# Patient Record
Sex: FEMALE | Race: BLACK OR AFRICAN AMERICAN | Employment: FULL TIME | URBAN - METROPOLITAN AREA
[De-identification: names, ages, dates, MRNs, and addresses within clinical notes are randomized per-mention and may not be internally consistent; named-entity substitution may affect disease eponyms.]

---

## 2017-05-18 PROBLEM — T82.868A THROMBOSIS COMPLICATING VENOUS ACCESS DEVICE (HCC): Chronic | Status: ACTIVE | Noted: 2017-05-18

## 2017-10-12 PROBLEM — D57.1 HB-SS DISEASE WITHOUT CRISIS (HCC): Status: ACTIVE | Noted: 2017-10-12

## 2017-10-12 PROBLEM — M54.40 ACUTE BILATERAL LOW BACK PAIN WITH SCIATICA: Status: ACTIVE | Noted: 2017-10-12

## 2017-12-13 PROBLEM — D57.00 SICKLE CELL CRISIS (HCC): Status: ACTIVE | Noted: 2017-12-13

## 2018-01-19 PROBLEM — Z3A.21 21 WEEKS GESTATION OF PREGNANCY: Status: ACTIVE | Noted: 2018-01-19

## 2018-03-20 ENCOUNTER — OFFICE VISIT (OUTPATIENT)
Dept: VASCULAR SURGERY | Age: 23
End: 2018-03-20

## 2018-03-20 DIAGNOSIS — D57.1 HB-SS DISEASE WITHOUT CRISIS (HCC): Primary | ICD-10-CM

## 2018-03-20 PROCEDURE — 99024 POSTOP FOLLOW-UP VISIT: CPT | Performed by: SURGERY

## 2018-05-28 ENCOUNTER — APPOINTMENT (OUTPATIENT)
Dept: CT IMAGING | Age: 23
DRG: 662 | End: 2018-05-28
Payer: MEDICAID

## 2018-05-28 ENCOUNTER — HOSPITAL ENCOUNTER (INPATIENT)
Age: 23
LOS: 3 days | Discharge: HOME | DRG: 662 | End: 2018-05-31
Attending: EMERGENCY MEDICINE | Admitting: INTERNAL MEDICINE
Payer: MEDICAID

## 2018-05-28 DIAGNOSIS — D57.01 ACUTE CHEST SYNDROME (HCC): Primary | ICD-10-CM

## 2018-05-28 DIAGNOSIS — D57.00 SICKLE CELL PAIN CRISIS (HCC): ICD-10-CM

## 2018-05-28 PROBLEM — J18.9 COMMUNITY ACQUIRED PNEUMONIA OF RIGHT LUNG: Status: ACTIVE | Noted: 2018-05-28

## 2018-05-28 LAB
ABO/RH: NORMAL
ANISOCYTOSIS: ABNORMAL
ANTIBODY SCREEN: NORMAL
APTT: 23.9 SEC (ref 24.5–35.1)
BACTERIA: ABNORMAL /HPF
BASOPHILS ABSOLUTE: 0 E9/L (ref 0–0.2)
BASOPHILS RELATIVE PERCENT: 0.2 % (ref 0–2)
BILIRUBIN URINE: NEGATIVE
BLOOD, URINE: NEGATIVE
CHP ED QC CHECK: YES
CHP ED QC CHECK: YES
CLARITY: CLEAR
COLOR: YELLOW
EKG ATRIAL RATE: 113 BPM
EKG P AXIS: 72 DEGREES
EKG P-R INTERVAL: 148 MS
EKG Q-T INTERVAL: 336 MS
EKG QRS DURATION: 80 MS
EKG QTC CALCULATION (BAZETT): 460 MS
EKG R AXIS: 88 DEGREES
EKG T AXIS: 5 DEGREES
EKG VENTRICULAR RATE: 113 BPM
EOSINOPHILS ABSOLUTE: 0.22 E9/L (ref 0.05–0.5)
EOSINOPHILS RELATIVE PERCENT: 1.5 % (ref 0–6)
EPITHELIAL CELLS, UA: ABNORMAL /HPF
GLUCOSE BLD-MCNC: 105 MG/DL
GLUCOSE URINE: NEGATIVE MG/DL
HCT VFR BLD CALC: 25.2 % (ref 34–48)
HEMOGLOBIN: 8.3 G/DL (ref 11.5–15.5)
HYPOCHROMIA: ABNORMAL
IMMATURE RETIC FRACT: 24.7 % (ref 3–15.9)
INR BLD: 1.2
KETONES, URINE: NEGATIVE MG/DL
LACTIC ACID, SEPSIS: 0.6 MMOL/L (ref 0.5–1.9)
LEUKOCYTE ESTERASE, URINE: ABNORMAL
LYMPHOCYTES ABSOLUTE: 3.17 E9/L (ref 1.5–4)
LYMPHOCYTES RELATIVE PERCENT: 22 % (ref 20–42)
MCH RBC QN AUTO: 18.9 PG (ref 26–35)
MCHC RBC AUTO-ENTMCNC: 32.9 % (ref 32–34.5)
MCV RBC AUTO: 57.3 FL (ref 80–99.9)
MONOCYTES ABSOLUTE: 0.86 E9/L (ref 0.1–0.95)
MONOCYTES RELATIVE PERCENT: 5.5 % (ref 2–12)
NEUTROPHILS ABSOLUTE: 10.22 E9/L (ref 1.8–7.3)
NEUTROPHILS RELATIVE PERCENT: 71 % (ref 43–80)
NITRITE, URINE: NEGATIVE
OVALOCYTES: ABNORMAL
PDW BLD-RTO: 22.5 FL (ref 11.5–15)
PH UA: 6.5 (ref 5–9)
PLATELET # BLD: 275 E9/L (ref 130–450)
PMV BLD AUTO: ABNORMAL FL (ref 7–12)
POC ANION GAP: 17
POC BUN: 7
POC CHLORIDE: 103
POC CO2: 24
POC CREATININE: NORMAL
POC POTASSIUM: NORMAL
POC SODIUM: 140
POIKILOCYTES: ABNORMAL
POLYCHROMASIA: ABNORMAL
PREGNANCY TEST URINE, POC: NEGATIVE
PROCALCITONIN: 0.05 NG/ML (ref 0–0.08)
PROTEIN UA: NEGATIVE MG/DL
PROTHROMBIN TIME: 13.9 SEC (ref 9.3–12.4)
RBC # BLD: 4.4 E12/L (ref 3.5–5.5)
RBC UA: ABNORMAL /HPF (ref 0–2)
RETIC HGB EQUIVALENT: 22.4 PG (ref 28.2–36.6)
RETICULOCYTE ABSOLUTE COUNT: 0.07 E12/L
RETICULOCYTE COUNT PCT: 1.5 % (ref 0.4–1.9)
SPECIFIC GRAVITY UA: 1.01 (ref 1–1.03)
TARGET CELLS: ABNORMAL
TROPONIN: <0.01 NG/ML (ref 0–0.03)
UROBILINOGEN, URINE: 0.2 E.U./DL
WBC # BLD: 14.4 E9/L (ref 4.5–11.5)
WBC UA: ABNORMAL /HPF (ref 0–5)

## 2018-05-28 PROCEDURE — 36415 COLL VENOUS BLD VENIPUNCTURE: CPT

## 2018-05-28 PROCEDURE — 2580000003 HC RX 258: Performed by: NURSE PRACTITIONER

## 2018-05-28 PROCEDURE — 86850 RBC ANTIBODY SCREEN: CPT

## 2018-05-28 PROCEDURE — 2580000003 HC RX 258: Performed by: EMERGENCY MEDICINE

## 2018-05-28 PROCEDURE — 6370000000 HC RX 637 (ALT 250 FOR IP): Performed by: NURSE PRACTITIONER

## 2018-05-28 PROCEDURE — 86900 BLOOD TYPING SEROLOGIC ABO: CPT

## 2018-05-28 PROCEDURE — 85730 THROMBOPLASTIN TIME PARTIAL: CPT

## 2018-05-28 PROCEDURE — 2580000003 HC RX 258: Performed by: INTERNAL MEDICINE

## 2018-05-28 PROCEDURE — 84484 ASSAY OF TROPONIN QUANT: CPT

## 2018-05-28 PROCEDURE — 71275 CT ANGIOGRAPHY CHEST: CPT

## 2018-05-28 PROCEDURE — 85610 PROTHROMBIN TIME: CPT

## 2018-05-28 PROCEDURE — 96374 THER/PROPH/DIAG INJ IV PUSH: CPT

## 2018-05-28 PROCEDURE — 99285 EMERGENCY DEPT VISIT HI MDM: CPT

## 2018-05-28 PROCEDURE — 1200000000 HC SEMI PRIVATE

## 2018-05-28 PROCEDURE — 93005 ELECTROCARDIOGRAM TRACING: CPT | Performed by: EMERGENCY MEDICINE

## 2018-05-28 PROCEDURE — 83605 ASSAY OF LACTIC ACID: CPT

## 2018-05-28 PROCEDURE — 85025 COMPLETE CBC W/AUTO DIFF WBC: CPT

## 2018-05-28 PROCEDURE — 87040 BLOOD CULTURE FOR BACTERIA: CPT

## 2018-05-28 PROCEDURE — 87149 DNA/RNA DIRECT PROBE: CPT

## 2018-05-28 PROCEDURE — 81001 URINALYSIS AUTO W/SCOPE: CPT

## 2018-05-28 PROCEDURE — 6360000002 HC RX W HCPCS: Performed by: NURSE PRACTITIONER

## 2018-05-28 PROCEDURE — 6360000002 HC RX W HCPCS: Performed by: INTERNAL MEDICINE

## 2018-05-28 PROCEDURE — 96375 TX/PRO/DX INJ NEW DRUG ADDON: CPT

## 2018-05-28 PROCEDURE — 86901 BLOOD TYPING SEROLOGIC RH(D): CPT

## 2018-05-28 PROCEDURE — 85045 AUTOMATED RETICULOCYTE COUNT: CPT

## 2018-05-28 PROCEDURE — 6360000002 HC RX W HCPCS: Performed by: EMERGENCY MEDICINE

## 2018-05-28 PROCEDURE — 6370000000 HC RX 637 (ALT 250 FOR IP): Performed by: INTERNAL MEDICINE

## 2018-05-28 PROCEDURE — 6360000004 HC RX CONTRAST MEDICATION: Performed by: RADIOLOGY

## 2018-05-28 PROCEDURE — 84145 PROCALCITONIN (PCT): CPT

## 2018-05-28 RX ORDER — HYDROMORPHONE HCL 110MG/55ML
1 PATIENT CONTROLLED ANALGESIA SYRINGE INTRAVENOUS ONCE
Status: COMPLETED | OUTPATIENT
Start: 2018-05-28 | End: 2018-05-28

## 2018-05-28 RX ORDER — SODIUM CHLORIDE 0.9 % (FLUSH) 0.9 %
10 SYRINGE (ML) INJECTION EVERY 12 HOURS SCHEDULED
Status: DISCONTINUED | OUTPATIENT
Start: 2018-05-28 | End: 2018-05-31 | Stop reason: HOSPADM

## 2018-05-28 RX ORDER — HYDROMORPHONE HCL 110MG/55ML
0.5 PATIENT CONTROLLED ANALGESIA SYRINGE INTRAVENOUS
Status: DISCONTINUED | OUTPATIENT
Start: 2018-05-28 | End: 2018-05-28

## 2018-05-28 RX ORDER — METHYLPREDNISOLONE SODIUM SUCCINATE 125 MG/2ML
60 INJECTION, POWDER, LYOPHILIZED, FOR SOLUTION INTRAMUSCULAR; INTRAVENOUS EVERY 12 HOURS
Status: DISCONTINUED | OUTPATIENT
Start: 2018-05-28 | End: 2018-05-30

## 2018-05-28 RX ORDER — HYDROXYUREA 500 MG/1
500 CAPSULE ORAL 2 TIMES DAILY
Status: DISCONTINUED | OUTPATIENT
Start: 2018-05-28 | End: 2018-05-31 | Stop reason: HOSPADM

## 2018-05-28 RX ORDER — FERROUS SULFATE 325(65) MG
325 TABLET ORAL 2 TIMES DAILY WITH MEALS
Status: DISCONTINUED | OUTPATIENT
Start: 2018-05-28 | End: 2018-05-31 | Stop reason: HOSPADM

## 2018-05-28 RX ORDER — SODIUM CHLORIDE 9 MG/ML
1000 INJECTION, SOLUTION INTRAVENOUS ONCE
Status: COMPLETED | OUTPATIENT
Start: 2018-05-28 | End: 2018-05-28

## 2018-05-28 RX ORDER — OXYCODONE HYDROCHLORIDE AND ACETAMINOPHEN 5; 325 MG/1; MG/1
2 TABLET ORAL EVERY 4 HOURS PRN
Status: DISCONTINUED | OUTPATIENT
Start: 2018-05-28 | End: 2018-05-28

## 2018-05-28 RX ORDER — SODIUM CHLORIDE 9 MG/ML
INJECTION, SOLUTION INTRAVENOUS CONTINUOUS
Status: DISCONTINUED | OUTPATIENT
Start: 2018-05-28 | End: 2018-05-30

## 2018-05-28 RX ORDER — ONDANSETRON 2 MG/ML
4 INJECTION INTRAMUSCULAR; INTRAVENOUS ONCE
Status: COMPLETED | OUTPATIENT
Start: 2018-05-28 | End: 2018-05-28

## 2018-05-28 RX ORDER — MORPHINE SULFATE 10 MG/ML
5 INJECTION, SOLUTION INTRAMUSCULAR; INTRAVENOUS ONCE
Status: COMPLETED | OUTPATIENT
Start: 2018-05-28 | End: 2018-05-28

## 2018-05-28 RX ORDER — IPRATROPIUM BROMIDE AND ALBUTEROL SULFATE 2.5; .5 MG/3ML; MG/3ML
1 SOLUTION RESPIRATORY (INHALATION)
Status: DISCONTINUED | OUTPATIENT
Start: 2018-05-28 | End: 2018-05-31 | Stop reason: HOSPADM

## 2018-05-28 RX ORDER — HYDROMORPHONE HCL 110MG/55ML
1 PATIENT CONTROLLED ANALGESIA SYRINGE INTRAVENOUS
Status: DISCONTINUED | OUTPATIENT
Start: 2018-05-28 | End: 2018-05-31

## 2018-05-28 RX ORDER — FOLIC ACID 1 MG/1
2 TABLET ORAL 3 TIMES DAILY
Status: DISCONTINUED | OUTPATIENT
Start: 2018-05-28 | End: 2018-05-28

## 2018-05-28 RX ORDER — ALBUTEROL SULFATE 2.5 MG/3ML
2.5 SOLUTION RESPIRATORY (INHALATION)
Status: DISCONTINUED | OUTPATIENT
Start: 2018-05-28 | End: 2018-05-31 | Stop reason: HOSPADM

## 2018-05-28 RX ORDER — OXYCODONE AND ACETAMINOPHEN 10; 325 MG/1; MG/1
1 TABLET ORAL EVERY 6 HOURS
Status: DISCONTINUED | OUTPATIENT
Start: 2018-05-28 | End: 2018-05-31 | Stop reason: HOSPADM

## 2018-05-28 RX ORDER — OXYCODONE HYDROCHLORIDE AND ACETAMINOPHEN 5; 325 MG/1; MG/1
1 TABLET ORAL EVERY 4 HOURS PRN
Status: DISCONTINUED | OUTPATIENT
Start: 2018-05-28 | End: 2018-05-28

## 2018-05-28 RX ORDER — SODIUM CHLORIDE 0.9 % (FLUSH) 0.9 %
10 SYRINGE (ML) INJECTION PRN
Status: DISCONTINUED | OUTPATIENT
Start: 2018-05-28 | End: 2018-05-31 | Stop reason: HOSPADM

## 2018-05-28 RX ORDER — FOLIC ACID 1 MG/1
1 TABLET ORAL 3 TIMES DAILY
Status: DISCONTINUED | OUTPATIENT
Start: 2018-05-28 | End: 2018-05-31 | Stop reason: HOSPADM

## 2018-05-28 RX ADMIN — IOPAMIDOL 80 ML: 755 INJECTION, SOLUTION INTRAVENOUS at 02:52

## 2018-05-28 RX ADMIN — OXYCODONE HYDROCHLORIDE AND ACETAMINOPHEN 2 TABLET: 5; 325 TABLET ORAL at 08:33

## 2018-05-28 RX ADMIN — Medication 10 ML: at 19:57

## 2018-05-28 RX ADMIN — Medication 10 ML: at 09:35

## 2018-05-28 RX ADMIN — HYDROMORPHONE HYDROCHLORIDE 1 MG: 2 INJECTION, SOLUTION INTRAMUSCULAR; INTRAVENOUS; SUBCUTANEOUS at 19:57

## 2018-05-28 RX ADMIN — ENOXAPARIN SODIUM 40 MG: 100 INJECTION SUBCUTANEOUS at 10:35

## 2018-05-28 RX ADMIN — SODIUM CHLORIDE 1000 ML: 9 INJECTION, SOLUTION INTRAVENOUS at 03:16

## 2018-05-28 RX ADMIN — METHYLPREDNISOLONE SODIUM SUCCINATE 60 MG: 125 INJECTION, POWDER, FOR SOLUTION INTRAMUSCULAR; INTRAVENOUS at 10:36

## 2018-05-28 RX ADMIN — HYDROXYUREA 500 MG: 500 CAPSULE ORAL at 11:21

## 2018-05-28 RX ADMIN — OXYCODONE HYDROCHLORIDE AND ACETAMINOPHEN 1 TABLET: 10; 325 TABLET ORAL at 17:18

## 2018-05-28 RX ADMIN — SODIUM CHLORIDE 1000 ML: 9 INJECTION, SOLUTION INTRAVENOUS at 06:50

## 2018-05-28 RX ADMIN — ONDANSETRON 4 MG: 2 INJECTION INTRAMUSCULAR; INTRAVENOUS at 03:42

## 2018-05-28 RX ADMIN — HYDROXYUREA 500 MG: 500 CAPSULE ORAL at 19:57

## 2018-05-28 RX ADMIN — MORPHINE SULFATE 5 MG: 10 INJECTION INTRAVENOUS at 03:19

## 2018-05-28 RX ADMIN — HYDROMORPHONE HYDROCHLORIDE 1 MG: 1 INJECTION, SOLUTION INTRAMUSCULAR; INTRAVENOUS; SUBCUTANEOUS at 05:21

## 2018-05-28 RX ADMIN — FOLIC ACID 1 MG: 1 TABLET ORAL at 19:57

## 2018-05-28 RX ADMIN — FERROUS SULFATE TAB 325 MG (65 MG ELEMENTAL FE) 325 MG: 325 (65 FE) TAB at 09:34

## 2018-05-28 RX ADMIN — OXYCODONE HYDROCHLORIDE AND ACETAMINOPHEN 1 TABLET: 10; 325 TABLET ORAL at 22:00

## 2018-05-28 RX ADMIN — SODIUM CHLORIDE: 9 INJECTION, SOLUTION INTRAVENOUS at 23:28

## 2018-05-28 RX ADMIN — METHYLPREDNISOLONE SODIUM SUCCINATE 60 MG: 125 INJECTION, POWDER, FOR SOLUTION INTRAMUSCULAR; INTRAVENOUS at 22:00

## 2018-05-28 RX ADMIN — SODIUM CHLORIDE: 9 INJECTION, SOLUTION INTRAVENOUS at 09:27

## 2018-05-28 RX ADMIN — HYDROMORPHONE HYDROCHLORIDE 0.5 MG: 2 INJECTION INTRAMUSCULAR; INTRAVENOUS; SUBCUTANEOUS at 10:36

## 2018-05-28 RX ADMIN — FERROUS SULFATE TAB 325 MG (65 MG ELEMENTAL FE) 325 MG: 325 (65 FE) TAB at 16:29

## 2018-05-28 RX ADMIN — HYDROMORPHONE HYDROCHLORIDE 1 MG: 2 INJECTION INTRAMUSCULAR; INTRAVENOUS; SUBCUTANEOUS at 13:33

## 2018-05-28 RX ADMIN — HYDROMORPHONE HYDROCHLORIDE 1 MG: 2 INJECTION, SOLUTION INTRAMUSCULAR; INTRAVENOUS; SUBCUTANEOUS at 16:25

## 2018-05-28 RX ADMIN — HYDROMORPHONE HYDROCHLORIDE 1 MG: 2 INJECTION, SOLUTION INTRAMUSCULAR; INTRAVENOUS; SUBCUTANEOUS at 23:28

## 2018-05-28 RX ADMIN — AZITHROMYCIN MONOHYDRATE 500 MG: 500 INJECTION, POWDER, LYOPHILIZED, FOR SOLUTION INTRAVENOUS at 06:51

## 2018-05-28 RX ADMIN — WATER 2 G: 1 INJECTION INTRAMUSCULAR; INTRAVENOUS; SUBCUTANEOUS at 06:47

## 2018-05-28 RX ADMIN — SODIUM CHLORIDE: 9 INJECTION, SOLUTION INTRAVENOUS at 16:29

## 2018-05-28 RX ADMIN — FOLIC ACID 1 MG: 1 TABLET ORAL at 13:33

## 2018-05-28 ASSESSMENT — PAIN DESCRIPTION - PROGRESSION
CLINICAL_PROGRESSION: NOT CHANGED

## 2018-05-28 ASSESSMENT — PAIN SCALES - GENERAL
PAINLEVEL_OUTOF10: 10
PAINLEVEL_OUTOF10: 10
PAINLEVEL_OUTOF10: 8
PAINLEVEL_OUTOF10: 6
PAINLEVEL_OUTOF10: 7
PAINLEVEL_OUTOF10: 9
PAINLEVEL_OUTOF10: 10
PAINLEVEL_OUTOF10: 10
PAINLEVEL_OUTOF10: 9
PAINLEVEL_OUTOF10: 4
PAINLEVEL_OUTOF10: 9
PAINLEVEL_OUTOF10: 10
PAINLEVEL_OUTOF10: 8
PAINLEVEL_OUTOF10: 8
PAINLEVEL_OUTOF10: 9
PAINLEVEL_OUTOF10: 5
PAINLEVEL_OUTOF10: 9

## 2018-05-28 ASSESSMENT — PAIN DESCRIPTION - DESCRIPTORS
DESCRIPTORS: ACHING;CONSTANT;DISCOMFORT
DESCRIPTORS: ACHING;DISCOMFORT
DESCRIPTORS: ACHING;DISCOMFORT
DESCRIPTORS: ACHING;CONSTANT;DISCOMFORT

## 2018-05-28 ASSESSMENT — PAIN DESCRIPTION - PAIN TYPE
TYPE: ACUTE PAIN

## 2018-05-28 ASSESSMENT — ENCOUNTER SYMPTOMS
COUGH: 0
EYE DISCHARGE: 0
WHEEZING: 0
VOMITING: 0
EYE REDNESS: 0
EYE PAIN: 0
BACK PAIN: 1
ABDOMINAL DISTENTION: 0
DIARRHEA: 0
SINUS PRESSURE: 0
SHORTNESS OF BREATH: 0
NAUSEA: 0
SORE THROAT: 0

## 2018-05-28 ASSESSMENT — PAIN DESCRIPTION - ONSET
ONSET: ON-GOING

## 2018-05-28 ASSESSMENT — PAIN DESCRIPTION - FREQUENCY
FREQUENCY: CONTINUOUS

## 2018-05-28 ASSESSMENT — PAIN DESCRIPTION - ORIENTATION
ORIENTATION: RIGHT;LEFT

## 2018-05-28 ASSESSMENT — PAIN DESCRIPTION - LOCATION
LOCATION: GENERALIZED
LOCATION: CHEST
LOCATION: BACK;CHEST;LEG
LOCATION: BACK;CHEST;LEG
LOCATION: GENERALIZED
LOCATION: BACK;CHEST;LEG
LOCATION: BACK;CHEST;LEG

## 2018-05-29 LAB
ALBUMIN SERPL-MCNC: 3.5 G/DL (ref 3.5–5.2)
ALP BLD-CCNC: 94 U/L (ref 35–104)
ALT SERPL-CCNC: 8 U/L (ref 0–32)
ANION GAP SERPL CALCULATED.3IONS-SCNC: 11 MMOL/L (ref 7–16)
ANISOCYTOSIS: ABNORMAL
AST SERPL-CCNC: 12 U/L (ref 0–31)
BASOPHILS ABSOLUTE: 0 E9/L (ref 0–0.2)
BASOPHILS RELATIVE PERCENT: 0.1 % (ref 0–2)
BILIRUB SERPL-MCNC: 0.8 MG/DL (ref 0–1.2)
BUN BLDV-MCNC: 7 MG/DL (ref 6–20)
CALCIUM SERPL-MCNC: 8.7 MG/DL (ref 8.6–10.2)
CHLORIDE BLD-SCNC: 103 MMOL/L (ref 98–107)
CO2: 24 MMOL/L (ref 22–29)
CREAT SERPL-MCNC: 0.7 MG/DL (ref 0.5–1)
EOSINOPHILS ABSOLUTE: 0 E9/L (ref 0.05–0.5)
EOSINOPHILS RELATIVE PERCENT: 0 % (ref 0–6)
GFR AFRICAN AMERICAN: >60
GFR NON-AFRICAN AMERICAN: >60 ML/MIN/1.73
GLUCOSE BLD-MCNC: 238 MG/DL (ref 74–109)
HCT VFR BLD CALC: 25 % (ref 34–48)
HEMOGLOBIN: 8.1 G/DL (ref 11.5–15.5)
HYPOCHROMIA: ABNORMAL
LIPASE: 22 U/L (ref 13–60)
LYMPHOCYTES ABSOLUTE: 0.91 E9/L (ref 1.5–4)
LYMPHOCYTES RELATIVE PERCENT: 5.5 % (ref 20–42)
MCH RBC QN AUTO: 18.8 PG (ref 26–35)
MCHC RBC AUTO-ENTMCNC: 32.4 % (ref 32–34.5)
MCV RBC AUTO: 58 FL (ref 80–99.9)
MONOCYTES ABSOLUTE: 0.15 E9/L (ref 0.1–0.95)
MONOCYTES RELATIVE PERCENT: 1 % (ref 2–12)
NEUTROPHILS ABSOLUTE: 14.19 E9/L (ref 1.8–7.3)
NEUTROPHILS RELATIVE PERCENT: 93.5 % (ref 43–80)
OVALOCYTES: ABNORMAL
PDW BLD-RTO: 22.3 FL (ref 11.5–15)
PLATELET # BLD: 289 E9/L (ref 130–450)
PMV BLD AUTO: ABNORMAL FL (ref 7–12)
POIKILOCYTES: ABNORMAL
POLYCHROMASIA: ABNORMAL
POTASSIUM REFLEX MAGNESIUM: 4.1 MMOL/L (ref 3.5–5)
RBC # BLD: 4.31 E12/L (ref 3.5–5.5)
SODIUM BLD-SCNC: 138 MMOL/L (ref 132–146)
TARGET CELLS: ABNORMAL
TOTAL PROTEIN: 7.3 G/DL (ref 6.4–8.3)
WBC # BLD: 15.1 E9/L (ref 4.5–11.5)

## 2018-05-29 PROCEDURE — 36415 COLL VENOUS BLD VENIPUNCTURE: CPT

## 2018-05-29 PROCEDURE — 6360000002 HC RX W HCPCS: Performed by: NURSE PRACTITIONER

## 2018-05-29 PROCEDURE — 83690 ASSAY OF LIPASE: CPT

## 2018-05-29 PROCEDURE — 2580000003 HC RX 258: Performed by: HOSPITALIST

## 2018-05-29 PROCEDURE — 80053 COMPREHEN METABOLIC PANEL: CPT

## 2018-05-29 PROCEDURE — 85025 COMPLETE CBC W/AUTO DIFF WBC: CPT

## 2018-05-29 PROCEDURE — 6370000000 HC RX 637 (ALT 250 FOR IP): Performed by: NURSE PRACTITIONER

## 2018-05-29 PROCEDURE — 36591 DRAW BLOOD OFF VENOUS DEVICE: CPT

## 2018-05-29 PROCEDURE — 2580000003 HC RX 258: Performed by: INTERNAL MEDICINE

## 2018-05-29 PROCEDURE — 1200000000 HC SEMI PRIVATE

## 2018-05-29 PROCEDURE — 6360000002 HC RX W HCPCS: Performed by: INTERNAL MEDICINE

## 2018-05-29 PROCEDURE — 6360000002 HC RX W HCPCS: Performed by: HOSPITALIST

## 2018-05-29 PROCEDURE — 2580000003 HC RX 258: Performed by: NURSE PRACTITIONER

## 2018-05-29 PROCEDURE — 6370000000 HC RX 637 (ALT 250 FOR IP): Performed by: INTERNAL MEDICINE

## 2018-05-29 RX ORDER — DIPHENHYDRAMINE HCL 25 MG
25 TABLET ORAL EVERY 6 HOURS PRN
Status: DISCONTINUED | OUTPATIENT
Start: 2018-05-29 | End: 2018-05-31 | Stop reason: HOSPADM

## 2018-05-29 RX ORDER — ONDANSETRON 2 MG/ML
4 INJECTION INTRAMUSCULAR; INTRAVENOUS EVERY 6 HOURS PRN
Status: DISCONTINUED | OUTPATIENT
Start: 2018-05-29 | End: 2018-05-31 | Stop reason: HOSPADM

## 2018-05-29 RX ORDER — DIPHENHYDRAMINE HYDROCHLORIDE 50 MG/ML
25 INJECTION INTRAMUSCULAR; INTRAVENOUS ONCE
Status: COMPLETED | OUTPATIENT
Start: 2018-05-29 | End: 2018-05-29

## 2018-05-29 RX ADMIN — FOLIC ACID 1 MG: 1 TABLET ORAL at 21:23

## 2018-05-29 RX ADMIN — OXYCODONE HYDROCHLORIDE AND ACETAMINOPHEN 1 TABLET: 10; 325 TABLET ORAL at 23:54

## 2018-05-29 RX ADMIN — SODIUM CHLORIDE: 9 INJECTION, SOLUTION INTRAVENOUS at 05:54

## 2018-05-29 RX ADMIN — ENOXAPARIN SODIUM 40 MG: 100 INJECTION SUBCUTANEOUS at 10:03

## 2018-05-29 RX ADMIN — FERROUS SULFATE TAB 325 MG (65 MG ELEMENTAL FE) 325 MG: 325 (65 FE) TAB at 10:02

## 2018-05-29 RX ADMIN — HYDROMORPHONE HYDROCHLORIDE 1 MG: 2 INJECTION, SOLUTION INTRAMUSCULAR; INTRAVENOUS; SUBCUTANEOUS at 08:23

## 2018-05-29 RX ADMIN — HYDROXYUREA 500 MG: 500 CAPSULE ORAL at 21:23

## 2018-05-29 RX ADMIN — OXYCODONE HYDROCHLORIDE AND ACETAMINOPHEN 1 TABLET: 10; 325 TABLET ORAL at 11:52

## 2018-05-29 RX ADMIN — FERROUS SULFATE TAB 325 MG (65 MG ELEMENTAL FE) 325 MG: 325 (65 FE) TAB at 17:01

## 2018-05-29 RX ADMIN — OXYCODONE HYDROCHLORIDE AND ACETAMINOPHEN 1 TABLET: 10; 325 TABLET ORAL at 18:33

## 2018-05-29 RX ADMIN — HYDROMORPHONE HYDROCHLORIDE 1 MG: 2 INJECTION, SOLUTION INTRAMUSCULAR; INTRAVENOUS; SUBCUTANEOUS at 21:23

## 2018-05-29 RX ADMIN — DEXTROSE MONOHYDRATE 1 G: 5 INJECTION INTRAVENOUS at 10:02

## 2018-05-29 RX ADMIN — HYDROMORPHONE HYDROCHLORIDE 1 MG: 2 INJECTION, SOLUTION INTRAMUSCULAR; INTRAVENOUS; SUBCUTANEOUS at 13:40

## 2018-05-29 RX ADMIN — AZITHROMYCIN MONOHYDRATE 500 MG: 500 INJECTION, POWDER, LYOPHILIZED, FOR SOLUTION INTRAVENOUS at 07:08

## 2018-05-29 RX ADMIN — DIPHENHYDRAMINE HCL 25 MG: 25 TABLET ORAL at 18:36

## 2018-05-29 RX ADMIN — Medication 10 ML: at 21:30

## 2018-05-29 RX ADMIN — HYDROMORPHONE HYDROCHLORIDE 1 MG: 2 INJECTION, SOLUTION INTRAMUSCULAR; INTRAVENOUS; SUBCUTANEOUS at 04:22

## 2018-05-29 RX ADMIN — DIPHENHYDRAMINE HYDROCHLORIDE 25 MG: 50 INJECTION, SOLUTION INTRAMUSCULAR; INTRAVENOUS at 01:04

## 2018-05-29 RX ADMIN — SODIUM CHLORIDE: 9 INJECTION, SOLUTION INTRAVENOUS at 17:34

## 2018-05-29 RX ADMIN — HYDROMORPHONE HYDROCHLORIDE 1 MG: 2 INJECTION, SOLUTION INTRAMUSCULAR; INTRAVENOUS; SUBCUTANEOUS at 17:01

## 2018-05-29 RX ADMIN — ONDANSETRON 4 MG: 2 INJECTION INTRAMUSCULAR; INTRAVENOUS at 10:11

## 2018-05-29 RX ADMIN — METHYLPREDNISOLONE SODIUM SUCCINATE 60 MG: 125 INJECTION, POWDER, FOR SOLUTION INTRAMUSCULAR; INTRAVENOUS at 21:23

## 2018-05-29 RX ADMIN — OXYCODONE HYDROCHLORIDE AND ACETAMINOPHEN 1 TABLET: 10; 325 TABLET ORAL at 05:53

## 2018-05-29 RX ADMIN — FOLIC ACID 1 MG: 1 TABLET ORAL at 10:02

## 2018-05-29 RX ADMIN — HYDROXYUREA 500 MG: 500 CAPSULE ORAL at 10:03

## 2018-05-29 RX ADMIN — FOLIC ACID 1 MG: 1 TABLET ORAL at 13:28

## 2018-05-29 RX ADMIN — METHYLPREDNISOLONE SODIUM SUCCINATE 60 MG: 125 INJECTION, POWDER, FOR SOLUTION INTRAMUSCULAR; INTRAVENOUS at 10:02

## 2018-05-29 ASSESSMENT — PAIN SCALES - GENERAL
PAINLEVEL_OUTOF10: 9
PAINLEVEL_OUTOF10: 9
PAINLEVEL_OUTOF10: 0
PAINLEVEL_OUTOF10: 9
PAINLEVEL_OUTOF10: 8
PAINLEVEL_OUTOF10: 0
PAINLEVEL_OUTOF10: 8
PAINLEVEL_OUTOF10: 6
PAINLEVEL_OUTOF10: 8
PAINLEVEL_OUTOF10: 7
PAINLEVEL_OUTOF10: 7
PAINLEVEL_OUTOF10: 9

## 2018-05-29 ASSESSMENT — PAIN DESCRIPTION - PAIN TYPE
TYPE: ACUTE PAIN

## 2018-05-29 ASSESSMENT — PAIN DESCRIPTION - DESCRIPTORS
DESCRIPTORS: ACHING;CRAMPING;SORE
DESCRIPTORS: ACHING;CRAMPING;SORE
DESCRIPTORS: ACHING;CRAMPING;DISCOMFORT;SORE
DESCRIPTORS: ACHING;DISCOMFORT;CRAMPING;SORE

## 2018-05-29 ASSESSMENT — PAIN DESCRIPTION - ORIENTATION
ORIENTATION: RIGHT;LEFT

## 2018-05-29 ASSESSMENT — PAIN DESCRIPTION - ONSET
ONSET: ON-GOING

## 2018-05-29 ASSESSMENT — PAIN DESCRIPTION - PROGRESSION
CLINICAL_PROGRESSION: NOT CHANGED

## 2018-05-29 ASSESSMENT — PAIN DESCRIPTION - FREQUENCY
FREQUENCY: CONTINUOUS

## 2018-05-29 ASSESSMENT — PAIN DESCRIPTION - LOCATION
LOCATION: GENERALIZED
LOCATION: RIB CAGE;BACK;LEG

## 2018-05-30 LAB
ALBUMIN SERPL-MCNC: 3.4 G/DL (ref 3.5–5.2)
ALP BLD-CCNC: 98 U/L (ref 35–104)
ALT SERPL-CCNC: 7 U/L (ref 0–32)
ANION GAP SERPL CALCULATED.3IONS-SCNC: 9 MMOL/L (ref 7–16)
ANISOCYTOSIS: ABNORMAL
AST SERPL-CCNC: 13 U/L (ref 0–31)
BASOPHILS ABSOLUTE: 0 E9/L (ref 0–0.2)
BASOPHILS RELATIVE PERCENT: 0.1 % (ref 0–2)
BILIRUB SERPL-MCNC: 0.5 MG/DL (ref 0–1.2)
BUN BLDV-MCNC: 6 MG/DL (ref 6–20)
CALCIUM SERPL-MCNC: 8.4 MG/DL (ref 8.6–10.2)
CHLORIDE BLD-SCNC: 108 MMOL/L (ref 98–107)
CO2: 25 MMOL/L (ref 22–29)
CREAT SERPL-MCNC: 0.7 MG/DL (ref 0.5–1)
EOSINOPHILS ABSOLUTE: 0 E9/L (ref 0.05–0.5)
EOSINOPHILS RELATIVE PERCENT: 0 % (ref 0–6)
GFR AFRICAN AMERICAN: >60
GFR NON-AFRICAN AMERICAN: >60 ML/MIN/1.73
GLUCOSE BLD-MCNC: 227 MG/DL (ref 74–109)
HCT VFR BLD CALC: 25.6 % (ref 34–48)
HEMOGLOBIN: 8.3 G/DL (ref 11.5–15.5)
HYPOCHROMIA: ABNORMAL
LIPASE: 32 U/L (ref 13–60)
LYMPHOCYTES ABSOLUTE: 0.85 E9/L (ref 1.5–4)
LYMPHOCYTES RELATIVE PERCENT: 3 % (ref 20–42)
MCH RBC QN AUTO: 18.9 PG (ref 26–35)
MCHC RBC AUTO-ENTMCNC: 32.4 % (ref 32–34.5)
MCV RBC AUTO: 58.4 FL (ref 80–99.9)
MONOCYTES ABSOLUTE: 0.57 E9/L (ref 0.1–0.95)
MONOCYTES RELATIVE PERCENT: 1.5 % (ref 2–12)
NEUTROPHILS ABSOLUTE: 27.17 E9/L (ref 1.8–7.3)
NEUTROPHILS RELATIVE PERCENT: 95.5 % (ref 43–80)
NUCLEATED RED BLOOD CELLS: 0.5 /100 WBC
PDW BLD-RTO: 22.5 FL (ref 11.5–15)
PLATELET # BLD: 316 E9/L (ref 130–450)
PMV BLD AUTO: ABNORMAL FL (ref 7–12)
POIKILOCYTES: ABNORMAL
POLYCHROMASIA: ABNORMAL
POTASSIUM REFLEX MAGNESIUM: 3.8 MMOL/L (ref 3.5–5)
PROCALCITONIN: 0.03 NG/ML (ref 0–0.08)
RBC # BLD: 4.38 E12/L (ref 3.5–5.5)
SODIUM BLD-SCNC: 142 MMOL/L (ref 132–146)
TARGET CELLS: ABNORMAL
TOTAL PROTEIN: 7 G/DL (ref 6.4–8.3)
WBC # BLD: 28.3 E9/L (ref 4.5–11.5)

## 2018-05-30 PROCEDURE — 87040 BLOOD CULTURE FOR BACTERIA: CPT

## 2018-05-30 PROCEDURE — 85025 COMPLETE CBC W/AUTO DIFF WBC: CPT

## 2018-05-30 PROCEDURE — 84145 PROCALCITONIN (PCT): CPT

## 2018-05-30 PROCEDURE — 2580000003 HC RX 258: Performed by: NURSE PRACTITIONER

## 2018-05-30 PROCEDURE — 6370000000 HC RX 637 (ALT 250 FOR IP): Performed by: NURSE PRACTITIONER

## 2018-05-30 PROCEDURE — 36591 DRAW BLOOD OFF VENOUS DEVICE: CPT

## 2018-05-30 PROCEDURE — 1200000000 HC SEMI PRIVATE

## 2018-05-30 PROCEDURE — 36415 COLL VENOUS BLD VENIPUNCTURE: CPT

## 2018-05-30 PROCEDURE — 6360000002 HC RX W HCPCS: Performed by: HOSPITALIST

## 2018-05-30 PROCEDURE — 6360000002 HC RX W HCPCS: Performed by: INTERNAL MEDICINE

## 2018-05-30 PROCEDURE — 80053 COMPREHEN METABOLIC PANEL: CPT

## 2018-05-30 PROCEDURE — 6360000002 HC RX W HCPCS: Performed by: NURSE PRACTITIONER

## 2018-05-30 PROCEDURE — 6370000000 HC RX 637 (ALT 250 FOR IP): Performed by: INTERNAL MEDICINE

## 2018-05-30 PROCEDURE — 86902 BLOOD TYPE ANTIGEN DONOR EA: CPT

## 2018-05-30 PROCEDURE — 2580000003 HC RX 258: Performed by: HOSPITALIST

## 2018-05-30 PROCEDURE — 83690 ASSAY OF LIPASE: CPT

## 2018-05-30 RX ORDER — METHYLPREDNISOLONE SODIUM SUCCINATE 40 MG/ML
20 INJECTION, POWDER, LYOPHILIZED, FOR SOLUTION INTRAMUSCULAR; INTRAVENOUS EVERY 12 HOURS
Status: DISCONTINUED | OUTPATIENT
Start: 2018-05-30 | End: 2018-05-31

## 2018-05-30 RX ADMIN — DEXTROSE MONOHYDRATE 1 G: 5 INJECTION INTRAVENOUS at 09:29

## 2018-05-30 RX ADMIN — DIPHENHYDRAMINE HCL 25 MG: 25 TABLET ORAL at 06:27

## 2018-05-30 RX ADMIN — HYDROMORPHONE HYDROCHLORIDE 1 MG: 2 INJECTION, SOLUTION INTRAMUSCULAR; INTRAVENOUS; SUBCUTANEOUS at 15:54

## 2018-05-30 RX ADMIN — OXYCODONE HYDROCHLORIDE AND ACETAMINOPHEN 1 TABLET: 10; 325 TABLET ORAL at 23:50

## 2018-05-30 RX ADMIN — Medication 10 ML: at 20:31

## 2018-05-30 RX ADMIN — METHYLPREDNISOLONE SODIUM SUCCINATE 60 MG: 125 INJECTION, POWDER, FOR SOLUTION INTRAMUSCULAR; INTRAVENOUS at 10:42

## 2018-05-30 RX ADMIN — FERROUS SULFATE TAB 325 MG (65 MG ELEMENTAL FE) 325 MG: 325 (65 FE) TAB at 09:29

## 2018-05-30 RX ADMIN — FOLIC ACID 1 MG: 1 TABLET ORAL at 13:37

## 2018-05-30 RX ADMIN — HYDROMORPHONE HYDROCHLORIDE 1 MG: 2 INJECTION, SOLUTION INTRAMUSCULAR; INTRAVENOUS; SUBCUTANEOUS at 01:21

## 2018-05-30 RX ADMIN — Medication 10 ML: at 22:29

## 2018-05-30 RX ADMIN — AZITHROMYCIN MONOHYDRATE 500 MG: 500 INJECTION, POWDER, LYOPHILIZED, FOR SOLUTION INTRAVENOUS at 07:29

## 2018-05-30 RX ADMIN — FOLIC ACID 1 MG: 1 TABLET ORAL at 21:50

## 2018-05-30 RX ADMIN — METHYLPREDNISOLONE SODIUM SUCCINATE 20 MG: 40 INJECTION, POWDER, FOR SOLUTION INTRAMUSCULAR; INTRAVENOUS at 22:29

## 2018-05-30 RX ADMIN — HYDROXYUREA 500 MG: 500 CAPSULE ORAL at 09:29

## 2018-05-30 RX ADMIN — HYDROMORPHONE HYDROCHLORIDE 1 MG: 2 INJECTION, SOLUTION INTRAMUSCULAR; INTRAVENOUS; SUBCUTANEOUS at 08:21

## 2018-05-30 RX ADMIN — SODIUM CHLORIDE: 9 INJECTION, SOLUTION INTRAVENOUS at 03:33

## 2018-05-30 RX ADMIN — OXYCODONE HYDROCHLORIDE AND ACETAMINOPHEN 1 TABLET: 10; 325 TABLET ORAL at 18:42

## 2018-05-30 RX ADMIN — HYDROMORPHONE HYDROCHLORIDE 1 MG: 2 INJECTION, SOLUTION INTRAMUSCULAR; INTRAVENOUS; SUBCUTANEOUS at 20:31

## 2018-05-30 RX ADMIN — DIPHENHYDRAMINE HCL 25 MG: 25 TABLET ORAL at 15:54

## 2018-05-30 RX ADMIN — FERROUS SULFATE TAB 325 MG (65 MG ELEMENTAL FE) 325 MG: 325 (65 FE) TAB at 17:07

## 2018-05-30 RX ADMIN — ENOXAPARIN SODIUM 40 MG: 100 INJECTION SUBCUTANEOUS at 09:29

## 2018-05-30 RX ADMIN — OXYCODONE HYDROCHLORIDE AND ACETAMINOPHEN 1 TABLET: 10; 325 TABLET ORAL at 13:37

## 2018-05-30 RX ADMIN — OXYCODONE HYDROCHLORIDE AND ACETAMINOPHEN 1 TABLET: 10; 325 TABLET ORAL at 06:17

## 2018-05-30 RX ADMIN — ONDANSETRON 4 MG: 2 INJECTION INTRAMUSCULAR; INTRAVENOUS at 08:21

## 2018-05-30 RX ADMIN — HYDROXYUREA 500 MG: 500 CAPSULE ORAL at 21:50

## 2018-05-30 RX ADMIN — HYDROMORPHONE HYDROCHLORIDE 1 MG: 2 INJECTION, SOLUTION INTRAMUSCULAR; INTRAVENOUS; SUBCUTANEOUS at 04:45

## 2018-05-30 RX ADMIN — FOLIC ACID 1 MG: 1 TABLET ORAL at 09:29

## 2018-05-30 RX ADMIN — HYDROMORPHONE HYDROCHLORIDE 1 MG: 2 INJECTION, SOLUTION INTRAMUSCULAR; INTRAVENOUS; SUBCUTANEOUS at 11:27

## 2018-05-30 ASSESSMENT — PAIN SCALES - GENERAL
PAINLEVEL_OUTOF10: 0
PAINLEVEL_OUTOF10: 8
PAINLEVEL_OUTOF10: 4
PAINLEVEL_OUTOF10: 8
PAINLEVEL_OUTOF10: 6
PAINLEVEL_OUTOF10: 8
PAINLEVEL_OUTOF10: 5
PAINLEVEL_OUTOF10: 9
PAINLEVEL_OUTOF10: 8
PAINLEVEL_OUTOF10: 7
PAINLEVEL_OUTOF10: 8
PAINLEVEL_OUTOF10: 0
PAINLEVEL_OUTOF10: 8
PAINLEVEL_OUTOF10: 10

## 2018-05-30 ASSESSMENT — PAIN DESCRIPTION - PAIN TYPE
TYPE: ACUTE PAIN

## 2018-05-30 ASSESSMENT — PAIN DESCRIPTION - ORIENTATION
ORIENTATION: RIGHT;LEFT
ORIENTATION: RIGHT;LEFT
ORIENTATION: RIGHT

## 2018-05-30 ASSESSMENT — PAIN DESCRIPTION - LOCATION
LOCATION: GENERALIZED
LOCATION: GENERALIZED
LOCATION: BACK;GENERALIZED
LOCATION: GENERALIZED

## 2018-05-30 ASSESSMENT — PAIN DESCRIPTION - PROGRESSION
CLINICAL_PROGRESSION: NOT CHANGED

## 2018-05-30 ASSESSMENT — PAIN DESCRIPTION - ONSET
ONSET: ON-GOING

## 2018-05-30 ASSESSMENT — PAIN DESCRIPTION - DESCRIPTORS
DESCRIPTORS: SHARP
DESCRIPTORS: ACHING;CONSTANT;DISCOMFORT
DESCRIPTORS: CONSTANT;DISCOMFORT;DULL
DESCRIPTORS: ACHING;DISCOMFORT;CONSTANT
DESCRIPTORS: ACHING;CONSTANT;CRAMPING
DESCRIPTORS: ACHING;CONSTANT;CRAMPING;DISCOMFORT
DESCRIPTORS: ACHING;CONSTANT;CRAMPING

## 2018-05-30 ASSESSMENT — PAIN DESCRIPTION - FREQUENCY
FREQUENCY: CONTINUOUS

## 2018-05-31 VITALS
BODY MASS INDEX: 50.02 KG/M2 | OXYGEN SATURATION: 99 % | DIASTOLIC BLOOD PRESSURE: 64 MMHG | WEIGHT: 293 LBS | SYSTOLIC BLOOD PRESSURE: 129 MMHG | TEMPERATURE: 97.8 F | HEART RATE: 69 BPM | RESPIRATION RATE: 18 BRPM | HEIGHT: 64 IN

## 2018-05-31 LAB
ANISOCYTOSIS: ABNORMAL
BASOPHILS ABSOLUTE: 0 E9/L (ref 0–0.2)
BASOPHILS RELATIVE PERCENT: 0.2 % (ref 0–2)
EOSINOPHILS ABSOLUTE: 0 E9/L (ref 0.05–0.5)
EOSINOPHILS RELATIVE PERCENT: 0 % (ref 0–6)
HCT VFR BLD CALC: 24.8 % (ref 34–48)
HEMOGLOBIN: 8.1 G/DL (ref 11.5–15.5)
HYPOCHROMIA: ABNORMAL
LIPASE: 33 U/L (ref 13–60)
LYMPHOCYTES ABSOLUTE: 3.53 E9/L (ref 1.5–4)
LYMPHOCYTES RELATIVE PERCENT: 9.5 % (ref 20–42)
MCH RBC QN AUTO: 19.1 PG (ref 26–35)
MCHC RBC AUTO-ENTMCNC: 32.7 % (ref 32–34.5)
MCV RBC AUTO: 58.4 FL (ref 80–99.9)
MONOCYTES ABSOLUTE: 0.71 E9/L (ref 0.1–0.95)
MONOCYTES RELATIVE PERCENT: 1.5 % (ref 2–12)
NEUTROPHILS ABSOLUTE: 31.42 E9/L (ref 1.8–7.3)
NEUTROPHILS RELATIVE PERCENT: 88.9 % (ref 43–80)
OVALOCYTES: ABNORMAL
PDW BLD-RTO: 22.5 FL (ref 11.5–15)
PLATELET # BLD: 308 E9/L (ref 130–450)
PMV BLD AUTO: ABNORMAL FL (ref 7–12)
POIKILOCYTES: ABNORMAL
POLYCHROMASIA: ABNORMAL
RBC # BLD: 4.25 E12/L (ref 3.5–5.5)
TARGET CELLS: ABNORMAL
TEAR DROP CELLS: ABNORMAL
WBC # BLD: 35.3 E9/L (ref 4.5–11.5)

## 2018-05-31 PROCEDURE — 6360000002 HC RX W HCPCS: Performed by: HOSPITALIST

## 2018-05-31 PROCEDURE — 6360000002 HC RX W HCPCS: Performed by: INTERNAL MEDICINE

## 2018-05-31 PROCEDURE — 6370000000 HC RX 637 (ALT 250 FOR IP): Performed by: INTERNAL MEDICINE

## 2018-05-31 PROCEDURE — 6370000000 HC RX 637 (ALT 250 FOR IP): Performed by: NURSE PRACTITIONER

## 2018-05-31 PROCEDURE — 6360000002 HC RX W HCPCS: Performed by: NURSE PRACTITIONER

## 2018-05-31 PROCEDURE — 36591 DRAW BLOOD OFF VENOUS DEVICE: CPT

## 2018-05-31 PROCEDURE — 85025 COMPLETE CBC W/AUTO DIFF WBC: CPT

## 2018-05-31 PROCEDURE — 6360000002 HC RX W HCPCS

## 2018-05-31 PROCEDURE — 83690 ASSAY OF LIPASE: CPT

## 2018-05-31 PROCEDURE — 36415 COLL VENOUS BLD VENIPUNCTURE: CPT

## 2018-05-31 PROCEDURE — 2580000003 HC RX 258: Performed by: NURSE PRACTITIONER

## 2018-05-31 RX ORDER — HEPARIN SODIUM (PORCINE) LOCK FLUSH IV SOLN 100 UNIT/ML 100 UNIT/ML
SOLUTION INTRAVENOUS
Status: COMPLETED
Start: 2018-05-31 | End: 2018-05-31

## 2018-05-31 RX ORDER — OXYCODONE HYDROCHLORIDE AND ACETAMINOPHEN 5; 325 MG/1; MG/1
1 TABLET ORAL EVERY 6 HOURS PRN
Qty: 20 TABLET | Refills: 0 | Status: SHIPPED | OUTPATIENT
Start: 2018-05-31 | End: 2018-06-07

## 2018-05-31 RX ORDER — LEVOFLOXACIN 500 MG/1
500 TABLET, FILM COATED ORAL DAILY
Qty: 7 TABLET | Refills: 0 | Status: SHIPPED | OUTPATIENT
Start: 2018-05-31 | End: 2018-06-07

## 2018-05-31 RX ORDER — HYDROMORPHONE HCL 110MG/55ML
1 PATIENT CONTROLLED ANALGESIA SYRINGE INTRAVENOUS 4 TIMES DAILY PRN
Status: DISCONTINUED | OUTPATIENT
Start: 2018-05-31 | End: 2018-05-31 | Stop reason: HOSPADM

## 2018-05-31 RX ADMIN — AZITHROMYCIN MONOHYDRATE 500 MG: 500 INJECTION, POWDER, LYOPHILIZED, FOR SOLUTION INTRAVENOUS at 06:29

## 2018-05-31 RX ADMIN — FOLIC ACID 1 MG: 1 TABLET ORAL at 13:30

## 2018-05-31 RX ADMIN — HYDROMORPHONE HYDROCHLORIDE 1 MG: 2 INJECTION, SOLUTION INTRAMUSCULAR; INTRAVENOUS; SUBCUTANEOUS at 08:07

## 2018-05-31 RX ADMIN — HYDROMORPHONE HYDROCHLORIDE 1 MG: 2 INJECTION, SOLUTION INTRAMUSCULAR; INTRAVENOUS; SUBCUTANEOUS at 01:27

## 2018-05-31 RX ADMIN — HYDROMORPHONE HYDROCHLORIDE 1 MG: 2 INJECTION, SOLUTION INTRAMUSCULAR; INTRAVENOUS; SUBCUTANEOUS at 04:41

## 2018-05-31 RX ADMIN — Medication 10 ML: at 04:41

## 2018-05-31 RX ADMIN — FOLIC ACID 1 MG: 1 TABLET ORAL at 08:06

## 2018-05-31 RX ADMIN — DIPHENHYDRAMINE HCL 25 MG: 25 TABLET ORAL at 01:27

## 2018-05-31 RX ADMIN — HYDROMORPHONE HYDROCHLORIDE 1 MG: 2 INJECTION INTRAMUSCULAR; INTRAVENOUS; SUBCUTANEOUS at 13:30

## 2018-05-31 RX ADMIN — HEPARIN 100 UNITS: 100 SYRINGE at 16:35

## 2018-05-31 RX ADMIN — ENOXAPARIN SODIUM 40 MG: 100 INJECTION SUBCUTANEOUS at 08:06

## 2018-05-31 RX ADMIN — OXYCODONE HYDROCHLORIDE AND ACETAMINOPHEN 1 TABLET: 10; 325 TABLET ORAL at 06:28

## 2018-05-31 RX ADMIN — Medication 10 ML: at 08:07

## 2018-05-31 RX ADMIN — OXYCODONE HYDROCHLORIDE AND ACETAMINOPHEN 1 TABLET: 10; 325 TABLET ORAL at 12:08

## 2018-05-31 RX ADMIN — DEXTROSE MONOHYDRATE 1 G: 5 INJECTION INTRAVENOUS at 08:06

## 2018-05-31 RX ADMIN — FERROUS SULFATE TAB 325 MG (65 MG ELEMENTAL FE) 325 MG: 325 (65 FE) TAB at 08:06

## 2018-05-31 RX ADMIN — HYDROXYUREA 500 MG: 500 CAPSULE ORAL at 08:05

## 2018-05-31 ASSESSMENT — PAIN DESCRIPTION - PAIN TYPE
TYPE: ACUTE PAIN

## 2018-05-31 ASSESSMENT — PAIN SCALES - GENERAL
PAINLEVEL_OUTOF10: 8
PAINLEVEL_OUTOF10: 8
PAINLEVEL_OUTOF10: 7
PAINLEVEL_OUTOF10: 7
PAINLEVEL_OUTOF10: 6
PAINLEVEL_OUTOF10: 7
PAINLEVEL_OUTOF10: 8
PAINLEVEL_OUTOF10: 7
PAINLEVEL_OUTOF10: 6
PAINLEVEL_OUTOF10: 8
PAINLEVEL_OUTOF10: 8

## 2018-05-31 ASSESSMENT — PAIN DESCRIPTION - LOCATION
LOCATION: GENERALIZED
LOCATION: BACK;CHEST;GENERALIZED
LOCATION: GENERALIZED
LOCATION: BACK;CHEST;GENERALIZED
LOCATION: BACK;CHEST;GENERALIZED

## 2018-05-31 ASSESSMENT — PAIN DESCRIPTION - DESCRIPTORS
DESCRIPTORS: ACHING;CONSTANT;DISCOMFORT
DESCRIPTORS: SHARP
DESCRIPTORS: ACHING;CONSTANT;DISCOMFORT

## 2018-05-31 ASSESSMENT — PAIN DESCRIPTION - PROGRESSION: CLINICAL_PROGRESSION: NOT CHANGED

## 2018-05-31 ASSESSMENT — PAIN DESCRIPTION - FREQUENCY: FREQUENCY: CONTINUOUS

## 2018-06-01 LAB
CULTURE, BLOOD 2: ABNORMAL
CULTURE, BLOOD 2: ABNORMAL
ORGANISM: ABNORMAL

## 2018-06-02 LAB — BLOOD CULTURE, ROUTINE: NORMAL

## 2018-06-04 LAB — BLOOD CULTURE, ROUTINE: NORMAL

## 2018-08-19 ENCOUNTER — APPOINTMENT (OUTPATIENT)
Dept: GENERAL RADIOLOGY | Age: 23
End: 2018-08-19
Payer: MEDICAID

## 2018-08-19 ENCOUNTER — HOSPITAL ENCOUNTER (EMERGENCY)
Age: 23
Discharge: HOME | End: 2018-08-19
Attending: EMERGENCY MEDICINE
Payer: MEDICAID

## 2018-08-19 VITALS
DIASTOLIC BLOOD PRESSURE: 77 MMHG | TEMPERATURE: 98 F | HEIGHT: 64 IN | WEIGHT: 283 LBS | BODY MASS INDEX: 48.32 KG/M2 | SYSTOLIC BLOOD PRESSURE: 109 MMHG | OXYGEN SATURATION: 100 % | RESPIRATION RATE: 16 BRPM | HEART RATE: 81 BPM

## 2018-08-19 DIAGNOSIS — D57.00 SICKLE CELL ANEMIA WITH PAIN (HCC): Primary | ICD-10-CM

## 2018-08-19 LAB
ALBUMIN SERPL-MCNC: 3.9 G/DL (ref 3.5–5.2)
ALP BLD-CCNC: 110 U/L (ref 35–104)
ALT SERPL-CCNC: <5 U/L (ref 0–32)
ANION GAP SERPL CALCULATED.3IONS-SCNC: 12 MMOL/L (ref 7–16)
ANISOCYTOSIS: ABNORMAL
AST SERPL-CCNC: 10 U/L (ref 0–31)
BACTERIA: ABNORMAL /HPF
BASOPHILS ABSOLUTE: 0.03 E9/L (ref 0–0.2)
BASOPHILS RELATIVE PERCENT: 0.3 % (ref 0–2)
BILIRUB SERPL-MCNC: 1 MG/DL (ref 0–1.2)
BILIRUBIN URINE: NEGATIVE
BLOOD, URINE: ABNORMAL
BUN BLDV-MCNC: 11 MG/DL (ref 6–20)
CALCIUM SERPL-MCNC: 9 MG/DL (ref 8.6–10.2)
CHLORIDE BLD-SCNC: 103 MMOL/L (ref 98–107)
CHP ED QC CHECK: YES
CLARITY: ABNORMAL
CO2: 24 MMOL/L (ref 22–29)
COLOR: YELLOW
CREAT SERPL-MCNC: 0.9 MG/DL (ref 0.5–1)
EKG ATRIAL RATE: 73 BPM
EKG P AXIS: 51 DEGREES
EKG P-R INTERVAL: 144 MS
EKG Q-T INTERVAL: 398 MS
EKG QRS DURATION: 84 MS
EKG QTC CALCULATION (BAZETT): 438 MS
EKG R AXIS: 56 DEGREES
EKG T AXIS: 24 DEGREES
EKG VENTRICULAR RATE: 73 BPM
EOSINOPHILS ABSOLUTE: 0.14 E9/L (ref 0.05–0.5)
EOSINOPHILS RELATIVE PERCENT: 1.2 % (ref 0–6)
EPITHELIAL CELLS, UA: ABNORMAL /HPF
GFR AFRICAN AMERICAN: >60
GFR NON-AFRICAN AMERICAN: >60 ML/MIN/1.73
GLUCOSE BLD-MCNC: 97 MG/DL (ref 74–109)
GLUCOSE URINE: NEGATIVE MG/DL
HCT VFR BLD CALC: 28.1 % (ref 34–48)
HEMOGLOBIN: 9.3 G/DL (ref 11.5–15.5)
HYPOCHROMIA: ABNORMAL
IMMATURE GRANULOCYTES #: 0.05 E9/L
IMMATURE GRANULOCYTES %: 0.4 % (ref 0–5)
IMMATURE RETIC FRACT: 22.6 % (ref 3–15.9)
KETONES, URINE: NEGATIVE MG/DL
LACTIC ACID: 0.7 MMOL/L (ref 0.5–2.2)
LEUKOCYTE ESTERASE, URINE: ABNORMAL
LIPASE: 28 U/L (ref 13–60)
LYMPHOCYTES ABSOLUTE: 3.91 E9/L (ref 1.5–4)
LYMPHOCYTES RELATIVE PERCENT: 33.1 % (ref 20–42)
MCH RBC QN AUTO: 19.3 PG (ref 26–35)
MCHC RBC AUTO-ENTMCNC: 33.1 % (ref 32–34.5)
MCV RBC AUTO: 58.3 FL (ref 80–99.9)
MONOCYTES ABSOLUTE: 0.72 E9/L (ref 0.1–0.95)
MONOCYTES RELATIVE PERCENT: 6.1 % (ref 2–12)
NEUTROPHILS ABSOLUTE: 6.97 E9/L (ref 1.8–7.3)
NEUTROPHILS RELATIVE PERCENT: 58.9 % (ref 43–80)
NITRITE, URINE: NEGATIVE
PDW BLD-RTO: 20.3 FL (ref 11.5–15)
PH UA: 6.5 (ref 5–9)
PLATELET # BLD: 300 E9/L (ref 130–450)
PMV BLD AUTO: ABNORMAL FL (ref 7–12)
POIKILOCYTES: ABNORMAL
POLYCHROMASIA: ABNORMAL
POTASSIUM SERPL-SCNC: 4.1 MMOL/L (ref 3.5–5)
PREGNANCY TEST URINE, POC: NEGATIVE
PROTEIN UA: NEGATIVE MG/DL
RBC # BLD: 4.82 E12/L (ref 3.5–5.5)
RBC UA: ABNORMAL /HPF (ref 0–2)
RETIC HGB EQUIVALENT: 21.3 PG (ref 28.2–36.6)
RETICULOCYTE ABSOLUTE COUNT: 0.08 E12/L
RETICULOCYTE COUNT PCT: 1.6 % (ref 0.4–1.9)
SODIUM BLD-SCNC: 139 MMOL/L (ref 132–146)
SPECIFIC GRAVITY UA: 1.01 (ref 1–1.03)
SPHEROCYTES: ABNORMAL
TARGET CELLS: ABNORMAL
TEAR DROP CELLS: ABNORMAL
TOTAL PROTEIN: 7.4 G/DL (ref 6.4–8.3)
TROPONIN: <0.01 NG/ML (ref 0–0.03)
UROBILINOGEN, URINE: 0.2 E.U./DL
WBC # BLD: 11.8 E9/L (ref 4.5–11.5)
WBC UA: ABNORMAL /HPF (ref 0–5)

## 2018-08-19 PROCEDURE — 6360000002 HC RX W HCPCS: Performed by: PHYSICIAN ASSISTANT

## 2018-08-19 PROCEDURE — 85025 COMPLETE CBC W/AUTO DIFF WBC: CPT

## 2018-08-19 PROCEDURE — 71046 X-RAY EXAM CHEST 2 VIEWS: CPT

## 2018-08-19 PROCEDURE — 96372 THER/PROPH/DIAG INJ SC/IM: CPT

## 2018-08-19 PROCEDURE — 81001 URINALYSIS AUTO W/SCOPE: CPT

## 2018-08-19 PROCEDURE — S0028 INJECTION, FAMOTIDINE, 20 MG: HCPCS | Performed by: PHYSICIAN ASSISTANT

## 2018-08-19 PROCEDURE — 96375 TX/PRO/DX INJ NEW DRUG ADDON: CPT

## 2018-08-19 PROCEDURE — 83605 ASSAY OF LACTIC ACID: CPT

## 2018-08-19 PROCEDURE — 87088 URINE BACTERIA CULTURE: CPT

## 2018-08-19 PROCEDURE — 83690 ASSAY OF LIPASE: CPT

## 2018-08-19 PROCEDURE — 93005 ELECTROCARDIOGRAM TRACING: CPT | Performed by: PHYSICIAN ASSISTANT

## 2018-08-19 PROCEDURE — 36415 COLL VENOUS BLD VENIPUNCTURE: CPT

## 2018-08-19 PROCEDURE — 80053 COMPREHEN METABOLIC PANEL: CPT

## 2018-08-19 PROCEDURE — 96376 TX/PRO/DX INJ SAME DRUG ADON: CPT

## 2018-08-19 PROCEDURE — 99284 EMERGENCY DEPT VISIT MOD MDM: CPT

## 2018-08-19 PROCEDURE — 2580000003 HC RX 258: Performed by: PHYSICIAN ASSISTANT

## 2018-08-19 PROCEDURE — 96374 THER/PROPH/DIAG INJ IV PUSH: CPT

## 2018-08-19 PROCEDURE — 84484 ASSAY OF TROPONIN QUANT: CPT

## 2018-08-19 PROCEDURE — 85045 AUTOMATED RETICULOCYTE COUNT: CPT

## 2018-08-19 PROCEDURE — 2500000003 HC RX 250 WO HCPCS: Performed by: PHYSICIAN ASSISTANT

## 2018-08-19 RX ORDER — DIPHENHYDRAMINE HYDROCHLORIDE 50 MG/ML
25 INJECTION INTRAMUSCULAR; INTRAVENOUS ONCE
Status: COMPLETED | OUTPATIENT
Start: 2018-08-19 | End: 2018-08-19

## 2018-08-19 RX ORDER — HEPARIN SODIUM (PORCINE) LOCK FLUSH IV SOLN 100 UNIT/ML 100 UNIT/ML
100 SOLUTION INTRAVENOUS ONCE
Status: COMPLETED | OUTPATIENT
Start: 2018-08-19 | End: 2018-08-19

## 2018-08-19 RX ORDER — ALBUTEROL SULFATE 0.63 MG/3ML
1 SOLUTION RESPIRATORY (INHALATION) EVERY 4 HOURS PRN
COMMUNITY
End: 2019-05-01

## 2018-08-19 RX ORDER — 0.9 % SODIUM CHLORIDE 0.9 %
1000 INTRAVENOUS SOLUTION INTRAVENOUS ONCE
Status: COMPLETED | OUTPATIENT
Start: 2018-08-19 | End: 2018-08-19

## 2018-08-19 RX ADMIN — FAMOTIDINE 20 MG: 10 INJECTION, SOLUTION INTRAVENOUS at 18:29

## 2018-08-19 RX ADMIN — SODIUM CHLORIDE 1000 ML: 9 INJECTION, SOLUTION INTRAVENOUS at 18:28

## 2018-08-19 RX ADMIN — HEPARIN 100 UNITS: 100 SYRINGE at 20:25

## 2018-08-19 RX ADMIN — HYDROMORPHONE HYDROCHLORIDE 1 MG: 1 INJECTION, SOLUTION INTRAMUSCULAR; INTRAVENOUS; SUBCUTANEOUS at 19:37

## 2018-08-19 RX ADMIN — HYDROMORPHONE HYDROCHLORIDE 1 MG: 1 INJECTION, SOLUTION INTRAMUSCULAR; INTRAVENOUS; SUBCUTANEOUS at 18:28

## 2018-08-19 RX ADMIN — ENOXAPARIN SODIUM 100 MG: 100 INJECTION SUBCUTANEOUS at 19:37

## 2018-08-19 RX ADMIN — DIPHENHYDRAMINE HYDROCHLORIDE 25 MG: 50 INJECTION, SOLUTION INTRAMUSCULAR; INTRAVENOUS at 18:29

## 2018-08-19 ASSESSMENT — PAIN DESCRIPTION - DESCRIPTORS: DESCRIPTORS: CRAMPING

## 2018-08-19 ASSESSMENT — PAIN DESCRIPTION - ORIENTATION: ORIENTATION: UPPER

## 2018-08-19 ASSESSMENT — PAIN DESCRIPTION - LOCATION: LOCATION: ABDOMEN

## 2018-08-19 ASSESSMENT — PAIN DESCRIPTION - PAIN TYPE: TYPE: ACUTE PAIN

## 2018-08-19 ASSESSMENT — PAIN DESCRIPTION - FREQUENCY: FREQUENCY: CONTINUOUS

## 2018-08-19 ASSESSMENT — PAIN SCALES - GENERAL: PAINLEVEL_OUTOF10: 9

## 2018-08-19 NOTE — ED PROVIDER NOTES
ED Attending  CC: Yes      HPI:  8/19/18, Time: 5:55PM.       Tabatha Sosa is a 21 y.o. female presenting to the ED for sickle cell pain to her lower back and hips as well as anterior chest soreness and upper abdominal discomfort. Her symptoms began worse since yesterday. She lives out of the area and is at the hospital today because her best friend is in labor. She has had no known fever or chills. She has had some nausea, but no vomiting. The complaint has been persistent, moderate in severity, and worsened by nothing. She has an extensive history of sickle cell, pulmonary embolism as well as pneumonia a few months ago. ROS:   Pertinent positives and negatives are stated within the HPI, all other systems reviewed and are negative.    --------------------------------------------- PAST HISTORY ---------------------------------------------  Past Medical History:  has a past medical history of CLABSI (central line-associated bloodstream infection); Deep vein thrombosis (DVT) of left upper extremity ([facility] [facility] Disease of blood and blood forming organ; Heart murmur; 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[address] Miscarriage within last 12 months; Obesity, Class III, BMI 40-49.9 (morbid obesity) ([address] PE (pulmonary embolism); PE (pulmonary embolism); Pneumonia; Psychiatric problem; Sickle cell anemia ([facility] [address] and Thrombosis complicating venous access device ([facility] [address] Past Surgical History:  has a past surgical history that includes knee surgery; Tonsillectomy; Tunneled venous port placement (2014); Tunneled venous port placement (Right, 01/23/2015); other surgical history (08/04/2016); Tunneled venous port placement (Right, 09/15/2016); Tunneled venous port placement (Right, 05/18/2017); and other surgical history (Right, 03/05/2018). Social History:  reports that she has never smoked. She has never used smokeless tobacco. She reports that she does not drink alcohol or use drugs.     Family History: family history includes Cancer in her maternal grandmother; Diabetes in her mother; High Blood Pressure in her mother; Kidney Disease in her paternal grandmother. The patients home medications have been reviewed. Allergies: Demerol hcl [meperidine]; Adhesive tape; and Bactrim [sulfamethoxazole-trimethoprim]      ---------------------------------------------------PHYSICAL EXAM--------------------------------------    Constitutional/General: Alert and oriented x3, well appearing, non toxic in NAD  Head: NC/AT  Eyes: PERRL, EOMI  Ears and Nose: No discharge or bleeding  Mouth: Oropharynx clear, no erythema, mucosa moist,  handling secretions, no trismus  Neck: Supple, full ROM, non tender to palpation in the midline, no stridor, no crepitus, no meningeal signs  Pulmonary: Lungs clear to auscultation bilaterally, no current active wheezes, rales, or rhonchi. Not in respiratory distress  Cardiovascular:  Regular rate. Regular rhythm. . 2+ distal pulses  Chest: Some mild anterior chest wall tenderness  Abdomen: Soft. Some mid epigastric tenderness, Non distended. +BS. No rebound, guarding, or rigidity. No pulsatile masses appreciated. No CVA tenderness posteriorly bilaterally. Musculoskeletal: Moves all extremities x 4. Warm and well perfused, no clubbing, cyanosis, or edema. Capillary refill <3 seconds  Skin: warm and dry. No rashes. Neurologic: GCS 15, CN 2-12 grossly intact, no focal deficits, symmetric strength 5/5 in the upper and lower extremities bilaterally  Psych: Normal Affect    -------------------------------------------------- RESULTS -------------------------------------------------  I have personally reviewed all laboratory and imaging results for this patient. Results are listed below.      LABS:  Results for orders placed or performed during the hospital encounter of 08/19/18   CBC Auto Differential   Result Value Ref Range    WBC 11.8 (H) 4.5 - 11.5 E9/L    RBC 4.82 3.50 - 5.50 E12/L    Hemoglobin 9.3 (L) 11.5 - 15.5 g/dL Intravenous Given 8/19/18 1937)   heparin flush 100 UNIT/ML injection 100 Units (100 Units Intercatheter Given 8/19/18 2025)       Medical Decision Making:    Patient to ER with complaints of upper abdominal soreness, chest soreness and sickle cell pain. Re-Evaluations:             Re-evaluation. Patients symptoms stable  Will check EKG, labs, CXR, UA. Patient given IVF, Benadryl, Dilaudid and Pepcid. 7:25PM Patient resting, somewhat improved with Dilaudid given. Patient advised of stable labs so far. Patient has missed 2 Lovenox doses as she forgot her medication at home, history of PE. Additional dose of IV Dilaudid ordered for pain. Will give dose of Lovenox now as well.    8:13PM Patient eating cheese puffs and pop in room, using her cell phone, feeling improved after second dose of Dilaudid given. Patient advised to make sure that she uses her meds as prescribed and to use her Lovenox twice daily as prescribed. Recommend to see her PCP upon arriving home for follow up. This patient's ED course included: a personal history and physicial examination, re-evaluation prior to disposition, multiple bedside re-evaluations, IV medications, cardiac monitoring, continuous pulse oximetry and complex medical decision making and emergency management    This patient has remained hemodynamically stable during their ED course. Counseling: The emergency provider has spoken with the patient and discussed todays results, in addition to providing specific details for the plan of care and counseling regarding the diagnosis and prognosis. Questions are answered at this time and they are agreeable with the plan.     --------------------------------- IMPRESSION AND DISPOSITION ---------------------------------    IMPRESSION  1.  Sickle cell anemia with pain (HCC)        DISPOSITION  Disposition: Discharge to home  Patient condition is stable    Patient seen and discussed with Dr. Susi Choudhary

## 2018-08-20 NOTE — ED NOTES
Discharge instructions given, medications and follow up instructions reviewed. Patient verbalized understanding, no other noted or stated problems at this time. Patient will follow up with physicians as directed.       Vinnie Baer RN  08/19/18 [id number]

## 2018-08-21 LAB — URINE CULTURE, ROUTINE: NORMAL

## 2018-11-16 ENCOUNTER — APPOINTMENT (OUTPATIENT)
Dept: GENERAL RADIOLOGY | Age: 23
DRG: 662 | End: 2018-11-16
Payer: MEDICAID

## 2018-11-16 ENCOUNTER — HOSPITAL ENCOUNTER (EMERGENCY)
Age: 23
Discharge: HOME | DRG: 662 | End: 2018-11-16
Attending: EMERGENCY MEDICINE
Payer: MEDICAID

## 2018-11-16 VITALS
OXYGEN SATURATION: 100 % | RESPIRATION RATE: 16 BRPM | SYSTOLIC BLOOD PRESSURE: 150 MMHG | HEIGHT: 63 IN | WEIGHT: 290 LBS | TEMPERATURE: 98.2 F | HEART RATE: 81 BPM | BODY MASS INDEX: 51.38 KG/M2 | DIASTOLIC BLOOD PRESSURE: 81 MMHG

## 2018-11-16 DIAGNOSIS — D57.00 SICKLE CELL PAIN CRISIS (HCC): Primary | ICD-10-CM

## 2018-11-16 LAB
ALBUMIN SERPL-MCNC: 3.8 G/DL (ref 3.5–5.2)
ALP BLD-CCNC: 99 U/L (ref 35–104)
ALT SERPL-CCNC: 6 U/L (ref 0–32)
ANION GAP SERPL CALCULATED.3IONS-SCNC: 11 MMOL/L (ref 7–16)
ANISOCYTOSIS: ABNORMAL
AST SERPL-CCNC: 11 U/L (ref 0–31)
BASOPHILS ABSOLUTE: 0.02 E9/L (ref 0–0.2)
BASOPHILS RELATIVE PERCENT: 0.2 % (ref 0–2)
BILIRUB SERPL-MCNC: 0.8 MG/DL (ref 0–1.2)
BUN BLDV-MCNC: 7 MG/DL (ref 6–20)
CALCIUM SERPL-MCNC: 8.5 MG/DL (ref 8.6–10.2)
CHLORIDE BLD-SCNC: 103 MMOL/L (ref 98–107)
CO2: 24 MMOL/L (ref 22–29)
CREAT SERPL-MCNC: 0.8 MG/DL (ref 0.5–1)
EKG ATRIAL RATE: 59 BPM
EKG P AXIS: 20 DEGREES
EKG P-R INTERVAL: 138 MS
EKG Q-T INTERVAL: 418 MS
EKG QRS DURATION: 88 MS
EKG QTC CALCULATION (BAZETT): 413 MS
EKG R AXIS: 43 DEGREES
EKG T AXIS: 24 DEGREES
EKG VENTRICULAR RATE: 59 BPM
EOSINOPHILS ABSOLUTE: 0.22 E9/L (ref 0.05–0.5)
EOSINOPHILS RELATIVE PERCENT: 2.2 % (ref 0–6)
GFR AFRICAN AMERICAN: >60
GFR NON-AFRICAN AMERICAN: >60 ML/MIN/1.73
GLUCOSE BLD-MCNC: 123 MG/DL (ref 74–99)
HCT VFR BLD CALC: 27.3 % (ref 34–48)
HEMOGLOBIN: 9.2 G/DL (ref 11.5–15.5)
IMMATURE GRANULOCYTES #: 0.06 E9/L
IMMATURE GRANULOCYTES %: 0.6 % (ref 0–5)
IMMATURE RETIC FRACT: 20.7 % (ref 3–15.9)
LYMPHOCYTES ABSOLUTE: 3.98 E9/L (ref 1.5–4)
LYMPHOCYTES RELATIVE PERCENT: 38.9 % (ref 20–42)
MCH RBC QN AUTO: 19.5 PG (ref 26–35)
MCHC RBC AUTO-ENTMCNC: 33.7 % (ref 32–34.5)
MCV RBC AUTO: 57.7 FL (ref 80–99.9)
MONOCYTES ABSOLUTE: 0.55 E9/L (ref 0.1–0.95)
MONOCYTES RELATIVE PERCENT: 5.4 % (ref 2–12)
NEUTROPHILS ABSOLUTE: 5.39 E9/L (ref 1.8–7.3)
NEUTROPHILS RELATIVE PERCENT: 52.7 % (ref 43–80)
PDW BLD-RTO: 20.5 FL (ref 11.5–15)
PLATELET # BLD: 306 E9/L (ref 130–450)
PMV BLD AUTO: ABNORMAL FL (ref 7–12)
POIKILOCYTES: ABNORMAL
POTASSIUM SERPL-SCNC: 3.8 MMOL/L (ref 3.5–5)
RBC # BLD: 4.73 E12/L (ref 3.5–5.5)
RETIC HGB EQUIVALENT: 21.9 PG (ref 28.2–36.6)
RETICULOCYTE ABSOLUTE COUNT: 0.06 E12/L
RETICULOCYTE COUNT PCT: 1.3 % (ref 0.4–1.9)
SODIUM BLD-SCNC: 138 MMOL/L (ref 132–146)
TARGET CELLS: ABNORMAL
TOTAL PROTEIN: 7 G/DL (ref 6.4–8.3)
WBC # BLD: 10.2 E9/L (ref 4.5–11.5)

## 2018-11-16 PROCEDURE — 93005 ELECTROCARDIOGRAM TRACING: CPT

## 2018-11-16 PROCEDURE — 6360000002 HC RX W HCPCS: Performed by: EMERGENCY MEDICINE

## 2018-11-16 PROCEDURE — 96375 TX/PRO/DX INJ NEW DRUG ADDON: CPT

## 2018-11-16 PROCEDURE — 80053 COMPREHEN METABOLIC PANEL: CPT

## 2018-11-16 PROCEDURE — 96376 TX/PRO/DX INJ SAME DRUG ADON: CPT

## 2018-11-16 PROCEDURE — 85045 AUTOMATED RETICULOCYTE COUNT: CPT

## 2018-11-16 PROCEDURE — 6360000002 HC RX W HCPCS

## 2018-11-16 PROCEDURE — 85025 COMPLETE CBC W/AUTO DIFF WBC: CPT

## 2018-11-16 PROCEDURE — 36415 COLL VENOUS BLD VENIPUNCTURE: CPT

## 2018-11-16 PROCEDURE — 71045 X-RAY EXAM CHEST 1 VIEW: CPT

## 2018-11-16 PROCEDURE — 99284 EMERGENCY DEPT VISIT MOD MDM: CPT

## 2018-11-16 PROCEDURE — 96374 THER/PROPH/DIAG INJ IV PUSH: CPT

## 2018-11-16 RX ORDER — HEPARIN SODIUM (PORCINE) LOCK FLUSH IV SOLN 100 UNIT/ML 100 UNIT/ML
SOLUTION INTRAVENOUS
Status: COMPLETED
Start: 2018-11-16 | End: 2018-11-16

## 2018-11-16 RX ORDER — KETOROLAC TROMETHAMINE 30 MG/ML
15 INJECTION, SOLUTION INTRAMUSCULAR; INTRAVENOUS ONCE
Status: COMPLETED | OUTPATIENT
Start: 2018-11-16 | End: 2018-11-16

## 2018-11-16 RX ORDER — OXYCODONE HYDROCHLORIDE 5 MG/1
5 TABLET ORAL EVERY 6 HOURS PRN
Qty: 12 TABLET | Refills: 0 | Status: ON HOLD | OUTPATIENT
Start: 2018-11-16 | End: 2018-11-22 | Stop reason: HOSPADM

## 2018-11-16 RX ADMIN — HYDROMORPHONE HYDROCHLORIDE 1 MG: 1 INJECTION, SOLUTION INTRAMUSCULAR; INTRAVENOUS; SUBCUTANEOUS at 16:22

## 2018-11-16 RX ADMIN — HYDROMORPHONE HYDROCHLORIDE 1 MG: 1 INJECTION, SOLUTION INTRAMUSCULAR; INTRAVENOUS; SUBCUTANEOUS at 15:20

## 2018-11-16 RX ADMIN — Medication 300 UNITS: at 18:28

## 2018-11-16 RX ADMIN — KETOROLAC TROMETHAMINE 15 MG: 30 INJECTION, SOLUTION INTRAMUSCULAR at 15:20

## 2018-11-16 ASSESSMENT — PAIN DESCRIPTION - PROGRESSION: CLINICAL_PROGRESSION: NOT CHANGED

## 2018-11-16 ASSESSMENT — ENCOUNTER SYMPTOMS
DIARRHEA: 0
SHORTNESS OF BREATH: 0
ABDOMINAL DISTENTION: 0
COUGH: 0
EYE REDNESS: 0
SORE THROAT: 0
WHEEZING: 0
BACK PAIN: 0
EYE DISCHARGE: 0
NAUSEA: 0
VOMITING: 0
EYE PAIN: 0
SINUS PRESSURE: 0

## 2018-11-16 ASSESSMENT — PAIN DESCRIPTION - PAIN TYPE: TYPE: ACUTE PAIN

## 2018-11-16 ASSESSMENT — PAIN DESCRIPTION - ONSET: ONSET: ON-GOING

## 2018-11-16 ASSESSMENT — PAIN DESCRIPTION - FREQUENCY: FREQUENCY: CONTINUOUS

## 2018-11-16 ASSESSMENT — PAIN SCALES - GENERAL
PAINLEVEL_OUTOF10: 9
PAINLEVEL_OUTOF10: 9
PAINLEVEL_OUTOF10: 7

## 2018-11-16 ASSESSMENT — PAIN DESCRIPTION - LOCATION: LOCATION: GENERALIZED

## 2018-11-16 NOTE — ED NOTES
Bed: 01  Expected date:   Expected time:   Means of arrival:   Comments:     Isaías Burch RN  11/16/18 [id number]

## 2018-11-18 ENCOUNTER — APPOINTMENT (OUTPATIENT)
Dept: CT IMAGING | Age: 23
DRG: 662 | End: 2018-11-18
Payer: MEDICAID

## 2018-11-18 ENCOUNTER — HOSPITAL ENCOUNTER (INPATIENT)
Age: 23
LOS: 3 days | Discharge: HOME | DRG: 662 | End: 2018-11-22
Attending: EMERGENCY MEDICINE | Admitting: INTERNAL MEDICINE
Payer: MEDICAID

## 2018-11-18 DIAGNOSIS — R11.0 NAUSEA: ICD-10-CM

## 2018-11-18 DIAGNOSIS — D57.00 SICKLE CELL ANEMIA WITH PAIN (HCC): Primary | ICD-10-CM

## 2018-11-18 LAB
ANION GAP SERPL CALCULATED.3IONS-SCNC: 10 MMOL/L (ref 7–16)
ANISOCYTOSIS: ABNORMAL
BASOPHILS ABSOLUTE: 0.02 E9/L (ref 0–0.2)
BASOPHILS RELATIVE PERCENT: 0.2 % (ref 0–2)
BUN BLDV-MCNC: 10 MG/DL (ref 6–20)
CALCIUM SERPL-MCNC: 8.8 MG/DL (ref 8.6–10.2)
CHLORIDE BLD-SCNC: 101 MMOL/L (ref 98–107)
CO2: 26 MMOL/L (ref 22–29)
CREAT SERPL-MCNC: 0.9 MG/DL (ref 0.5–1)
EKG ATRIAL RATE: 76 BPM
EKG P AXIS: 47 DEGREES
EKG P-R INTERVAL: 148 MS
EKG Q-T INTERVAL: 392 MS
EKG QRS DURATION: 82 MS
EKG QTC CALCULATION (BAZETT): 441 MS
EKG R AXIS: 48 DEGREES
EKG T AXIS: 28 DEGREES
EKG VENTRICULAR RATE: 76 BPM
EOSINOPHILS ABSOLUTE: 0.14 E9/L (ref 0.05–0.5)
EOSINOPHILS RELATIVE PERCENT: 1.6 % (ref 0–6)
GFR AFRICAN AMERICAN: >60
GFR NON-AFRICAN AMERICAN: >60 ML/MIN/1.73
GLUCOSE BLD-MCNC: 95 MG/DL (ref 74–99)
HCG(URINE) PREGNANCY TEST: NEGATIVE
HCT VFR BLD CALC: 28.8 % (ref 34–48)
HEMOGLOBIN: 9.6 G/DL (ref 11.5–15.5)
IMMATURE GRANULOCYTES #: 0.03 E9/L
IMMATURE GRANULOCYTES %: 0.3 % (ref 0–5)
IMMATURE RETIC FRACT: 25.9 % (ref 3–15.9)
LYMPHOCYTES ABSOLUTE: 3.27 E9/L (ref 1.5–4)
LYMPHOCYTES RELATIVE PERCENT: 36.7 % (ref 20–42)
MCH RBC QN AUTO: 19.5 PG (ref 26–35)
MCHC RBC AUTO-ENTMCNC: 33.3 % (ref 32–34.5)
MCV RBC AUTO: 58.4 FL (ref 80–99.9)
MONOCYTES ABSOLUTE: 0.6 E9/L (ref 0.1–0.95)
MONOCYTES RELATIVE PERCENT: 6.7 % (ref 2–12)
NEUTROPHILS ABSOLUTE: 4.84 E9/L (ref 1.8–7.3)
NEUTROPHILS RELATIVE PERCENT: 54.5 % (ref 43–80)
PDW BLD-RTO: 21 FL (ref 11.5–15)
PLATELET # BLD: 292 E9/L (ref 130–450)
PMV BLD AUTO: ABNORMAL FL (ref 7–12)
POIKILOCYTES: ABNORMAL
POLYCHROMASIA: ABNORMAL
POTASSIUM SERPL-SCNC: 3.9 MMOL/L (ref 3.5–5)
RBC # BLD: 4.93 E12/L (ref 3.5–5.5)
RETIC HGB EQUIVALENT: 22.2 PG (ref 28.2–36.6)
RETICULOCYTE ABSOLUTE COUNT: 0.07 E12/L
RETICULOCYTE COUNT PCT: 1.4 % (ref 0.4–1.9)
SODIUM BLD-SCNC: 137 MMOL/L (ref 132–146)
TARGET CELLS: ABNORMAL
TEAR DROP CELLS: ABNORMAL
TROPONIN: <0.01 NG/ML (ref 0–0.03)
WBC # BLD: 8.9 E9/L (ref 4.5–11.5)

## 2018-11-18 PROCEDURE — 6360000002 HC RX W HCPCS: Performed by: INTERNAL MEDICINE

## 2018-11-18 PROCEDURE — 6370000000 HC RX 637 (ALT 250 FOR IP): Performed by: INTERNAL MEDICINE

## 2018-11-18 PROCEDURE — 99219 PR INITIAL OBSERVATION CARE/DAY 50 MINUTES: CPT | Performed by: INTERNAL MEDICINE

## 2018-11-18 PROCEDURE — 71275 CT ANGIOGRAPHY CHEST: CPT

## 2018-11-18 PROCEDURE — 85045 AUTOMATED RETICULOCYTE COUNT: CPT

## 2018-11-18 PROCEDURE — 2580000003 HC RX 258: Performed by: STUDENT IN AN ORGANIZED HEALTH CARE EDUCATION/TRAINING PROGRAM

## 2018-11-18 PROCEDURE — 85025 COMPLETE CBC W/AUTO DIFF WBC: CPT

## 2018-11-18 PROCEDURE — 6360000004 HC RX CONTRAST MEDICATION: Performed by: RADIOLOGY

## 2018-11-18 PROCEDURE — 81025 URINE PREGNANCY TEST: CPT

## 2018-11-18 PROCEDURE — 6370000000 HC RX 637 (ALT 250 FOR IP): Performed by: STUDENT IN AN ORGANIZED HEALTH CARE EDUCATION/TRAINING PROGRAM

## 2018-11-18 PROCEDURE — 84484 ASSAY OF TROPONIN QUANT: CPT

## 2018-11-18 PROCEDURE — G0378 HOSPITAL OBSERVATION PER HR: HCPCS

## 2018-11-18 PROCEDURE — 80048 BASIC METABOLIC PNL TOTAL CA: CPT

## 2018-11-18 PROCEDURE — 96374 THER/PROPH/DIAG INJ IV PUSH: CPT

## 2018-11-18 PROCEDURE — 6360000002 HC RX W HCPCS: Performed by: STUDENT IN AN ORGANIZED HEALTH CARE EDUCATION/TRAINING PROGRAM

## 2018-11-18 PROCEDURE — 93005 ELECTROCARDIOGRAM TRACING: CPT | Performed by: STUDENT IN AN ORGANIZED HEALTH CARE EDUCATION/TRAINING PROGRAM

## 2018-11-18 PROCEDURE — 2580000003 HC RX 258: Performed by: INTERNAL MEDICINE

## 2018-11-18 PROCEDURE — 94761 N-INVAS EAR/PLS OXIMETRY MLT: CPT

## 2018-11-18 PROCEDURE — 96375 TX/PRO/DX INJ NEW DRUG ADDON: CPT

## 2018-11-18 PROCEDURE — 96376 TX/PRO/DX INJ SAME DRUG ADON: CPT

## 2018-11-18 PROCEDURE — 99285 EMERGENCY DEPT VISIT HI MDM: CPT

## 2018-11-18 RX ORDER — SODIUM CHLORIDE 0.9 % (FLUSH) 0.9 %
10 SYRINGE (ML) INJECTION PRN
Status: DISCONTINUED | OUTPATIENT
Start: 2018-11-18 | End: 2018-11-22 | Stop reason: HOSPADM

## 2018-11-18 RX ORDER — 0.9 % SODIUM CHLORIDE 0.9 %
1000 INTRAVENOUS SOLUTION INTRAVENOUS ONCE
Status: COMPLETED | OUTPATIENT
Start: 2018-11-18 | End: 2018-11-18

## 2018-11-18 RX ORDER — OXYCODONE HYDROCHLORIDE AND ACETAMINOPHEN 5; 325 MG/1; MG/1
2 TABLET ORAL EVERY 4 HOURS PRN
Status: DISCONTINUED | OUTPATIENT
Start: 2018-11-18 | End: 2018-11-22 | Stop reason: HOSPADM

## 2018-11-18 RX ORDER — KETOROLAC TROMETHAMINE 30 MG/ML
15 INJECTION, SOLUTION INTRAMUSCULAR; INTRAVENOUS ONCE
Status: COMPLETED | OUTPATIENT
Start: 2018-11-18 | End: 2018-11-18

## 2018-11-18 RX ORDER — ONDANSETRON 2 MG/ML
4 INJECTION INTRAMUSCULAR; INTRAVENOUS EVERY 8 HOURS PRN
Status: DISCONTINUED | OUTPATIENT
Start: 2018-11-18 | End: 2018-11-22 | Stop reason: HOSPADM

## 2018-11-18 RX ORDER — ONDANSETRON 2 MG/ML
4 INJECTION INTRAMUSCULAR; INTRAVENOUS ONCE
Status: COMPLETED | OUTPATIENT
Start: 2018-11-18 | End: 2018-11-18

## 2018-11-18 RX ORDER — OXYCODONE HYDROCHLORIDE AND ACETAMINOPHEN 5; 325 MG/1; MG/1
1 TABLET ORAL ONCE
Status: COMPLETED | OUTPATIENT
Start: 2018-11-18 | End: 2018-11-18

## 2018-11-18 RX ORDER — OXYCODONE HYDROCHLORIDE AND ACETAMINOPHEN 5; 325 MG/1; MG/1
1 TABLET ORAL EVERY 4 HOURS PRN
Status: DISCONTINUED | OUTPATIENT
Start: 2018-11-18 | End: 2018-11-22 | Stop reason: HOSPADM

## 2018-11-18 RX ORDER — FOLIC ACID 1 MG/1
2 TABLET ORAL 3 TIMES DAILY
Status: DISCONTINUED | OUTPATIENT
Start: 2018-11-18 | End: 2018-11-22 | Stop reason: HOSPADM

## 2018-11-18 RX ORDER — HYDROXYUREA 500 MG/1
500 CAPSULE ORAL 2 TIMES DAILY
Status: DISCONTINUED | OUTPATIENT
Start: 2018-11-18 | End: 2018-11-22 | Stop reason: HOSPADM

## 2018-11-18 RX ORDER — ALBUTEROL SULFATE 0.63 MG/3ML
1 SOLUTION RESPIRATORY (INHALATION) EVERY 4 HOURS PRN
Status: DISCONTINUED | OUTPATIENT
Start: 2018-11-18 | End: 2018-11-22 | Stop reason: HOSPADM

## 2018-11-18 RX ORDER — 0.9 % SODIUM CHLORIDE 0.9 %
1000 INTRAVENOUS SOLUTION INTRAVENOUS ONCE
Status: DISCONTINUED | OUTPATIENT
Start: 2018-11-18 | End: 2018-11-22 | Stop reason: HOSPADM

## 2018-11-18 RX ORDER — FERROUS SULFATE 325(65) MG
325 TABLET ORAL 2 TIMES DAILY WITH MEALS
Status: DISCONTINUED | OUTPATIENT
Start: 2018-11-18 | End: 2018-11-22 | Stop reason: HOSPADM

## 2018-11-18 RX ORDER — SODIUM CHLORIDE 9 MG/ML
INJECTION, SOLUTION INTRAVENOUS CONTINUOUS
Status: DISCONTINUED | OUTPATIENT
Start: 2018-11-18 | End: 2018-11-22 | Stop reason: HOSPADM

## 2018-11-18 RX ORDER — OXYCODONE HYDROCHLORIDE 5 MG/1
5 TABLET ORAL EVERY 6 HOURS PRN
Status: DISCONTINUED | OUTPATIENT
Start: 2018-11-18 | End: 2018-11-22 | Stop reason: HOSPADM

## 2018-11-18 RX ORDER — SODIUM CHLORIDE 0.9 % (FLUSH) 0.9 %
10 SYRINGE (ML) INJECTION EVERY 12 HOURS SCHEDULED
Status: DISCONTINUED | OUTPATIENT
Start: 2018-11-18 | End: 2018-11-22 | Stop reason: HOSPADM

## 2018-11-18 RX ORDER — ACETAMINOPHEN 325 MG/1
650 TABLET ORAL EVERY 4 HOURS PRN
Status: DISCONTINUED | OUTPATIENT
Start: 2018-11-18 | End: 2018-11-22 | Stop reason: HOSPADM

## 2018-11-18 RX ADMIN — ONDANSETRON 4 MG: 2 INJECTION INTRAMUSCULAR; INTRAVENOUS at 17:01

## 2018-11-18 RX ADMIN — SODIUM CHLORIDE 1000 ML: 9 INJECTION, SOLUTION INTRAVENOUS at 13:30

## 2018-11-18 RX ADMIN — OXYCODONE AND ACETAMINOPHEN 2 TABLET: 5; 325 TABLET ORAL at 21:00

## 2018-11-18 RX ADMIN — SODIUM CHLORIDE 1000 ML: 9 INJECTION, SOLUTION INTRAVENOUS at 17:18

## 2018-11-18 RX ADMIN — HYDROMORPHONE HYDROCHLORIDE 0.5 MG: 1 INJECTION, SOLUTION INTRAMUSCULAR; INTRAVENOUS; SUBCUTANEOUS at 13:30

## 2018-11-18 RX ADMIN — OXYCODONE AND ACETAMINOPHEN 1 TABLET: 5; 325 TABLET ORAL at 17:01

## 2018-11-18 RX ADMIN — SODIUM CHLORIDE: 9 INJECTION, SOLUTION INTRAVENOUS at 18:35

## 2018-11-18 RX ADMIN — ENOXAPARIN SODIUM 100 MG: 100 INJECTION SUBCUTANEOUS at 21:00

## 2018-11-18 RX ADMIN — KETOROLAC TROMETHAMINE 15 MG: 30 INJECTION, SOLUTION INTRAMUSCULAR at 13:34

## 2018-11-18 RX ADMIN — FOLIC ACID 2 MG: 1 TABLET ORAL at 21:00

## 2018-11-18 RX ADMIN — IOPAMIDOL 90 ML: 755 INJECTION, SOLUTION INTRAVENOUS at 15:55

## 2018-11-18 RX ADMIN — HYDROXYUREA 500 MG: 500 CAPSULE ORAL at 21:01

## 2018-11-18 RX ADMIN — HYDROMORPHONE HYDROCHLORIDE 0.5 MG: 1 INJECTION, SOLUTION INTRAMUSCULAR; INTRAVENOUS; SUBCUTANEOUS at 15:04

## 2018-11-18 ASSESSMENT — ENCOUNTER SYMPTOMS
ABDOMINAL PAIN: 0
STRIDOR: 0
WHEEZING: 0
SHORTNESS OF BREATH: 1
DIARRHEA: 0
VOMITING: 0
COUGH: 0
NAUSEA: 0
BACK PAIN: 0
COLOR CHANGE: 0
SORE THROAT: 0

## 2018-11-18 ASSESSMENT — PAIN DESCRIPTION - PROGRESSION
CLINICAL_PROGRESSION: NOT CHANGED
CLINICAL_PROGRESSION: NOT CHANGED

## 2018-11-18 ASSESSMENT — PAIN DESCRIPTION - LOCATION
LOCATION: GENERALIZED
LOCATION: BACK;CHEST;LEG
LOCATION: GENERALIZED

## 2018-11-18 ASSESSMENT — PAIN SCALES - GENERAL
PAINLEVEL_OUTOF10: 9
PAINLEVEL_OUTOF10: 8
PAINLEVEL_OUTOF10: 8
PAINLEVEL_OUTOF10: 9
PAINLEVEL_OUTOF10: 8
PAINLEVEL_OUTOF10: 7
PAINLEVEL_OUTOF10: 8
PAINLEVEL_OUTOF10: 8
PAINLEVEL_OUTOF10: 7
PAINLEVEL_OUTOF10: 9
PAINLEVEL_OUTOF10: 8

## 2018-11-18 ASSESSMENT — PAIN DESCRIPTION - DESCRIPTORS
DESCRIPTORS: PATIENT UNABLE TO DESCRIBE
DESCRIPTORS: ACHING;CONSTANT;DISCOMFORT
DESCRIPTORS: SHARP
DESCRIPTORS: ACHING;CONSTANT
DESCRIPTORS: ACHING
DESCRIPTORS: ACHING;SHARP

## 2018-11-18 ASSESSMENT — PAIN DESCRIPTION - PAIN TYPE
TYPE: CHRONIC PAIN
TYPE: ACUTE PAIN
TYPE: CHRONIC PAIN

## 2018-11-18 ASSESSMENT — PAIN DESCRIPTION - ONSET: ONSET: ON-GOING

## 2018-11-18 ASSESSMENT — PAIN DESCRIPTION - FREQUENCY
FREQUENCY: CONTINUOUS

## 2018-11-18 NOTE — ED PROVIDER NOTES
The patient is a 24-year-old female with a history of sickle cell disease who presents to the emergency department with complaint of chest pain and hip pain bilaterally that has been worsening over the past 2 days. The patient had been seen 2 days ago in the emergency department and was treated for a sickle cell pain crisis. Her pain was adequately managed at that time although after discharge the oxycodone that she was using as needed for pain has not been adequate. She also reports left sided chest pain and some shortness of breath associated with her symptoms. Her pain is aching and nonradiating, constant. The patient is on prophylactic Lovenox due to a history of pulmonary embolism in the past and acute chest syndrome. Symptoms are worse with deep respirations. Patient is nontoxic in appearance but does appear to be somewhat uncomfortable. The history is provided by the patient. Sickle Cell Pain Crisis   Location:  Hip and chest  Severity:  Severe  Onset quality:  Gradual  Similar to previous crisis episodes: yes    Timing:  Constant  Progression:  Worsening  Chronicity:  Recurrent  History of pulmonary emboli: yes    Context: not infection and not pregnancy    Relieved by:  Nothing  Worsened by:  Nothing  Ineffective treatments:  Prescription drugs  Associated symptoms: chest pain and shortness of breath    Associated symptoms: no congestion, no cough, no fatigue, no fever, no headaches, no nausea, no sore throat, no vomiting and no wheezing    Risk factors: hx of pneumonia and prior acute chest    Risk factors: no smoking        Review of Systems   Constitutional: Negative for activity change, appetite change, chills, diaphoresis, fatigue and fever. HENT: Negative for congestion and sore throat. Eyes: Negative for visual disturbance. Respiratory: Positive for shortness of breath. Negative for cough, wheezing and stridor. Cardiovascular: Positive for chest pain.  Negative for palpitations and with contrast.  [LS]   5319 Case discussed with Dr. Jayda Adam. He agrees to accept the patient for observation.  [LS]      ED Course User Index  [LS] Hardeep Capellan DO     EKG: This EKG is signed and interpreted by me. Rate: 76  Rhythm: Sinus  Interpretation: no acute changes  Comparison: changes compared to previous EKG on 11/16/2018 (was previously sinus bradycardia)    ED Course as of Nov 18 1735   Sun Nov 18, 2018   1356 Patient sitting comfortably in bed. [LS]   1440 Patient reassessed. She reports minimal improvement in pain. Patient is lying in bed Face-timing on her phone. We'll re-dose dilaudid. She has not gone to CTA yet. [LS]   [phone] with radiology tech. I discussed with them that myself and the attending don't believe the patient currently is in acute sickle cell pain crisis of the laboratory findings. We are comfortable with evaluate the patient with CTA chest with contrast.  [LS]   2722 Case discussed with Dr. Jayda Adam. He agrees to accept the patient for observation.  [LS]      ED Course User Index  [LS] Hardeep Capellan, DO       --------------------------------------------- PAST HISTORY ---------------------------------------------  Past Medical History:  has a past medical history of CLABSI (central line-associated bloodstream infection); Deep vein thrombosis (DVT) of left upper extremity ([address] Disease of blood and blood forming organ; Heart murmur; 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[address] Miscarriage within last 12 months; Obesity, Class III, BMI 40-49.9 (morbid obesity) ([address] PE (pulmonary embolism); PE (pulmonary embolism); Pneumonia; Psychiatric problem; Sickle cell anemia ([address] and Thrombosis complicating venous access device ([address] Past Surgical History:  has a past surgical history that includes knee surgery; Tonsillectomy; Tunneled venous port placement (2014); Tunneled venous port placement (Right, 01/23/2015); other surgical history (08/04/2016);  Tunneled venous port placement (Right, 09/15/2016); Tunneled venous port placement (Right, 05/18/2017); and other surgical history (Right, 03/05/2018). Social History:  reports that she has never smoked. She has never used smokeless tobacco. She reports that she does not drink alcohol or use drugs. Family History: family history includes Cancer in her maternal grandmother; Diabetes in her mother; High Blood Pressure in her mother; Kidney Disease in her paternal grandmother. The patients home medications have been reviewed. Allergies: Demerol hcl [meperidine];  Adhesive tape; and Bactrim [sulfamethoxazole-trimethoprim]    -------------------------------------------------- RESULTS -------------------------------------------------    LABS:  Results for orders placed or performed during the hospital encounter of 11/18/18   CBC Auto Differential   Result Value Ref Range    WBC 8.9 4.5 - 11.5 E9/L    RBC 4.93 3.50 - 5.50 E12/L    Hemoglobin 9.6 (L) 11.5 - 15.5 g/dL    Hematocrit 28.8 (L) 34.0 - 48.0 %    MCV 58.4 (L) 80.0 - 99.9 fL    MCH 19.5 (L) 26.0 - 35.0 pg    MCHC 33.3 32.0 - 34.5 %    RDW 21.0 (H) 11.5 - 15.0 fL    Platelets 851 337 - 676 E9/L    MPV NOT CALC 7.0 - 12.0 fL    Neutrophils % 54.5 43.0 - 80.0 %    Immature Granulocytes % 0.3 0.0 - 5.0 %    Lymphocytes % 36.7 20.0 - 42.0 %    Monocytes % 6.7 2.0 - 12.0 %    Eosinophils % 1.6 0.0 - 6.0 %    Basophils % 0.2 0.0 - 2.0 %    Neutrophils # 4.84 1.80 - 7.30 E9/L    Immature Granulocytes # 0.03 E9/L    Lymphocytes # 3.27 1.50 - 4.00 E9/L    Monocytes # 0.60 0.10 - 0.95 E9/L    Eosinophils # 0.14 0.05 - 0.50 E9/L    Basophils # 0.02 0.00 - 0.20 E9/L    Anisocytosis 1+     Polychromasia 1+     Poikilocytes 1+     Target Cells 1+     Tear Drop Cells 1+    Basic Metabolic Panel   Result Value Ref Range    Sodium 137 132 - 146 mmol/L    Potassium 3.9 3.5 - 5.0 mmol/L    Chloride 101 98 - 107 mmol/L    CO2 26 22 - 29 mmol/L    Anion Gap 10 7 - 16 mmol/L    Glucose 95 74 - 99 mg/dL    BUN 10

## 2018-11-18 NOTE — H&P
[facility]   History and Physical      CHIEF COMPLAINT:  Pain in chest as well as back    History of Present Illness:  21 y.o. female with a history of sickle cell, who also had Bilateral PE in past and who is on anticoagulation with Lovenox on advise from her Hematologist, presents with pains more due to her sickle cell crises. Patient who presented to ED twice in 3 days and parents who were reluctant in going back, patient was referred for in patient management mostly hydration. No recent fever, chills, nausea or vomitting. Pain more generalized but involving chest wall and back. Informant(s) for H&P: Patient as well as her parents    REVIEW OF SYSTEMS:  no fevers, chills, cp, sob, n/v, ha, vision/hearing changes, wt changes, hot/cold flashes, other open skin lesions, diarrhea, constipation, dysuria/hematuria unless noted in HPI. Complete ROS performed with the patient and is otherwise negative.       PMH:  Past Medical History:   Diagnosis Date    CLABSI (central line-associated bloodstream infection) 8/3/2016    Deep vein thrombosis (DVT) of left upper extremity (HCC) 8/12/2016    Disease of blood and blood forming organ     Heart murmur     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[address]     Miscarriage within last 12 months 04/2017    Obesity, Class III, BMI 40-49.9 (morbid obesity) ([address] 5/22/2014    PE (pulmonary embolism)     PE (pulmonary embolism)     Pneumonia     Psychiatric problem     Sickle cell anemia (HCC)     Thrombosis complicating venous access device ([address] 5/18/2017       Surgical History:  Past Surgical History:   Procedure Laterality Date    KNEE SURGERY      right    OTHER SURGICAL HISTORY  08/04/2016    removal of mediport    OTHER SURGICAL HISTORY Right 03/05/2018    TUNNELED VENOUS PORT REMOVAL & REINSERTION;DILATION OF VENOUS TRACT    TONSILLECTOMY      TUNNELED VENOUS PORT PLACEMENT  2014    and removed    TUNNELED VENOUS PORT PLACEMENT Right 01/23/2015

## 2018-11-19 PROCEDURE — 6360000002 HC RX W HCPCS: Performed by: INTERNAL MEDICINE

## 2018-11-19 PROCEDURE — 6370000000 HC RX 637 (ALT 250 FOR IP): Performed by: INTERNAL MEDICINE

## 2018-11-19 PROCEDURE — 2580000003 HC RX 258: Performed by: INTERNAL MEDICINE

## 2018-11-19 PROCEDURE — 1200000000 HC SEMI PRIVATE

## 2018-11-19 PROCEDURE — 99232 SBSQ HOSP IP/OBS MODERATE 35: CPT | Performed by: INTERNAL MEDICINE

## 2018-11-19 PROCEDURE — 36591 DRAW BLOOD OFF VENOUS DEVICE: CPT

## 2018-11-19 RX ORDER — KETOROLAC TROMETHAMINE 30 MG/ML
15 INJECTION, SOLUTION INTRAMUSCULAR; INTRAVENOUS EVERY 8 HOURS PRN
Status: DISCONTINUED | OUTPATIENT
Start: 2018-11-19 | End: 2018-11-22 | Stop reason: HOSPADM

## 2018-11-19 RX ADMIN — OXYCODONE AND ACETAMINOPHEN 2 TABLET: 5; 325 TABLET ORAL at 01:25

## 2018-11-19 RX ADMIN — OXYCODONE AND ACETAMINOPHEN 2 TABLET: 5; 325 TABLET ORAL at 18:30

## 2018-11-19 RX ADMIN — FOLIC ACID 2 MG: 1 TABLET ORAL at 20:55

## 2018-11-19 RX ADMIN — KETOROLAC TROMETHAMINE 15 MG: 30 INJECTION, SOLUTION INTRAMUSCULAR at 16:59

## 2018-11-19 RX ADMIN — ENOXAPARIN SODIUM 100 MG: 100 INJECTION SUBCUTANEOUS at 08:51

## 2018-11-19 RX ADMIN — OXYCODONE AND ACETAMINOPHEN 2 TABLET: 5; 325 TABLET ORAL at 10:29

## 2018-11-19 RX ADMIN — HYDROXYUREA 500 MG: 500 CAPSULE ORAL at 20:55

## 2018-11-19 RX ADMIN — FERROUS SULFATE TAB 325 MG (65 MG ELEMENTAL FE) 325 MG: 325 (65 FE) TAB at 16:56

## 2018-11-19 RX ADMIN — SODIUM CHLORIDE: 9 INJECTION, SOLUTION INTRAVENOUS at 22:36

## 2018-11-19 RX ADMIN — SODIUM CHLORIDE: 9 INJECTION, SOLUTION INTRAVENOUS at 00:31

## 2018-11-19 RX ADMIN — OXYCODONE AND ACETAMINOPHEN 2 TABLET: 5; 325 TABLET ORAL at 22:34

## 2018-11-19 RX ADMIN — FERROUS SULFATE TAB 325 MG (65 MG ELEMENTAL FE) 325 MG: 325 (65 FE) TAB at 08:51

## 2018-11-19 RX ADMIN — FOLIC ACID 2 MG: 1 TABLET ORAL at 08:51

## 2018-11-19 RX ADMIN — OXYCODONE AND ACETAMINOPHEN 2 TABLET: 5; 325 TABLET ORAL at 05:39

## 2018-11-19 RX ADMIN — ONDANSETRON 4 MG: 2 INJECTION INTRAMUSCULAR; INTRAVENOUS at 18:22

## 2018-11-19 RX ADMIN — ENOXAPARIN SODIUM 100 MG: 100 INJECTION SUBCUTANEOUS at 20:55

## 2018-11-19 RX ADMIN — HYDROXYUREA 500 MG: 500 CAPSULE ORAL at 08:51

## 2018-11-19 RX ADMIN — OXYCODONE AND ACETAMINOPHEN 2 TABLET: 5; 325 TABLET ORAL at 14:30

## 2018-11-19 ASSESSMENT — PAIN DESCRIPTION - FREQUENCY
FREQUENCY: CONTINUOUS
FREQUENCY: INTERMITTENT
FREQUENCY: INTERMITTENT
FREQUENCY: CONTINUOUS

## 2018-11-19 ASSESSMENT — PAIN DESCRIPTION - LOCATION
LOCATION: GENERALIZED

## 2018-11-19 ASSESSMENT — PAIN DESCRIPTION - DESCRIPTORS
DESCRIPTORS: ACHING;DISCOMFORT;SORE
DESCRIPTORS: ACHING;DISCOMFORT;SORE
DESCRIPTORS: ACHING;CONSTANT;DISCOMFORT
DESCRIPTORS: ACHING
DESCRIPTORS: ACHING
DESCRIPTORS: ACHING;DISCOMFORT
DESCRIPTORS: ACHING;DISCOMFORT
DESCRIPTORS: ACHING

## 2018-11-19 ASSESSMENT — PAIN DESCRIPTION - PROGRESSION
CLINICAL_PROGRESSION: GRADUALLY WORSENING
CLINICAL_PROGRESSION: GRADUALLY WORSENING
CLINICAL_PROGRESSION: NOT CHANGED
CLINICAL_PROGRESSION: GRADUALLY WORSENING
CLINICAL_PROGRESSION: GRADUALLY WORSENING

## 2018-11-19 ASSESSMENT — PAIN DESCRIPTION - ONSET
ONSET: GRADUAL
ONSET: GRADUAL
ONSET: ON-GOING

## 2018-11-19 ASSESSMENT — PAIN DESCRIPTION - PAIN TYPE
TYPE: ACUTE PAIN
TYPE: CHRONIC PAIN

## 2018-11-19 ASSESSMENT — PAIN SCALES - GENERAL
PAINLEVEL_OUTOF10: 0
PAINLEVEL_OUTOF10: 0
PAINLEVEL_OUTOF10: 8
PAINLEVEL_OUTOF10: 9
PAINLEVEL_OUTOF10: 7
PAINLEVEL_OUTOF10: 5
PAINLEVEL_OUTOF10: 7
PAINLEVEL_OUTOF10: 8
PAINLEVEL_OUTOF10: 0
PAINLEVEL_OUTOF10: 9
PAINLEVEL_OUTOF10: 7
PAINLEVEL_OUTOF10: 8
PAINLEVEL_OUTOF10: 0

## 2018-11-19 NOTE — PROGRESS NOTES
P Quality Flow/Interdisciplinary Rounds Progress Note        Quality Flow Rounds held on November 19, 2018    Disciplines Attending:  Bedside Nurse, ,  and Nursing Unit Leadership    Priya Carias was admitted on 11/18/2018 12:20 PM    Anticipated Discharge Date:  Expected Discharge Date: 11/19/18    Disposition:    Tristen Score:  Tristen Scale Score: 23    Readmission Risk              Risk of Unplanned Readmission:        15           Discussed patient goal for the day, patient clinical progression, and barriers to discharge.   The following Goal(s) of the Day/Commitment(s) have been identified:  check ho Fajardo  November 19, 2018

## 2018-11-20 ENCOUNTER — APPOINTMENT (OUTPATIENT)
Dept: CT IMAGING | Age: 23
DRG: 662 | End: 2018-11-20
Payer: MEDICAID

## 2018-11-20 LAB
ANION GAP SERPL CALCULATED.3IONS-SCNC: 9 MMOL/L (ref 7–16)
APTT: 43.4 SEC (ref 24.5–35.1)
BUN BLDV-MCNC: 9 MG/DL (ref 6–20)
CALCIUM SERPL-MCNC: 8.5 MG/DL (ref 8.6–10.2)
CHLORIDE BLD-SCNC: 106 MMOL/L (ref 98–107)
CO2: 25 MMOL/L (ref 22–29)
CREAT SERPL-MCNC: 0.9 MG/DL (ref 0.5–1)
GFR AFRICAN AMERICAN: >60
GFR NON-AFRICAN AMERICAN: >60 ML/MIN/1.73
GLUCOSE BLD-MCNC: 118 MG/DL (ref 74–99)
HCT VFR BLD CALC: 27.8 % (ref 34–48)
HCT VFR BLD CALC: 28.4 % (ref 34–48)
HEMOGLOBIN: 9.2 G/DL (ref 11.5–15.5)
HEMOGLOBIN: 9.4 G/DL (ref 11.5–15.5)
IMMATURE RETIC FRACT: 26.8 % (ref 3–15.9)
INR BLD: 1.1
MCH RBC QN AUTO: 19.7 PG (ref 26–35)
MCHC RBC AUTO-ENTMCNC: 33.8 % (ref 32–34.5)
MCV RBC AUTO: 58.4 FL (ref 80–99.9)
PDW BLD-RTO: 21.1 FL (ref 11.5–15)
PLATELET # BLD: 277 E9/L (ref 130–450)
PMV BLD AUTO: ABNORMAL FL (ref 7–12)
POTASSIUM SERPL-SCNC: 4.1 MMOL/L (ref 3.5–5)
PROTHROMBIN TIME: 13.2 SEC (ref 9.3–12.4)
RBC # BLD: 4.76 E12/L (ref 3.5–5.5)
RETIC HGB EQUIVALENT: 22.4 PG (ref 28.2–36.6)
RETICULOCYTE ABSOLUTE COUNT: 0.08 E12/L
RETICULOCYTE COUNT PCT: 1.7 % (ref 0.4–1.9)
SODIUM BLD-SCNC: 140 MMOL/L (ref 132–146)
WBC # BLD: 10.1 E9/L (ref 4.5–11.5)

## 2018-11-20 PROCEDURE — 85014 HEMATOCRIT: CPT

## 2018-11-20 PROCEDURE — 6360000002 HC RX W HCPCS: Performed by: INTERNAL MEDICINE

## 2018-11-20 PROCEDURE — 36415 COLL VENOUS BLD VENIPUNCTURE: CPT

## 2018-11-20 PROCEDURE — 6370000000 HC RX 637 (ALT 250 FOR IP): Performed by: INTERNAL MEDICINE

## 2018-11-20 PROCEDURE — 99232 SBSQ HOSP IP/OBS MODERATE 35: CPT | Performed by: INTERNAL MEDICINE

## 2018-11-20 PROCEDURE — 85018 HEMOGLOBIN: CPT

## 2018-11-20 PROCEDURE — 74178 CT ABD&PLV WO CNTR FLWD CNTR: CPT

## 2018-11-20 PROCEDURE — 85610 PROTHROMBIN TIME: CPT

## 2018-11-20 PROCEDURE — 36591 DRAW BLOOD OFF VENOUS DEVICE: CPT

## 2018-11-20 PROCEDURE — 1200000000 HC SEMI PRIVATE

## 2018-11-20 PROCEDURE — 6370000000 HC RX 637 (ALT 250 FOR IP): Performed by: CLINICAL NURSE SPECIALIST

## 2018-11-20 PROCEDURE — 6360000004 HC RX CONTRAST MEDICATION: Performed by: RADIOLOGY

## 2018-11-20 PROCEDURE — 2580000003 HC RX 258: Performed by: INTERNAL MEDICINE

## 2018-11-20 PROCEDURE — 85027 COMPLETE CBC AUTOMATED: CPT

## 2018-11-20 PROCEDURE — 85045 AUTOMATED RETICULOCYTE COUNT: CPT

## 2018-11-20 PROCEDURE — 85730 THROMBOPLASTIN TIME PARTIAL: CPT

## 2018-11-20 PROCEDURE — 80048 BASIC METABOLIC PNL TOTAL CA: CPT

## 2018-11-20 RX ORDER — PANTOPRAZOLE SODIUM 40 MG/1
40 TABLET, DELAYED RELEASE ORAL
Status: DISCONTINUED | OUTPATIENT
Start: 2018-11-20 | End: 2018-11-22 | Stop reason: HOSPADM

## 2018-11-20 RX ORDER — DIPHENHYDRAMINE HCL 25 MG
25 TABLET ORAL EVERY 8 HOURS PRN
Status: DISCONTINUED | OUTPATIENT
Start: 2018-11-20 | End: 2018-11-22 | Stop reason: HOSPADM

## 2018-11-20 RX ADMIN — FERROUS SULFATE TAB 325 MG (65 MG ELEMENTAL FE) 325 MG: 325 (65 FE) TAB at 08:31

## 2018-11-20 RX ADMIN — FOLIC ACID 2 MG: 1 TABLET ORAL at 21:00

## 2018-11-20 RX ADMIN — ENOXAPARIN SODIUM 100 MG: 100 INJECTION SUBCUTANEOUS at 21:00

## 2018-11-20 RX ADMIN — HYDROXYUREA 500 MG: 500 CAPSULE ORAL at 21:01

## 2018-11-20 RX ADMIN — ONDANSETRON 4 MG: 2 INJECTION INTRAMUSCULAR; INTRAVENOUS at 00:44

## 2018-11-20 RX ADMIN — ENOXAPARIN SODIUM 100 MG: 100 INJECTION SUBCUTANEOUS at 08:30

## 2018-11-20 RX ADMIN — SODIUM CHLORIDE: 9 INJECTION, SOLUTION INTRAVENOUS at 22:18

## 2018-11-20 RX ADMIN — KETOROLAC TROMETHAMINE 15 MG: 30 INJECTION, SOLUTION INTRAMUSCULAR at 00:43

## 2018-11-20 RX ADMIN — FOLIC ACID 2 MG: 1 TABLET ORAL at 13:26

## 2018-11-20 RX ADMIN — SODIUM CHLORIDE: 9 INJECTION, SOLUTION INTRAVENOUS at 02:40

## 2018-11-20 RX ADMIN — KETOROLAC TROMETHAMINE 15 MG: 30 INJECTION, SOLUTION INTRAMUSCULAR at 21:38

## 2018-11-20 RX ADMIN — OXYCODONE HYDROCHLORIDE 5 MG: 5 TABLET ORAL at 16:09

## 2018-11-20 RX ADMIN — OXYCODONE HYDROCHLORIDE 5 MG: 5 TABLET ORAL at 00:44

## 2018-11-20 RX ADMIN — OXYCODONE AND ACETAMINOPHEN 2 TABLET: 5; 325 TABLET ORAL at 06:41

## 2018-11-20 RX ADMIN — FERROUS SULFATE TAB 325 MG (65 MG ELEMENTAL FE) 325 MG: 325 (65 FE) TAB at 16:20

## 2018-11-20 RX ADMIN — SODIUM CHLORIDE: 9 INJECTION, SOLUTION INTRAVENOUS at 14:37

## 2018-11-20 RX ADMIN — FOLIC ACID 2 MG: 1 TABLET ORAL at 08:31

## 2018-11-20 RX ADMIN — OXYCODONE AND ACETAMINOPHEN 2 TABLET: 5; 325 TABLET ORAL at 02:40

## 2018-11-20 RX ADMIN — IOHEXOL 50 ML: 240 INJECTION, SOLUTION INTRATHECAL; INTRAVASCULAR; INTRAVENOUS; ORAL at 17:18

## 2018-11-20 RX ADMIN — OXYCODONE AND ACETAMINOPHEN 2 TABLET: 5; 325 TABLET ORAL at 23:32

## 2018-11-20 RX ADMIN — IOPAMIDOL 110 ML: 755 INJECTION, SOLUTION INTRAVENOUS at 17:18

## 2018-11-20 RX ADMIN — PANTOPRAZOLE SODIUM 40 MG: 40 TABLET, DELAYED RELEASE ORAL at 17:52

## 2018-11-20 RX ADMIN — OXYCODONE AND ACETAMINOPHEN 2 TABLET: 5; 325 TABLET ORAL at 12:29

## 2018-11-20 RX ADMIN — KETOROLAC TROMETHAMINE 15 MG: 30 INJECTION, SOLUTION INTRAMUSCULAR at 08:30

## 2018-11-20 RX ADMIN — OXYCODONE AND ACETAMINOPHEN 2 TABLET: 5; 325 TABLET ORAL at 17:52

## 2018-11-20 RX ADMIN — OXYCODONE HYDROCHLORIDE 5 MG: 5 TABLET ORAL at 09:50

## 2018-11-20 RX ADMIN — DIPHENHYDRAMINE HCL 25 MG: 25 TABLET ORAL at 10:36

## 2018-11-20 RX ADMIN — HYDROXYUREA 500 MG: 500 CAPSULE ORAL at 08:30

## 2018-11-20 ASSESSMENT — PAIN DESCRIPTION - ORIENTATION
ORIENTATION: RIGHT

## 2018-11-20 ASSESSMENT — PAIN DESCRIPTION - DESCRIPTORS
DESCRIPTORS: ACHING;DISCOMFORT;CONSTANT
DESCRIPTORS: ACHING;CONSTANT;DISCOMFORT
DESCRIPTORS: ACHING;DISCOMFORT;CONSTANT
DESCRIPTORS: ACHING;CONSTANT;DISCOMFORT
DESCRIPTORS: STABBING
DESCRIPTORS: ACHING;CONSTANT;DISCOMFORT
DESCRIPTORS: ACHING;DISCOMFORT;SPASM
DESCRIPTORS: ACHING;DISCOMFORT;CONSTANT

## 2018-11-20 ASSESSMENT — PAIN DESCRIPTION - PAIN TYPE
TYPE: ACUTE PAIN

## 2018-11-20 ASSESSMENT — PAIN DESCRIPTION - PROGRESSION
CLINICAL_PROGRESSION: NOT CHANGED

## 2018-11-20 ASSESSMENT — PAIN DESCRIPTION - LOCATION
LOCATION: ABDOMEN;GENERALIZED
LOCATION: ABDOMEN
LOCATION: ABDOMEN;GENERALIZED
LOCATION: ABDOMEN
LOCATION: ABDOMEN
LOCATION: ABDOMEN;GENERALIZED
LOCATION: ABDOMEN

## 2018-11-20 ASSESSMENT — PAIN SCALES - GENERAL
PAINLEVEL_OUTOF10: 5
PAINLEVEL_OUTOF10: 8
PAINLEVEL_OUTOF10: 8
PAINLEVEL_OUTOF10: 5
PAINLEVEL_OUTOF10: 8
PAINLEVEL_OUTOF10: 8
PAINLEVEL_OUTOF10: 6
PAINLEVEL_OUTOF10: 5
PAINLEVEL_OUTOF10: 4
PAINLEVEL_OUTOF10: 8
PAINLEVEL_OUTOF10: 9
PAINLEVEL_OUTOF10: 3
PAINLEVEL_OUTOF10: 8
PAINLEVEL_OUTOF10: 5
PAINLEVEL_OUTOF10: 8
PAINLEVEL_OUTOF10: 7
PAINLEVEL_OUTOF10: 4
PAINLEVEL_OUTOF10: 4
PAINLEVEL_OUTOF10: 7

## 2018-11-20 ASSESSMENT — PAIN DESCRIPTION - ONSET
ONSET: ON-GOING

## 2018-11-20 ASSESSMENT — PAIN DESCRIPTION - FREQUENCY
FREQUENCY: CONTINUOUS
FREQUENCY: INTERMITTENT
FREQUENCY: CONTINUOUS
FREQUENCY: INTERMITTENT
FREQUENCY: CONTINUOUS

## 2018-11-20 NOTE — CONSULTS
EMR please  · Medicate for pain per PCP  · Medicate with Zofran for nausea as ordered  · Protonix as ordered  · Continue to monitor    Thank you very much for your consultation. We will follow closely with you.     Discussed with Dr. Gerardo Jack developed by Dr. Shu Garcia [id number], FNP-BC 11/20/2018 3:06 PM for Dr. Elvis Girard

## 2018-11-20 NOTE — PLAN OF CARE
Problem: Pain:  Goal: Control of acute pain  Control of acute pain   Outcome: Met This Shift      Problem: Falls - Risk of:  Goal: Will remain free from falls  Will remain free from falls   Outcome: Met This Shift      Problem:  Activity:  Goal: Fatigue will decrease  Fatigue will decrease  Outcome: Met This Shift      Problem: Physical Regulation:  Goal: Signs and symptoms of infection will decrease  Signs and symptoms of infection will decrease  Outcome: Met This Shift

## 2018-11-21 LAB
ALBUMIN SERPL-MCNC: 3.6 G/DL (ref 3.5–5.2)
ALP BLD-CCNC: 98 U/L (ref 35–104)
ALT SERPL-CCNC: 6 U/L (ref 0–32)
ANION GAP SERPL CALCULATED.3IONS-SCNC: 9 MMOL/L (ref 7–16)
AST SERPL-CCNC: 12 U/L (ref 0–31)
BILIRUB SERPL-MCNC: 0.7 MG/DL (ref 0–1.2)
BUN BLDV-MCNC: 7 MG/DL (ref 6–20)
CALCIUM SERPL-MCNC: 8.3 MG/DL (ref 8.6–10.2)
CHLORIDE BLD-SCNC: 105 MMOL/L (ref 98–107)
CO2: 25 MMOL/L (ref 22–29)
CREAT SERPL-MCNC: 0.9 MG/DL (ref 0.5–1)
GFR AFRICAN AMERICAN: >60
GFR NON-AFRICAN AMERICAN: >60 ML/MIN/1.73
GLUCOSE BLD-MCNC: 111 MG/DL (ref 74–99)
HCT VFR BLD CALC: 26.8 % (ref 34–48)
HCT VFR BLD CALC: 28 % (ref 34–48)
HCT VFR BLD CALC: 29 % (ref 34–48)
HEMOGLOBIN: 8.9 G/DL (ref 11.5–15.5)
HEMOGLOBIN: 9.5 G/DL (ref 11.5–15.5)
HEMOGLOBIN: 9.8 G/DL (ref 11.5–15.5)
MCH RBC QN AUTO: 19.5 PG (ref 26–35)
MCHC RBC AUTO-ENTMCNC: 33.2 % (ref 32–34.5)
MCV RBC AUTO: 58.6 FL (ref 80–99.9)
PDW BLD-RTO: 20.7 FL (ref 11.5–15)
PLATELET # BLD: 268 E9/L (ref 130–450)
PMV BLD AUTO: ABNORMAL FL (ref 7–12)
POTASSIUM REFLEX MAGNESIUM: 4 MMOL/L (ref 3.5–5)
RBC # BLD: 4.57 E12/L (ref 3.5–5.5)
SODIUM BLD-SCNC: 139 MMOL/L (ref 132–146)
TOTAL PROTEIN: 6.3 G/DL (ref 6.4–8.3)
WBC # BLD: 8.9 E9/L (ref 4.5–11.5)

## 2018-11-21 PROCEDURE — 2580000003 HC RX 258: Performed by: INTERNAL MEDICINE

## 2018-11-21 PROCEDURE — 80053 COMPREHEN METABOLIC PANEL: CPT

## 2018-11-21 PROCEDURE — 6370000000 HC RX 637 (ALT 250 FOR IP): Performed by: INTERNAL MEDICINE

## 2018-11-21 PROCEDURE — 6370000000 HC RX 637 (ALT 250 FOR IP): Performed by: CLINICAL NURSE SPECIALIST

## 2018-11-21 PROCEDURE — APPSS30 APP SPLIT SHARED TIME 16-30 MINUTES: Performed by: NURSE PRACTITIONER

## 2018-11-21 PROCEDURE — 85018 HEMOGLOBIN: CPT

## 2018-11-21 PROCEDURE — 36591 DRAW BLOOD OFF VENOUS DEVICE: CPT

## 2018-11-21 PROCEDURE — 6360000002 HC RX W HCPCS: Performed by: INTERNAL MEDICINE

## 2018-11-21 PROCEDURE — 85014 HEMATOCRIT: CPT

## 2018-11-21 PROCEDURE — 87425 ROTAVIRUS AG IA: CPT

## 2018-11-21 PROCEDURE — 87045 FECES CULTURE AEROBIC BACT: CPT

## 2018-11-21 PROCEDURE — 85027 COMPLETE CBC AUTOMATED: CPT

## 2018-11-21 PROCEDURE — 2580000003 HC RX 258: Performed by: NURSE PRACTITIONER

## 2018-11-21 PROCEDURE — 1200000000 HC SEMI PRIVATE

## 2018-11-21 PROCEDURE — 90686 IIV4 VACC NO PRSV 0.5 ML IM: CPT | Performed by: INTERNAL MEDICINE

## 2018-11-21 PROCEDURE — G0008 ADMIN INFLUENZA VIRUS VAC: HCPCS | Performed by: INTERNAL MEDICINE

## 2018-11-21 PROCEDURE — 99233 SBSQ HOSP IP/OBS HIGH 50: CPT | Performed by: INTERNAL MEDICINE

## 2018-11-21 PROCEDURE — 6360000002 HC RX W HCPCS: Performed by: NURSE PRACTITIONER

## 2018-11-21 PROCEDURE — 36415 COLL VENOUS BLD VENIPUNCTURE: CPT

## 2018-11-21 PROCEDURE — 6370000000 HC RX 637 (ALT 250 FOR IP): Performed by: NURSE PRACTITIONER

## 2018-11-21 RX ORDER — METHYLPREDNISOLONE SODIUM SUCCINATE 40 MG/ML
30 INJECTION, POWDER, LYOPHILIZED, FOR SOLUTION INTRAMUSCULAR; INTRAVENOUS EVERY 8 HOURS
Status: COMPLETED | OUTPATIENT
Start: 2018-11-21 | End: 2018-11-22

## 2018-11-21 RX ORDER — DOCUSATE SODIUM 100 MG/1
100 CAPSULE, LIQUID FILLED ORAL DAILY
Status: DISCONTINUED | OUTPATIENT
Start: 2018-11-21 | End: 2018-11-22

## 2018-11-21 RX ADMIN — PIPERACILLIN SODIUM AND TAZOBACTAM SODIUM 3.38 G: 3; .375 INJECTION, POWDER, LYOPHILIZED, FOR SOLUTION INTRAVENOUS at 12:24

## 2018-11-21 RX ADMIN — OXYCODONE HYDROCHLORIDE 5 MG: 5 TABLET ORAL at 06:23

## 2018-11-21 RX ADMIN — OXYCODONE HYDROCHLORIDE 5 MG: 5 TABLET ORAL at 21:12

## 2018-11-21 RX ADMIN — FERROUS SULFATE TAB 325 MG (65 MG ELEMENTAL FE) 325 MG: 325 (65 FE) TAB at 16:25

## 2018-11-21 RX ADMIN — KETOROLAC TROMETHAMINE 15 MG: 30 INJECTION, SOLUTION INTRAMUSCULAR at 04:22

## 2018-11-21 RX ADMIN — DOCUSATE SODIUM 100 MG: 100 CAPSULE, LIQUID FILLED ORAL at 14:07

## 2018-11-21 RX ADMIN — PANTOPRAZOLE SODIUM 40 MG: 40 TABLET, DELAYED RELEASE ORAL at 16:25

## 2018-11-21 RX ADMIN — PIPERACILLIN SODIUM AND TAZOBACTAM SODIUM 3.38 G: 3; .375 INJECTION, POWDER, LYOPHILIZED, FOR SOLUTION INTRAVENOUS at 21:08

## 2018-11-21 RX ADMIN — SODIUM CHLORIDE: 9 INJECTION, SOLUTION INTRAVENOUS at 08:24

## 2018-11-21 RX ADMIN — OXYCODONE HYDROCHLORIDE 5 MG: 5 TABLET ORAL at 14:05

## 2018-11-21 RX ADMIN — DIPHENHYDRAMINE HCL 25 MG: 25 TABLET ORAL at 21:12

## 2018-11-21 RX ADMIN — OXYCODONE AND ACETAMINOPHEN 2 TABLET: 5; 325 TABLET ORAL at 08:23

## 2018-11-21 RX ADMIN — INFLUENZA A VIRUS A/MICHIGAN/45/2015 X-275 (H1N1) ANTIGEN (FORMALDEHYDE INACTIVATED), INFLUENZA A VIRUS A/SINGAPORE/INFIMH-16-0019/2016 IVR-186 (H3N2) ANTIGEN (FORMALDEHYDE INACTIVATED), INFLUENZA B VIRUS B/PHUKET/3073/2013 ANTIGEN (FORMALDEHYDE INACTIVATED), AND INFLUENZA B VIRUS B/MARYLAND/15/2016 BX-69A ANTIGEN (FORMALDEHYDE INACTIVATED) 0.5 ML: 15; 15; 15; 15 INJECTION, SUSPENSION INTRAMUSCULAR at 08:35

## 2018-11-21 RX ADMIN — ENOXAPARIN SODIUM 100 MG: 100 INJECTION SUBCUTANEOUS at 21:08

## 2018-11-21 RX ADMIN — FOLIC ACID 2 MG: 1 TABLET ORAL at 14:05

## 2018-11-21 RX ADMIN — HYDROXYUREA 500 MG: 500 CAPSULE ORAL at 21:08

## 2018-11-21 RX ADMIN — FOLIC ACID 2 MG: 1 TABLET ORAL at 08:23

## 2018-11-21 RX ADMIN — HYDROXYUREA 500 MG: 500 CAPSULE ORAL at 08:22

## 2018-11-21 RX ADMIN — FOLIC ACID 2 MG: 1 TABLET ORAL at 21:08

## 2018-11-21 RX ADMIN — OXYCODONE AND ACETAMINOPHEN 2 TABLET: 5; 325 TABLET ORAL at 19:02

## 2018-11-21 RX ADMIN — FERROUS SULFATE TAB 325 MG (65 MG ELEMENTAL FE) 325 MG: 325 (65 FE) TAB at 08:23

## 2018-11-21 RX ADMIN — DIPHENHYDRAMINE HCL 25 MG: 25 TABLET ORAL at 04:24

## 2018-11-21 RX ADMIN — Medication 10 ML: at 16:26

## 2018-11-21 RX ADMIN — ENOXAPARIN SODIUM 100 MG: 100 INJECTION SUBCUTANEOUS at 08:23

## 2018-11-21 RX ADMIN — METHYLPREDNISOLONE SODIUM SUCCINATE 30 MG: 40 INJECTION, POWDER, FOR SOLUTION INTRAMUSCULAR; INTRAVENOUS at 12:25

## 2018-11-21 RX ADMIN — SODIUM CHLORIDE: 9 INJECTION, SOLUTION INTRAVENOUS at 16:25

## 2018-11-21 RX ADMIN — METHYLPREDNISOLONE SODIUM SUCCINATE 30 MG: 40 INJECTION, POWDER, FOR SOLUTION INTRAMUSCULAR; INTRAVENOUS at 21:08

## 2018-11-21 RX ADMIN — PANTOPRAZOLE SODIUM 40 MG: 40 TABLET, DELAYED RELEASE ORAL at 06:23

## 2018-11-21 RX ADMIN — KETOROLAC TROMETHAMINE 15 MG: 30 INJECTION, SOLUTION INTRAMUSCULAR at 16:25

## 2018-11-21 ASSESSMENT — PAIN DESCRIPTION - DESCRIPTORS
DESCRIPTORS: ACHING;DISCOMFORT
DESCRIPTORS: ACHING;DISCOMFORT;SORE
DESCRIPTORS: ACHING;DISCOMFORT;SORE

## 2018-11-21 ASSESSMENT — PAIN SCALES - GENERAL
PAINLEVEL_OUTOF10: 7
PAINLEVEL_OUTOF10: 7
PAINLEVEL_OUTOF10: 6
PAINLEVEL_OUTOF10: 8
PAINLEVEL_OUTOF10: 10
PAINLEVEL_OUTOF10: 8
PAINLEVEL_OUTOF10: 7
PAINLEVEL_OUTOF10: 0
PAINLEVEL_OUTOF10: 7
PAINLEVEL_OUTOF10: 0
PAINLEVEL_OUTOF10: 5

## 2018-11-21 ASSESSMENT — PAIN DESCRIPTION - LOCATION
LOCATION: ABDOMEN;HIP
LOCATION: ABDOMEN;BACK
LOCATION: ABDOMEN
LOCATION: ABDOMEN;BACK;CHEST

## 2018-11-21 ASSESSMENT — PAIN DESCRIPTION - ORIENTATION: ORIENTATION: RIGHT

## 2018-11-21 ASSESSMENT — PAIN DESCRIPTION - PROGRESSION
CLINICAL_PROGRESSION: NOT CHANGED
CLINICAL_PROGRESSION: GRADUALLY WORSENING
CLINICAL_PROGRESSION: GRADUALLY WORSENING

## 2018-11-21 ASSESSMENT — PAIN DESCRIPTION - FREQUENCY
FREQUENCY: INTERMITTENT

## 2018-11-21 ASSESSMENT — PAIN DESCRIPTION - PAIN TYPE
TYPE: ACUTE PAIN

## 2018-11-21 ASSESSMENT — PAIN DESCRIPTION - ONSET
ONSET: ON-GOING
ONSET: ON-GOING
ONSET: GRADUAL

## 2018-11-21 NOTE — PATIENT CARE CONFERENCE
[facility] Quality Flow/Interdisciplinary Rounds Progress Note        Quality Flow Rounds held on November 21, 2018    Disciplines Attending:  Bedside Nurse, ,  and Nursing Unit Leadership    Stas Paulino was admitted on 11/18/2018 12:20 PM    Anticipated Discharge Date:  Expected Discharge Date: 11/19/18    Disposition:    Tristen Score:  Tristen Scale Score: 22    Readmission Risk              Risk of Unplanned Readmission:        13           Discussed patient goal for the day, patient clinical progression, and barriers to discharge.   The following Goal(s) of the Day/Commitment(s) have been identified:  Labs - Report Results      Litzy Adilia  November 21, 2018

## 2018-11-21 NOTE — PROGRESS NOTES
Paged Dr Martins Lipoma re CT scan result. Waiting for call back. Notified Dr Faheem Guo re Ct scan results.

## 2018-11-21 NOTE — CONSULTS
complicating venous access device [facility]) 5/18/2017 August 2016. Admitted to [facility] with a sickle cell crisis characterized by abdominal and foot pain. She had a low-grade fever. Patient was seen in consultation by ID because of blood cultures with coagulase-negative Staphylococcus. She was thought to have a CLABSI with an infected Mediport. The Mediport was removed and the patient was discharged on IV Vancomycin for 2 weeks. Before she finished her antibiotic she was readmitted to [facility] with pain and swelling in the left upper extremity where she had the PICC. She seen in consultation by Dr. Arlander Goodpasture and was observed off antibiotics. She was anticoagulated and discharged home. September 2014. Admitted to the hospital with a sickle cell crisis. Seen in consultation by Dr. Derick Martin for gram-negative coco bacteremia. She was thought to have a possible infected Mediport which was nonfunctioning. Blood cultures grew Klebsiella pneumonia. The Mediport was left in place and she was treated with Zosyn. She was discharged on Ertapenem and oral Ciprofloxacin.     Past Surgical History:        Procedure Laterality Date    KNEE SURGERY      right    OTHER SURGICAL HISTORY  08/04/2016    removal of mediport    OTHER SURGICAL HISTORY Right 03/05/2018    TUNNELED VENOUS PORT REMOVAL & REINSERTION;DILATION OF VENOUS TRACT    TONSILLECTOMY      TUNNELED VENOUS PORT PLACEMENT  2014    and removed    TUNNELED VENOUS PORT PLACEMENT Right 01/23/2015    [place], removed 08/04/16 CLABSI    TUNNELED VENOUS PORT PLACEMENT Right 09/15/2016    CHEST    TUNNELED VENOUS PORT PLACEMENT Right 05/18/2017    SUBCLAVIAN     Current Medications:   Scheduled Meds:   methylPREDNISolone  30 mg Intravenous Q8H    piperacillin-tazobactam  3.375 g Intravenous Q8H    docusate sodium  100 mg Oral Daily    pantoprazole  40 mg Oral BID AC    sodium chloride  1,000 mL Intravenous Once    sodium chloride flush Lab Results   Component Value Date    COLORU Yellow 08/19/2018    PHUR 6.5 08/19/2018    LABCAST RARE 12/28/2016    WBCUA 5-10 08/19/2018    RBCUA NONE 08/19/2018    MUCUS Present 10/29/2017    BACTERIA MODERATE 08/19/2018    CLARITYU SLCLOUDY 08/19/2018    SPECGRAV 1.010 08/19/2018    LEUKOCYTESUR LARGE 08/19/2018    UROBILINOGEN 0.2 08/19/2018    BILIRUBINUR Negative 08/19/2018    BLOODU TRACE 08/19/2018    GLUCOSEU Negative 08/19/2018    AMORPHOUS FEW 10/29/2017     Radiology:  CT of the abdomen and pelvis reviewed    Microbiology:  Pending  No results for input(s): BC in the last 72 hours. No results for input(s): ORG in the last 72 hours. No results for input(s): Og Holes in the last 72 hours. No results for input(s): STREPNEUMAGU in the last 72 hours. No results for input(s): LP1UAG in the last 72 hours. No results for input(s): ASO in the last 72 hours. No results for input(s): CULTRESP in the last 72 hours. Assessment:  · Sickle cell crisis  · Possible foodborne illness  · Abdominal pain associated to GI bleed  · Typhlitis with lymphadenitis on CT. I did not think CT was impressive    Plan:    · Antibiotics per gastrology  · Patient can be discharged from ID standpoint    Thank you for having us see this patient in consultation. I will be discussing this case with the treating physicians.     Samy Presser  2:01 PM  11/21/2018

## 2018-11-22 ENCOUNTER — APPOINTMENT (OUTPATIENT)
Dept: GENERAL RADIOLOGY | Age: 23
DRG: 662 | End: 2018-11-22
Payer: MEDICAID

## 2018-11-22 VITALS
HEIGHT: 63 IN | RESPIRATION RATE: 16 BRPM | OXYGEN SATURATION: 98 % | BODY MASS INDEX: 51.91 KG/M2 | TEMPERATURE: 98.3 F | WEIGHT: 293 LBS | HEART RATE: 72 BPM | DIASTOLIC BLOOD PRESSURE: 67 MMHG | SYSTOLIC BLOOD PRESSURE: 126 MMHG

## 2018-11-22 LAB
ALBUMIN SERPL-MCNC: 3.8 G/DL (ref 3.5–5.2)
ALP BLD-CCNC: 93 U/L (ref 35–104)
ALT SERPL-CCNC: 8 U/L (ref 0–32)
ANION GAP SERPL CALCULATED.3IONS-SCNC: 11 MMOL/L (ref 7–16)
AST SERPL-CCNC: 14 U/L (ref 0–31)
BILIRUB SERPL-MCNC: 0.6 MG/DL (ref 0–1.2)
BUN BLDV-MCNC: 6 MG/DL (ref 6–20)
CALCIUM SERPL-MCNC: 8.7 MG/DL (ref 8.6–10.2)
CHLORIDE BLD-SCNC: 103 MMOL/L (ref 98–107)
CO2: 23 MMOL/L (ref 22–29)
CREAT SERPL-MCNC: 0.7 MG/DL (ref 0.5–1)
GFR AFRICAN AMERICAN: >60
GFR NON-AFRICAN AMERICAN: >60 ML/MIN/1.73
GLUCOSE BLD-MCNC: 185 MG/DL (ref 74–99)
POTASSIUM REFLEX MAGNESIUM: 4.2 MMOL/L (ref 3.5–5)
ROTAVIRUS ANTIGEN: NORMAL
SODIUM BLD-SCNC: 137 MMOL/L (ref 132–146)
TOTAL PROTEIN: 7 G/DL (ref 6.4–8.3)

## 2018-11-22 PROCEDURE — 6370000000 HC RX 637 (ALT 250 FOR IP): Performed by: INTERNAL MEDICINE

## 2018-11-22 PROCEDURE — 6370000000 HC RX 637 (ALT 250 FOR IP): Performed by: NURSE PRACTITIONER

## 2018-11-22 PROCEDURE — 6370000000 HC RX 637 (ALT 250 FOR IP): Performed by: CLINICAL NURSE SPECIALIST

## 2018-11-22 PROCEDURE — 36415 COLL VENOUS BLD VENIPUNCTURE: CPT

## 2018-11-22 PROCEDURE — 6360000002 HC RX W HCPCS: Performed by: INTERNAL MEDICINE

## 2018-11-22 PROCEDURE — 2580000003 HC RX 258: Performed by: NURSE PRACTITIONER

## 2018-11-22 PROCEDURE — 6360000002 HC RX W HCPCS: Performed by: NURSE PRACTITIONER

## 2018-11-22 PROCEDURE — 99239 HOSP IP/OBS DSCHRG MGMT >30: CPT | Performed by: INTERNAL MEDICINE

## 2018-11-22 PROCEDURE — 80053 COMPREHEN METABOLIC PANEL: CPT

## 2018-11-22 PROCEDURE — 74018 RADEX ABDOMEN 1 VIEW: CPT

## 2018-11-22 PROCEDURE — 2580000003 HC RX 258: Performed by: INTERNAL MEDICINE

## 2018-11-22 PROCEDURE — APPSS30 APP SPLIT SHARED TIME 16-30 MINUTES: Performed by: NURSE PRACTITIONER

## 2018-11-22 RX ORDER — PANTOPRAZOLE SODIUM 40 MG/1
40 TABLET, DELAYED RELEASE ORAL DAILY
Qty: 30 TABLET | Refills: 0 | Status: SHIPPED | OUTPATIENT
Start: 2018-11-22 | End: 2019-04-20

## 2018-11-22 RX ORDER — DOCUSATE SODIUM 100 MG/1
200 CAPSULE, LIQUID FILLED ORAL NIGHTLY
Status: DISCONTINUED | OUTPATIENT
Start: 2018-11-22 | End: 2018-11-22 | Stop reason: HOSPADM

## 2018-11-22 RX ORDER — HEPARIN SODIUM (PORCINE) LOCK FLUSH IV SOLN 100 UNIT/ML 100 UNIT/ML
100 SOLUTION INTRAVENOUS PRN
Status: DISCONTINUED | OUTPATIENT
Start: 2018-11-22 | End: 2018-11-22 | Stop reason: HOSPADM

## 2018-11-22 RX ADMIN — SODIUM CHLORIDE: 9 INJECTION, SOLUTION INTRAVENOUS at 10:12

## 2018-11-22 RX ADMIN — PANTOPRAZOLE SODIUM 40 MG: 40 TABLET, DELAYED RELEASE ORAL at 17:17

## 2018-11-22 RX ADMIN — HYDROXYUREA 500 MG: 500 CAPSULE ORAL at 08:16

## 2018-11-22 RX ADMIN — OXYCODONE HYDROCHLORIDE 5 MG: 5 TABLET ORAL at 15:10

## 2018-11-22 RX ADMIN — OXYCODONE AND ACETAMINOPHEN 2 TABLET: 5; 325 TABLET ORAL at 00:27

## 2018-11-22 RX ADMIN — FOLIC ACID 2 MG: 1 TABLET ORAL at 08:15

## 2018-11-22 RX ADMIN — FERROUS SULFATE TAB 325 MG (65 MG ELEMENTAL FE) 325 MG: 325 (65 FE) TAB at 08:15

## 2018-11-22 RX ADMIN — PANTOPRAZOLE SODIUM 40 MG: 40 TABLET, DELAYED RELEASE ORAL at 06:09

## 2018-11-22 RX ADMIN — OXYCODONE HYDROCHLORIDE 5 MG: 5 TABLET ORAL at 04:52

## 2018-11-22 RX ADMIN — DIPHENHYDRAMINE HCL 25 MG: 25 TABLET ORAL at 04:52

## 2018-11-22 RX ADMIN — PIPERACILLIN SODIUM AND TAZOBACTAM SODIUM 3.38 G: 3; .375 INJECTION, POWDER, LYOPHILIZED, FOR SOLUTION INTRAVENOUS at 04:15

## 2018-11-22 RX ADMIN — FERROUS SULFATE TAB 325 MG (65 MG ELEMENTAL FE) 325 MG: 325 (65 FE) TAB at 17:17

## 2018-11-22 RX ADMIN — FOLIC ACID 2 MG: 1 TABLET ORAL at 15:07

## 2018-11-22 RX ADMIN — OXYCODONE AND ACETAMINOPHEN 2 TABLET: 5; 325 TABLET ORAL at 12:22

## 2018-11-22 RX ADMIN — DIPHENHYDRAMINE HCL 25 MG: 25 TABLET ORAL at 15:10

## 2018-11-22 RX ADMIN — ENOXAPARIN SODIUM 100 MG: 100 INJECTION SUBCUTANEOUS at 08:14

## 2018-11-22 RX ADMIN — METHYLPREDNISOLONE SODIUM SUCCINATE 30 MG: 40 INJECTION, POWDER, FOR SOLUTION INTRAMUSCULAR; INTRAVENOUS at 04:15

## 2018-11-22 RX ADMIN — DOCUSATE SODIUM 100 MG: 100 CAPSULE, LIQUID FILLED ORAL at 08:18

## 2018-11-22 RX ADMIN — OXYCODONE AND ACETAMINOPHEN 2 TABLET: 5; 325 TABLET ORAL at 08:15

## 2018-11-22 RX ADMIN — Medication 100 UNITS: at 17:32

## 2018-11-22 ASSESSMENT — PAIN DESCRIPTION - ORIENTATION
ORIENTATION: RIGHT

## 2018-11-22 ASSESSMENT — PAIN SCALES - GENERAL
PAINLEVEL_OUTOF10: 7
PAINLEVEL_OUTOF10: 7
PAINLEVEL_OUTOF10: 0
PAINLEVEL_OUTOF10: 6
PAINLEVEL_OUTOF10: 7
PAINLEVEL_OUTOF10: 7
PAINLEVEL_OUTOF10: 4
PAINLEVEL_OUTOF10: 0
PAINLEVEL_OUTOF10: 7

## 2018-11-22 ASSESSMENT — PAIN DESCRIPTION - LOCATION
LOCATION: ABDOMEN

## 2018-11-22 ASSESSMENT — PAIN DESCRIPTION - DESCRIPTORS
DESCRIPTORS: ACHING;DISCOMFORT;SORE
DESCRIPTORS: ACHING
DESCRIPTORS: ACHING;DISCOMFORT;SORE

## 2018-11-22 ASSESSMENT — PAIN DESCRIPTION - PROGRESSION
CLINICAL_PROGRESSION: NOT CHANGED

## 2018-11-22 ASSESSMENT — PAIN DESCRIPTION - FREQUENCY
FREQUENCY: INTERMITTENT

## 2018-11-22 ASSESSMENT — PAIN DESCRIPTION - ONSET
ONSET: ON-GOING

## 2018-11-22 ASSESSMENT — PAIN DESCRIPTION - PAIN TYPE
TYPE: ACUTE PAIN

## 2018-11-22 NOTE — PROGRESS NOTES
Low-dose CT  acquisition technique included one of following options; 1 . Automated exposure control, 2. Adjustment of MA and or KV according to patient's size or 3. Use of iterative reconstruction. Scarring/linear atelectasis in the right lower lung. Pulmonary nodule left lower lung ( series 5 image 11), measuring approximately 0.93 x 0.9 cm. Second pulmonary nodule left lower lung more posteriorly measuring approximately 0.77 x by 0.7 cm (series 5 image 11). Area of airspace disease in the right lower lung base measuring 2.4 x 2.1 cm on series 5 image 15. These findings are new when compared with the study from 5/7/2017. Another area of indeterminate airspace disease noted in the left lung base measuring approximately 2.2 x 1.7 cm also new compared with the previous exam, on series 5 image 16. Noncontrasted imaging demonstrates no evidence of gallstones. No renal, ureteral or bladder calculi. Postcontrast imaging demonstrates that the visualized portions of the heart, esophagus, stomach, duodenum, pancreas, pancreatic duct,, extrahepatic common bile duct, adrenals, right kidney, left kidney, are unremarkable. Abnormally enlarged lymph nodes are noted in the right lower abdominal quadrant which are also more numerous than expected. Fat-containing umbilical hernia. Anasarca noted within the subcutaneous fatty tissues involving both the right lateral abdominal regions. Spleen is enlarged measuring 14.7 cm measuring craniocaudal dimension. Liver measures approximately 19.2 cm craniocaudal dimension. The appendix is within normal limits. Mild to moderate diffuse fatty infiltration of liver. There is no evidence of portal vein, splenic vein, venous confluence or superior mesenteric vein thrombus or occlusion. Visualized uterus is grossly unremarkable. Visualized bladder grossly unremarkable. Vertebral body heights and intervertebral disc spaces are well-preserved.  There is normal sagittal alignment of the visualized spine. No lytic or blastic bony lesions. .     ALERT:  THIS IS AN ABNORMAL REPORT 1. There is no evidence of bowel obstruction on the exam. 2. Abnormal pulmonary nodules in the left lung, the largest which measures 0.93 x 0.9 cm findings could reflect benign pulmonary nodules, metastatic disease is not excluded. Other potential etiologies including, not limited to, septic pulmonary emboli or Wegener's granulomatosis are also not excluded. . Clinical correlation recommended. 3. Amorphous appearing airspace disease in the bilateral lower lobes, largest area is on the right measuring approximately 2.4 x 2.1 cm, findings could be reflective focal areas of developing infiltrate/pneumonia. Other etiologies not excluded. Clinical correlation recommended. 4. Anasarca in the bilateral subcutaneous fatty tissues of the abdomen without drainable fluid collection. 5. More numerous than expected and abnormally enlarged lymph nodes in the right lower abdominal quadrant could be suggestive of lymphadenitis or typhlitis. Clinical correlation recommended. Other infectious or inflammatory etiologies are not excluded. 6. Mild to moderate diffuse fatty infiltration of liver. 7. Splenomegaly. Hepatomegaly. Assessment:     Active Problems:  ? Hematochezia/rectal bleeding with Heme + stool  ? Sickle Cell Anemia  ? Abdominal pain, low abdomen  ? Diarrhea  ? Nausea  ? Shortness of breath - defer to PCP  ? Hx PE, DVT  ? Pulmonary nodules- per CT  ? Anasarca- per CT  ? Lymphadenitis vs typhlitis- per CT  ? Fatty liver- per CT  ? Splenomegaly  ? Hepatomegaly       Plan:     ? Stool studies pending  ? DC antibiotics. D/W ID  ? Solumedrol 30 MG IV Q 8 x 3 doses per Dr. Summer Basurto  ? Continue Protonix   ? Discharge per PCP, ok from gi pov with outpatient work up to be scheduled.      Discussed with Dr. Sana Borges per Dr. Vandana Holland [id number], FNP-BC 11/22/2018 12:56 PM For Dr. Summer Basurto

## 2018-11-24 LAB — CULTURE, STOOL: NORMAL

## 2019-01-11 ENCOUNTER — APPOINTMENT (OUTPATIENT)
Dept: GENERAL RADIOLOGY | Age: 24
End: 2019-01-11
Payer: MEDICAID

## 2019-01-11 ENCOUNTER — HOSPITAL ENCOUNTER (EMERGENCY)
Age: 24
Discharge: HOME | End: 2019-01-12
Attending: EMERGENCY MEDICINE
Payer: MEDICAID

## 2019-01-11 DIAGNOSIS — Z86.2 HISTORY OF SICKLE CELL ANEMIA: ICD-10-CM

## 2019-01-11 DIAGNOSIS — G89.29 OTHER CHRONIC PAIN: Primary | ICD-10-CM

## 2019-01-11 LAB
ALBUMIN SERPL-MCNC: 4 G/DL (ref 3.5–5.2)
ALP BLD-CCNC: 97 U/L (ref 35–104)
ALT SERPL-CCNC: 6 U/L (ref 0–32)
ANION GAP SERPL CALCULATED.3IONS-SCNC: 10 MMOL/L (ref 7–16)
ANISOCYTOSIS: ABNORMAL
AST SERPL-CCNC: 10 U/L (ref 0–31)
BASOPHILS ABSOLUTE: 0.03 E9/L (ref 0–0.2)
BASOPHILS RELATIVE PERCENT: 0.2 % (ref 0–2)
BILIRUB SERPL-MCNC: 1.1 MG/DL (ref 0–1.2)
BILIRUBIN URINE: NEGATIVE
BLOOD, URINE: NEGATIVE
BUN BLDV-MCNC: 9 MG/DL (ref 6–20)
CALCIUM SERPL-MCNC: 8.7 MG/DL (ref 8.6–10.2)
CHLORIDE BLD-SCNC: 105 MMOL/L (ref 98–107)
CLARITY: CLEAR
CO2: 24 MMOL/L (ref 22–29)
COLOR: YELLOW
CREAT SERPL-MCNC: 0.8 MG/DL (ref 0.5–1)
EOSINOPHILS ABSOLUTE: 0.24 E9/L (ref 0.05–0.5)
EOSINOPHILS RELATIVE PERCENT: 1.9 % (ref 0–6)
GFR AFRICAN AMERICAN: >60
GFR NON-AFRICAN AMERICAN: >60 ML/MIN/1.73
GLUCOSE BLD-MCNC: 97 MG/DL (ref 74–99)
GLUCOSE URINE: NEGATIVE MG/DL
HCT VFR BLD CALC: 27 % (ref 34–48)
HEMOGLOBIN: 9.2 G/DL (ref 11.5–15.5)
HYPOCHROMIA: ABNORMAL
IMMATURE GRANULOCYTES #: 0.06 E9/L
IMMATURE GRANULOCYTES %: 0.5 % (ref 0–5)
IMMATURE RETIC FRACT: 27.3 % (ref 3–15.9)
KETONES, URINE: NEGATIVE MG/DL
LACTIC ACID: 0.8 MMOL/L (ref 0.5–2.2)
LEUKOCYTE ESTERASE, URINE: ABNORMAL
LIPASE: 29 U/L (ref 13–60)
LYMPHOCYTES ABSOLUTE: 4.96 E9/L (ref 1.5–4)
LYMPHOCYTES RELATIVE PERCENT: 39.7 % (ref 20–42)
MCH RBC QN AUTO: 20.7 PG (ref 26–35)
MCHC RBC AUTO-ENTMCNC: 34.1 % (ref 32–34.5)
MCV RBC AUTO: 60.7 FL (ref 80–99.9)
MONOCYTES ABSOLUTE: 0.78 E9/L (ref 0.1–0.95)
MONOCYTES RELATIVE PERCENT: 6.2 % (ref 2–12)
NEUTROPHILS ABSOLUTE: 6.43 E9/L (ref 1.8–7.3)
NEUTROPHILS RELATIVE PERCENT: 51.5 % (ref 43–80)
NITRITE, URINE: NEGATIVE
OVALOCYTES: ABNORMAL
PDW BLD-RTO: 21.5 FL (ref 11.5–15)
PH UA: 6.5 (ref 5–9)
PLATELET # BLD: 270 E9/L (ref 130–450)
PMV BLD AUTO: ABNORMAL FL (ref 7–12)
POIKILOCYTES: ABNORMAL
POLYCHROMASIA: ABNORMAL
POTASSIUM REFLEX MAGNESIUM: 3.8 MMOL/L (ref 3.5–5)
PROTEIN UA: NEGATIVE MG/DL
RBC # BLD: 4.45 E12/L (ref 3.5–5.5)
RETIC HGB EQUIVALENT: 24.3 PG (ref 28.2–36.6)
RETICULOCYTE ABSOLUTE COUNT: 0.09 E12/L
RETICULOCYTE COUNT PCT: 2.1 % (ref 0.4–1.9)
SODIUM BLD-SCNC: 139 MMOL/L (ref 132–146)
SPECIFIC GRAVITY UA: 1.01 (ref 1–1.03)
TARGET CELLS: ABNORMAL
TEAR DROP CELLS: ABNORMAL
TOTAL PROTEIN: 7.1 G/DL (ref 6.4–8.3)
UROBILINOGEN, URINE: 0.2 E.U./DL
WBC # BLD: 12.5 E9/L (ref 4.5–11.5)

## 2019-01-11 PROCEDURE — 81001 URINALYSIS AUTO W/SCOPE: CPT

## 2019-01-11 PROCEDURE — 36415 COLL VENOUS BLD VENIPUNCTURE: CPT

## 2019-01-11 PROCEDURE — 99284 EMERGENCY DEPT VISIT MOD MDM: CPT

## 2019-01-11 PROCEDURE — G0480 DRUG TEST DEF 1-7 CLASSES: HCPCS

## 2019-01-11 PROCEDURE — 83690 ASSAY OF LIPASE: CPT

## 2019-01-11 PROCEDURE — 80053 COMPREHEN METABOLIC PANEL: CPT

## 2019-01-11 PROCEDURE — 83605 ASSAY OF LACTIC ACID: CPT

## 2019-01-11 PROCEDURE — 6360000002 HC RX W HCPCS: Performed by: EMERGENCY MEDICINE

## 2019-01-11 PROCEDURE — 85025 COMPLETE CBC W/AUTO DIFF WBC: CPT

## 2019-01-11 PROCEDURE — 96374 THER/PROPH/DIAG INJ IV PUSH: CPT

## 2019-01-11 PROCEDURE — 80307 DRUG TEST PRSMV CHEM ANLYZR: CPT

## 2019-01-11 PROCEDURE — 2580000003 HC RX 258: Performed by: EMERGENCY MEDICINE

## 2019-01-11 PROCEDURE — 71045 X-RAY EXAM CHEST 1 VIEW: CPT

## 2019-01-11 PROCEDURE — 87088 URINE BACTERIA CULTURE: CPT

## 2019-01-11 PROCEDURE — 85045 AUTOMATED RETICULOCYTE COUNT: CPT

## 2019-01-11 RX ORDER — 0.9 % SODIUM CHLORIDE 0.9 %
2000 INTRAVENOUS SOLUTION INTRAVENOUS ONCE
Status: COMPLETED | OUTPATIENT
Start: 2019-01-11 | End: 2019-01-12

## 2019-01-11 RX ORDER — KETOROLAC TROMETHAMINE 30 MG/ML
15 INJECTION, SOLUTION INTRAMUSCULAR; INTRAVENOUS ONCE
Status: COMPLETED | OUTPATIENT
Start: 2019-01-11 | End: 2019-01-11

## 2019-01-11 RX ADMIN — KETOROLAC TROMETHAMINE 15 MG: 30 INJECTION INTRAMUSCULAR; INTRAVENOUS at 22:53

## 2019-01-11 RX ADMIN — SODIUM CHLORIDE 2000 ML: 9 INJECTION, SOLUTION INTRAVENOUS at 22:46

## 2019-01-11 ASSESSMENT — ENCOUNTER SYMPTOMS
CONSTIPATION: 0
DIARRHEA: 0
BLOOD IN STOOL: 0
COUGH: 0
SHORTNESS OF BREATH: 0
BACK PAIN: 1
VOMITING: 0
NAUSEA: 0
ABDOMINAL PAIN: 0

## 2019-01-11 ASSESSMENT — PAIN SCALES - GENERAL
PAINLEVEL_OUTOF10: 9
PAINLEVEL_OUTOF10: 9
PAINLEVEL_OUTOF10: 5

## 2019-01-12 VITALS
WEIGHT: 282 LBS | OXYGEN SATURATION: 98 % | DIASTOLIC BLOOD PRESSURE: 69 MMHG | RESPIRATION RATE: 18 BRPM | TEMPERATURE: 98.6 F | HEIGHT: 63 IN | HEART RATE: 87 BPM | BODY MASS INDEX: 49.96 KG/M2 | SYSTOLIC BLOOD PRESSURE: 105 MMHG

## 2019-01-12 LAB
ACETAMINOPHEN LEVEL: <5 MCG/ML (ref 10–30)
AMPHETAMINE SCREEN, URINE: NOT DETECTED
BACTERIA: NORMAL /HPF
BARBITURATE SCREEN URINE: NOT DETECTED
BENZODIAZEPINE SCREEN, URINE: NOT DETECTED
CANNABINOID SCREEN URINE: NOT DETECTED
COCAINE METABOLITE SCREEN URINE: NOT DETECTED
EPITHELIAL CELLS, UA: NORMAL /HPF
ETHANOL: <10 MG/DL (ref 0–0.08)
METHADONE SCREEN, URINE: NOT DETECTED
OPIATE SCREEN URINE: NOT DETECTED
PHENCYCLIDINE SCREEN URINE: NOT DETECTED
PROPOXYPHENE SCREEN: NOT DETECTED
RBC UA: NORMAL /HPF (ref 0–2)
SALICYLATE, SERUM: <0.3 MG/DL (ref 0–30)
TRICYCLIC ANTIDEPRESSANTS SCREEN SERUM: NEGATIVE NG/ML
WBC UA: NORMAL /HPF (ref 0–5)

## 2019-01-12 PROCEDURE — 96375 TX/PRO/DX INJ NEW DRUG ADDON: CPT

## 2019-01-12 PROCEDURE — 6360000002 HC RX W HCPCS: Performed by: EMERGENCY MEDICINE

## 2019-01-12 PROCEDURE — 6360000002 HC RX W HCPCS

## 2019-01-12 RX ORDER — HEPARIN SODIUM (PORCINE) LOCK FLUSH IV SOLN 100 UNIT/ML 100 UNIT/ML
SOLUTION INTRAVENOUS
Status: COMPLETED
Start: 2019-01-12 | End: 2019-01-12

## 2019-01-12 RX ORDER — OXYCODONE HYDROCHLORIDE AND ACETAMINOPHEN 5; 325 MG/1; MG/1
1 TABLET ORAL EVERY 6 HOURS PRN
Qty: 10 TABLET | Refills: 0 | Status: SHIPPED | OUTPATIENT
Start: 2019-01-12 | End: 2019-01-15

## 2019-01-12 RX ORDER — HEPARIN SODIUM (PORCINE) LOCK FLUSH IV SOLN 100 UNIT/ML 100 UNIT/ML
500 SOLUTION INTRAVENOUS PRN
Status: DISCONTINUED | OUTPATIENT
Start: 2019-01-12 | End: 2019-01-12 | Stop reason: HOSPADM

## 2019-01-12 RX ADMIN — HEPARIN SODIUM (PORCINE) LOCK FLUSH IV SOLN 100 UNIT/ML 500 UNITS: 100 SOLUTION at 01:10

## 2019-01-12 RX ADMIN — Medication 500 UNITS: at 01:10

## 2019-01-12 RX ADMIN — HYDROMORPHONE HYDROCHLORIDE 1 MG: 1 INJECTION, SOLUTION INTRAMUSCULAR; INTRAVENOUS; SUBCUTANEOUS at 00:14

## 2019-01-12 ASSESSMENT — PAIN SCALES - GENERAL: PAINLEVEL_OUTOF10: 5

## 2019-01-13 LAB — URINE CULTURE, ROUTINE: NORMAL

## 2019-01-17 ENCOUNTER — HOSPITAL ENCOUNTER (EMERGENCY)
Age: 24
Discharge: HOME | End: 2019-01-17
Attending: EMERGENCY MEDICINE
Payer: MEDICAID

## 2019-01-17 VITALS
DIASTOLIC BLOOD PRESSURE: 65 MMHG | WEIGHT: 282 LBS | HEIGHT: 63 IN | RESPIRATION RATE: 16 BRPM | SYSTOLIC BLOOD PRESSURE: 114 MMHG | OXYGEN SATURATION: 95 % | TEMPERATURE: 98.1 F | BODY MASS INDEX: 49.96 KG/M2 | HEART RATE: 99 BPM

## 2019-01-17 DIAGNOSIS — D57.00 SICKLE CELL PAIN CRISIS (HCC): Primary | ICD-10-CM

## 2019-01-17 LAB
ALBUMIN SERPL-MCNC: 3.7 G/DL (ref 3.5–5.2)
ALP BLD-CCNC: 99 U/L (ref 35–104)
ALT SERPL-CCNC: 9 U/L (ref 0–32)
ANION GAP SERPL CALCULATED.3IONS-SCNC: 7 MMOL/L (ref 7–16)
ANISOCYTOSIS: ABNORMAL
AST SERPL-CCNC: 17 U/L (ref 0–31)
BASOPHILS ABSOLUTE: 0 E9/L (ref 0–0.2)
BASOPHILS RELATIVE PERCENT: 0 % (ref 0–2)
BILIRUB SERPL-MCNC: 1.3 MG/DL (ref 0–1.2)
BUN BLDV-MCNC: 8 MG/DL (ref 6–20)
CALCIUM SERPL-MCNC: 8.7 MG/DL (ref 8.6–10.2)
CHLORIDE BLD-SCNC: 102 MMOL/L (ref 98–107)
CO2: 27 MMOL/L (ref 22–29)
CREAT SERPL-MCNC: 0.9 MG/DL (ref 0.5–1)
EKG ATRIAL RATE: 88 BPM
EKG P AXIS: 59 DEGREES
EKG P-R INTERVAL: 140 MS
EKG Q-T INTERVAL: 378 MS
EKG QRS DURATION: 82 MS
EKG QTC CALCULATION (BAZETT): 457 MS
EKG R AXIS: 77 DEGREES
EKG T AXIS: 20 DEGREES
EKG VENTRICULAR RATE: 88 BPM
EOSINOPHILS ABSOLUTE: 0.28 E9/L (ref 0.05–0.5)
EOSINOPHILS RELATIVE PERCENT: 3 % (ref 0–6)
GFR AFRICAN AMERICAN: >60
GFR NON-AFRICAN AMERICAN: >60 ML/MIN/1.73
GLUCOSE BLD-MCNC: 97 MG/DL (ref 74–99)
HCT VFR BLD CALC: 28.8 % (ref 34–48)
HEMOGLOBIN: 9.6 G/DL (ref 11.5–15.5)
HYPOCHROMIA: ABNORMAL
IMMATURE RETIC FRACT: 30 % (ref 3–15.9)
LYMPHOCYTES ABSOLUTE: 3.5 E9/L (ref 1.5–4)
LYMPHOCYTES RELATIVE PERCENT: 38 % (ref 20–42)
MCH RBC QN AUTO: 20.3 PG (ref 26–35)
MCHC RBC AUTO-ENTMCNC: 33.3 % (ref 32–34.5)
MCV RBC AUTO: 60.9 FL (ref 80–99.9)
MONOCYTES ABSOLUTE: 0.46 E9/L (ref 0.1–0.95)
MONOCYTES RELATIVE PERCENT: 5 % (ref 2–12)
NEUTROPHILS ABSOLUTE: 4.97 E9/L (ref 1.8–7.3)
NEUTROPHILS RELATIVE PERCENT: 54 % (ref 43–80)
PDW BLD-RTO: 21.2 FL (ref 11.5–15)
PLATELET # BLD: 278 E9/L (ref 130–450)
PMV BLD AUTO: ABNORMAL FL (ref 7–12)
POIKILOCYTES: ABNORMAL
POLYCHROMASIA: ABNORMAL
POTASSIUM REFLEX MAGNESIUM: 4.1 MMOL/L (ref 3.5–5)
RBC # BLD: 4.73 E12/L (ref 3.5–5.5)
RETIC HGB EQUIVALENT: 24.7 PG (ref 28.2–36.6)
RETICULOCYTE ABSOLUTE COUNT: 0.12 E12/L
RETICULOCYTE COUNT PCT: 2.4 % (ref 0.4–1.9)
SODIUM BLD-SCNC: 136 MMOL/L (ref 132–146)
TARGET CELLS: ABNORMAL
TOTAL PROTEIN: 6.7 G/DL (ref 6.4–8.3)
TROPONIN: <0.01 NG/ML (ref 0–0.03)
WBC # BLD: 9.2 E9/L (ref 4.5–11.5)

## 2019-01-17 PROCEDURE — 6360000002 HC RX W HCPCS: Performed by: FAMILY MEDICINE

## 2019-01-17 PROCEDURE — 96374 THER/PROPH/DIAG INJ IV PUSH: CPT

## 2019-01-17 PROCEDURE — 85025 COMPLETE CBC W/AUTO DIFF WBC: CPT

## 2019-01-17 PROCEDURE — 96375 TX/PRO/DX INJ NEW DRUG ADDON: CPT

## 2019-01-17 PROCEDURE — 93005 ELECTROCARDIOGRAM TRACING: CPT

## 2019-01-17 PROCEDURE — 80053 COMPREHEN METABOLIC PANEL: CPT

## 2019-01-17 PROCEDURE — 99284 EMERGENCY DEPT VISIT MOD MDM: CPT

## 2019-01-17 PROCEDURE — 2580000003 HC RX 258: Performed by: FAMILY MEDICINE

## 2019-01-17 PROCEDURE — 96376 TX/PRO/DX INJ SAME DRUG ADON: CPT

## 2019-01-17 PROCEDURE — 85045 AUTOMATED RETICULOCYTE COUNT: CPT

## 2019-01-17 PROCEDURE — 84484 ASSAY OF TROPONIN QUANT: CPT

## 2019-01-17 RX ORDER — 0.9 % SODIUM CHLORIDE 0.9 %
1000 INTRAVENOUS SOLUTION INTRAVENOUS ONCE
Status: COMPLETED | OUTPATIENT
Start: 2019-01-17 | End: 2019-01-17

## 2019-01-17 RX ORDER — HEPARIN SODIUM (PORCINE) LOCK FLUSH IV SOLN 100 UNIT/ML 100 UNIT/ML
SOLUTION INTRAVENOUS
Status: DISCONTINUED
Start: 2019-01-17 | End: 2019-01-17 | Stop reason: HOSPADM

## 2019-01-17 RX ORDER — DIPHENHYDRAMINE HYDROCHLORIDE 50 MG/ML
50 INJECTION INTRAMUSCULAR; INTRAVENOUS ONCE
Status: COMPLETED | OUTPATIENT
Start: 2019-01-17 | End: 2019-01-17

## 2019-01-17 RX ADMIN — SODIUM CHLORIDE 1000 ML: 9 INJECTION, SOLUTION INTRAVENOUS at 14:23

## 2019-01-17 RX ADMIN — HYDROMORPHONE HYDROCHLORIDE 1 MG: 1 INJECTION, SOLUTION INTRAMUSCULAR; INTRAVENOUS; SUBCUTANEOUS at 15:42

## 2019-01-17 RX ADMIN — HYDROMORPHONE HYDROCHLORIDE 0.5 MG: 1 INJECTION, SOLUTION INTRAMUSCULAR; INTRAVENOUS; SUBCUTANEOUS at 14:23

## 2019-01-17 RX ADMIN — DIPHENHYDRAMINE HYDROCHLORIDE 50 MG: 50 INJECTION INTRAMUSCULAR; INTRAVENOUS at 14:24

## 2019-01-17 ASSESSMENT — ENCOUNTER SYMPTOMS
SORE THROAT: 0
SINUS PRESSURE: 0
CONSTIPATION: 0
VOMITING: 0
ABDOMINAL PAIN: 0
WHEEZING: 0
RHINORRHEA: 0
SHORTNESS OF BREATH: 0
NAUSEA: 0
COUGH: 0
BACK PAIN: 1
DIARRHEA: 0
SINUS PAIN: 0

## 2019-01-17 ASSESSMENT — PAIN SCALES - GENERAL
PAINLEVEL_OUTOF10: 7
PAINLEVEL_OUTOF10: 9

## 2019-01-17 ASSESSMENT — PAIN DESCRIPTION - PAIN TYPE: TYPE: CHRONIC PAIN

## 2019-01-17 ASSESSMENT — PAIN SCALES - WONG BAKER: WONGBAKER_NUMERICALRESPONSE: 2

## 2019-02-15 ENCOUNTER — TELEPHONE (OUTPATIENT)
Dept: VASCULAR SURGERY | Age: 24
End: 2019-02-15

## 2019-03-17 ENCOUNTER — HOSPITAL ENCOUNTER (EMERGENCY)
Age: 24
Discharge: HOME | End: 2019-03-17
Attending: EMERGENCY MEDICINE
Payer: MEDICAID

## 2019-03-17 ENCOUNTER — APPOINTMENT (OUTPATIENT)
Dept: GENERAL RADIOLOGY | Age: 24
End: 2019-03-17
Payer: MEDICAID

## 2019-03-17 VITALS
DIASTOLIC BLOOD PRESSURE: 59 MMHG | RESPIRATION RATE: 14 BRPM | HEART RATE: 83 BPM | TEMPERATURE: 98.6 F | HEIGHT: 63 IN | OXYGEN SATURATION: 98 % | SYSTOLIC BLOOD PRESSURE: 120 MMHG | WEIGHT: 293 LBS | BODY MASS INDEX: 51.91 KG/M2

## 2019-03-17 DIAGNOSIS — D57.00 HB-SS DISEASE WITH CRISIS (HCC): Primary | ICD-10-CM

## 2019-03-17 LAB
ANION GAP SERPL CALCULATED.3IONS-SCNC: 10 MMOL/L (ref 7–16)
ANISOCYTOSIS: ABNORMAL
BASOPHILS ABSOLUTE: 0.03 E9/L (ref 0–0.2)
BASOPHILS RELATIVE PERCENT: 0.3 % (ref 0–2)
BUN BLDV-MCNC: 9 MG/DL (ref 6–20)
CALCIUM SERPL-MCNC: 9 MG/DL (ref 8.6–10.2)
CHLORIDE BLD-SCNC: 101 MMOL/L (ref 98–107)
CO2: 27 MMOL/L (ref 22–29)
CREAT SERPL-MCNC: 0.8 MG/DL (ref 0.5–1)
EOSINOPHILS ABSOLUTE: 0.19 E9/L (ref 0.05–0.5)
EOSINOPHILS RELATIVE PERCENT: 2 % (ref 0–6)
GFR AFRICAN AMERICAN: >60
GFR NON-AFRICAN AMERICAN: >60 ML/MIN/1.73
GLUCOSE BLD-MCNC: 97 MG/DL (ref 74–99)
HCT VFR BLD CALC: 31.6 % (ref 34–48)
HEMOGLOBIN: 10.6 G/DL (ref 11.5–15.5)
IMMATURE GRANULOCYTES #: 0.03 E9/L
IMMATURE GRANULOCYTES %: 0.3 % (ref 0–5)
IMMATURE RETIC FRACT: 14 % (ref 3–15.9)
LYMPHOCYTES ABSOLUTE: 4.12 E9/L (ref 1.5–4)
LYMPHOCYTES RELATIVE PERCENT: 44.3 % (ref 20–42)
MCH RBC QN AUTO: 21.2 PG (ref 26–35)
MCHC RBC AUTO-ENTMCNC: 33.5 % (ref 32–34.5)
MCV RBC AUTO: 63.2 FL (ref 80–99.9)
MONOCYTES ABSOLUTE: 0.65 E9/L (ref 0.1–0.95)
MONOCYTES RELATIVE PERCENT: 7 % (ref 2–12)
NEUTROPHILS ABSOLUTE: 4.29 E9/L (ref 1.8–7.3)
NEUTROPHILS RELATIVE PERCENT: 46.1 % (ref 43–80)
PDW BLD-RTO: 21.3 FL (ref 11.5–15)
PLATELET # BLD: 302 E9/L (ref 130–450)
PMV BLD AUTO: ABNORMAL FL (ref 7–12)
POIKILOCYTES: ABNORMAL
POLYCHROMASIA: ABNORMAL
POTASSIUM SERPL-SCNC: 5.2 MMOL/L (ref 3.5–5)
RBC # BLD: 5 E12/L (ref 3.5–5.5)
RETIC HGB EQUIVALENT: 22 PG (ref 28.2–36.6)
RETICULOCYTE ABSOLUTE COUNT: 0.08 E12/L
RETICULOCYTE COUNT PCT: 1.6 % (ref 0.4–1.9)
SCHISTOCYTES: ABNORMAL
SODIUM BLD-SCNC: 138 MMOL/L (ref 132–146)
TARGET CELLS: ABNORMAL
WBC # BLD: 9.3 E9/L (ref 4.5–11.5)

## 2019-03-17 PROCEDURE — 85045 AUTOMATED RETICULOCYTE COUNT: CPT

## 2019-03-17 PROCEDURE — 6360000002 HC RX W HCPCS: Performed by: PHYSICIAN ASSISTANT

## 2019-03-17 PROCEDURE — 6360000002 HC RX W HCPCS: Performed by: EMERGENCY MEDICINE

## 2019-03-17 PROCEDURE — 96374 THER/PROPH/DIAG INJ IV PUSH: CPT

## 2019-03-17 PROCEDURE — 71046 X-RAY EXAM CHEST 2 VIEWS: CPT

## 2019-03-17 PROCEDURE — 85025 COMPLETE CBC W/AUTO DIFF WBC: CPT

## 2019-03-17 PROCEDURE — 96375 TX/PRO/DX INJ NEW DRUG ADDON: CPT

## 2019-03-17 PROCEDURE — 99284 EMERGENCY DEPT VISIT MOD MDM: CPT

## 2019-03-17 PROCEDURE — 80048 BASIC METABOLIC PNL TOTAL CA: CPT

## 2019-03-17 RX ORDER — KETOROLAC TROMETHAMINE 30 MG/ML
15 INJECTION, SOLUTION INTRAMUSCULAR; INTRAVENOUS ONCE
Status: COMPLETED | OUTPATIENT
Start: 2019-03-17 | End: 2019-03-17

## 2019-03-17 RX ORDER — METOCLOPRAMIDE HYDROCHLORIDE 5 MG/ML
10 INJECTION INTRAMUSCULAR; INTRAVENOUS ONCE
Status: COMPLETED | OUTPATIENT
Start: 2019-03-17 | End: 2019-03-17

## 2019-03-17 RX ORDER — DIPHENHYDRAMINE HYDROCHLORIDE 50 MG/ML
12.5 INJECTION INTRAMUSCULAR; INTRAVENOUS ONCE
Status: COMPLETED | OUTPATIENT
Start: 2019-03-17 | End: 2019-03-17

## 2019-03-17 RX ORDER — NAPROXEN 500 MG/1
500 TABLET ORAL 2 TIMES DAILY
Qty: 14 TABLET | Refills: 0 | Status: SHIPPED | OUTPATIENT
Start: 2019-03-17 | End: 2019-04-20

## 2019-03-17 RX ADMIN — KETOROLAC TROMETHAMINE 15 MG: 30 INJECTION, SOLUTION INTRAMUSCULAR; INTRAVENOUS at 16:00

## 2019-03-17 RX ADMIN — DIPHENHYDRAMINE HYDROCHLORIDE 12.5 MG: 50 INJECTION, SOLUTION INTRAMUSCULAR; INTRAVENOUS at 15:19

## 2019-03-17 RX ADMIN — METOCLOPRAMIDE 10 MG: 5 INJECTION, SOLUTION INTRAMUSCULAR; INTRAVENOUS at 15:19

## 2019-03-17 RX ADMIN — HYDROMORPHONE HYDROCHLORIDE 0.5 MG: 1 INJECTION, SOLUTION INTRAMUSCULAR; INTRAVENOUS; SUBCUTANEOUS at 15:18

## 2019-03-17 ASSESSMENT — PAIN SCALES - GENERAL
PAINLEVEL_OUTOF10: 9
PAINLEVEL_OUTOF10: 7
PAINLEVEL_OUTOF10: 9

## 2019-03-23 ENCOUNTER — APPOINTMENT (OUTPATIENT)
Dept: GENERAL RADIOLOGY | Age: 24
End: 2019-03-23
Payer: MEDICAID

## 2019-03-23 ENCOUNTER — HOSPITAL ENCOUNTER (EMERGENCY)
Age: 24
Discharge: HOME | End: 2019-03-24
Attending: EMERGENCY MEDICINE
Payer: MEDICAID

## 2019-03-23 DIAGNOSIS — D57.00 SICKLE CELL PAIN CRISIS (HCC): Primary | ICD-10-CM

## 2019-03-23 LAB
ALBUMIN SERPL-MCNC: 3.6 G/DL (ref 3.5–5.2)
ALP BLD-CCNC: 102 U/L (ref 35–104)
ALT SERPL-CCNC: 10 U/L (ref 0–32)
ANION GAP SERPL CALCULATED.3IONS-SCNC: 11 MMOL/L (ref 7–16)
AST SERPL-CCNC: 14 U/L (ref 0–31)
BACTERIA: ABNORMAL /HPF
BASOPHILS ABSOLUTE: 0.03 E9/L (ref 0–0.2)
BASOPHILS RELATIVE PERCENT: 0.2 % (ref 0–2)
BILIRUB SERPL-MCNC: 0.9 MG/DL (ref 0–1.2)
BILIRUBIN URINE: NEGATIVE
BLOOD, URINE: NEGATIVE
BUN BLDV-MCNC: 10 MG/DL (ref 6–20)
CALCIUM SERPL-MCNC: 9 MG/DL (ref 8.6–10.2)
CHLORIDE BLD-SCNC: 102 MMOL/L (ref 98–107)
CLARITY: CLEAR
CO2: 25 MMOL/L (ref 22–29)
COLOR: YELLOW
CREAT SERPL-MCNC: 0.9 MG/DL (ref 0.5–1)
EOSINOPHILS ABSOLUTE: 0.18 E9/L (ref 0.05–0.5)
EOSINOPHILS RELATIVE PERCENT: 1.4 % (ref 0–6)
EPITHELIAL CELLS, UA: ABNORMAL /HPF
GFR AFRICAN AMERICAN: >60
GFR NON-AFRICAN AMERICAN: >60 ML/MIN/1.73
GLUCOSE BLD-MCNC: 104 MG/DL (ref 74–99)
GLUCOSE URINE: NEGATIVE MG/DL
HCG, URINE, POC: NEGATIVE
HCT VFR BLD CALC: 29.3 % (ref 34–48)
HEMOGLOBIN: 9.8 G/DL (ref 11.5–15.5)
IMMATURE GRANULOCYTES #: 0.05 E9/L
IMMATURE GRANULOCYTES %: 0.4 % (ref 0–5)
IMMATURE RETIC FRACT: 19.3 % (ref 3–15.9)
KETONES, URINE: NEGATIVE MG/DL
LEUKOCYTE ESTERASE, URINE: ABNORMAL
LYMPHOCYTES ABSOLUTE: 4.12 E9/L (ref 1.5–4)
LYMPHOCYTES RELATIVE PERCENT: 32.6 % (ref 20–42)
Lab: NORMAL
MCH RBC QN AUTO: 20.7 PG (ref 26–35)
MCHC RBC AUTO-ENTMCNC: 33.4 % (ref 32–34.5)
MCV RBC AUTO: 61.8 FL (ref 80–99.9)
MONOCYTES ABSOLUTE: 0.83 E9/L (ref 0.1–0.95)
MONOCYTES RELATIVE PERCENT: 6.6 % (ref 2–12)
NEGATIVE QC PASS/FAIL: NORMAL
NEUTROPHILS ABSOLUTE: 7.44 E9/L (ref 1.8–7.3)
NEUTROPHILS RELATIVE PERCENT: 58.8 % (ref 43–80)
NITRITE, URINE: NEGATIVE
PDW BLD-RTO: 20.2 FL (ref 11.5–15)
PH UA: 6.5 (ref 5–9)
PLATELET # BLD: 266 E9/L (ref 130–450)
PMV BLD AUTO: ABNORMAL FL (ref 7–12)
POSITIVE QC PASS/FAIL: NORMAL
POTASSIUM SERPL-SCNC: 4.2 MMOL/L (ref 3.5–5)
PROTEIN UA: NEGATIVE MG/DL
RBC # BLD: 4.74 E12/L (ref 3.5–5.5)
RBC UA: ABNORMAL /HPF (ref 0–2)
RETIC HGB EQUIVALENT: 22.9 PG (ref 28.2–36.6)
RETICULOCYTE ABSOLUTE COUNT: 0.07 E12/L
RETICULOCYTE COUNT PCT: 1.4 % (ref 0.4–1.9)
SODIUM BLD-SCNC: 138 MMOL/L (ref 132–146)
SPECIFIC GRAVITY UA: 1.01 (ref 1–1.03)
TOTAL PROTEIN: 7.4 G/DL (ref 6.4–8.3)
TROPONIN: <0.01 NG/ML (ref 0–0.03)
UROBILINOGEN, URINE: 0.2 E.U./DL
WBC # BLD: 12.7 E9/L (ref 4.5–11.5)
WBC UA: ABNORMAL /HPF (ref 0–5)

## 2019-03-23 PROCEDURE — 85045 AUTOMATED RETICULOCYTE COUNT: CPT

## 2019-03-23 PROCEDURE — 84484 ASSAY OF TROPONIN QUANT: CPT

## 2019-03-23 PROCEDURE — 99284 EMERGENCY DEPT VISIT MOD MDM: CPT

## 2019-03-23 PROCEDURE — 85025 COMPLETE CBC W/AUTO DIFF WBC: CPT

## 2019-03-23 PROCEDURE — 6370000000 HC RX 637 (ALT 250 FOR IP): Performed by: EMERGENCY MEDICINE

## 2019-03-23 PROCEDURE — 71045 X-RAY EXAM CHEST 1 VIEW: CPT

## 2019-03-23 PROCEDURE — 80053 COMPREHEN METABOLIC PANEL: CPT

## 2019-03-23 PROCEDURE — 36415 COLL VENOUS BLD VENIPUNCTURE: CPT

## 2019-03-23 PROCEDURE — 81001 URINALYSIS AUTO W/SCOPE: CPT

## 2019-03-23 RX ORDER — OXYCODONE HYDROCHLORIDE AND ACETAMINOPHEN 5; 325 MG/1; MG/1
2 TABLET ORAL ONCE
Status: COMPLETED | OUTPATIENT
Start: 2019-03-23 | End: 2019-03-23

## 2019-03-23 RX ADMIN — OXYCODONE HYDROCHLORIDE AND ACETAMINOPHEN 2 TABLET: 5; 325 TABLET ORAL at 23:28

## 2019-03-23 ASSESSMENT — PAIN DESCRIPTION - LOCATION: LOCATION: GENERALIZED

## 2019-03-23 ASSESSMENT — PAIN DESCRIPTION - DESCRIPTORS: DESCRIPTORS: ACHING

## 2019-03-23 ASSESSMENT — PAIN SCALES - GENERAL: PAINLEVEL_OUTOF10: 9

## 2019-03-24 VITALS
SYSTOLIC BLOOD PRESSURE: 111 MMHG | RESPIRATION RATE: 18 BRPM | TEMPERATURE: 98 F | OXYGEN SATURATION: 99 % | DIASTOLIC BLOOD PRESSURE: 89 MMHG | WEIGHT: 293 LBS | HEART RATE: 80 BPM | HEIGHT: 63 IN | BODY MASS INDEX: 51.91 KG/M2

## 2019-03-24 PROCEDURE — 6360000002 HC RX W HCPCS: Performed by: EMERGENCY MEDICINE

## 2019-03-24 PROCEDURE — 6370000000 HC RX 637 (ALT 250 FOR IP): Performed by: EMERGENCY MEDICINE

## 2019-03-24 PROCEDURE — 96372 THER/PROPH/DIAG INJ SC/IM: CPT

## 2019-03-24 RX ORDER — ONDANSETRON 4 MG/1
TABLET, ORALLY DISINTEGRATING ORAL
Status: DISCONTINUED
Start: 2019-03-24 | End: 2019-03-24 | Stop reason: HOSPADM

## 2019-03-24 RX ORDER — ONDANSETRON 4 MG/1
4 TABLET, ORALLY DISINTEGRATING ORAL ONCE
Status: COMPLETED | OUTPATIENT
Start: 2019-03-24 | End: 2019-03-24

## 2019-03-24 RX ADMIN — HYDROMORPHONE HYDROCHLORIDE 2 MG: 1 INJECTION, SOLUTION INTRAMUSCULAR; INTRAVENOUS; SUBCUTANEOUS at 00:35

## 2019-03-24 RX ADMIN — ONDANSETRON 4 MG: 4 TABLET, ORALLY DISINTEGRATING ORAL at 00:35

## 2019-03-24 ASSESSMENT — PAIN SCALES - GENERAL
PAINLEVEL_OUTOF10: 8
PAINLEVEL_OUTOF10: 8
PAINLEVEL_OUTOF10: 5

## 2019-03-24 ASSESSMENT — PAIN DESCRIPTION - LOCATION: LOCATION: GENERALIZED

## 2019-03-24 ASSESSMENT — PAIN DESCRIPTION - DESCRIPTORS: DESCRIPTORS: ACHING

## 2019-03-29 LAB
EKG ATRIAL RATE: 92 BPM
EKG P AXIS: 54 DEGREES
EKG P-R INTERVAL: 142 MS
EKG Q-T INTERVAL: 366 MS
EKG QRS DURATION: 84 MS
EKG QTC CALCULATION (BAZETT): 452 MS
EKG R AXIS: 61 DEGREES
EKG T AXIS: 37 DEGREES
EKG VENTRICULAR RATE: 92 BPM

## 2019-04-01 ENCOUNTER — HOSPITAL ENCOUNTER (EMERGENCY)
Age: 24
Discharge: HOME | End: 2019-04-01
Attending: EMERGENCY MEDICINE
Payer: MEDICAID

## 2019-04-01 VITALS
HEART RATE: 86 BPM | OXYGEN SATURATION: 100 % | BODY MASS INDEX: 51.91 KG/M2 | SYSTOLIC BLOOD PRESSURE: 98 MMHG | WEIGHT: 293 LBS | DIASTOLIC BLOOD PRESSURE: 60 MMHG | TEMPERATURE: 98.1 F | RESPIRATION RATE: 16 BRPM | HEIGHT: 63 IN

## 2019-04-01 DIAGNOSIS — O20.0 MISCARRIAGE, THREATENED, EARLY PREGNANCY: ICD-10-CM

## 2019-04-01 DIAGNOSIS — N30.00 ACUTE CYSTITIS WITHOUT HEMATURIA: ICD-10-CM

## 2019-04-01 DIAGNOSIS — D57.00 HB-SS DISEASE WITH CRISIS (HCC): Primary | ICD-10-CM

## 2019-04-01 LAB
ALBUMIN SERPL-MCNC: 4.1 G/DL (ref 3.5–5.2)
ALP BLD-CCNC: 92 U/L (ref 35–104)
ALT SERPL-CCNC: 8 U/L (ref 0–32)
ANION GAP SERPL CALCULATED.3IONS-SCNC: 10 MMOL/L (ref 7–16)
AST SERPL-CCNC: 13 U/L (ref 0–31)
BACTERIA: ABNORMAL /HPF
BILIRUB SERPL-MCNC: 0.9 MG/DL (ref 0–1.2)
BILIRUBIN DIRECT: 0.2 MG/DL (ref 0–0.3)
BILIRUBIN URINE: NEGATIVE
BILIRUBIN, INDIRECT: 0.7 MG/DL (ref 0–1)
BLOOD, URINE: ABNORMAL
BUN BLDV-MCNC: 8 MG/DL (ref 6–20)
CALCIUM SERPL-MCNC: 9 MG/DL (ref 8.6–10.2)
CHLORIDE BLD-SCNC: 104 MMOL/L (ref 98–107)
CLARITY: CLEAR
CO2: 25 MMOL/L (ref 22–29)
COLOR: YELLOW
CREAT SERPL-MCNC: 0.9 MG/DL (ref 0.5–1)
EPITHELIAL CELLS, UA: ABNORMAL /HPF
GFR AFRICAN AMERICAN: >60
GFR NON-AFRICAN AMERICAN: >60 ML/MIN/1.73
GLUCOSE BLD-MCNC: 87 MG/DL (ref 74–99)
GLUCOSE URINE: NEGATIVE MG/DL
GONADOTROPIN, CHORIONIC (HCG) QUANT: <0.1 MIU/ML
HCT VFR BLD CALC: 28.9 % (ref 34–48)
HEMOGLOBIN: 9.7 G/DL (ref 11.5–15.5)
IMMATURE RETIC FRACT: 22.1 % (ref 3–15.9)
KETONES, URINE: NEGATIVE MG/DL
LEUKOCYTE ESTERASE, URINE: ABNORMAL
MCH RBC QN AUTO: 21.2 PG (ref 26–35)
MCHC RBC AUTO-ENTMCNC: 33.6 % (ref 32–34.5)
MCV RBC AUTO: 63.2 FL (ref 80–99.9)
NITRITE, URINE: NEGATIVE
PDW BLD-RTO: 20.4 FL (ref 11.5–15)
PH UA: 6 (ref 5–9)
PLATELET # BLD: 315 E9/L (ref 130–450)
PMV BLD AUTO: ABNORMAL FL (ref 7–12)
POTASSIUM REFLEX MAGNESIUM: 4.2 MMOL/L (ref 3.5–5)
PROTEIN UA: NEGATIVE MG/DL
RBC # BLD: 4.57 E12/L (ref 3.5–5.5)
RBC UA: >20 /HPF (ref 0–2)
RETIC HGB EQUIVALENT: 25.7 PG (ref 28.2–36.6)
RETICULOCYTE ABSOLUTE COUNT: 0.09 E12/L
RETICULOCYTE COUNT PCT: 1.9 % (ref 0.4–1.9)
SODIUM BLD-SCNC: 139 MMOL/L (ref 132–146)
SPECIFIC GRAVITY UA: 1.01 (ref 1–1.03)
TOTAL PROTEIN: 7.3 G/DL (ref 6.4–8.3)
UROBILINOGEN, URINE: 0.2 E.U./DL
WBC # BLD: 11.2 E9/L (ref 4.5–11.5)
WBC UA: ABNORMAL /HPF (ref 0–5)

## 2019-04-01 PROCEDURE — 99284 EMERGENCY DEPT VISIT MOD MDM: CPT

## 2019-04-01 PROCEDURE — 84702 CHORIONIC GONADOTROPIN TEST: CPT

## 2019-04-01 PROCEDURE — 6360000002 HC RX W HCPCS: Performed by: EMERGENCY MEDICINE

## 2019-04-01 PROCEDURE — 80048 BASIC METABOLIC PNL TOTAL CA: CPT

## 2019-04-01 PROCEDURE — 96375 TX/PRO/DX INJ NEW DRUG ADDON: CPT

## 2019-04-01 PROCEDURE — 96365 THER/PROPH/DIAG IV INF INIT: CPT

## 2019-04-01 PROCEDURE — 85045 AUTOMATED RETICULOCYTE COUNT: CPT

## 2019-04-01 PROCEDURE — 81001 URINALYSIS AUTO W/SCOPE: CPT

## 2019-04-01 PROCEDURE — 96376 TX/PRO/DX INJ SAME DRUG ADON: CPT

## 2019-04-01 PROCEDURE — 80076 HEPATIC FUNCTION PANEL: CPT

## 2019-04-01 PROCEDURE — 85027 COMPLETE CBC AUTOMATED: CPT

## 2019-04-01 PROCEDURE — 6360000002 HC RX W HCPCS: Performed by: STUDENT IN AN ORGANIZED HEALTH CARE EDUCATION/TRAINING PROGRAM

## 2019-04-01 PROCEDURE — 2580000003 HC RX 258: Performed by: EMERGENCY MEDICINE

## 2019-04-01 RX ORDER — SODIUM CHLORIDE 9 MG/ML
INJECTION, SOLUTION INTRAVENOUS ONCE
Status: COMPLETED | OUTPATIENT
Start: 2019-04-01 | End: 2019-04-01

## 2019-04-01 RX ORDER — DIPHENHYDRAMINE HYDROCHLORIDE 50 MG/ML
50 INJECTION INTRAMUSCULAR; INTRAVENOUS ONCE
Status: COMPLETED | OUTPATIENT
Start: 2019-04-01 | End: 2019-04-01

## 2019-04-01 RX ORDER — CEFDINIR 300 MG/1
300 CAPSULE ORAL 2 TIMES DAILY
Qty: 14 CAPSULE | Refills: 0 | Status: SHIPPED | OUTPATIENT
Start: 2019-04-01 | End: 2019-04-08

## 2019-04-01 RX ADMIN — DIPHENHYDRAMINE HYDROCHLORIDE 50 MG: 50 INJECTION, SOLUTION INTRAMUSCULAR; INTRAVENOUS at 14:13

## 2019-04-01 RX ADMIN — CEFTRIAXONE 1 G: 1 INJECTION, POWDER, FOR SOLUTION INTRAMUSCULAR; INTRAVENOUS at 15:05

## 2019-04-01 RX ADMIN — SODIUM CHLORIDE: 9 INJECTION, SOLUTION INTRAVENOUS at 14:14

## 2019-04-01 RX ADMIN — HYDROMORPHONE HYDROCHLORIDE 0.5 MG: 1 INJECTION, SOLUTION INTRAMUSCULAR; INTRAVENOUS; SUBCUTANEOUS at 16:44

## 2019-04-01 RX ADMIN — HYDROMORPHONE HYDROCHLORIDE 1 MG: 1 INJECTION, SOLUTION INTRAMUSCULAR; INTRAVENOUS; SUBCUTANEOUS at 14:13

## 2019-04-01 ASSESSMENT — ENCOUNTER SYMPTOMS
EYE REDNESS: 0
SHORTNESS OF BREATH: 0
SINUS PRESSURE: 0
EYE PAIN: 0
DIARRHEA: 0
NAUSEA: 0
SORE THROAT: 0
COUGH: 0
EYE DISCHARGE: 0
VOMITING: 0
WHEEZING: 0
BACK PAIN: 1
ABDOMINAL DISTENTION: 0

## 2019-04-01 ASSESSMENT — PAIN SCALES - GENERAL
PAINLEVEL_OUTOF10: 8
PAINLEVEL_OUTOF10: 9
PAINLEVEL_OUTOF10: 6
PAINLEVEL_OUTOF10: 9

## 2019-04-01 ASSESSMENT — PAIN DESCRIPTION - INTENSITY: RATING_2: 5

## 2019-04-01 ASSESSMENT — PAIN DESCRIPTION - PAIN TYPE: TYPE: ACUTE PAIN

## 2019-04-01 ASSESSMENT — PAIN DESCRIPTION - LOCATION
LOCATION_2: PELVIS
LOCATION: CHEST;BACK;LEG

## 2019-04-01 NOTE — ED NOTES
Last menstrual period . Pt went to ER on Thursday and this is when she found out she was pregnant. Pt began having vaginal bleeding, spotting, got heavier this morning states when she went to the bathroom there was blood and so she came right up, pt did not place a sanitary pad but thinks she is still bleeding. Pt experiencing cramping. Pt has been pregnant 4 times, one live birth 25 weeks  at approx one hour old, the others were early pregnancy miscarriage.      Rhianna Brown, NOHEMY  [phone]

## 2019-04-01 NOTE — ED PROVIDER NOTES
reviewed and are negative. Physical Exam   Constitutional: She is oriented to person, place, and time. She appears well-developed and well-nourished. HENT:   Head: Normocephalic and atraumatic. Eyes: Pupils are equal, round, and reactive to light. Neck: Normal range of motion. Neck supple. Cardiovascular: Normal rate and regular rhythm. Pulmonary/Chest: Effort normal and breath sounds normal. No respiratory distress. She has no wheezes. She has no rales. Abdominal: Soft. Bowel sounds are normal. There is no tenderness. There is no rebound and no guarding. Musculoskeletal: She exhibits no edema. Neurological: She is alert and oriented to person, place, and time. No cranial nerve deficit. Coordination normal.   Skin: Skin is warm and dry. Nursing note and vitals reviewed. Procedures    LIMITED PELVIC BEDSIDE ULTRASOUND     Risks, benefits and alternatives (for applicable procedures below) described. Performed By: Fabio Ortega DO. Indication:  Vaginal Bleeding; Early Pregnancy  Endocavitary probe utilized: no  Informed consent: The patient provided verbal consent for this procedure. .  Procedural Quadrants:     Bladder Full: no  IUP Identified: yes  Ectopic Pregnancy Identified: no  Pelvic Free Fluid: no  Other findings: Gestational Sac dated at 5 weeks and 5 days    A female chaperone was present for all transvaginal portions of the exam, if applicable. MDM  Number of Diagnoses or Management Options  Diagnosis management comments: Patient is a 21 yr old female with a hx of Sickle Cell. She relates symptoms in line with her normal crises which includes chest pain, back pain and leg pain. She denies any fevers, abdominal pain or n/v. She is prescribed Norco at home which she states was not enough for her pain. She also relates mild vaginal bleeding which began yesterday. She recently found out that she was pregnant.  She has had 2 prior miscarriages and a premature birth that passed shortly thereafter. A Bedside US was performed which found the gestational sac to equal 5 weeks and 5 days. We will obtain a Transvag US and perform a pelvic exam to r/o miscarriage. She will also be given pain medication for her crisis.                Archana Agudelo DO  Resident  04/01/19 [phone]

## 2019-04-20 ENCOUNTER — APPOINTMENT (OUTPATIENT)
Dept: GENERAL RADIOLOGY | Age: 24
End: 2019-04-20
Payer: MEDICAID

## 2019-04-20 ENCOUNTER — HOSPITAL ENCOUNTER (EMERGENCY)
Age: 24
Discharge: HOME | End: 2019-04-20
Attending: EMERGENCY MEDICINE
Payer: MEDICAID

## 2019-04-20 VITALS
SYSTOLIC BLOOD PRESSURE: 114 MMHG | WEIGHT: 293 LBS | BODY MASS INDEX: 51.9 KG/M2 | RESPIRATION RATE: 17 BRPM | TEMPERATURE: 97.9 F | HEART RATE: 74 BPM | DIASTOLIC BLOOD PRESSURE: 66 MMHG | OXYGEN SATURATION: 99 %

## 2019-04-20 DIAGNOSIS — D57.00 SICKLE CELL ANEMIA WITH PAIN (HCC): Primary | ICD-10-CM

## 2019-04-20 LAB
ANION GAP SERPL CALCULATED.3IONS-SCNC: 9 MMOL/L (ref 7–16)
BUN BLDV-MCNC: 10 MG/DL (ref 6–20)
CALCIUM SERPL-MCNC: 9.2 MG/DL (ref 8.6–10.2)
CHLORIDE BLD-SCNC: 105 MMOL/L (ref 98–107)
CO2: 26 MMOL/L (ref 22–29)
CREAT SERPL-MCNC: 0.9 MG/DL (ref 0.5–1)
D DIMER: 262 NG/ML DDU
GFR AFRICAN AMERICAN: >60
GFR NON-AFRICAN AMERICAN: >60 ML/MIN/1.73
GLUCOSE BLD-MCNC: 81 MG/DL (ref 74–99)
HCG, URINE, POC: NEGATIVE
HCT VFR BLD CALC: 29.2 % (ref 34–48)
HEMOGLOBIN: 9.7 G/DL (ref 11.5–15.5)
Lab: NORMAL
MCH RBC QN AUTO: 20.3 PG (ref 26–35)
MCHC RBC AUTO-ENTMCNC: 33.2 % (ref 32–34.5)
MCV RBC AUTO: 61.2 FL (ref 80–99.9)
NEGATIVE QC PASS/FAIL: NORMAL
PDW BLD-RTO: 19.6 FL (ref 11.5–15)
PLATELET # BLD: 296 E9/L (ref 130–450)
PMV BLD AUTO: ABNORMAL FL (ref 7–12)
POSITIVE QC PASS/FAIL: NORMAL
POTASSIUM REFLEX MAGNESIUM: 4 MMOL/L (ref 3.5–5)
RBC # BLD: 4.77 E12/L (ref 3.5–5.5)
SODIUM BLD-SCNC: 140 MMOL/L (ref 132–146)
TROPONIN: <0.01 NG/ML (ref 0–0.03)
WBC # BLD: 10.3 E9/L (ref 4.5–11.5)

## 2019-04-20 PROCEDURE — 6360000002 HC RX W HCPCS: Performed by: EMERGENCY MEDICINE

## 2019-04-20 PROCEDURE — 85027 COMPLETE CBC AUTOMATED: CPT

## 2019-04-20 PROCEDURE — 99285 EMERGENCY DEPT VISIT HI MDM: CPT

## 2019-04-20 PROCEDURE — 36415 COLL VENOUS BLD VENIPUNCTURE: CPT

## 2019-04-20 PROCEDURE — 96376 TX/PRO/DX INJ SAME DRUG ADON: CPT

## 2019-04-20 PROCEDURE — 80048 BASIC METABOLIC PNL TOTAL CA: CPT

## 2019-04-20 PROCEDURE — 71045 X-RAY EXAM CHEST 1 VIEW: CPT

## 2019-04-20 PROCEDURE — 96374 THER/PROPH/DIAG INJ IV PUSH: CPT

## 2019-04-20 PROCEDURE — 84484 ASSAY OF TROPONIN QUANT: CPT

## 2019-04-20 PROCEDURE — 2580000003 HC RX 258: Performed by: EMERGENCY MEDICINE

## 2019-04-20 PROCEDURE — 96375 TX/PRO/DX INJ NEW DRUG ADDON: CPT

## 2019-04-20 PROCEDURE — 85378 FIBRIN DEGRADE SEMIQUANT: CPT

## 2019-04-20 RX ORDER — MORPHINE SULFATE 4 MG/ML
10 INJECTION, SOLUTION INTRAMUSCULAR; INTRAVENOUS ONCE
Status: COMPLETED | OUTPATIENT
Start: 2019-04-20 | End: 2019-04-20

## 2019-04-20 RX ORDER — KETOROLAC TROMETHAMINE 30 MG/ML
15 INJECTION, SOLUTION INTRAMUSCULAR; INTRAVENOUS ONCE
Status: COMPLETED | OUTPATIENT
Start: 2019-04-20 | End: 2019-04-20

## 2019-04-20 RX ORDER — DIPHENHYDRAMINE HYDROCHLORIDE 50 MG/ML
25 INJECTION INTRAMUSCULAR; INTRAVENOUS ONCE
Status: COMPLETED | OUTPATIENT
Start: 2019-04-20 | End: 2019-04-20

## 2019-04-20 RX ORDER — 0.9 % SODIUM CHLORIDE 0.9 %
1000 INTRAVENOUS SOLUTION INTRAVENOUS ONCE
Status: COMPLETED | OUTPATIENT
Start: 2019-04-20 | End: 2019-04-20

## 2019-04-20 RX ADMIN — SODIUM CHLORIDE 1000 ML: 9 INJECTION, SOLUTION INTRAVENOUS at 15:06

## 2019-04-20 RX ADMIN — MORPHINE SULFATE 10 MG: 4 INJECTION INTRAVENOUS at 14:58

## 2019-04-20 RX ADMIN — KETOROLAC TROMETHAMINE 15 MG: 30 INJECTION, SOLUTION INTRAMUSCULAR at 15:21

## 2019-04-20 RX ADMIN — HYDROMORPHONE HYDROCHLORIDE 1 MG: 1 INJECTION, SOLUTION INTRAMUSCULAR; INTRAVENOUS; SUBCUTANEOUS at 17:09

## 2019-04-20 RX ADMIN — DIPHENHYDRAMINE HYDROCHLORIDE 25 MG: 50 INJECTION, SOLUTION INTRAMUSCULAR; INTRAVENOUS at 14:58

## 2019-04-20 ASSESSMENT — ENCOUNTER SYMPTOMS
SORE THROAT: 0
BACK PAIN: 1
EYE REDNESS: 0
NAUSEA: 0
SHORTNESS OF BREATH: 0
COUGH: 0
VOMITING: 0
DIARRHEA: 0
EYE DISCHARGE: 0
WHEEZING: 0
SINUS PRESSURE: 0
EYE PAIN: 0
ABDOMINAL DISTENTION: 0

## 2019-04-20 ASSESSMENT — PAIN DESCRIPTION - LOCATION
LOCATION: GENERALIZED
LOCATION: GENERALIZED
LOCATION: BACK;CHEST;LEG

## 2019-04-20 ASSESSMENT — PAIN DESCRIPTION - PAIN TYPE
TYPE: ACUTE PAIN
TYPE: ACUTE PAIN

## 2019-04-20 ASSESSMENT — PAIN SCALES - GENERAL
PAINLEVEL_OUTOF10: 9
PAINLEVEL_OUTOF10: 7
PAINLEVEL_OUTOF10: 9

## 2019-04-20 NOTE — ED PROVIDER NOTES
29-year-old female history of sickle cell disease, morbid obesity, recent miscarriage, presenting to the emergency Department for primary complaint of chest pain, back pain, lower extremity pain. Patient states this pain is similar to previous sickle cell pain. She denies any fevers, chills, nausea, vomiting, shortness of breath. Denies any recent trauma. Denies any recent sick contacts. She has had multiple episodes of sickle cell ane and the last month and a half since her miscarriage. She states her symptoms usually improve with oxygen, Dilaudid, Toradol. She is attended to utilize Tylenol and agree Profen at home without any improvement in her pain. The history is provided by the patient. Review of Systems   Constitutional: Negative for chills and fever. HENT: Negative for ear pain, sinus pressure and sore throat. Eyes: Negative for pain, discharge and redness. Respiratory: Negative for cough, shortness of breath and wheezing. Cardiovascular: Positive for chest pain. Gastrointestinal: Negative for abdominal distention, diarrhea, nausea and vomiting. Genitourinary: Negative for dysuria and frequency. Musculoskeletal: Positive for back pain and myalgias. Negative for arthralgias. Skin: Negative for rash and wound. Neurological: Negative for weakness and headaches. Hematological: Negative for adenopathy. All other systems reviewed and are negative. Physical Exam   Constitutional: She is oriented to person, place, and time. She appears well-developed and well-nourished. Obese female, sitting upright, no acute distress. HENT:   Head: Normocephalic and atraumatic. Right Ear: External ear normal.   Left Ear: External ear normal.   Mouth/Throat: Oropharynx is clear and moist.   Eyes: Pupils are equal, round, and reactive to light. Neck: Normal range of motion. Neck supple. Cardiovascular: Normal rate, regular rhythm and normal heart sounds.    No murmur heard.  Pulmonary/Chest: Effort normal and breath sounds normal. No respiratory distress. She has no wheezes. She has no rales. Abdominal: Soft. Bowel sounds are normal. There is no tenderness. There is no rebound and no guarding. Musculoskeletal: She exhibits no edema. Reproducible pain with palpation to the anterior chest wall, as well as back and bilateral lower extremities. Neurological: She is alert and oriented to person, place, and time. No cranial nerve deficit. Coordination normal.   Skin: Skin is warm and dry. Nursing note and vitals reviewed. Procedures    MDM  Number of Diagnoses or Management Options  Sickle cell anemia with pain [facility]):   Diagnosis management comments: 30-year-old female history of sickle cell disease, presenting to the emergency Department with primary complaint of chest pain, back pain, bilateral lower extremities. Patient states pain is similar to previous sickle cell flares. Denies any fevers, chills, nausea, vomiting, service of breath. Pain patient's pain improved significantly with 2 rounds of opioid pain medication as well as Toradol and IV fluid. She was discharged home. ED Course as of Apr 20 1918   Sat Apr 20, 2019   1701 Reassessed the patient at bedside. She states she has received minimal improvement from morphine. Notes that dilaudid works much better. [KS]      ED Course User Index  [KS] Gaby Young DO       EKG: This EKG is signed and interpreted by me. Rate: 81  Rhythm: Sinus  Interpretation: Normal sinus rhythm, no acute ST elevations or depressions, adequate R wave progression, normal axis  Comparison: No acute changes.  Previous EKG captured on 3/23/2019       --------------------------------------------- PAST HISTORY ---------------------------------------------  Past Medical History:  has a past medical history of CLABSI (central line-associated bloodstream infection), Deep vein thrombosis (DVT) of left upper extremity ([address] Disease of blood and blood forming organ, Heart murmur, 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[facility] Miscarriage within last 12 months, Obesity, Class III, BMI 40-49.9 (morbid obesity) ([facility] PE (pulmonary embolism), PE (pulmonary embolism), Pneumonia, Psychiatric problem, Sickle cell anemia ([facility] and Thrombosis complicating venous access device ([facility] Past Surgical History:  has a past surgical history that includes knee surgery; Tonsillectomy; Tunneled venous port placement (2014); Tunneled venous port placement (Right, 01/23/2015); other surgical history (08/04/2016); Tunneled venous port placement (Right, 09/15/2016); Tunneled venous port placement (Right, 05/18/2017); and other surgical history (Right, 03/05/2018). Social History:  reports that she has never smoked. She has never used smokeless tobacco. She reports that she does not drink alcohol or use drugs. Family History: family history includes Cancer in her maternal grandmother; Diabetes in her mother; High Blood Pressure in her mother; Kidney Disease in her paternal grandmother. The patients home medications have been reviewed. Allergies: Demerol hcl [meperidine];  Adhesive tape; and Bactrim [sulfamethoxazole-trimethoprim]    -------------------------------------------------- RESULTS -------------------------------------------------  Labs:  Results for orders placed or performed during the hospital encounter of 04/20/19   D-Dimer, Quantitative   Result Value Ref Range    D-Dimer, Quant 262 ng/mL DDU   Troponin   Result Value Ref Range    Troponin <0.01 0.00 - 0.03 ng/mL   CBC   Result Value Ref Range    WBC 10.3 4.5 - 11.5 E9/L    RBC 4.77 3.50 - 5.50 E12/L    Hemoglobin 9.7 (L) 11.5 - 15.5 g/dL    Hematocrit 29.2 (L) 34.0 - 48.0 %    MCV 61.2 (L) 80.0 - 99.9 fL    MCH 20.3 (L) 26.0 - 35.0 pg    MCHC 33.2 32.0 - 34.5 %    RDW 19.6 (H) 11.5 - 15.0 fL    Platelets 547 920 - 511 E9/L    MPV NOT CALC 7.0 - 12.0 fL   Basic Metabolic Panel w/ Reflex to MG Result Value Ref Range    Sodium 140 132 - 146 mmol/L    Potassium reflex Magnesium 4.0 3.5 - 5.0 mmol/L    Chloride 105 98 - 107 mmol/L    CO2 26 22 - 29 mmol/L    Anion Gap 9 7 - 16 mmol/L    Glucose 81 74 - 99 mg/dL    BUN 10 6 - 20 mg/dL    CREATININE 0.9 0.5 - 1.0 mg/dL    GFR Non-African American >60 >=60 mL/min/1.73    GFR African American >60     Calcium 9.2 8.6 - 10.2 mg/dL   POC Pregnancy Urine Qual   Result Value Ref Range    HCG, Urine, POC Negative Negative    Lot Number [id number]     Positive QC Pass/Fail Pass     Negative QC Pass/Fail Pass    EKG 12 Lead   Result Value Ref Range    Ventricular Rate 81 BPM    Atrial Rate 81 BPM    P-R Interval 142 ms    QRS Duration 82 ms    Q-T Interval 386 ms    QTc Calculation (Bazett) 448 ms    P Axis 51 degrees    R Axis 49 degrees    T Axis 21 degrees       Radiology:  XR CHEST PORTABLE   Final Result      NO ACUTE CARDIOPULMONARY PROCESS             ------------------------- NURSING NOTES AND VITALS REVIEWED ---------------------------  Date / Time Roomed:  4/20/2019  1:51 PM  ED Bed Assignment:  17A/17A-17    The nursing notes within the ED encounter and vital signs as below have been reviewed. /66   Pulse 74   Temp 97.9 °F (36.6 °C) (Oral)   Resp 17   Wt 293 lb (132.9 kg)   LMP 02/17/2019 Comment: recent miscarriage  SpO2 99%   Breastfeeding? Unknown   BMI 51.90 kg/m²   Oxygen Saturation Interpretation: Normal      ------------------------------------------ PROGRESS NOTES ------------------------------------------  ED Course as of Apr 20 1919   Sat Apr 20, 2019   1701 Reassessed the patient at bedside. She states she has received minimal improvement from morphine. Notes that dilaudid works much better.      [KS]      ED Course User Index  [KS] Catarina Escobar,          7:19 PM  I have spoken with the patient and discussed todays results, in addition to providing specific details for the plan of care and counseling regarding the diagnosis and prognosis. Their questions are answered at this time and they are agreeable with the plan. I discussed at length with them reasons for immediate return here for re evaluation. They will followup with their primary care physician by calling their office tomorrow. --------------------------------- ADDITIONAL PROVIDER NOTES ---------------------------------  At this time the patient is without objective evidence of an acute process requiring hospitalization or inpatient management. They have remained hemodynamically stable throughout their entire ED visit and are stable for discharge with outpatient follow-up. The plan has been discussed in detail and they are aware of the specific conditions for emergent return, as well as the importance of follow-up. New Prescriptions    No medications on file       Diagnosis:  1. Sickle cell anemia with pain (HCC)        Disposition:  Patient's disposition: Discharge to home  Patient's condition is stable.        Christos Roberts DO  Resident  04/20/19 [id number]

## 2019-04-25 LAB
EKG ATRIAL RATE: 81 BPM
EKG P AXIS: 51 DEGREES
EKG P-R INTERVAL: 142 MS
EKG Q-T INTERVAL: 386 MS
EKG QRS DURATION: 82 MS
EKG QTC CALCULATION (BAZETT): 448 MS
EKG R AXIS: 49 DEGREES
EKG T AXIS: 21 DEGREES
EKG VENTRICULAR RATE: 81 BPM

## 2019-04-30 ENCOUNTER — OFFICE VISIT (OUTPATIENT)
Dept: VASCULAR SURGERY | Age: 24
End: 2019-04-30
Payer: MEDICAID

## 2019-04-30 DIAGNOSIS — I87.8 POOR VENOUS ACCESS: Primary | ICD-10-CM

## 2019-04-30 PROCEDURE — G8427 DOCREV CUR MEDS BY ELIG CLIN: HCPCS | Performed by: NURSE PRACTITIONER

## 2019-04-30 PROCEDURE — G8417 CALC BMI ABV UP PARAM F/U: HCPCS | Performed by: NURSE PRACTITIONER

## 2019-04-30 PROCEDURE — 99213 OFFICE O/P EST LOW 20 MIN: CPT | Performed by: NURSE PRACTITIONER

## 2019-04-30 PROCEDURE — 1036F TOBACCO NON-USER: CPT | Performed by: NURSE PRACTITIONER

## 2019-04-30 RX ORDER — ESCITALOPRAM OXALATE 5 MG/1
10 TABLET ORAL DAILY
Status: ON HOLD | COMMUNITY
Start: 2019-02-21 | End: 2020-10-08

## 2019-04-30 NOTE — PROGRESS NOTES
Vascular Surgery Outpatient Progress Note      Chief Complaint   Patient presents with    Circulatory Problem     Port was infected and removed and patient would like a new port inserted. HISTORY OF PRESENT ILLNESS:                The patient is a 21 y.o. female who returns for follow-up evaluation of poor venous access, sickle cell anemia. She reports that her port was removed approximately 3 months ago at [facility] due to infection. She completed a course of antibiotics. She was recently in the ED and was poked over 15 times unsuccessfully for peripheral IV. She has been cleared for a new port insertion and would like to proceed. Past Medical History:        Diagnosis Date    CLABSI (central line-associated bloodstream infection) 8/3/2016    Deep vein thrombosis (DVT) of left upper extremity (HCC) 8/12/2016    Disease of blood and blood forming organ     Heart murmur     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[facility]     Miscarriage within last 12 months 04/2017    Obesity, Class III, BMI 40-49.9 (morbid obesity) ([facility] 5/22/2014    PE (pulmonary embolism)     PE (pulmonary embolism)     Pneumonia     Psychiatric problem     Sickle cell anemia (HCC)     Thrombosis complicating venous access device ([facility] 5/18/2017     Past Surgical History:        Procedure Laterality Date    KNEE SURGERY      right    OTHER SURGICAL HISTORY  08/04/2016    removal of mediport    OTHER SURGICAL HISTORY Right 03/05/2018    TUNNELED VENOUS PORT REMOVAL & REINSERTION;DILATION OF VENOUS TRACT    TONSILLECTOMY      TUNNELED VENOUS PORT PLACEMENT  2014    and removed    TUNNELED VENOUS PORT PLACEMENT Right 01/23/2015    [place], removed 08/04/16 CLABSI    TUNNELED VENOUS PORT PLACEMENT Right 09/15/2016    CHEST    TUNNELED VENOUS PORT PLACEMENT Right 05/18/2017    SUBCLAVIAN     Current Medications:   Prior to Admission medications    Medication Sig Start Date End Date Taking?  Authorizing Provider   escitalopram (LEXAPRO) 5 MG tablet Take 5 mg by mouth daily 2/21/19  Yes Historical Provider, MD   enoxaparin (LOVENOX) 100 MG/ML injection Inject 40 mg into the skin 2 times daily    Yes Historical Provider, MD   ferrous sulfate 325 (65 FE) MG tablet Take 1 tablet by mouth 2 times daily (with meals) 2/27/17  Yes Cira Pineda DO   folic acid (FOLVITE) 1 MG tablet Take 2 tablets by mouth daily  Patient taking differently: Take 2 mg by mouth 3 times daily  9/20/16  Yes Cira Pineda DO   albuterol (ACCUNEB) 0.63 MG/3ML nebulizer solution Take 1 ampule by nebulization every 4 hours as needed for Wheezing    Historical Provider, MD     Allergies:  Demerol hcl [meperidine];  Adhesive tape; and Bactrim [sulfamethoxazole-trimethoprim]    Social History     Socioeconomic History    Marital status: Single     Spouse name: Not on file    Number of children: Not on file    Years of education: Not on file    Highest education level: Not on file   Occupational History    Not on file   Social Needs    Financial resource strain: Not on file    Food insecurity:     Worry: Not on file     Inability: Not on file    Transportation needs:     Medical: Not on file     Non-medical: Not on file   Tobacco Use    Smoking status: Never Smoker    Smokeless tobacco: Never Used   Substance and Sexual Activity    Alcohol use: Yes     Comment: socially    Drug use: No    Sexual activity: Yes     Partners: Male   Lifestyle    Physical activity:     Days per week: Not on file     Minutes per session: Not on file    Stress: Not on file   Relationships    Social connections:     Talks on phone: Not on file     Gets together: Not on file     Attends [employer] service: Not on file     Active member of club or organization: Not on file     Attends meetings of clubs or organizations: Not on file     Relationship status: Not on file    Intimate partner violence:     Fear of current or ex partner: Not on file     Emotionally abused: Not on file Physically abused: Not on file     Forced sexual activity: Not on file   Other Topics Concern    Not on file   Social History Narrative    Not on file        Family History   Problem Relation Age of Onset    High Blood Pressure Mother     Diabetes Mother     Cancer Maternal Grandmother         breast    Kidney Disease Paternal Grandmother        REVIEW OF SYSTEMS (New symptoms):    Eyes:      Blurred vision:  No [x]/Yes []               Diplopia:   No [x]/Yes []               Vision loss:       No [x]/Yes []   Ears, nose, throat:             Hearing loss:    No [x]/Yes []      Vertigo:   No [x]/Yes []                       Swallowing problem:  No [x]/Yes []               Nose bleeds:   No [x]/Yes []      Voice hoarseness:  No [x]/Yes []  Respiratory:             Cough:   No [x]/Yes []      Pleuritic chest pain:  No [x]/Yes []                        Dyspnea:   No [x]/Yes []      Wheezing:   No [x]/Yes []  Cardiovascular:             Angina:   No [x]/Yes []      Palpitations:   No [x]/Yes []          Claudication:    No [x]/Yes []      Leg swelling:   No [x]/Yes []  Gastrointestinal:             Nausea or vomiting:  No [x]/Yes []               Abdominal pain:  No [x]/Yes []                     Intestinal bleeding: No [x]/Yes []  Musculoskeletal:             Leg pain:   No [x]/Yes []      Back pain:   No [x]/Yes []                    Weakness:   No [x]/Yes []  Neurologic:             Numbness:   No [x]/Yes []      Paralysis:   No [x]/Yes []                       Headaches:   No [x]/Yes []  Hematologic, lymphatic:   Anemia:   No [x]/Yes []              Bleeding or bruising:  No [x]/Yes []              Fevers or chills: No [x]/Yes []  Endocrine:             Temp intolerance:   No [x]/Yes []                       Polydipsia, polyuria:  No [x]/Yes []  Skin:              Rash:    No [x]/Yes []      Ulcers:   No [x]/Yes []              Abnorm pigment: No [x]/Yes []  :              Frequency/urgency:  No [x]/Yes

## 2019-05-01 ENCOUNTER — HOSPITAL ENCOUNTER (INPATIENT)
Age: 24
LOS: 2 days | Discharge: HOME | DRG: 662 | End: 2019-05-05
Attending: EMERGENCY MEDICINE | Admitting: HOSPITALIST
Payer: MEDICAID

## 2019-05-01 ENCOUNTER — APPOINTMENT (OUTPATIENT)
Dept: GENERAL RADIOLOGY | Age: 24
DRG: 662 | End: 2019-05-01
Payer: MEDICAID

## 2019-05-01 DIAGNOSIS — D57.00 SICKLE CELL PAIN CRISIS (HCC): Primary | ICD-10-CM

## 2019-05-01 LAB
ALBUMIN SERPL-MCNC: 3.9 G/DL (ref 3.5–5.2)
ALP BLD-CCNC: 100 U/L (ref 35–104)
ALT SERPL-CCNC: 8 U/L (ref 0–32)
ANION GAP SERPL CALCULATED.3IONS-SCNC: 10 MMOL/L (ref 7–16)
APTT: 29.6 SEC (ref 24.5–35.1)
AST SERPL-CCNC: 14 U/L (ref 0–31)
BACTERIA: NORMAL /HPF
BASOPHILS ABSOLUTE: 0.03 E9/L (ref 0–0.2)
BASOPHILS RELATIVE PERCENT: 0.2 % (ref 0–2)
BILIRUB SERPL-MCNC: 0.8 MG/DL (ref 0–1.2)
BILIRUBIN URINE: NEGATIVE
BLOOD, URINE: NEGATIVE
BUN BLDV-MCNC: 11 MG/DL (ref 6–20)
CALCIUM SERPL-MCNC: 8.7 MG/DL (ref 8.6–10.2)
CHLORIDE BLD-SCNC: 107 MMOL/L (ref 98–107)
CLARITY: CLEAR
CO2: 26 MMOL/L (ref 22–29)
COLOR: YELLOW
CREAT SERPL-MCNC: 1 MG/DL (ref 0.5–1)
EOSINOPHILS ABSOLUTE: 0.28 E9/L (ref 0.05–0.5)
EOSINOPHILS RELATIVE PERCENT: 2.2 % (ref 0–6)
GFR AFRICAN AMERICAN: >60
GFR NON-AFRICAN AMERICAN: >60 ML/MIN/1.73
GLUCOSE BLD-MCNC: 91 MG/DL (ref 74–99)
GLUCOSE URINE: NEGATIVE MG/DL
HCG(URINE) PREGNANCY TEST: NEGATIVE
HCT VFR BLD CALC: 30.1 % (ref 34–48)
HEMOGLOBIN: 10.1 G/DL (ref 11.5–15.5)
IMMATURE GRANULOCYTES #: 0.1 E9/L
IMMATURE GRANULOCYTES %: 0.8 % (ref 0–5)
IMMATURE RETIC FRACT: 24.7 % (ref 3–15.9)
INR BLD: 1.2
KETONES, URINE: NEGATIVE MG/DL
LACTIC ACID: 1.1 MMOL/L (ref 0.5–2.2)
LEUKOCYTE ESTERASE, URINE: ABNORMAL
LYMPHOCYTES ABSOLUTE: 5.08 E9/L (ref 1.5–4)
LYMPHOCYTES RELATIVE PERCENT: 40.3 % (ref 20–42)
MCH RBC QN AUTO: 20.7 PG (ref 26–35)
MCHC RBC AUTO-ENTMCNC: 33.6 % (ref 32–34.5)
MCV RBC AUTO: 61.7 FL (ref 80–99.9)
MONOCYTES ABSOLUTE: 0.66 E9/L (ref 0.1–0.95)
MONOCYTES RELATIVE PERCENT: 5.2 % (ref 2–12)
NEUTROPHILS ABSOLUTE: 6.47 E9/L (ref 1.8–7.3)
NEUTROPHILS RELATIVE PERCENT: 51.3 % (ref 43–80)
NITRITE, URINE: NEGATIVE
PDW BLD-RTO: 19.7 FL (ref 11.5–15)
PH UA: 6 (ref 5–9)
PLATELET # BLD: 300 E9/L (ref 130–450)
PMV BLD AUTO: ABNORMAL FL (ref 7–12)
POTASSIUM REFLEX MAGNESIUM: 3.9 MMOL/L (ref 3.5–5)
PROTEIN UA: NEGATIVE MG/DL
PROTHROMBIN TIME: 13.6 SEC (ref 9.3–12.4)
RBC # BLD: 4.88 E12/L (ref 3.5–5.5)
RBC UA: NORMAL /HPF (ref 0–2)
RETIC HGB EQUIVALENT: 22.7 PG (ref 28.2–36.6)
RETICULOCYTE ABSOLUTE COUNT: 0.11 E12/L
RETICULOCYTE COUNT PCT: 2.2 % (ref 0.4–1.9)
SODIUM BLD-SCNC: 143 MMOL/L (ref 132–146)
SPECIFIC GRAVITY UA: 1.01 (ref 1–1.03)
TOTAL PROTEIN: 7.6 G/DL (ref 6.4–8.3)
TROPONIN: <0.01 NG/ML (ref 0–0.03)
UROBILINOGEN, URINE: 0.2 E.U./DL
WBC # BLD: 12.6 E9/L (ref 4.5–11.5)
WBC UA: NORMAL /HPF (ref 0–5)

## 2019-05-01 PROCEDURE — 81025 URINE PREGNANCY TEST: CPT

## 2019-05-01 PROCEDURE — 6360000002 HC RX W HCPCS: Performed by: EMERGENCY MEDICINE

## 2019-05-01 PROCEDURE — 85610 PROTHROMBIN TIME: CPT

## 2019-05-01 PROCEDURE — 96361 HYDRATE IV INFUSION ADD-ON: CPT

## 2019-05-01 PROCEDURE — 96374 THER/PROPH/DIAG INJ IV PUSH: CPT

## 2019-05-01 PROCEDURE — 99285 EMERGENCY DEPT VISIT HI MDM: CPT

## 2019-05-01 PROCEDURE — 36415 COLL VENOUS BLD VENIPUNCTURE: CPT

## 2019-05-01 PROCEDURE — 6360000002 HC RX W HCPCS: Performed by: HOSPITALIST

## 2019-05-01 PROCEDURE — 85045 AUTOMATED RETICULOCYTE COUNT: CPT

## 2019-05-01 PROCEDURE — 93005 ELECTROCARDIOGRAM TRACING: CPT | Performed by: NURSE PRACTITIONER

## 2019-05-01 PROCEDURE — 81001 URINALYSIS AUTO W/SCOPE: CPT

## 2019-05-01 PROCEDURE — 83605 ASSAY OF LACTIC ACID: CPT

## 2019-05-01 PROCEDURE — 96376 TX/PRO/DX INJ SAME DRUG ADON: CPT

## 2019-05-01 PROCEDURE — 84484 ASSAY OF TROPONIN QUANT: CPT

## 2019-05-01 PROCEDURE — 85730 THROMBOPLASTIN TIME PARTIAL: CPT

## 2019-05-01 PROCEDURE — G0378 HOSPITAL OBSERVATION PER HR: HCPCS

## 2019-05-01 PROCEDURE — 2580000003 HC RX 258: Performed by: HOSPITALIST

## 2019-05-01 PROCEDURE — 2580000003 HC RX 258: Performed by: PHYSICIAN ASSISTANT

## 2019-05-01 PROCEDURE — 71046 X-RAY EXAM CHEST 2 VIEWS: CPT

## 2019-05-01 PROCEDURE — 96375 TX/PRO/DX INJ NEW DRUG ADDON: CPT

## 2019-05-01 PROCEDURE — 94761 N-INVAS EAR/PLS OXIMETRY MLT: CPT

## 2019-05-01 PROCEDURE — 85025 COMPLETE CBC W/AUTO DIFF WBC: CPT

## 2019-05-01 PROCEDURE — 6360000002 HC RX W HCPCS: Performed by: PHYSICIAN ASSISTANT

## 2019-05-01 PROCEDURE — 96372 THER/PROPH/DIAG INJ SC/IM: CPT

## 2019-05-01 PROCEDURE — 80053 COMPREHEN METABOLIC PANEL: CPT

## 2019-05-01 RX ORDER — FOLIC ACID 1 MG/1
2 TABLET ORAL DAILY
Status: DISCONTINUED | OUTPATIENT
Start: 2019-05-02 | End: 2019-05-02

## 2019-05-01 RX ORDER — SODIUM CHLORIDE AND POTASSIUM CHLORIDE .9; .15 G/100ML; G/100ML
SOLUTION INTRAVENOUS CONTINUOUS
Status: DISCONTINUED | OUTPATIENT
Start: 2019-05-01 | End: 2019-05-05 | Stop reason: HOSPADM

## 2019-05-01 RX ORDER — SODIUM CHLORIDE 0.9 % (FLUSH) 0.9 %
10 SYRINGE (ML) INJECTION EVERY 12 HOURS SCHEDULED
Status: DISCONTINUED | OUTPATIENT
Start: 2019-05-01 | End: 2019-05-05 | Stop reason: HOSPADM

## 2019-05-01 RX ORDER — ONDANSETRON 2 MG/ML
4 INJECTION INTRAMUSCULAR; INTRAVENOUS ONCE
Status: COMPLETED | OUTPATIENT
Start: 2019-05-01 | End: 2019-05-01

## 2019-05-01 RX ORDER — SODIUM CHLORIDE 0.9 % (FLUSH) 0.9 %
10 SYRINGE (ML) INJECTION PRN
Status: DISCONTINUED | OUTPATIENT
Start: 2019-05-01 | End: 2019-05-05 | Stop reason: HOSPADM

## 2019-05-01 RX ORDER — ONDANSETRON 2 MG/ML
4 INJECTION INTRAMUSCULAR; INTRAVENOUS EVERY 6 HOURS PRN
Status: DISCONTINUED | OUTPATIENT
Start: 2019-05-01 | End: 2019-05-05 | Stop reason: HOSPADM

## 2019-05-01 RX ORDER — ESCITALOPRAM OXALATE 10 MG/1
5 TABLET ORAL DAILY
Status: DISCONTINUED | OUTPATIENT
Start: 2019-05-02 | End: 2019-05-05 | Stop reason: HOSPADM

## 2019-05-01 RX ORDER — FERROUS SULFATE 325(65) MG
325 TABLET ORAL 2 TIMES DAILY WITH MEALS
Status: DISCONTINUED | OUTPATIENT
Start: 2019-05-02 | End: 2019-05-05 | Stop reason: HOSPADM

## 2019-05-01 RX ORDER — 0.9 % SODIUM CHLORIDE 0.9 %
1000 INTRAVENOUS SOLUTION INTRAVENOUS ONCE
Status: COMPLETED | OUTPATIENT
Start: 2019-05-01 | End: 2019-05-01

## 2019-05-01 RX ORDER — SENNA PLUS 8.6 MG/1
2 TABLET ORAL 2 TIMES DAILY
Status: DISCONTINUED | OUTPATIENT
Start: 2019-05-01 | End: 2019-05-05 | Stop reason: HOSPADM

## 2019-05-01 RX ADMIN — HYDROMORPHONE HYDROCHLORIDE 0.5 MG: 1 INJECTION, SOLUTION INTRAMUSCULAR; INTRAVENOUS; SUBCUTANEOUS at 20:25

## 2019-05-01 RX ADMIN — HYDROMORPHONE HYDROCHLORIDE 1 MG: 1 INJECTION, SOLUTION INTRAMUSCULAR; INTRAVENOUS; SUBCUTANEOUS at 21:19

## 2019-05-01 RX ADMIN — ONDANSETRON 4 MG: 2 INJECTION INTRAMUSCULAR; INTRAVENOUS at 21:17

## 2019-05-01 RX ADMIN — HYDROMORPHONE HYDROCHLORIDE 1 MG: 1 INJECTION, SOLUTION INTRAMUSCULAR; INTRAVENOUS; SUBCUTANEOUS at 22:13

## 2019-05-01 RX ADMIN — HYDROMORPHONE HYDROCHLORIDE 1 MG: 1 INJECTION, SOLUTION INTRAMUSCULAR; INTRAVENOUS; SUBCUTANEOUS at 23:35

## 2019-05-01 RX ADMIN — ONDANSETRON 4 MG: 2 INJECTION INTRAMUSCULAR; INTRAVENOUS at 20:22

## 2019-05-01 RX ADMIN — SODIUM CHLORIDE AND POTASSIUM CHLORIDE: .9; .15 SOLUTION INTRAVENOUS at 23:38

## 2019-05-01 RX ADMIN — SODIUM CHLORIDE 1000 ML: 9 INJECTION, SOLUTION INTRAVENOUS at 19:48

## 2019-05-01 RX ADMIN — ENOXAPARIN SODIUM 120 MG: 150 INJECTION SUBCUTANEOUS at 23:38

## 2019-05-01 RX ADMIN — Medication 10 ML: at 23:39

## 2019-05-01 ASSESSMENT — PAIN DESCRIPTION - DESCRIPTORS
DESCRIPTORS: SHARP;PRESSURE
DESCRIPTORS: ACHING;SORE;SHARP

## 2019-05-01 ASSESSMENT — PAIN SCALES - GENERAL
PAINLEVEL_OUTOF10: 9
PAINLEVEL_OUTOF10: 9
PAINLEVEL_OUTOF10: 7
PAINLEVEL_OUTOF10: 8
PAINLEVEL_OUTOF10: 8

## 2019-05-01 ASSESSMENT — PAIN DESCRIPTION - LOCATION
LOCATION: BACK;CHEST;LEG
LOCATION: BACK;CHEST;LEG

## 2019-05-01 ASSESSMENT — PAIN DESCRIPTION - PAIN TYPE
TYPE: ACUTE PAIN
TYPE: ACUTE PAIN

## 2019-05-01 ASSESSMENT — PAIN DESCRIPTION - FREQUENCY: FREQUENCY: INTERMITTENT

## 2019-05-01 ASSESSMENT — PAIN DESCRIPTION - ONSET: ONSET: ON-GOING

## 2019-05-02 LAB
ANION GAP SERPL CALCULATED.3IONS-SCNC: 14 MMOL/L (ref 7–16)
BUN BLDV-MCNC: 11 MG/DL (ref 6–20)
CALCIUM SERPL-MCNC: 8.1 MG/DL (ref 8.6–10.2)
CHLORIDE BLD-SCNC: 108 MMOL/L (ref 98–107)
CO2: 21 MMOL/L (ref 22–29)
CREAT SERPL-MCNC: 1 MG/DL (ref 0.5–1)
EKG ATRIAL RATE: 79 BPM
EKG P AXIS: 49 DEGREES
EKG P-R INTERVAL: 148 MS
EKG Q-T INTERVAL: 388 MS
EKG QRS DURATION: 94 MS
EKG QTC CALCULATION (BAZETT): 444 MS
EKG R AXIS: 54 DEGREES
EKG T AXIS: 40 DEGREES
EKG VENTRICULAR RATE: 79 BPM
GFR AFRICAN AMERICAN: >60
GFR NON-AFRICAN AMERICAN: >60 ML/MIN/1.73
GLUCOSE BLD-MCNC: 81 MG/DL (ref 74–99)
HCT VFR BLD CALC: 26.1 % (ref 34–48)
HCT VFR BLD CALC: 27.4 % (ref 34–48)
HEMOGLOBIN: 8.7 G/DL (ref 11.5–15.5)
IMMATURE RETIC FRACT: 20 % (ref 3–15.9)
LACTATE DEHYDROGENASE: 175 U/L (ref 135–214)
MAGNESIUM: 1.8 MG/DL (ref 1.6–2.6)
MCH RBC QN AUTO: 20.7 PG (ref 26–35)
MCHC RBC AUTO-ENTMCNC: 33.3 % (ref 32–34.5)
MCV RBC AUTO: 62 FL (ref 80–99.9)
PDW BLD-RTO: 19.4 FL (ref 11.5–15)
PLATELET # BLD: 260 E9/L (ref 130–450)
PMV BLD AUTO: ABNORMAL FL (ref 7–12)
POTASSIUM REFLEX MAGNESIUM: 3.9 MMOL/L (ref 3.5–5)
RBC # BLD: 4.21 E12/L (ref 3.5–5.5)
RETIC HGB EQUIVALENT: 21.9 PG (ref 28.2–36.6)
RETICULOCYTE ABSOLUTE COUNT: 0.08 E12/L
RETICULOCYTE COUNT PCT: 1.9 % (ref 0.4–1.9)
SODIUM BLD-SCNC: 143 MMOL/L (ref 132–146)
WBC # BLD: 11.1 E9/L (ref 4.5–11.5)

## 2019-05-02 PROCEDURE — 93010 ELECTROCARDIOGRAM REPORT: CPT | Performed by: INTERNAL MEDICINE

## 2019-05-02 PROCEDURE — 96372 THER/PROPH/DIAG INJ SC/IM: CPT

## 2019-05-02 PROCEDURE — 2580000003 HC RX 258: Performed by: HOSPITALIST

## 2019-05-02 PROCEDURE — 83615 LACTATE (LD) (LDH) ENZYME: CPT

## 2019-05-02 PROCEDURE — 85027 COMPLETE CBC AUTOMATED: CPT

## 2019-05-02 PROCEDURE — 6370000000 HC RX 637 (ALT 250 FOR IP): Performed by: HOSPITALIST

## 2019-05-02 PROCEDURE — 85045 AUTOMATED RETICULOCYTE COUNT: CPT

## 2019-05-02 PROCEDURE — 80048 BASIC METABOLIC PNL TOTAL CA: CPT

## 2019-05-02 PROCEDURE — 6360000002 HC RX W HCPCS: Performed by: HOSPITALIST

## 2019-05-02 PROCEDURE — 96361 HYDRATE IV INFUSION ADD-ON: CPT

## 2019-05-02 PROCEDURE — 96376 TX/PRO/DX INJ SAME DRUG ADON: CPT

## 2019-05-02 PROCEDURE — 6370000000 HC RX 637 (ALT 250 FOR IP): Performed by: STUDENT IN AN ORGANIZED HEALTH CARE EDUCATION/TRAINING PROGRAM

## 2019-05-02 PROCEDURE — 83735 ASSAY OF MAGNESIUM: CPT

## 2019-05-02 PROCEDURE — G0378 HOSPITAL OBSERVATION PER HR: HCPCS

## 2019-05-02 PROCEDURE — 36415 COLL VENOUS BLD VENIPUNCTURE: CPT

## 2019-05-02 RX ORDER — OXYCODONE HYDROCHLORIDE AND ACETAMINOPHEN 5; 325 MG/1; MG/1
1 TABLET ORAL EVERY 6 HOURS PRN
Qty: 12 TABLET | Refills: 0 | Status: SHIPPED | OUTPATIENT
Start: 2019-05-02 | End: 2019-05-05

## 2019-05-02 RX ORDER — DIPHENHYDRAMINE HCL 25 MG
25 TABLET ORAL EVERY 6 HOURS PRN
Status: DISCONTINUED | OUTPATIENT
Start: 2019-05-02 | End: 2019-05-05 | Stop reason: HOSPADM

## 2019-05-02 RX ORDER — FOLIC ACID 1 MG/1
1 TABLET ORAL 2 TIMES DAILY
Status: DISCONTINUED | OUTPATIENT
Start: 2019-05-02 | End: 2019-05-05 | Stop reason: HOSPADM

## 2019-05-02 RX ADMIN — HYDROMORPHONE HYDROCHLORIDE 1 MG: 1 INJECTION, SOLUTION INTRAMUSCULAR; INTRAVENOUS; SUBCUTANEOUS at 12:22

## 2019-05-02 RX ADMIN — HYDROMORPHONE HYDROCHLORIDE 1 MG: 1 INJECTION, SOLUTION INTRAMUSCULAR; INTRAVENOUS; SUBCUTANEOUS at 02:51

## 2019-05-02 RX ADMIN — DIPHENHYDRAMINE HCL 25 MG: 25 TABLET ORAL at 08:35

## 2019-05-02 RX ADMIN — HYDROMORPHONE HYDROCHLORIDE 1 MG: 1 INJECTION, SOLUTION INTRAMUSCULAR; INTRAVENOUS; SUBCUTANEOUS at 06:09

## 2019-05-02 RX ADMIN — Medication 10 ML: at 12:22

## 2019-05-02 RX ADMIN — HYDROMORPHONE HYDROCHLORIDE 1 MG: 1 INJECTION, SOLUTION INTRAMUSCULAR; INTRAVENOUS; SUBCUTANEOUS at 15:21

## 2019-05-02 RX ADMIN — SODIUM CHLORIDE AND POTASSIUM CHLORIDE: .9; .15 SOLUTION INTRAVENOUS at 07:31

## 2019-05-02 RX ADMIN — SODIUM CHLORIDE AND POTASSIUM CHLORIDE: .9; .15 SOLUTION INTRAVENOUS at 22:55

## 2019-05-02 RX ADMIN — FOLIC ACID 1 MG: 1 TABLET ORAL at 21:28

## 2019-05-02 RX ADMIN — DIPHENHYDRAMINE HCL 25 MG: 25 TABLET ORAL at 01:17

## 2019-05-02 RX ADMIN — Medication 10 ML: at 18:27

## 2019-05-02 RX ADMIN — FERROUS SULFATE TAB 325 MG (65 MG ELEMENTAL FE) 325 MG: 325 (65 FE) TAB at 17:39

## 2019-05-02 RX ADMIN — HYDROMORPHONE HYDROCHLORIDE 1 MG: 1 INJECTION, SOLUTION INTRAMUSCULAR; INTRAVENOUS; SUBCUTANEOUS at 09:18

## 2019-05-02 RX ADMIN — Medication 10 ML: at 21:28

## 2019-05-02 RX ADMIN — FERROUS SULFATE TAB 325 MG (65 MG ELEMENTAL FE) 325 MG: 325 (65 FE) TAB at 08:35

## 2019-05-02 RX ADMIN — ENOXAPARIN SODIUM 120 MG: 150 INJECTION SUBCUTANEOUS at 21:27

## 2019-05-02 RX ADMIN — FOLIC ACID 1 MG: 1 TABLET ORAL at 08:37

## 2019-05-02 RX ADMIN — SENNOSIDES 17.2 MG: 8.6 TABLET, FILM COATED ORAL at 08:35

## 2019-05-02 RX ADMIN — SODIUM CHLORIDE AND POTASSIUM CHLORIDE: .9; .15 SOLUTION INTRAVENOUS at 15:20

## 2019-05-02 RX ADMIN — ESCITALOPRAM OXALATE 5 MG: 10 TABLET, FILM COATED ORAL at 11:01

## 2019-05-02 RX ADMIN — ENOXAPARIN SODIUM 120 MG: 150 INJECTION SUBCUTANEOUS at 08:35

## 2019-05-02 RX ADMIN — DIPHENHYDRAMINE HCL 25 MG: 25 TABLET ORAL at 21:34

## 2019-05-02 RX ADMIN — HYDROMORPHONE HYDROCHLORIDE 1 MG: 1 INJECTION, SOLUTION INTRAMUSCULAR; INTRAVENOUS; SUBCUTANEOUS at 18:27

## 2019-05-02 RX ADMIN — SENNOSIDES 17.2 MG: 8.6 TABLET, FILM COATED ORAL at 21:28

## 2019-05-02 RX ADMIN — Medication 10 ML: at 09:18

## 2019-05-02 RX ADMIN — DIPHENHYDRAMINE HCL 25 MG: 25 TABLET ORAL at 15:20

## 2019-05-02 RX ADMIN — HYDROMORPHONE HYDROCHLORIDE 1 MG: 1 INJECTION, SOLUTION INTRAMUSCULAR; INTRAVENOUS; SUBCUTANEOUS at 21:28

## 2019-05-02 ASSESSMENT — PAIN DESCRIPTION - PAIN TYPE
TYPE: ACUTE PAIN

## 2019-05-02 ASSESSMENT — PAIN DESCRIPTION - DESCRIPTORS
DESCRIPTORS: ACHING;SHOOTING;STABBING
DESCRIPTORS: STABBING;SHOOTING;ACHING
DESCRIPTORS: STABBING;SHOOTING;SHARP
DESCRIPTORS: STABBING;SHOOTING;SORE
DESCRIPTORS: STABBING;SHARP;JABBING
DESCRIPTORS: STABBING;SHOOTING;ACHING

## 2019-05-02 ASSESSMENT — PAIN SCALES - GENERAL
PAINLEVEL_OUTOF10: 2
PAINLEVEL_OUTOF10: 7
PAINLEVEL_OUTOF10: 8
PAINLEVEL_OUTOF10: 7
PAINLEVEL_OUTOF10: 3
PAINLEVEL_OUTOF10: 0
PAINLEVEL_OUTOF10: 8
PAINLEVEL_OUTOF10: 7
PAINLEVEL_OUTOF10: 8
PAINLEVEL_OUTOF10: 2
PAINLEVEL_OUTOF10: 0
PAINLEVEL_OUTOF10: 2
PAINLEVEL_OUTOF10: 8
PAINLEVEL_OUTOF10: 2
PAINLEVEL_OUTOF10: 0
PAINLEVEL_OUTOF10: 3

## 2019-05-02 ASSESSMENT — PAIN - FUNCTIONAL ASSESSMENT
PAIN_FUNCTIONAL_ASSESSMENT: ACTIVITIES ARE NOT PREVENTED

## 2019-05-02 ASSESSMENT — PAIN DESCRIPTION - FREQUENCY
FREQUENCY: CONTINUOUS

## 2019-05-02 ASSESSMENT — PAIN DESCRIPTION - ONSET
ONSET: ON-GOING
ONSET: ON-GOING

## 2019-05-02 ASSESSMENT — PAIN DESCRIPTION - LOCATION
LOCATION: GENERALIZED
LOCATION: GENERALIZED
LOCATION: BACK;CHEST;LEG
LOCATION: GENERALIZED

## 2019-05-02 ASSESSMENT — PAIN DESCRIPTION - PROGRESSION
CLINICAL_PROGRESSION: GRADUALLY WORSENING
CLINICAL_PROGRESSION: NOT CHANGED

## 2019-05-02 NOTE — DISCHARGE SUMMARY
Hospital Medicine Discharge Summary    Patient ID: Souleymane Carnes      Patient's PCP: Delvin Moritz    Admit Date: 5/1/2019     Discharge Date:   05/05/19    Admitting Physician: Anderson Prince MD     Discharge Physician: Philomena Henley MD     Discharge Diagnoses: Active Hospital Problems    Diagnosis Date Noted    Sickle cell crisis [facility]) [D57.00] 12/13/2017       The patient was seen and examined on day of discharge and this discharge summary is in conjunction with any daily progress note from day of discharge. Admission HPI: 22 yo female hx SCS PE on lovenox 120 bid,  came to ER with 4 days of typical of her SCC pain chest lower back legs. Denies any recent fever, chills, nausea, vomiting, URI symptoms or cough. In ER HD stable, no distress given iv pain meds ivf and being admitted, ua negative, trop negative ekg/cxr normal      Hospital Course:   Ms. Souleymane Carnes, a 21y.o. year old female  who  has a past medical history of CLABSI (central line-associated bloodstream infection), Deep vein thrombosis (DVT) of left upper extremity ([address] Disease of blood and blood forming organ, Heart murmur, 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[address] Miscarriage within last 12 months, Obesity, Class III, BMI 40-49.9 (morbid obesity) ([address] PE (pulmonary embolism), PE (pulmonary embolism), Pneumonia, Psychiatric problem, Sickle cell anemia ([address] and Thrombosis complicating venous access device ([address] During the course the patient's hospital stay admitted for sickle cell crisis. Heme/onc was consulted. She felt better clinically after receiving IV fluids and pain meds. Stable at time of discharge to home.      Consults:     IP CONSULT TO INTERNAL MEDICINE  IP CONSULT TO ONCOLOGY    Significant Diagnostic Studies:  As above      Discharge Instructions/Follow-up:  As above       Activity: activity as tolerated    Physical Exam:  Vitals:    05/02/19 0830   BP: 122/69   Pulse: 95   Resp: 20   Temp: 97.8 °F (36.6 °C)   SpO2:

## 2019-05-02 NOTE — PROGRESS NOTES
Subjective: The patient is awake and alert. She is complaining of pain involving the anterior chest lower back and lower extremities. Pain is rated as 10/10. It is responding to Dilaudid and it can drop to 6/10. She has no fevers or chills. No significant dyspnea. No nausea or vomiting. No abdominal pain. No constipation or diarrhea. No dizziness or lightheadedness. No changes in the color of the stools or urine. Objective:    /69   Pulse 95   Temp 97.8 °F (36.6 °C)   Resp 20   Ht 5' 3\" (1.6 m)   Wt 295 lb 9.6 oz (134.1 kg)   LMP 02/17/2019 Comment: recent miscarriage  SpO2 96%   BMI 52.36 kg/m²     General: NAD  HEENT: No thrush or mucositis, EOMI, PERRLA  Heart:  RRR, no murmurs, gallops, or rubs.   Lungs:  Decreased breath sounds at the bases, no wheeze, rales or rhonchi  Abd: obese,  nontender, nondistended, no masses  Extrem:  No clubbing, cyanosis, or edema  Lymphatics: No palpable adenopathy in cervical and supraclavicular regions        CBC with Differential:    Lab Results   Component Value Date    WBC 11.1 05/02/2019    RBC 4.21 05/02/2019    HGB 8.7 05/02/2019    HCT 26.1 05/02/2019     05/02/2019    MCV 62.0 05/02/2019    MCH 20.7 05/02/2019    MCHC 33.3 05/02/2019    RDW 19.4 05/02/2019    NRBC 0.5 05/30/2018    BANDSPCT 1 09/01/2016    METASPCT 1 09/16/2016    LYMPHOPCT 40.3 05/01/2019    PROMYELOPCT 3.5 06/26/2017    MONOPCT 5.2 05/01/2019    BASOPCT 0.2 05/01/2019    MONOSABS 0.66 05/01/2019    LYMPHSABS 5.08 05/01/2019    EOSABS 0.28 05/01/2019    BASOSABS 0.03 05/01/2019     CMP:    Lab Results   Component Value Date     05/02/2019    K 3.9 05/02/2019     05/02/2019    CO2 21 05/02/2019    BUN 11 05/02/2019    CREATININE 1.0 05/02/2019    GFRAA >60 05/02/2019    LABGLOM >60 05/02/2019    GLUCOSE 81 05/02/2019    GLUCOSE 89 05/03/2011    PROT 7.6 05/01/2019    LABALBU 3.9 05/01/2019    LABALBU 4.0 05/03/2011    CALCIUM 8.1 05/02/2019    BILITOT 0.8 05/01/2019    ALKPHOS 100 05/01/2019    AST 14 05/01/2019    ALT 8 05/01/2019            Current Facility-Administered Medications:     diphenhydrAMINE (BENADRYL) tablet 25 mg, 25 mg, Oral, Q6H PRN, Dmitry Hale MD, 25 mg at 05/02/19 0835    sodium chloride flush 0.9 % injection 10 mL, 10 mL, Intravenous, 2 times per day, Dmitry Hale MD, 10 mL at 05/01/19 2339    sodium chloride flush 0.9 % injection 10 mL, 10 mL, Intravenous, PRN, Dmitry Hale MD, 10 mL at 05/02/19 0918    magnesium hydroxide (MILK OF MAGNESIA) 400 MG/5ML suspension 30 mL, 30 mL, Oral, Daily PRN, Dmitry Hale MD    ondansetron (ZOFRAN) injection 4 mg, 4 mg, Intravenous, Q6H PRN, Dmitry Hale MD    enoxaparin (LOVENOX) injection 120 mg, 120 mg, Subcutaneous, BID, Dmitry Hale MD, 120 mg at 05/02/19 0835    escitalopram (LEXAPRO) tablet 5 mg, 5 mg, Oral, Daily, Dmitry Hale MD    ferrous sulfate tablet 325 mg, 325 mg, Oral, BID WC, Dmitry Hale MD, 325 mg at 57/76/30 7729    folic acid (FOLVITE) tablet 2 mg, 2 mg, Oral, Daily, Dmitry Hale MD, 1 mg at 05/02/19 0837    HYDROmorphone (DILAUDID) injection 1 mg, 1 mg, Intravenous, Q3H PRN, Dmitry Hale MD, 1 mg at 05/02/19 0918    senna (SENOKOT) tablet 17.2 mg, 2 tablet, Oral, BID, Dmitry Hale MD, 17.2 mg at 05/02/19 0835    0.9% NaCl with KCl 20 mEq infusion, , Intravenous, Continuous, Dmitry Hale MD, Last Rate: 125 mL/hr at 05/02/19 0731    Assessment:    Active Problems:    Sickle cell crisis ([address]  Resolved Problems:    * No resolved hospital problems. *  Patient is a 44-year-old woman who used to be followed at the [facility] by Dr. Josefina Simmons for history of hemoglobin C disease, currently she is being followed by a hematologist at [facility] on and off hydroxyurea admitted to the hospital for progressive pain over the last 4-5 days involving the chest lower back and lower extremities. On admission WBC 12.6 hemoglobin 10.1 and platelet count 175.  She has history of pulmonary embolism diagnosed in April 2015 treated with short term anticoagulation. She was placed on full dose Lovenox since her last pregnancy    Plan:  Continue supportive care with IV hydration and pain control. She is currently using Dilaudid 1 mg every 3 hours as needed. She thinks the current regimen is adequate. The hematologist that she is following at [facility] is not recommending to restart hydroxyurea according to the patient. Her baseline hemoglobin is between 9 to 10 g/dL. I recommend to transfuse her with 1 or 2 units of PRBC if tomorrow's hemoglobin is less than 8.5 g/dL. Continue anticoagulation with Lovenox.     Electronically signed by Cristiano Murray MD on 5/2/2019 at 9:44 AM

## 2019-05-02 NOTE — ED PROVIDER NOTES
ED Attending  CC: Caitlin       Department of Emergency Medicine   ED  Provider Note  Admit Date/RoomTime: 5/1/2019  5:54 PM  ED Room: 35[facility]  MRN: [id number]  Chief Complaint:   Sickle Cell Pain Crisis (for 4-5 days, pain chest, lower back and legs, some sob)       History of Present Illness   Source of history provided by:  patient. History/Exam Limitations: none. Souleymane Carnes is a 21 y.o. 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 The complaint has been persistent, moderate in severity. Patient states for the last 4-5 days she has had chest, neck, back and leg pain. States occasional shortness of breath. Has a history of sickle cell. States this feels like her typical crisis. Denies any recent fever, chills, nausea, vomiting, URI symptoms or cough. ROS   Pertinent positives and negatives are stated within HPI, all other systems reviewed and are negative.        Past Surgical History:   Procedure Laterality Date    KNEE SURGERY      right    OTHER SURGICAL HISTORY  08/04/2016    removal of mediport    OTHER SURGICAL HISTORY Right 03/05/2018    TUNNELED VENOUS PORT REMOVAL & REINSERTION;DILATION OF VENOUS TRACT    TONSILLECTOMY      TUNNELED VENOUS PORT PLACEMENT  2014    and removed    TUNNELED VENOUS PORT PLACEMENT Right 01/23/2015    [place], removed 08/04/16 CLABSI    TUNNELED VENOUS PORT PLACEMENT Right 09/15/2016    CHEST    TUNNELED VENOUS PORT PLACEMENT Right 05/18/2017    SUBCLAVIAN   Social History:  reports that she has never smoked. She has never used smokeless tobacco. She reports that she drinks alcohol. She reports that she does not use drugs. Family History: family history includes Cancer in her maternal grandmother; Diabetes in her mother; High Blood Pressure in her mother; Kidney Disease in her paternal grandmother. Allergies: Demerol hcl [meperidine]; Adhesive tape; and Bactrim [sulfamethoxazole-trimethoprim]    Physical Exam Section   Oxygen Saturation Interpretation: Normal.   ED Triage Vitals   BP Temp Temp Source Pulse Resp SpO2 Height Weight   05/01/19 1706 05/01/19 1706 05/01/19 1706 05/01/19 1649 05/01/19 1706 05/01/19 1649 05/01/19 1706 05/01/19 1706   131/87 98.3 °F (36.8 °C) Temporal 97 18 98 % 5' 3\" (1.6 m) 293 lb (132.9 kg)       Physical Exam  · Constitutional/General: Alert and oriented x3, well appearing, non toxic. · HEENT:  NC/NT. PERRLA,  Airway patent. · Neck: Supple, full ROM, non tender to palpation in the midline, no stridor, no crepitus, no meningeal signs  · Respiratory: Lungs clear to auscultation bilaterally, no wheezes, rales, or rhonchi. Not in respiratory distress  · CV:  Regular rate. Regular rhythm. No murmurs, gallops, or rubs. 2+ distal pulses  · Chest: No chest wall tenderness  · GI:  Abdomen Soft, Non tender, Non distended. +BS. No rebound, guarding, or rigidity. No pulsatile masses. · Musculoskeletal: Moves all extremities x 4. Warm and well perfused, no clubbing, cyanosis, or edema. Capillary refill <3 seconds  · Integument: skin warm and dry. No rashes.    · Lymphatic: no lymphadenopathy noted  · Neurologic: GCS 15, no focal deficits, symmetric strength 5/5 in the upper and lower extremities bilaterally  · Psychiatric: Normal Affect      Lab / Imaging Results   (All laboratory and radiology results have been personally reviewed by myself)  Labs:  Results for orders placed or performed during the hospital encounter of 05/01/19   CBC Auto Differential   Result Value Ref Range    WBC 12.6 (H) 4.5 - 11.5 E9/L    RBC 4.88 3.50 - 5.50 E12/L    Hemoglobin 10.1 (L) 11.5 - 15.5 g/dL    Hematocrit 30.1 (L) 34.0 - 48.0 %    MCV 61.7 (L) 80.0 - 99.9 fL    MCH 20.7 (L) 26.0 - 35.0 pg    MCHC 33.6 32.0 - 34.5 %    RDW 19.7 (H) 11.5 - 15.0 fL    Platelets 689 237 - 370 E9/L    MPV NOT CALC 7.0 - 12.0 fL    Neutrophils % 51.3 43.0 - 80.0 %    Immature Granulocytes % 0.8 0.0 - 5.0 %    Lymphocytes % 40.3 20.0 - 42.0 %    Monocytes % 5.2 2.0 - 12.0 %    Eosinophils % 2.2 0.0 - 6.0 %    Basophils % 0.2 0.0 - 2.0 %    Neutrophils # 6.47 1.80 - 7.30 E9/L    Immature Granulocytes # 0.10 E9/L    Lymphocytes # 5.08 (H) 1.50 - 4.00 E9/L    Monocytes # 0.66 0.10 - 0.95 E9/L    Eosinophils # 0.28 0.05 - 0.50 E9/L    Basophils # 0.03 0.00 - 0.20 E9/L   Comprehensive Metabolic Panel w/ Reflex to MG   Result Value Ref Range    Sodium 143 132 - 146 mmol/L    Potassium reflex Magnesium 3.9 3.5 - 5.0 mmol/L    Chloride 107 98 - 107 mmol/L    CO2 26 22 - 29 mmol/L    Anion Gap 10 7 - 16 mmol/L    Glucose 91 74 - 99 mg/dL    BUN 11 6 - 20 mg/dL    CREATININE 1.0 0.5 - 1.0 mg/dL    GFR Non-African American >60 >=60 mL/min/1.73    GFR African American >60     Calcium 8.7 8.6 - 10.2 mg/dL    Total Protein 7.6 6.4 - 8.3 g/dL    Alb 3.9 3.5 - 5.2 g/dL    Total Bilirubin 0.8 0.0 - 1.2 mg/dL    Alkaline Phosphatase 100 35 - 104 U/L    ALT 8 0 - 32 U/L    AST 14 0 - 31 U/L   Lactic Acid, Plasma   Result Value Ref Range    Lactic Acid 1.1 0.5 - 2.2 mmol/L   RETICULOCYTES   Result Value Ref Range    Retic Ct Pct 2.2 (H) 0.4 - 1.9 %    Retic Ct Abs 0.110 E12/L    Immature Retic Fract 24.7 (H) 3.0 - 15.9 %    Retic HGB Equivalent 22.7 (L) 28.2 - 36.6 pg   Urinalysis, reflex to microscopic   Result Value Ref Range    Color, UA Yellow Straw/Yellow    Clarity, UA Clear Clear    Glucose, Ur Negative Negative mg/dL    Bilirubin Urine Negative Negative    Ketones, Urine Negative Negative mg/dL    Specific Gravity, UA 1.010 1.005 - 1.030    Blood, Urine Negative Negative    pH, UA 6.0 5.0 - 9.0    Protein, UA Negative Negative mg/dL    Urobilinogen, Urine 0.2 <2.0 E.U./dL    Nitrite, Urine Negative Negative    Leukocyte Esterase, Urine TRACE (A) Negative   Troponin   Result Value Ref Range    Troponin <0.01 0.00 - 0.03 ng/mL   Microscopic Urinalysis   Result Value Ref Range    WBC, UA 0-1 0 - 5 /HPF    RBC, UA NONE 0 - 2 /HPF    Bacteria, UA NONE /HPF   Pregnancy, urine   Result Value Ref Range    HCG(Urine) Pregnancy Test NEGATIVE NEGATIVE   Protime-INR   Result Value Ref Range    Protime 13.6 (H) 9.3 - 12.4 sec    INR 1.2    APTT   Result Value Ref Range    aPTT 29.6 24.5 - 35.1 sec     Imaging: All Radiology results interpreted by Radiologist unless otherwise noted. XR CHEST STANDARD (2 VW)   Final Result   No acute cardiopulmonary process. EKG #1:  Interpreted by emergency department physician unless otherwise noted. Time:  1721    Rate: 79  Rhythm: Sinus. Interpretation: normal EKG, normal sinus rhythm. ED Course / Medical Decision Making     Medications   HYDROmorphone (DILAUDID) injection 1 mg (has no administration in time range)   ondansetron (ZOFRAN) injection 4 mg (has no administration in time range)   0.9 % sodium chloride bolus (1,000 mLs Intravenous New Bag 5/1/19 1948)   ondansetron (ZOFRAN) injection 4 mg (4 mg Intravenous Given 5/1/19 2022)   HYDROmorphone (DILAUDID) injection 0.5 mg (0.5 mg Intravenous Given 5/1/19 2025)     Consultations:             None    Procedures:   none    MDM:  Vital signs stable. Patient no acute distress. Lab work and EKG obtained. EKG shows normal sinus rhythm. Multiple attempts for IV access. Slight leukocytosis noted. Hemoglobin at baseline.  Reticulocyte count 2.2. Troponin negative. Chest x-ray shows no acute cardiopulmonary pathology. Dr. Nikolai Cook evaluated the patient. Disposition pending pain management. Counseling:   I have spoken with the patient and discussed todays results, in addition to providing specific details for the plan of care and counseling regarding the diagnosis and prognosis and are agreeable with the plan. Assessment      1. Sickle cell pain crisis [facility])      This patient's ED course included: a personal history and physicial examination  This patient has remained hemodynamically stable during their ED course. Plan   Other Disposition: pain control. Patient condition is stable. New Medications     New Prescriptions    No medications on file     Electronically signed by SHAHABZ Erickson   DD: 5/1/19  **This report was transcribed using voice recognition software. Every effort was made to ensure accuracy; however, inadvertent computerized transcription errors may be present.   END OF PROVIDER NOTE       Javier Erickson  05/01/19 3154

## 2019-05-02 NOTE — PLAN OF CARE
Problem: Pain:  Goal: Pain level will decrease  Description  Pain level will decrease  Outcome: Met This Shift  Goal: Control of acute pain  Description  Control of acute pain  Outcome: Met This Shift  Goal: Control of chronic pain  Description  Control of chronic pain  Outcome: Met This Shift     Problem: DAILY CARE  Goal: Daily care needs are met  Outcome: Met This Shift     Problem: PAIN  Goal: Patient's pain/discomfort is manageable  Outcome: Met This Shift

## 2019-05-02 NOTE — H&P
Hospital Medicine History & Physical      PCP: Pedro Reese    Date of Admission: 5/1/2019    Date of Service: 5-2-19    Chief Complaint:  scc      History Of Present Illness:      22 yo female hx SCS PE on lovenox 120 bid,  came to ER with 4 days of typical of her SCC pain chest lower back legs. Denies any recent fever, chills, nausea, vomiting, URI symptoms or cough. 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[facility] [address]     Miscarriage within last 12 months 04/2017    Obesity, Class III, BMI 40-49.9 (morbid obesity) ([facility] [address] 5/22/2014    PE (pulmonary embolism)     PE (pulmonary embolism)     Pneumonia     Psychiatric problem     Sickle cell anemia (HCC)     Thrombosis complicating venous access device ([facility] [address] 5/18/2017       Past Surgical History:          Procedure Laterality Date    KNEE SURGERY      right    OTHER SURGICAL HISTORY  08/04/2016    removal of mediport    OTHER SURGICAL HISTORY Right 03/05/2018    TUNNELED VENOUS PORT REMOVAL & REINSERTION;DILATION OF VENOUS TRACT    TONSILLECTOMY      TUNNELED VENOUS PORT PLACEMENT  2014    and removed    TUNNELED VENOUS PORT PLACEMENT Right 01/23/2015    [place], removed 08/04/16 CLABSI    TUNNELED VENOUS PORT PLACEMENT Right 09/15/2016    CHEST    TUNNELED VENOUS PORT PLACEMENT Right 05/18/2017    SUBCLAVIAN       Medications Prior to Admission:      Prior to Admission medications    Medication Sig Start Date End Date Taking?  Authorizing Provider   escitalopram (LEXAPRO) 5 MG tablet Take 5 mg by mouth daily 2/21/19  Yes Historical Provider, MD   enoxaparin (LOVENOX) 100 MG/ML injection Inject 40 mg into the skin 2 times daily    Yes Historical Provider, MD   ferrous sulfate 325 (65 FE) MG tablet Take 1 tablet by mouth 2 times daily (with meals) 2/27/17  Yes Harshad Weinberg, DO   folic acid (FOLVITE) 1 MG tablet Take 2 tablets by mouth daily  Patient taking differently: Take 2 mg by mouth 3 times daily  9/20/16  Yes Harshad Weinberg DO       Allergies:  Demerol hcl [meperidine]; Adhesive tape; and Bactrim [sulfamethoxazole-trimethoprim]    Social History:      The patient currently lives at home    TOBACCO:   reports that she has never smoked. She has never used smokeless tobacco.  ETOH:   reports that she drinks alcohol. Family History:       Reviewed in detail and negative for DM, CAD, Cancer, CVA. Positive as follows:        Problem Relation Age of Onset    High Blood Pressure Mother     Diabetes Mother     Cancer Maternal Grandmother         breast    Kidney Disease Paternal Grandmother        REVIEW OF SYSTEMS:   Pertinent positives as noted in the HPI. All other systems reviewed and negative. PHYSICAL EXAM PERFORMED:    BP (!) 141/64   Pulse 97   Temp 97.8 °F (36.6 °C) (Temporal)   Resp 18   Ht 5' 3\" (1.6 m)   Wt 293 lb (132.9 kg)   LMP 02/17/2019 Comment: recent miscarriage  SpO2 99%   BMI 51.90 kg/m²     General appearance:  No apparent distress, appears stated age and cooperative. HEENT:  Normal cephalic, atraumatic without obvious deformity. Pupils equal, round, and reactive to light. Extra ocular muscles intact. Conjunctivae/corneas clear. Neck: Supple, with full range of motion. No jugular venous distention. Trachea midline. Respiratory:  Normal respiratory effort. Clear to auscultation, bilaterally without Rales/Wheezes/Rhonchi. Cardiovascular:  Regular rate and rhythm with normal S1/S2 without murmurs, rubs or gallops. Abdomen: Soft, non-tender, non-distended with normal bowel sounds. Musculoskeletal:  No clubbing, cyanosis or edema bilaterally.   Full range of motion without deformity. Skin: Skin color, texture, turgor normal.  No rashes or lesions. Neurologic:  Neurovascularly intact without any focal sensory/motor deficits. Cranial nerves: II-XII intact, grossly non-focal.  Psychiatric:  Alert and oriented, thought content appropriate, normal insight  Capillary Refill: Brisk,< 3 seconds   Peripheral Pulses: +2 palpable, equal bilaterally       Labs:     Recent Labs     05/01/19  1730   WBC 12.6*   HGB 10.1*   HCT 30.1*        Recent Labs     05/01/19  1730      K 3.9      CO2 26   BUN 11   CREATININE 1.0   CALCIUM 8.7     Recent Labs     05/01/19  1730   AST 14   ALT 8   BILITOT 0.8   ALKPHOS 100     Recent Labs     05/01/19  1845   INR 1.2     Recent Labs     05/01/19  1730   TROPONINI <0.01       Urinalysis:      Lab Results   Component Value Date    NITRU Negative 05/01/2019    WBCUA 0-1 05/01/2019    BACTERIA NONE 05/01/2019    RBCUA NONE 05/01/2019    BLOODU Negative 05/01/2019    SPECGRAV 1.010 05/01/2019    GLUCOSEU Negative 05/01/2019       Radiology:         XR CHEST STANDARD (2 VW)   Final Result   No acute cardiopulmonary process. ASSESSMENT:    Active Hospital Problems    Diagnosis Date Noted    Sickle cell crisis [facility]) [D57.00] 12/13/2017     20 yo female hx SCS PE on lovenox 120 bid,  came to ER with 4 days of typical of her SCC pain chest lower back legs. Denies any recent fever, chills, nausea, vomiting, URI symptoms or cough. In ER HD stable, no distress given iv pain meds ivf and being admitted, ua negative, trop negative ekg/cxr normal      SCC   HX PE      obs  Pain control  ivf  Hem c/s  Home med/    DVT Prophylaxis: lovenox  Diet: DIET FULL LIQUID;  Code Status: Full Code    PT/OT Eval Status: ordered    Dispo - obs       Dmitry Hale MD    Thank you Libretad Fink for the opportunity to be involved in this patient's care. If you have any questions or concerns please feel free to contact me at [phone].

## 2019-05-02 NOTE — PLAN OF CARE
Problem: Pain:  Goal: Pain level will decrease  Description  Pain level will decrease  5/2/2019 1952 by Marcela Delgado RN  Outcome: Met This Shift  5/2/2019 1742 by Julio Gtz RN  Outcome: Met This Shift  5/2/2019 0859 by Julio Gtz RN  Outcome: Met This Shift

## 2019-05-03 LAB
ALBUMIN SERPL-MCNC: 3.5 G/DL (ref 3.5–5.2)
ALP BLD-CCNC: 99 U/L (ref 35–104)
ALT SERPL-CCNC: 12 U/L (ref 0–32)
ANION GAP SERPL CALCULATED.3IONS-SCNC: 11 MMOL/L (ref 7–16)
AST SERPL-CCNC: 19 U/L (ref 0–31)
BILIRUB SERPL-MCNC: 0.6 MG/DL (ref 0–1.2)
BUN BLDV-MCNC: 8 MG/DL (ref 6–20)
CALCIUM SERPL-MCNC: 8.3 MG/DL (ref 8.6–10.2)
CHLORIDE BLD-SCNC: 108 MMOL/L (ref 98–107)
CO2: 21 MMOL/L (ref 22–29)
CREAT SERPL-MCNC: 0.8 MG/DL (ref 0.5–1)
GFR AFRICAN AMERICAN: >60
GFR NON-AFRICAN AMERICAN: >60 ML/MIN/1.73
GLUCOSE BLD-MCNC: 105 MG/DL (ref 74–99)
HCT VFR BLD CALC: 27.3 % (ref 34–48)
HEMOGLOBIN: 9.2 G/DL (ref 11.5–15.5)
MCH RBC QN AUTO: 20.6 PG (ref 26–35)
MCHC RBC AUTO-ENTMCNC: 33.7 % (ref 32–34.5)
MCV RBC AUTO: 61.2 FL (ref 80–99.9)
PDW BLD-RTO: 19.5 FL (ref 11.5–15)
PLATELET # BLD: 268 E9/L (ref 130–450)
PMV BLD AUTO: ABNORMAL FL (ref 7–12)
POTASSIUM SERPL-SCNC: 4.4 MMOL/L (ref 3.5–5)
RBC # BLD: 4.46 E12/L (ref 3.5–5.5)
SODIUM BLD-SCNC: 140 MMOL/L (ref 132–146)
TOTAL PROTEIN: 6.4 G/DL (ref 6.4–8.3)
WBC # BLD: 10.2 E9/L (ref 4.5–11.5)

## 2019-05-03 PROCEDURE — 36415 COLL VENOUS BLD VENIPUNCTURE: CPT

## 2019-05-03 PROCEDURE — 80053 COMPREHEN METABOLIC PANEL: CPT

## 2019-05-03 PROCEDURE — 6360000002 HC RX W HCPCS: Performed by: HOSPITALIST

## 2019-05-03 PROCEDURE — 6370000000 HC RX 637 (ALT 250 FOR IP): Performed by: HOSPITALIST

## 2019-05-03 PROCEDURE — 6370000000 HC RX 637 (ALT 250 FOR IP): Performed by: STUDENT IN AN ORGANIZED HEALTH CARE EDUCATION/TRAINING PROGRAM

## 2019-05-03 PROCEDURE — 85027 COMPLETE CBC AUTOMATED: CPT

## 2019-05-03 PROCEDURE — 2140000000 HC CCU INTERMEDIATE R&B

## 2019-05-03 RX ADMIN — SODIUM CHLORIDE AND POTASSIUM CHLORIDE: .9; .15 SOLUTION INTRAVENOUS at 06:51

## 2019-05-03 RX ADMIN — HYDROMORPHONE HYDROCHLORIDE 1 MG: 1 INJECTION, SOLUTION INTRAMUSCULAR; INTRAVENOUS; SUBCUTANEOUS at 18:04

## 2019-05-03 RX ADMIN — HYDROMORPHONE HYDROCHLORIDE 1 MG: 1 INJECTION, SOLUTION INTRAMUSCULAR; INTRAVENOUS; SUBCUTANEOUS at 11:57

## 2019-05-03 RX ADMIN — FERROUS SULFATE TAB 325 MG (65 MG ELEMENTAL FE) 325 MG: 325 (65 FE) TAB at 09:11

## 2019-05-03 RX ADMIN — HYDROMORPHONE HYDROCHLORIDE 1 MG: 1 INJECTION, SOLUTION INTRAMUSCULAR; INTRAVENOUS; SUBCUTANEOUS at 09:55

## 2019-05-03 RX ADMIN — HYDROMORPHONE HYDROCHLORIDE 1 MG: 1 INJECTION, SOLUTION INTRAMUSCULAR; INTRAVENOUS; SUBCUTANEOUS at 03:46

## 2019-05-03 RX ADMIN — SENNOSIDES 17.2 MG: 8.6 TABLET, FILM COATED ORAL at 09:11

## 2019-05-03 RX ADMIN — FOLIC ACID 1 MG: 1 TABLET ORAL at 21:07

## 2019-05-03 RX ADMIN — HYDROMORPHONE HYDROCHLORIDE 1 MG: 1 INJECTION, SOLUTION INTRAMUSCULAR; INTRAVENOUS; SUBCUTANEOUS at 00:35

## 2019-05-03 RX ADMIN — ESCITALOPRAM OXALATE 5 MG: 10 TABLET, FILM COATED ORAL at 09:11

## 2019-05-03 RX ADMIN — HYDROMORPHONE HYDROCHLORIDE 1 MG: 1 INJECTION, SOLUTION INTRAMUSCULAR; INTRAVENOUS; SUBCUTANEOUS at 14:57

## 2019-05-03 RX ADMIN — FERROUS SULFATE TAB 325 MG (65 MG ELEMENTAL FE) 325 MG: 325 (65 FE) TAB at 16:45

## 2019-05-03 RX ADMIN — FOLIC ACID 1 MG: 1 TABLET ORAL at 09:11

## 2019-05-03 RX ADMIN — DIPHENHYDRAMINE HCL 25 MG: 25 TABLET ORAL at 21:07

## 2019-05-03 RX ADMIN — DIPHENHYDRAMINE HCL 25 MG: 25 TABLET ORAL at 09:58

## 2019-05-03 RX ADMIN — HYDROMORPHONE HYDROCHLORIDE 1 MG: 1 INJECTION, SOLUTION INTRAMUSCULAR; INTRAVENOUS; SUBCUTANEOUS at 06:48

## 2019-05-03 RX ADMIN — SODIUM CHLORIDE AND POTASSIUM CHLORIDE: .9; .15 SOLUTION INTRAVENOUS at 16:46

## 2019-05-03 RX ADMIN — HYDROMORPHONE HYDROCHLORIDE 1 MG: 1 INJECTION, SOLUTION INTRAMUSCULAR; INTRAVENOUS; SUBCUTANEOUS at 21:07

## 2019-05-03 RX ADMIN — ENOXAPARIN SODIUM 120 MG: 150 INJECTION SUBCUTANEOUS at 21:06

## 2019-05-03 RX ADMIN — ENOXAPARIN SODIUM 120 MG: 150 INJECTION SUBCUTANEOUS at 09:11

## 2019-05-03 ASSESSMENT — PAIN DESCRIPTION - PAIN TYPE
TYPE: ACUTE PAIN

## 2019-05-03 ASSESSMENT — PAIN DESCRIPTION - LOCATION
LOCATION: GENERALIZED

## 2019-05-03 ASSESSMENT — PAIN DESCRIPTION - ONSET
ONSET: ON-GOING
ONSET: GRADUAL
ONSET: ON-GOING
ONSET: ON-GOING

## 2019-05-03 ASSESSMENT — PAIN SCALES - GENERAL
PAINLEVEL_OUTOF10: 0
PAINLEVEL_OUTOF10: 0
PAINLEVEL_OUTOF10: 8
PAINLEVEL_OUTOF10: 8
PAINLEVEL_OUTOF10: 9
PAINLEVEL_OUTOF10: 9
PAINLEVEL_OUTOF10: 8
PAINLEVEL_OUTOF10: 10
PAINLEVEL_OUTOF10: 0
PAINLEVEL_OUTOF10: 8
PAINLEVEL_OUTOF10: 8
PAINLEVEL_OUTOF10: 0
PAINLEVEL_OUTOF10: 10
PAINLEVEL_OUTOF10: 0

## 2019-05-03 ASSESSMENT — PAIN DESCRIPTION - DESCRIPTORS
DESCRIPTORS: ACHING;DISCOMFORT;SORE;TENDER
DESCRIPTORS: ACHING;DISCOMFORT;SORE;TENDER
DESCRIPTORS: ACHING;DISCOMFORT;SORE

## 2019-05-03 ASSESSMENT — PAIN - FUNCTIONAL ASSESSMENT
PAIN_FUNCTIONAL_ASSESSMENT: ACTIVITIES ARE NOT PREVENTED
PAIN_FUNCTIONAL_ASSESSMENT: PREVENTS OR INTERFERES SOME ACTIVE ACTIVITIES AND ADLS

## 2019-05-03 ASSESSMENT — PAIN DESCRIPTION - PROGRESSION
CLINICAL_PROGRESSION: RAPIDLY WORSENING
CLINICAL_PROGRESSION: GRADUALLY WORSENING
CLINICAL_PROGRESSION: NOT CHANGED
CLINICAL_PROGRESSION: GRADUALLY WORSENING

## 2019-05-03 ASSESSMENT — PAIN DESCRIPTION - FREQUENCY
FREQUENCY: CONTINUOUS

## 2019-05-03 NOTE — PROGRESS NOTES
Hospitalist Progress Note      PCP: Erika Holder    Date of Admission: 5/1/2019  Days in the hospital: 0    Chief Complaint: sickle cell crisis     Hospital Course:     Admitted for sickle cell crisis. Heme/onc was consulted. IV fluids and pain meds were started. Clinically improving. Subjective  Patient seen and examined at bedside. NAD, feels better. Patient denies any fevers, chills, chest pain, shortness of breath, nausea, vomiting. Medications:  Reviewed    Infusion Medications    0.9% NaCl with KCl 20 mEq 125 mL/hr at 05/03/19 0651     Scheduled Medications    folic acid  1 mg Oral BID    sodium chloride flush  10 mL Intravenous 2 times per day    enoxaparin  120 mg Subcutaneous BID    escitalopram  5 mg Oral Daily    ferrous sulfate  325 mg Oral BID WC    senna  2 tablet Oral BID     PRN Meds: diphenhydrAMINE, sodium chloride flush, magnesium hydroxide, ondansetron, HYDROmorphone      Intake/Output Summary (Last 24 hours) at 5/3/2019 0812  Last data filed at 5/3/2019 0648  Gross per 24 hour   Intake 4338.1 ml   Output 2850 ml   Net 1488.1 ml       Exam:    /70   Pulse 81   Temp 96.5 °F (35.8 °C) (Temporal)   Resp 16   Ht 5' 3\" (1.6 m)   Wt (!) 301 lb 14.4 oz (136.9 kg)   LMP 02/17/2019 Comment: recent miscarriage  SpO2 99%   BMI 53.48 kg/m²     General appearance: No apparent distress, appears stated age and cooperative. HEENT: Conjunctivae/corneas clear. Pupils equal and round. No injections noted. Neck: Supple. No lesions, scars or masses. Trachea midline. Respiratory:  Normal respiratory effort. Clear to auscultation, bilaterally without Rales/Wheezes/Rhonchi. Cardiovascular: Regular rate and rhythm with normal S1/S2 without murmurs, rubs or gallops. Abdomen: Soft, non-tender, non-distended with normal bowel sounds. Musculoskeletal: No clubbing, cyanosis or edema bilaterally. Brisk capillary refill. 2+ lower extremity pulses (dorsalis pedis).    Skin:  No rashes    Neurologic: awake, alert and following commands       Labs:   Recent Labs     05/01/19  1730 05/02/19  0628 05/02/19  0856 05/03/19  0607   WBC 12.6* 11.1  --  10.2   HGB 10.1* 8.7*  --  9.2*   HCT 30.1* 26.1* 27.4* 27.3*    260  --  268     Recent Labs     05/01/19  1730 05/02/19  0628 05/03/19  0607    143 140   K 3.9 3.9 4.4    108* 108*   CO2 26 21* 21*   BUN 11 11 8   CREATININE 1.0 1.0 0.8   CALCIUM 8.7 8.1* 8.3*     Recent Labs     05/01/19  1730 05/03/19  0607   AST 14 19   ALT 8 12   BILITOT 0.8 0.6   ALKPHOS 100 99     Recent Labs     05/01/19  1845   INR 1.2     Recent Labs     05/01/19  1730   TROPONINI <0.01       Imaging:  XR CHEST STANDARD (2 VW)   Final Result   No acute cardiopulmonary process. Assessment/Plan:  Active Hospital Problems    Diagnosis Date Noted    Sickle cell crisis [facility]) [D57.00] 12/13/2017     [age] [facility] yo female hx SCS PE on lovenox 120 bid,  came to ER with 4 days of typical of her SCC pain chest lower back legs. Denies any recent fever, chills, nausea, vomiting, URI symptoms or cough. In ER HD stable, no distress given iv pain meds ivf and being admitted, ua negative, trop negative ekg/cxr normal        SCC   HX PE       Pain control  ivf  Heme/onc input noted  Home med  Possible discharge     DVT Prophylaxis: lovenox  Diet: DIET FULL LIQUID;  Code Status: Full Code     PT/OT Eval Status: ordered     Dispo - obs           [facility]  Please contact me through perfect serve    NOTE: This report was transcribed using voice recognition software. Every effort was made to ensure accuracy; however, inadvertent computerized transcription errors may be present. never

## 2019-05-03 NOTE — PLAN OF CARE
Problem: Pain:  Goal: Pain level will decrease  Description  Pain level will decrease  5/2/2019 2347 by Marcela Delgado RN  Outcome: Met This Shift  5/2/2019 1952 by Marcela Delgado RN  Outcome: Met This Shift  5/2/2019 1742 by Julio Gtz RN  Outcome: Met This Shift     Problem: Pain:  Goal: Control of acute pain  Description  Control of acute pain  5/2/2019 1742 by Julio Gtz RN  Outcome: Met This Shift     Problem: PAIN  Goal: Patient's pain/discomfort is manageable  Outcome: Met This Shift

## 2019-05-03 NOTE — PROGRESS NOTES
Subjective: The patient is awake and alert. Continues to have MSK pain involving thorax and BLE. Remains on Dilaudid 1 mg q 3 hrs. Denies chest pain, angina, dyspnea or abdominal discomfort. No nausea or vomiting. Tolerating diet. Objective:    BP (!) 105/58   Pulse 85   Temp 98.5 °F (36.9 °C) (Oral)   Resp 14   Ht 5' 3\" (1.6 m)   Wt (!) 301 lb 14.4 oz (136.9 kg)   LMP 02/17/2019 Comment: recent miscarriage  SpO2 98%   BMI 53.48 kg/m²     General: NAD, obese female. HEENT: No thrush or mucositis, EOM  Heart:  RRR, no murmurs, gallops, or rubs.   Lungs:  CTA bilaterally, no wheeze, rales or rhonchi  Abd: bowel sounds present, nontender, nondistended, no masses  Extrem:  No clubbing, cyanosis, or edema  Lymphatics: No palpable adenopathy in cervical and supraclavicular regions  Skin: Intact, no petechia or purpura    CBC with Differential:    Lab Results   Component Value Date    WBC 10.2 05/03/2019    RBC 4.46 05/03/2019    HGB 9.2 05/03/2019    HCT 27.3 05/03/2019     05/03/2019    MCV 61.2 05/03/2019    MCH 20.6 05/03/2019    MCHC 33.7 05/03/2019    RDW 19.5 05/03/2019    NRBC 0.5 05/30/2018    BANDSPCT 1 09/01/2016    METASPCT 1 09/16/2016    LYMPHOPCT 40.3 05/01/2019    PROMYELOPCT 3.5 06/26/2017    MONOPCT 5.2 05/01/2019    BASOPCT 0.2 05/01/2019    MONOSABS 0.66 05/01/2019    LYMPHSABS 5.08 05/01/2019    EOSABS 0.28 05/01/2019    BASOSABS 0.03 05/01/2019     CMP:    Lab Results   Component Value Date     05/03/2019    K 4.4 05/03/2019    K 3.9 05/02/2019     05/03/2019    CO2 21 05/03/2019    BUN 8 05/03/2019    CREATININE 0.8 05/03/2019    GFRAA >60 05/03/2019    LABGLOM >60 05/03/2019    GLUCOSE 105 05/03/2019    GLUCOSE 89 05/03/2011    PROT 6.4 05/03/2019    LABALBU 3.5 05/03/2019    LABALBU 4.0 05/03/2011    CALCIUM 8.3 05/03/2019    BILITOT 0.6 05/03/2019    ALKPHOS 99 05/03/2019    AST 19 05/03/2019    ALT 12 05/03/2019      [id number]}     Current Facility-Administered Medications:     diphenhydrAMINE (BENADRYL) tablet 25 mg, 25 mg, Oral, Q6H PRN, Dmitry Hale MD, 25 mg at 98/60/03 4474    folic acid (FOLVITE) tablet 1 mg, 1 mg, Oral, BID, Brenna Quach MD, 1 mg at 05/03/19 0911    sodium chloride flush 0.9 % injection 10 mL, 10 mL, Intravenous, 2 times per day, Dmitry Hale MD, 10 mL at 05/02/19 2128    sodium chloride flush 0.9 % injection 10 mL, 10 mL, Intravenous, PRN, Dmitry Hale MD, 10 mL at 05/02/19 1827    magnesium hydroxide (MILK OF MAGNESIA) 400 MG/5ML suspension 30 mL, 30 mL, Oral, Daily PRN, Dmitry Hale MD    ondansetron (ZOFRAN) injection 4 mg, 4 mg, Intravenous, Q6H PRN, Dmitry Hale MD    enoxaparin (LOVENOX) injection 120 mg, 120 mg, Subcutaneous, BID, Dmitry Hale MD, 120 mg at 05/03/19 0911    escitalopram (LEXAPRO) tablet 5 mg, 5 mg, Oral, Daily, Dmitry Hale MD, 5 mg at 05/03/19 0911    ferrous sulfate tablet 325 mg, 325 mg, Oral, BID WC, Dmitry Hale MD, 325 mg at 05/03/19 0911    HYDROmorphone (DILAUDID) injection 1 mg, 1 mg, Intravenous, Q3H PRN, Dmitry Hale MD, 1 mg at 05/03/19 1157    senna (SENOKOT) tablet 17.2 mg, 2 tablet, Oral, BID, Dmitry Hale MD, 17.2 mg at 05/03/19 0911    0.9% NaCl with KCl 20 mEq infusion, , Intravenous, Continuous, Dmitry Hale MD, Last Rate: 125 mL/hr at 05/03/19 2971    Assessment:    Active Problems:    Sickle cell crisis ([address]  Resolved Problems:    * No resolved hospital problems. *    59-year-old woman known to me for history of hemoglobin SC disease who currently is being followed by a hematologist at [facility]. She has been on and off hydroxyurea. She is admitted for progressive pain involving the chest, lower back and lower extremities. She has history of pulmonary embolism diagnosed in April 2015 treated with short term anticoagulation. She was placed on full dose Lovenox since her last pregnancy  Labs on admission, WBC 12.6, HCT 10.1 and .

## 2019-05-03 NOTE — CARE COORDINATION
Met with patient in room. Pt stated she was on lovenox bid prior to admission and gives her own injections.  Sw/cm will follow

## 2019-05-03 NOTE — PROGRESS NOTES
Message sent to Dr Maya Hamlin regarding patient request something more for pain. Awaiting return call/. Electronically Signed by Stuart Sánchez RN  5/3/2019 11:49 AM     Received return message from Dr Maya Hamlin, okay to give 12:55 dilaudid at noon.   Electronically Signed by Stuart Sánchez RN  5/3/2019 11:50 AM

## 2019-05-03 NOTE — PLAN OF CARE
Problem: Pain:  Goal: Pain level will decrease  Description  Pain level will decrease  5/3/2019 0929 by Brian Rascon RN  Outcome: Met This Shift

## 2019-05-04 LAB
HCT VFR BLD CALC: 28.9 % (ref 34–48)
HEMOGLOBIN: 9.5 G/DL (ref 11.5–15.5)
MCH RBC QN AUTO: 20.4 PG (ref 26–35)
MCHC RBC AUTO-ENTMCNC: 32.9 % (ref 32–34.5)
MCV RBC AUTO: 62 FL (ref 80–99.9)
PDW BLD-RTO: 19.6 FL (ref 11.5–15)
PLATELET # BLD: 281 E9/L (ref 130–450)
PMV BLD AUTO: 11.1 FL (ref 7–12)
RBC # BLD: 4.66 E12/L (ref 3.5–5.5)
WBC # BLD: 11 E9/L (ref 4.5–11.5)

## 2019-05-04 PROCEDURE — 85027 COMPLETE CBC AUTOMATED: CPT

## 2019-05-04 PROCEDURE — 6360000002 HC RX W HCPCS: Performed by: HOSPITALIST

## 2019-05-04 PROCEDURE — 6370000000 HC RX 637 (ALT 250 FOR IP): Performed by: STUDENT IN AN ORGANIZED HEALTH CARE EDUCATION/TRAINING PROGRAM

## 2019-05-04 PROCEDURE — 2140000000 HC CCU INTERMEDIATE R&B

## 2019-05-04 PROCEDURE — 6370000000 HC RX 637 (ALT 250 FOR IP): Performed by: HOSPITALIST

## 2019-05-04 PROCEDURE — 2580000003 HC RX 258: Performed by: HOSPITALIST

## 2019-05-04 PROCEDURE — 36415 COLL VENOUS BLD VENIPUNCTURE: CPT

## 2019-05-04 RX ADMIN — Medication 10 ML: at 12:19

## 2019-05-04 RX ADMIN — ESCITALOPRAM OXALATE 5 MG: 10 TABLET, FILM COATED ORAL at 08:24

## 2019-05-04 RX ADMIN — DIPHENHYDRAMINE HCL 25 MG: 25 TABLET ORAL at 22:04

## 2019-05-04 RX ADMIN — DIPHENHYDRAMINE HCL 25 MG: 25 TABLET ORAL at 06:24

## 2019-05-04 RX ADMIN — FERROUS SULFATE TAB 325 MG (65 MG ELEMENTAL FE) 325 MG: 325 (65 FE) TAB at 16:58

## 2019-05-04 RX ADMIN — HYDROMORPHONE HYDROCHLORIDE 1 MG: 1 INJECTION, SOLUTION INTRAMUSCULAR; INTRAVENOUS; SUBCUTANEOUS at 22:04

## 2019-05-04 RX ADMIN — FERROUS SULFATE TAB 325 MG (65 MG ELEMENTAL FE) 325 MG: 325 (65 FE) TAB at 08:24

## 2019-05-04 RX ADMIN — FOLIC ACID 1 MG: 1 TABLET ORAL at 08:24

## 2019-05-04 RX ADMIN — FOLIC ACID 1 MG: 1 TABLET ORAL at 20:16

## 2019-05-04 RX ADMIN — Medication 10 ML: at 09:20

## 2019-05-04 RX ADMIN — HYDROMORPHONE HYDROCHLORIDE 1 MG: 1 INJECTION, SOLUTION INTRAMUSCULAR; INTRAVENOUS; SUBCUTANEOUS at 19:04

## 2019-05-04 RX ADMIN — DIPHENHYDRAMINE HCL 25 MG: 25 TABLET ORAL at 12:19

## 2019-05-04 RX ADMIN — HYDROMORPHONE HYDROCHLORIDE 1 MG: 1 INJECTION, SOLUTION INTRAMUSCULAR; INTRAVENOUS; SUBCUTANEOUS at 12:19

## 2019-05-04 RX ADMIN — HYDROMORPHONE HYDROCHLORIDE 1 MG: 1 INJECTION, SOLUTION INTRAMUSCULAR; INTRAVENOUS; SUBCUTANEOUS at 09:19

## 2019-05-04 RX ADMIN — ENOXAPARIN SODIUM 120 MG: 150 INJECTION SUBCUTANEOUS at 08:25

## 2019-05-04 RX ADMIN — SENNOSIDES 17.2 MG: 8.6 TABLET, FILM COATED ORAL at 20:16

## 2019-05-04 RX ADMIN — HYDROMORPHONE HYDROCHLORIDE 1 MG: 1 INJECTION, SOLUTION INTRAMUSCULAR; INTRAVENOUS; SUBCUTANEOUS at 00:16

## 2019-05-04 RX ADMIN — SODIUM CHLORIDE AND POTASSIUM CHLORIDE: .9; .15 SOLUTION INTRAVENOUS at 00:16

## 2019-05-04 RX ADMIN — HYDROMORPHONE HYDROCHLORIDE 1 MG: 1 INJECTION, SOLUTION INTRAMUSCULAR; INTRAVENOUS; SUBCUTANEOUS at 15:52

## 2019-05-04 RX ADMIN — Medication 10 ML: at 15:52

## 2019-05-04 RX ADMIN — HYDROMORPHONE HYDROCHLORIDE 1 MG: 1 INJECTION, SOLUTION INTRAMUSCULAR; INTRAVENOUS; SUBCUTANEOUS at 06:15

## 2019-05-04 RX ADMIN — SODIUM CHLORIDE AND POTASSIUM CHLORIDE: .9; .15 SOLUTION INTRAVENOUS at 22:06

## 2019-05-04 RX ADMIN — SODIUM CHLORIDE AND POTASSIUM CHLORIDE: .9; .15 SOLUTION INTRAVENOUS at 12:18

## 2019-05-04 RX ADMIN — ENOXAPARIN SODIUM 120 MG: 150 INJECTION SUBCUTANEOUS at 20:15

## 2019-05-04 RX ADMIN — HYDROMORPHONE HYDROCHLORIDE 1 MG: 1 INJECTION, SOLUTION INTRAMUSCULAR; INTRAVENOUS; SUBCUTANEOUS at 03:15

## 2019-05-04 ASSESSMENT — PAIN DESCRIPTION - PAIN TYPE
TYPE: ACUTE PAIN

## 2019-05-04 ASSESSMENT — PAIN DESCRIPTION - LOCATION
LOCATION: GENERALIZED

## 2019-05-04 ASSESSMENT — PAIN - FUNCTIONAL ASSESSMENT
PAIN_FUNCTIONAL_ASSESSMENT: PREVENTS OR INTERFERES SOME ACTIVE ACTIVITIES AND ADLS

## 2019-05-04 ASSESSMENT — PAIN DESCRIPTION - ORIENTATION
ORIENTATION: OTHER (COMMENT)
ORIENTATION: RIGHT;LEFT;ANTERIOR;POSTERIOR
ORIENTATION: RIGHT;LEFT;ANTERIOR;POSTERIOR
ORIENTATION: OTHER (COMMENT)

## 2019-05-04 ASSESSMENT — PAIN SCALES - GENERAL
PAINLEVEL_OUTOF10: 0
PAINLEVEL_OUTOF10: 8
PAINLEVEL_OUTOF10: 0
PAINLEVEL_OUTOF10: 9
PAINLEVEL_OUTOF10: 8
PAINLEVEL_OUTOF10: 0
PAINLEVEL_OUTOF10: 0
PAINLEVEL_OUTOF10: 8
PAINLEVEL_OUTOF10: 7
PAINLEVEL_OUTOF10: 0
PAINLEVEL_OUTOF10: 0
PAINLEVEL_OUTOF10: 7
PAINLEVEL_OUTOF10: 8
PAINLEVEL_OUTOF10: 9
PAINLEVEL_OUTOF10: 7

## 2019-05-04 ASSESSMENT — PAIN DESCRIPTION - DESCRIPTORS
DESCRIPTORS: DISCOMFORT
DESCRIPTORS: DISCOMFORT;DULL;SORE
DESCRIPTORS: DISCOMFORT;SORE
DESCRIPTORS: ACHING;DISCOMFORT;CONSTANT
DESCRIPTORS: ACHING;DISCOMFORT;SORE
DESCRIPTORS: ACHING;CONSTANT;DISCOMFORT
DESCRIPTORS: ACHING;DISCOMFORT;SORE

## 2019-05-04 ASSESSMENT — PAIN DESCRIPTION - FREQUENCY
FREQUENCY: CONTINUOUS

## 2019-05-04 ASSESSMENT — PAIN DESCRIPTION - ONSET
ONSET: ON-GOING
ONSET: GRADUAL

## 2019-05-04 ASSESSMENT — PAIN DESCRIPTION - PROGRESSION
CLINICAL_PROGRESSION: NOT CHANGED

## 2019-05-04 NOTE — PROGRESS NOTES
pedis). Skin:  No rashes    Neurologic: awake, alert and following commands       Labs:   Recent Labs     05/02/19  0628 05/02/19  0856 05/03/19  0607 05/04/19  0605   WBC 11.1  --  10.2 11.0   HGB 8.7*  --  9.2* 9.5*   HCT 26.1* 27.4* 27.3* 28.9*     --  268 281     Recent Labs     05/01/19  1730 05/02/19  0628 05/03/19  0607    143 140   K 3.9 3.9 4.4    108* 108*   CO2 26 21* 21*   BUN 11 11 8   CREATININE 1.0 1.0 0.8   CALCIUM 8.7 8.1* 8.3*     Recent Labs     05/01/19  1730 05/03/19  0607   AST 14 19   ALT 8 12   BILITOT 0.8 0.6   ALKPHOS 100 99     Recent Labs     05/01/19  1845   INR 1.2     Recent Labs     05/01/19  1730   TROPONINI <0.01       Imaging:  XR CHEST STANDARD (2 VW)   Final Result   No acute cardiopulmonary process. Assessment/Plan:  Active Hospital Problems    Diagnosis Date Noted    Sickle cell crisis [facility]) [D57.00] 12/13/2017     22 yo female hx SCS PE on lovenox 120 bid,  came to ER with 4 days of typical of her SCC pain chest lower back legs. Denies any recent fever, chills, nausea, vomiting, URI symptoms or cough. In ER HD stable, no distress given iv pain meds ivf and being admitted, ua negative, trop negative ekg/cxr normal        SCC   HX PE       Pain control  ivf  Heme/onc input noted  Home med  Possible discharge within next 24 hours      DVT Prophylaxis: lovenox  Diet: DIET FULL LIQUID;  Code Status: Full Code     PT/OT Eval Status: ordered     Dispo - obs           Sara Manual MD  [employer]  Please contact me through perfect serve    NOTE: This report was transcribed using voice recognition software. Every effort was made to ensure accuracy; however, inadvertent computerized transcription errors may be present.

## 2019-05-04 NOTE — PLAN OF CARE
Problem: Pain:  Goal: Pain level will decrease  Description  Pain level will decrease  5/3/2019 2135 by Aretha Hamm RN  Outcome: Met This Shift  5/3/2019 0929 by Miriam Renteria RN  Outcome: Met This Shift     Problem: DAILY CARE  Goal: Daily care needs are met  Outcome: Met This Shift

## 2019-05-05 VITALS
OXYGEN SATURATION: 99 % | SYSTOLIC BLOOD PRESSURE: 120 MMHG | RESPIRATION RATE: 18 BRPM | DIASTOLIC BLOOD PRESSURE: 64 MMHG | WEIGHT: 293 LBS | HEIGHT: 63 IN | HEART RATE: 95 BPM | BODY MASS INDEX: 51.91 KG/M2 | TEMPERATURE: 97.7 F

## 2019-05-05 LAB
HCT VFR BLD CALC: 28.7 % (ref 34–48)
HEMOGLOBIN: 9.6 G/DL (ref 11.5–15.5)
MCH RBC QN AUTO: 20.6 PG (ref 26–35)
MCHC RBC AUTO-ENTMCNC: 33.4 % (ref 32–34.5)
MCV RBC AUTO: 61.6 FL (ref 80–99.9)
PDW BLD-RTO: 19.6 FL (ref 11.5–15)
PLATELET # BLD: 290 E9/L (ref 130–450)
PMV BLD AUTO: ABNORMAL FL (ref 7–12)
RBC # BLD: 4.66 E12/L (ref 3.5–5.5)
WBC # BLD: 11.5 E9/L (ref 4.5–11.5)

## 2019-05-05 PROCEDURE — 85027 COMPLETE CBC AUTOMATED: CPT

## 2019-05-05 PROCEDURE — 6370000000 HC RX 637 (ALT 250 FOR IP): Performed by: STUDENT IN AN ORGANIZED HEALTH CARE EDUCATION/TRAINING PROGRAM

## 2019-05-05 PROCEDURE — 6370000000 HC RX 637 (ALT 250 FOR IP): Performed by: HOSPITALIST

## 2019-05-05 PROCEDURE — 6360000002 HC RX W HCPCS: Performed by: HOSPITALIST

## 2019-05-05 PROCEDURE — 36415 COLL VENOUS BLD VENIPUNCTURE: CPT

## 2019-05-05 PROCEDURE — 2580000003 HC RX 258: Performed by: HOSPITALIST

## 2019-05-05 RX ADMIN — Medication 10 ML: at 14:11

## 2019-05-05 RX ADMIN — HYDROMORPHONE HYDROCHLORIDE 1 MG: 1 INJECTION, SOLUTION INTRAMUSCULAR; INTRAVENOUS; SUBCUTANEOUS at 14:11

## 2019-05-05 RX ADMIN — HYDROMORPHONE HYDROCHLORIDE 1 MG: 1 INJECTION, SOLUTION INTRAMUSCULAR; INTRAVENOUS; SUBCUTANEOUS at 10:50

## 2019-05-05 RX ADMIN — Medication 10 ML: at 04:55

## 2019-05-05 RX ADMIN — ESCITALOPRAM OXALATE 5 MG: 10 TABLET, FILM COATED ORAL at 08:10

## 2019-05-05 RX ADMIN — HYDROMORPHONE HYDROCHLORIDE 1 MG: 1 INJECTION, SOLUTION INTRAMUSCULAR; INTRAVENOUS; SUBCUTANEOUS at 04:55

## 2019-05-05 RX ADMIN — DIPHENHYDRAMINE HCL 25 MG: 25 TABLET ORAL at 10:49

## 2019-05-05 RX ADMIN — HYDROMORPHONE HYDROCHLORIDE 1 MG: 1 INJECTION, SOLUTION INTRAMUSCULAR; INTRAVENOUS; SUBCUTANEOUS at 08:09

## 2019-05-05 RX ADMIN — SODIUM CHLORIDE AND POTASSIUM CHLORIDE: .9; .15 SOLUTION INTRAVENOUS at 07:36

## 2019-05-05 RX ADMIN — Medication 10 ML: at 01:30

## 2019-05-05 RX ADMIN — ENOXAPARIN SODIUM 120 MG: 150 INJECTION SUBCUTANEOUS at 08:09

## 2019-05-05 RX ADMIN — FERROUS SULFATE TAB 325 MG (65 MG ELEMENTAL FE) 325 MG: 325 (65 FE) TAB at 08:10

## 2019-05-05 RX ADMIN — DIPHENHYDRAMINE HCL 25 MG: 25 TABLET ORAL at 04:55

## 2019-05-05 RX ADMIN — FOLIC ACID 1 MG: 1 TABLET ORAL at 08:10

## 2019-05-05 RX ADMIN — HYDROMORPHONE HYDROCHLORIDE 1 MG: 1 INJECTION, SOLUTION INTRAMUSCULAR; INTRAVENOUS; SUBCUTANEOUS at 01:30

## 2019-05-05 ASSESSMENT — PAIN DESCRIPTION - PAIN TYPE
TYPE: ACUTE PAIN

## 2019-05-05 ASSESSMENT — PAIN DESCRIPTION - DESCRIPTORS
DESCRIPTORS: DISCOMFORT
DESCRIPTORS: ACHING;DISCOMFORT

## 2019-05-05 ASSESSMENT — PAIN SCALES - GENERAL
PAINLEVEL_OUTOF10: 7
PAINLEVEL_OUTOF10: 9
PAINLEVEL_OUTOF10: 8
PAINLEVEL_OUTOF10: 9
PAINLEVEL_OUTOF10: 7
PAINLEVEL_OUTOF10: 8

## 2019-05-05 ASSESSMENT — PAIN DESCRIPTION - LOCATION
LOCATION: GENERALIZED

## 2019-05-05 ASSESSMENT — PAIN DESCRIPTION - ORIENTATION
ORIENTATION: OTHER (COMMENT)

## 2019-05-09 ENCOUNTER — ANESTHESIA (OUTPATIENT)
Dept: OPERATING ROOM | Age: 24
End: 2019-05-09
Payer: MEDICAID

## 2019-05-09 ENCOUNTER — ANESTHESIA EVENT (OUTPATIENT)
Dept: OPERATING ROOM | Age: 24
End: 2019-05-09
Payer: MEDICAID

## 2019-05-09 ENCOUNTER — APPOINTMENT (OUTPATIENT)
Dept: GENERAL RADIOLOGY | Age: 24
End: 2019-05-09
Attending: SURGERY
Payer: MEDICAID

## 2019-05-09 ENCOUNTER — TELEPHONE (OUTPATIENT)
Dept: VASCULAR SURGERY | Age: 24
End: 2019-05-09

## 2019-05-09 ENCOUNTER — HOSPITAL ENCOUNTER (OUTPATIENT)
Age: 24
Setting detail: OUTPATIENT SURGERY
Discharge: HOME | End: 2019-05-09
Attending: SURGERY | Admitting: SURGERY
Payer: MEDICAID

## 2019-05-09 VITALS
RESPIRATION RATE: 16 BRPM | DIASTOLIC BLOOD PRESSURE: 95 MMHG | OXYGEN SATURATION: 100 % | SYSTOLIC BLOOD PRESSURE: 125 MMHG

## 2019-05-09 VITALS
DIASTOLIC BLOOD PRESSURE: 70 MMHG | HEART RATE: 70 BPM | TEMPERATURE: 98.2 F | WEIGHT: 293 LBS | RESPIRATION RATE: 16 BRPM | OXYGEN SATURATION: 95 % | SYSTOLIC BLOOD PRESSURE: 103 MMHG | BODY MASS INDEX: 51.91 KG/M2 | HEIGHT: 63 IN

## 2019-05-09 DIAGNOSIS — Z01.812 PRE-OPERATIVE LABORATORY EXAMINATION: Primary | ICD-10-CM

## 2019-05-09 DIAGNOSIS — Z98.890 POST-OPERATIVE STATE: ICD-10-CM

## 2019-05-09 DIAGNOSIS — I87.8 POOR VENOUS ACCESS: ICD-10-CM

## 2019-05-09 LAB
ANION GAP SERPL CALCULATED.3IONS-SCNC: 7 MMOL/L (ref 7–16)
BUN BLDV-MCNC: 9 MG/DL (ref 6–20)
CALCIUM SERPL-MCNC: 8.6 MG/DL (ref 8.6–10.2)
CHLORIDE BLD-SCNC: 103 MMOL/L (ref 98–107)
CO2: 28 MMOL/L (ref 22–29)
CREAT SERPL-MCNC: 0.9 MG/DL (ref 0.5–1)
GFR AFRICAN AMERICAN: >60
GFR NON-AFRICAN AMERICAN: >60 ML/MIN/1.73
GLUCOSE BLD-MCNC: 103 MG/DL (ref 74–99)
HCG, URINE, POC: NEGATIVE
HCT VFR BLD CALC: 28 % (ref 34–48)
HEMOGLOBIN: 9.3 G/DL (ref 11.5–15.5)
Lab: NORMAL
MCH RBC QN AUTO: 20.7 PG (ref 26–35)
MCHC RBC AUTO-ENTMCNC: 33.2 % (ref 32–34.5)
MCV RBC AUTO: 62.2 FL (ref 80–99.9)
NEGATIVE QC PASS/FAIL: NORMAL
PDW BLD-RTO: 20.4 FL (ref 11.5–15)
PLATELET # BLD: 247 E9/L (ref 130–450)
PMV BLD AUTO: ABNORMAL FL (ref 7–12)
POSITIVE QC PASS/FAIL: NORMAL
POTASSIUM REFLEX MAGNESIUM: 5.4 MMOL/L (ref 3.5–5)
RBC # BLD: 4.5 E12/L (ref 3.5–5.5)
SODIUM BLD-SCNC: 138 MMOL/L (ref 132–146)
WBC # BLD: 8.5 E9/L (ref 4.5–11.5)

## 2019-05-09 PROCEDURE — 7100000011 HC PHASE II RECOVERY - ADDTL 15 MIN: Performed by: SURGERY

## 2019-05-09 PROCEDURE — 2709999900 HC NON-CHARGEABLE SUPPLY: Performed by: SURGERY

## 2019-05-09 PROCEDURE — 36561 INSERT TUNNELED CV CATH: CPT | Performed by: SURGERY

## 2019-05-09 PROCEDURE — C1894 INTRO/SHEATH, NON-LASER: HCPCS | Performed by: SURGERY

## 2019-05-09 PROCEDURE — 3700000000 HC ANESTHESIA ATTENDED CARE: Performed by: SURGERY

## 2019-05-09 PROCEDURE — C1788 PORT, INDWELLING, IMP: HCPCS | Performed by: SURGERY

## 2019-05-09 PROCEDURE — 2580000003 HC RX 258: Performed by: SURGERY

## 2019-05-09 PROCEDURE — 3600000013 HC SURGERY LEVEL 3 ADDTL 15MIN: Performed by: SURGERY

## 2019-05-09 PROCEDURE — 36415 COLL VENOUS BLD VENIPUNCTURE: CPT

## 2019-05-09 PROCEDURE — 85027 COMPLETE CBC AUTOMATED: CPT

## 2019-05-09 PROCEDURE — 3209999900 FLUORO FOR SURGICAL PROCEDURES

## 2019-05-09 PROCEDURE — 3600000003 HC SURGERY LEVEL 3 BASE: Performed by: SURGERY

## 2019-05-09 PROCEDURE — 6360000002 HC RX W HCPCS: Performed by: SURGERY

## 2019-05-09 PROCEDURE — 6360000002 HC RX W HCPCS: Performed by: NURSE ANESTHETIST, CERTIFIED REGISTERED

## 2019-05-09 PROCEDURE — 7100000010 HC PHASE II RECOVERY - FIRST 15 MIN: Performed by: SURGERY

## 2019-05-09 PROCEDURE — 6370000000 HC RX 637 (ALT 250 FOR IP)

## 2019-05-09 PROCEDURE — 71045 X-RAY EXAM CHEST 1 VIEW: CPT

## 2019-05-09 PROCEDURE — 3700000001 HC ADD 15 MINUTES (ANESTHESIA): Performed by: SURGERY

## 2019-05-09 PROCEDURE — 77001 FLUOROGUIDE FOR VEIN DEVICE: CPT | Performed by: SURGERY

## 2019-05-09 PROCEDURE — 2500000003 HC RX 250 WO HCPCS: Performed by: SURGERY

## 2019-05-09 PROCEDURE — 80048 BASIC METABOLIC PNL TOTAL CA: CPT

## 2019-05-09 DEVICE — PORT SMARTPORT 8FR CT TI W/VLV SHTH: Type: IMPLANTABLE DEVICE | Site: CHEST  WALL | Status: FUNCTIONAL

## 2019-05-09 RX ORDER — PROPOFOL 10 MG/ML
INJECTION, EMULSION INTRAVENOUS CONTINUOUS PRN
Status: DISCONTINUED | OUTPATIENT
Start: 2019-05-09 | End: 2019-05-09 | Stop reason: SDUPTHER

## 2019-05-09 RX ORDER — SODIUM CHLORIDE 9 MG/ML
INJECTION, SOLUTION INTRAVENOUS CONTINUOUS
Status: DISCONTINUED | OUTPATIENT
Start: 2019-05-09 | End: 2019-05-09 | Stop reason: HOSPADM

## 2019-05-09 RX ORDER — OXYCODONE HYDROCHLORIDE AND ACETAMINOPHEN 5; 325 MG/1; MG/1
TABLET ORAL
Status: COMPLETED
Start: 2019-05-09 | End: 2019-05-09

## 2019-05-09 RX ORDER — HEPARIN SODIUM (PORCINE) LOCK FLUSH IV SOLN 100 UNIT/ML 100 UNIT/ML
SOLUTION INTRAVENOUS PRN
Status: DISCONTINUED | OUTPATIENT
Start: 2019-05-09 | End: 2019-05-09 | Stop reason: ALTCHOICE

## 2019-05-09 RX ORDER — ONDANSETRON 2 MG/ML
4 INJECTION INTRAMUSCULAR; INTRAVENOUS EVERY 8 HOURS PRN
Status: DISCONTINUED | OUTPATIENT
Start: 2019-05-09 | End: 2019-05-09 | Stop reason: HOSPADM

## 2019-05-09 RX ORDER — SODIUM CHLORIDE 0.9 % (FLUSH) 0.9 %
10 SYRINGE (ML) INJECTION EVERY 12 HOURS SCHEDULED
Status: DISCONTINUED | OUTPATIENT
Start: 2019-05-09 | End: 2019-05-09 | Stop reason: HOSPADM

## 2019-05-09 RX ORDER — ACETAMINOPHEN 325 MG/1
650 TABLET ORAL EVERY 4 HOURS PRN
Status: DISCONTINUED | OUTPATIENT
Start: 2019-05-09 | End: 2019-05-09 | Stop reason: HOSPADM

## 2019-05-09 RX ORDER — FENTANYL CITRATE 50 UG/ML
INJECTION, SOLUTION INTRAMUSCULAR; INTRAVENOUS PRN
Status: DISCONTINUED | OUTPATIENT
Start: 2019-05-09 | End: 2019-05-09 | Stop reason: SDUPTHER

## 2019-05-09 RX ORDER — MIDAZOLAM HYDROCHLORIDE 1 MG/ML
INJECTION INTRAMUSCULAR; INTRAVENOUS PRN
Status: DISCONTINUED | OUTPATIENT
Start: 2019-05-09 | End: 2019-05-09 | Stop reason: SDUPTHER

## 2019-05-09 RX ORDER — OXYCODONE HYDROCHLORIDE AND ACETAMINOPHEN 5; 325 MG/1; MG/1
2 TABLET ORAL EVERY 4 HOURS PRN
Status: DISCONTINUED | OUTPATIENT
Start: 2019-05-09 | End: 2019-05-09 | Stop reason: HOSPADM

## 2019-05-09 RX ORDER — SODIUM CHLORIDE 0.9 % (FLUSH) 0.9 %
10 SYRINGE (ML) INJECTION PRN
Status: DISCONTINUED | OUTPATIENT
Start: 2019-05-09 | End: 2019-05-09 | Stop reason: HOSPADM

## 2019-05-09 RX ORDER — OXYCODONE HYDROCHLORIDE AND ACETAMINOPHEN 5; 325 MG/1; MG/1
1 TABLET ORAL EVERY 4 HOURS PRN
Status: DISCONTINUED | OUTPATIENT
Start: 2019-05-09 | End: 2019-05-09 | Stop reason: HOSPADM

## 2019-05-09 RX ADMIN — FENTANYL CITRATE 50 MCG: 50 INJECTION, SOLUTION INTRAMUSCULAR; INTRAVENOUS at 10:22

## 2019-05-09 RX ADMIN — OXYCODONE HYDROCHLORIDE AND ACETAMINOPHEN 2 TABLET: 5; 325 TABLET ORAL at 11:00

## 2019-05-09 RX ADMIN — SODIUM CHLORIDE: 9 INJECTION, SOLUTION INTRAVENOUS at 09:59

## 2019-05-09 RX ADMIN — CEFAZOLIN 3 G: 1 INJECTION, POWDER, FOR SOLUTION INTRAMUSCULAR; INTRAVENOUS; PARENTERAL at 10:01

## 2019-05-09 RX ADMIN — FENTANYL CITRATE 50 MCG: 50 INJECTION, SOLUTION INTRAMUSCULAR; INTRAVENOUS at 10:14

## 2019-05-09 RX ADMIN — FENTANYL CITRATE 50 MCG: 50 INJECTION, SOLUTION INTRAMUSCULAR; INTRAVENOUS at 10:00

## 2019-05-09 RX ADMIN — MIDAZOLAM HYDROCHLORIDE 2 MG: 1 INJECTION, SOLUTION INTRAMUSCULAR; INTRAVENOUS at 10:00

## 2019-05-09 RX ADMIN — PROPOFOL 75 MCG/KG/MIN: 10 INJECTION, EMULSION INTRAVENOUS at 10:02

## 2019-05-09 RX ADMIN — FENTANYL CITRATE 50 MCG: 50 INJECTION, SOLUTION INTRAMUSCULAR; INTRAVENOUS at 10:03

## 2019-05-09 ASSESSMENT — PAIN - FUNCTIONAL ASSESSMENT: PAIN_FUNCTIONAL_ASSESSMENT: 0-10

## 2019-05-09 ASSESSMENT — PULMONARY FUNCTION TESTS
PIF_VALUE: 0
PIF_VALUE: 1
PIF_VALUE: 0
PIF_VALUE: 1
PIF_VALUE: 0

## 2019-05-09 ASSESSMENT — PAIN DESCRIPTION - PAIN TYPE
TYPE: SURGICAL PAIN

## 2019-05-09 ASSESSMENT — PAIN DESCRIPTION - DESCRIPTORS
DESCRIPTORS: ACHING;DISCOMFORT
DESCRIPTORS: CONSTANT

## 2019-05-09 ASSESSMENT — PAIN SCALES - GENERAL
PAINLEVEL_OUTOF10: 10
PAINLEVEL_OUTOF10: 10
PAINLEVEL_OUTOF10: 8
PAINLEVEL_OUTOF10: 10

## 2019-05-09 ASSESSMENT — PAIN DESCRIPTION - LOCATION
LOCATION: GENERALIZED

## 2019-05-09 NOTE — TELEPHONE ENCOUNTER
----- Message from Rhonda Zavala MD sent at 5/9/2019 10:55 AM EDT -----  Please schedule patient for 2-week post-op office visit.

## 2019-05-09 NOTE — ANESTHESIA PRE PROCEDURE
Department of Anesthesiology  Preprocedure Note       Name:  Kristin Jackson   Age:  21 y.o.  :  1995                                          MRN:  [id number]         Date:  2019      Surgeon: Diamond Stacy):  Oswald Galvin MD    Procedure: PORT INSERTION (N/A )    Medications prior to admission:   Prior to Admission medications    Medication Sig Start Date End Date Taking? Authorizing Provider   escitalopram (LEXAPRO) 5 MG tablet Take 5 mg by mouth daily 19  Yes Historical Provider, MD   ferrous sulfate 325 (65 FE) MG tablet Take 1 tablet by mouth 2 times daily (with meals) 17  Yes Gregg Rendon DO   folic acid (FOLVITE) 1 MG tablet Take 2 tablets by mouth daily  Patient taking differently: Take 2 mg by mouth 3 times daily  16  Yes Gregg Rendon DO   enoxaparin (LOVENOX) 100 MG/ML injection Inject 40 mg into the skin 2 times daily     Historical Provider, MD       Current medications:    Current Facility-Administered Medications   Medication Dose Route Frequency Provider Last Rate Last Dose    0.9 % sodium chloride infusion   Intravenous Continuous Oswald Galvin MD        sodium chloride flush 0.9 % injection 10 mL  10 mL Intravenous 2 times per day Oswald Galvin MD        sodium chloride flush 0.9 % injection 10 mL  10 mL Intravenous PRN Oswald Galvin MD        ceFAZolin (ANCEF) 3 g in dextrose 5 % 100 mL IVPB  3 g Intravenous On Call to [address] [place] [address] [address] Sherlyn           Allergies:     Allergies   Allergen Reactions    Demerol Hcl [Meperidine] Hives    Adhesive Tape Dermatitis, Rash and Itching    Bactrim [Sulfamethoxazole-Trimethoprim] Rash     Rash, itchy       Problem List:    Patient Active Problem List   Diagnosis Code    Microcytic anemia D50.9    Obesity, Class III, BMI 40-49.9 (morbid obesity) ([facility]) E66.01    Poor venous access, s/p Rt ACW venous access port I87.8    Chronic pain syndrome G89.4    Chest pain R07.9    Nephrocalcinosis E83.59, N29  Psychogenic pain F45.41    Hb-SS disease with crisis ([address] D57.00    S/p Pulmonary embolism (HCC) I26.99    Numbness on right side R20.0    Gastroenteritis K52.9    Sickle-cell-hemoglobin C disease with crisis ([address] D57.219    Lower abdominal pain R10.30    C/f of JOSE ANTONIO (obstructive sleep apnea) G47.33    Hypomagnesemia E83.42    Acute deep vein thrombosis (DVT) of axillary vein of left upper extremity ([address] I82. A12    Acute cystitis with hematuria N30.01    Sickle cell disease, type SC (HCC) D57.20    Thrombosis complicating venous access device (HCC) P15.513G    Acute bilateral low back pain with sciatica M54.40    Hb-SS disease without crisis ([address] D57.1    Maternal sickle cell anemia complicating pregnancy in second trimester ([facility] O99.012, D57.1    Encounter for fetal anatomic survey Z36.89    Suspected shortening of cervix not found Z03.75    Long term current use of anticoagulant therapy Z79.01    Personal history of PE (pulmonary embolism) Z86.711    Oligohydramnios, second trimester, not applicable or unspecified O41. 46X0    Maternal morbid obesity in second trimester, antepartum ([address] O99.212, E66.01    16 weeks gestation of pregnancy Z3A.16    Sickle cell crisis ([address] D57.00    21 weeks gestation of pregnancy Z3A.21    Sickle cell crisis acute chest syndrome ([address] D57.01    Community acquired pneumonia of right upper lobe of lung ([address] J18.1    Sickle cell anemia with pain (HCC) D57.00    Lymphadenitis I88.9    Rectal bleeding K62.5    Anemia due to blood loss D50.0       Past Medical History:        Diagnosis Date    CLABSI (central line-associated bloodstream infection) 8/3/2016    Deep vein thrombosis (DVT) of left upper extremity ([address] 8/12/2016    Disease of blood and blood forming organ     Heart murmur     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[address]     Miscarriage within last 12 months 04/2017    Obesity, Class III, BMI 40-49.9 (morbid obesity) ([facility] 5/22/2014    PE (pulmonary embolism)     PE (pulmonary embolism)     Pneumonia     Psychiatric problem     Sickle cell anemia (HCC)     Thrombosis complicating venous access device ([facility] 5/18/2017       Past Surgical History:        Procedure Laterality Date    KNEE SURGERY      right    OTHER SURGICAL HISTORY  08/04/2016    removal of mediport    OTHER SURGICAL HISTORY Right 03/05/2018    TUNNELED VENOUS PORT REMOVAL & REINSERTION;DILATION OF VENOUS TRACT    TONSILLECTOMY      TUNNELED VENOUS PORT PLACEMENT  2014    and removed    TUNNELED VENOUS PORT PLACEMENT Right 01/23/2015    Delatore, removed 08/04/16 CLABSI    TUNNELED VENOUS PORT PLACEMENT Right 09/15/2016    CHEST    TUNNELED VENOUS PORT PLACEMENT Right 05/18/2017    SUBCLAVIAN       Social History:    Social History     Tobacco Use    Smoking status: Never Smoker    Smokeless tobacco: Never Used   Substance Use Topics    Alcohol use: Yes     Comment: socially                                Counseling given: Not Answered      Vital Signs (Current):   Vitals:    05/07/19 1550 05/09/19 0810   BP:  126/64   Pulse:  85   Resp:  18   Temp:  98.1 °F (36.7 °C)   TempSrc:  Temporal   SpO2:  99%   Weight: (!) 305 lb (138.3 kg) (!) 305 lb (138.3 kg)   Height: 5' 3\" (1.6 m) 5' 3\" (1.6 m)                                              BP Readings from Last 3 Encounters:   05/09/19 126/64   05/05/19 120/64   04/20/19 114/66       NPO Status: Time of last liquid consumption: 2330                        Time of last solid consumption: 2330                        Date of last liquid consumption: 05/08/19                        Date of last solid food consumption: 05/08/19    BMI:   Wt Readings from Last 3 Encounters:   05/09/19 (!) 305 lb (138.3 kg)   05/05/19 (!) 305 lb 4.8 oz (138.5 kg)   04/20/19 293 lb (132.9 kg)     Body mass index is 54.03 kg/m².     CBC:   Lab Results   Component Value Date    WBC 11.5 05/05/2019    RBC 4.66 05/05/2019    HGB 9.6 05/05/2019    HCT

## 2019-05-09 NOTE — PROGRESS NOTES
Going over patients discharge instructions with patient and patients friends , patient friend wants to speak with Doctor about having patient admitted over night for observations in inforemed him that patients had out patient procedure and is able to go home and if she had any post op issues patient can come to ER,  but I could message doctor in perfect serve to see if he is still here and able to speak to patient family . Dr Tristen Gore .

## 2019-06-11 ENCOUNTER — OFFICE VISIT (OUTPATIENT)
Dept: VASCULAR SURGERY | Age: 24
End: 2019-06-11

## 2019-06-11 DIAGNOSIS — I87.8 POOR VENOUS ACCESS: Primary | ICD-10-CM

## 2019-06-11 PROCEDURE — 99024 POSTOP FOLLOW-UP VISIT: CPT | Performed by: NURSE PRACTITIONER

## 2019-06-11 NOTE — PROGRESS NOTES
Vascular Surgery Progress Note    Chief Complaint   Patient presents with    Post-Op Check     s/p insertion catheter. Patient returns for post operative evaluation status post venous port insertion. The patient denies any unexpected problems since hospital discharge. She reports that her port is functioning well for medication and fluid administration, but does not draw back blood for blood draws. Procedure Laterality Date    INSERTION / REMOVAL / REPLACEMENT VENOUS ACCESS CATHETER Left 5/9/2019    PORT INSERTION performed by Kassy Charles MD at [place] [address]      right    OTHER SURGICAL HISTORY  08/04/2016    removal of mediport    OTHER SURGICAL HISTORY Right 03/05/2018    TUNNELED VENOUS PORT REMOVAL & REINSERTION;DILATION OF VENOUS TRACT    TONSILLECTOMY      TUNNELED VENOUS PORT PLACEMENT  2014    and removed    TUNNELED VENOUS PORT PLACEMENT Right 01/23/2015    Delatore, removed 08/04/16 CLABSI    TUNNELED VENOUS PORT PLACEMENT Right 09/15/2016    CHEST    TUNNELED VENOUS PORT PLACEMENT Right 05/18/2017    SUBCLAVIAN       Physical Exam:  The incision(s) are healing without evidence of infection. Heart rhythm is regular. Right      Left   Brachial     Radial 2 2   Femoral     Popliteal     Dorsalis Pedis     Posterior Tibial     (3=normal, 2=diminished, 1=barely palpable, 4=widened)    Problem List Items Addressed This Visit     Poor venous access - Primary (Chronic)        I reviewed with the patient and her mother that I recommend using the port for IV medication and fluid administration. If the port ever stops functioning completely, we can replace it at that time. I reviewed with the patient that normal activities can be resumed as tolerated. Pt seen and plan reviewed with Dr. Leatha Brown.      Alea Rosas CNP     Plan: Return PRN

## 2019-06-26 ENCOUNTER — HOSPITAL ENCOUNTER (EMERGENCY)
Age: 24
Discharge: HOME | End: 2019-06-26
Attending: EMERGENCY MEDICINE
Payer: MEDICAID

## 2019-06-26 ENCOUNTER — APPOINTMENT (OUTPATIENT)
Dept: GENERAL RADIOLOGY | Age: 24
End: 2019-06-26
Payer: MEDICAID

## 2019-06-26 VITALS
HEIGHT: 64 IN | WEIGHT: 290 LBS | RESPIRATION RATE: 18 BRPM | BODY MASS INDEX: 49.51 KG/M2 | TEMPERATURE: 96.7 F | HEART RATE: 92 BPM | SYSTOLIC BLOOD PRESSURE: 117 MMHG | OXYGEN SATURATION: 97 % | DIASTOLIC BLOOD PRESSURE: 69 MMHG

## 2019-06-26 DIAGNOSIS — D57.00 SICKLE CELL ANEMIA WITH PAIN (HCC): Primary | ICD-10-CM

## 2019-06-26 LAB
ALBUMIN SERPL-MCNC: 3.8 G/DL (ref 3.5–5.2)
ALP BLD-CCNC: 90 U/L (ref 35–104)
ALT SERPL-CCNC: 5 U/L (ref 0–32)
ANION GAP SERPL CALCULATED.3IONS-SCNC: 10 MMOL/L (ref 7–16)
ANISOCYTOSIS: ABNORMAL
AST SERPL-CCNC: 9 U/L (ref 0–31)
BASOPHILS ABSOLUTE: 0 E9/L (ref 0–0.2)
BASOPHILS RELATIVE PERCENT: 0.2 % (ref 0–2)
BILIRUB SERPL-MCNC: 0.9 MG/DL (ref 0–1.2)
BUN BLDV-MCNC: 10 MG/DL (ref 6–20)
CALCIUM SERPL-MCNC: 9 MG/DL (ref 8.6–10.2)
CHLORIDE BLD-SCNC: 102 MMOL/L (ref 98–107)
CO2: 26 MMOL/L (ref 22–29)
CREAT SERPL-MCNC: 0.9 MG/DL (ref 0.5–1)
EKG ATRIAL RATE: 86 BPM
EKG P AXIS: 57 DEGREES
EKG P-R INTERVAL: 148 MS
EKG Q-T INTERVAL: 374 MS
EKG QRS DURATION: 86 MS
EKG QTC CALCULATION (BAZETT): 447 MS
EKG R AXIS: 40 DEGREES
EKG T AXIS: 34 DEGREES
EKG VENTRICULAR RATE: 86 BPM
EOSINOPHILS ABSOLUTE: 0.35 E9/L (ref 0.05–0.5)
EOSINOPHILS RELATIVE PERCENT: 2.6 % (ref 0–6)
GFR AFRICAN AMERICAN: >60
GFR NON-AFRICAN AMERICAN: >60 ML/MIN/1.73
GLUCOSE BLD-MCNC: 114 MG/DL (ref 74–99)
HCG QUALITATIVE: NEGATIVE
HCT VFR BLD CALC: 27.9 % (ref 34–48)
HEMOGLOBIN: 9.4 G/DL (ref 11.5–15.5)
HYPOCHROMIA: ABNORMAL
IMMATURE RETIC FRACT: 17 % (ref 3–15.9)
LACTIC ACID: 0.7 MMOL/L (ref 0.5–2.2)
LYMPHOCYTES ABSOLUTE: 4.19 E9/L (ref 1.5–4)
LYMPHOCYTES RELATIVE PERCENT: 30.7 % (ref 20–42)
MCH RBC QN AUTO: 20.3 PG (ref 26–35)
MCHC RBC AUTO-ENTMCNC: 33.7 % (ref 32–34.5)
MCV RBC AUTO: 60.1 FL (ref 80–99.9)
MONOCYTES ABSOLUTE: 0.54 E9/L (ref 0.1–0.95)
MONOCYTES RELATIVE PERCENT: 3.5 % (ref 2–12)
NEUTROPHILS ABSOLUTE: 8.51 E9/L (ref 1.8–7.3)
NEUTROPHILS RELATIVE PERCENT: 63.2 % (ref 43–80)
PDW BLD-RTO: 19.6 FL (ref 11.5–15)
PLATELET # BLD: 294 E9/L (ref 130–450)
PMV BLD AUTO: ABNORMAL FL (ref 7–12)
POIKILOCYTES: ABNORMAL
POLYCHROMASIA: ABNORMAL
POTASSIUM SERPL-SCNC: 3.8 MMOL/L (ref 3.5–5)
RBC # BLD: 4.64 E12/L (ref 3.5–5.5)
RETIC HGB EQUIVALENT: 23 PG (ref 28.2–36.6)
RETICULOCYTE ABSOLUTE COUNT: 0.08 E12/L
RETICULOCYTE COUNT PCT: 1.7 % (ref 0.4–1.9)
SODIUM BLD-SCNC: 138 MMOL/L (ref 132–146)
TARGET CELLS: ABNORMAL
TOTAL PROTEIN: 7.3 G/DL (ref 6.4–8.3)
TROPONIN: <0.01 NG/ML (ref 0–0.03)
WBC # BLD: 13.5 E9/L (ref 4.5–11.5)

## 2019-06-26 PROCEDURE — 2580000003 HC RX 258: Performed by: EMERGENCY MEDICINE

## 2019-06-26 PROCEDURE — 85025 COMPLETE CBC W/AUTO DIFF WBC: CPT

## 2019-06-26 PROCEDURE — 6360000002 HC RX W HCPCS: Performed by: EMERGENCY MEDICINE

## 2019-06-26 PROCEDURE — 83605 ASSAY OF LACTIC ACID: CPT

## 2019-06-26 PROCEDURE — 71045 X-RAY EXAM CHEST 1 VIEW: CPT

## 2019-06-26 PROCEDURE — 80053 COMPREHEN METABOLIC PANEL: CPT

## 2019-06-26 PROCEDURE — 96376 TX/PRO/DX INJ SAME DRUG ADON: CPT

## 2019-06-26 PROCEDURE — 36415 COLL VENOUS BLD VENIPUNCTURE: CPT

## 2019-06-26 PROCEDURE — 99285 EMERGENCY DEPT VISIT HI MDM: CPT

## 2019-06-26 PROCEDURE — 96374 THER/PROPH/DIAG INJ IV PUSH: CPT

## 2019-06-26 PROCEDURE — 93005 ELECTROCARDIOGRAM TRACING: CPT | Performed by: EMERGENCY MEDICINE

## 2019-06-26 PROCEDURE — 84703 CHORIONIC GONADOTROPIN ASSAY: CPT

## 2019-06-26 PROCEDURE — 96375 TX/PRO/DX INJ NEW DRUG ADDON: CPT

## 2019-06-26 PROCEDURE — 85045 AUTOMATED RETICULOCYTE COUNT: CPT

## 2019-06-26 PROCEDURE — 93010 ELECTROCARDIOGRAM REPORT: CPT | Performed by: INTERNAL MEDICINE

## 2019-06-26 PROCEDURE — 84484 ASSAY OF TROPONIN QUANT: CPT

## 2019-06-26 RX ORDER — HEPARIN SODIUM (PORCINE) LOCK FLUSH IV SOLN 100 UNIT/ML 100 UNIT/ML
SOLUTION INTRAVENOUS
Status: DISCONTINUED
Start: 2019-06-26 | End: 2019-06-26 | Stop reason: HOSPADM

## 2019-06-26 RX ORDER — ONDANSETRON 2 MG/ML
4 INJECTION INTRAMUSCULAR; INTRAVENOUS ONCE
Status: COMPLETED | OUTPATIENT
Start: 2019-06-26 | End: 2019-06-26

## 2019-06-26 RX ORDER — 0.9 % SODIUM CHLORIDE 0.9 %
500 INTRAVENOUS SOLUTION INTRAVENOUS ONCE
Status: COMPLETED | OUTPATIENT
Start: 2019-06-26 | End: 2019-06-26

## 2019-06-26 RX ORDER — 0.9 % SODIUM CHLORIDE 0.9 %
1000 INTRAVENOUS SOLUTION INTRAVENOUS ONCE
Status: COMPLETED | OUTPATIENT
Start: 2019-06-26 | End: 2019-06-26

## 2019-06-26 RX ADMIN — HYDROMORPHONE HYDROCHLORIDE 1 MG: 1 INJECTION, SOLUTION INTRAMUSCULAR; INTRAVENOUS; SUBCUTANEOUS at 07:06

## 2019-06-26 RX ADMIN — ONDANSETRON HYDROCHLORIDE 4 MG: 2 SOLUTION INTRAMUSCULAR; INTRAVENOUS at 07:00

## 2019-06-26 RX ADMIN — SODIUM CHLORIDE 1000 ML: 9 INJECTION, SOLUTION INTRAVENOUS at 05:17

## 2019-06-26 RX ADMIN — SODIUM CHLORIDE 500 ML: 9 INJECTION, SOLUTION INTRAVENOUS at 07:05

## 2019-06-26 RX ADMIN — HYDROMORPHONE HYDROCHLORIDE 1 MG: 1 INJECTION, SOLUTION INTRAMUSCULAR; INTRAVENOUS; SUBCUTANEOUS at 05:16

## 2019-06-26 ASSESSMENT — PAIN SCALES - GENERAL
PAINLEVEL_OUTOF10: 9
PAINLEVEL_OUTOF10: 7
PAINLEVEL_OUTOF10: 9

## 2019-06-26 ASSESSMENT — PAIN DESCRIPTION - DESCRIPTORS: DESCRIPTORS: SHARP

## 2019-06-26 ASSESSMENT — PAIN DESCRIPTION - LOCATION: LOCATION: BACK;CHEST;LEG

## 2019-06-26 NOTE — ED PROVIDER NOTES
Department of Emergency Medicine   ED  Provider Note  Admit Date/RoomTime: 2019  4:17 AM  ED Room:   MRN: [id number]  Chief Complaint: Sickle Cell Pain Crisis (started couple of days ago geting worse)       History of Present Illness   Source of history provided by:  patient. History/Exam Limitations: none. Tobi Spence is a 21 y.o. female who has a past medical history of:   Past Medical History:   Diagnosis Date    CLABSI (central line-associated bloodstream infection) 8/3/2016    Deep vein thrombosis (DVT) of left upper extremity ([facility] 2016    Disease of blood and blood forming organ     Heart murmur     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[facility]     Miscarriage within last 12 months 2017    Obesity, Class III, BMI 40-49.9 (morbid obesity) ([facility] 2014    PE (pulmonary embolism)     PE (pulmonary embolism)     Pneumonia     Psychiatric problem     Sickle cell anemia (HCC)     Thrombosis complicating venous access device ([facility] 2017     Patient with history of sickle cell disease and a left chest wall Mediport presents with a pain crisis. She states that she has pain in her chest, her back and her extremities. This is her typical sickle cell flareup. She is taking no narcotic pain medications at home and states that she is actually visiting here from [place]. Her grandmother  and she is attending the .  She follows with a hematologist in [place] although she does have history of extensive medical visits here to our facility as well. No fever or chills. She denies any productive cough or leg swelling. ROS    Pertinent positives and negatives are stated within HPI, all other systems reviewed and are negative.     Past Surgical History:   Procedure Laterality Date    INSERTION / REMOVAL / REPLACEMENT VENOUS ACCESS CATHETER Left 2019    PORT INSERTION performed by Tony Barton MD at [address]      right    OTHER SURGICAL HISTORY 08/04/2016    removal of mediport    OTHER SURGICAL HISTORY Right 03/05/2018    TUNNELED VENOUS PORT REMOVAL & REINSERTION;DILATION OF VENOUS TRACT    TONSILLECTOMY      TUNNELED VENOUS PORT PLACEMENT  2014    and removed    TUNNELED VENOUS PORT PLACEMENT Right 01/23/2015    Reginoatordeidre, removed 08/04/16 CLABSI    TUNNELED VENOUS PORT PLACEMENT Right 09/15/2016    CHEST    TUNNELED VENOUS PORT PLACEMENT Right 05/18/2017    SUBCLAVIAN   Social History:  reports that she has never smoked. She has never used smokeless tobacco. She reports that she drinks alcohol. She reports that she does not use drugs. Family History: family history includes Cancer in her maternal grandmother; Diabetes in her mother; High Blood Pressure in her mother; Kidney Disease in her paternal grandmother. Allergies: Demerol hcl [meperidine]; Adhesive tape; and Bactrim [sulfamethoxazole-trimethoprim]    Physical Exam   Oxygen Saturation Interpretation: Normal.   ED Triage Vitals   BP Temp Temp src Pulse Resp SpO2 Height Weight   06/26/19 0407 06/26/19 0407 -- 06/26/19 0405 06/26/19 0407 06/26/19 0405 06/26/19 0407 06/26/19 0407   (!) 146/98 96.7 °F (35.9 °C)  92 18 98 % 5' 3.5\" (1.613 m) 290 lb (131.5 kg)       Physical Exam  · Constitutional/General: Alert and oriented x3, well appearing, non toxic  · HEENT:  NC/NT. PERRLA,  Airway patent. · Neck: Supple, full ROM, non tender to palpation in the midline, no stridor, no crepitus, no meningeal signs  · Respiratory: Lungs clear to auscultation bilaterally, no wheezes, rales, or rhonchi. Not in respiratory distress  · CV:  Regular rate. Regular rhythm. No murmurs, gallops, or rubs. 2+ distal pulses  · Chest: No chest wall tenderness  · GI:  Abdomen Soft, Non tender, Non distended. +BS. No rebound, guarding, or rigidity. No pulsatile masses. · Musculoskeletal: Moves all extremities x 4. Warm and well perfused, no clubbing, cyanosis, or edema.  Capillary refill <3 seconds  · Integument: skin Ct Pct 1.7 0.4 - 1.9 %    Retic Ct Abs 0.080 E12/L    Immature Retic Fract 17.0 (H) 3.0 - 15.9 %    Retic HGB Equivalent 23.0 (L) 28.2 - 36.6 pg   Lactic Acid, Plasma   Result Value Ref Range    Lactic Acid 0.7 0.5 - 2.2 mmol/L   HCG, SERUM, QUALITATIVE   Result Value Ref Range    hCG Qual NEGATIVE NEGATIVE   EKG 12 Lead   Result Value Ref Range    Ventricular Rate 86 BPM    Atrial Rate 86 BPM    P-R Interval 148 ms    QRS Duration 86 ms    Q-T Interval 374 ms    QTc Calculation (Bazett) 447 ms    P Axis 57 degrees    R Axis 40 degrees    T Axis 34 degrees     Imaging: All Radiology results interpreted by Radiologist unless otherwise noted. XR CHEST PORTABLE   Final Result   No interval change                   ED Course / Medical Decision Making     Medications   heparin flush 100 UNIT/ML injection (has no administration in time range)   0.9 % sodium chloride bolus (0 mLs Intravenous Stopped 6/26/19 0702)   HYDROmorphone (DILAUDID) injection 1 mg (1 mg Intravenous Given 6/26/19 0516)   HYDROmorphone (DILAUDID) injection 1 mg (1 mg Intravenous Given 6/26/19 0706)   ondansetron (ZOFRAN) injection 4 mg (4 mg Intravenous Given 6/26/19 0700)   0.9 % sodium chloride bolus (500 mLs Intravenous New Bag 6/26/19 0705)       Consult(s):   None    Procedure(s):   none    MDM:   Patient is very comfortable appearing on physical exam.  Her hemoglobin is 9.4 which appears to be at her baseline. No evidence of acute chest syndrome. She was medicated for pain and given IV fluids. She will be discharged once her symptoms improve. Counseling: The emergency provider has spoken with the patient and discussed todays results, in addition to providing specific details for the plan of care and counseling regarding the diagnosis and prognosis. Questions are answered at this time and they are agreeable with the plan. Assessment      1.  Sickle cell anemia with pain [facility])      Plan   Discharge to home  Patient condition is stable    New Medications     New Prescriptions    No medications on file     Electronically signed by Yamini Casanova DO   DD: 6/26/19  **This report was transcribed using voice recognition software. Every effort was made to ensure accuracy; however, inadvertent computerized transcription errors may be present.   END OF ED PROVIDER NOTE        Yamini Casanova DO  06/26/19 4671

## 2019-06-30 ENCOUNTER — HOSPITAL ENCOUNTER (EMERGENCY)
Age: 24
Discharge: HOME | End: 2019-06-30
Attending: EMERGENCY MEDICINE
Payer: MEDICAID

## 2019-06-30 ENCOUNTER — APPOINTMENT (OUTPATIENT)
Dept: GENERAL RADIOLOGY | Age: 24
End: 2019-06-30
Payer: MEDICAID

## 2019-06-30 VITALS
TEMPERATURE: 98.8 F | HEART RATE: 64 BPM | DIASTOLIC BLOOD PRESSURE: 68 MMHG | SYSTOLIC BLOOD PRESSURE: 118 MMHG | RESPIRATION RATE: 16 BRPM | BODY MASS INDEX: 51.38 KG/M2 | WEIGHT: 290 LBS | HEIGHT: 63 IN | OXYGEN SATURATION: 95 %

## 2019-06-30 DIAGNOSIS — R07.9 CHEST PAIN, UNSPECIFIED TYPE: ICD-10-CM

## 2019-06-30 DIAGNOSIS — D57.00 SICKLE CELL PAIN CRISIS (HCC): Primary | ICD-10-CM

## 2019-06-30 DIAGNOSIS — M54.50 LOW BACK PAIN WITHOUT SCIATICA, UNSPECIFIED BACK PAIN LATERALITY, UNSPECIFIED CHRONICITY: ICD-10-CM

## 2019-06-30 LAB
ANION GAP SERPL CALCULATED.3IONS-SCNC: 10 MMOL/L (ref 7–16)
ANISOCYTOSIS: ABNORMAL
BACTERIA: ABNORMAL /HPF
BASOPHILS ABSOLUTE: 0 E9/L (ref 0–0.2)
BASOPHILS RELATIVE PERCENT: 0 % (ref 0–2)
BILIRUBIN URINE: NEGATIVE
BLOOD, URINE: NEGATIVE
BUN BLDV-MCNC: 7 MG/DL (ref 6–20)
CALCIUM SERPL-MCNC: 8.8 MG/DL (ref 8.6–10.2)
CHLORIDE BLD-SCNC: 105 MMOL/L (ref 98–107)
CLARITY: CLEAR
CO2: 24 MMOL/L (ref 22–29)
COLOR: YELLOW
CREAT SERPL-MCNC: 0.9 MG/DL (ref 0.5–1)
EOSINOPHILS ABSOLUTE: 0.19 E9/L (ref 0.05–0.5)
EOSINOPHILS RELATIVE PERCENT: 1.7 % (ref 0–6)
EPITHELIAL CELLS, UA: ABNORMAL /HPF
GFR AFRICAN AMERICAN: >60
GFR NON-AFRICAN AMERICAN: >60 ML/MIN/1.73
GLUCOSE BLD-MCNC: 126 MG/DL (ref 74–99)
GLUCOSE URINE: NEGATIVE MG/DL
HCG, URINE, POC: NEGATIVE
HCT VFR BLD CALC: 28 % (ref 34–48)
HEMOGLOBIN: 9.5 G/DL (ref 11.5–15.5)
HYPOCHROMIA: ABNORMAL
IMMATURE RETIC FRACT: 16.7 % (ref 3–15.9)
KETONES, URINE: NEGATIVE MG/DL
LEUKOCYTE ESTERASE, URINE: ABNORMAL
LYMPHOCYTES ABSOLUTE: 5 E9/L (ref 1.5–4)
LYMPHOCYTES RELATIVE PERCENT: 45.2 % (ref 20–42)
Lab: NORMAL
MCH RBC QN AUTO: 20.5 PG (ref 26–35)
MCHC RBC AUTO-ENTMCNC: 33.9 % (ref 32–34.5)
MCV RBC AUTO: 60.3 FL (ref 80–99.9)
MONOCYTES ABSOLUTE: 0.44 E9/L (ref 0.1–0.95)
MONOCYTES RELATIVE PERCENT: 3.5 % (ref 2–12)
NEGATIVE QC PASS/FAIL: NORMAL
NEUTROPHILS ABSOLUTE: 5.55 E9/L (ref 1.8–7.3)
NEUTROPHILS RELATIVE PERCENT: 49.6 % (ref 43–80)
NITRITE, URINE: NEGATIVE
NUCLEATED RED BLOOD CELLS: 0 /100 WBC
PDW BLD-RTO: 19.5 FL (ref 11.5–15)
PH UA: 7.5 (ref 5–9)
PLATELET # BLD: 276 E9/L (ref 130–450)
PMV BLD AUTO: ABNORMAL FL (ref 7–12)
POIKILOCYTES: ABNORMAL
POLYCHROMASIA: ABNORMAL
POSITIVE QC PASS/FAIL: NORMAL
POTASSIUM SERPL-SCNC: 3.6 MMOL/L (ref 3.5–5)
PROTEIN UA: NEGATIVE MG/DL
RBC # BLD: 4.64 E12/L (ref 3.5–5.5)
RBC UA: ABNORMAL /HPF (ref 0–2)
RETIC HGB EQUIVALENT: 22 PG (ref 28.2–36.6)
RETICULOCYTE ABSOLUTE COUNT: 0.08 E12/L
RETICULOCYTE COUNT PCT: 1.7 % (ref 0.4–1.9)
SODIUM BLD-SCNC: 139 MMOL/L (ref 132–146)
SPECIFIC GRAVITY UA: 1.01 (ref 1–1.03)
TARGET CELLS: ABNORMAL
TEAR DROP CELLS: ABNORMAL
TROPONIN: <0.01 NG/ML (ref 0–0.03)
UROBILINOGEN, URINE: 0.2 E.U./DL
WBC # BLD: 11.1 E9/L (ref 4.5–11.5)
WBC UA: ABNORMAL /HPF (ref 0–5)

## 2019-06-30 PROCEDURE — 85045 AUTOMATED RETICULOCYTE COUNT: CPT

## 2019-06-30 PROCEDURE — 80048 BASIC METABOLIC PNL TOTAL CA: CPT

## 2019-06-30 PROCEDURE — 6360000002 HC RX W HCPCS: Performed by: EMERGENCY MEDICINE

## 2019-06-30 PROCEDURE — 93005 ELECTROCARDIOGRAM TRACING: CPT | Performed by: EMERGENCY MEDICINE

## 2019-06-30 PROCEDURE — 71046 X-RAY EXAM CHEST 2 VIEWS: CPT

## 2019-06-30 PROCEDURE — 81001 URINALYSIS AUTO W/SCOPE: CPT

## 2019-06-30 PROCEDURE — 99285 EMERGENCY DEPT VISIT HI MDM: CPT

## 2019-06-30 PROCEDURE — 72110 X-RAY EXAM L-2 SPINE 4/>VWS: CPT

## 2019-06-30 PROCEDURE — 96374 THER/PROPH/DIAG INJ IV PUSH: CPT

## 2019-06-30 PROCEDURE — 96375 TX/PRO/DX INJ NEW DRUG ADDON: CPT

## 2019-06-30 PROCEDURE — 84484 ASSAY OF TROPONIN QUANT: CPT

## 2019-06-30 PROCEDURE — 85025 COMPLETE CBC W/AUTO DIFF WBC: CPT

## 2019-06-30 RX ORDER — HEPARIN SODIUM (PORCINE) LOCK FLUSH IV SOLN 100 UNIT/ML 100 UNIT/ML
SOLUTION INTRAVENOUS
Status: DISCONTINUED
Start: 2019-06-30 | End: 2019-06-30 | Stop reason: HOSPADM

## 2019-06-30 RX ORDER — DIPHENHYDRAMINE HYDROCHLORIDE 50 MG/ML
50 INJECTION INTRAMUSCULAR; INTRAVENOUS ONCE
Status: COMPLETED | OUTPATIENT
Start: 2019-06-30 | End: 2019-06-30

## 2019-06-30 RX ORDER — ONDANSETRON 2 MG/ML
6 INJECTION INTRAMUSCULAR; INTRAVENOUS ONCE
Status: COMPLETED | OUTPATIENT
Start: 2019-06-30 | End: 2019-06-30

## 2019-06-30 RX ORDER — MORPHINE SULFATE 4 MG/ML
8 INJECTION, SOLUTION INTRAMUSCULAR; INTRAVENOUS ONCE
Status: COMPLETED | OUTPATIENT
Start: 2019-06-30 | End: 2019-06-30

## 2019-06-30 RX ORDER — OXYCODONE HYDROCHLORIDE 15 MG/1
15 TABLET ORAL EVERY 8 HOURS PRN
Qty: 10 TABLET | Refills: 0 | Status: SHIPPED | OUTPATIENT
Start: 2019-06-30 | End: 2019-07-03

## 2019-06-30 RX ORDER — ONDANSETRON 2 MG/ML
6 INJECTION INTRAMUSCULAR; INTRAVENOUS
Status: DISCONTINUED | OUTPATIENT
Start: 2019-06-30 | End: 2019-06-30

## 2019-06-30 RX ADMIN — ONDANSETRON 6 MG: 2 INJECTION INTRAMUSCULAR; INTRAVENOUS at 13:19

## 2019-06-30 RX ADMIN — MORPHINE SULFATE 8 MG: 4 INJECTION, SOLUTION INTRAMUSCULAR; INTRAVENOUS at 13:19

## 2019-06-30 RX ADMIN — DIPHENHYDRAMINE HYDROCHLORIDE 50 MG: 50 INJECTION, SOLUTION INTRAMUSCULAR; INTRAVENOUS at 13:20

## 2019-06-30 ASSESSMENT — ENCOUNTER SYMPTOMS
SORE THROAT: 0
COUGH: 0
ABDOMINAL DISTENTION: 0
VOMITING: 0
NAUSEA: 1
HEARTBURN: 0
BACK PAIN: 1
EYE REDNESS: 0
BLOOD IN STOOL: 0
ABDOMINAL PAIN: 0
EYE DISCHARGE: 0
RHINORRHEA: 0
WHEEZING: 0
CONSTIPATION: 0
EYE PAIN: 0
SHORTNESS OF BREATH: 0
DIARRHEA: 0
SINUS PRESSURE: 0

## 2019-06-30 ASSESSMENT — PAIN SCALES - GENERAL
PAINLEVEL_OUTOF10: 6
PAINLEVEL_OUTOF10: 9
PAINLEVEL_OUTOF10: 9

## 2019-06-30 ASSESSMENT — PAIN DESCRIPTION - DESCRIPTORS: DESCRIPTORS: ACHING

## 2019-06-30 ASSESSMENT — PAIN DESCRIPTION - PAIN TYPE: TYPE: ACUTE PAIN

## 2019-06-30 ASSESSMENT — PAIN DESCRIPTION - LOCATION: LOCATION: GENERALIZED

## 2019-07-01 LAB
EKG ATRIAL RATE: 79 BPM
EKG P AXIS: 46 DEGREES
EKG P-R INTERVAL: 148 MS
EKG Q-T INTERVAL: 396 MS
EKG QRS DURATION: 96 MS
EKG QTC CALCULATION (BAZETT): 454 MS
EKG R AXIS: 50 DEGREES
EKG T AXIS: 39 DEGREES
EKG VENTRICULAR RATE: 79 BPM

## 2019-07-01 PROCEDURE — 93010 ELECTROCARDIOGRAM REPORT: CPT | Performed by: INTERNAL MEDICINE

## 2019-07-18 ENCOUNTER — APPOINTMENT (OUTPATIENT)
Dept: GENERAL RADIOLOGY | Age: 24
End: 2019-07-18
Payer: MEDICAID

## 2019-07-18 ENCOUNTER — HOSPITAL ENCOUNTER (EMERGENCY)
Age: 24
Discharge: HOME | End: 2019-07-18
Attending: EMERGENCY MEDICINE
Payer: MEDICAID

## 2019-07-18 VITALS
WEIGHT: 290 LBS | DIASTOLIC BLOOD PRESSURE: 71 MMHG | RESPIRATION RATE: 16 BRPM | TEMPERATURE: 97.4 F | BODY MASS INDEX: 49.51 KG/M2 | SYSTOLIC BLOOD PRESSURE: 121 MMHG | HEIGHT: 64 IN | HEART RATE: 61 BPM | OXYGEN SATURATION: 97 %

## 2019-07-18 DIAGNOSIS — D57.00 SICKLE CELL PAIN CRISIS (HCC): Primary | ICD-10-CM

## 2019-07-18 LAB
ALBUMIN SERPL-MCNC: 4.1 G/DL (ref 3.5–5.2)
ALP BLD-CCNC: 94 U/L (ref 35–104)
ALT SERPL-CCNC: 6 U/L (ref 0–32)
ANION GAP SERPL CALCULATED.3IONS-SCNC: 13 MMOL/L (ref 7–16)
AST SERPL-CCNC: 10 U/L (ref 0–31)
BILIRUB SERPL-MCNC: 1.1 MG/DL (ref 0–1.2)
BILIRUBIN URINE: NEGATIVE
BLOOD, URINE: NEGATIVE
BUN BLDV-MCNC: 11 MG/DL (ref 6–20)
CALCIUM SERPL-MCNC: 9.1 MG/DL (ref 8.6–10.2)
CHLORIDE BLD-SCNC: 103 MMOL/L (ref 98–107)
CLARITY: CLEAR
CO2: 22 MMOL/L (ref 22–29)
COLOR: YELLOW
CREAT SERPL-MCNC: 0.9 MG/DL (ref 0.5–1)
EKG ATRIAL RATE: 60 BPM
EKG P AXIS: 10 DEGREES
EKG P-R INTERVAL: 138 MS
EKG Q-T INTERVAL: 436 MS
EKG QRS DURATION: 82 MS
EKG QTC CALCULATION (BAZETT): 436 MS
EKG R AXIS: 31 DEGREES
EKG T AXIS: 27 DEGREES
EKG VENTRICULAR RATE: 60 BPM
GFR AFRICAN AMERICAN: >60
GFR NON-AFRICAN AMERICAN: >60 ML/MIN/1.73
GLUCOSE BLD-MCNC: 111 MG/DL (ref 74–99)
GLUCOSE URINE: NEGATIVE MG/DL
HCG, URINE, POC: NEGATIVE
HCT VFR BLD CALC: 30 % (ref 34–48)
HEMOGLOBIN: 10.1 G/DL (ref 11.5–15.5)
KETONES, URINE: NEGATIVE MG/DL
LEUKOCYTE ESTERASE, URINE: NEGATIVE
Lab: NORMAL
MCH RBC QN AUTO: 20.1 PG (ref 26–35)
MCHC RBC AUTO-ENTMCNC: 33.7 % (ref 32–34.5)
MCV RBC AUTO: 59.8 FL (ref 80–99.9)
NEGATIVE QC PASS/FAIL: NORMAL
NITRITE, URINE: NEGATIVE
PDW BLD-RTO: 20.2 FL (ref 11.5–15)
PH UA: 6.5 (ref 5–9)
PLATELET # BLD: 276 E9/L (ref 130–450)
PMV BLD AUTO: ABNORMAL FL (ref 7–12)
POSITIVE QC PASS/FAIL: NORMAL
POTASSIUM SERPL-SCNC: 3.7 MMOL/L (ref 3.5–5)
PROTEIN UA: NEGATIVE MG/DL
RBC # BLD: 5.02 E12/L (ref 3.5–5.5)
SODIUM BLD-SCNC: 138 MMOL/L (ref 132–146)
SPECIFIC GRAVITY UA: 1.01 (ref 1–1.03)
TOTAL PROTEIN: 7.6 G/DL (ref 6.4–8.3)
TROPONIN: <0.01 NG/ML (ref 0–0.03)
UROBILINOGEN, URINE: 0.2 E.U./DL
WBC # BLD: 12 E9/L (ref 4.5–11.5)

## 2019-07-18 PROCEDURE — 84484 ASSAY OF TROPONIN QUANT: CPT

## 2019-07-18 PROCEDURE — 96372 THER/PROPH/DIAG INJ SC/IM: CPT

## 2019-07-18 PROCEDURE — 80053 COMPREHEN METABOLIC PANEL: CPT

## 2019-07-18 PROCEDURE — 2580000003 HC RX 258: Performed by: EMERGENCY MEDICINE

## 2019-07-18 PROCEDURE — 93010 ELECTROCARDIOGRAM REPORT: CPT | Performed by: INTERNAL MEDICINE

## 2019-07-18 PROCEDURE — 71045 X-RAY EXAM CHEST 1 VIEW: CPT

## 2019-07-18 PROCEDURE — 99284 EMERGENCY DEPT VISIT MOD MDM: CPT

## 2019-07-18 PROCEDURE — 6360000002 HC RX W HCPCS: Performed by: EMERGENCY MEDICINE

## 2019-07-18 PROCEDURE — 81003 URINALYSIS AUTO W/O SCOPE: CPT

## 2019-07-18 PROCEDURE — 85027 COMPLETE CBC AUTOMATED: CPT

## 2019-07-18 PROCEDURE — 96375 TX/PRO/DX INJ NEW DRUG ADDON: CPT

## 2019-07-18 PROCEDURE — 36415 COLL VENOUS BLD VENIPUNCTURE: CPT

## 2019-07-18 PROCEDURE — 96374 THER/PROPH/DIAG INJ IV PUSH: CPT

## 2019-07-18 PROCEDURE — 93005 ELECTROCARDIOGRAM TRACING: CPT | Performed by: EMERGENCY MEDICINE

## 2019-07-18 RX ORDER — MORPHINE SULFATE 4 MG/ML
4 INJECTION, SOLUTION INTRAMUSCULAR; INTRAVENOUS ONCE
Status: COMPLETED | OUTPATIENT
Start: 2019-07-18 | End: 2019-07-18

## 2019-07-18 RX ORDER — PROMETHAZINE HYDROCHLORIDE 25 MG/ML
12.5 INJECTION, SOLUTION INTRAMUSCULAR; INTRAVENOUS ONCE
Status: COMPLETED | OUTPATIENT
Start: 2019-07-18 | End: 2019-07-18

## 2019-07-18 RX ORDER — DIPHENHYDRAMINE HYDROCHLORIDE 50 MG/ML
25 INJECTION INTRAMUSCULAR; INTRAVENOUS ONCE
Status: COMPLETED | OUTPATIENT
Start: 2019-07-18 | End: 2019-07-18

## 2019-07-18 RX ORDER — 0.9 % SODIUM CHLORIDE 0.9 %
1000 INTRAVENOUS SOLUTION INTRAVENOUS ONCE
Status: COMPLETED | OUTPATIENT
Start: 2019-07-18 | End: 2019-07-18

## 2019-07-18 RX ADMIN — SODIUM CHLORIDE 1000 ML: 9 INJECTION, SOLUTION INTRAVENOUS at 02:33

## 2019-07-18 RX ADMIN — PROMETHAZINE HYDROCHLORIDE 12.5 MG: 25 INJECTION INTRAMUSCULAR; INTRAVENOUS at 03:02

## 2019-07-18 RX ADMIN — MORPHINE SULFATE 4 MG: 4 INJECTION, SOLUTION INTRAMUSCULAR; INTRAVENOUS at 03:03

## 2019-07-18 RX ADMIN — DIPHENHYDRAMINE HYDROCHLORIDE 25 MG: 50 INJECTION, SOLUTION INTRAMUSCULAR; INTRAVENOUS at 03:03

## 2019-07-18 ASSESSMENT — PAIN DESCRIPTION - DESCRIPTORS: DESCRIPTORS: SHARP

## 2019-07-18 ASSESSMENT — PAIN SCALES - GENERAL: PAINLEVEL_OUTOF10: 10

## 2019-07-18 ASSESSMENT — PAIN DESCRIPTION - LOCATION: LOCATION: GENERALIZED

## 2019-07-18 ASSESSMENT — PAIN DESCRIPTION - FREQUENCY: FREQUENCY: CONTINUOUS

## 2019-07-18 ASSESSMENT — PAIN DESCRIPTION - PAIN TYPE: TYPE: ACUTE PAIN

## 2019-07-18 NOTE — ED PROVIDER NOTES
Department of Emergency Medicine   ED  Provider Note  Admit Date/RoomTime: 7/18/2019  2:12 AM  ED Room: 05/05     HPI:   Miguelito Santana is a 25 y.o. female presenting to the ED for sickle cell crisis, beginning 2 days ago. The complaint has been persistent, moderate in severity, and worsened by nothing. Pt with Hx of DVT, sickle cell, PE, PNA, presents to the ED due to a sickle cell crisis that began 2 days ago. Pt states her crisis is typical and consistent with her usual crisis, no new sx. She complains of chest pain, lower back pain, and hip pain with radiation down the lower extremities. Pt also notes mild SOB, worse when coughing, denies any production. Pt took ibuprofen 6 hours PTA. She reports not having her prescribed pain meds at home, but confirms she has been taking her Lovenox consistently. Patient denies fever/chills, edema, headache, abdominal pain, nausea, vomiting, diarrhea, constipation, hematochezia, melena, incontinence, dysuria, hematuria, trauma, neck pain or other complaints. ROS:   Pertinent positives and negatives are stated within HPI, all other systems reviewed and are negative.    ---------------------------------------- PAST HISTORY -------------------------------------------  Past Medical History:  has a past medical history of CLABSI (central line-associated bloodstream infection), Deep vein thrombosis (DVT) of left upper extremity ([address] Disease of blood and blood forming organ, Heart murmur, 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[address] Miscarriage within last 12 months, Obesity, Class III, BMI 40-49.9 (morbid obesity) ([address] PE (pulmonary embolism), PE (pulmonary embolism), Pneumonia, Psychiatric problem, Sickle cell anemia ([address] and Thrombosis complicating venous access device ([address] Past Surgical History:  has a past surgical history that includes knee surgery; Tonsillectomy; Tunneled venous port placement (2014);  Tunneled venous port placement (Right, 01/23/2015); other surgical Interpretation: Normal    --------------------------------------------PHYSICAL EXAM--------------------------------------  Constitutional/General: Alert and oriented x3, well appearing, mild distress  Head: Normocephalic and atraumatic  Eyes: PERRL, EOMI, conjunctiva normal, sclera non icteric  Mouth: Oropharynx clear and handling secretions  Neck: Supple, full ROM, No tenderness  Respiratory: Lungs clear to auscultation bilaterally, no wheezes, rales, or rhonchi. Not in respiratory distress  Cardiovascular:  Regular rate. Regular rhythm. No murmurs, gallops, or rubs. 2+ distal pulses  Chest: No chest wall tenderness, power port noted in the left upper chest  GI:  Abdomen Soft, Non tender, Non distended. +BS. No rebound, guarding, or rigidity. No pulsatile masses. Musculoskeletal: Moves all extremities x 4. Warm and well perfused, no clubbing, cyanosis, or edema. Integument: Skin warm and dry. No rashes. Neurologic: GCS 15, no focal deficits, symmetric strength 5/5 in the upper and lower extremities bilaterally x4. Cranial nerves II-XII grossly intact. Psych: Normal Affect    ------------------------- ED COURSE/MEDICAL DECISION MAKING----------------------  Medications   0.9 % sodium chloride bolus (1,000 mLs Intravenous New Bag 7/18/19 0233)   morphine sulfate (PF) injection 4 mg (4 mg Intravenous Given 7/18/19 0303)   promethazine (PHENERGAN) injection 12.5 mg (12.5 mg Intramuscular Given 7/18/19 0302)   diphenhydrAMINE (BENADRYL) injection 25 mg (25 mg Intravenous Given 7/18/19 0303)       Medical Decision Making:          Re-Evaluation:  Re-evaluation. Medicated and made aware of findings. Patient to follow-up with her family doctor. Vital signs are stable. Plan will be to discharge home    Consultation:    Counseling:    The emergency provider has spoken with the patient and discussed todays results, in addition to providing specific details for the plan of care and counseling regarding the diagnosis

## 2019-08-12 ENCOUNTER — HOSPITAL ENCOUNTER (EMERGENCY)
Age: 24
Discharge: HOME | End: 2019-08-12
Attending: EMERGENCY MEDICINE
Payer: MEDICAID

## 2019-08-12 ENCOUNTER — APPOINTMENT (OUTPATIENT)
Dept: GENERAL RADIOLOGY | Age: 24
End: 2019-08-12
Payer: MEDICAID

## 2019-08-12 VITALS
SYSTOLIC BLOOD PRESSURE: 98 MMHG | WEIGHT: 290 LBS | BODY MASS INDEX: 51.38 KG/M2 | RESPIRATION RATE: 14 BRPM | HEIGHT: 63 IN | DIASTOLIC BLOOD PRESSURE: 58 MMHG | HEART RATE: 89 BPM | TEMPERATURE: 97.8 F | OXYGEN SATURATION: 97 %

## 2019-08-12 DIAGNOSIS — R07.9 CHEST PAIN, UNSPECIFIED TYPE: ICD-10-CM

## 2019-08-12 DIAGNOSIS — D57.00 SICKLE CELL PAIN CRISIS (HCC): Primary | ICD-10-CM

## 2019-08-12 DIAGNOSIS — M79.604 LEG PAIN, BILATERAL: ICD-10-CM

## 2019-08-12 DIAGNOSIS — M79.605 LEG PAIN, BILATERAL: ICD-10-CM

## 2019-08-12 LAB
ALBUMIN SERPL-MCNC: 4.2 G/DL (ref 3.5–5.2)
ALP BLD-CCNC: 101 U/L (ref 35–104)
ALT SERPL-CCNC: 6 U/L (ref 0–32)
ANION GAP SERPL CALCULATED.3IONS-SCNC: 10 MMOL/L (ref 7–16)
ANISOCYTOSIS: ABNORMAL
AST SERPL-CCNC: 15 U/L (ref 0–31)
ATYPICAL LYMPHOCYTE RELATIVE PERCENT: 0.9 % (ref 0–4)
BACTERIA: ABNORMAL /HPF
BASOPHILS ABSOLUTE: 0 E9/L (ref 0–0.2)
BASOPHILS RELATIVE PERCENT: 0 % (ref 0–2)
BILIRUB SERPL-MCNC: 1.4 MG/DL (ref 0–1.2)
BILIRUBIN URINE: NEGATIVE
BLOOD, URINE: ABNORMAL
BUN BLDV-MCNC: 8 MG/DL (ref 6–20)
CALCIUM SERPL-MCNC: 9.3 MG/DL (ref 8.6–10.2)
CHLORIDE BLD-SCNC: 103 MMOL/L (ref 98–107)
CLARITY: CLEAR
CO2: 23 MMOL/L (ref 22–29)
COLOR: YELLOW
CREAT SERPL-MCNC: 0.9 MG/DL (ref 0.5–1)
EOSINOPHILS ABSOLUTE: 0.09 E9/L (ref 0.05–0.5)
EOSINOPHILS RELATIVE PERCENT: 0.8 % (ref 0–6)
EPITHELIAL CELLS, UA: ABNORMAL /HPF
GFR AFRICAN AMERICAN: >60
GFR NON-AFRICAN AMERICAN: >60 ML/MIN/1.73
GLUCOSE BLD-MCNC: 101 MG/DL (ref 74–99)
GLUCOSE URINE: NEGATIVE MG/DL
HCG, URINE, POC: NEGATIVE
HCT VFR BLD CALC: 30.4 % (ref 34–48)
HEMOGLOBIN: 10.2 G/DL (ref 11.5–15.5)
HYPOCHROMIA: ABNORMAL
IMMATURE RETIC FRACT: 24.8 % (ref 3–15.9)
KETONES, URINE: NEGATIVE MG/DL
LEUKOCYTE ESTERASE, URINE: ABNORMAL
LYMPHOCYTES ABSOLUTE: 3.7 E9/L (ref 1.5–4)
LYMPHOCYTES RELATIVE PERCENT: 31.9 % (ref 20–42)
Lab: NORMAL
MCH RBC QN AUTO: 20.5 PG (ref 26–35)
MCHC RBC AUTO-ENTMCNC: 33.6 % (ref 32–34.5)
MCV RBC AUTO: 61 FL (ref 80–99.9)
METAMYELOCYTES RELATIVE PERCENT: 0.9 % (ref 0–1)
MONOCYTES ABSOLUTE: 0.11 E9/L (ref 0.1–0.95)
MONOCYTES RELATIVE PERCENT: 0.8 % (ref 2–12)
MYELOCYTE PERCENT: 0.9 % (ref 0–0)
NEGATIVE QC PASS/FAIL: NORMAL
NEUTROPHILS ABSOLUTE: 7.39 E9/L (ref 1.8–7.3)
NEUTROPHILS RELATIVE PERCENT: 63.8 % (ref 43–80)
NITRITE, URINE: NEGATIVE
NUCLEATED RED BLOOD CELLS: 0 /100 WBC
PDW BLD-RTO: 21 FL (ref 11.5–15)
PH UA: 5 (ref 5–9)
PLATELET # BLD: 311 E9/L (ref 130–450)
PMV BLD AUTO: ABNORMAL FL (ref 7–12)
POIKILOCYTES: ABNORMAL
POSITIVE QC PASS/FAIL: NORMAL
POTASSIUM SERPL-SCNC: 4.1 MMOL/L (ref 3.5–5)
PROTEIN UA: ABNORMAL MG/DL
RBC # BLD: 4.98 E12/L (ref 3.5–5.5)
RBC UA: >20 /HPF (ref 0–2)
RETIC HGB EQUIVALENT: 26.1 PG (ref 28.2–36.6)
RETICULOCYTE ABSOLUTE COUNT: 0.1 E12/L
RETICULOCYTE COUNT PCT: 2.1 % (ref 0.4–1.9)
ROULEAUX: ABNORMAL
SODIUM BLD-SCNC: 136 MMOL/L (ref 132–146)
SPECIFIC GRAVITY UA: 1.02 (ref 1–1.03)
TARGET CELLS: ABNORMAL
TOTAL PROTEIN: 8.2 G/DL (ref 6.4–8.3)
TROPONIN: <0.01 NG/ML (ref 0–0.03)
UROBILINOGEN, URINE: 0.2 E.U./DL
WBC # BLD: 11.2 E9/L (ref 4.5–11.5)
WBC UA: >20 /HPF (ref 0–5)

## 2019-08-12 PROCEDURE — 96375 TX/PRO/DX INJ NEW DRUG ADDON: CPT

## 2019-08-12 PROCEDURE — 87088 URINE BACTERIA CULTURE: CPT

## 2019-08-12 PROCEDURE — 71045 X-RAY EXAM CHEST 1 VIEW: CPT

## 2019-08-12 PROCEDURE — 6360000002 HC RX W HCPCS: Performed by: EMERGENCY MEDICINE

## 2019-08-12 PROCEDURE — 81001 URINALYSIS AUTO W/SCOPE: CPT

## 2019-08-12 PROCEDURE — 99284 EMERGENCY DEPT VISIT MOD MDM: CPT

## 2019-08-12 PROCEDURE — 2580000003 HC RX 258: Performed by: NURSE PRACTITIONER

## 2019-08-12 PROCEDURE — 85025 COMPLETE CBC W/AUTO DIFF WBC: CPT

## 2019-08-12 PROCEDURE — 93005 ELECTROCARDIOGRAM TRACING: CPT | Performed by: NURSE PRACTITIONER

## 2019-08-12 PROCEDURE — 84484 ASSAY OF TROPONIN QUANT: CPT

## 2019-08-12 PROCEDURE — 93005 ELECTROCARDIOGRAM TRACING: CPT | Performed by: EMERGENCY MEDICINE

## 2019-08-12 PROCEDURE — 6360000002 HC RX W HCPCS: Performed by: STUDENT IN AN ORGANIZED HEALTH CARE EDUCATION/TRAINING PROGRAM

## 2019-08-12 PROCEDURE — 85045 AUTOMATED RETICULOCYTE COUNT: CPT

## 2019-08-12 PROCEDURE — 80053 COMPREHEN METABOLIC PANEL: CPT

## 2019-08-12 PROCEDURE — 96374 THER/PROPH/DIAG INJ IV PUSH: CPT

## 2019-08-12 RX ORDER — MORPHINE SULFATE 4 MG/ML
4 INJECTION, SOLUTION INTRAMUSCULAR; INTRAVENOUS ONCE
Status: COMPLETED | OUTPATIENT
Start: 2019-08-12 | End: 2019-08-12

## 2019-08-12 RX ORDER — 0.9 % SODIUM CHLORIDE 0.9 %
1000 INTRAVENOUS SOLUTION INTRAVENOUS ONCE
Status: COMPLETED | OUTPATIENT
Start: 2019-08-12 | End: 2019-08-12

## 2019-08-12 RX ORDER — ONDANSETRON 2 MG/ML
4 INJECTION INTRAMUSCULAR; INTRAVENOUS ONCE
Status: COMPLETED | OUTPATIENT
Start: 2019-08-12 | End: 2019-08-12

## 2019-08-12 RX ORDER — MORPHINE SULFATE 4 MG/ML
8 INJECTION, SOLUTION INTRAMUSCULAR; INTRAVENOUS ONCE
Status: DISCONTINUED | OUTPATIENT
Start: 2019-08-12 | End: 2019-08-12

## 2019-08-12 RX ORDER — HEPARIN SODIUM (PORCINE) LOCK FLUSH IV SOLN 100 UNIT/ML 100 UNIT/ML
100 SOLUTION INTRAVENOUS PRN
Status: DISCONTINUED | OUTPATIENT
Start: 2019-08-12 | End: 2019-08-13 | Stop reason: HOSPADM

## 2019-08-12 RX ADMIN — SODIUM CHLORIDE 1000 ML: 9 INJECTION, SOLUTION INTRAVENOUS at 19:22

## 2019-08-12 RX ADMIN — Medication 100 UNITS: at 22:08

## 2019-08-12 RX ADMIN — HYDROMORPHONE HYDROCHLORIDE 1 MG: 1 INJECTION, SOLUTION INTRAMUSCULAR; INTRAVENOUS; SUBCUTANEOUS at 20:55

## 2019-08-12 RX ADMIN — MORPHINE SULFATE 4 MG: 4 INJECTION, SOLUTION INTRAMUSCULAR; INTRAVENOUS at 19:36

## 2019-08-12 RX ADMIN — ONDANSETRON 4 MG: 2 INJECTION INTRAMUSCULAR; INTRAVENOUS at 19:34

## 2019-08-12 ASSESSMENT — ENCOUNTER SYMPTOMS
SINUS PAIN: 0
DIARRHEA: 1
EYE PAIN: 0
VOMITING: 0
SHORTNESS OF BREATH: 0
NAUSEA: 1
COUGH: 0
CONSTIPATION: 0
ABDOMINAL PAIN: 1
EYE REDNESS: 0
SORE THROAT: 0

## 2019-08-12 ASSESSMENT — PAIN SCALES - GENERAL
PAINLEVEL_OUTOF10: 6
PAINLEVEL_OUTOF10: 7
PAINLEVEL_OUTOF10: 8
PAINLEVEL_OUTOF10: 9

## 2019-08-12 ASSESSMENT — PAIN DESCRIPTION - DESCRIPTORS: DESCRIPTORS: ACHING

## 2019-08-12 ASSESSMENT — PAIN DESCRIPTION - LOCATION: LOCATION: GENERALIZED

## 2019-08-12 NOTE — ED PROVIDER NOTES
sounds and intact distal pulses. Pulmonary/Chest: Effort normal and breath sounds normal. No respiratory distress. She has no wheezes. Abdominal: Soft. Bowel sounds are normal. She exhibits no distension and no mass. There is no tenderness. There is no guarding. Musculoskeletal: She exhibits no edema. Neurological: She is oriented to person, place, and time. Skin: Skin is warm and dry. Psychiatric: She has a normal mood and affect. Nursing note and vitals reviewed. Procedures    MDM        Patient presents to the ED for   Chief Complaint   Patient presents with    Sickle Cell Pain Crisis     CP and abd pain with diarrhea- SICKLE HX    Diarrhea   . Chart review was performed. Workup in the ED revealed sickle cell pain, chest pain, bilateral leg pain. Since symptoms started by previous symptoms when she is in a sickle cell pain crisis. EKG, chest x-ray, lab work was all drawn including reticulocyte count which was appropriately elevated. She was given Zofran and morphine for pain initially with no improvement and then given Dilaudid with good improvement of her pain. She was also given a liter of IV fluids. Patient continues to be non-toxic on re-evaluation. Findings were discussed with the patient and reasons to immediately return to the ED were articulated to them.  They will follow-up with their PMD.         --------------------------------------------- PAST HISTORY ---------------------------------------------  Past Medical History:  has a past medical history of CLABSI (central line-associated bloodstream infection), Deep vein thrombosis (DVT) of left upper extremity ([facility] Disease of blood and blood forming organ, Heart murmur, 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[facility] [address] Miscarriage within last 12 months, Obesity, Class III, BMI 40-49.9 (morbid obesity) ([facility] [address] PE (pulmonary embolism), PE (pulmonary embolism), Pneumonia, Psychiatric problem, Sickle cell anemia ([facility] [address] and Thrombosis complicating QC Pass/Fail Pass    EKG 12 Lead   Result Value Ref Range    Ventricular Rate 56 BPM    Atrial Rate 56 BPM    P-R Interval 144 ms    QRS Duration 80 ms    Q-T Interval 418 ms    QTc Calculation (Bazett) 403 ms    P Axis 38 degrees    R Axis 41 degrees    T Axis 17 degrees       Radiology:  XR CHEST PORTABLE   Final Result      No airspace opacities or pleural effusion.                ------------------------- NURSING NOTES AND VITALS REVIEWED ---------------------------  Date / Time Roomed:  8/12/2019  7:00 PM  ED Bed Assignment:  02/02    The nursing notes within the ED encounter and vital signs as below have been reviewed. BP (!) 98/58   Pulse 89   Temp 97.8 °F (36.6 °C) (Oral)   Resp 14   Ht 5' 3\" (1.6 m)   Wt 290 lb (131.5 kg)   LMP 08/11/2019 (Exact Date)   SpO2 97%   BMI 51.37 kg/m²           --------------------------------- ADDITIONAL PROVIDER NOTES ---------------------------------  At this time the patient is without objective evidence of an acute process requiring hospitalization or inpatient management. They have remained hemodynamically stable throughout their entire ED visit and are stable for discharge with outpatient follow-up. The plan has been discussed in detail and they are aware of the specific conditions for emergent return, as well as the importance of follow-up. Discharge Medication List as of 8/12/2019  8:51 PM          Diagnosis:  1. Sickle cell pain crisis ([facility] [address]    2. Chest pain, unspecified type    3. Leg pain, bilateral        Disposition:  Patient's disposition: Discharge  Patient's condition is stable.            Sudhakar Allen DO  Resident  08/12/19 0272

## 2019-08-13 LAB
EKG ATRIAL RATE: 56 BPM
EKG ATRIAL RATE: 80 BPM
EKG P AXIS: 38 DEGREES
EKG P AXIS: 50 DEGREES
EKG P-R INTERVAL: 144 MS
EKG P-R INTERVAL: 150 MS
EKG Q-T INTERVAL: 396 MS
EKG Q-T INTERVAL: 418 MS
EKG QRS DURATION: 80 MS
EKG QRS DURATION: 86 MS
EKG QTC CALCULATION (BAZETT): 403 MS
EKG QTC CALCULATION (BAZETT): 456 MS
EKG R AXIS: 38 DEGREES
EKG R AXIS: 41 DEGREES
EKG T AXIS: 17 DEGREES
EKG T AXIS: 43 DEGREES
EKG VENTRICULAR RATE: 56 BPM
EKG VENTRICULAR RATE: 80 BPM

## 2019-08-13 PROCEDURE — 93010 ELECTROCARDIOGRAM REPORT: CPT | Performed by: INTERNAL MEDICINE

## 2019-08-14 LAB — URINE CULTURE, ROUTINE: NORMAL

## 2019-09-04 ENCOUNTER — HOSPITAL ENCOUNTER (EMERGENCY)
Age: 24
Discharge: HOME | End: 2019-09-05
Payer: MEDICAID

## 2019-09-04 DIAGNOSIS — D57.1 HB-SS DISEASE WITHOUT CRISIS (HCC): Primary | ICD-10-CM

## 2019-09-04 PROCEDURE — 99284 EMERGENCY DEPT VISIT MOD MDM: CPT

## 2019-09-04 RX ORDER — 0.9 % SODIUM CHLORIDE 0.9 %
1000 INTRAVENOUS SOLUTION INTRAVENOUS ONCE
Status: COMPLETED | OUTPATIENT
Start: 2019-09-04 | End: 2019-09-05

## 2019-09-04 ASSESSMENT — PAIN DESCRIPTION - PAIN TYPE: TYPE: ACUTE PAIN

## 2019-09-04 ASSESSMENT — PAIN DESCRIPTION - LOCATION: LOCATION: BACK;CHEST;LEG

## 2019-09-04 ASSESSMENT — PAIN DESCRIPTION - DESCRIPTORS: DESCRIPTORS: SHARP;ACHING

## 2019-09-04 ASSESSMENT — PAIN SCALES - GENERAL: PAINLEVEL_OUTOF10: 9

## 2019-09-05 VITALS
HEART RATE: 90 BPM | TEMPERATURE: 97.8 F | DIASTOLIC BLOOD PRESSURE: 84 MMHG | WEIGHT: 293 LBS | OXYGEN SATURATION: 98 % | RESPIRATION RATE: 16 BRPM | HEIGHT: 63 IN | SYSTOLIC BLOOD PRESSURE: 123 MMHG | BODY MASS INDEX: 51.91 KG/M2

## 2019-09-05 LAB
ALBUMIN SERPL-MCNC: 4 G/DL (ref 3.5–5.2)
ALP BLD-CCNC: 93 U/L (ref 35–104)
ALT SERPL-CCNC: 8 U/L (ref 0–32)
ANION GAP SERPL CALCULATED.3IONS-SCNC: 9 MMOL/L (ref 7–16)
ANISOCYTOSIS: ABNORMAL
AST SERPL-CCNC: 14 U/L (ref 0–31)
BASOPHILS ABSOLUTE: 0 E9/L (ref 0–0.2)
BASOPHILS RELATIVE PERCENT: 0.3 % (ref 0–2)
BILIRUB SERPL-MCNC: 0.8 MG/DL (ref 0–1.2)
BUN BLDV-MCNC: 9 MG/DL (ref 6–20)
CALCIUM SERPL-MCNC: 9 MG/DL (ref 8.6–10.2)
CHLORIDE BLD-SCNC: 105 MMOL/L (ref 98–107)
CO2: 26 MMOL/L (ref 22–29)
CREAT SERPL-MCNC: 0.9 MG/DL (ref 0.5–1)
EOSINOPHILS ABSOLUTE: 0.13 E9/L (ref 0.05–0.5)
EOSINOPHILS RELATIVE PERCENT: 0.9 % (ref 0–6)
GFR AFRICAN AMERICAN: >60
GFR NON-AFRICAN AMERICAN: >60 ML/MIN/1.73
GLUCOSE BLD-MCNC: 113 MG/DL (ref 74–99)
HCG, URINE, POC: NEGATIVE
HCT VFR BLD CALC: 28.6 % (ref 34–48)
HEMOGLOBIN: 9.7 G/DL (ref 11.5–15.5)
HYPOCHROMIA: ABNORMAL
IMMATURE RETIC FRACT: 28.8 % (ref 3–15.9)
LYMPHOCYTES ABSOLUTE: 5.26 E9/L (ref 1.5–4)
LYMPHOCYTES RELATIVE PERCENT: 35.7 % (ref 20–42)
Lab: NORMAL
MCH RBC QN AUTO: 20.7 PG (ref 26–35)
MCHC RBC AUTO-ENTMCNC: 33.9 % (ref 32–34.5)
MCV RBC AUTO: 61 FL (ref 80–99.9)
MONOCYTES ABSOLUTE: 0.44 E9/L (ref 0.1–0.95)
MONOCYTES RELATIVE PERCENT: 2.6 % (ref 2–12)
NEGATIVE QC PASS/FAIL: NORMAL
NEUTROPHILS ABSOLUTE: 8.91 E9/L (ref 1.8–7.3)
NEUTROPHILS RELATIVE PERCENT: 60.9 % (ref 43–80)
PDW BLD-RTO: 20.3 FL (ref 11.5–15)
PLATELET # BLD: 262 E9/L (ref 130–450)
PMV BLD AUTO: ABNORMAL FL (ref 7–12)
POLYCHROMASIA: ABNORMAL
POSITIVE QC PASS/FAIL: NORMAL
POTASSIUM SERPL-SCNC: 3.9 MMOL/L (ref 3.5–5)
RBC # BLD: 4.69 E12/L (ref 3.5–5.5)
RETIC HGB EQUIVALENT: 23.5 PG (ref 28.2–36.6)
RETICULOCYTE ABSOLUTE COUNT: 0.11 E12/L
RETICULOCYTE COUNT PCT: 2.3 % (ref 0.4–1.9)
SODIUM BLD-SCNC: 140 MMOL/L (ref 132–146)
TARGET CELLS: ABNORMAL
TEAR DROP CELLS: ABNORMAL
TOTAL PROTEIN: 7.5 G/DL (ref 6.4–8.3)
WBC # BLD: 14.6 E9/L (ref 4.5–11.5)

## 2019-09-05 PROCEDURE — 96375 TX/PRO/DX INJ NEW DRUG ADDON: CPT

## 2019-09-05 PROCEDURE — 80053 COMPREHEN METABOLIC PANEL: CPT

## 2019-09-05 PROCEDURE — 96376 TX/PRO/DX INJ SAME DRUG ADON: CPT

## 2019-09-05 PROCEDURE — 6360000002 HC RX W HCPCS: Performed by: NURSE PRACTITIONER

## 2019-09-05 PROCEDURE — 36415 COLL VENOUS BLD VENIPUNCTURE: CPT

## 2019-09-05 PROCEDURE — 85025 COMPLETE CBC W/AUTO DIFF WBC: CPT

## 2019-09-05 PROCEDURE — 85045 AUTOMATED RETICULOCYTE COUNT: CPT

## 2019-09-05 PROCEDURE — 2580000003 HC RX 258: Performed by: NURSE PRACTITIONER

## 2019-09-05 PROCEDURE — 96374 THER/PROPH/DIAG INJ IV PUSH: CPT

## 2019-09-05 RX ORDER — HEPARIN SODIUM (PORCINE) LOCK FLUSH IV SOLN 100 UNIT/ML 100 UNIT/ML
SOLUTION INTRAVENOUS
Status: DISCONTINUED
Start: 2019-09-05 | End: 2019-09-05 | Stop reason: HOSPADM

## 2019-09-05 RX ORDER — OXYCODONE HYDROCHLORIDE AND ACETAMINOPHEN 5; 325 MG/1; MG/1
1 TABLET ORAL EVERY 6 HOURS PRN
Qty: 6 TABLET | Refills: 0 | Status: SHIPPED | OUTPATIENT
Start: 2019-09-05 | End: 2019-09-06

## 2019-09-05 RX ORDER — DIPHENHYDRAMINE HYDROCHLORIDE 50 MG/ML
12.5 INJECTION INTRAMUSCULAR; INTRAVENOUS ONCE
Status: COMPLETED | OUTPATIENT
Start: 2019-09-05 | End: 2019-09-05

## 2019-09-05 RX ADMIN — SODIUM CHLORIDE 1000 ML: 9 INJECTION, SOLUTION INTRAVENOUS at 00:28

## 2019-09-05 RX ADMIN — HYDROMORPHONE HYDROCHLORIDE 1 MG: 1 INJECTION, SOLUTION INTRAMUSCULAR; INTRAVENOUS; SUBCUTANEOUS at 00:28

## 2019-09-05 RX ADMIN — HYDROMORPHONE HYDROCHLORIDE 1 MG: 1 INJECTION, SOLUTION INTRAMUSCULAR; INTRAVENOUS; SUBCUTANEOUS at 01:23

## 2019-09-05 RX ADMIN — DIPHENHYDRAMINE HYDROCHLORIDE 12.5 MG: 50 INJECTION, SOLUTION INTRAMUSCULAR; INTRAVENOUS at 02:12

## 2019-09-05 ASSESSMENT — PAIN SCALES - GENERAL
PAINLEVEL_OUTOF10: 8
PAINLEVEL_OUTOF10: 9

## 2019-09-05 NOTE — ED PROVIDER NOTES
[facility]       Department of Emergency Medicine   ED  Provider Note  Admit Date/RoomTime: 9/4/2019 11:31 PM  ED Room: 22/22  MRN: [id number]  Chief Complaint: Sickle Cell Pain Crisis (Patient states sickle cell pain crisis past few days, chest/back/legs )       History of Present Illness   Source of history provided by:  patient. History/Exam Limitations: none. Rita Hester is a 25 y.o. female who has a past medical history of:   Past Medical History:   Diagnosis Date    CLABSI (central line-associated bloodstream infection) 8/3/2016    Deep vein thrombosis (DVT) of left upper extremity ([facility] 8/12/2016    Disease of blood and blood forming organ     Heart murmur     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[facility]     Miscarriage within last 12 months 04/2017    Obesity, Class III, BMI 40-49.9 (morbid obesity) ([facility] [address] 5/22/2014    PE (pulmonary embolism)     PE (pulmonary embolism)     Pneumonia     Psychiatric problem     Sickle cell anemia (HCC)     Thrombosis complicating venous access device ([facility] [address] 5/18/2017    presents to the ED by private car and is alone for chest, back, and bilateral leg pain that she associates with sickle cell disease, beginning 3 days ago and are unchanged since that time. The complaint has been persistent, moderate in severity. Patient states that these are her typical symptoms with sickle cell flareups. Patient states that she is scheduled to be initially seen by pain management in 2 days. She states nothing makes her symptoms better or worse. She denies headache, fever, chills, cough, shortness of breath, abdominal pain, nausea, vomiting, diarrhea, constipation, dysuria, rash, pregnancy. ROS    Pertinent positives and negatives are stated within HPI, all other systems reviewed and are negative.     Past Surgical History:   Procedure Laterality Date    INSERTION / REMOVAL / REPLACEMENT VENOUS ACCESS CATHETER Left 5/9/2019    PORT INSERTION performed by Jeremias De La Cruz MD at

## 2019-09-05 NOTE — ED NOTES
Patient has port and prefers to wait until accessed for blood draws.       570 Susan Gonzales RN  09/04/19 6898

## 2019-10-23 ENCOUNTER — HOSPITAL ENCOUNTER (INPATIENT)
Age: 24
LOS: 3 days | Discharge: HOME | DRG: 662 | End: 2019-10-26
Attending: EMERGENCY MEDICINE | Admitting: INTERNAL MEDICINE
Payer: MEDICAID

## 2019-10-23 ENCOUNTER — APPOINTMENT (OUTPATIENT)
Dept: GENERAL RADIOLOGY | Age: 24
DRG: 662 | End: 2019-10-23
Payer: MEDICAID

## 2019-10-23 DIAGNOSIS — D57.00 HB-SS DISEASE WITH CRISIS (HCC): Primary | ICD-10-CM

## 2019-10-23 LAB
ANION GAP SERPL CALCULATED.3IONS-SCNC: 11 MMOL/L (ref 7–16)
ANISOCYTOSIS: ABNORMAL
BASOPHILS ABSOLUTE: 0 E9/L (ref 0–0.2)
BASOPHILS RELATIVE PERCENT: 0 % (ref 0–2)
BUN BLDV-MCNC: 8 MG/DL (ref 6–20)
CALCIUM SERPL-MCNC: 8.4 MG/DL (ref 8.6–10.2)
CHLORIDE BLD-SCNC: 105 MMOL/L (ref 98–107)
CO2: 24 MMOL/L (ref 22–29)
CREAT SERPL-MCNC: 0.8 MG/DL (ref 0.5–1)
EOSINOPHILS ABSOLUTE: 0.19 E9/L (ref 0.05–0.5)
EOSINOPHILS RELATIVE PERCENT: 1.8 % (ref 0–6)
FERRITIN: 105 NG/ML
GFR AFRICAN AMERICAN: >60
GFR NON-AFRICAN AMERICAN: >60 ML/MIN/1.73
GLUCOSE BLD-MCNC: 104 MG/DL (ref 74–99)
HAPTOGLOBIN: 113 MG/DL (ref 30–200)
HCG, URINE, POC: NEGATIVE
HCT VFR BLD CALC: 21.6 % (ref 34–48)
HCT VFR BLD CALC: 21.7 % (ref 34–48)
HCT VFR BLD CALC: 23.9 % (ref 34–48)
HEMOGLOBIN: 7.1 G/DL (ref 11.5–15.5)
HEMOGLOBIN: 7.8 G/DL (ref 11.5–15.5)
HYPOCHROMIA: ABNORMAL
IMMATURE RETIC FRACT: 32.2 % (ref 3–15.9)
IMMATURE RETIC FRACT: 35.1 % (ref 3–15.9)
IRON SATURATION: 25 % (ref 15–50)
IRON: 59 MCG/DL (ref 37–145)
LACTATE DEHYDROGENASE: 246 U/L (ref 135–214)
LYMPHOCYTES ABSOLUTE: 1.8 E9/L (ref 1.5–4)
LYMPHOCYTES RELATIVE PERCENT: 16.8 % (ref 20–42)
Lab: NORMAL
MCH RBC QN AUTO: 20.9 PG (ref 26–35)
MCHC RBC AUTO-ENTMCNC: 32.6 % (ref 32–34.5)
MCV RBC AUTO: 64.1 FL (ref 80–99.9)
MONOCYTES ABSOLUTE: 0.64 E9/L (ref 0.1–0.95)
MONOCYTES RELATIVE PERCENT: 6.2 % (ref 2–12)
NEGATIVE QC PASS/FAIL: NORMAL
NEUTROPHILS ABSOLUTE: 7.95 E9/L (ref 1.8–7.3)
NEUTROPHILS RELATIVE PERCENT: 75.2 % (ref 43–80)
NUCLEATED RED BLOOD CELLS: 0.9 /100 WBC
PDW BLD-RTO: 21.4 FL (ref 11.5–15)
PLATELET # BLD: 200 E9/L (ref 130–450)
PMV BLD AUTO: ABNORMAL FL (ref 7–12)
POLYCHROMASIA: ABNORMAL
POSITIVE QC PASS/FAIL: NORMAL
POTASSIUM REFLEX MAGNESIUM: 3.8 MMOL/L (ref 3.5–5)
PRO-BNP: 416 PG/ML (ref 0–125)
RBC # BLD: 3.73 E12/L (ref 3.5–5.5)
RETIC HGB EQUIVALENT: 22.8 PG (ref 28.2–36.6)
RETIC HGB EQUIVALENT: 26.5 PG (ref 28.2–36.6)
RETICULOCYTE ABSOLUTE COUNT: 0.13 E12/L
RETICULOCYTE ABSOLUTE COUNT: 0.15 E12/L
RETICULOCYTE COUNT PCT: 3.9 % (ref 0.4–1.9)
RETICULOCYTE COUNT PCT: 4.1 % (ref 0.4–1.9)
SODIUM BLD-SCNC: 140 MMOL/L (ref 132–146)
TARGET CELLS: ABNORMAL
TOTAL IRON BINDING CAPACITY: 234 MCG/DL (ref 250–450)
TRANSFERRIN: 176 MG/DL (ref 200–360)
TROPONIN: <0.01 NG/ML (ref 0–0.03)
WBC # BLD: 10.6 E9/L (ref 4.5–11.5)

## 2019-10-23 PROCEDURE — 83880 ASSAY OF NATRIURETIC PEPTIDE: CPT

## 2019-10-23 PROCEDURE — 84484 ASSAY OF TROPONIN QUANT: CPT

## 2019-10-23 PROCEDURE — 99285 EMERGENCY DEPT VISIT HI MDM: CPT

## 2019-10-23 PROCEDURE — 6360000002 HC RX W HCPCS: Performed by: INTERNAL MEDICINE

## 2019-10-23 PROCEDURE — 85018 HEMOGLOBIN: CPT

## 2019-10-23 PROCEDURE — 80048 BASIC METABOLIC PNL TOTAL CA: CPT

## 2019-10-23 PROCEDURE — 93005 ELECTROCARDIOGRAM TRACING: CPT | Performed by: EMERGENCY MEDICINE

## 2019-10-23 PROCEDURE — 85014 HEMATOCRIT: CPT

## 2019-10-23 PROCEDURE — 83615 LACTATE (LD) (LDH) ENZYME: CPT

## 2019-10-23 PROCEDURE — 96375 TX/PRO/DX INJ NEW DRUG ADDON: CPT

## 2019-10-23 PROCEDURE — 6370000000 HC RX 637 (ALT 250 FOR IP): Performed by: INTERNAL MEDICINE

## 2019-10-23 PROCEDURE — 6360000002 HC RX W HCPCS: Performed by: EMERGENCY MEDICINE

## 2019-10-23 PROCEDURE — 85025 COMPLETE CBC W/AUTO DIFF WBC: CPT

## 2019-10-23 PROCEDURE — 83550 IRON BINDING TEST: CPT

## 2019-10-23 PROCEDURE — 83010 ASSAY OF HAPTOGLOBIN QUANT: CPT

## 2019-10-23 PROCEDURE — 71046 X-RAY EXAM CHEST 2 VIEWS: CPT

## 2019-10-23 PROCEDURE — 82728 ASSAY OF FERRITIN: CPT

## 2019-10-23 PROCEDURE — 99220 PR INITIAL OBSERVATION CARE/DAY 70 MINUTES: CPT | Performed by: INTERNAL MEDICINE

## 2019-10-23 PROCEDURE — G0378 HOSPITAL OBSERVATION PER HR: HCPCS

## 2019-10-23 PROCEDURE — 84466 ASSAY OF TRANSFERRIN: CPT

## 2019-10-23 PROCEDURE — 2580000003 HC RX 258: Performed by: EMERGENCY MEDICINE

## 2019-10-23 PROCEDURE — 83540 ASSAY OF IRON: CPT

## 2019-10-23 PROCEDURE — 85045 AUTOMATED RETICULOCYTE COUNT: CPT

## 2019-10-23 PROCEDURE — 2580000003 HC RX 258: Performed by: INTERNAL MEDICINE

## 2019-10-23 PROCEDURE — 36415 COLL VENOUS BLD VENIPUNCTURE: CPT

## 2019-10-23 PROCEDURE — 1200000000 HC SEMI PRIVATE

## 2019-10-23 PROCEDURE — 96374 THER/PROPH/DIAG INJ IV PUSH: CPT

## 2019-10-23 RX ORDER — HYDROXYUREA 500 MG/1
500 CAPSULE ORAL 2 TIMES DAILY
Status: DISCONTINUED | OUTPATIENT
Start: 2019-10-23 | End: 2019-10-26 | Stop reason: HOSPADM

## 2019-10-23 RX ORDER — HYDROXYUREA 500 MG/1
500 CAPSULE ORAL 2 TIMES DAILY
COMMUNITY

## 2019-10-23 RX ORDER — ESCITALOPRAM OXALATE 10 MG/1
10 TABLET ORAL DAILY
Status: DISCONTINUED | OUTPATIENT
Start: 2019-10-23 | End: 2019-10-26 | Stop reason: HOSPADM

## 2019-10-23 RX ORDER — FERROUS SULFATE 325(65) MG
325 TABLET ORAL 2 TIMES DAILY WITH MEALS
Status: DISCONTINUED | OUTPATIENT
Start: 2019-10-23 | End: 2019-10-26 | Stop reason: HOSPADM

## 2019-10-23 RX ORDER — ONDANSETRON 2 MG/ML
4 INJECTION INTRAMUSCULAR; INTRAVENOUS EVERY 6 HOURS PRN
Status: DISCONTINUED | OUTPATIENT
Start: 2019-10-23 | End: 2019-10-26 | Stop reason: HOSPADM

## 2019-10-23 RX ORDER — KETOROLAC TROMETHAMINE 30 MG/ML
15 INJECTION, SOLUTION INTRAMUSCULAR; INTRAVENOUS ONCE
Status: COMPLETED | OUTPATIENT
Start: 2019-10-23 | End: 2019-10-23

## 2019-10-23 RX ORDER — SODIUM CHLORIDE 0.9 % (FLUSH) 0.9 %
10 SYRINGE (ML) INJECTION PRN
Status: DISCONTINUED | OUTPATIENT
Start: 2019-10-23 | End: 2019-10-26 | Stop reason: HOSPADM

## 2019-10-23 RX ORDER — ONDANSETRON 2 MG/ML
4 INJECTION INTRAMUSCULAR; INTRAVENOUS ONCE
Status: COMPLETED | OUTPATIENT
Start: 2019-10-23 | End: 2019-10-23

## 2019-10-23 RX ORDER — SODIUM CHLORIDE 9 MG/ML
INJECTION, SOLUTION INTRAVENOUS CONTINUOUS
Status: DISCONTINUED | OUTPATIENT
Start: 2019-10-23 | End: 2019-10-26 | Stop reason: HOSPADM

## 2019-10-23 RX ORDER — SODIUM CHLORIDE 0.9 % (FLUSH) 0.9 %
10 SYRINGE (ML) INJECTION EVERY 12 HOURS SCHEDULED
Status: DISCONTINUED | OUTPATIENT
Start: 2019-10-23 | End: 2019-10-23 | Stop reason: SDUPTHER

## 2019-10-23 RX ORDER — SODIUM CHLORIDE 0.9 % (FLUSH) 0.9 %
10 SYRINGE (ML) INJECTION PRN
Status: DISCONTINUED | OUTPATIENT
Start: 2019-10-23 | End: 2019-10-23 | Stop reason: SDUPTHER

## 2019-10-23 RX ORDER — FOLIC ACID 1 MG/1
2 TABLET ORAL DAILY
Status: DISCONTINUED | OUTPATIENT
Start: 2019-10-23 | End: 2019-10-26 | Stop reason: HOSPADM

## 2019-10-23 RX ORDER — SODIUM CHLORIDE, SODIUM LACTATE, POTASSIUM CHLORIDE, CALCIUM CHLORIDE 600; 310; 30; 20 MG/100ML; MG/100ML; MG/100ML; MG/100ML
1000 INJECTION, SOLUTION INTRAVENOUS ONCE
Status: COMPLETED | OUTPATIENT
Start: 2019-10-23 | End: 2019-10-23

## 2019-10-23 RX ORDER — SODIUM CHLORIDE 0.9 % (FLUSH) 0.9 %
10 SYRINGE (ML) INJECTION EVERY 12 HOURS SCHEDULED
Status: DISCONTINUED | OUTPATIENT
Start: 2019-10-23 | End: 2019-10-26 | Stop reason: HOSPADM

## 2019-10-23 RX ORDER — ACETAMINOPHEN 325 MG/1
650 TABLET ORAL EVERY 4 HOURS PRN
Status: DISCONTINUED | OUTPATIENT
Start: 2019-10-23 | End: 2019-10-26 | Stop reason: HOSPADM

## 2019-10-23 RX ORDER — DIPHENHYDRAMINE HYDROCHLORIDE 50 MG/ML
25 INJECTION INTRAMUSCULAR; INTRAVENOUS ONCE
Status: COMPLETED | OUTPATIENT
Start: 2019-10-23 | End: 2019-10-23

## 2019-10-23 RX ADMIN — KETOROLAC TROMETHAMINE 15 MG: 30 INJECTION, SOLUTION INTRAMUSCULAR at 10:26

## 2019-10-23 RX ADMIN — SODIUM CHLORIDE: 9 INJECTION, SOLUTION INTRAVENOUS at 15:14

## 2019-10-23 RX ADMIN — HYDROXYUREA 500 MG: 500 CAPSULE ORAL at 22:35

## 2019-10-23 RX ADMIN — HYDROMORPHONE HYDROCHLORIDE 0.5 MG: 1 INJECTION, SOLUTION INTRAMUSCULAR; INTRAVENOUS; SUBCUTANEOUS at 21:33

## 2019-10-23 RX ADMIN — ONDANSETRON 4 MG: 2 INJECTION INTRAMUSCULAR; INTRAVENOUS at 10:21

## 2019-10-23 RX ADMIN — HYDROMORPHONE HYDROCHLORIDE 1 MG: 1 INJECTION, SOLUTION INTRAMUSCULAR; INTRAVENOUS; SUBCUTANEOUS at 10:25

## 2019-10-23 RX ADMIN — HYDROMORPHONE HYDROCHLORIDE 0.5 MG: 1 INJECTION, SOLUTION INTRAMUSCULAR; INTRAVENOUS; SUBCUTANEOUS at 18:31

## 2019-10-23 RX ADMIN — ENOXAPARIN SODIUM 40 MG: 40 INJECTION SUBCUTANEOUS at 12:56

## 2019-10-23 RX ADMIN — FERROUS SULFATE TAB 325 MG (65 MG ELEMENTAL FE) 325 MG: 325 (65 FE) TAB at 16:48

## 2019-10-23 RX ADMIN — HYDROMORPHONE HYDROCHLORIDE 0.5 MG: 1 INJECTION, SOLUTION INTRAMUSCULAR; INTRAVENOUS; SUBCUTANEOUS at 15:25

## 2019-10-23 RX ADMIN — HYDROMORPHONE HYDROCHLORIDE 0.5 MG: 1 INJECTION, SOLUTION INTRAMUSCULAR; INTRAVENOUS; SUBCUTANEOUS at 12:01

## 2019-10-23 RX ADMIN — SODIUM CHLORIDE, POTASSIUM CHLORIDE, SODIUM LACTATE AND CALCIUM CHLORIDE 1000 ML: 600; 310; 30; 20 INJECTION, SOLUTION INTRAVENOUS at 10:21

## 2019-10-23 RX ADMIN — ESCITALOPRAM OXALATE 10 MG: 10 TABLET ORAL at 15:14

## 2019-10-23 RX ADMIN — DIPHENHYDRAMINE HYDROCHLORIDE 25 MG: 50 INJECTION, SOLUTION INTRAMUSCULAR; INTRAVENOUS at 10:25

## 2019-10-23 RX ADMIN — ENOXAPARIN SODIUM 135 MG: 150 INJECTION SUBCUTANEOUS at 21:34

## 2019-10-23 ASSESSMENT — ENCOUNTER SYMPTOMS
DIARRHEA: 0
WHEEZING: 0
SINUS PRESSURE: 0
ABDOMINAL DISTENTION: 0
COUGH: 0
EYE DISCHARGE: 0
NAUSEA: 0
BACK PAIN: 0
SHORTNESS OF BREATH: 0
SORE THROAT: 0
VOMITING: 0
EYE PAIN: 0
EYE REDNESS: 0

## 2019-10-23 ASSESSMENT — PAIN DESCRIPTION - ORIENTATION
ORIENTATION: RIGHT;LEFT
ORIENTATION: LEFT
ORIENTATION: RIGHT;LEFT
ORIENTATION: RIGHT;LEFT

## 2019-10-23 ASSESSMENT — PAIN DESCRIPTION - DESCRIPTORS
DESCRIPTORS: ACHING;DISCOMFORT;DULL
DESCRIPTORS: ACHING;DISCOMFORT;DULL
DESCRIPTORS: ACHING;SHARP;DISCOMFORT

## 2019-10-23 ASSESSMENT — PAIN DESCRIPTION - FREQUENCY
FREQUENCY: CONTINUOUS

## 2019-10-23 ASSESSMENT — PAIN - FUNCTIONAL ASSESSMENT
PAIN_FUNCTIONAL_ASSESSMENT: ACTIVITIES ARE NOT PREVENTED

## 2019-10-23 ASSESSMENT — PAIN DESCRIPTION - ONSET
ONSET: ON-GOING

## 2019-10-23 ASSESSMENT — PAIN SCALES - GENERAL
PAINLEVEL_OUTOF10: 10
PAINLEVEL_OUTOF10: 8
PAINLEVEL_OUTOF10: 8
PAINLEVEL_OUTOF10: 9
PAINLEVEL_OUTOF10: 0
PAINLEVEL_OUTOF10: 8
PAINLEVEL_OUTOF10: 8
PAINLEVEL_OUTOF10: 0

## 2019-10-23 ASSESSMENT — PAIN DESCRIPTION - PROGRESSION
CLINICAL_PROGRESSION: GRADUALLY WORSENING
CLINICAL_PROGRESSION: GRADUALLY IMPROVING
CLINICAL_PROGRESSION: GRADUALLY WORSENING
CLINICAL_PROGRESSION: GRADUALLY WORSENING

## 2019-10-23 ASSESSMENT — PAIN DESCRIPTION - PAIN TYPE
TYPE: ACUTE PAIN

## 2019-10-23 ASSESSMENT — PAIN DESCRIPTION - LOCATION
LOCATION: GENERALIZED
LOCATION: CHEST
LOCATION: GENERALIZED
LOCATION: GENERALIZED

## 2019-10-24 LAB
ABO/RH: NORMAL
ANION GAP SERPL CALCULATED.3IONS-SCNC: 8 MMOL/L (ref 7–16)
ANTIBODY SCREEN: NORMAL
BUN BLDV-MCNC: 9 MG/DL (ref 6–20)
CALCIUM SERPL-MCNC: 8 MG/DL (ref 8.6–10.2)
CHLORIDE BLD-SCNC: 104 MMOL/L (ref 98–107)
CO2: 25 MMOL/L (ref 22–29)
CREAT SERPL-MCNC: 0.8 MG/DL (ref 0.5–1)
DR. NOTIFY: NORMAL
EKG ATRIAL RATE: 93 BPM
EKG P AXIS: 42 DEGREES
EKG P-R INTERVAL: 150 MS
EKG Q-T INTERVAL: 382 MS
EKG QRS DURATION: 94 MS
EKG QTC CALCULATION (BAZETT): 474 MS
EKG R AXIS: 34 DEGREES
EKG T AXIS: 17 DEGREES
EKG VENTRICULAR RATE: 93 BPM
GFR AFRICAN AMERICAN: >60
GFR NON-AFRICAN AMERICAN: >60 ML/MIN/1.73
GLUCOSE BLD-MCNC: 114 MG/DL (ref 74–99)
HCT VFR BLD CALC: 21.6 % (ref 34–48)
HEMOGLOBIN: 7 G/DL (ref 11.5–15.5)
MAGNESIUM: 1.8 MG/DL (ref 1.6–2.6)
MCH RBC QN AUTO: 20.8 PG (ref 26–35)
MCHC RBC AUTO-ENTMCNC: 32.4 % (ref 32–34.5)
MCV RBC AUTO: 64.1 FL (ref 80–99.9)
PDW BLD-RTO: 21.2 FL (ref 11.5–15)
PLATELET # BLD: 183 E9/L (ref 130–450)
PMV BLD AUTO: 10.3 FL (ref 7–12)
POTASSIUM REFLEX MAGNESIUM: 3.2 MMOL/L (ref 3.5–5)
RBC # BLD: 3.37 E12/L (ref 3.5–5.5)
SODIUM BLD-SCNC: 137 MMOL/L (ref 132–146)
WBC # BLD: 10.4 E9/L (ref 4.5–11.5)

## 2019-10-24 PROCEDURE — 6360000002 HC RX W HCPCS: Performed by: INTERNAL MEDICINE

## 2019-10-24 PROCEDURE — 99233 SBSQ HOSP IP/OBS HIGH 50: CPT | Performed by: INTERNAL MEDICINE

## 2019-10-24 PROCEDURE — 86900 BLOOD TYPING SEROLOGIC ABO: CPT

## 2019-10-24 PROCEDURE — 83735 ASSAY OF MAGNESIUM: CPT

## 2019-10-24 PROCEDURE — P9016 RBC LEUKOCYTES REDUCED: HCPCS

## 2019-10-24 PROCEDURE — 36415 COLL VENOUS BLD VENIPUNCTURE: CPT

## 2019-10-24 PROCEDURE — 80048 BASIC METABOLIC PNL TOTAL CA: CPT

## 2019-10-24 PROCEDURE — 85027 COMPLETE CBC AUTOMATED: CPT

## 2019-10-24 PROCEDURE — 6370000000 HC RX 637 (ALT 250 FOR IP): Performed by: INTERNAL MEDICINE

## 2019-10-24 PROCEDURE — 86922 COMPATIBILITY TEST ANTIGLOB: CPT

## 2019-10-24 PROCEDURE — 1200000000 HC SEMI PRIVATE

## 2019-10-24 PROCEDURE — 2580000003 HC RX 258: Performed by: INTERNAL MEDICINE

## 2019-10-24 PROCEDURE — 93010 ELECTROCARDIOGRAM REPORT: CPT | Performed by: INTERNAL MEDICINE

## 2019-10-24 PROCEDURE — 86850 RBC ANTIBODY SCREEN: CPT

## 2019-10-24 PROCEDURE — G0378 HOSPITAL OBSERVATION PER HR: HCPCS

## 2019-10-24 PROCEDURE — 86901 BLOOD TYPING SEROLOGIC RH(D): CPT

## 2019-10-24 RX ORDER — 0.9 % SODIUM CHLORIDE 0.9 %
250 INTRAVENOUS SOLUTION INTRAVENOUS ONCE
Status: COMPLETED | OUTPATIENT
Start: 2019-10-24 | End: 2019-10-25

## 2019-10-24 RX ORDER — POTASSIUM CHLORIDE 20 MEQ/1
40 TABLET, EXTENDED RELEASE ORAL ONCE
Status: COMPLETED | OUTPATIENT
Start: 2019-10-24 | End: 2019-10-24

## 2019-10-24 RX ORDER — HYDROCODONE BITARTRATE AND ACETAMINOPHEN 5; 325 MG/1; MG/1
1 TABLET ORAL EVERY 4 HOURS PRN
Status: DISCONTINUED | OUTPATIENT
Start: 2019-10-24 | End: 2019-10-26 | Stop reason: HOSPADM

## 2019-10-24 RX ADMIN — ENOXAPARIN SODIUM 135 MG: 150 INJECTION SUBCUTANEOUS at 09:03

## 2019-10-24 RX ADMIN — FOLIC ACID 2 MG: 1 TABLET ORAL at 08:52

## 2019-10-24 RX ADMIN — ESCITALOPRAM OXALATE 10 MG: 10 TABLET ORAL at 08:53

## 2019-10-24 RX ADMIN — FERROUS SULFATE TAB 325 MG (65 MG ELEMENTAL FE) 325 MG: 325 (65 FE) TAB at 16:36

## 2019-10-24 RX ADMIN — HYDROMORPHONE HYDROCHLORIDE 0.5 MG: 1 INJECTION, SOLUTION INTRAMUSCULAR; INTRAVENOUS; SUBCUTANEOUS at 08:54

## 2019-10-24 RX ADMIN — SODIUM CHLORIDE: 9 INJECTION, SOLUTION INTRAVENOUS at 10:40

## 2019-10-24 RX ADMIN — HYDROMORPHONE HYDROCHLORIDE 0.5 MG: 1 INJECTION, SOLUTION INTRAMUSCULAR; INTRAVENOUS; SUBCUTANEOUS at 22:12

## 2019-10-24 RX ADMIN — HYDROMORPHONE HYDROCHLORIDE 0.5 MG: 1 INJECTION, SOLUTION INTRAMUSCULAR; INTRAVENOUS; SUBCUTANEOUS at 03:54

## 2019-10-24 RX ADMIN — POTASSIUM CHLORIDE 40 MEQ: 20 TABLET, EXTENDED RELEASE ORAL at 10:41

## 2019-10-24 RX ADMIN — SODIUM CHLORIDE: 9 INJECTION, SOLUTION INTRAVENOUS at 00:38

## 2019-10-24 RX ADMIN — HYDROXYUREA 500 MG: 500 CAPSULE ORAL at 08:53

## 2019-10-24 RX ADMIN — HYDROMORPHONE HYDROCHLORIDE 1 MG: 1 INJECTION, SOLUTION INTRAMUSCULAR; INTRAVENOUS; SUBCUTANEOUS at 05:11

## 2019-10-24 RX ADMIN — HYDROMORPHONE HYDROCHLORIDE 0.5 MG: 1 INJECTION, SOLUTION INTRAMUSCULAR; INTRAVENOUS; SUBCUTANEOUS at 18:29

## 2019-10-24 RX ADMIN — HYDROXYUREA 500 MG: 500 CAPSULE ORAL at 20:58

## 2019-10-24 RX ADMIN — HYDROCODONE BITARTRATE AND ACETAMINOPHEN 1 TABLET: 5; 325 TABLET ORAL at 16:36

## 2019-10-24 RX ADMIN — ENOXAPARIN SODIUM 135 MG: 150 INJECTION SUBCUTANEOUS at 20:58

## 2019-10-24 RX ADMIN — Medication 10 ML: at 08:54

## 2019-10-24 RX ADMIN — Medication 10 ML: at 18:29

## 2019-10-24 RX ADMIN — HYDROMORPHONE HYDROCHLORIDE 0.5 MG: 1 INJECTION, SOLUTION INTRAMUSCULAR; INTRAVENOUS; SUBCUTANEOUS at 00:36

## 2019-10-24 RX ADMIN — HYDROMORPHONE HYDROCHLORIDE 0.5 MG: 1 INJECTION, SOLUTION INTRAMUSCULAR; INTRAVENOUS; SUBCUTANEOUS at 15:14

## 2019-10-24 RX ADMIN — SODIUM CHLORIDE: 9 INJECTION, SOLUTION INTRAVENOUS at 19:44

## 2019-10-24 RX ADMIN — FERROUS SULFATE TAB 325 MG (65 MG ELEMENTAL FE) 325 MG: 325 (65 FE) TAB at 08:53

## 2019-10-24 RX ADMIN — HYDROMORPHONE HYDROCHLORIDE 0.5 MG: 1 INJECTION, SOLUTION INTRAMUSCULAR; INTRAVENOUS; SUBCUTANEOUS at 11:58

## 2019-10-24 RX ADMIN — HYDROCODONE BITARTRATE AND ACETAMINOPHEN 1 TABLET: 5; 325 TABLET ORAL at 20:58

## 2019-10-24 ASSESSMENT — PAIN DESCRIPTION - FREQUENCY
FREQUENCY: CONTINUOUS

## 2019-10-24 ASSESSMENT — PAIN DESCRIPTION - DESCRIPTORS
DESCRIPTORS: ACHING;CONSTANT;DISCOMFORT
DESCRIPTORS: ACHING;DISCOMFORT;CONSTANT
DESCRIPTORS: ACHING;CONSTANT;DISCOMFORT
DESCRIPTORS: ACHING;DISCOMFORT;CONSTANT
DESCRIPTORS: ACHING;CONSTANT;DISCOMFORT
DESCRIPTORS: ACHING;CONSTANT;DISCOMFORT
DESCRIPTORS: ACHING;SHARP;CONSTANT
DESCRIPTORS: ACHING;DISCOMFORT;DULL

## 2019-10-24 ASSESSMENT — PAIN DESCRIPTION - LOCATION
LOCATION: GENERALIZED
LOCATION: GENERALIZED;FLANK
LOCATION: GENERALIZED
LOCATION: FLANK;GENERALIZED
LOCATION: GENERALIZED
LOCATION: GENERALIZED
LOCATION: FLANK;GENERALIZED
LOCATION: GENERALIZED
LOCATION: GENERALIZED

## 2019-10-24 ASSESSMENT — PAIN - FUNCTIONAL ASSESSMENT

## 2019-10-24 ASSESSMENT — PAIN DESCRIPTION - ORIENTATION
ORIENTATION: LEFT
ORIENTATION: RIGHT;LEFT
ORIENTATION: LEFT
ORIENTATION: RIGHT;LEFT
ORIENTATION: LEFT
ORIENTATION: RIGHT;LEFT

## 2019-10-24 ASSESSMENT — PAIN SCALES - GENERAL
PAINLEVEL_OUTOF10: 7
PAINLEVEL_OUTOF10: 8
PAINLEVEL_OUTOF10: 9
PAINLEVEL_OUTOF10: 8
PAINLEVEL_OUTOF10: 9
PAINLEVEL_OUTOF10: 6
PAINLEVEL_OUTOF10: 0
PAINLEVEL_OUTOF10: 0
PAINLEVEL_OUTOF10: 7
PAINLEVEL_OUTOF10: 9
PAINLEVEL_OUTOF10: 7
PAINLEVEL_OUTOF10: 7
PAINLEVEL_OUTOF10: 8

## 2019-10-24 ASSESSMENT — PAIN DESCRIPTION - ONSET
ONSET: ON-GOING

## 2019-10-24 ASSESSMENT — PAIN DESCRIPTION - PROGRESSION
CLINICAL_PROGRESSION: NOT CHANGED
CLINICAL_PROGRESSION: GRADUALLY WORSENING
CLINICAL_PROGRESSION: NOT CHANGED
CLINICAL_PROGRESSION: GRADUALLY WORSENING

## 2019-10-24 ASSESSMENT — PAIN DESCRIPTION - PAIN TYPE
TYPE: CHRONIC PAIN;ACUTE PAIN
TYPE: ACUTE PAIN;CHRONIC PAIN
TYPE: ACUTE PAIN;CHRONIC PAIN
TYPE: ACUTE PAIN
TYPE: ACUTE PAIN;CHRONIC PAIN
TYPE: CHRONIC PAIN;ACUTE PAIN
TYPE: ACUTE PAIN;CHRONIC PAIN
TYPE: ACUTE PAIN;CHRONIC PAIN
TYPE: CHRONIC PAIN;ACUTE PAIN
TYPE: ACUTE PAIN
TYPE: ACUTE PAIN;CHRONIC PAIN
TYPE: CHRONIC PAIN;ACUTE PAIN
TYPE: CHRONIC PAIN;ACUTE PAIN

## 2019-10-25 LAB
BLOOD BANK DISPENSE STATUS: NORMAL
BLOOD BANK PRODUCT CODE: NORMAL
BPU ID: NORMAL
DESCRIPTION BLOOD BANK: NORMAL
HCT VFR BLD CALC: 23.5 % (ref 34–48)
HCT VFR BLD CALC: 23.8 % (ref 34–48)
HEMOGLOBIN: 7.6 G/DL (ref 11.5–15.5)
HEMOGLOBIN: 7.7 G/DL (ref 11.5–15.5)

## 2019-10-25 PROCEDURE — 6370000000 HC RX 637 (ALT 250 FOR IP): Performed by: INTERNAL MEDICINE

## 2019-10-25 PROCEDURE — 1200000000 HC SEMI PRIVATE

## 2019-10-25 PROCEDURE — 2580000003 HC RX 258: Performed by: INTERNAL MEDICINE

## 2019-10-25 PROCEDURE — 6360000002 HC RX W HCPCS: Performed by: INTERNAL MEDICINE

## 2019-10-25 PROCEDURE — 85014 HEMATOCRIT: CPT

## 2019-10-25 PROCEDURE — P9016 RBC LEUKOCYTES REDUCED: HCPCS

## 2019-10-25 PROCEDURE — 36430 TRANSFUSION BLD/BLD COMPNT: CPT

## 2019-10-25 PROCEDURE — 85018 HEMOGLOBIN: CPT

## 2019-10-25 PROCEDURE — 99232 SBSQ HOSP IP/OBS MODERATE 35: CPT | Performed by: INTERNAL MEDICINE

## 2019-10-25 PROCEDURE — 36415 COLL VENOUS BLD VENIPUNCTURE: CPT

## 2019-10-25 RX ADMIN — HYDROMORPHONE HYDROCHLORIDE 0.5 MG: 1 INJECTION, SOLUTION INTRAMUSCULAR; INTRAVENOUS; SUBCUTANEOUS at 01:36

## 2019-10-25 RX ADMIN — HYDROMORPHONE HYDROCHLORIDE 0.5 MG: 1 INJECTION, SOLUTION INTRAMUSCULAR; INTRAVENOUS; SUBCUTANEOUS at 05:00

## 2019-10-25 RX ADMIN — ENOXAPARIN SODIUM 135 MG: 150 INJECTION SUBCUTANEOUS at 20:32

## 2019-10-25 RX ADMIN — HYDROCODONE BITARTRATE AND ACETAMINOPHEN 1 TABLET: 5; 325 TABLET ORAL at 19:03

## 2019-10-25 RX ADMIN — FERROUS SULFATE TAB 325 MG (65 MG ELEMENTAL FE) 325 MG: 325 (65 FE) TAB at 18:19

## 2019-10-25 RX ADMIN — HYDROMORPHONE HYDROCHLORIDE 0.5 MG: 1 INJECTION, SOLUTION INTRAMUSCULAR; INTRAVENOUS; SUBCUTANEOUS at 08:23

## 2019-10-25 RX ADMIN — HYDROCODONE BITARTRATE AND ACETAMINOPHEN 1 TABLET: 5; 325 TABLET ORAL at 02:57

## 2019-10-25 RX ADMIN — HYDROCODONE BITARTRATE AND ACETAMINOPHEN 1 TABLET: 5; 325 TABLET ORAL at 09:40

## 2019-10-25 RX ADMIN — ENOXAPARIN SODIUM 135 MG: 150 INJECTION SUBCUTANEOUS at 09:31

## 2019-10-25 RX ADMIN — SODIUM CHLORIDE: 9 INJECTION, SOLUTION INTRAVENOUS at 18:20

## 2019-10-25 RX ADMIN — FOLIC ACID 2 MG: 1 TABLET ORAL at 09:30

## 2019-10-25 RX ADMIN — HYDROCODONE BITARTRATE AND ACETAMINOPHEN 1 TABLET: 5; 325 TABLET ORAL at 14:33

## 2019-10-25 RX ADMIN — HYDROXYUREA 500 MG: 500 CAPSULE ORAL at 09:30

## 2019-10-25 RX ADMIN — HYDROMORPHONE HYDROCHLORIDE 0.5 MG: 1 INJECTION, SOLUTION INTRAMUSCULAR; INTRAVENOUS; SUBCUTANEOUS at 11:41

## 2019-10-25 RX ADMIN — HYDROXYUREA 500 MG: 500 CAPSULE ORAL at 20:31

## 2019-10-25 RX ADMIN — HYDROMORPHONE HYDROCHLORIDE 0.5 MG: 1 INJECTION, SOLUTION INTRAMUSCULAR; INTRAVENOUS; SUBCUTANEOUS at 15:46

## 2019-10-25 RX ADMIN — SODIUM CHLORIDE 250 ML: 9 INJECTION, SOLUTION INTRAVENOUS at 01:54

## 2019-10-25 RX ADMIN — FERROUS SULFATE TAB 325 MG (65 MG ELEMENTAL FE) 325 MG: 325 (65 FE) TAB at 09:30

## 2019-10-25 RX ADMIN — ESCITALOPRAM OXALATE 10 MG: 10 TABLET ORAL at 09:30

## 2019-10-25 RX ADMIN — HYDROMORPHONE HYDROCHLORIDE 0.5 MG: 1 INJECTION, SOLUTION INTRAMUSCULAR; INTRAVENOUS; SUBCUTANEOUS at 20:32

## 2019-10-25 ASSESSMENT — PAIN SCALES - GENERAL
PAINLEVEL_OUTOF10: 7
PAINLEVEL_OUTOF10: 7
PAINLEVEL_OUTOF10: 8
PAINLEVEL_OUTOF10: 7
PAINLEVEL_OUTOF10: 4
PAINLEVEL_OUTOF10: 7
PAINLEVEL_OUTOF10: 8
PAINLEVEL_OUTOF10: 5
PAINLEVEL_OUTOF10: 7

## 2019-10-25 ASSESSMENT — PAIN DESCRIPTION - PROGRESSION
CLINICAL_PROGRESSION: GRADUALLY WORSENING
CLINICAL_PROGRESSION: NOT CHANGED
CLINICAL_PROGRESSION: NOT CHANGED
CLINICAL_PROGRESSION: GRADUALLY WORSENING
CLINICAL_PROGRESSION: NOT CHANGED
CLINICAL_PROGRESSION: NOT CHANGED

## 2019-10-25 ASSESSMENT — PAIN - FUNCTIONAL ASSESSMENT
PAIN_FUNCTIONAL_ASSESSMENT: ACTIVITIES ARE NOT PREVENTED
PAIN_FUNCTIONAL_ASSESSMENT: PREVENTS OR INTERFERES SOME ACTIVE ACTIVITIES AND ADLS
PAIN_FUNCTIONAL_ASSESSMENT: ACTIVITIES ARE NOT PREVENTED
PAIN_FUNCTIONAL_ASSESSMENT: PREVENTS OR INTERFERES SOME ACTIVE ACTIVITIES AND ADLS

## 2019-10-25 ASSESSMENT — PAIN DESCRIPTION - DESCRIPTORS
DESCRIPTORS: ACHING;DISCOMFORT
DESCRIPTORS: ACHING;CONSTANT;DISCOMFORT
DESCRIPTORS: ACHING;DISCOMFORT
DESCRIPTORS: ACHING;DISCOMFORT
DESCRIPTORS: ACHING;CONSTANT;DISCOMFORT
DESCRIPTORS: ACHING;DISCOMFORT
DESCRIPTORS: ACHING;DISCOMFORT
DESCRIPTORS: ACHING;CONSTANT;DISCOMFORT
DESCRIPTORS: ACHING;DISCOMFORT

## 2019-10-25 ASSESSMENT — PAIN DESCRIPTION - ONSET
ONSET: GRADUAL
ONSET: ON-GOING
ONSET: GRADUAL
ONSET: ON-GOING
ONSET: ON-GOING
ONSET: GRADUAL
ONSET: GRADUAL

## 2019-10-25 ASSESSMENT — PAIN DESCRIPTION - ORIENTATION
ORIENTATION: RIGHT;LEFT
ORIENTATION: LEFT;RIGHT
ORIENTATION: RIGHT;LEFT
ORIENTATION: LEFT;RIGHT
ORIENTATION: LEFT;RIGHT

## 2019-10-25 ASSESSMENT — PAIN DESCRIPTION - FREQUENCY
FREQUENCY: CONTINUOUS

## 2019-10-25 ASSESSMENT — PAIN DESCRIPTION - PAIN TYPE
TYPE: ACUTE PAIN;CHRONIC PAIN
TYPE: ACUTE PAIN
TYPE: ACUTE PAIN;CHRONIC PAIN
TYPE: ACUTE PAIN

## 2019-10-25 ASSESSMENT — PAIN DESCRIPTION - LOCATION
LOCATION: GENERALIZED

## 2019-10-26 VITALS
DIASTOLIC BLOOD PRESSURE: 62 MMHG | TEMPERATURE: 98.3 F | RESPIRATION RATE: 16 BRPM | SYSTOLIC BLOOD PRESSURE: 125 MMHG | OXYGEN SATURATION: 97 % | HEIGHT: 63 IN | WEIGHT: 293 LBS | BODY MASS INDEX: 51.91 KG/M2 | HEART RATE: 84 BPM

## 2019-10-26 LAB
HCT VFR BLD CALC: 23.4 % (ref 34–48)
HEMOGLOBIN: 7.6 G/DL (ref 11.5–15.5)

## 2019-10-26 PROCEDURE — 6360000002 HC RX W HCPCS: Performed by: INTERNAL MEDICINE

## 2019-10-26 PROCEDURE — 6360000002 HC RX W HCPCS

## 2019-10-26 PROCEDURE — 6370000000 HC RX 637 (ALT 250 FOR IP): Performed by: INTERNAL MEDICINE

## 2019-10-26 PROCEDURE — 85014 HEMATOCRIT: CPT

## 2019-10-26 PROCEDURE — 85018 HEMOGLOBIN: CPT

## 2019-10-26 PROCEDURE — 2580000003 HC RX 258: Performed by: INTERNAL MEDICINE

## 2019-10-26 PROCEDURE — 99239 HOSP IP/OBS DSCHRG MGMT >30: CPT | Performed by: INTERNAL MEDICINE

## 2019-10-26 RX ORDER — HEPARIN SODIUM (PORCINE) LOCK FLUSH IV SOLN 100 UNIT/ML 100 UNIT/ML
SOLUTION INTRAVENOUS
Status: COMPLETED
Start: 2019-10-26 | End: 2019-10-26

## 2019-10-26 RX ORDER — HYDROCODONE BITARTRATE AND ACETAMINOPHEN 5; 325 MG/1; MG/1
1 TABLET ORAL EVERY 6 HOURS PRN
Qty: 20 TABLET | Refills: 0 | Status: SHIPPED | OUTPATIENT
Start: 2019-10-26 | End: 2019-10-31

## 2019-10-26 RX ADMIN — HYDROMORPHONE HYDROCHLORIDE 0.5 MG: 1 INJECTION, SOLUTION INTRAMUSCULAR; INTRAVENOUS; SUBCUTANEOUS at 02:36

## 2019-10-26 RX ADMIN — HYDROCODONE BITARTRATE AND ACETAMINOPHEN 1 TABLET: 5; 325 TABLET ORAL at 09:46

## 2019-10-26 RX ADMIN — FOLIC ACID 2 MG: 1 TABLET ORAL at 09:46

## 2019-10-26 RX ADMIN — HYDROMORPHONE HYDROCHLORIDE 0.5 MG: 1 INJECTION, SOLUTION INTRAMUSCULAR; INTRAVENOUS; SUBCUTANEOUS at 06:42

## 2019-10-26 RX ADMIN — HYDROMORPHONE HYDROCHLORIDE 0.5 MG: 1 INJECTION, SOLUTION INTRAMUSCULAR; INTRAVENOUS; SUBCUTANEOUS at 10:52

## 2019-10-26 RX ADMIN — HYDROCODONE BITARTRATE AND ACETAMINOPHEN 1 TABLET: 5; 325 TABLET ORAL at 00:37

## 2019-10-26 RX ADMIN — ENOXAPARIN SODIUM 135 MG: 150 INJECTION SUBCUTANEOUS at 09:46

## 2019-10-26 RX ADMIN — HYDROXYUREA 500 MG: 500 CAPSULE ORAL at 09:46

## 2019-10-26 RX ADMIN — ESCITALOPRAM OXALATE 10 MG: 10 TABLET ORAL at 09:46

## 2019-10-26 RX ADMIN — SODIUM CHLORIDE: 9 INJECTION, SOLUTION INTRAVENOUS at 02:39

## 2019-10-26 RX ADMIN — FERROUS SULFATE TAB 325 MG (65 MG ELEMENTAL FE) 325 MG: 325 (65 FE) TAB at 09:46

## 2019-10-26 RX ADMIN — HYDROCODONE BITARTRATE AND ACETAMINOPHEN 1 TABLET: 5; 325 TABLET ORAL at 05:46

## 2019-10-26 RX ADMIN — Medication 300 UNITS: at 13:18

## 2019-10-26 ASSESSMENT — PAIN DESCRIPTION - ORIENTATION
ORIENTATION: RIGHT;LEFT

## 2019-10-26 ASSESSMENT — PAIN DESCRIPTION - PROGRESSION
CLINICAL_PROGRESSION: NOT CHANGED

## 2019-10-26 ASSESSMENT — PAIN DESCRIPTION - DESCRIPTORS
DESCRIPTORS: ACHING;CONSTANT;DISCOMFORT
DESCRIPTORS: ACHING;CRAMPING;CONSTANT
DESCRIPTORS: ACHING;CONSTANT;DISCOMFORT
DESCRIPTORS: ACHING;CONSTANT;DISCOMFORT
DESCRIPTORS: ACHING;DISCOMFORT;CONSTANT
DESCRIPTORS: ACHING;CONSTANT;DISCOMFORT

## 2019-10-26 ASSESSMENT — PAIN DESCRIPTION - PAIN TYPE
TYPE: ACUTE PAIN
TYPE: ACUTE PAIN
TYPE: ACUTE PAIN;CHRONIC PAIN
TYPE: CHRONIC PAIN;ACUTE PAIN
TYPE: ACUTE PAIN;CHRONIC PAIN
TYPE: ACUTE PAIN;CHRONIC PAIN

## 2019-10-26 ASSESSMENT — PAIN DESCRIPTION - ONSET
ONSET: ON-GOING

## 2019-10-26 ASSESSMENT — PAIN - FUNCTIONAL ASSESSMENT
PAIN_FUNCTIONAL_ASSESSMENT: ACTIVITIES ARE NOT PREVENTED

## 2019-10-26 ASSESSMENT — PAIN DESCRIPTION - FREQUENCY
FREQUENCY: CONTINUOUS

## 2019-10-26 ASSESSMENT — PAIN SCALES - GENERAL
PAINLEVEL_OUTOF10: 8
PAINLEVEL_OUTOF10: 8
PAINLEVEL_OUTOF10: 7
PAINLEVEL_OUTOF10: 8
PAINLEVEL_OUTOF10: 8
PAINLEVEL_OUTOF10: 9
PAINLEVEL_OUTOF10: 8
PAINLEVEL_OUTOF10: 7

## 2019-10-26 ASSESSMENT — PAIN DESCRIPTION - LOCATION
LOCATION: GENERALIZED

## 2019-10-27 ENCOUNTER — HOSPITAL ENCOUNTER (EMERGENCY)
Age: 24
Discharge: HOME | End: 2019-10-27
Attending: EMERGENCY MEDICINE
Payer: MEDICAID

## 2019-10-27 ENCOUNTER — APPOINTMENT (OUTPATIENT)
Dept: GENERAL RADIOLOGY | Age: 24
End: 2019-10-27
Payer: MEDICAID

## 2019-10-27 VITALS
DIASTOLIC BLOOD PRESSURE: 68 MMHG | TEMPERATURE: 98.2 F | OXYGEN SATURATION: 98 % | BODY MASS INDEX: 51.91 KG/M2 | HEART RATE: 57 BPM | SYSTOLIC BLOOD PRESSURE: 135 MMHG | HEIGHT: 63 IN | RESPIRATION RATE: 16 BRPM | WEIGHT: 293 LBS

## 2019-10-27 DIAGNOSIS — D57.00 HB-SS DISEASE WITH CRISIS (HCC): Primary | ICD-10-CM

## 2019-10-27 LAB
ALBUMIN SERPL-MCNC: 3.7 G/DL (ref 3.5–5.2)
ALP BLD-CCNC: 97 U/L (ref 35–104)
ALT SERPL-CCNC: 8 U/L (ref 0–32)
ANION GAP SERPL CALCULATED.3IONS-SCNC: 13 MMOL/L (ref 7–16)
ANISOCYTOSIS: ABNORMAL
AST SERPL-CCNC: 13 U/L (ref 0–31)
BASOPHILS ABSOLUTE: 0.02 E9/L (ref 0–0.2)
BASOPHILS RELATIVE PERCENT: 0.2 % (ref 0–2)
BILIRUB SERPL-MCNC: 1.5 MG/DL (ref 0–1.2)
BUN BLDV-MCNC: 6 MG/DL (ref 6–20)
CALCIUM SERPL-MCNC: 8.8 MG/DL (ref 8.6–10.2)
CHLORIDE BLD-SCNC: 100 MMOL/L (ref 98–107)
CO2: 24 MMOL/L (ref 22–29)
CREAT SERPL-MCNC: 0.8 MG/DL (ref 0.5–1)
EKG ATRIAL RATE: 47 BPM
EKG P AXIS: 34 DEGREES
EKG P-R INTERVAL: 150 MS
EKG Q-T INTERVAL: 486 MS
EKG QRS DURATION: 84 MS
EKG QTC CALCULATION (BAZETT): 430 MS
EKG R AXIS: 23 DEGREES
EKG T AXIS: 0 DEGREES
EKG VENTRICULAR RATE: 47 BPM
EOSINOPHILS ABSOLUTE: 0.03 E9/L (ref 0.05–0.5)
EOSINOPHILS RELATIVE PERCENT: 0.2 % (ref 0–6)
GFR AFRICAN AMERICAN: >60
GFR NON-AFRICAN AMERICAN: >60 ML/MIN/1.73
GLUCOSE BLD-MCNC: 94 MG/DL (ref 74–99)
HCT VFR BLD CALC: 26.2 % (ref 34–48)
HEMOGLOBIN: 8.6 G/DL (ref 11.5–15.5)
IMMATURE GRANULOCYTES #: 0.1 E9/L
IMMATURE GRANULOCYTES %: 0.8 % (ref 0–5)
IMMATURE RETIC FRACT: 33.4 % (ref 3–15.9)
LIPASE: 16 U/L (ref 13–60)
LYMPHOCYTES ABSOLUTE: 2.45 E9/L (ref 1.5–4)
LYMPHOCYTES RELATIVE PERCENT: 19.8 % (ref 20–42)
MCH RBC QN AUTO: 22.2 PG (ref 26–35)
MCHC RBC AUTO-ENTMCNC: 32.8 % (ref 32–34.5)
MCV RBC AUTO: 67.7 FL (ref 80–99.9)
MONOCYTES ABSOLUTE: 0.63 E9/L (ref 0.1–0.95)
MONOCYTES RELATIVE PERCENT: 5.1 % (ref 2–12)
NEUTROPHILS ABSOLUTE: 9.16 E9/L (ref 1.8–7.3)
NEUTROPHILS RELATIVE PERCENT: 73.9 % (ref 43–80)
PDW BLD-RTO: 25.1 FL (ref 11.5–15)
PLATELET # BLD: 247 E9/L (ref 130–450)
PMV BLD AUTO: ABNORMAL FL (ref 7–12)
POIKILOCYTES: ABNORMAL
POTASSIUM SERPL-SCNC: 3.5 MMOL/L (ref 3.5–5)
RBC # BLD: 3.87 E12/L (ref 3.5–5.5)
RETIC HGB EQUIVALENT: 27.6 PG (ref 28.2–36.6)
RETICULOCYTE ABSOLUTE COUNT: 0.16 E12/L
RETICULOCYTE COUNT PCT: 4 % (ref 0.4–1.9)
SCHISTOCYTES: ABNORMAL
SODIUM BLD-SCNC: 137 MMOL/L (ref 132–146)
TARGET CELLS: ABNORMAL
TOTAL PROTEIN: 7.3 G/DL (ref 6.4–8.3)
WBC # BLD: 12.4 E9/L (ref 4.5–11.5)

## 2019-10-27 PROCEDURE — 93005 ELECTROCARDIOGRAM TRACING: CPT | Performed by: EMERGENCY MEDICINE

## 2019-10-27 PROCEDURE — 99285 EMERGENCY DEPT VISIT HI MDM: CPT

## 2019-10-27 PROCEDURE — 83690 ASSAY OF LIPASE: CPT

## 2019-10-27 PROCEDURE — 96374 THER/PROPH/DIAG INJ IV PUSH: CPT

## 2019-10-27 PROCEDURE — 85025 COMPLETE CBC W/AUTO DIFF WBC: CPT

## 2019-10-27 PROCEDURE — 85045 AUTOMATED RETICULOCYTE COUNT: CPT

## 2019-10-27 PROCEDURE — 6360000002 HC RX W HCPCS: Performed by: EMERGENCY MEDICINE

## 2019-10-27 PROCEDURE — 93010 ELECTROCARDIOGRAM REPORT: CPT | Performed by: INTERNAL MEDICINE

## 2019-10-27 PROCEDURE — 96375 TX/PRO/DX INJ NEW DRUG ADDON: CPT

## 2019-10-27 PROCEDURE — 80053 COMPREHEN METABOLIC PANEL: CPT

## 2019-10-27 PROCEDURE — 96376 TX/PRO/DX INJ SAME DRUG ADON: CPT

## 2019-10-27 PROCEDURE — 71045 X-RAY EXAM CHEST 1 VIEW: CPT

## 2019-10-27 PROCEDURE — 2580000003 HC RX 258: Performed by: EMERGENCY MEDICINE

## 2019-10-27 RX ORDER — ONDANSETRON 2 MG/ML
4 INJECTION INTRAMUSCULAR; INTRAVENOUS ONCE
Status: COMPLETED | OUTPATIENT
Start: 2019-10-27 | End: 2019-10-27

## 2019-10-27 RX ORDER — DIPHENHYDRAMINE HYDROCHLORIDE 50 MG/ML
12.5 INJECTION INTRAMUSCULAR; INTRAVENOUS ONCE
Status: COMPLETED | OUTPATIENT
Start: 2019-10-27 | End: 2019-10-27

## 2019-10-27 RX ORDER — ONDANSETRON 4 MG/1
4 TABLET, FILM COATED ORAL EVERY 8 HOURS PRN
Qty: 12 TABLET | Refills: 0 | Status: SHIPPED | OUTPATIENT
Start: 2019-10-27 | End: 2019-11-01

## 2019-10-27 RX ORDER — 0.9 % SODIUM CHLORIDE 0.9 %
1000 INTRAVENOUS SOLUTION INTRAVENOUS ONCE
Status: COMPLETED | OUTPATIENT
Start: 2019-10-27 | End: 2019-10-27

## 2019-10-27 RX ORDER — OXYCODONE HYDROCHLORIDE AND ACETAMINOPHEN 5; 325 MG/1; MG/1
1 TABLET ORAL EVERY 6 HOURS PRN
Qty: 10 TABLET | Refills: 0 | Status: SHIPPED | OUTPATIENT
Start: 2019-10-27 | End: 2019-11-06

## 2019-10-27 RX ADMIN — HYDROMORPHONE HYDROCHLORIDE 1 MG: 1 INJECTION, SOLUTION INTRAMUSCULAR; INTRAVENOUS; SUBCUTANEOUS at 14:53

## 2019-10-27 RX ADMIN — ONDANSETRON 4 MG: 2 INJECTION INTRAMUSCULAR; INTRAVENOUS at 14:52

## 2019-10-27 RX ADMIN — SODIUM CHLORIDE 1000 ML: 9 INJECTION, SOLUTION INTRAVENOUS at 14:52

## 2019-10-27 RX ADMIN — DIPHENHYDRAMINE HYDROCHLORIDE 12.5 MG: 50 INJECTION, SOLUTION INTRAMUSCULAR; INTRAVENOUS at 14:52

## 2019-10-27 RX ADMIN — HYDROMORPHONE HYDROCHLORIDE 1 MG: 1 INJECTION, SOLUTION INTRAMUSCULAR; INTRAVENOUS; SUBCUTANEOUS at 16:01

## 2019-10-27 ASSESSMENT — PAIN SCALES - GENERAL
PAINLEVEL_OUTOF10: 8
PAINLEVEL_OUTOF10: 9
PAINLEVEL_OUTOF10: 8

## 2019-10-27 ASSESSMENT — PAIN DESCRIPTION - FREQUENCY: FREQUENCY: CONTINUOUS

## 2019-10-27 ASSESSMENT — PAIN DESCRIPTION - LOCATION
LOCATION: GENERALIZED
LOCATION: GENERALIZED

## 2019-10-27 ASSESSMENT — PAIN DESCRIPTION - PAIN TYPE
TYPE: ACUTE PAIN
TYPE: ACUTE PAIN

## 2019-10-27 ASSESSMENT — PAIN DESCRIPTION - DESCRIPTORS
DESCRIPTORS: ACHING
DESCRIPTORS: ACHING

## 2019-11-19 ENCOUNTER — APPOINTMENT (OUTPATIENT)
Dept: GENERAL RADIOLOGY | Age: 24
End: 2019-11-19
Payer: MEDICAID

## 2019-11-19 ENCOUNTER — HOSPITAL ENCOUNTER (EMERGENCY)
Age: 24
Discharge: HOME | End: 2019-11-20
Attending: EMERGENCY MEDICINE
Payer: MEDICAID

## 2019-11-19 DIAGNOSIS — N39.0 URINARY TRACT INFECTION WITHOUT HEMATURIA, SITE UNSPECIFIED: Primary | ICD-10-CM

## 2019-11-19 DIAGNOSIS — D57.20 SICKLE CELL-HEMOGLOBIN C DISEASE WITHOUT CRISIS (HCC): ICD-10-CM

## 2019-11-19 LAB
HCG, URINE, POC: NEGATIVE
Lab: NORMAL
NEGATIVE QC PASS/FAIL: NORMAL
POSITIVE QC PASS/FAIL: NORMAL

## 2019-11-19 PROCEDURE — 85045 AUTOMATED RETICULOCYTE COUNT: CPT

## 2019-11-19 PROCEDURE — 85025 COMPLETE CBC W/AUTO DIFF WBC: CPT

## 2019-11-19 PROCEDURE — 84484 ASSAY OF TROPONIN QUANT: CPT

## 2019-11-19 PROCEDURE — 96374 THER/PROPH/DIAG INJ IV PUSH: CPT

## 2019-11-19 PROCEDURE — 2580000003 HC RX 258: Performed by: NURSE PRACTITIONER

## 2019-11-19 PROCEDURE — 80053 COMPREHEN METABOLIC PANEL: CPT

## 2019-11-19 PROCEDURE — 83735 ASSAY OF MAGNESIUM: CPT

## 2019-11-19 PROCEDURE — 71045 X-RAY EXAM CHEST 1 VIEW: CPT

## 2019-11-19 PROCEDURE — 96375 TX/PRO/DX INJ NEW DRUG ADDON: CPT

## 2019-11-19 PROCEDURE — 93005 ELECTROCARDIOGRAM TRACING: CPT | Performed by: EMERGENCY MEDICINE

## 2019-11-19 PROCEDURE — 36415 COLL VENOUS BLD VENIPUNCTURE: CPT

## 2019-11-19 PROCEDURE — 6360000002 HC RX W HCPCS: Performed by: NURSE PRACTITIONER

## 2019-11-19 PROCEDURE — 99284 EMERGENCY DEPT VISIT MOD MDM: CPT

## 2019-11-19 RX ORDER — DIPHENHYDRAMINE HYDROCHLORIDE 50 MG/ML
12.5 INJECTION INTRAMUSCULAR; INTRAVENOUS ONCE
Status: COMPLETED | OUTPATIENT
Start: 2019-11-19 | End: 2019-11-19

## 2019-11-19 RX ORDER — ONDANSETRON 2 MG/ML
4 INJECTION INTRAMUSCULAR; INTRAVENOUS ONCE
Status: COMPLETED | OUTPATIENT
Start: 2019-11-19 | End: 2019-11-19

## 2019-11-19 RX ORDER — 0.9 % SODIUM CHLORIDE 0.9 %
1000 INTRAVENOUS SOLUTION INTRAVENOUS ONCE
Status: COMPLETED | OUTPATIENT
Start: 2019-11-19 | End: 2019-11-20

## 2019-11-19 RX ADMIN — HYDROMORPHONE HYDROCHLORIDE 1 MG: 1 INJECTION, SOLUTION INTRAMUSCULAR; INTRAVENOUS; SUBCUTANEOUS at 23:47

## 2019-11-19 RX ADMIN — SODIUM CHLORIDE 1000 ML: 9 INJECTION, SOLUTION INTRAVENOUS at 23:53

## 2019-11-19 RX ADMIN — ONDANSETRON HYDROCHLORIDE 4 MG: 2 INJECTION, SOLUTION INTRAMUSCULAR; INTRAVENOUS at 23:47

## 2019-11-19 RX ADMIN — DIPHENHYDRAMINE HYDROCHLORIDE 12.5 MG: 50 INJECTION, SOLUTION INTRAMUSCULAR; INTRAVENOUS at 23:47

## 2019-11-19 ASSESSMENT — PAIN SCALES - GENERAL
PAINLEVEL_OUTOF10: 9
PAINLEVEL_OUTOF10: 9

## 2019-11-19 ASSESSMENT — PAIN DESCRIPTION - PAIN TYPE: TYPE: ACUTE PAIN

## 2019-11-19 ASSESSMENT — PAIN DESCRIPTION - LOCATION: LOCATION: BACK;LEG;CHEST

## 2019-11-20 VITALS
SYSTOLIC BLOOD PRESSURE: 128 MMHG | RESPIRATION RATE: 16 BRPM | BODY MASS INDEX: 51.38 KG/M2 | DIASTOLIC BLOOD PRESSURE: 71 MMHG | HEIGHT: 63 IN | TEMPERATURE: 99.5 F | WEIGHT: 290 LBS | OXYGEN SATURATION: 99 % | HEART RATE: 86 BPM

## 2019-11-20 LAB
ALBUMIN SERPL-MCNC: 3.8 G/DL (ref 3.5–5.2)
ALP BLD-CCNC: 95 U/L (ref 35–104)
ALT SERPL-CCNC: 7 U/L (ref 0–32)
ANION GAP SERPL CALCULATED.3IONS-SCNC: 11 MMOL/L (ref 7–16)
ANISOCYTOSIS: ABNORMAL
AST SERPL-CCNC: 13 U/L (ref 0–31)
BACTERIA: ABNORMAL /HPF
BASOPHILS ABSOLUTE: 0.03 E9/L (ref 0–0.2)
BASOPHILS RELATIVE PERCENT: 0.2 % (ref 0–2)
BILIRUB SERPL-MCNC: 0.8 MG/DL (ref 0–1.2)
BILIRUBIN URINE: NEGATIVE
BLOOD, URINE: NEGATIVE
BUN BLDV-MCNC: 9 MG/DL (ref 6–20)
CALCIUM SERPL-MCNC: 8.8 MG/DL (ref 8.6–10.2)
CHLORIDE BLD-SCNC: 104 MMOL/L (ref 98–107)
CLARITY: CLEAR
CO2: 23 MMOL/L (ref 22–29)
COLOR: YELLOW
CREAT SERPL-MCNC: 0.9 MG/DL (ref 0.5–1)
EKG ATRIAL RATE: 80 BPM
EKG P AXIS: 42 DEGREES
EKG P-R INTERVAL: 154 MS
EKG Q-T INTERVAL: 398 MS
EKG QRS DURATION: 96 MS
EKG QTC CALCULATION (BAZETT): 459 MS
EKG R AXIS: 41 DEGREES
EKG T AXIS: 32 DEGREES
EKG VENTRICULAR RATE: 80 BPM
EOSINOPHILS ABSOLUTE: 0.23 E9/L (ref 0.05–0.5)
EOSINOPHILS RELATIVE PERCENT: 1.6 % (ref 0–6)
EPITHELIAL CELLS, UA: ABNORMAL /HPF
GFR AFRICAN AMERICAN: >60
GFR NON-AFRICAN AMERICAN: >60 ML/MIN/1.73
GLUCOSE BLD-MCNC: 100 MG/DL (ref 74–99)
GLUCOSE URINE: NEGATIVE MG/DL
HCT VFR BLD CALC: 29.7 % (ref 34–48)
HEMOGLOBIN: 9.6 G/DL (ref 11.5–15.5)
HYPOCHROMIA: ABNORMAL
IMMATURE GRANULOCYTES #: 0.13 E9/L
IMMATURE GRANULOCYTES %: 0.9 % (ref 0–5)
IMMATURE RETIC FRACT: 28.7 % (ref 3–15.9)
KETONES, URINE: NEGATIVE MG/DL
LEUKOCYTE ESTERASE, URINE: ABNORMAL
LYMPHOCYTES ABSOLUTE: 5.77 E9/L (ref 1.5–4)
LYMPHOCYTES RELATIVE PERCENT: 41.4 % (ref 20–42)
MAGNESIUM: 1.8 MG/DL (ref 1.6–2.6)
MCH RBC QN AUTO: 21.4 PG (ref 26–35)
MCHC RBC AUTO-ENTMCNC: 32.3 % (ref 32–34.5)
MCV RBC AUTO: 66.1 FL (ref 80–99.9)
MONOCYTES ABSOLUTE: 0.87 E9/L (ref 0.1–0.95)
MONOCYTES RELATIVE PERCENT: 6.2 % (ref 2–12)
NEUTROPHILS ABSOLUTE: 6.92 E9/L (ref 1.8–7.3)
NEUTROPHILS RELATIVE PERCENT: 49.7 % (ref 43–80)
NITRITE, URINE: NEGATIVE
OVALOCYTES: ABNORMAL
PDW BLD-RTO: 23.1 FL (ref 11.5–15)
PH UA: 6 (ref 5–9)
PLATELET # BLD: 297 E9/L (ref 130–450)
PMV BLD AUTO: 10.8 FL (ref 7–12)
POIKILOCYTES: ABNORMAL
POLYCHROMASIA: ABNORMAL
POTASSIUM SERPL-SCNC: 3.9 MMOL/L (ref 3.5–5)
PROTEIN UA: NEGATIVE MG/DL
RBC # BLD: 4.49 E12/L (ref 3.5–5.5)
RBC UA: ABNORMAL /HPF (ref 0–2)
RETIC HGB EQUIVALENT: 29 PG (ref 28.2–36.6)
RETICULOCYTE ABSOLUTE COUNT: 0.08 E12/L
RETICULOCYTE COUNT PCT: 1.8 % (ref 0.4–1.9)
SODIUM BLD-SCNC: 138 MMOL/L (ref 132–146)
SPECIFIC GRAVITY UA: 1.02 (ref 1–1.03)
TARGET CELLS: ABNORMAL
TEAR DROP CELLS: ABNORMAL
TOTAL PROTEIN: 7.5 G/DL (ref 6.4–8.3)
TROPONIN: <0.01 NG/ML (ref 0–0.03)
UROBILINOGEN, URINE: 0.2 E.U./DL
WBC # BLD: 14 E9/L (ref 4.5–11.5)
WBC UA: ABNORMAL /HPF (ref 0–5)

## 2019-11-20 PROCEDURE — 2580000003 HC RX 258: Performed by: NURSE PRACTITIONER

## 2019-11-20 PROCEDURE — 6360000002 HC RX W HCPCS: Performed by: NURSE PRACTITIONER

## 2019-11-20 PROCEDURE — 81001 URINALYSIS AUTO W/SCOPE: CPT

## 2019-11-20 PROCEDURE — 93010 ELECTROCARDIOGRAM REPORT: CPT | Performed by: INTERNAL MEDICINE

## 2019-11-20 PROCEDURE — 6370000000 HC RX 637 (ALT 250 FOR IP): Performed by: NURSE PRACTITIONER

## 2019-11-20 PROCEDURE — 96375 TX/PRO/DX INJ NEW DRUG ADDON: CPT

## 2019-11-20 RX ORDER — OXYCODONE HYDROCHLORIDE AND ACETAMINOPHEN 5; 325 MG/1; MG/1
1 TABLET ORAL EVERY 6 HOURS PRN
Qty: 10 TABLET | Refills: 0 | Status: SHIPPED | OUTPATIENT
Start: 2019-11-20 | End: 2019-11-23

## 2019-11-20 RX ORDER — OXYCODONE HYDROCHLORIDE AND ACETAMINOPHEN 5; 325 MG/1; MG/1
1 TABLET ORAL ONCE
Status: COMPLETED | OUTPATIENT
Start: 2019-11-20 | End: 2019-11-20

## 2019-11-20 RX ORDER — ONDANSETRON 4 MG/1
4 TABLET, ORALLY DISINTEGRATING ORAL EVERY 8 HOURS PRN
Qty: 24 TABLET | Refills: 0 | Status: ON HOLD | OUTPATIENT
Start: 2019-11-20 | End: 2020-10-16 | Stop reason: HOSPADM

## 2019-11-20 RX ORDER — CEPHALEXIN 500 MG/1
500 CAPSULE ORAL 2 TIMES DAILY
Qty: 14 CAPSULE | Refills: 0 | Status: SHIPPED | OUTPATIENT
Start: 2019-11-20 | End: 2019-11-27

## 2019-11-20 RX ORDER — HEPARIN SODIUM (PORCINE) LOCK FLUSH IV SOLN 100 UNIT/ML 100 UNIT/ML
SOLUTION INTRAVENOUS
Status: DISCONTINUED
Start: 2019-11-20 | End: 2019-11-20 | Stop reason: HOSPADM

## 2019-11-20 RX ADMIN — CEFTRIAXONE SODIUM 1 G: 1 INJECTION, POWDER, FOR SOLUTION INTRAMUSCULAR; INTRAVENOUS at 00:53

## 2019-11-20 RX ADMIN — OXYCODONE HYDROCHLORIDE AND ACETAMINOPHEN 1 TABLET: 5; 325 TABLET ORAL at 00:57

## 2019-11-20 ASSESSMENT — PAIN SCALES - GENERAL
PAINLEVEL_OUTOF10: 7
PAINLEVEL_OUTOF10: 7

## 2019-12-22 ENCOUNTER — APPOINTMENT (OUTPATIENT)
Dept: GENERAL RADIOLOGY | Age: 24
End: 2019-12-22
Payer: MEDICAID

## 2019-12-22 ENCOUNTER — HOSPITAL ENCOUNTER (EMERGENCY)
Age: 24
Discharge: HOME | End: 2019-12-23
Payer: MEDICAID

## 2019-12-22 DIAGNOSIS — D57.1 HB-SS DISEASE WITHOUT CRISIS (HCC): Primary | ICD-10-CM

## 2019-12-22 LAB
ALBUMIN SERPL-MCNC: 3.7 G/DL (ref 3.5–5.2)
ALP BLD-CCNC: 94 U/L (ref 35–104)
ALT SERPL-CCNC: 9 U/L (ref 0–32)
ANION GAP SERPL CALCULATED.3IONS-SCNC: 10 MMOL/L (ref 7–16)
AST SERPL-CCNC: 33 U/L (ref 0–31)
BASOPHILS ABSOLUTE: 0.02 E9/L (ref 0–0.2)
BASOPHILS RELATIVE PERCENT: 0.2 % (ref 0–2)
BILIRUB SERPL-MCNC: 0.9 MG/DL (ref 0–1.2)
BUN BLDV-MCNC: 9 MG/DL (ref 6–20)
CALCIUM SERPL-MCNC: 8.7 MG/DL (ref 8.6–10.2)
CHLORIDE BLD-SCNC: 101 MMOL/L (ref 98–107)
CO2: 24 MMOL/L (ref 22–29)
CREAT SERPL-MCNC: 0.8 MG/DL (ref 0.5–1)
EOSINOPHILS ABSOLUTE: 0.16 E9/L (ref 0.05–0.5)
EOSINOPHILS RELATIVE PERCENT: 1.6 % (ref 0–6)
GFR AFRICAN AMERICAN: >60
GFR NON-AFRICAN AMERICAN: >60 ML/MIN/1.73
GLUCOSE BLD-MCNC: 95 MG/DL (ref 74–99)
HCG, URINE, POC: NEGATIVE
HCT VFR BLD CALC: 30 % (ref 34–48)
HCT VFR BLD CALC: 30.5 % (ref 34–48)
HEMOGLOBIN: 9.6 G/DL (ref 11.5–15.5)
IMMATURE GRANULOCYTES #: 0.07 E9/L
IMMATURE GRANULOCYTES %: 0.7 % (ref 0–5)
IMMATURE RETIC FRACT: 16.1 % (ref 3–15.9)
LYMPHOCYTES ABSOLUTE: 4.5 E9/L (ref 1.5–4)
LYMPHOCYTES RELATIVE PERCENT: 44.4 % (ref 20–42)
Lab: NORMAL
MCH RBC QN AUTO: 21.1 PG (ref 26–35)
MCHC RBC AUTO-ENTMCNC: 32 % (ref 32–34.5)
MCV RBC AUTO: 65.8 FL (ref 80–99.9)
MONOCYTES ABSOLUTE: 0.5 E9/L (ref 0.1–0.95)
MONOCYTES RELATIVE PERCENT: 4.9 % (ref 2–12)
NEGATIVE QC PASS/FAIL: NORMAL
NEUTROPHILS ABSOLUTE: 4.89 E9/L (ref 1.8–7.3)
NEUTROPHILS RELATIVE PERCENT: 48.2 % (ref 43–80)
PDW BLD-RTO: 19.9 FL (ref 11.5–15)
PLATELET # BLD: 198 E9/L (ref 130–450)
PMV BLD AUTO: ABNORMAL FL (ref 7–12)
POSITIVE QC PASS/FAIL: NORMAL
POTASSIUM SERPL-SCNC: 4.9 MMOL/L (ref 3.5–5)
RBC # BLD: 4.56 E12/L (ref 3.5–5.5)
REASON FOR REJECTION: NORMAL
REJECTED TEST: NORMAL
RETIC HGB EQUIVALENT: 24.3 PG (ref 28.2–36.6)
RETICULOCYTE ABSOLUTE COUNT: 0.07 E12/L
RETICULOCYTE COUNT PCT: 1.5 % (ref 0.4–1.9)
SODIUM BLD-SCNC: 135 MMOL/L (ref 132–146)
TOTAL PROTEIN: 7.3 G/DL (ref 6.4–8.3)
TROPONIN: <0.01 NG/ML (ref 0–0.03)
WBC # BLD: 10.1 E9/L (ref 4.5–11.5)

## 2019-12-22 PROCEDURE — 85045 AUTOMATED RETICULOCYTE COUNT: CPT

## 2019-12-22 PROCEDURE — 6360000002 HC RX W HCPCS: Performed by: NURSE PRACTITIONER

## 2019-12-22 PROCEDURE — 85025 COMPLETE CBC W/AUTO DIFF WBC: CPT

## 2019-12-22 PROCEDURE — 36415 COLL VENOUS BLD VENIPUNCTURE: CPT

## 2019-12-22 PROCEDURE — 96375 TX/PRO/DX INJ NEW DRUG ADDON: CPT

## 2019-12-22 PROCEDURE — 93005 ELECTROCARDIOGRAM TRACING: CPT

## 2019-12-22 PROCEDURE — 2580000003 HC RX 258: Performed by: NURSE PRACTITIONER

## 2019-12-22 PROCEDURE — 71045 X-RAY EXAM CHEST 1 VIEW: CPT

## 2019-12-22 PROCEDURE — 80053 COMPREHEN METABOLIC PANEL: CPT

## 2019-12-22 PROCEDURE — 96374 THER/PROPH/DIAG INJ IV PUSH: CPT

## 2019-12-22 PROCEDURE — 84484 ASSAY OF TROPONIN QUANT: CPT

## 2019-12-22 PROCEDURE — 99284 EMERGENCY DEPT VISIT MOD MDM: CPT

## 2019-12-22 RX ORDER — PREDNISONE 20 MG/1
60 TABLET ORAL ONCE
Status: DISCONTINUED | OUTPATIENT
Start: 2019-12-22 | End: 2019-12-22

## 2019-12-22 RX ORDER — OXYCODONE HYDROCHLORIDE AND ACETAMINOPHEN 5; 325 MG/1; MG/1
1 TABLET ORAL ONCE
Status: DISCONTINUED | OUTPATIENT
Start: 2019-12-22 | End: 2019-12-22

## 2019-12-22 RX ORDER — DIPHENHYDRAMINE HYDROCHLORIDE 50 MG/ML
25 INJECTION INTRAMUSCULAR; INTRAVENOUS ONCE
Status: COMPLETED | OUTPATIENT
Start: 2019-12-22 | End: 2019-12-22

## 2019-12-22 RX ORDER — ONDANSETRON 2 MG/ML
4 INJECTION INTRAMUSCULAR; INTRAVENOUS ONCE
Status: COMPLETED | OUTPATIENT
Start: 2019-12-22 | End: 2019-12-22

## 2019-12-22 RX ORDER — 0.9 % SODIUM CHLORIDE 0.9 %
1000 INTRAVENOUS SOLUTION INTRAVENOUS ONCE
Status: COMPLETED | OUTPATIENT
Start: 2019-12-22 | End: 2019-12-23

## 2019-12-22 RX ORDER — DIPHENHYDRAMINE HYDROCHLORIDE 50 MG/ML
12.5 INJECTION INTRAMUSCULAR; INTRAVENOUS ONCE
Status: COMPLETED | OUTPATIENT
Start: 2019-12-22 | End: 2019-12-23

## 2019-12-22 RX ORDER — HEPARIN SODIUM (PORCINE) LOCK FLUSH IV SOLN 100 UNIT/ML 100 UNIT/ML
100 SOLUTION INTRAVENOUS ONCE
Status: COMPLETED | OUTPATIENT
Start: 2019-12-22 | End: 2019-12-22

## 2019-12-22 RX ADMIN — SODIUM CHLORIDE 1000 ML: 9 INJECTION, SOLUTION INTRAVENOUS at 22:02

## 2019-12-22 RX ADMIN — ONDANSETRON 4 MG: 2 INJECTION INTRAMUSCULAR; INTRAVENOUS at 22:01

## 2019-12-22 RX ADMIN — SODIUM CHLORIDE, PRESERVATIVE FREE 100 UNITS: 5 INJECTION INTRAVENOUS at 22:03

## 2019-12-22 RX ADMIN — DIPHENHYDRAMINE HYDROCHLORIDE 25 MG: 50 INJECTION, SOLUTION INTRAMUSCULAR; INTRAVENOUS at 22:02

## 2019-12-22 RX ADMIN — HYDROMORPHONE HYDROCHLORIDE 1 MG: 1 INJECTION, SOLUTION INTRAMUSCULAR; INTRAVENOUS; SUBCUTANEOUS at 22:01

## 2019-12-22 ASSESSMENT — PAIN DESCRIPTION - PAIN TYPE: TYPE: ACUTE PAIN

## 2019-12-22 ASSESSMENT — PAIN SCALES - GENERAL
PAINLEVEL_OUTOF10: 9
PAINLEVEL_OUTOF10: 9

## 2019-12-22 ASSESSMENT — PAIN DESCRIPTION - LOCATION: LOCATION: CHEST;BACK;LEG

## 2019-12-23 VITALS
TEMPERATURE: 98.5 F | HEIGHT: 63 IN | WEIGHT: 290 LBS | OXYGEN SATURATION: 100 % | BODY MASS INDEX: 51.38 KG/M2 | DIASTOLIC BLOOD PRESSURE: 64 MMHG | HEART RATE: 94 BPM | SYSTOLIC BLOOD PRESSURE: 122 MMHG | RESPIRATION RATE: 16 BRPM

## 2019-12-23 LAB
BASOPHILS ABSOLUTE: ABNORMAL E9/L (ref 0–0.2)
BASOPHILS RELATIVE PERCENT: ABNORMAL % (ref 0–2)
EOSINOPHILS ABSOLUTE: ABNORMAL E9/L (ref 0.05–0.5)
EOSINOPHILS RELATIVE PERCENT: ABNORMAL % (ref 0–6)
HCT VFR BLD CALC: ABNORMAL % (ref 34–48)
HEMOGLOBIN: ABNORMAL G/DL (ref 11.5–15.5)
IMMATURE GRANULOCYTES #: ABNORMAL E9/L
IMMATURE GRANULOCYTES %: ABNORMAL % (ref 0–5)
LYMPHOCYTES ABSOLUTE: ABNORMAL E9/L (ref 1.5–4)
LYMPHOCYTES RELATIVE PERCENT: ABNORMAL % (ref 20–42)
MCH RBC QN AUTO: ABNORMAL PG (ref 26–35)
MCHC RBC AUTO-ENTMCNC: ABNORMAL % (ref 32–34.5)
MCV RBC AUTO: ABNORMAL FL (ref 80–99.9)
MONOCYTES ABSOLUTE: ABNORMAL E9/L (ref 0.1–0.95)
MONOCYTES RELATIVE PERCENT: ABNORMAL % (ref 2–12)
NEUTROPHILS ABSOLUTE: ABNORMAL E9/L (ref 1.8–7.3)
NEUTROPHILS RELATIVE PERCENT: ABNORMAL % (ref 43–80)
PDW BLD-RTO: ABNORMAL FL (ref 11.5–15)
PLATELET # BLD: ABNORMAL E9/L (ref 130–450)
PMV BLD AUTO: ABNORMAL FL (ref 7–12)
RBC # BLD: ABNORMAL E12/L (ref 3.5–5.5)
WBC # BLD: ABNORMAL E9/L (ref 4.5–11.5)

## 2019-12-23 PROCEDURE — 96376 TX/PRO/DX INJ SAME DRUG ADON: CPT

## 2019-12-23 PROCEDURE — 6360000002 HC RX W HCPCS: Performed by: NURSE PRACTITIONER

## 2019-12-23 PROCEDURE — 6360000002 HC RX W HCPCS

## 2019-12-23 RX ORDER — HEPARIN SODIUM (PORCINE) LOCK FLUSH IV SOLN 100 UNIT/ML 100 UNIT/ML
100 SOLUTION INTRAVENOUS ONCE
Status: COMPLETED | OUTPATIENT
Start: 2019-12-23 | End: 2019-12-23

## 2019-12-23 RX ORDER — OXYCODONE HYDROCHLORIDE 5 MG/1
5 TABLET ORAL EVERY 6 HOURS PRN
Qty: 10 TABLET | Refills: 0 | Status: SHIPPED | OUTPATIENT
Start: 2019-12-23 | End: 2019-12-26

## 2019-12-23 RX ORDER — HEPARIN SODIUM (PORCINE) LOCK FLUSH IV SOLN 100 UNIT/ML 100 UNIT/ML
SOLUTION INTRAVENOUS
Status: DISCONTINUED
Start: 2019-12-23 | End: 2019-12-23 | Stop reason: HOSPADM

## 2019-12-23 RX ORDER — HEPARIN SODIUM (PORCINE) LOCK FLUSH IV SOLN 100 UNIT/ML 100 UNIT/ML
SOLUTION INTRAVENOUS
Status: COMPLETED
Start: 2019-12-23 | End: 2019-12-23

## 2019-12-23 RX ADMIN — HEPARIN SODIUM (PORCINE) LOCK FLUSH IV SOLN 100 UNIT/ML 500 UNITS: 100 SOLUTION at 00:44

## 2019-12-23 RX ADMIN — DIPHENHYDRAMINE HYDROCHLORIDE 12.5 MG: 50 INJECTION, SOLUTION INTRAMUSCULAR; INTRAVENOUS at 00:00

## 2019-12-23 RX ADMIN — HYDROMORPHONE HYDROCHLORIDE 1 MG: 1 INJECTION, SOLUTION INTRAMUSCULAR; INTRAVENOUS; SUBCUTANEOUS at 00:00

## 2019-12-23 RX ADMIN — HEPARIN 500 UNITS: 100 SYRINGE at 00:44

## 2019-12-23 ASSESSMENT — PAIN SCALES - GENERAL: PAINLEVEL_OUTOF10: 9

## 2019-12-26 LAB
EKG ATRIAL RATE: 79 BPM
EKG P AXIS: 49 DEGREES
EKG P-R INTERVAL: 152 MS
EKG Q-T INTERVAL: 376 MS
EKG QRS DURATION: 88 MS
EKG QTC CALCULATION (BAZETT): 431 MS
EKG R AXIS: 47 DEGREES
EKG T AXIS: 20 DEGREES
EKG VENTRICULAR RATE: 79 BPM

## 2020-01-01 ENCOUNTER — APPOINTMENT (OUTPATIENT)
Dept: GENERAL RADIOLOGY | Age: 25
DRG: 662 | End: 2020-01-01
Payer: MEDICAID

## 2020-01-01 ENCOUNTER — HOSPITAL ENCOUNTER (OUTPATIENT)
Age: 25
Setting detail: OBSERVATION
Discharge: LEFT BEFORE DISCHARGE | DRG: 662 | End: 2020-01-02
Attending: EMERGENCY MEDICINE | Admitting: INTERNAL MEDICINE
Payer: MEDICAID

## 2020-01-01 PROBLEM — D57.00 SICKLE CELL PAIN CRISIS (HCC): Status: ACTIVE | Noted: 2020-01-01

## 2020-01-01 LAB
ABO/RH: NORMAL
ALBUMIN SERPL-MCNC: 3.5 G/DL (ref 3.5–5.2)
ALP BLD-CCNC: 99 U/L (ref 35–104)
ALT SERPL-CCNC: 8 U/L (ref 0–32)
ANION GAP SERPL CALCULATED.3IONS-SCNC: 9 MMOL/L (ref 7–16)
ANTIBODY IDENTIFICATION: NORMAL
ANTIBODY SCREEN: NORMAL
AST SERPL-CCNC: 14 U/L (ref 0–31)
BASOPHILS ABSOLUTE: 0.03 E9/L (ref 0–0.2)
BASOPHILS RELATIVE PERCENT: 0.2 % (ref 0–2)
BILIRUB SERPL-MCNC: 0.8 MG/DL (ref 0–1.2)
BUN BLDV-MCNC: 9 MG/DL (ref 6–20)
CALCIUM SERPL-MCNC: 8.6 MG/DL (ref 8.6–10.2)
CHLORIDE BLD-SCNC: 105 MMOL/L (ref 98–107)
CO2: 25 MMOL/L (ref 22–29)
CREAT SERPL-MCNC: 0.8 MG/DL (ref 0.5–1)
DAT POLYSPECIFIC: NORMAL
DR. NOTIFY: NORMAL
EKG ATRIAL RATE: 94 BPM
EKG P AXIS: 47 DEGREES
EKG P-R INTERVAL: 154 MS
EKG Q-T INTERVAL: 380 MS
EKG QRS DURATION: 92 MS
EKG QTC CALCULATION (BAZETT): 475 MS
EKG R AXIS: 43 DEGREES
EKG T AXIS: 18 DEGREES
EKG VENTRICULAR RATE: 94 BPM
EOSINOPHILS ABSOLUTE: 0.23 E9/L (ref 0.05–0.5)
EOSINOPHILS RELATIVE PERCENT: 1.7 % (ref 0–6)
GFR AFRICAN AMERICAN: >60
GFR NON-AFRICAN AMERICAN: >60 ML/MIN/1.73
GLUCOSE BLD-MCNC: 113 MG/DL (ref 74–99)
HCT VFR BLD CALC: 28.4 % (ref 34–48)
HEMOGLOBIN: 9.3 G/DL (ref 11.5–15.5)
IMMATURE GRANULOCYTES #: 0.09 E9/L
IMMATURE GRANULOCYTES %: 0.7 % (ref 0–5)
IMMATURE RETIC FRACT: 24.2 % (ref 3–15.9)
INFLUENZA A BY PCR: NOT DETECTED
INFLUENZA B BY PCR: NOT DETECTED
LACTIC ACID: 1.1 MMOL/L (ref 0.5–2.2)
LYMPHOCYTES ABSOLUTE: 4.59 E9/L (ref 1.5–4)
LYMPHOCYTES RELATIVE PERCENT: 33.4 % (ref 20–42)
MCH RBC QN AUTO: 21.6 PG (ref 26–35)
MCHC RBC AUTO-ENTMCNC: 32.7 % (ref 32–34.5)
MCV RBC AUTO: 66 FL (ref 80–99.9)
MONOCYTES ABSOLUTE: 0.92 E9/L (ref 0.1–0.95)
MONOCYTES RELATIVE PERCENT: 6.7 % (ref 2–12)
NEUTROPHILS ABSOLUTE: 7.9 E9/L (ref 1.8–7.3)
NEUTROPHILS RELATIVE PERCENT: 57.3 % (ref 43–80)
PDW BLD-RTO: 19.3 FL (ref 11.5–15)
PLATELET # BLD: 246 E9/L (ref 130–450)
PMV BLD AUTO: 10.8 FL (ref 7–12)
POTASSIUM SERPL-SCNC: 3.7 MMOL/L (ref 3.5–5)
RBC # BLD: 4.3 E12/L (ref 3.5–5.5)
RETIC HGB EQUIVALENT: 25 PG (ref 28.2–36.6)
RETICULOCYTE ABSOLUTE COUNT: 0.08 E12/L
RETICULOCYTE COUNT PCT: 2 % (ref 0.4–1.9)
SODIUM BLD-SCNC: 139 MMOL/L (ref 132–146)
TOTAL PROTEIN: 7.2 G/DL (ref 6.4–8.3)
TROPONIN: <0.01 NG/ML (ref 0–0.03)
WBC # BLD: 13.8 E9/L (ref 4.5–11.5)

## 2020-01-01 PROCEDURE — 86900 BLOOD TYPING SEROLOGIC ABO: CPT

## 2020-01-01 PROCEDURE — 96372 THER/PROPH/DIAG INJ SC/IM: CPT

## 2020-01-01 PROCEDURE — 86880 COOMBS TEST DIRECT: CPT

## 2020-01-01 PROCEDURE — 93010 ELECTROCARDIOGRAM REPORT: CPT | Performed by: INTERNAL MEDICINE

## 2020-01-01 PROCEDURE — 87502 INFLUENZA DNA AMP PROBE: CPT

## 2020-01-01 PROCEDURE — 6360000002 HC RX W HCPCS: Performed by: EMERGENCY MEDICINE

## 2020-01-01 PROCEDURE — 96376 TX/PRO/DX INJ SAME DRUG ADON: CPT

## 2020-01-01 PROCEDURE — 86850 RBC ANTIBODY SCREEN: CPT

## 2020-01-01 PROCEDURE — G0378 HOSPITAL OBSERVATION PER HR: HCPCS

## 2020-01-01 PROCEDURE — 2580000003 HC RX 258: Performed by: CLINICAL NURSE SPECIALIST

## 2020-01-01 PROCEDURE — 2580000003 HC RX 258: Performed by: EMERGENCY MEDICINE

## 2020-01-01 PROCEDURE — 80053 COMPREHEN METABOLIC PANEL: CPT

## 2020-01-01 PROCEDURE — 6370000000 HC RX 637 (ALT 250 FOR IP): Performed by: CLINICAL NURSE SPECIALIST

## 2020-01-01 PROCEDURE — 96375 TX/PRO/DX INJ NEW DRUG ADDON: CPT

## 2020-01-01 PROCEDURE — 86870 RBC ANTIBODY IDENTIFICATION: CPT

## 2020-01-01 PROCEDURE — 96374 THER/PROPH/DIAG INJ IV PUSH: CPT

## 2020-01-01 PROCEDURE — 36415 COLL VENOUS BLD VENIPUNCTURE: CPT

## 2020-01-01 PROCEDURE — 85025 COMPLETE CBC W/AUTO DIFF WBC: CPT

## 2020-01-01 PROCEDURE — 6360000002 HC RX W HCPCS: Performed by: CLINICAL NURSE SPECIALIST

## 2020-01-01 PROCEDURE — 71045 X-RAY EXAM CHEST 1 VIEW: CPT

## 2020-01-01 PROCEDURE — 83605 ASSAY OF LACTIC ACID: CPT

## 2020-01-01 PROCEDURE — 93005 ELECTROCARDIOGRAM TRACING: CPT | Performed by: EMERGENCY MEDICINE

## 2020-01-01 PROCEDURE — 86901 BLOOD TYPING SEROLOGIC RH(D): CPT

## 2020-01-01 PROCEDURE — 85045 AUTOMATED RETICULOCYTE COUNT: CPT

## 2020-01-01 PROCEDURE — 99285 EMERGENCY DEPT VISIT HI MDM: CPT

## 2020-01-01 PROCEDURE — 84484 ASSAY OF TROPONIN QUANT: CPT

## 2020-01-01 RX ORDER — ONDANSETRON 4 MG/1
4 TABLET, ORALLY DISINTEGRATING ORAL EVERY 8 HOURS PRN
Status: DISCONTINUED | OUTPATIENT
Start: 2020-01-01 | End: 2020-01-02 | Stop reason: HOSPADM

## 2020-01-01 RX ORDER — KETOROLAC TROMETHAMINE 30 MG/ML
15 INJECTION, SOLUTION INTRAMUSCULAR; INTRAVENOUS ONCE
Status: COMPLETED | OUTPATIENT
Start: 2020-01-01 | End: 2020-01-01

## 2020-01-01 RX ORDER — SODIUM CHLORIDE 9 MG/ML
INJECTION, SOLUTION INTRAVENOUS CONTINUOUS
Status: DISCONTINUED | OUTPATIENT
Start: 2020-01-01 | End: 2020-01-02 | Stop reason: HOSPADM

## 2020-01-01 RX ORDER — HEPARIN SODIUM (PORCINE) LOCK FLUSH IV SOLN 100 UNIT/ML 100 UNIT/ML
SOLUTION INTRAVENOUS
Status: DISPENSED
Start: 2020-01-01 | End: 2020-01-01

## 2020-01-01 RX ORDER — HYDROCODONE BITARTRATE AND ACETAMINOPHEN 5; 325 MG/1; MG/1
1 TABLET ORAL EVERY 4 HOURS PRN
Status: DISCONTINUED | OUTPATIENT
Start: 2020-01-01 | End: 2020-01-02 | Stop reason: HOSPADM

## 2020-01-01 RX ORDER — ONDANSETRON 2 MG/ML
4 INJECTION INTRAMUSCULAR; INTRAVENOUS EVERY 6 HOURS PRN
Status: DISCONTINUED | OUTPATIENT
Start: 2020-01-01 | End: 2020-01-01 | Stop reason: SDUPTHER

## 2020-01-01 RX ORDER — 0.9 % SODIUM CHLORIDE 0.9 %
1000 INTRAVENOUS SOLUTION INTRAVENOUS ONCE
Status: COMPLETED | OUTPATIENT
Start: 2020-01-01 | End: 2020-01-01

## 2020-01-01 RX ORDER — SODIUM CHLORIDE 0.9 % (FLUSH) 0.9 %
10 SYRINGE (ML) INJECTION EVERY 12 HOURS SCHEDULED
Status: DISCONTINUED | OUTPATIENT
Start: 2020-01-01 | End: 2020-01-02 | Stop reason: HOSPADM

## 2020-01-01 RX ORDER — FERROUS SULFATE 325(65) MG
325 TABLET ORAL 2 TIMES DAILY WITH MEALS
Status: DISCONTINUED | OUTPATIENT
Start: 2020-01-01 | End: 2020-01-02 | Stop reason: HOSPADM

## 2020-01-01 RX ORDER — SODIUM CHLORIDE 0.9 % (FLUSH) 0.9 %
10 SYRINGE (ML) INJECTION PRN
Status: DISCONTINUED | OUTPATIENT
Start: 2020-01-01 | End: 2020-01-02 | Stop reason: HOSPADM

## 2020-01-01 RX ORDER — HYDROXYUREA 500 MG/1
500 CAPSULE ORAL 2 TIMES DAILY
Status: DISCONTINUED | OUTPATIENT
Start: 2020-01-01 | End: 2020-01-02 | Stop reason: HOSPADM

## 2020-01-01 RX ORDER — ESCITALOPRAM OXALATE 10 MG/1
10 TABLET ORAL DAILY
Status: DISCONTINUED | OUTPATIENT
Start: 2020-01-01 | End: 2020-01-02 | Stop reason: HOSPADM

## 2020-01-01 RX ORDER — FOLIC ACID 1 MG/1
2 TABLET ORAL 3 TIMES DAILY
Status: DISCONTINUED | OUTPATIENT
Start: 2020-01-01 | End: 2020-01-02 | Stop reason: HOSPADM

## 2020-01-01 RX ORDER — HYDROCODONE BITARTRATE AND ACETAMINOPHEN 5; 325 MG/1; MG/1
2 TABLET ORAL EVERY 4 HOURS PRN
Status: DISCONTINUED | OUTPATIENT
Start: 2020-01-01 | End: 2020-01-02 | Stop reason: HOSPADM

## 2020-01-01 RX ADMIN — SODIUM CHLORIDE 1000 ML: 9 INJECTION, SOLUTION INTRAVENOUS at 06:21

## 2020-01-01 RX ADMIN — ESCITALOPRAM 10 MG: 10 TABLET, FILM COATED ORAL at 13:37

## 2020-01-01 RX ADMIN — FOLIC ACID 2 MG: 1 TABLET ORAL at 20:04

## 2020-01-01 RX ADMIN — ENOXAPARIN SODIUM 40 MG: 40 INJECTION SUBCUTANEOUS at 13:37

## 2020-01-01 RX ADMIN — FERROUS SULFATE TAB 325 MG (65 MG ELEMENTAL FE) 325 MG: 325 (65 FE) TAB at 16:13

## 2020-01-01 RX ADMIN — HYDROMORPHONE HYDROCHLORIDE 0.5 MG: 1 INJECTION, SOLUTION INTRAMUSCULAR; INTRAVENOUS; SUBCUTANEOUS at 16:13

## 2020-01-01 RX ADMIN — HYDROMORPHONE HYDROCHLORIDE 1 MG: 1 INJECTION, SOLUTION INTRAMUSCULAR; INTRAVENOUS; SUBCUTANEOUS at 06:17

## 2020-01-01 RX ADMIN — HYDROMORPHONE HYDROCHLORIDE 0.5 MG: 1 INJECTION, SOLUTION INTRAMUSCULAR; INTRAVENOUS; SUBCUTANEOUS at 20:09

## 2020-01-01 RX ADMIN — SODIUM CHLORIDE 1000 ML: 9 INJECTION, SOLUTION INTRAVENOUS at 08:58

## 2020-01-01 RX ADMIN — HYDROCODONE BITARTRATE AND ACETAMINOPHEN 2 TABLET: 5; 325 TABLET ORAL at 13:41

## 2020-01-01 RX ADMIN — SODIUM CHLORIDE: 9 INJECTION, SOLUTION INTRAVENOUS at 12:39

## 2020-01-01 RX ADMIN — HYDROMORPHONE HYDROCHLORIDE 1 MG: 1 INJECTION, SOLUTION INTRAMUSCULAR; INTRAVENOUS; SUBCUTANEOUS at 11:46

## 2020-01-01 RX ADMIN — HYDROXYUREA 500 MG: 500 CAPSULE ORAL at 20:04

## 2020-01-01 RX ADMIN — HYDROXYUREA 500 MG: 500 CAPSULE ORAL at 14:39

## 2020-01-01 RX ADMIN — Medication 10 ML: at 21:00

## 2020-01-01 RX ADMIN — HYDROCODONE BITARTRATE AND ACETAMINOPHEN 2 TABLET: 5; 325 TABLET ORAL at 22:02

## 2020-01-01 RX ADMIN — HYDROMORPHONE HYDROCHLORIDE 1 MG: 1 INJECTION, SOLUTION INTRAMUSCULAR; INTRAVENOUS; SUBCUTANEOUS at 09:16

## 2020-01-01 RX ADMIN — FOLIC ACID 2 MG: 1 TABLET ORAL at 13:37

## 2020-01-01 RX ADMIN — HYDROCODONE BITARTRATE AND ACETAMINOPHEN 2 TABLET: 5; 325 TABLET ORAL at 18:01

## 2020-01-01 RX ADMIN — KETOROLAC TROMETHAMINE 15 MG: 30 INJECTION, SOLUTION INTRAMUSCULAR at 08:58

## 2020-01-01 ASSESSMENT — PAIN SCALES - GENERAL
PAINLEVEL_OUTOF10: 9
PAINLEVEL_OUTOF10: 8
PAINLEVEL_OUTOF10: 6
PAINLEVEL_OUTOF10: 8
PAINLEVEL_OUTOF10: 9
PAINLEVEL_OUTOF10: 6
PAINLEVEL_OUTOF10: 8
PAINLEVEL_OUTOF10: 8
PAINLEVEL_OUTOF10: 5
PAINLEVEL_OUTOF10: 9
PAINLEVEL_OUTOF10: 9
PAINLEVEL_OUTOF10: 8
PAINLEVEL_OUTOF10: 10
PAINLEVEL_OUTOF10: 10
PAINLEVEL_OUTOF10: 8

## 2020-01-01 ASSESSMENT — PAIN DESCRIPTION - PAIN TYPE
TYPE: ACUTE PAIN

## 2020-01-01 ASSESSMENT — PAIN DESCRIPTION - LOCATION
LOCATION: GENERALIZED
LOCATION: GENERALIZED

## 2020-01-01 ASSESSMENT — PAIN DESCRIPTION - ONSET
ONSET: ON-GOING
ONSET: ON-GOING

## 2020-01-01 ASSESSMENT — PAIN DESCRIPTION - DESCRIPTORS: DESCRIPTORS: ACHING;CONSTANT;DISCOMFORT

## 2020-01-01 ASSESSMENT — PAIN - FUNCTIONAL ASSESSMENT: PAIN_FUNCTIONAL_ASSESSMENT: PREVENTS OR INTERFERES SOME ACTIVE ACTIVITIES AND ADLS

## 2020-01-01 ASSESSMENT — PAIN DESCRIPTION - PROGRESSION
CLINICAL_PROGRESSION: NOT CHANGED
CLINICAL_PROGRESSION: NOT CHANGED

## 2020-01-01 ASSESSMENT — PAIN DESCRIPTION - FREQUENCY
FREQUENCY: CONTINUOUS
FREQUENCY: CONTINUOUS

## 2020-01-01 NOTE — H&P
17   Ht 5' 3\" (1.6 m)   LMP 11/20/2019   SpO2 97%   BMI 51.37 kg/m²     General appearance:  No apparent distress, appears stated age and cooperative. HEENT:  Normal cephalic, atraumatic without obvious deformity. Pupils equal, round, and reactive to light. Extra ocular muscles intact. Conjunctivae/corneas clear. Neck: Supple, with full range of motion. No jugular venous distention. Trachea midline. Respiratory:  Normal respiratory effort. Clear to auscultation, bilaterally without Rales/Wheezes/Rhonchi. Cardiovascular:  Regular rate and rhythm with normal S1/S2 without murmurs, rubs or gallops. Abdomen: Soft, non-tender, non-distended with normal bowel sounds. Musculoskeletal:  No clubbing, cyanosis or edema bilaterally. Full range of motion without deformity. Skin: Skin color, texture, turgor normal.  No rashes or lesions. Neurologic:  Neurovascularly intact without any focal sensory/motor deficits. Psychiatric:  Alert and oriented, thought content appropriate, normal insight  Capillary Refill: Brisk,< 3 seconds   Peripheral Pulses: +2 palpable, equal bilaterally       CXR:  I have reviewed the CXR with the following interpretation: NAD      Labs:     Recent Labs     01/01/20 0627   WBC 13.8*   HGB 9.3*   HCT 28.4*        Recent Labs     01/01/20 0627      K 3.7      CO2 25   BUN 9   CREATININE 0.8   CALCIUM 8.6     Recent Labs     01/01/20 0627   AST 14   ALT 8   BILITOT 0.8   ALKPHOS 99     No results for input(s): INR in the last 72 hours.   Recent Labs     01/01/20 0627   TROPONINI <0.01       Urinalysis:      Lab Results   Component Value Date    NITRU Negative 11/20/2019    WBCUA 5-10 11/20/2019    BACTERIA MODERATE 11/20/2019    RBCUA 2-5 11/20/2019    BLOODU Negative 11/20/2019    SPECGRAV 1.020 11/20/2019    GLUCOSEU Negative 11/20/2019         ASSESSMENT:    Active Hospital Problems    Diagnosis Date Noted    Poor venous access, s/p Rt ACW venous access port [I87.8]

## 2020-01-01 NOTE — ED PROVIDER NOTES
Department of Emergency Medicine   ED  Provider Note  Admit Date/RoomTime: 1/1/2020  4:34 AM  ED Room: 8201/8201-A   Chief Complaint     Chest Pain (PT has hx of Sickle cell); Shortness of Breath; Leg Pain; and Back Pain    History of Present Illness   Source of history provided by:  patient. History/Exam Limitations: none. Jeniffer Machado is a 25 y.o. old female who has a past medical history of:   Past Medical History:   Diagnosis Date    CLABSI (central line-associated bloodstream infection) 8/3/2016    Deep vein thrombosis (DVT) of left upper extremity ([facility] [address] 8/12/2016    Disease of blood and blood forming organ     Heart murmur     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[facility] [address]     Miscarriage within last 12 months 04/2017    Obesity, Class III, BMI 40-49.9 (morbid obesity) ([facility] [address] 5/22/2014    PE (pulmonary embolism)     PE (pulmonary embolism)     Pneumonia     Psychiatric problem     Sickle cell anemia (HCC)     Thrombosis complicating venous access device ([facility] [address] 5/18/2017      Patient with sickle cell disease presents for sickle cell pain and shortness of breath. She describes chest pain, leg pain and back pain. This is typical of her flareups. Records show that she was seen in the emergency department yesterday at [facility] clinic and had multiple negative CTs/imaging studies. She does have a med port. Currently, she is attempting to control her pain at home with Motrin and Tylenol. She denies any drug or alcohol use. .  ROS    Pertinent positives and negatives are stated within HPI, all other systems reviewed and are negative.     Past Surgical History:   Procedure Laterality Date    INSERTION / REMOVAL / REPLACEMENT VENOUS ACCESS CATHETER Left 5/9/2019    PORT INSERTION performed by Nimo Méndez MD at [place] [id number]      right    OTHER SURGICAL HISTORY  08/04/2016    removal of mediport    OTHER SURGICAL HISTORY Right 03/05/2018    TUNNELED VENOUS PORT REMOVAL & REINSERTION;DILATION OF VENOUS TRACT    TONSILLECTOMY      TUNNELED VENOUS PORT PLACEMENT  2014    and removed    TUNNELED VENOUS PORT PLACEMENT Right 01/23/2015    [place], removed 08/04/16 CLABSI    TUNNELED VENOUS PORT PLACEMENT Right 09/15/2016    CHEST    TUNNELED VENOUS PORT PLACEMENT Right 05/18/2017    SUBCLAVIAN   Social History:  reports that she has never smoked. She has never used smokeless tobacco. She reports current alcohol use. She reports that she does not use drugs. Family History: family history includes Cancer in her maternal grandmother; Diabetes in her mother; High Blood Pressure in her mother; Kidney Disease in her paternal grandmother. Allergies: Bactrim [sulfamethoxazole-trimethoprim]; Demerol hcl [meperidine]; Demerol hcl [meperidine]; Adhesive tape; and Bactrim [sulfamethoxazole-trimethoprim]    Physical Exam           ED Triage Vitals   BP Temp Temp src Pulse Resp SpO2 Height Weight   01/01/20 0430 01/01/20 0430 -- 01/01/20 0341 01/01/20 0430 01/01/20 0341 01/01/20 0430 --   (!) 120/90 97.8 °F (36.6 °C)  102 18 99 % 5' 3\" (1.6 m)       Oxygen Saturation Interpretation: Normal.    General Appearance/Constitutional:  Alert, development consistent with age, NAD. HEENT:  NC/NT. PERRLA. Airway patent. Neck:  Normal ROM. Supple. Respiratory:  Clear to auscultation and breath sounds equal.  CV:  Regular rate and rhythm, normal heart sounds, without pathological murmurs, ectopy, gallops, or rubs. Chest:  Symmetrical without visible rash or tenderness. GI:  Abdomen Soft, nontender, good bowel sounds. No firm or pulsatile mass. Back:  No costovertebral tenderness. Extremities: No tenderness or edema noted. Lymphatics: no lymphadenopathy noted  Integument:  Normal turgor. Warm, dry, without visible rash, unless noted elsewhere. Neurological:  Oriented. Motor functions intact.    Psychiatric:  Affect normal.    Lab / Imaging Results   (All laboratory and radiology results have Equivalent 25.0 (L) 28.2 - 36.6 pg   Lactic Acid, Plasma   Result Value Ref Range    Lactic Acid 1.1 0.5 - 2.2 mmol/L   Doctor Notification   Result Value Ref Range    DR. HOLLY WEI, LB/0811/Deborah    CBC Auto Differential   Result Value Ref Range    WBC 10.6 4.5 - 11.5 E9/L    RBC 4.40 3.50 - 5.50 E12/L    Hemoglobin 9.5 (L) 11.5 - 15.5 g/dL    Hematocrit 28.9 (L) 34.0 - 48.0 %    MCV 65.7 (L) 80.0 - 99.9 fL    MCH 21.6 (L) 26.0 - 35.0 pg    MCHC 32.9 32.0 - 34.5 %    RDW 19.2 (H) 11.5 - 15.0 fL    Platelets 816 134 - 651 E9/L    MPV 10.6 7.0 - 12.0 fL    Neutrophils % 42.8 (L) 43.0 - 80.0 %    Immature Granulocytes % 0.7 0.0 - 5.0 %    Lymphocytes % 47.5 (H) 20.0 - 42.0 %    Monocytes % 5.5 2.0 - 12.0 %    Eosinophils % 3.2 0.0 - 6.0 %    Basophils % 0.3 0.0 - 2.0 %    Neutrophils Absolute 4.52 1.80 - 7.30 E9/L    Immature Granulocytes # 0.07 E9/L    Lymphocytes Absolute 5.01 (H) 1.50 - 4.00 E9/L    Monocytes Absolute 0.58 0.10 - 0.95 E9/L    Eosinophils Absolute 0.34 0.05 - 0.50 E9/L    Basophils Absolute 0.03 0.00 - 0.20 I1/J   Basic Metabolic Panel w/ Reflex to MG   Result Value Ref Range    Sodium 139 132 - 146 mmol/L    Potassium reflex Magnesium 4.1 3.5 - 5.0 mmol/L    Chloride 106 98 - 107 mmol/L    CO2 23 22 - 29 mmol/L    Anion Gap 10 7 - 16 mmol/L    Glucose 112 (H) 74 - 99 mg/dL    BUN 8 6 - 20 mg/dL    CREATININE 0.7 0.5 - 1.0 mg/dL    GFR Non-African American >60 >=60 mL/min/1.73    GFR African American >60     Calcium 8.3 (L) 8.6 - 10.2 mg/dL   EKG 12 Lead   Result Value Ref Range    Ventricular Rate 94 BPM    Atrial Rate 94 BPM    P-R Interval 154 ms    QRS Duration 92 ms    Q-T Interval 380 ms    QTc Calculation (Bazett) 475 ms    P Axis 47 degrees    R Axis 43 degrees    T Axis 18 degrees   TYPE AND SCREEN   Result Value Ref Range    ABO/Rh O POS     Antibody Screen POS    DIRECT ANTIGLOBULIN TEST   Result Value Ref Range    Polyspecific Avtar NEG    ANTIBODY IDENTIFICATION   Result Value Ref Range    Antibody ID POS, Anti-I        Imaging: All Radiology results interpreted by Radiologist unless otherwise noted. XR CHEST PORTABLE   Final Result      NO ACUTE CARDIOPULMONARY PROCESS           EKG #1:  Interpreted by emergency department physician unless otherwise noted. Time:  6:08    Rate: 84  Rhythm: Sinus. Interpretation: normal EKG, normal sinus rhythm. ED Course / Medical Decision Making     Medications   heparin flush 100 UNIT/ML injection (has no administration in time range)   0.9 % sodium chloride bolus (0 mLs Intravenous Stopped 1/1/20 0858)   HYDROmorphone (DILAUDID) injection 1 mg (1 mg Intravenous Given 1/1/20 0617)   ketorolac (TORADOL) injection 15 mg (15 mg Intravenous Given 1/1/20 0858)   0.9 % sodium chloride bolus (0 mLs Intravenous Stopped 1/1/20 1141)   HYDROmorphone (DILAUDID) injection 1 mg (1 mg Intravenous Given 1/1/20 0916)   HYDROmorphone (DILAUDID) injection 1 mg (1 mg Intravenous Given 1/1/20 1146)   HYDROmorphone (DILAUDID) injection 0.5 mg (0.5 mg Intravenous Given 1/2/20 0426)        Re-Evaluations:  1/1/20        Patients symptoms show no change. Consultations:             None    Procedures:   none    MDM: Patient presents for sickle cell pain crisis. Pain is in typical location, chest back and legs. Chest x-ray was clear with no evidence of pneumonia/acute chest syndrome. Hemoglobin 9.3, labs grossly at baseline. Patient signed out to oncoming ED physician. Ultimately, pain control cannot be achieved and she was admitted for further management. Counseling:   I have spoken with the patient and discussed todays results, in addition to providing specific details for the plan of care and counseling regarding the diagnosis and prognosis and are agreeable with the plan. Assessment      1.  Sickle cell pain crisis [facility])      This patient's ED course included: a personal history and physicial examination  This patient has remained hemodynamically stable during their ED course. Plan   Admit to med/surg floor. Patient condition is stable. New Medications     Discharge Medication List as of 1/2/2020 11:01 AM        Electronically signed by Glenny Koenig DO   DD: 1/1/20  **This report was transcribed using voice recognition software. Every effort was made to ensure accuracy; however, inadvertent computerized transcription errors may be present.   END OF PROVIDER NOTE        Glenny Koenig DO  01/04/20 1105

## 2020-01-02 VITALS
SYSTOLIC BLOOD PRESSURE: 130 MMHG | RESPIRATION RATE: 16 BRPM | HEART RATE: 95 BPM | DIASTOLIC BLOOD PRESSURE: 83 MMHG | WEIGHT: 290 LBS | BODY MASS INDEX: 51.38 KG/M2 | OXYGEN SATURATION: 97 % | TEMPERATURE: 97.5 F | HEIGHT: 63 IN

## 2020-01-02 PROBLEM — R76.8 RED BLOOD CELL ANTIBODY POSITIVE: Status: ACTIVE | Noted: 2020-01-02

## 2020-01-02 LAB
ANION GAP SERPL CALCULATED.3IONS-SCNC: 10 MMOL/L (ref 7–16)
BASOPHILS ABSOLUTE: 0.03 E9/L (ref 0–0.2)
BASOPHILS RELATIVE PERCENT: 0.3 % (ref 0–2)
BUN BLDV-MCNC: 8 MG/DL (ref 6–20)
CALCIUM SERPL-MCNC: 8.3 MG/DL (ref 8.6–10.2)
CHLORIDE BLD-SCNC: 106 MMOL/L (ref 98–107)
CO2: 23 MMOL/L (ref 22–29)
CREAT SERPL-MCNC: 0.7 MG/DL (ref 0.5–1)
EOSINOPHILS ABSOLUTE: 0.34 E9/L (ref 0.05–0.5)
EOSINOPHILS RELATIVE PERCENT: 3.2 % (ref 0–6)
GFR AFRICAN AMERICAN: >60
GFR NON-AFRICAN AMERICAN: >60 ML/MIN/1.73
GLUCOSE BLD-MCNC: 112 MG/DL (ref 74–99)
HCT VFR BLD CALC: 28.9 % (ref 34–48)
HEMOGLOBIN: 9.5 G/DL (ref 11.5–15.5)
IMMATURE GRANULOCYTES #: 0.07 E9/L
IMMATURE GRANULOCYTES %: 0.7 % (ref 0–5)
LYMPHOCYTES ABSOLUTE: 5.01 E9/L (ref 1.5–4)
LYMPHOCYTES RELATIVE PERCENT: 47.5 % (ref 20–42)
MCH RBC QN AUTO: 21.6 PG (ref 26–35)
MCHC RBC AUTO-ENTMCNC: 32.9 % (ref 32–34.5)
MCV RBC AUTO: 65.7 FL (ref 80–99.9)
MONOCYTES ABSOLUTE: 0.58 E9/L (ref 0.1–0.95)
MONOCYTES RELATIVE PERCENT: 5.5 % (ref 2–12)
NEUTROPHILS ABSOLUTE: 4.52 E9/L (ref 1.8–7.3)
NEUTROPHILS RELATIVE PERCENT: 42.8 % (ref 43–80)
PDW BLD-RTO: 19.2 FL (ref 11.5–15)
PLATELET # BLD: 243 E9/L (ref 130–450)
PMV BLD AUTO: 10.6 FL (ref 7–12)
POTASSIUM REFLEX MAGNESIUM: 4.1 MMOL/L (ref 3.5–5)
RBC # BLD: 4.4 E12/L (ref 3.5–5.5)
SODIUM BLD-SCNC: 139 MMOL/L (ref 132–146)
WBC # BLD: 10.6 E9/L (ref 4.5–11.5)

## 2020-01-02 PROCEDURE — 1200000000 HC SEMI PRIVATE

## 2020-01-02 PROCEDURE — 80048 BASIC METABOLIC PNL TOTAL CA: CPT

## 2020-01-02 PROCEDURE — 96376 TX/PRO/DX INJ SAME DRUG ADON: CPT

## 2020-01-02 PROCEDURE — 36415 COLL VENOUS BLD VENIPUNCTURE: CPT

## 2020-01-02 PROCEDURE — 6360000002 HC RX W HCPCS: Performed by: CLINICAL NURSE SPECIALIST

## 2020-01-02 PROCEDURE — 6370000000 HC RX 637 (ALT 250 FOR IP): Performed by: CLINICAL NURSE SPECIALIST

## 2020-01-02 PROCEDURE — G0378 HOSPITAL OBSERVATION PER HR: HCPCS

## 2020-01-02 PROCEDURE — 85025 COMPLETE CBC W/AUTO DIFF WBC: CPT

## 2020-01-02 RX ORDER — HEPARIN SODIUM (PORCINE) LOCK FLUSH IV SOLN 100 UNIT/ML 100 UNIT/ML
500 SOLUTION INTRAVENOUS PRN
Status: DISCONTINUED | OUTPATIENT
Start: 2020-01-02 | End: 2020-01-02 | Stop reason: HOSPADM

## 2020-01-02 RX ORDER — IBUPROFEN 400 MG/1
800 TABLET ORAL 3 TIMES DAILY PRN
Status: DISCONTINUED | OUTPATIENT
Start: 2020-01-02 | End: 2020-01-02 | Stop reason: HOSPADM

## 2020-01-02 RX ORDER — KETOROLAC TROMETHAMINE 30 MG/ML
15 INJECTION, SOLUTION INTRAMUSCULAR; INTRAVENOUS ONCE
Status: DISCONTINUED | OUTPATIENT
Start: 2020-01-02 | End: 2020-01-02 | Stop reason: HOSPADM

## 2020-01-02 RX ADMIN — HYDROCODONE BITARTRATE AND ACETAMINOPHEN 2 TABLET: 5; 325 TABLET ORAL at 02:07

## 2020-01-02 RX ADMIN — HYDROMORPHONE HYDROCHLORIDE 0.5 MG: 1 INJECTION, SOLUTION INTRAMUSCULAR; INTRAVENOUS; SUBCUTANEOUS at 00:14

## 2020-01-02 RX ADMIN — HYDROCODONE BITARTRATE AND ACETAMINOPHEN 2 TABLET: 5; 325 TABLET ORAL at 06:29

## 2020-01-02 RX ADMIN — HYDROMORPHONE HYDROCHLORIDE 0.5 MG: 1 INJECTION, SOLUTION INTRAMUSCULAR; INTRAVENOUS; SUBCUTANEOUS at 04:26

## 2020-01-02 ASSESSMENT — PAIN SCALES - GENERAL
PAINLEVEL_OUTOF10: 9
PAINLEVEL_OUTOF10: 8
PAINLEVEL_OUTOF10: 10

## 2020-01-02 NOTE — DISCHARGE SUMMARY
Hospital Medicine Discharge Summary    Patient ID: Francine Velez      Patient's PCP: Travis Drake    Admit Date: 1/1/2020     Discharge Date: 1/2/2020     Admitting Physician: Earl Denney DO     Discharge Physician: Janine Hicks MD     Discharge Diagnoses: Active Hospital Problems    Diagnosis Date Noted    Poor venous access, s/p Rt ACW venous access port [I87.8] 06/22/2014     Priority: Low    Obesity, Class III, BMI 40-49.9 (morbid obesity) ([facility] [address] [E66.01] 05/22/2014     Priority: Low    Sickle cell pain crisis ([facility] [address] [D57.00] 01/01/2020    Personal history of PE (pulmonary embolism) [Z86.711]        The patient was seen and examined on day of discharge and this discharge summary is in conjunction with any daily progress note from day of discharge. Hospital Course: 26 yo female with history of Sickle Cell presented with pain in her chest (midsternal), lower back and legs (mainly lateral thighs.) she reports having moved to [place] [address] 2 years ago and being back here for the holidays. She reports being compliant with her hydroxurea as prescribed by her oncologist in [place] [address] but has not been prescribed pain medications for some time. During the stay, her reculocyte count was 2.0 and her bilirubin was normal. She was given Dilaudid for one day but after being switched to Norco, she left AMA. Exam:     /83   Pulse 95   Temp 97.5 °F (36.4 °C) (Temporal)   Resp 16   Ht 5' 3\" (1.6 m)   Wt 290 lb (131.5 kg)   LMP 11/20/2019   SpO2 97%   BMI 51.37 kg/m²       General appearance:  No apparent distress, appears stated age and cooperative. HEENT:  Normal cephalic, atraumatic without obvious deformity. Pupils equal, round, and reactive to light. Extra ocular muscles intact. Conjunctivae/corneas clear. Neck: Supple, with full range of motion. No jugular venous distention. Trachea midline. Respiratory:  Normal respiratory effort.  Clear to auscultation, bilaterally without Rales/Wheezes/Rhonchi. Cardiovascular:  Regular rate and rhythm with normal S1/S2 without murmurs, rubs or gallops. Abdomen: Soft, non-tender, non-distended with normal bowel sounds. Musculoskeletal:  No clubbing, cyanosis or edema bilaterally. Full range of motion without deformity. No calf tenderness  Skin: Skin color, texture, turgor normal.  No rashes or lesions. Neurologic:  Neurovascularly intact without any focal sensory/motor deficits. Cranial nerves: II-XII intact, grossly non-focal.  Psychiatric:  Alert and oriented, thought content appropriate, normal insight  Capillary Refill: Brisk,< 3 seconds   Peripheral Pulses: +2 palpable, equal bilaterally       Labs: For convenience and continuity at follow-up the following most recent labs are provided:      CBC:    Lab Results   Component Value Date    WBC 10.6 01/02/2020    HGB 9.5 01/02/2020    HCT 28.9 01/02/2020     01/02/2020       Renal:    Lab Results   Component Value Date     01/02/2020    K 4.1 01/02/2020     01/02/2020    CO2 23 01/02/2020    BUN 8 01/02/2020    CREATININE 0.7 01/02/2020    CALCIUM 8.3 01/02/2020    PHOS 2.9 02/29/2016         Significant Diagnostic Studies    Radiology:   XR CHEST PORTABLE   Final Result      NO ACUTE CARDIOPULMONARY PROCESS                Consults:     None    Disposition:  AMA    Condition at Discharge: Stable    Discharge Instructions/Follow-up:  Please follow up with your oncologist and establish with a primary care physician.     Code Status:  Prior     Activity: activity as tolerated    Diet: regular diet      Discharge Medications:     Discharge Medication List as of 1/2/2020 11:01 AM           Details   ondansetron (ZOFRAN ODT) 4 MG disintegrating tablet Take 1 tablet by mouth every 8 hours as needed for Nausea or Vomiting, Disp-24 tablet, R-0Print      enoxaparin (LOVENOX) 150 MG/ML injection Inject 0.93 mLs into the skin every 12 hours, Disp-60 Syringe, R-0Normal      hydroxyurea

## 2020-01-02 NOTE — PROGRESS NOTES
lesions. Neurologic:  Neurovascularly intact without any focal sensory/motor deficits. Cranial nerves: II-XII intact, grossly non-focal.  Psychiatric: Alert and oriented, thought content appropriate, normal insight    Labs:   Recent Labs     01/01/20 0627 01/02/20 0557   WBC 13.8* 10.6   HGB 9.3* 9.5*   HCT 28.4* 28.9*    243     Recent Labs     01/01/20 0627 01/02/20 0557    139   K 3.7 4.1    106   CO2 25 23   BUN 9 8   CREATININE 0.8 0.7   CALCIUM 8.6 8.3*     Recent Labs     01/01/20 0627   AST 14   ALT 8   BILITOT 0.8   ALKPHOS 99     No results for input(s): INR in the last 72 hours.   Recent Labs     01/01/20 0627   TROPONINI <0.01       Urinalysis:      Lab Results   Component Value Date    NITRU Negative 11/20/2019    WBCUA 5-10 11/20/2019    BACTERIA MODERATE 11/20/2019    RBCUA 2-5 11/20/2019    BLOODU Negative 11/20/2019    SPECGRAV 1.020 11/20/2019    GLUCOSEU Negative 11/20/2019       Radiology:  XR CHEST PORTABLE   Final Result      NO ACUTE CARDIOPULMONARY PROCESS                 Assessment/Plan:    Active Hospital Problems    Diagnosis    Poor venous access, s/p Rt ACW venous access port [I87.8]     Priority: Low    Obesity, Class III, BMI 40-49.9 (morbid obesity) ([facility] [address] [E66.01]     Priority: Low    Sickle cell pain crisis ([facility] [address] [D57.00]    Personal history of PE (pulmonary embolism) [Z86.711]     Toradol x one now; continue NSAIDs PRN  Heating pads  Offered trial of PPI for midsternal pain but patient refused    Sickle cell- [place]    DVT Prophylaxis: Lovenox  Diet: DIET GENERAL;  Code Status: Full Code    PT/OT Eval Status: NA    Dispo - Je Tapia MD

## 2020-01-14 ENCOUNTER — OFFICE VISIT (OUTPATIENT)
Dept: FAMILY MEDICINE CLINIC | Age: 25
End: 2020-01-14
Payer: MEDICAID

## 2020-01-14 VITALS
SYSTOLIC BLOOD PRESSURE: 115 MMHG | WEIGHT: 293 LBS | HEART RATE: 83 BPM | HEIGHT: 64 IN | OXYGEN SATURATION: 98 % | TEMPERATURE: 98.6 F | BODY MASS INDEX: 50.02 KG/M2 | RESPIRATION RATE: 18 BRPM | DIASTOLIC BLOOD PRESSURE: 66 MMHG

## 2020-01-14 PROCEDURE — 99202 OFFICE O/P NEW SF 15 MIN: CPT | Performed by: STUDENT IN AN ORGANIZED HEALTH CARE EDUCATION/TRAINING PROGRAM

## 2020-01-14 PROCEDURE — 1036F TOBACCO NON-USER: CPT | Performed by: STUDENT IN AN ORGANIZED HEALTH CARE EDUCATION/TRAINING PROGRAM

## 2020-01-14 PROCEDURE — G8484 FLU IMMUNIZE NO ADMIN: HCPCS | Performed by: STUDENT IN AN ORGANIZED HEALTH CARE EDUCATION/TRAINING PROGRAM

## 2020-01-14 PROCEDURE — G8417 CALC BMI ABV UP PARAM F/U: HCPCS | Performed by: STUDENT IN AN ORGANIZED HEALTH CARE EDUCATION/TRAINING PROGRAM

## 2020-01-14 PROCEDURE — 99213 OFFICE O/P EST LOW 20 MIN: CPT | Performed by: STUDENT IN AN ORGANIZED HEALTH CARE EDUCATION/TRAINING PROGRAM

## 2020-01-14 PROCEDURE — G8427 DOCREV CUR MEDS BY ELIG CLIN: HCPCS | Performed by: STUDENT IN AN ORGANIZED HEALTH CARE EDUCATION/TRAINING PROGRAM

## 2020-01-14 ASSESSMENT — PATIENT HEALTH QUESTIONNAIRE - PHQ9
SUM OF ALL RESPONSES TO PHQ QUESTIONS 1-9: 1
SUM OF ALL RESPONSES TO PHQ9 QUESTIONS 1 & 2: 1
1. LITTLE INTEREST OR PLEASURE IN DOING THINGS: 0
2. FEELING DOWN, DEPRESSED OR HOPELESS: 1
SUM OF ALL RESPONSES TO PHQ QUESTIONS 1-9: 1

## 2020-01-14 ASSESSMENT — ENCOUNTER SYMPTOMS
ABDOMINAL PAIN: 0
COUGH: 0
DIARRHEA: 0
CONSTIPATION: 0
RHINORRHEA: 0
WHEEZING: 0
NAUSEA: 0
VOMITING: 0
SHORTNESS OF BREATH: 0

## 2020-01-14 NOTE — PROGRESS NOTES
S: 25 y.o. female here hx of sickle -thalessemia minor who presents requesting refills on dilaudid. She is seeing Dr. Winifred Rocha pcp near [facility]. Denies pain. O: VS: /66 (Site: Left Lower Arm, Position: Sitting, Cuff Size: Medium Adult)   Pulse 83   Temp 98.6 °F (37 °C) (Oral)   Resp 18   Ht 5' 4\" (1.626 m)   Wt (!) 301 lb (136.5 kg)   LMP 12/30/2019   SpO2 98%   BMI 51.67 kg/m²    General: NAD   CV:  RRR, no gallops, rubs, or murmurs   Resp: CTAB no R/R/W   Abd:  Soft, nontender, no masses    Ext:  no C/C/E    Spine nonttp, neg slr, ms 5/5, nl rom  Impression/Plan:   1. HBg-SS with thal minor-continue with h/o. [facility] from [facility] Dr. Justice Sandoval 10/2019 appointment and records here from last visits in 2017. Patient was advised here that we could not prescribe her dilaudid any longer after several negative uds and was referred to pain mgmt. She had seen pain mgmt 2019 who advised that narcotics were not appropriate to treat her pain and referred her to [facility]. His note indicated that she had a positive uds for cocaine in 2018. She was advised that we were 1)not starting new patients on chronic opioids  and 2) did not feel this was the appropriate treatment for her at this time. She declined non-narcotic treatments for her pain and declined referral to pain mgmt. [place] report showed numerous prescribers of oxycodone and percocet (limited doses) over the past year. Controlled Substance Monitoring:    Acute and Chronic Pain Monitoring:   RX Monitoring 1/14/2020   Attestation -   Periodic Controlled Substance Monitoring Possible medication side effects, risk of tolerance/dependence & alternative treatments discussed. ;Potential drug abuse or diversion identified, see note documentation. Pt does not plan to follow up here. Will continue with Dr. Ryan Oneill pcp in 2 days in [place].     Attending Physician Statement  I have discussed the case, including

## 2020-01-14 NOTE — PROGRESS NOTES
[facility]  FAMILY MEDICINE RESIDENCY PROGRAM  DATE OF VISIT : 2020    Patient : Jeniffer Machado   Age : 25 y. o.  : 1995   MRN : [id number]   ______________________________________________________________________    Chief Complaint :   Chief Complaint   Patient presents with   Julian Garg Doctor       HPI : Jeniffer Machado is 25 y.o. female who presented to the clinic today for     DVT/PE -uses Lovenox for DVT/PE x2 DVT x1 PE. No SOB, no CP, no tachycardia, no leg swelling. Od[place] Cell with thalassemia minor - takes hydroxeurea, folic acid and iron pill. She presented to the ED 4 days ago for sickle cell pain. received fluids diladid and toradol. Does not get transufsion routinely. Follows with Dr Hodan Austin hematology. Will follow with Dr Charlee Briones here. Was last admitted on [place] sickle cell pains. Patient has presented to the ED 60 times in the past year for sickle cell pain. Before that she is to get her pain managed by [facility] in 2017 however she was weaned off of it. Patient is requesting Dilaudid 4 mg 6 hourly for which she takes as needed when she gets a flareup of sickle cell.       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[facility] [address]     Miscarriage within last 12 months 2017    Obesity, Class III, BMI 40-49.9 (morbid obesity) ([address] 2014    PE (pulmonary embolism)     PE (pulmonary embolism)     Pneumonia     Psychiatric problem     Sickle cell anemia (HCC)     Thrombosis complicating venous access device ([facility] [address] 2017     Past Surgical History:   Procedure Laterality Date    INSERTION / REMOVAL / REPLACEMENT VENOUS ACCESS CATHETER Left 2019    PORT INSERTION performed by Cesario Cespedes Roni Sun MD at [place] [address]      right    OTHER SURGICAL HISTORY  08/04/2016    removal of mediport    OTHER SURGICAL HISTORY Right 03/05/2018    TUNNELED VENOUS PORT REMOVAL & REINSERTION;DILATION OF VENOUS TRACT    TONSILLECTOMY      TUNNELED VENOUS PORT PLACEMENT  2014    and removed    TUNNELED VENOUS PORT PLACEMENT Right 01/23/2015    [place], removed 08/04/16 CLABSI    TUNNELED VENOUS PORT PLACEMENT Right 09/15/2016    CHEST    TUNNELED VENOUS PORT PLACEMENT Right 05/18/2017    SUBCLAVIAN       Allergies : Allergies   Allergen Reactions    Bactrim [Sulfamethoxazole-Trimethoprim]     Demerol Hcl [Meperidine] Hives    Demerol Hcl [Meperidine]     Adhesive Tape Dermatitis, Rash and Itching    Bactrim [Sulfamethoxazole-Trimethoprim] Rash     Rash, itchy       Medication List :    Current Outpatient Medications   Medication Sig Dispense Refill    ondansetron (ZOFRAN ODT) 4 MG disintegrating tablet Take 1 tablet by mouth every 8 hours as needed for Nausea or Vomiting 24 tablet 0    enoxaparin (LOVENOX) 150 MG/ML injection Inject 0.93 mLs into the skin every 12 hours 60 Syringe 0    hydroxyurea (HYDREA) 500 MG chemo capsule Take 500 mg by mouth 2 times daily      escitalopram (LEXAPRO) 5 MG tablet Take 10 mg by mouth daily       ferrous sulfate 325 (65 FE) MG tablet Take 1 tablet by mouth 2 times daily (with meals) 30 tablet 3    folic acid (FOLVITE) 1 MG tablet Take 2 tablets by mouth daily (Patient taking differently: Take 2 mg by mouth 3 times daily ) 60 tablet 11     No current facility-administered medications for this visit. Review of Systems :  Review of Systems   Constitutional: Negative for activity change, fever and unexpected weight change. HENT: Negative for congestion and rhinorrhea. Respiratory: Negative for cough, shortness of breath and wheezing. Cardiovascular: Negative for chest pain and palpitations.    Gastrointestinal: Negative for abdominal pain, narcotics were not appropriate for her condition and he referred her to [facility], pain management's note indicated the patient was positive for cocaine in UDS. Patient was advised that we are not restarting narcotic pain medication for her, however she was offered non-narcotic pain management and referral to pain management clinic. She declined nonnarcotic treatment and she declined establishing care with provider patient decided to continue following up with original PCP and she is scheduled to see her in 2 days in [place].          Meg Duncan MD

## 2020-01-15 PROBLEM — D56.3 THALASSEMIA MINOR: Status: ACTIVE | Noted: 2020-01-15

## 2020-02-24 ENCOUNTER — APPOINTMENT (OUTPATIENT)
Dept: CT IMAGING | Age: 25
End: 2020-02-24
Payer: MEDICAID

## 2020-02-24 ENCOUNTER — APPOINTMENT (OUTPATIENT)
Dept: GENERAL RADIOLOGY | Age: 25
End: 2020-02-24
Payer: MEDICAID

## 2020-02-24 ENCOUNTER — HOSPITAL ENCOUNTER (EMERGENCY)
Age: 25
Discharge: HOME | End: 2020-02-24
Attending: EMERGENCY MEDICINE
Payer: MEDICAID

## 2020-02-24 VITALS
SYSTOLIC BLOOD PRESSURE: 139 MMHG | HEART RATE: 83 BPM | OXYGEN SATURATION: 97 % | RESPIRATION RATE: 17 BRPM | BODY MASS INDEX: 51.91 KG/M2 | HEIGHT: 63 IN | TEMPERATURE: 97.4 F | DIASTOLIC BLOOD PRESSURE: 89 MMHG | WEIGHT: 293 LBS

## 2020-02-24 LAB
ALBUMIN SERPL-MCNC: 3.7 G/DL (ref 3.5–5.2)
ALP BLD-CCNC: 94 U/L (ref 35–104)
ALT SERPL-CCNC: 12 U/L (ref 0–32)
ANION GAP SERPL CALCULATED.3IONS-SCNC: 13 MMOL/L (ref 7–16)
AST SERPL-CCNC: 12 U/L (ref 0–31)
BACTERIA: ABNORMAL /HPF
BILIRUB SERPL-MCNC: 0.6 MG/DL (ref 0–1.2)
BILIRUBIN URINE: NEGATIVE
BLOOD, URINE: NEGATIVE
BUN BLDV-MCNC: 9 MG/DL (ref 6–20)
CALCIUM SERPL-MCNC: 8.8 MG/DL (ref 8.6–10.2)
CHLORIDE BLD-SCNC: 104 MMOL/L (ref 98–107)
CLARITY: ABNORMAL
CO2: 24 MMOL/L (ref 22–29)
COLOR: YELLOW
CREAT SERPL-MCNC: 0.9 MG/DL (ref 0.5–1)
EKG ATRIAL RATE: 75 BPM
EKG P AXIS: 44 DEGREES
EKG P-R INTERVAL: 142 MS
EKG Q-T INTERVAL: 402 MS
EKG QRS DURATION: 84 MS
EKG QTC CALCULATION (BAZETT): 448 MS
EKG R AXIS: 43 DEGREES
EKG T AXIS: 35 DEGREES
EKG VENTRICULAR RATE: 75 BPM
EPITHELIAL CELLS, UA: ABNORMAL /HPF
GFR AFRICAN AMERICAN: >60
GFR NON-AFRICAN AMERICAN: >60 ML/MIN/1.73
GLUCOSE BLD-MCNC: 105 MG/DL (ref 74–99)
GLUCOSE URINE: NEGATIVE MG/DL
HCG, URINE, POC: NEGATIVE
HCT VFR BLD CALC: 30.5 % (ref 34–48)
HCT VFR BLD CALC: 30.7 % (ref 34–48)
HEMOGLOBIN: 9.9 G/DL (ref 11.5–15.5)
IMMATURE RETIC FRACT: 25.4 % (ref 3–15.9)
INR BLD: 1.1
KETONES, URINE: NEGATIVE MG/DL
LACTIC ACID: 1.3 MMOL/L (ref 0.5–2.2)
LEUKOCYTE ESTERASE, URINE: ABNORMAL
LIPASE: 17 U/L (ref 13–60)
Lab: NORMAL
MCH RBC QN AUTO: 20.9 PG (ref 26–35)
MCHC RBC AUTO-ENTMCNC: 32.2 % (ref 32–34.5)
MCV RBC AUTO: 64.8 FL (ref 80–99.9)
NEGATIVE QC PASS/FAIL: NORMAL
NITRITE, URINE: NEGATIVE
PDW BLD-RTO: 19.1 FL (ref 11.5–15)
PH UA: 5.5 (ref 5–9)
PLATELET # BLD: 282 E9/L (ref 130–450)
PMV BLD AUTO: 10.7 FL (ref 7–12)
POSITIVE QC PASS/FAIL: NORMAL
POTASSIUM SERPL-SCNC: 3.7 MMOL/L (ref 3.5–5)
PROTEIN UA: NEGATIVE MG/DL
PROTHROMBIN TIME: 12.9 SEC (ref 9.3–12.4)
RBC # BLD: 4.74 E12/L (ref 3.5–5.5)
RBC UA: ABNORMAL /HPF (ref 0–2)
RETIC HGB EQUIVALENT: 26.5 PG (ref 28.2–36.6)
RETICULOCYTE ABSOLUTE COUNT: 0.09 E12/L
RETICULOCYTE COUNT PCT: 1.9 % (ref 0.4–1.9)
SODIUM BLD-SCNC: 141 MMOL/L (ref 132–146)
SPECIFIC GRAVITY UA: 1.02 (ref 1–1.03)
TOTAL PROTEIN: 7.3 G/DL (ref 6.4–8.3)
TROPONIN: <0.01 NG/ML (ref 0–0.03)
UROBILINOGEN, URINE: 0.2 E.U./DL
WBC # BLD: 24.3 E9/L (ref 4.5–11.5)
WBC UA: ABNORMAL /HPF (ref 0–5)

## 2020-02-24 PROCEDURE — 6360000002 HC RX W HCPCS: Performed by: EMERGENCY MEDICINE

## 2020-02-24 PROCEDURE — 71045 X-RAY EXAM CHEST 1 VIEW: CPT

## 2020-02-24 PROCEDURE — 36415 COLL VENOUS BLD VENIPUNCTURE: CPT

## 2020-02-24 PROCEDURE — 85610 PROTHROMBIN TIME: CPT

## 2020-02-24 PROCEDURE — 96376 TX/PRO/DX INJ SAME DRUG ADON: CPT

## 2020-02-24 PROCEDURE — 6360000002 HC RX W HCPCS: Performed by: NURSE PRACTITIONER

## 2020-02-24 PROCEDURE — 85027 COMPLETE CBC AUTOMATED: CPT

## 2020-02-24 PROCEDURE — 84484 ASSAY OF TROPONIN QUANT: CPT

## 2020-02-24 PROCEDURE — 93010 ELECTROCARDIOGRAM REPORT: CPT | Performed by: INTERNAL MEDICINE

## 2020-02-24 PROCEDURE — 85045 AUTOMATED RETICULOCYTE COUNT: CPT

## 2020-02-24 PROCEDURE — 74176 CT ABD & PELVIS W/O CONTRAST: CPT

## 2020-02-24 PROCEDURE — 2580000003 HC RX 258: Performed by: EMERGENCY MEDICINE

## 2020-02-24 PROCEDURE — 83690 ASSAY OF LIPASE: CPT

## 2020-02-24 PROCEDURE — 81001 URINALYSIS AUTO W/SCOPE: CPT

## 2020-02-24 PROCEDURE — 93005 ELECTROCARDIOGRAM TRACING: CPT | Performed by: EMERGENCY MEDICINE

## 2020-02-24 PROCEDURE — 83605 ASSAY OF LACTIC ACID: CPT

## 2020-02-24 PROCEDURE — 99285 EMERGENCY DEPT VISIT HI MDM: CPT

## 2020-02-24 PROCEDURE — 96375 TX/PRO/DX INJ NEW DRUG ADDON: CPT

## 2020-02-24 PROCEDURE — 80053 COMPREHEN METABOLIC PANEL: CPT

## 2020-02-24 PROCEDURE — 96374 THER/PROPH/DIAG INJ IV PUSH: CPT

## 2020-02-24 RX ORDER — ONDANSETRON 2 MG/ML
4 INJECTION INTRAMUSCULAR; INTRAVENOUS ONCE
Status: COMPLETED | OUTPATIENT
Start: 2020-02-24 | End: 2020-02-24

## 2020-02-24 RX ORDER — 0.9 % SODIUM CHLORIDE 0.9 %
1000 INTRAVENOUS SOLUTION INTRAVENOUS ONCE
Status: COMPLETED | OUTPATIENT
Start: 2020-02-24 | End: 2020-02-24

## 2020-02-24 RX ORDER — CEPHALEXIN 250 MG/1
250 CAPSULE ORAL 3 TIMES DAILY
Qty: 21 CAPSULE | Refills: 0 | Status: SHIPPED | OUTPATIENT
Start: 2020-02-24 | End: 2020-03-02

## 2020-02-24 RX ADMIN — SODIUM CHLORIDE 1000 ML: 9 INJECTION, SOLUTION INTRAVENOUS at 04:15

## 2020-02-24 RX ADMIN — ONDANSETRON 4 MG: 2 INJECTION INTRAMUSCULAR; INTRAVENOUS at 04:19

## 2020-02-24 RX ADMIN — HYDROMORPHONE HYDROCHLORIDE 1 MG: 1 INJECTION, SOLUTION INTRAMUSCULAR; INTRAVENOUS; SUBCUTANEOUS at 04:19

## 2020-02-24 RX ADMIN — HYDROMORPHONE HYDROCHLORIDE 1 MG: 1 INJECTION, SOLUTION INTRAMUSCULAR; INTRAVENOUS; SUBCUTANEOUS at 06:01

## 2020-02-24 ASSESSMENT — PAIN DESCRIPTION - DESCRIPTORS: DESCRIPTORS: DISCOMFORT;ACHING

## 2020-02-24 ASSESSMENT — PAIN DESCRIPTION - PAIN TYPE: TYPE: ACUTE PAIN

## 2020-02-24 ASSESSMENT — PAIN DESCRIPTION - LOCATION: LOCATION: GENERALIZED

## 2020-02-24 ASSESSMENT — PAIN SCALES - GENERAL
PAINLEVEL_OUTOF10: 9
PAINLEVEL_OUTOF10: 9
PAINLEVEL_OUTOF10: 7

## 2020-02-24 NOTE — ED NOTES
Bed: 17A-17  Expected date:   Expected time:   Means of arrival:   Comments:  triage     Greg Florez RN  02/24/20 [id number]

## 2020-02-24 NOTE — ED PROVIDER NOTES
History:  has a past surgical history that includes knee surgery; Tonsillectomy; Tunneled venous port placement (2014); Tunneled venous port placement (Right, 01/23/2015); other surgical history (08/04/2016); Tunneled venous port placement (Right, 09/15/2016); Tunneled venous port placement (Right, 05/18/2017); other surgical history (Right, 03/05/2018); and INSERTION / REMOVAL / REPLACEMENT VENOUS ACCESS CATHETER (Left, 5/9/2019). Social History:  reports that she has never smoked. She has never used smokeless tobacco. She reports current alcohol use. She reports that she does not use drugs. Family History: family history includes Cancer in her maternal grandmother; Diabetes in her mother; High Blood Pressure in her mother; Kidney Disease in her paternal grandmother. The patients home medications have been reviewed. Allergies: Bactrim [sulfamethoxazole-trimethoprim]; Demerol hcl [meperidine]; Demerol hcl [meperidine];  Adhesive tape; and Bactrim [sulfamethoxazole-trimethoprim]    -------------------------------------------------- RESULTS -------------------------------------------------  All laboratory and radiology results have been personally reviewed by myself   LABS:  Results for orders placed or performed during the hospital encounter of 02/24/20   CBC   Result Value Ref Range    WBC 24.3 (H) 4.5 - 11.5 E9/L    RBC 4.74 3.50 - 5.50 E12/L    Hemoglobin 9.9 (L) 11.5 - 15.5 g/dL    Hematocrit 30.7 (L) 34.0 - 48.0 %    MCV 64.8 (L) 80.0 - 99.9 fL    MCH 20.9 (L) 26.0 - 35.0 pg    MCHC 32.2 32.0 - 34.5 %    RDW 19.1 (H) 11.5 - 15.0 fL    Platelets 747 572 - 938 E9/L    MPV 10.7 7.0 - 12.0 fL   Comprehensive Metabolic Panel   Result Value Ref Range    Sodium 141 132 - 146 mmol/L    Potassium 3.7 3.5 - 5.0 mmol/L    Chloride 104 98 - 107 mmol/L    CO2 24 22 - 29 mmol/L    Anion Gap 13 7 - 16 mmol/L    Glucose 105 (H) 74 - 99 mg/dL    BUN 9 6 - 20 mg/dL    CREATININE 0.9 0.5 - 1.0 mg/dL    GFR Non- American >60 >=60 mL/min/1.73    GFR African American >60     Calcium 8.8 8.6 - 10.2 mg/dL    Total Protein 7.3 6.4 - 8.3 g/dL    Alb 3.7 3.5 - 5.2 g/dL    Total Bilirubin 0.6 0.0 - 1.2 mg/dL    Alkaline Phosphatase 94 35 - 104 U/L    ALT 12 0 - 32 U/L    AST 12 0 - 31 U/L   Lipase   Result Value Ref Range    Lipase 17 13 - 60 U/L   Protime-INR   Result Value Ref Range    Protime 12.9 (H) 9.3 - 12.4 sec    INR 1.1    Urinalysis   Result Value Ref Range    Color, UA Yellow Straw/Yellow    Clarity, UA SL CLOUDY Clear    Glucose, Ur Negative Negative mg/dL    Bilirubin Urine Negative Negative    Ketones, Urine Negative Negative mg/dL    Specific Gravity, UA 1.025 1.005 - 1.030    Blood, Urine Negative Negative    pH, UA 5.5 5.0 - 9.0    Protein, UA Negative Negative mg/dL    Urobilinogen, Urine 0.2 <2.0 E.U./dL    Nitrite, Urine Negative Negative    Leukocyte Esterase, Urine SMALL (A) Negative   Reticulocytes   Result Value Ref Range    Retic Ct Pct 1.9 0.4 - 1.9 %    Retic Ct Abs 0.090 E12/L    Immature Retic Fract 25.4 (H) 3.0 - 15.9 %    Hematocrit 30.5 (L) 34.0 - 48.0 %    Retic HGB Equivalent 26.5 (L) 28.2 - 36.6 pg   Troponin   Result Value Ref Range    Troponin <0.01 0.00 - 0.03 ng/mL   Lactic Acid, Plasma   Result Value Ref Range    Lactic Acid 1.3 0.5 - 2.2 mmol/L   Microscopic Urinalysis   Result Value Ref Range    WBC, UA 2-5 0 - 5 /HPF    RBC, UA 0-1 0 - 2 /HPF    Epi Cells MANY /HPF    Bacteria, UA MANY (A) None Seen /HPF   POC Pregnancy Urine Qual   Result Value Ref Range    HCG, Urine, POC Negative Negative    Lot Number [id number]     Positive QC Pass/Fail Pass     Negative QC Pass/Fail Pass    EKG 12 Lead   Result Value Ref Range    Ventricular Rate 75 BPM    Atrial Rate 75 BPM    P-R Interval 142 ms    QRS Duration 84 ms    Q-T Interval 402 ms    QTc Calculation (Bazett) 448 ms    P Axis 44 degrees    R Axis 43 degrees    T Axis 35 degrees       RADIOLOGY:  Interpreted by Radiologist.  Fred Hoang WO CONTRAST   Final Result   There are no acute abdominal findings. This report has been electronically signed by Dorothea Unger MD.      XR CHEST PORTABLE    (Results Pending)       ------------------------- NURSING NOTES AND VITALS REVIEWED ---------------------------   The nursing notes within the ED encounter and vital signs as below have been reviewed. BP (!) 144/92   Pulse 78   Temp 97.2 °F (36.2 °C)   Resp 18   Ht 5' 3\" (1.6 m)   Wt (!) 313 lb (142 kg)   LMP 02/08/2020   SpO2 96%   BMI 55.45 kg/m²   Oxygen Saturation Interpretation: Normal      ---------------------------------------------------PHYSICAL EXAM--------------------------------------      Constitutional/General: Alert and oriented x3, well appearing, non toxic in NAD  Head: Normocephalic and atraumatic  Eyes: PERRL, EOMI  Mouth: Oropharynx clear, handling secretions, no trismus  Neck: Supple, full ROM,   Pulmonary: Lungs clear to auscultation bilaterally, no wheezes, rales, or rhonchi. Not in respiratory distress, lungs clear but diminished. Occasional moist nonproductive cough noted during assessment. Cardiovascular:  Regular rate and rhythm, no murmurs, gallops, or rubs. 2+ distal pulses  Abdomen: Soft, non tender, non distended,   Extremities: Moves all extremities x 4. Warm and well perfused  Skin: warm and dry without rash  Neurologic: GCS 15, cranial nerves II through XII grossly intact. No acute neurovascular deficit noted.   Speech clear and coherent strength strong and equal bilaterally  Psych: Normal Affect      ------------------------------ ED COURSE/MEDICAL DECISION MAKING----------------------  Medications   0.9 % sodium chloride bolus (0 mLs Intravenous Stopped 2/24/20 0604)   HYDROmorphone (DILAUDID) injection 1 mg (1 mg Intravenous Given 2/24/20 0419)   ondansetron (ZOFRAN) injection 4 mg (4 mg Intravenous Given 2/24/20 0419)   HYDROmorphone (DILAUDID) injection 1 mg (1 mg Intravenous Given 2/24/20 0601) ED COURSE:       Medical Decision Making:     Plan Will be for labs will also obtain imaging and medicate for symptom relief. Chest x-ray completed unremarkable. Labs resulted urine pregnancy negative, CBC with elevated white blood cell count 24.3, hemoglobin hematocrit 9.9 and 30.7. This is normal for patient. Chemistry panel unremarkable. Lipase is negative, PT/INR all within normal limits. Urinalysis with small leukocytes awaiting urinalysis microscopic portion. Troponin is negative, lactic acid level negative, patient was just seen 2 days prior at [facility] for allergic reaction, rash. She did receive Solu-Medrol 125 mg IM injection. Patient recheck count is actually negative. Urine pregnancy negative. Twelve-lead EKG showing sinus rhythm no acute ST elevation or depression noted. Heart rate 75. This was compared to most recent EKG from January 1, 2020 which is unchanged. Will obtain CT scan of the abdomen due to markedly elevated white blood cell count this could be due to receiving the IM Solu-Medrol just 2 days prior for an allergic reaction. Counseling: The emergency provider has spoken with the patient and discussed todays results, in addition to providing specific details for the plan of care and counseling regarding the diagnosis and prognosis. Questions are answered at this time and they are agreeable with the plan.      --------------------------------- IMPRESSION AND DISPOSITION ---------------------------------    IMPRESSION  1. Sickle cell disease with crisis ([facility] [address]    2. Lower abdominal pain    3. Urinary tract infection without hematuria, site unspecified        DISPOSITION  Disposition:  Patient condition is good      NOTE: This report was transcribed using voice recognition software.  Every effort was made to ensure accuracy; however, inadvertent computerized transcription errors may be present     SOLOMON Evans - FITO  02/24/20 0218

## 2020-02-25 ENCOUNTER — OFFICE VISIT (OUTPATIENT)
Dept: VASCULAR SURGERY | Age: 25
End: 2020-02-25
Payer: MEDICAID

## 2020-02-25 PROCEDURE — G8484 FLU IMMUNIZE NO ADMIN: HCPCS | Performed by: SURGERY

## 2020-02-25 PROCEDURE — 99212 OFFICE O/P EST SF 10 MIN: CPT | Performed by: SURGERY

## 2020-02-25 PROCEDURE — G8417 CALC BMI ABV UP PARAM F/U: HCPCS | Performed by: SURGERY

## 2020-02-25 PROCEDURE — 1036F TOBACCO NON-USER: CPT | Performed by: SURGERY

## 2020-02-25 PROCEDURE — G8427 DOCREV CUR MEDS BY ELIG CLIN: HCPCS | Performed by: SURGERY

## 2020-05-05 ENCOUNTER — HOSPITAL ENCOUNTER (EMERGENCY)
Age: 25
Discharge: HOME | End: 2020-05-05
Attending: EMERGENCY MEDICINE
Payer: MEDICAID

## 2020-05-05 ENCOUNTER — APPOINTMENT (OUTPATIENT)
Dept: GENERAL RADIOLOGY | Age: 25
End: 2020-05-05
Payer: MEDICAID

## 2020-05-05 VITALS
RESPIRATION RATE: 18 BRPM | TEMPERATURE: 97.8 F | OXYGEN SATURATION: 100 % | HEIGHT: 63 IN | SYSTOLIC BLOOD PRESSURE: 120 MMHG | BODY MASS INDEX: 51.91 KG/M2 | HEART RATE: 100 BPM | DIASTOLIC BLOOD PRESSURE: 59 MMHG | WEIGHT: 293 LBS

## 2020-05-05 LAB
ALBUMIN SERPL-MCNC: 3.7 G/DL (ref 3.5–5.2)
ALP BLD-CCNC: 102 U/L (ref 35–104)
ALT SERPL-CCNC: 8 U/L (ref 0–32)
ANION GAP SERPL CALCULATED.3IONS-SCNC: 9 MMOL/L (ref 7–16)
ANISOCYTOSIS: ABNORMAL
AST SERPL-CCNC: 15 U/L (ref 0–31)
ATYPICAL LYMPHOCYTE RELATIVE PERCENT: 1.7 % (ref 0–4)
BASOPHILS ABSOLUTE: 0 E9/L (ref 0–0.2)
BASOPHILS RELATIVE PERCENT: 0 % (ref 0–2)
BILIRUB SERPL-MCNC: 0.8 MG/DL (ref 0–1.2)
BUN BLDV-MCNC: 8 MG/DL (ref 6–20)
CALCIUM SERPL-MCNC: 8.8 MG/DL (ref 8.6–10.2)
CHLORIDE BLD-SCNC: 104 MMOL/L (ref 98–107)
CO2: 27 MMOL/L (ref 22–29)
CREAT SERPL-MCNC: 0.9 MG/DL (ref 0.5–1)
EOSINOPHILS ABSOLUTE: 0.11 E9/L (ref 0.05–0.5)
EOSINOPHILS RELATIVE PERCENT: 0.9 % (ref 0–6)
GFR AFRICAN AMERICAN: >60
GFR NON-AFRICAN AMERICAN: >60 ML/MIN/1.73
GLUCOSE BLD-MCNC: 111 MG/DL (ref 74–99)
HCG QUALITATIVE: NEGATIVE
HCT VFR BLD CALC: 29.2 % (ref 34–48)
HEMOGLOBIN: 9.8 G/DL (ref 11.5–15.5)
HYPOCHROMIA: ABNORMAL
IMMATURE RETIC FRACT: 27.9 % (ref 3–15.9)
LIPASE: 20 U/L (ref 13–60)
LYMPHOCYTES ABSOLUTE: 3.86 E9/L (ref 1.5–4)
LYMPHOCYTES RELATIVE PERCENT: 31 % (ref 20–42)
MCH RBC QN AUTO: 21.4 PG (ref 26–35)
MCHC RBC AUTO-ENTMCNC: 33.6 % (ref 32–34.5)
MCV RBC AUTO: 63.9 FL (ref 80–99.9)
MONOCYTES ABSOLUTE: 0.12 E9/L (ref 0.1–0.95)
MONOCYTES RELATIVE PERCENT: 0.9 % (ref 2–12)
NEUTROPHILS ABSOLUTE: 7.72 E9/L (ref 1.8–7.3)
NEUTROPHILS RELATIVE PERCENT: 65.5 % (ref 43–80)
NUCLEATED RED BLOOD CELLS: 0 /100 WBC
OVALOCYTES: ABNORMAL
PDW BLD-RTO: 19.4 FL (ref 11.5–15)
PLATELET # BLD: 277 E9/L (ref 130–450)
PMV BLD AUTO: 10.8 FL (ref 7–12)
POIKILOCYTES: ABNORMAL
POLYCHROMASIA: ABNORMAL
POTASSIUM REFLEX MAGNESIUM: 3.8 MMOL/L (ref 3.5–5)
RBC # BLD: 4.57 E12/L (ref 3.5–5.5)
RETIC HGB EQUIVALENT: 24.6 PG (ref 28.2–36.6)
RETICULOCYTE ABSOLUTE COUNT: 0.12 E12/L
RETICULOCYTE COUNT PCT: 2.6 % (ref 0.4–1.9)
SCHISTOCYTES: ABNORMAL
SODIUM BLD-SCNC: 140 MMOL/L (ref 132–146)
TARGET CELLS: ABNORMAL
TEAR DROP CELLS: ABNORMAL
TOTAL PROTEIN: 7 G/DL (ref 6.4–8.3)
TROPONIN: <0.01 NG/ML (ref 0–0.03)
WBC # BLD: 11.7 E9/L (ref 4.5–11.5)

## 2020-05-05 PROCEDURE — 93005 ELECTROCARDIOGRAM TRACING: CPT | Performed by: EMERGENCY MEDICINE

## 2020-05-05 PROCEDURE — 2580000003 HC RX 258: Performed by: EMERGENCY MEDICINE

## 2020-05-05 PROCEDURE — 85045 AUTOMATED RETICULOCYTE COUNT: CPT

## 2020-05-05 PROCEDURE — 99284 EMERGENCY DEPT VISIT MOD MDM: CPT

## 2020-05-05 PROCEDURE — 6360000002 HC RX W HCPCS: Performed by: EMERGENCY MEDICINE

## 2020-05-05 PROCEDURE — 83690 ASSAY OF LIPASE: CPT

## 2020-05-05 PROCEDURE — 96374 THER/PROPH/DIAG INJ IV PUSH: CPT

## 2020-05-05 PROCEDURE — 71045 X-RAY EXAM CHEST 1 VIEW: CPT

## 2020-05-05 PROCEDURE — 6360000002 HC RX W HCPCS

## 2020-05-05 PROCEDURE — 94760 N-INVAS EAR/PLS OXIMETRY 1: CPT

## 2020-05-05 PROCEDURE — 85025 COMPLETE CBC W/AUTO DIFF WBC: CPT

## 2020-05-05 PROCEDURE — 84703 CHORIONIC GONADOTROPIN ASSAY: CPT

## 2020-05-05 PROCEDURE — 84484 ASSAY OF TROPONIN QUANT: CPT

## 2020-05-05 PROCEDURE — 80053 COMPREHEN METABOLIC PANEL: CPT

## 2020-05-05 RX ORDER — HEPARIN SODIUM (PORCINE) LOCK FLUSH IV SOLN 100 UNIT/ML 100 UNIT/ML
SOLUTION INTRAVENOUS
Status: COMPLETED
Start: 2020-05-05 | End: 2020-05-05

## 2020-05-05 RX ORDER — HEPARIN SODIUM (PORCINE) LOCK FLUSH IV SOLN 100 UNIT/ML 100 UNIT/ML
100 SOLUTION INTRAVENOUS PRN
Status: DISCONTINUED | OUTPATIENT
Start: 2020-05-05 | End: 2020-05-06 | Stop reason: HOSPADM

## 2020-05-05 RX ORDER — 0.9 % SODIUM CHLORIDE 0.9 %
500 INTRAVENOUS SOLUTION INTRAVENOUS ONCE
Status: COMPLETED | OUTPATIENT
Start: 2020-05-05 | End: 2020-05-05

## 2020-05-05 RX ORDER — MORPHINE SULFATE 4 MG/ML
4 INJECTION, SOLUTION INTRAMUSCULAR; INTRAVENOUS ONCE
Status: COMPLETED | OUTPATIENT
Start: 2020-05-05 | End: 2020-05-05

## 2020-05-05 RX ADMIN — MORPHINE SULFATE 4 MG: 4 INJECTION, SOLUTION INTRAMUSCULAR; INTRAVENOUS at 22:07

## 2020-05-05 RX ADMIN — SODIUM CHLORIDE, PRESERVATIVE FREE 100 UNITS: 5 INJECTION INTRAVENOUS at 23:18

## 2020-05-05 RX ADMIN — SODIUM CHLORIDE 500 ML: 9 INJECTION, SOLUTION INTRAVENOUS at 21:26

## 2020-05-05 RX ADMIN — HEPARIN SODIUM (PORCINE) LOCK FLUSH IV SOLN 100 UNIT/ML 100 UNITS: 100 SOLUTION at 23:18

## 2020-05-05 ASSESSMENT — ENCOUNTER SYMPTOMS
EYE PAIN: 0
RHINORRHEA: 0
ABDOMINAL PAIN: 0
VOMITING: 0
BLOOD IN STOOL: 0
BACK PAIN: 1
WHEEZING: 0
EYE DISCHARGE: 0
ABDOMINAL DISTENTION: 0
COUGH: 0
SORE THROAT: 0
SHORTNESS OF BREATH: 0
CHEST TIGHTNESS: 1
CONSTIPATION: 0
EYE REDNESS: 0
DIARRHEA: 0
NAUSEA: 0
SINUS PRESSURE: 0
DOUBLE VISION: 0

## 2020-05-05 ASSESSMENT — PAIN SCALES - GENERAL
PAINLEVEL_OUTOF10: 10
PAINLEVEL_OUTOF10: 10

## 2020-05-05 ASSESSMENT — VISUAL ACUITY: OU: 1

## 2020-05-05 ASSESSMENT — PAIN DESCRIPTION - PAIN TYPE: TYPE: ACUTE PAIN

## 2020-05-06 LAB
EKG ATRIAL RATE: 91 BPM
EKG P AXIS: 37 DEGREES
EKG P-R INTERVAL: 140 MS
EKG Q-T INTERVAL: 372 MS
EKG QRS DURATION: 82 MS
EKG QTC CALCULATION (BAZETT): 457 MS
EKG R AXIS: 24 DEGREES
EKG T AXIS: 4 DEGREES
EKG VENTRICULAR RATE: 91 BPM

## 2020-05-06 PROCEDURE — 93010 ELECTROCARDIOGRAM REPORT: CPT | Performed by: INTERNAL MEDICINE

## 2020-05-06 NOTE — ED PROVIDER NOTES
Patient is a 25years old female with history of sickle cell disease as well as blood clots for which she is currently taking Lovenox here with aches all over her body including chest tightness, backache, and an ache in her legs that started about 2 days ago. Patient states she took Tylenol with last dose around noon. She states nothing makes her symptoms worse or better. She states her LMP was 2.5 weeks ago. Patient denies fever, chills, sweats, falls, injuries, lightheadedness, dizziness, headache, neck pain/stiffness, change in vision/hearing, numbness/tingling, focal weakness, cough, recent travel, sick contacts/contact with anyone positive with COVID-19, shortness of breath, abdominal pain, nausea/vomiting, diarrhea, constipation, blood in stool or urine, burning with urination, urinary frequency changes, or use of recreational drugs/alcohol. Illness    The current episode started 2 days ago. Pertinent negatives include no fever, no decreased vision, no double vision, no abdominal pain, no constipation, no diarrhea, no nausea, no vomiting, no ear pain, no headaches, no hearing loss, no rhinorrhea, no sore throat, no neck pain, no neck stiffness, no cough, no URI, no wheezing, no rash, no eye discharge, no eye pain and no eye redness. Review of Systems   Constitutional: Negative for chills, diaphoresis and fever. HENT: Negative for ear pain, hearing loss, rhinorrhea, sinus pressure and sore throat. Eyes: Negative for double vision, pain, discharge, redness and visual disturbance. Respiratory: Positive for chest tightness. Negative for cough, shortness of breath and wheezing. Gastrointestinal: Negative for abdominal distention, abdominal pain, blood in stool, constipation, diarrhea, nausea and vomiting. Genitourinary: Negative for dysuria, flank pain, frequency and hematuria. Musculoskeletal: Positive for arthralgias, back pain and myalgias.  Negative for neck pain and neck stiffness. Skin: Negative for rash and wound. Neurological: Negative for dizziness, syncope, weakness, light-headedness, numbness and headaches. Hematological: Negative for adenopathy. All other systems reviewed and are negative. Physical Exam  Vitals signs and nursing note reviewed. Constitutional:       Appearance: She is well-developed. HENT:      Head: Normocephalic and atraumatic. No raccoon eyes or Paulino's sign. Nose: Nose normal.      Mouth/Throat:      Mouth: Mucous membranes are dry. Pharynx: Oropharynx is clear. Uvula midline. Eyes:      General: Lids are normal. Vision grossly intact. Extraocular Movements: Extraocular movements intact. Conjunctiva/sclera: Conjunctivae normal.      Pupils: Pupils are equal, round, and reactive to light. Neck:      Musculoskeletal: Normal range of motion and neck supple. Vascular: No JVD. Trachea: Trachea normal.   Cardiovascular:      Rate and Rhythm: Normal rate and regular rhythm. Pulses:           Radial pulses are 2+ on the right side and 2+ on the left side. Posterior tibial pulses are 2+ on the right side and 2+ on the left side. Pulmonary:      Effort: Pulmonary effort is normal. No respiratory distress. Breath sounds: Normal breath sounds. No wheezing or rales. Abdominal:      General: Bowel sounds are normal.      Palpations: Abdomen is soft. Tenderness: There is no abdominal tenderness. There is no right CVA tenderness, left CVA tenderness, guarding or rebound. Musculoskeletal:      Right lower leg: She exhibits no tenderness and no swelling. Left lower leg: She exhibits no tenderness and no swelling. Skin:     General: Skin is warm and dry. Neurological:      Mental Status: She is alert and oriented to person, place, and time. Cranial Nerves: Cranial nerves are intact. No cranial nerve deficit. Sensory: Sensation is intact. Motor: Motor function is intact. Coordination: Coordination normal.          Procedures     MDM  Number of Diagnoses or Management Options  Generalized body aches:   Hx of sickle cell disease:   Diagnosis management comments: Patient has history of sickle cell disease and arrived here due to body wide aches. Her EKG is unremarkable for STEMI. Her CXR is unremarkable for acute changes. Her troponin is WNL. Her reticulocyte count is stable. Discussed patient's condition and test results with her. Discussed return precautions. Patient is discharged in stable condition with instructions to follow-up with her hematologist and PCP or return to ED if her symptoms return or worsen. Amount and/or Complexity of Data Reviewed  Decide to obtain previous medical records or to obtain history from someone other than the patient: yes             EKG:  This EKG is signed and interpreted by me. Rate: 91  Rhythm: Sinus  Interpretation: non-specific EKG  Comparison: stable as compared to patient's most recent EKG           --------------------------------------------- PAST HISTORY ---------------------------------------------  Past Medical History:  has a past medical history of CLABSI (central line-associated bloodstream infection), Deep vein thrombosis (DVT) of left upper extremity ([address] Disease of blood and blood forming organ, Heart murmur, 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[address] Miscarriage within last 12 months, Obesity, Class III, BMI 40-49.9 (morbid obesity) ([address] PE (pulmonary embolism), PE (pulmonary embolism), Pneumonia, Psychiatric problem, Sickle cell anemia ([address] and Thrombosis complicating venous access device ([address] Past Surgical History:  has a past surgical history that includes knee surgery; Tonsillectomy; Tunneled venous port placement (2014); Tunneled venous port placement (Right, 01/23/2015); other surgical history (08/04/2016); Tunneled venous port placement (Right, 09/15/2016);  Tunneled venous port placement (Right, 05/18/2017); the plan of care and counseling regarding the diagnosis and prognosis. Their questions are answered at this time and they are agreeable with the plan. I discussed at length with them reasons for immediate return here for re evaluation. They will followup with their Hematologist and primary care physician by calling their office tomorrow. --------------------------------- ADDITIONAL PROVIDER NOTES ---------------------------------  At this time the patient is without objective evidence of an acute process requiring hospitalization or inpatient management. They have remained hemodynamically stable throughout their entire ED visit and are stable for discharge with outpatient follow-up. The plan has been discussed in detail and they are aware of the specific conditions for emergent return, as well as the importance of follow-up. New Prescriptions    No medications on file       Diagnosis:  1. Generalized body aches    2. Hx of sickle cell disease        Disposition:  Patient's disposition: Discharge to home  Patient's condition is stable.           Crystal Lares,   Resident  05/05/20 1566

## 2020-10-07 ENCOUNTER — HOSPITAL ENCOUNTER (EMERGENCY)
Age: 25
Discharge: HOME | DRG: 315 | End: 2020-10-07
Payer: MEDICARE

## 2020-10-07 ENCOUNTER — APPOINTMENT (OUTPATIENT)
Dept: GENERAL RADIOLOGY | Age: 25
DRG: 315 | End: 2020-10-07
Payer: MEDICARE

## 2020-10-07 VITALS
DIASTOLIC BLOOD PRESSURE: 77 MMHG | HEART RATE: 90 BPM | OXYGEN SATURATION: 99 % | SYSTOLIC BLOOD PRESSURE: 133 MMHG | TEMPERATURE: 97.7 F | BODY MASS INDEX: 51.91 KG/M2 | HEIGHT: 63 IN | RESPIRATION RATE: 14 BRPM | WEIGHT: 293 LBS

## 2020-10-07 LAB
ALBUMIN SERPL-MCNC: 4 G/DL (ref 3.5–5.2)
ALP BLD-CCNC: 99 U/L (ref 35–104)
ALT SERPL-CCNC: 8 U/L (ref 0–32)
ANION GAP SERPL CALCULATED.3IONS-SCNC: 11 MMOL/L (ref 7–16)
AST SERPL-CCNC: 11 U/L (ref 0–31)
BASOPHILS ABSOLUTE: 0.02 E9/L (ref 0–0.2)
BASOPHILS RELATIVE PERCENT: 0.1 % (ref 0–2)
BILIRUB SERPL-MCNC: 1.3 MG/DL (ref 0–1.2)
BUN BLDV-MCNC: 11 MG/DL (ref 6–20)
CALCIUM SERPL-MCNC: 9.2 MG/DL (ref 8.6–10.2)
CHLORIDE BLD-SCNC: 102 MMOL/L (ref 98–107)
CO2: 24 MMOL/L (ref 22–29)
CREAT SERPL-MCNC: 1 MG/DL (ref 0.5–1)
EKG ATRIAL RATE: 101 BPM
EKG P AXIS: 44 DEGREES
EKG P-R INTERVAL: 136 MS
EKG Q-T INTERVAL: 364 MS
EKG QRS DURATION: 84 MS
EKG QTC CALCULATION (BAZETT): 471 MS
EKG R AXIS: 55 DEGREES
EKG T AXIS: 10 DEGREES
EKG VENTRICULAR RATE: 101 BPM
EOSINOPHILS ABSOLUTE: 0.27 E9/L (ref 0.05–0.5)
EOSINOPHILS RELATIVE PERCENT: 1.8 % (ref 0–6)
GFR AFRICAN AMERICAN: >60
GFR NON-AFRICAN AMERICAN: >60 ML/MIN/1.73
GLUCOSE BLD-MCNC: 136 MG/DL (ref 74–99)
HCG QUALITATIVE: NEGATIVE
HCT VFR BLD CALC: 31.1 % (ref 34–48)
HEMOGLOBIN: 10.7 G/DL (ref 11.5–15.5)
IMMATURE GRANULOCYTES #: 0.05 E9/L
IMMATURE GRANULOCYTES %: 0.3 % (ref 0–5)
IMMATURE RETIC FRACT: 17.4 % (ref 3–15.9)
LYMPHOCYTES ABSOLUTE: 4.97 E9/L (ref 1.5–4)
LYMPHOCYTES RELATIVE PERCENT: 33.6 % (ref 20–42)
MCH RBC QN AUTO: 25.5 PG (ref 26–35)
MCHC RBC AUTO-ENTMCNC: 34.4 % (ref 32–34.5)
MCV RBC AUTO: 74.2 FL (ref 80–99.9)
MONOCYTES ABSOLUTE: 0.8 E9/L (ref 0.1–0.95)
MONOCYTES RELATIVE PERCENT: 5.4 % (ref 2–12)
NEUTROPHILS ABSOLUTE: 8.69 E9/L (ref 1.8–7.3)
NEUTROPHILS RELATIVE PERCENT: 58.8 % (ref 43–80)
PDW BLD-RTO: 19.4 FL (ref 11.5–15)
PLATELET # BLD: 288 E9/L (ref 130–450)
PMV BLD AUTO: 10.6 FL (ref 7–12)
POTASSIUM SERPL-SCNC: 3.7 MMOL/L (ref 3.5–5)
RBC # BLD: 4.19 E12/L (ref 3.5–5.5)
RETIC HGB EQUIVALENT: 28.2 PG (ref 28.2–36.6)
RETICULOCYTE ABSOLUTE COUNT: 0.09 E12/L
RETICULOCYTE COUNT PCT: 2.2 % (ref 0.4–1.9)
SODIUM BLD-SCNC: 137 MMOL/L (ref 132–146)
TOTAL PROTEIN: 7.3 G/DL (ref 6.4–8.3)
TROPONIN: <0.01 NG/ML (ref 0–0.03)
WBC # BLD: 14.8 E9/L (ref 4.5–11.5)

## 2020-10-07 PROCEDURE — 99284 EMERGENCY DEPT VISIT MOD MDM: CPT

## 2020-10-07 PROCEDURE — 71045 X-RAY EXAM CHEST 1 VIEW: CPT

## 2020-10-07 PROCEDURE — 93010 ELECTROCARDIOGRAM REPORT: CPT | Performed by: INTERNAL MEDICINE

## 2020-10-07 PROCEDURE — 87077 CULTURE AEROBIC IDENTIFY: CPT

## 2020-10-07 PROCEDURE — 85045 AUTOMATED RETICULOCYTE COUNT: CPT

## 2020-10-07 PROCEDURE — 85025 COMPLETE CBC W/AUTO DIFF WBC: CPT

## 2020-10-07 PROCEDURE — 80053 COMPREHEN METABOLIC PANEL: CPT

## 2020-10-07 PROCEDURE — 2580000003 HC RX 258: Performed by: NURSE PRACTITIONER

## 2020-10-07 PROCEDURE — 84484 ASSAY OF TROPONIN QUANT: CPT

## 2020-10-07 PROCEDURE — 6370000000 HC RX 637 (ALT 250 FOR IP): Performed by: NURSE PRACTITIONER

## 2020-10-07 PROCEDURE — 6360000002 HC RX W HCPCS: Performed by: NURSE PRACTITIONER

## 2020-10-07 PROCEDURE — 84703 CHORIONIC GONADOTROPIN ASSAY: CPT

## 2020-10-07 PROCEDURE — 87040 BLOOD CULTURE FOR BACTERIA: CPT

## 2020-10-07 PROCEDURE — 96375 TX/PRO/DX INJ NEW DRUG ADDON: CPT

## 2020-10-07 PROCEDURE — 87186 SC STD MICRODIL/AGAR DIL: CPT

## 2020-10-07 PROCEDURE — 87150 DNA/RNA AMPLIFIED PROBE: CPT

## 2020-10-07 PROCEDURE — 93005 ELECTROCARDIOGRAM TRACING: CPT | Performed by: NURSE PRACTITIONER

## 2020-10-07 PROCEDURE — 96376 TX/PRO/DX INJ SAME DRUG ADON: CPT

## 2020-10-07 PROCEDURE — 96374 THER/PROPH/DIAG INJ IV PUSH: CPT

## 2020-10-07 PROCEDURE — 99285 EMERGENCY DEPT VISIT HI MDM: CPT

## 2020-10-07 PROCEDURE — 6360000002 HC RX W HCPCS

## 2020-10-07 RX ORDER — AZITHROMYCIN 250 MG/1
500 TABLET, FILM COATED ORAL ONCE
Status: COMPLETED | OUTPATIENT
Start: 2020-10-07 | End: 2020-10-07

## 2020-10-07 RX ORDER — CEFDINIR 300 MG/1
300 CAPSULE ORAL ONCE
Status: COMPLETED | OUTPATIENT
Start: 2020-10-07 | End: 2020-10-07

## 2020-10-07 RX ORDER — CEFDINIR 300 MG/1
300 CAPSULE ORAL 2 TIMES DAILY
Qty: 20 CAPSULE | Refills: 0 | Status: ON HOLD | OUTPATIENT
Start: 2020-10-07 | End: 2020-10-16 | Stop reason: HOSPADM

## 2020-10-07 RX ORDER — AZITHROMYCIN 250 MG/1
TABLET, FILM COATED ORAL
Qty: 1 PACKET | Refills: 0 | Status: ON HOLD | OUTPATIENT
Start: 2020-10-07 | End: 2020-10-16 | Stop reason: HOSPADM

## 2020-10-07 RX ORDER — HEPARIN SODIUM (PORCINE) LOCK FLUSH IV SOLN 100 UNIT/ML 100 UNIT/ML
SOLUTION INTRAVENOUS
Status: COMPLETED
Start: 2020-10-07 | End: 2020-10-07

## 2020-10-07 RX ORDER — DIPHENHYDRAMINE HYDROCHLORIDE 50 MG/ML
25 INJECTION INTRAMUSCULAR; INTRAVENOUS ONCE
Status: COMPLETED | OUTPATIENT
Start: 2020-10-07 | End: 2020-10-07

## 2020-10-07 RX ORDER — ONDANSETRON 2 MG/ML
4 INJECTION INTRAMUSCULAR; INTRAVENOUS ONCE
Status: COMPLETED | OUTPATIENT
Start: 2020-10-07 | End: 2020-10-07

## 2020-10-07 RX ORDER — 0.9 % SODIUM CHLORIDE 0.9 %
1000 INTRAVENOUS SOLUTION INTRAVENOUS ONCE
Status: COMPLETED | OUTPATIENT
Start: 2020-10-07 | End: 2020-10-07

## 2020-10-07 RX ADMIN — SODIUM CHLORIDE 1000 ML: 9 INJECTION, SOLUTION INTRAVENOUS at 02:46

## 2020-10-07 RX ADMIN — ONDANSETRON 4 MG: 2 INJECTION INTRAMUSCULAR; INTRAVENOUS at 04:05

## 2020-10-07 RX ADMIN — HEPARIN 500 UNITS: 100 SYRINGE at 05:29

## 2020-10-07 RX ADMIN — HYDROMORPHONE HYDROCHLORIDE 1 MG: 1 INJECTION, SOLUTION INTRAMUSCULAR; INTRAVENOUS; SUBCUTANEOUS at 04:05

## 2020-10-07 RX ADMIN — HYDROMORPHONE HYDROCHLORIDE 1 MG: 1 INJECTION, SOLUTION INTRAMUSCULAR; INTRAVENOUS; SUBCUTANEOUS at 02:46

## 2020-10-07 RX ADMIN — AZITHROMYCIN 500 MG: 250 TABLET, FILM COATED ORAL at 05:29

## 2020-10-07 RX ADMIN — CEFDINIR 300 MG: 300 CAPSULE ORAL at 05:29

## 2020-10-07 RX ADMIN — DIPHENHYDRAMINE HYDROCHLORIDE 25 MG: 50 INJECTION, SOLUTION INTRAMUSCULAR; INTRAVENOUS at 04:05

## 2020-10-07 RX ADMIN — DIPHENHYDRAMINE HYDROCHLORIDE 25 MG: 50 INJECTION, SOLUTION INTRAMUSCULAR; INTRAVENOUS at 02:46

## 2020-10-07 RX ADMIN — ONDANSETRON 4 MG: 2 INJECTION INTRAMUSCULAR; INTRAVENOUS at 02:46

## 2020-10-07 ASSESSMENT — PAIN DESCRIPTION - LOCATION: LOCATION: GENERALIZED

## 2020-10-07 ASSESSMENT — PAIN SCALES - GENERAL
PAINLEVEL_OUTOF10: 8
PAINLEVEL_OUTOF10: 8
PAINLEVEL_OUTOF10: 9

## 2020-10-07 ASSESSMENT — PAIN DESCRIPTION - DESCRIPTORS: DESCRIPTORS: DISCOMFORT

## 2020-10-07 ASSESSMENT — PAIN DESCRIPTION - PAIN TYPE: TYPE: ACUTE PAIN

## 2020-10-07 NOTE — ED PROVIDER NOTES
Independent  HPI:  10/7/20, Time: 2:24 AM EDT         Louise Clark is a 22 y.o. female presenting to the ED for sickle cell pain. Patient reports that the pain started 2 to 3 days ago. States that is primarily to the midsternal chest as well as her back and lower legs. States that this pain is all consistent with her sickle cell pain. States that she did take her Percocet 10 mg tablets without any relief. Patient reports recent hospitalization in September at [facility] diagnosed with a splenic infarct and is currently on Lovenox 120 mg subcu 2 times a day. Patient otherwise denies any unusual lower extremity swelling denies any fevers, cough, chills as well as no noted abdominal pain. Review of Systems:   Pertinent positives and negatives are stated within HPI, all other systems reviewed and are negative.          --------------------------------------------- PAST HISTORY ---------------------------------------------  Past Medical History:  has a past medical history of CLABSI (central line-associated bloodstream infection), Deep vein thrombosis (DVT) of left upper extremity ([address] Disease of blood and blood forming organ, Heart murmur, 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[address] Miscarriage within last 12 months, Obesity, Class III, BMI 40-49.9 (morbid obesity) ([address] PE (pulmonary embolism), PE (pulmonary embolism), Pneumonia, Psychiatric problem, Sickle cell anemia ([address] and Thrombosis complicating venous access device ([address] Past Surgical History:  has a past surgical history that includes knee surgery; Tonsillectomy; Tunneled venous port placement (2014); Tunneled venous port placement (Right, 01/23/2015); other surgical history (08/04/2016); Tunneled venous port placement (Right, 09/15/2016); Tunneled venous port placement (Right, 05/18/2017); other surgical history (Right, 03/05/2018); and INSERTION / REMOVAL / REPLACEMENT VENOUS ACCESS CATHETER (Left, 5/9/2019).     Social History:  reports that she has never smoked. She has never used smokeless tobacco. She reports current alcohol use. She reports that she does not use drugs. Family History: family history includes Cancer in her maternal grandmother; Diabetes in her mother; High Blood Pressure in her mother; Kidney Disease in her paternal grandmother. The patients home medications have been reviewed. Allergies: Bactrim [sulfamethoxazole-trimethoprim]; Demerol hcl [meperidine]; Demerol hcl [meperidine];  Adhesive tape; and Bactrim [sulfamethoxazole-trimethoprim]    -------------------------------------------------- RESULTS -------------------------------------------------  All laboratory and radiology results have been personally reviewed by myself   LABS:  Results for orders placed or performed during the hospital encounter of 10/07/20   Troponin   Result Value Ref Range    Troponin <0.01 0.00 - 0.03 ng/mL   CBC Auto Differential   Result Value Ref Range    WBC 14.8 (H) 4.5 - 11.5 E9/L    RBC 4.19 3.50 - 5.50 E12/L    Hemoglobin 10.7 (L) 11.5 - 15.5 g/dL    Hematocrit 31.1 (L) 34.0 - 48.0 %    MCV 74.2 (L) 80.0 - 99.9 fL    MCH 25.5 (L) 26.0 - 35.0 pg    MCHC 34.4 32.0 - 34.5 %    RDW 19.4 (H) 11.5 - 15.0 fL    Platelets 651 471 - 598 E9/L    MPV 10.6 7.0 - 12.0 fL    Neutrophils % 58.8 43.0 - 80.0 %    Immature Granulocytes % 0.3 0.0 - 5.0 %    Lymphocytes % 33.6 20.0 - 42.0 %    Monocytes % 5.4 2.0 - 12.0 %    Eosinophils % 1.8 0.0 - 6.0 %    Basophils % 0.1 0.0 - 2.0 %    Neutrophils Absolute 8.69 (H) 1.80 - 7.30 E9/L    Immature Granulocytes # 0.05 E9/L    Lymphocytes Absolute 4.97 (H) 1.50 - 4.00 E9/L    Monocytes Absolute 0.80 0.10 - 0.95 E9/L    Eosinophils Absolute 0.27 0.05 - 0.50 E9/L    Basophils Absolute 0.02 0.00 - 0.20 E9/L   Comprehensive Metabolic Panel   Result Value Ref Range    Sodium 137 132 - 146 mmol/L    Potassium 3.7 3.5 - 5.0 mmol/L    Chloride 102 98 - 107 mmol/L    CO2 24 22 - 29 mmol/L    Anion Gap 11 7 - 16 mmol/L    Glucose 136 (H) 74 - 99 mg/dL    BUN 11 6 - 20 mg/dL    CREATININE 1.0 0.5 - 1.0 mg/dL    GFR Non-African American >60 >=60 mL/min/1.73    GFR African American >60     Calcium 9.2 8.6 - 10.2 mg/dL    Total Protein 7.3 6.4 - 8.3 g/dL    Alb 4.0 3.5 - 5.2 g/dL    Total Bilirubin 1.3 (H) 0.0 - 1.2 mg/dL    Alkaline Phosphatase 99 35 - 104 U/L    ALT 8 0 - 32 U/L    AST 11 0 - 31 U/L   HCG Qualitative, Serum   Result Value Ref Range    hCG Qual NEGATIVE NEGATIVE   Reticulocytes   Result Value Ref Range    Retic Ct Pct 2.2 (H) 0.4 - 1.9 %    Retic Ct Abs 0.090 E12/L    Immature Retic Fract 17.4 (H) 3.0 - 15.9 %    Retic HGB Equivalent 28.2 28.2 - 36.6 pg   EKG 12 Lead   Result Value Ref Range    Ventricular Rate 101 BPM    Atrial Rate 101 BPM    P-R Interval 136 ms    QRS Duration 84 ms    Q-T Interval 364 ms    QTc Calculation (Bazett) 471 ms    P Axis 44 degrees    R Axis 55 degrees    T Axis 10 degrees       RADIOLOGY:  Interpreted by Radiologist.  XR CHEST PORTABLE   Final Result   Bibasilar airspace disease, nonspecific though may be seen with pneumonia,   including that described with trending viral illness. Left chest port catheter tip terminates over the right atrium. Correlate for   ectopy.             ------------------------- NURSING NOTES AND VITALS REVIEWED ---------------------------   The nursing notes within the ED encounter and vital signs as below have been reviewed.    BP (!) 155/97   Pulse 101   Temp 97.7 °F (36.5 °C)   Resp 16   Ht 5' 3\" (1.6 m)   Wt (!) 307 lb (139.3 kg)   LMP 08/23/2020   SpO2 97%   BMI 54.38 kg/m²   Oxygen Saturation Interpretation: Normal      ---------------------------------------------------PHYSICAL EXAM--------------------------------------      Constitutional/General: Alert and oriented x3, well appearing, non toxic in NAD  Head: Normocephalic and atraumatic  Eyes: PERRL, EOMI  Mouth: Oropharynx clear, handling secretions, no trismus  Neck: Supple, full ROM, Pulmonary: Lungs clear to auscultation bilaterally, no wheezes, rales, or rhonchi. Not in respiratory distress  Cardiovascular:  Regular rate and rhythm, no murmurs, gallops, or rubs. 2+ distal pulses, pain is reproducible to midsternal chest wall area  Abdomen: Soft, non tender, non distended,   Extremities: Moves all extremities x 4. Warm and well perfused, no lower extremity swelling noted  Skin: warm and dry without rash  Neurologic: GCS 15,  Psych: Normal Affect      ------------------------------ ED COURSE/MEDICAL DECISION MAKING----------------------  Medications   azithromycin (ZITHROMAX) tablet 500 mg (has no administration in time range)   cefdinir (OMNICEF) capsule 300 mg (has no administration in time range)   0.9 % sodium chloride bolus (1,000 mLs Intravenous New Bag 10/7/20 0246)   HYDROmorphone (DILAUDID) injection 1 mg (1 mg Intravenous Given 10/7/20 0246)   diphenhydrAMINE (BENADRYL) injection 25 mg (25 mg Intravenous Given 10/7/20 0246)   ondansetron (ZOFRAN) injection 4 mg (4 mg Intravenous Given 10/7/20 0246)   HYDROmorphone (DILAUDID) injection 1 mg (1 mg Intravenous Given 10/7/20 0405)   diphenhydrAMINE (BENADRYL) injection 25 mg (25 mg Intravenous Given 10/7/20 0405)   ondansetron (ZOFRAN) injection 4 mg (4 mg Intravenous Given 10/7/20 0405)         ED COURSE:       Medical Decision Making: Plan will be for labs will also obtain imaging and medicate for her symptom relief. We will then reevaluate  Twelve-lead EKG interpreted showed a heart rate of 101, sinus tachycardia. No acute ST elevation or depression noted. Right axis deviation, patient was medicated twice for her pain relief she is now reporting marked improvement. Labs are resulted troponin negative.   CBC slight elevated white blood cell count of 14.8 she has been high in the past this could be an inflammatory response related to sickle cell disease versus infectious etiology, chemistry panel negative she does have total bili of 1.3, pregnancy test negative, recheck count 2.2, chest x-ray showing by basilar airspace disease nonspecific though may be seen with a pneumonia. Patient does deny fevers or cough, she is not been hypoxic, she was able to perform ambulation for nursing staff with maintaining pulse ox at 99% on room air, she is not tachycardic I do not suspect any pulmonary embolism she is with reproducible chest wall pain on clinical exam, she was made aware of all results and joint decision-making was completed with this patient she states she does feel well enough to go home and does prefer to be discharged home she will be provided with first dose of Zithromax and Omnicef for concerns for a pneumonia component and she was educated on medication compliance as well as when to return back to the emergency department. Patient reports full understanding of this she was made aware to return if she does develop any return of sickle cell pain as well as any other new concerns. Patient otherwise nontoxic. Patient neurovascular intact. Patient expressed understanding and safely discharged home      Counseling: The emergency provider has spoken with the patient and discussed todays results, in addition to providing specific details for the plan of care and counseling regarding the diagnosis and prognosis. Questions are answered at this time and they are agreeable with the plan.      --------------------------------- IMPRESSION AND DISPOSITION ---------------------------------    IMPRESSION  1. Hb-SS disease without crisis ([facility] [address]    2. Community acquired pneumonia, unspecified laterality        DISPOSITION  Disposition: Discharge to home  Patient condition is good      NOTE: This report was transcribed using voice recognition software.  Every effort was made to ensure accuracy; however, inadvertent computerized transcription errors may be present     SOLOMON Glover - CNP  10/07/20 0453  ATTENDING PROVIDER ATTESTATION: Supervising Physician, on-site, available for consultation, non-participatory in the evaluation or care of this patient       Ismael Menchaca MD  10/07/20 [id number]

## 2020-10-07 NOTE — ED NOTES
Pt ambulated without difficulty. Denies SOB & dizziness. spo2 99% throughout. Steady gait.  NP notified        Sammi Gallegos RN  10/07/20 [id number]

## 2020-10-07 NOTE — ED NOTES
Pt is a cousin of KHUSHI in [address] [place] [address] Kady Carbajal  10/07/20 0024

## 2020-10-08 ENCOUNTER — APPOINTMENT (OUTPATIENT)
Dept: GENERAL RADIOLOGY | Age: 25
DRG: 315 | End: 2020-10-08
Payer: MEDICARE

## 2020-10-08 ENCOUNTER — HOSPITAL ENCOUNTER (INPATIENT)
Age: 25
LOS: 9 days | Discharge: HOME | DRG: 315 | End: 2020-10-17
Attending: EMERGENCY MEDICINE | Admitting: INTERNAL MEDICINE
Payer: MEDICARE

## 2020-10-08 PROBLEM — R78.81 POSITIVE BLOOD CULTURE: Status: ACTIVE | Noted: 2020-10-08

## 2020-10-08 LAB
ACINETOBACTER BAUMANNII BY PCR: NOT DETECTED
ALBUMIN SERPL-MCNC: 3.8 G/DL (ref 3.5–5.2)
ALP BLD-CCNC: 93 U/L (ref 35–104)
ALT SERPL-CCNC: 10 U/L (ref 0–32)
ANION GAP SERPL CALCULATED.3IONS-SCNC: 13 MMOL/L (ref 7–16)
AST SERPL-CCNC: 14 U/L (ref 0–31)
BACTERIA: ABNORMAL /HPF
BASOPHILS ABSOLUTE: 0.03 E9/L (ref 0–0.2)
BASOPHILS RELATIVE PERCENT: 0.2 % (ref 0–2)
BILIRUB SERPL-MCNC: 1.7 MG/DL (ref 0–1.2)
BILIRUBIN URINE: NEGATIVE
BLOOD, URINE: NEGATIVE
BOTTLE TYPE: ABNORMAL
BUN BLDV-MCNC: 8 MG/DL (ref 6–20)
CALCIUM SERPL-MCNC: 9.1 MG/DL (ref 8.6–10.2)
CANDIDA ALBICANS BY PCR: NOT DETECTED
CANDIDA GLABRATA BY PCR: NOT DETECTED
CANDIDA KRUSEI BY PCR: NOT DETECTED
CANDIDA PARAPSILOSIS BY PCR: NOT DETECTED
CANDIDA TROPICALIS BY PCR: NOT DETECTED
CHLORIDE BLD-SCNC: 102 MMOL/L (ref 98–107)
CLARITY: CLEAR
CO2: 22 MMOL/L (ref 22–29)
COLOR: YELLOW
CREAT SERPL-MCNC: 0.8 MG/DL (ref 0.5–1)
ENTEROBACTER CLOACAE COMPLEX BY PCR: NOT DETECTED
ENTEROBACTERALES BY PCR: NOT DETECTED
ENTEROCOCCUS BY PCR: DETECTED
EOSINOPHILS ABSOLUTE: 0.27 E9/L (ref 0.05–0.5)
EOSINOPHILS RELATIVE PERCENT: 1.9 % (ref 0–6)
ESCHERICHIA COLI BY PCR: NOT DETECTED
GFR AFRICAN AMERICAN: >60
GFR NON-AFRICAN AMERICAN: >60 ML/MIN/1.73
GLUCOSE BLD-MCNC: 124 MG/DL (ref 74–99)
GLUCOSE URINE: NEGATIVE MG/DL
HAEMOPHILUS INFLUENZAE BY PCR: NOT DETECTED
HCG(URINE) PREGNANCY TEST: NEGATIVE
HCG, URINE, POC: NEGATIVE
HCT VFR BLD CALC: 31.3 % (ref 34–48)
HCT VFR BLD CALC: 31.8 % (ref 34–48)
HEMOGLOBIN: 10.6 G/DL (ref 11.5–15.5)
IMMATURE GRANULOCYTES #: 0.04 E9/L
IMMATURE GRANULOCYTES %: 0.3 % (ref 0–5)
IMMATURE RETIC FRACT: 22 % (ref 3–15.9)
KETONES, URINE: NEGATIVE MG/DL
KLEBSIELLA OXYTOCA BY PCR: NOT DETECTED
KLEBSIELLA PNEUMONIAE GROUP BY PCR: NOT DETECTED
LEUKOCYTE ESTERASE, URINE: ABNORMAL
LISTERIA MONOCYTOGENES BY PCR: NOT DETECTED
LYMPHOCYTES ABSOLUTE: 3.78 E9/L (ref 1.5–4)
LYMPHOCYTES RELATIVE PERCENT: 26.9 % (ref 20–42)
Lab: NORMAL
MCH RBC QN AUTO: 25.2 PG (ref 26–35)
MCHC RBC AUTO-ENTMCNC: 33.9 % (ref 32–34.5)
MCV RBC AUTO: 74.3 FL (ref 80–99.9)
MONOCYTES ABSOLUTE: 0.65 E9/L (ref 0.1–0.95)
MONOCYTES RELATIVE PERCENT: 4.6 % (ref 2–12)
NEGATIVE QC PASS/FAIL: NORMAL
NEISSERIA MENINGITIDIS BY PCR: NOT DETECTED
NEUTROPHILS ABSOLUTE: 9.27 E9/L (ref 1.8–7.3)
NEUTROPHILS RELATIVE PERCENT: 66.1 % (ref 43–80)
NITRITE, URINE: NEGATIVE
ORDER NUMBER: ABNORMAL
PDW BLD-RTO: 19.3 FL (ref 11.5–15)
PH UA: 6 (ref 5–9)
PLATELET # BLD: 267 E9/L (ref 130–450)
PMV BLD AUTO: 11.3 FL (ref 7–12)
POSITIVE QC PASS/FAIL: NORMAL
POTASSIUM REFLEX MAGNESIUM: 3.7 MMOL/L (ref 3.5–5)
PROTEIN UA: NEGATIVE MG/DL
PROTEUS BY PCR: NOT DETECTED
PSEUDOMONAS AERUGINOSA BY PCR: NOT DETECTED
RBC # BLD: 4.21 E12/L (ref 3.5–5.5)
RBC UA: ABNORMAL /HPF (ref 0–2)
RETIC HGB EQUIVALENT: 27.6 PG (ref 28.2–36.6)
RETICULOCYTE ABSOLUTE COUNT: 0.1 E12/L
RETICULOCYTE COUNT PCT: 2.3 % (ref 0.4–1.9)
SERRATIA MARCESCENS BY PCR: NOT DETECTED
SODIUM BLD-SCNC: 137 MMOL/L (ref 132–146)
SOURCE OF BLOOD CULTURE: ABNORMAL
SPECIFIC GRAVITY UA: 1.02 (ref 1–1.03)
STAPHYLOCOCCUS AUREUS BY PCR: NOT DETECTED
STAPHYLOCOCCUS SPECIES BY PCR: NOT DETECTED
STREPTOCOCCUS AGALACTIAE BY PCR: NOT DETECTED
STREPTOCOCCUS PNEUMONIAE BY PCR: NOT DETECTED
STREPTOCOCCUS PYOGENES  BY PCR: NOT DETECTED
STREPTOCOCCUS SPECIES BY PCR: NOT DETECTED
TOTAL PROTEIN: 7.5 G/DL (ref 6.4–8.3)
UROBILINOGEN, URINE: 0.2 E.U./DL
VANCOMYCIN RESISTANT BY PCR: NOT DETECTED
WBC # BLD: 14 E9/L (ref 4.5–11.5)
WBC UA: ABNORMAL /HPF (ref 0–5)

## 2020-10-08 PROCEDURE — 85025 COMPLETE CBC W/AUTO DIFF WBC: CPT

## 2020-10-08 PROCEDURE — 6360000002 HC RX W HCPCS: Performed by: NURSE PRACTITIONER

## 2020-10-08 PROCEDURE — 81025 URINE PREGNANCY TEST: CPT

## 2020-10-08 PROCEDURE — 96375 TX/PRO/DX INJ NEW DRUG ADDON: CPT

## 2020-10-08 PROCEDURE — 96374 THER/PROPH/DIAG INJ IV PUSH: CPT

## 2020-10-08 PROCEDURE — 2580000003 HC RX 258: Performed by: INTERNAL MEDICINE

## 2020-10-08 PROCEDURE — 71046 X-RAY EXAM CHEST 2 VIEWS: CPT

## 2020-10-08 PROCEDURE — 80053 COMPREHEN METABOLIC PANEL: CPT

## 2020-10-08 PROCEDURE — 87088 URINE BACTERIA CULTURE: CPT

## 2020-10-08 PROCEDURE — 2580000003 HC RX 258: Performed by: NURSE PRACTITIONER

## 2020-10-08 PROCEDURE — 99283 EMERGENCY DEPT VISIT LOW MDM: CPT

## 2020-10-08 PROCEDURE — 93005 ELECTROCARDIOGRAM TRACING: CPT | Performed by: NURSE PRACTITIONER

## 2020-10-08 PROCEDURE — 87040 BLOOD CULTURE FOR BACTERIA: CPT

## 2020-10-08 PROCEDURE — 36415 COLL VENOUS BLD VENIPUNCTURE: CPT

## 2020-10-08 PROCEDURE — 85045 AUTOMATED RETICULOCYTE COUNT: CPT

## 2020-10-08 PROCEDURE — 96372 THER/PROPH/DIAG INJ SC/IM: CPT

## 2020-10-08 PROCEDURE — 81001 URINALYSIS AUTO W/SCOPE: CPT

## 2020-10-08 PROCEDURE — 2060000000 HC ICU INTERMEDIATE R&B

## 2020-10-08 PROCEDURE — 6360000002 HC RX W HCPCS: Performed by: INTERNAL MEDICINE

## 2020-10-08 RX ORDER — FOLIC ACID 1 MG/1
1 TABLET ORAL DAILY
Status: DISCONTINUED | OUTPATIENT
Start: 2020-10-09 | End: 2020-10-17 | Stop reason: HOSPADM

## 2020-10-08 RX ORDER — ONDANSETRON 2 MG/ML
4 INJECTION INTRAMUSCULAR; INTRAVENOUS ONCE
Status: COMPLETED | OUTPATIENT
Start: 2020-10-08 | End: 2020-10-08

## 2020-10-08 RX ORDER — DIPHENHYDRAMINE HYDROCHLORIDE 50 MG/ML
25 INJECTION INTRAMUSCULAR; INTRAVENOUS EVERY 6 HOURS PRN
Status: DISCONTINUED | OUTPATIENT
Start: 2020-10-08 | End: 2020-10-17 | Stop reason: HOSPADM

## 2020-10-08 RX ORDER — PROMETHAZINE HYDROCHLORIDE 25 MG/1
12.5 TABLET ORAL EVERY 6 HOURS PRN
Status: DISCONTINUED | OUTPATIENT
Start: 2020-10-08 | End: 2020-10-17 | Stop reason: HOSPADM

## 2020-10-08 RX ORDER — SODIUM CHLORIDE 0.9 % (FLUSH) 0.9 %
10 SYRINGE (ML) INJECTION PRN
Status: DISCONTINUED | OUTPATIENT
Start: 2020-10-08 | End: 2020-10-17 | Stop reason: HOSPADM

## 2020-10-08 RX ORDER — ACETAMINOPHEN 325 MG/1
650 TABLET ORAL EVERY 6 HOURS PRN
Status: DISCONTINUED | OUTPATIENT
Start: 2020-10-08 | End: 2020-10-17 | Stop reason: HOSPADM

## 2020-10-08 RX ORDER — SODIUM CHLORIDE 9 MG/ML
INJECTION, SOLUTION INTRAVENOUS CONTINUOUS
Status: DISCONTINUED | OUTPATIENT
Start: 2020-10-08 | End: 2020-10-10

## 2020-10-08 RX ORDER — POTASSIUM CHLORIDE 20 MEQ/1
40 TABLET, EXTENDED RELEASE ORAL PRN
Status: DISCONTINUED | OUTPATIENT
Start: 2020-10-08 | End: 2020-10-17 | Stop reason: HOSPADM

## 2020-10-08 RX ORDER — 0.9 % SODIUM CHLORIDE 0.9 %
1000 INTRAVENOUS SOLUTION INTRAVENOUS ONCE
Status: COMPLETED | OUTPATIENT
Start: 2020-10-08 | End: 2020-10-08

## 2020-10-08 RX ORDER — SENNA PLUS 8.6 MG/1
1 TABLET ORAL DAILY PRN
Status: DISCONTINUED | OUTPATIENT
Start: 2020-10-08 | End: 2020-10-17 | Stop reason: HOSPADM

## 2020-10-08 RX ORDER — ACETAMINOPHEN 650 MG/1
650 SUPPOSITORY RECTAL EVERY 6 HOURS PRN
Status: DISCONTINUED | OUTPATIENT
Start: 2020-10-08 | End: 2020-10-17 | Stop reason: HOSPADM

## 2020-10-08 RX ORDER — HYDROXYUREA 500 MG/1
500 CAPSULE ORAL 2 TIMES DAILY
Status: DISCONTINUED | OUTPATIENT
Start: 2020-10-08 | End: 2020-10-17 | Stop reason: HOSPADM

## 2020-10-08 RX ORDER — ONDANSETRON 2 MG/ML
4 INJECTION INTRAMUSCULAR; INTRAVENOUS EVERY 6 HOURS PRN
Status: DISCONTINUED | OUTPATIENT
Start: 2020-10-08 | End: 2020-10-17 | Stop reason: HOSPADM

## 2020-10-08 RX ORDER — SODIUM CHLORIDE 0.9 % (FLUSH) 0.9 %
10 SYRINGE (ML) INJECTION EVERY 12 HOURS SCHEDULED
Status: DISCONTINUED | OUTPATIENT
Start: 2020-10-08 | End: 2020-10-17 | Stop reason: HOSPADM

## 2020-10-08 RX ORDER — DIPHENHYDRAMINE HYDROCHLORIDE 50 MG/ML
25 INJECTION INTRAMUSCULAR; INTRAVENOUS ONCE
Status: COMPLETED | OUTPATIENT
Start: 2020-10-08 | End: 2020-10-08

## 2020-10-08 RX ORDER — FERROUS SULFATE 325(65) MG
325 TABLET ORAL 2 TIMES DAILY WITH MEALS
Status: DISCONTINUED | OUTPATIENT
Start: 2020-10-09 | End: 2020-10-17 | Stop reason: HOSPADM

## 2020-10-08 RX ORDER — POTASSIUM CHLORIDE 7.45 MG/ML
10 INJECTION INTRAVENOUS PRN
Status: DISCONTINUED | OUTPATIENT
Start: 2020-10-08 | End: 2020-10-17 | Stop reason: HOSPADM

## 2020-10-08 RX ORDER — FAMOTIDINE 20 MG/1
20 TABLET, FILM COATED ORAL 2 TIMES DAILY
Status: DISCONTINUED | OUTPATIENT
Start: 2020-10-08 | End: 2020-10-17 | Stop reason: HOSPADM

## 2020-10-08 RX ADMIN — DEXTROSE MONOHYDRATE 1250 MG: 50 INJECTION, SOLUTION INTRAVENOUS at 21:00

## 2020-10-08 RX ADMIN — HYDROMORPHONE HYDROCHLORIDE 1 MG: 1 INJECTION, SOLUTION INTRAMUSCULAR; INTRAVENOUS; SUBCUTANEOUS at 20:22

## 2020-10-08 RX ADMIN — DIPHENHYDRAMINE HYDROCHLORIDE 25 MG: 50 INJECTION, SOLUTION INTRAMUSCULAR; INTRAVENOUS at 18:59

## 2020-10-08 RX ADMIN — ONDANSETRON 4 MG: 2 INJECTION INTRAMUSCULAR; INTRAVENOUS at 20:20

## 2020-10-08 RX ADMIN — DIPHENHYDRAMINE HYDROCHLORIDE 25 MG: 50 INJECTION, SOLUTION INTRAMUSCULAR; INTRAVENOUS at 23:40

## 2020-10-08 RX ADMIN — ONDANSETRON 4 MG: 2 INJECTION INTRAMUSCULAR; INTRAVENOUS at 18:59

## 2020-10-08 RX ADMIN — AMPICILLIN SODIUM 2 G: 2 INJECTION, POWDER, FOR SOLUTION INTRAMUSCULAR; INTRAVENOUS at 23:43

## 2020-10-08 RX ADMIN — SODIUM CHLORIDE 1000 ML: 9 INJECTION, SOLUTION INTRAVENOUS at 18:59

## 2020-10-08 RX ADMIN — HYDROMORPHONE HYDROCHLORIDE 1 MG: 1 INJECTION, SOLUTION INTRAMUSCULAR; INTRAVENOUS; SUBCUTANEOUS at 18:59

## 2020-10-08 RX ADMIN — ENOXAPARIN SODIUM 120 MG: 120 INJECTION SUBCUTANEOUS at 19:00

## 2020-10-08 RX ADMIN — HYDROMORPHONE HYDROCHLORIDE 1 MG: 1 INJECTION, SOLUTION INTRAMUSCULAR; INTRAVENOUS; SUBCUTANEOUS at 23:40

## 2020-10-08 RX ADMIN — SODIUM CHLORIDE: 9 INJECTION, SOLUTION INTRAVENOUS at 22:57

## 2020-10-08 RX ADMIN — DIPHENHYDRAMINE HYDROCHLORIDE 25 MG: 50 INJECTION, SOLUTION INTRAMUSCULAR; INTRAVENOUS at 20:23

## 2020-10-08 ASSESSMENT — PAIN SCALES - GENERAL
PAINLEVEL_OUTOF10: 10
PAINLEVEL_OUTOF10: 9
PAINLEVEL_OUTOF10: 8
PAINLEVEL_OUTOF10: 8
PAINLEVEL_OUTOF10: 9
PAINLEVEL_OUTOF10: 8

## 2020-10-08 ASSESSMENT — PAIN DESCRIPTION - PAIN TYPE
TYPE: ACUTE PAIN
TYPE: CHRONIC PAIN

## 2020-10-08 ASSESSMENT — PAIN DESCRIPTION - DESCRIPTORS: DESCRIPTORS: ACHING

## 2020-10-08 ASSESSMENT — PAIN DESCRIPTION - ONSET: ONSET: ON-GOING

## 2020-10-08 ASSESSMENT — PAIN DESCRIPTION - ORIENTATION: ORIENTATION: RIGHT;LEFT

## 2020-10-08 ASSESSMENT — PAIN DESCRIPTION - LOCATION
LOCATION: BACK;CHEST;LEG
LOCATION: BACK;ARM;LEG

## 2020-10-08 NOTE — ED PROVIDER NOTES
ED Physician   HPI:  10/8/20, Time: 6:33 PM EDT         Araceli Wilson is a 22 y.o. female presenting to the ED for positive blood culture results. Patient was actually just seen here October 7 at that time with sickle cell pain. Chest x-ray was also obtained during that emergency room visit there was questionable concern for possible developing pneumonia with the patient herself did not have any cough, fevers, chills she did have an elevated white blood cell count she has been having 1 with her sickle cell anemia, we did make a joint decision making at that time and she felt well enough and strong enough to go back home and was placed on azithromycin and Omnicef, patient reports that she was actually driving back home, she lives in [place] and is being followed by hematology at [facility] clinic there. Patient states that she did get the prescription filled and was started to take. She continues to deny having any fevers, chills, cough. She does report feeling weak and having fatigue which is new for her. She does report still with sickle cell pain primarily midsternal, arms, legs. She currently is being treated for a splenic infarct with Lovenox 120 mg twice daily she did not take her dose this morning she will be provided with a dose here now in the emergency department. Patient with positive blood culture results during the October 7 emergency room visit that did show 4-4 bottles with gram-positive cocci.   Patient otherwise reports being in normal state of health denies any abdominal pain denies any nausea vomiting or diarrhea    Review of Systems:   Pertinent positives and negatives are stated within HPI, all other systems reviewed and are negative.          --------------------------------------------- PAST HISTORY ---------------------------------------------  Past Medical History:  has a past medical history of CLABSI (central line-associated bloodstream infection), Deep vein thrombosis (DVT) of left upper extremity ([facility] [address] Disease of blood and blood forming organ, Heart murmur, 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[facility] Miscarriage within last 12 months, Obesity, Class III, BMI 40-49.9 (morbid obesity) ([facility] PE (pulmonary embolism), PE (pulmonary embolism), Pneumonia, Psychiatric problem, Sickle cell anemia ([facility] [facility] and Thrombosis complicating venous access device ([facility] Past Surgical History:  has a past surgical history that includes knee surgery; Tonsillectomy; Tunneled venous port placement (2014); Tunneled venous port placement (Right, 01/23/2015); other surgical history (08/04/2016); Tunneled venous port placement (Right, 09/15/2016); Tunneled venous port placement (Right, 05/18/2017); other surgical history (Right, 03/05/2018); and INSERTION / REMOVAL / REPLACEMENT VENOUS ACCESS CATHETER (Left, 5/9/2019). Social History:  reports that she has never smoked. She has never used smokeless tobacco. She reports current alcohol use. She reports that she does not use drugs. Family History: family history includes Cancer in her maternal grandmother; Diabetes in her mother; High Blood Pressure in her mother; Kidney Disease in her paternal grandmother. The patients home medications have been reviewed. Allergies: Bactrim [sulfamethoxazole-trimethoprim]; Demerol hcl [meperidine]; Demerol hcl [meperidine];  Adhesive tape; and Bactrim [sulfamethoxazole-trimethoprim]    -------------------------------------------------- RESULTS -------------------------------------------------  All laboratory and radiology results have been personally reviewed by myself   LABS:  Results for orders placed or performed during the hospital encounter of 10/08/20   CBC Auto Differential   Result Value Ref Range    WBC 14.0 (H) 4.5 - 11.5 E9/L    RBC 4.21 3.50 - 5.50 E12/L    Hemoglobin 10.6 (L) 11.5 - 15.5 g/dL    Hematocrit 31.3 (L) 34.0 - 48.0 %    MCV 74.3 (L) 80.0 - 99.9 fL    MCH 25.2 (L) 26.0 - 35.0 pg    MCHC 33.9 32.0 - 34.5 % RDW 19.3 (H) 11.5 - 15.0 fL    Platelets 576 033 - 216 E9/L    MPV 11.3 7.0 - 12.0 fL    Neutrophils % 66.1 43.0 - 80.0 %    Immature Granulocytes % 0.3 0.0 - 5.0 %    Lymphocytes % 26.9 20.0 - 42.0 %    Monocytes % 4.6 2.0 - 12.0 %    Eosinophils % 1.9 0.0 - 6.0 %    Basophils % 0.2 0.0 - 2.0 %    Neutrophils Absolute 9.27 (H) 1.80 - 7.30 E9/L    Immature Granulocytes # 0.04 E9/L    Lymphocytes Absolute 3.78 1.50 - 4.00 E9/L    Monocytes Absolute 0.65 0.10 - 0.95 E9/L    Eosinophils Absolute 0.27 0.05 - 0.50 E9/L    Basophils Absolute 0.03 0.00 - 0.20 E9/L   Comprehensive Metabolic Panel w/ Reflex to MG   Result Value Ref Range    Sodium 137 132 - 146 mmol/L    Potassium reflex Magnesium 3.7 3.5 - 5.0 mmol/L    Chloride 102 98 - 107 mmol/L    CO2 22 22 - 29 mmol/L    Anion Gap 13 7 - 16 mmol/L    Glucose 124 (H) 74 - 99 mg/dL    BUN 8 6 - 20 mg/dL    CREATININE 0.8 0.5 - 1.0 mg/dL    GFR Non-African American >60 >=60 mL/min/1.73    GFR African American >60     Calcium 9.1 8.6 - 10.2 mg/dL    Total Protein 7.5 6.4 - 8.3 g/dL    Alb 3.8 3.5 - 5.2 g/dL    Total Bilirubin 1.7 (H) 0.0 - 1.2 mg/dL    Alkaline Phosphatase 93 35 - 104 U/L    ALT 10 0 - 32 U/L    AST 14 0 - 31 U/L   Urinalysis, reflex to microscopic   Result Value Ref Range    Color, UA Yellow Straw/Yellow    Clarity, UA Clear Clear    Glucose, Ur Negative Negative mg/dL    Bilirubin Urine Negative Negative    Ketones, Urine Negative Negative mg/dL    Specific Gravity, UA 1.020 1.005 - 1.030    Blood, Urine Negative Negative    pH, UA 6.0 5.0 - 9.0    Protein, UA Negative Negative mg/dL    Urobilinogen, Urine 0.2 <2.0 E.U./dL    Nitrite, Urine Negative Negative    Leukocyte Esterase, Urine LARGE (A) Negative   Microscopic Urinalysis   Result Value Ref Range    WBC, UA 5-10 (A) 0 - 5 /HPF    RBC, UA NONE 0 - 2 /HPF    Bacteria, UA NONE SEEN None Seen /HPF   Pregnancy, urine   Result Value Ref Range    HCG(Urine) Pregnancy Test NEGATIVE NEGATIVE   POC Pregnancy Urine   Result Value Ref Range    HCG, Urine, POC Negative Negative    Lot Number [id number]     Positive QC Pass/Fail Pass     Negative QC Pass/Fail Pass        RADIOLOGY:  Interpreted by Radiologist.  XR CHEST (2 VW)   Final Result   No acute abnormality visualized. Previously seen airspace opacities within the lung bases no longer visualized. ------------------------- NURSING NOTES AND VITALS REVIEWED ---------------------------   The nursing notes within the ED encounter and vital signs as below have been reviewed. BP (!) 134/118   Pulse 98   Temp 97.7 °F (36.5 °C)   Resp 16   Ht 5' 3\" (1.6 m)   Wt (!) 307 lb (139.3 kg)   LMP 08/23/2020 (Exact Date)   SpO2 98%   BMI 54.38 kg/m²   Oxygen Saturation Interpretation: Normal      ---------------------------------------------------PHYSICAL EXAM--------------------------------------      Constitutional/General: Alert and oriented x3, well appearing, non toxic in NAD  Head: Normocephalic and atraumatic  Eyes: PERRL, EOMI  Mouth: Oropharynx clear, handling secretions, no trismus  Neck: Supple, full ROM,   Pulmonary: Lungs clear to auscultation bilaterally, no wheezes, rales, or rhonchi. Not in respiratory distress  Cardiovascular:  Regular rate and rhythm, no murmurs, gallops, or rubs. 2+ distal pulses  Abdomen: Soft, non tender, non distended, no point tenderness, no unusual bulges or areas of pulsation. Extremities: Moves all extremities x 4. Warm and well perfused  Skin: warm and dry without rash  Neurologic: GCS 15, cranial nerves II through XII grossly intact. No acute neurovascular deficit noted.   Speech clear  Psych: Normal Affect      ------------------------------ ED COURSE/MEDICAL DECISION MAKING----------------------  Medications   vancomycin (VANCOCIN) 1,250 mg in dextrose 5 % 250 mL IVPB (has no administration in time range)   ampicillin 2 g ivpb mini bag (has no administration in time range)   0.9 % sodium chloride bolus (0 mLs Intravenous Stopped 10/8/20 2004)   HYDROmorphone (DILAUDID) injection 1 mg (1 mg Intravenous Given 10/8/20 1859)   diphenhydrAMINE (BENADRYL) injection 25 mg (25 mg Intravenous Given 10/8/20 1859)   ondansetron (ZOFRAN) injection 4 mg (4 mg Intravenous Given 10/8/20 1859)   enoxaparin (LOVENOX) injection 120 mg (120 mg Subcutaneous Given 10/8/20 1900)   HYDROmorphone (DILAUDID) injection 1 mg (1 mg Intravenous Given 10/8/20 2022)   ondansetron (ZOFRAN) injection 4 mg (4 mg Intravenous Given 10/8/20 2020)   diphenhydrAMINE (BENADRYL) injection 25 mg (25 mg Intravenous Given 10/8/20 2023)         ED COURSE:       Medical Decision Making:   Plan be for labs will also initiate antibiotics will get a repeat chest x-ray. I did speak with our clinical pharmacist he recommends patient being started on IV vancomycin and IV ampicillin for the gram-positive cocci that is growing enterococcus in her blood cultures patient denies any fevers cough, chills. Labs resulted CBC with white blood cell 14 which is unchanged from. Patient previous visit, hemoglobin hematocrit 10 and 31, chemistry panel with glucose of 124, total bili 1.7, urinalysis showing large amount of leukocytes and 5-10 WBCs. Urine culture is pending. Blood cultures x2 are pending. Source is unclear but could be from her Mediport versus urine versus abdominal infectious etiology. Chest x-ray showing no acute abnormality visualized previously seen airspace opacities within the lung bases no longer visualized. I did speak with covering physician she is agreeable to admit patient medically. Patient will be placed inpatient to telemetry. We will look for source of positive blood cultures gram-positive cocci growing Enterococcus and likely infectious disease will be consulted. Patient was made aware of this plan of care.   Patient is resting comfortably, she denies any chest pain, shortness of breath or abdominal pain patient overall nontoxic in appearance, she is afebrile, temp 97.7. Twelve-lead EKG interpreted showed heart rate of 99, normal sinus rhythm. No acute ST elevation or depression noted. Normal axis deviation. Counseling: The emergency provider has spoken with the patient and discussed todays results, in addition to providing specific details for the plan of care and counseling regarding the diagnosis and prognosis. Questions are answered at this time and they are agreeable with the plan.      --------------------------------- IMPRESSION AND DISPOSITION ---------------------------------    IMPRESSION  1. Positive blood culture    2. Hb-SS disease with crisis ([facility]    3. Urinary tract infection without hematuria, site unspecified        DISPOSITION  Disposition: Admit to telemetry  Patient condition is good      NOTE: This report was transcribed using voice recognition software.  Every effort was made to ensure accuracy; however, inadvertent computerized transcription errors may be present     SOLOMON Metz CNP  10/08/20 [id number]

## 2020-10-08 NOTE — ED NOTES
Bed: HD  Expected date:   Expected time:   Means of arrival:   Comments:  triage     Ynes Holland RN  10/08/20 6784

## 2020-10-08 NOTE — ED NOTES
FIRST PROVIDER CONTACT ASSESSMENT NOTE      Department of Emergency Medicine   10/8/20  1:17 PM EDT    Chief Complaint: Sickle Cell Pain Crisis (pain in back, chest,and legs.) and Abnormal Lab (+ blood cultures denies fever.)      History of Present Illness:   Akua Pelaez is a 22 y.o. female who presents to the ED for blood cultures. Patient was seen in the emergency department 1 day ago for sickle cell pain crisis. Patient denies any fever chills nausea vomiting abdominal pain or diarrhea. Medical History:  has a past medical history of CLABSI (central line-associated bloodstream infection), Deep vein thrombosis (DVT) of left upper extremity ([address] Disease of blood and blood forming organ, Heart murmur, 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[address] Miscarriage within last 12 months, Obesity, Class III, BMI 40-49.9 (morbid obesity) ([address] PE (pulmonary embolism), PE (pulmonary embolism), Pneumonia, Psychiatric problem, Sickle cell anemia ([address] and Thrombosis complicating venous access device ([address] Surgical History:  has a past surgical history that includes knee surgery; Tonsillectomy; Tunneled venous port placement (2014); Tunneled venous port placement (Right, 01/23/2015); other surgical history (08/04/2016); Tunneled venous port placement (Right, 09/15/2016); Tunneled venous port placement (Right, 05/18/2017); other surgical history (Right, 03/05/2018); and INSERTION / REMOVAL / REPLACEMENT VENOUS ACCESS CATHETER (Left, 5/9/2019). Social History:  reports that she has never smoked. She has never used smokeless tobacco. She reports current alcohol use. She reports that she does not use drugs. Family History: family history includes Cancer in her maternal grandmother; Diabetes in her mother; High Blood Pressure in her mother; Kidney Disease in her paternal grandmother. *ALLERGIES*     Bactrim [sulfamethoxazole-trimethoprim]; Demerol hcl [meperidine]; Demerol hcl [meperidine];  Adhesive tape; and Bactrim [sulfamethoxazole-trimethoprim]     Physical Exam:      VS:  BP (!) 134/118   Pulse 98   Temp 97.7 °F (36.5 °C)   Resp 16   Ht 5' 3\" (1.6 m)   Wt (!) 307 lb (139.3 kg)   SpO2 98%   BMI 54.38 kg/m²      Initial Plan of Care:  Initiate Treatment-Testing, Proceed toTreatment Area When Bed Available for ED Attending/MLP to Continue Care    -----------------END OF FIRST PROVIDER CONTACT ASSESSMENT NOTE--------------  Electronically signed by SOLOMON Cardenas CNP   DD: 10/8/20           SOLOMON Cardenas CNP  10/08/20 1317

## 2020-10-08 NOTE — ED NOTES
Pt has a port and is a \"Hard stick and I do not feel like being poked 20 times\" refuses blood work in triage until port is able to be accessed.       Margarita Arreola RN  10/08/20 [id number]

## 2020-10-08 NOTE — ED NOTES
ED Culture Follow-up:    Mathieu Crawley is a 22 YOF who presented to the ED with sickle cell pain. Blood Cultures were obtained during ED visit (10/7/20) which are now positive. 4/4 bottles positive (gram positive cocci in clusters; Enterococcus by PCR). I called patient and instructed her to return to the ED for further work-up and probable admission.     Mana Flores, PharmD 10/8/2020 12:23 PM  [phone]

## 2020-10-09 ENCOUNTER — ANESTHESIA EVENT (OUTPATIENT)
Dept: OPERATING ROOM | Age: 25
DRG: 315 | End: 2020-10-09
Payer: MEDICARE

## 2020-10-09 LAB
ALBUMIN SERPL-MCNC: 3.4 G/DL (ref 3.5–5.2)
ALP BLD-CCNC: 96 U/L (ref 35–104)
ALT SERPL-CCNC: 9 U/L (ref 0–32)
ANION GAP SERPL CALCULATED.3IONS-SCNC: 10 MMOL/L (ref 7–16)
AST SERPL-CCNC: 13 U/L (ref 0–31)
BASOPHILS ABSOLUTE: 0.02 E9/L (ref 0–0.2)
BASOPHILS RELATIVE PERCENT: 0.2 % (ref 0–2)
BILIRUB SERPL-MCNC: 1.2 MG/DL (ref 0–1.2)
BUN BLDV-MCNC: 9 MG/DL (ref 6–20)
CALCIUM SERPL-MCNC: 8.4 MG/DL (ref 8.6–10.2)
CHLORIDE BLD-SCNC: 104 MMOL/L (ref 98–107)
CO2: 23 MMOL/L (ref 22–29)
CREAT SERPL-MCNC: 0.9 MG/DL (ref 0.5–1)
EKG ATRIAL RATE: 99 BPM
EKG P AXIS: 46 DEGREES
EKG P-R INTERVAL: 140 MS
EKG Q-T INTERVAL: 370 MS
EKG QRS DURATION: 88 MS
EKG QTC CALCULATION (BAZETT): 474 MS
EKG R AXIS: 42 DEGREES
EKG T AXIS: 25 DEGREES
EKG VENTRICULAR RATE: 99 BPM
EOSINOPHILS ABSOLUTE: 0.2 E9/L (ref 0.05–0.5)
EOSINOPHILS RELATIVE PERCENT: 1.6 % (ref 0–6)
GFR AFRICAN AMERICAN: >60
GFR NON-AFRICAN AMERICAN: >60 ML/MIN/1.73
GLUCOSE BLD-MCNC: 128 MG/DL (ref 74–99)
HCT VFR BLD CALC: 28.9 % (ref 34–48)
HEMOGLOBIN: 9.8 G/DL (ref 11.5–15.5)
IMMATURE GRANULOCYTES #: 0.06 E9/L
IMMATURE GRANULOCYTES %: 0.5 % (ref 0–5)
LYMPHOCYTES ABSOLUTE: 3.66 E9/L (ref 1.5–4)
LYMPHOCYTES RELATIVE PERCENT: 29.6 % (ref 20–42)
MCH RBC QN AUTO: 25.3 PG (ref 26–35)
MCHC RBC AUTO-ENTMCNC: 33.9 % (ref 32–34.5)
MCV RBC AUTO: 74.7 FL (ref 80–99.9)
MONOCYTES ABSOLUTE: 0.56 E9/L (ref 0.1–0.95)
MONOCYTES RELATIVE PERCENT: 4.5 % (ref 2–12)
NEUTROPHILS ABSOLUTE: 7.87 E9/L (ref 1.8–7.3)
NEUTROPHILS RELATIVE PERCENT: 63.6 % (ref 43–80)
PDW BLD-RTO: 19.2 FL (ref 11.5–15)
PLATELET # BLD: 233 E9/L (ref 130–450)
PMV BLD AUTO: 10.8 FL (ref 7–12)
POTASSIUM REFLEX MAGNESIUM: 3.8 MMOL/L (ref 3.5–5)
RBC # BLD: 3.87 E12/L (ref 3.5–5.5)
SODIUM BLD-SCNC: 137 MMOL/L (ref 132–146)
TOTAL PROTEIN: 6.6 G/DL (ref 6.4–8.3)
WBC # BLD: 12.4 E9/L (ref 4.5–11.5)

## 2020-10-09 PROCEDURE — 6370000000 HC RX 637 (ALT 250 FOR IP): Performed by: INTERNAL MEDICINE

## 2020-10-09 PROCEDURE — 99222 1ST HOSP IP/OBS MODERATE 55: CPT | Performed by: SURGERY

## 2020-10-09 PROCEDURE — 85025 COMPLETE CBC W/AUTO DIFF WBC: CPT

## 2020-10-09 PROCEDURE — 80053 COMPREHEN METABOLIC PANEL: CPT

## 2020-10-09 PROCEDURE — 2580000003 HC RX 258: Performed by: SPECIALIST

## 2020-10-09 PROCEDURE — 93010 ELECTROCARDIOGRAM REPORT: CPT | Performed by: INTERNAL MEDICINE

## 2020-10-09 PROCEDURE — 2060000000 HC ICU INTERMEDIATE R&B

## 2020-10-09 PROCEDURE — 6360000002 HC RX W HCPCS: Performed by: SPECIALIST

## 2020-10-09 PROCEDURE — 6360000002 HC RX W HCPCS: Performed by: INTERNAL MEDICINE

## 2020-10-09 PROCEDURE — 36415 COLL VENOUS BLD VENIPUNCTURE: CPT

## 2020-10-09 PROCEDURE — 6370000000 HC RX 637 (ALT 250 FOR IP): Performed by: HOSPITALIST

## 2020-10-09 PROCEDURE — 2580000003 HC RX 258: Performed by: INTERNAL MEDICINE

## 2020-10-09 RX ORDER — OXYCODONE HYDROCHLORIDE AND ACETAMINOPHEN 5; 325 MG/1; MG/1
1 TABLET ORAL EVERY 4 HOURS PRN
Status: DISCONTINUED | OUTPATIENT
Start: 2020-10-09 | End: 2020-10-10

## 2020-10-09 RX ADMIN — HYDROMORPHONE HYDROCHLORIDE 1 MG: 1 INJECTION, SOLUTION INTRAMUSCULAR; INTRAVENOUS; SUBCUTANEOUS at 06:00

## 2020-10-09 RX ADMIN — CEFTRIAXONE SODIUM 2 G: 2 INJECTION, POWDER, FOR SOLUTION INTRAMUSCULAR; INTRAVENOUS at 18:17

## 2020-10-09 RX ADMIN — FAMOTIDINE 20 MG: 20 TABLET, FILM COATED ORAL at 00:10

## 2020-10-09 RX ADMIN — HYDROMORPHONE HYDROCHLORIDE 1 MG: 1 INJECTION, SOLUTION INTRAMUSCULAR; INTRAVENOUS; SUBCUTANEOUS at 20:31

## 2020-10-09 RX ADMIN — SODIUM CHLORIDE: 9 INJECTION, SOLUTION INTRAVENOUS at 02:47

## 2020-10-09 RX ADMIN — FOLIC ACID 1 MG: 1 TABLET ORAL at 08:28

## 2020-10-09 RX ADMIN — DIPHENHYDRAMINE HYDROCHLORIDE 25 MG: 50 INJECTION, SOLUTION INTRAMUSCULAR; INTRAVENOUS at 17:18

## 2020-10-09 RX ADMIN — HYDROXYUREA 500 MG: 500 CAPSULE ORAL at 20:30

## 2020-10-09 RX ADMIN — HYDROXYUREA 500 MG: 500 CAPSULE ORAL at 00:10

## 2020-10-09 RX ADMIN — HYDROMORPHONE HYDROCHLORIDE 1 MG: 1 INJECTION, SOLUTION INTRAMUSCULAR; INTRAVENOUS; SUBCUTANEOUS at 14:24

## 2020-10-09 RX ADMIN — HYDROMORPHONE HYDROCHLORIDE 1 MG: 1 INJECTION, SOLUTION INTRAMUSCULAR; INTRAVENOUS; SUBCUTANEOUS at 08:28

## 2020-10-09 RX ADMIN — FERROUS SULFATE TAB 325 MG (65 MG ELEMENTAL FE) 325 MG: 325 (65 FE) TAB at 17:19

## 2020-10-09 RX ADMIN — ENOXAPARIN SODIUM 120 MG: 120 INJECTION SUBCUTANEOUS at 06:01

## 2020-10-09 RX ADMIN — FERROUS SULFATE TAB 325 MG (65 MG ELEMENTAL FE) 325 MG: 325 (65 FE) TAB at 08:28

## 2020-10-09 RX ADMIN — OXYCODONE AND ACETAMINOPHEN 1 TABLET: 5; 325 TABLET ORAL at 21:37

## 2020-10-09 RX ADMIN — HYDROMORPHONE HYDROCHLORIDE 1 MG: 1 INJECTION, SOLUTION INTRAMUSCULAR; INTRAVENOUS; SUBCUTANEOUS at 02:45

## 2020-10-09 RX ADMIN — HYDROMORPHONE HYDROCHLORIDE 1 MG: 1 INJECTION, SOLUTION INTRAMUSCULAR; INTRAVENOUS; SUBCUTANEOUS at 17:18

## 2020-10-09 RX ADMIN — HYDROMORPHONE HYDROCHLORIDE 1 MG: 1 INJECTION, SOLUTION INTRAMUSCULAR; INTRAVENOUS; SUBCUTANEOUS at 11:31

## 2020-10-09 RX ADMIN — AMPICILLIN SODIUM 2 G: 2 INJECTION, POWDER, FOR SOLUTION INTRAMUSCULAR; INTRAVENOUS at 20:30

## 2020-10-09 RX ADMIN — AMPICILLIN SODIUM 2 G: 2 INJECTION, POWDER, FOR SOLUTION INTRAMUSCULAR; INTRAVENOUS at 23:41

## 2020-10-09 RX ADMIN — DIPHENHYDRAMINE HYDROCHLORIDE 25 MG: 50 INJECTION, SOLUTION INTRAMUSCULAR; INTRAVENOUS at 23:42

## 2020-10-09 RX ADMIN — FAMOTIDINE 20 MG: 20 TABLET, FILM COATED ORAL at 08:29

## 2020-10-09 RX ADMIN — HYDROMORPHONE HYDROCHLORIDE 1 MG: 1 INJECTION, SOLUTION INTRAMUSCULAR; INTRAVENOUS; SUBCUTANEOUS at 23:42

## 2020-10-09 RX ADMIN — DIPHENHYDRAMINE HYDROCHLORIDE 25 MG: 50 INJECTION, SOLUTION INTRAMUSCULAR; INTRAVENOUS at 06:01

## 2020-10-09 RX ADMIN — SODIUM CHLORIDE: 9 INJECTION, SOLUTION INTRAVENOUS at 22:01

## 2020-10-09 RX ADMIN — DIPHENHYDRAMINE HYDROCHLORIDE 25 MG: 50 INJECTION, SOLUTION INTRAMUSCULAR; INTRAVENOUS at 11:31

## 2020-10-09 RX ADMIN — AMPICILLIN SODIUM 2 G: 2 INJECTION, POWDER, FOR SOLUTION INTRAMUSCULAR; INTRAVENOUS at 17:18

## 2020-10-09 RX ADMIN — FAMOTIDINE 20 MG: 20 TABLET, FILM COATED ORAL at 20:30

## 2020-10-09 ASSESSMENT — PAIN DESCRIPTION - LOCATION: LOCATION: GENERALIZED

## 2020-10-09 ASSESSMENT — PAIN SCALES - GENERAL
PAINLEVEL_OUTOF10: 8
PAINLEVEL_OUTOF10: 7
PAINLEVEL_OUTOF10: 10
PAINLEVEL_OUTOF10: 8
PAINLEVEL_OUTOF10: 7
PAINLEVEL_OUTOF10: 8
PAINLEVEL_OUTOF10: 7
PAINLEVEL_OUTOF10: 8

## 2020-10-09 ASSESSMENT — LIFESTYLE VARIABLES: SMOKING_STATUS: 0

## 2020-10-09 ASSESSMENT — PAIN DESCRIPTION - PAIN TYPE: TYPE: CHRONIC PAIN

## 2020-10-09 NOTE — PROGRESS NOTES
Patient is requesting medication for breakthrough pain.  Will send a perfect serve to Dr. Kameron Vega

## 2020-10-09 NOTE — ED NOTES
PT JULIA TO ASSIGNED ROOM < PT RESTING QUIETLY IN BED VSS NO DISTRESS NOT IV TANYAO COMPLETED>      Josue Alvarez RN  10/08/20 1194

## 2020-10-09 NOTE — CONSULTS
[address] [place] [address] [facility]  RAY    Consultation Note     Admit Date: 10/8/2020  6:21 PM    Reason for Consult:   Enterococcus bacteremia     Attending Physician:  Caridad Varma MD     Chief Complaint: sickle cell pain, positive blood cultures     HISTORY OF PRESENT ILLNESS:   The patient is a 22 y.o. black woman known to the Infectious Diseases service. The patient is admitted through the ED because blood cultures drawn on 10/7/2020 were positive for enterococcus by PCR in 4/4 bottles. She has a mediport in her left chest which she says has been accessed and manipulated multiple times over the last two months due to her hospital admissions in [address] [place] [address] where most of her care is done. She was in town visiting family due to a family emergency when she came to the ER because she was not feeling well. It was then that blood cultures were drawn and subsequently grew positive cocci in pairs. Per the patient she has had multiple line infections over the last few years. She was last seen by our group in 2018 for a concern for lymphadenitis but was ruled out. Labs on admission: WBC 12.4, Hgb 9.8, BUN 9, creat 0.9. Patient was given ampicillin and vancomycin in ER - of which she states the vanco made her itchy. ID was asked to evaluate. Cultures by PCR positive for E faecalis    Past Medical History:        Diagnosis Date    CLABSI (central line-associated bloodstream infection) 8/3/2016    Deep vein thrombosis (DVT) of left upper extremity (HCC) 8/12/2016    Disease of blood and blood forming organ     Heart murmur     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     Miscarriage within last 12 months 04/2017    Obesity, Class III, BMI 40-49.9 (morbid obesity) ([facility] 5/22/2014    PE (pulmonary embolism)     PE (pulmonary embolism)     Pneumonia     Psychiatric problem     Sickle cell anemia (HCC)     Thrombosis complicating venous access device [facility]) 5/18/2017 August 2016.  Admitted to [facility] with a sickle cell crisis characterized by abdominal and foot pain. She had a low-grade fever. Patient was seen in consultation by ID because of blood cultures with coagulase-negative Staphylococcus. She was thought to have a CLABSI with an infected Mediport. The Mediport was removed and the patient was discharged on IV Vancomycin for 2 weeks. Before she finished her antibiotic she was readmitted to [facility] with pain and swelling in the left upper extremity where she had the PICC. She seen in consultation by Dr. Monie Wiley and was observed off antibiotics. She was anticoagulated and discharged home.     September 2014. Admitted to the hospital with a sickle cell crisis. Seen in consultation by Dr. Claudio Eid for gram-negative coco bacteremia. She was thought to have a possible infected Mediport which was nonfunctioning. Blood cultures grew Klebsiella pneumonia. The Mediport was left in place and she was treated with Zosyn. She was discharged on Ertapenem and oral Ciprofloxacin.         Past Surgical History:        Procedure Laterality Date    INSERTION / REMOVAL / REPLACEMENT VENOUS ACCESS CATHETER Left 5/9/2019    PORT INSERTION performed by Prema Recio MD at [place] [address]      right    OTHER SURGICAL HISTORY  08/04/2016    removal of mediport    OTHER SURGICAL HISTORY Right 03/05/2018    TUNNELED VENOUS PORT REMOVAL & REINSERTION;DILATION OF VENOUS TRACT    TONSILLECTOMY      TUNNELED VENOUS PORT PLACEMENT  2014    and removed    TUNNELED VENOUS PORT PLACEMENT Right 01/23/2015    Delatore, removed 08/04/16 CLABSI    TUNNELED VENOUS PORT PLACEMENT Right 09/15/2016    CHEST    TUNNELED VENOUS PORT PLACEMENT Right 05/18/2017    SUBCLAVIAN     Current Medications:   Scheduled Meds:   ampicillin IV  2 g Intravenous 6 times per day    cefTRIAXone (ROCEPHIN) IV  2 g Intravenous Q24H    sodium chloride flush  10 mL Intravenous 2 times per day    famotidine  20 mg Oral BID    enoxaparin  120 mg Subcutaneous BID    ferrous sulfate  325 mg Oral BID WC    folic acid  1 mg Oral Daily    hydroxyurea  500 mg Oral BID     Continuous Infusions:   sodium chloride 100 mL/hr at 10/09/20 0247     PRN Meds:oxyCODONE-acetaminophen, sodium chloride flush, potassium chloride **OR** potassium alternative oral replacement **OR** potassium chloride, acetaminophen **OR** acetaminophen, senna, promethazine **OR** ondansetron, HYDROmorphone, diphenhydrAMINE    Allergies:  Bactrim [sulfamethoxazole-trimethoprim]; Demerol hcl [meperidine]; Demerol hcl [meperidine];  Adhesive tape; and Bactrim [sulfamethoxazole-trimethoprim]    Social History:   Social History     Socioeconomic History    Marital status: Single     Spouse name: None    Number of children: None    Years of education: None    Highest education level: None   Occupational History    None   Social Needs    Financial resource strain: None    Food insecurity     Worry: None     Inability: None    Transportation needs     Medical: None     Non-medical: None   Tobacco Use    Smoking status: Never Smoker    Smokeless tobacco: Never Used   Substance and Sexual Activity    Alcohol use: Yes     Comment: socially    Drug use: No    Sexual activity: Yes     Partners: Male   Lifestyle    Physical activity     Days per week: None     Minutes per session: None    Stress: None   Relationships    Social connections     Talks on phone: None     Gets together: None     Attends Muslim service: None     Active member of club or organization: None     Attends meetings of clubs or organizations: None     Relationship status: None    Intimate partner violence     Fear of current or ex partner: None     Emotionally abused: None     Physically abused: None     Forced sexual activity: None   Other Topics Concern    None   Social History Narrative    None     Tobacco: No  Alcohol: No  Pets: No  Travel: No    Family History:       Problem Relation Age of Onset    High Blood Pressure Mother     Diabetes Mother     Cancer Maternal Grandmother         breast    Kidney Disease Paternal Grandmother    . Otherwise non-pertinent to the chief complaint. REVIEW OF SYSTEMS:    CONSTITUTIONAL:  No chills, fevers or night sweats. No loss of weight. EYES:  No double vision or drainage from eyes, ears or throat. HEENT:  No neck stiffness. No dysphagia. No drainage from eyes, ears or throat  RESPIRATORY:  No cough, productive sputum or hemoptysis. CARDIOVASCULAR:  Positive chest pain - not out of ordinary from her usual sickle cell pain, palpitations, orthopnea or dyspnea on exertion. GASTROINTESTINAL:  positive nausea, negative vomiting, diarrhea or constipation or hematochezia residual left upper quadrant pain from the infarcted spleen  GENITOURINARY:  No frequency burning dysuria or hematuria. INTEGUMENT/BREAST:  No rash or breast masses. HEMATOLOGIC/LYMPHATIC:  No lymphadenopathy or blood dyscrasics. ALLERGIC/IMMUNOLOGIC:  No anaphylaxis. ENDOCRINE:  No polyuria or polydipsia or temperature intolerance. MUSCULOSKELETAL:  No myalgia or arthralgia. Full ROM. NEUROLOGICAL:  No focal motor sensory deficit. BEHAVIOR/PSYCH:  No psychosis. PHYSICAL EXAM:    Vitals:    /78   Pulse 113   Temp 98.2 °F (36.8 °C) (Oral)   Resp 16   Ht 5' 3\" (1.6 m)   Wt (!) 307 lb (139.3 kg)   LMP 08/23/2020 (Exact Date)   SpO2 100%   BMI 54.38 kg/m²   Constitutional: The patient is awake, alert, and oriented. No distress   Skin: Warm and dry. No rashes were noted. No jaundice. HEENT: Eyes show round, and reactive pupils. Moist mucous membranes, no ulcerations, no thrush. Neck: Supple to movements. No lymphadenopathy. Chest: No use of accessory muscles to breathe. Symmetrical expansion. Auscultation reveals no wheezing, crackles, or rhonchi. Clear to auscultation, mediport in left chest.   Cardiovascular: S1 and S2 are rhythmic and regular.  No murmurs appreciated. Abdomen: Positive bowel sounds to auscultation. Benign to palpation. No masses felt. No hepatosplenomegaly. Obese mild pain in the left upper quadrant  Genitourinary: female   Extremities: No clubbing, no cyanosis, no edema. Musculoskeletal: equal and symmetrical  Neurological: no focal   Lines: peripheral  mediport left chest accessed       CBC+dif:  Recent Labs     10/07/20  0234 10/08/20  1831  10/09/20  0510   WBC 14.8* 14.0*  --  12.4*   HGB 10.7* 10.6*  --  9.8*   HCT 31.1* 31.3*   < > 28.9*   MCV 74.2* 74.3*  --  74.7*    267  --  233   NEUTROABS 8.69* 9.27*  --  7.87*    < > = values in this interval not displayed.      Lab Results   Component Value Date    CRP 4.0 (H) 08/10/2016    CRP 1.7 (H) 08/02/2016    CRP 1.0 (H) 06/30/2016     No results found for: CRP  Lab Results   Component Value Date    SEDRATE 10 10/26/2017    SEDRATE 29 (H) 08/10/2016    SEDRATE 10 08/02/2016     Lab Results   Component Value Date    ALT 9 10/09/2020    AST 13 10/09/2020    ALKPHOS 96 10/09/2020    BILITOT 1.2 10/09/2020     Lab Results   Component Value Date     10/09/2020    K 3.8 10/09/2020     10/09/2020    CO2 23 10/09/2020    BUN 9 10/09/2020    CREATININE 0.9 10/09/2020    GFRAA >60 10/09/2020    LABGLOM >60 10/09/2020    GLUCOSE 128 10/09/2020    GLUCOSE 89 05/03/2011    PROT 6.6 10/09/2020    LABALBU 3.4 10/09/2020    LABALBU 4.0 05/03/2011    CALCIUM 8.4 10/09/2020    BILITOT 1.2 10/09/2020    ALKPHOS 96 10/09/2020    AST 13 10/09/2020    ALT 9 10/09/2020       Lab Results   Component Value Date    PROTIME 12.9 02/24/2020    INR 1.1 02/24/2020       Lab Results   Component Value Date    TSH 1.430 10/05/2017       Lab Results   Component Value Date    COLORU Yellow 10/08/2020    PHUR 6.0 10/08/2020    LABCAST RARE 12/28/2016    WBCUA 5-10 10/08/2020    RBCUA NONE 10/08/2020    MUCUS Present 10/29/2017    BACTERIA NONE SEEN 10/08/2020    CLARITYU Clear 10/08/2020    SPECGRAV 1.020 10/08/2020    LEUKOCYTESUR LARGE 10/08/2020    UROBILINOGEN 0.2 10/08/2020    BILIRUBINUR Negative 10/08/2020    BLOODU Negative 10/08/2020    GLUCOSEU Negative 10/08/2020    AMORPHOUS FEW 10/29/2017       No results found for: KVF1YSI, BEART, H9WTQYEH, PHART, THGBART, KGD1NCX, PO2ART, YFJ1BKZ  Radiology:  XR CHEST (2 VW)   Final Result   No acute abnormality visualized. Previously seen airspace opacities within the lung bases no longer visualized. Microbiology:  Pending  Recent Labs     10/07/20  0532 10/08/20  1831   BC Gram stain performed from blood culture bottle media  Gram positive cocci in chains  *  Identification and sensitivity to follow Gram stain performed from blood culture bottle media  Gram positive cocci in chains  Previously positive blood culture called  *     Recent Labs     10/07/20  0532   ORG Gram positive cocci*  Gram positive cocci*     Recent Labs     10/07/20  0532 10/08/20  1831   BLOODCULT2 Gram stain performed from blood culture bottle media  Gram positive cocci in chains  *  Identification and sensitivity to follow Gram stain performed from blood culture bottle media  Gram positive cocci in chains  Previously positive blood culture called  *     No results for input(s): STREPNEUMAGU in the last 72 hours. No results for input(s): LP1UAG in the last 72 hours. No results for input(s): ASO in the last 72 hours. No results for input(s): CULTRESP in the last 72 hours.     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   - Need meningococcal vaccine booster every 5 years. - Patient should avoid close contact with dogs & cats  - avoid tick exposure especially in [facility] - use DEET  · Baseline ESR, CRP  · Monitor labs  · Will follow with you    Thank you for having us see this patient in consultation. I will be discussing this case with the treating physicians.       Electronically signed by Yolanda Bentley MD on 10/9/2020 at 4:09 PM

## 2020-10-09 NOTE — PROGRESS NOTES
Notified Lady Gonzalez about patient stating that she has been itching all over since she received vancomycin in the ED. Received orders for PRN Benadryl.

## 2020-10-09 NOTE — H&P
Hospital Medicine History & Physical      PCP: Annie Maldonado    Date of Admission: 10/8/2020    Date of Service: Pt seen/examined on 10/8/2020 and Admitted to Inpatient with expected LOS greater than two midnights due to medical therapy. Chief Complaint:  Positive blood cultures. History Of Present Illness:    22 y.o. female who presented to [facility] yesterday due to sickle cell crisis. She states she is from out of town visiting family and started having pain in her chest and arms yesterday. She came to the er and had blood work, pain medication and fluids. She had blood cultures drawn at that time but leukocytosis was thought to be due to sickle cell. She was called today to return as her blood cultures came back positive for enterococcus. Patient has a mediport in her left anterior chest and states she has had 3-4 episodes of blood stream infections and has had to have the port removed previously. She denies any shortness of breath, nausea, vomiting or diarrhea. She does not access her mediport states only gets accessed at the hospital. Patient is currently being treated for a splenic infarct and getting lovenox twice a day.      Past Medical History:          Diagnosis Date    CLABSI (central line-associated bloodstream infection) 8/3/2016    Deep vein thrombosis (DVT) of left upper extremity (HCC) 8/12/2016    Disease of blood and blood forming organ     Heart murmur     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[address]     Miscarriage within last 12 months 04/2017    Obesity, Class III, BMI 40-49.9 (morbid obesity) ([address] 5/22/2014    PE (pulmonary embolism)     PE (pulmonary embolism)     Pneumonia     Psychiatric problem     Sickle cell anemia (HCC)     Thrombosis complicating venous access device ([address] 5/18/2017       Past Surgical History:          Procedure Laterality Date    INSERTION / REMOVAL / REPLACEMENT VENOUS ACCESS CATHETER Left 5/9/2019    PORT INSERTION performed by Alyssa Hatch Sera Kaur MD at [place] [id number]      right    OTHER SURGICAL HISTORY  08/04/2016    removal of mediport    OTHER SURGICAL HISTORY Right 03/05/2018    TUNNELED VENOUS PORT REMOVAL & REINSERTION;DILATION OF VENOUS TRACT    TONSILLECTOMY      TUNNELED VENOUS PORT PLACEMENT  2014    and removed    TUNNELED VENOUS PORT PLACEMENT Right 01/23/2015    Delatore, removed 08/04/16 CLABSI    TUNNELED VENOUS PORT PLACEMENT Right 09/15/2016    CHEST    TUNNELED VENOUS PORT PLACEMENT Right 05/18/2017    SUBCLAVIAN       Medications Prior to Admission:      Prior to Admission medications    Medication Sig Start Date End Date Taking? Authorizing Provider   azithromycin (ZITHROMAX Z-KEVIN) 250 MG tablet Take 2 tablets (500 mg) on Day 1, and then take 1 tablet (250 mg) on days 2 through 5. 10/7/20 10/11/20  Verner Rotunda, APRN - CNP   cefdinir (OMNICEF) 300 MG capsule Take 1 capsule by mouth 2 times daily for 10 days 10/7/20 10/17/20  Verner Rotunda, APRN - CNP   ondansetron (ZOFRAN ODT) 4 MG disintegrating tablet Take 1 tablet by mouth every 8 hours as needed for Nausea or Vomiting 11/20/19   Verner Rotunda, APRN - CNP   enoxaparin (LOVENOX) 150 MG/ML injection Inject 0.93 mLs into the skin every 12 hours 10/26/19   Lyssa Carbone MD   hydroxyurea (HYDREA) 500 MG chemo capsule Take 500 mg by mouth 2 times daily    Historical Provider, MD   escitalopram (LEXAPRO) 5 MG tablet Take 10 mg by mouth daily  2/21/19   Historical Provider, MD   ferrous sulfate 325 (65 FE) MG tablet Take 1 tablet by mouth 2 times daily (with meals) 2/27/17   Shyam Plata DO   folic acid (FOLVITE) 1 MG tablet Take 2 tablets by mouth daily  Patient taking differently: Take 2 mg by mouth 3 times daily  9/20/16   Shyam Plata DO       Allergies:  Bactrim [sulfamethoxazole-trimethoprim]; Demerol hcl [meperidine]; Demerol hcl [meperidine];  Adhesive tape; and Bactrim [sulfamethoxazole-trimethoprim]    Social History:      The patient currently lives out of town but here visiting family. TOBACCO:   reports that she has never smoked. She has never used smokeless tobacco.  ETOH:   reports current alcohol use. Family History:            Problem Relation Age of Onset    High Blood Pressure Mother     Diabetes Mother     Cancer Maternal Grandmother         breast    Kidney Disease Paternal Grandmother        REVIEW OF SYSTEMS:   Pertinent positives as noted in the HPI. All other systems reviewed and negative. PHYSICAL EXAM:    BP (!) 147/79   Pulse 111   Temp 97.4 °F (36.3 °C) (Temporal)   Resp 16   Ht 5' 3\" (1.6 m)   Wt (!) 307 lb (139.3 kg)   LMP 08/23/2020 (Exact Date)   SpO2 95%   BMI 54.38 kg/m²     General appearance:  No apparent distress, appears stated age and cooperative. HEENT:  Normal cephalic, atraumatic without obvious deformity. Pupils equal, round, and reactive to light. Extra ocular muscles intact. Conjunctivae/corneas clear. Neck: Supple, with full range of motion. No jugular venous distention. Trachea midline. Chest: Mediport site clean and dry without erythema or discharge. Respiratory:  Normal respiratory effort. Clear to auscultation, bilaterally without Rales/Wheezes/Rhonchi. Cardiovascular:  Tachycardic rate and rhythm with normal S1/S2 without murmurs, rubs or gallops. Abdomen: Soft, non-tender, non-distended with normal bowel sounds. Musculoskeletal:  No clubbing, cyanosis or edema bilaterally. Full range of motion without deformity. Skin: Skin color, texture, turgor normal.  No rashes or lesions. Neurologic:  Neurovascularly intact without any focal sensory/motor deficits. Cranial nerves: II-XII intact, grossly non-focal.  Psychiatric:  Alert and oriented, thought content appropriate, normal insight    CXR:  I have reviewed the CXR with the following interpretation:   No acute abnormality visualized.         Previously seen airspace opacities within the lung bases no longer visualized. EKG:  I have reviewed the EKG with the following interpretation: Sinus tachycardia  Otherwise normal ECG    Labs:     Recent Labs     10/07/20  0234 10/08/20  1831   WBC 14.8* 14.0*   HGB 10.7* 10.6*   HCT 31.1* 31.3*    267     Recent Labs     10/07/20  0234 10/08/20  1831    137   K 3.7 3.7    102   CO2 24 22   BUN 11 8   CREATININE 1.0 0.8   CALCIUM 9.2 9.1     Recent Labs     10/07/20  0234 10/08/20  1831   AST 11 14   ALT 8 10   BILITOT 1.3* 1.7*   ALKPHOS 99 93     No results for input(s): INR in the last 72 hours. Recent Labs     10/07/20  0234   TROPONINI <0.01       Urinalysis:      Lab Results   Component Value Date    NITRU Negative 10/08/2020    WBCUA 5-10 10/08/2020    BACTERIA NONE SEEN 10/08/2020    RBCUA NONE 10/08/2020    BLOODU Negative 10/08/2020    SPECGRAV 1.020 10/08/2020    GLUCOSEU Negative 10/08/2020         ASSESSMENT:    Active Hospital Problems    Diagnosis Date Noted    Positive blood culture [R78.81] 10/08/2020   1. Enterococcus Bacteremia   2. Hemoglobin SC disease  3. History of CLABSI  4. History of PE   5. Splenic infarct    PLAN:  Admit to inpatient. Ask ID to see. Received Vancomycin and ampicillin in er. Continue pain control. Received full dose lovenox in er. DVT Prophylaxis: Lovenox. Diet: DIET GENERAL;  Code Status: Full Code      Dispo - Home with home healthcare. Judy Faulkner DO    Thank you Jose Cornelius for the opportunity to be involved in this patient's care. If you have any questions or concerns please feel free to contact me at [phone].

## 2020-10-09 NOTE — ANESTHESIA PRE PROCEDURE
Department of Anesthesiology  Preprocedure Note       Name:  Rand Casarez   Age:  22 y.o.  :  1995                                          MRN:  [id number]         Date:  10/9/2020      Surgeon: Mercy Romero):  Savanah Baltazar MD    Procedure: Procedure(s):  PORT REMOVAL    Medications prior to admission:   Prior to Admission medications    Medication Sig Start Date End Date Taking?  Authorizing Provider   azithromycin (ZITHROMAX Z-KEVIN) 250 MG tablet Take 2 tablets (500 mg) on Day 1, and then take 1 tablet (250 mg) on days 2 through 5. 10/7/20 10/11/20 Yes SOLOMON Jefferson CNP   cefdinir (OMNICEF) 300 MG capsule Take 1 capsule by mouth 2 times daily for 10 days 10/7/20 10/17/20 Yes SOLOMON Philippe CNP   ondansetron (ZOFRAN ODT) 4 MG disintegrating tablet Take 1 tablet by mouth every 8 hours as needed for Nausea or Vomiting 19  Yes SOLOMON Philippe CNP   enoxaparin (LOVENOX) 150 MG/ML injection Inject 0.93 mLs into the skin every 12 hours  Patient taking differently: Inject 120 mg into the skin every 12 hours  10/26/19  Yes Semaj Rodriguez MD   hydroxyurea (HYDREA) 500 MG chemo capsule Take 500 mg by mouth 2 times daily   Yes Historical Provider, MD   ferrous sulfate 325 (65 FE) MG tablet Take 1 tablet by mouth 2 times daily (with meals) 17  Yes Colette Echevarria DO   folic acid (FOLVITE) 1 MG tablet Take 2 tablets by mouth daily  Patient taking differently: Take 1 mg by mouth daily  16  Yes Colette Echevarria DO       Current medications:    Current Facility-Administered Medications   Medication Dose Route Frequency Provider Last Rate Last Dose    oxyCODONE-acetaminophen (PERCOCET) 5-325 MG per tablet 1 tablet  1 tablet Oral Q4H PRN Chanel Denton MD        ampicillin 2 g ivpb mini bag  2 g Intravenous 6 times per day Trish Belle MD   Stopped at 10/09/20 1748    cefTRIAXone (ROCEPHIN) 2 g in sterile water 20 mL IV syringe  2 g Intravenous Q24H Denice Burch Kenny Lyon MD   2 g at 10/09/20 1817    perflutren lipid microspheres (DEFINITY) injection 1.65 mg  1.5 mL Intravenous ONCE PRN Jean-Pierre Cross MD        sodium chloride flush 0.9 % injection 10 mL  10 mL Intravenous 2 times per day Liv E Kaushik, DO        sodium chloride flush 0.9 % injection 10 mL  10 mL Intravenous PRN Liv E Kaushik, DO        potassium chloride (KLOR-CON M) extended release tablet 40 mEq  40 mEq Oral PRN Liv E Kaushik, DO        Or    potassium bicarb-citric acid (EFFER-K) effervescent tablet 40 mEq  40 mEq Oral PRN Liv E Kaushik, DO        Or    potassium chloride 10 mEq/100 mL IVPB (Peripheral Line)  10 mEq Intravenous PRN Liv E Kaushik, DO        acetaminophen (TYLENOL) tablet 650 mg  650 mg Oral Q6H PRN Liv E Kaushik, DO        Or    acetaminophen (TYLENOL) suppository 650 mg  650 mg Rectal Q6H PRN Liv E Kaushik, DO        senna (SENOKOT) tablet 8.6 mg  1 tablet Oral Daily PRN Liv E Kaushik, DO        promethazine (PHENERGAN) tablet 12.5 mg  12.5 mg Oral Q6H PRN Liv E Kaushik, DO        Or    ondansetron (ZOFRAN) injection 4 mg  4 mg Intravenous Q6H PRN Liv E Kaushik, DO        famotidine (PEPCID) tablet 20 mg  20 mg Oral BID Liv BUNCH Kaushik, DO   20 mg at 10/09/20 0829    HYDROmorphone (DILAUDID) injection 1 mg  1 mg Intravenous Q3H PRN Liv BUNCH Kaushik, DO   1 mg at 10/09/20 1718    0.9 % sodium chloride infusion   Intravenous Continuous Liv BUNCH Kaushik,  mL/hr at 10/09/20 0247      enoxaparin (LOVENOX) injection 120 mg  120 mg Subcutaneous BID Liv BUNCH Kaushik, DO   120 mg at 10/09/20 0601    ferrous sulfate (IRON 325) tablet 325 mg  325 mg Oral BID  Liv BUNCH Kaushik, DO   325 mg at 34/86/85 7070    folic acid (FOLVITE) tablet 1 mg  1 mg Oral Daily Liv BUNCH Kaushik, DO   1 mg at 10/09/20 0828    hydroxyurea (HYDREA) chemo capsule 500 mg  500 mg Oral BID Liv Faulkner DO   500 mg at 10/09/20 0010    diphenhydrAMINE (BENADRYL) Community acquired pneumonia of right upper lobe of lung J18.9    Sickle cell anemia with pain (HCC) D57.00    Lymphadenitis I88.9    Rectal bleeding K62.5    Anemia due to blood loss D50.0    Poor venous access I87.8    Sickle cell pain crisis (HCC) D57.00    Red blood cell antibody positive R76.8    Thalassemia minor D56.3    Positive blood culture R78.81       Past Medical History:        Diagnosis Date    CLABSI (central line-associated bloodstream infection) 8/3/2016    Deep vein thrombosis (DVT) of left upper extremity (HCC) 8/12/2016    Disease of blood and blood forming organ     Heart murmur     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[facility] [address]     Miscarriage within last 12 months 04/2017    Obesity, Class III, BMI 40-49.9 (morbid obesity) ([facility] [address] 5/22/2014    PE (pulmonary embolism)     PE (pulmonary embolism)     Pneumonia     Psychiatric problem     Sickle cell anemia (HCC)     Thrombosis complicating venous access device ([facility] [address] 5/18/2017       Past Surgical History:        Procedure Laterality Date    INSERTION / REMOVAL / REPLACEMENT VENOUS ACCESS CATHETER Left 5/9/2019    PORT INSERTION performed by Charu Hurst MD at [place] [address]      right    OTHER SURGICAL HISTORY  08/04/2016    removal of mediport    OTHER SURGICAL HISTORY Right 03/05/2018    TUNNELED VENOUS PORT REMOVAL & REINSERTION;DILATION OF VENOUS TRACT    TONSILLECTOMY      TUNNELED VENOUS PORT PLACEMENT  2014    and removed    TUNNELED VENOUS PORT PLACEMENT Right 01/23/2015    [place], removed 08/04/16 CLABSI    TUNNELED VENOUS PORT PLACEMENT Right 09/15/2016    CHEST    TUNNELED VENOUS PORT PLACEMENT Right 05/18/2017    SUBCLAVIAN       Social History:    Social History     Tobacco Use    Smoking status: Never Smoker    Smokeless tobacco: Never Used   Substance Use Topics    Alcohol use: Yes     Comment: socially                                Counseling given: Not Answered      Vital Signs (Current):   Vitals: 10/09/20 0230 10/09/20 0600 10/09/20 0836 10/09/20 1427   BP: (!) 155/66 123/61 109/61 130/78   Pulse: 109 106 106 113   Resp: 16 16 16 16   Temp:   36.8 °C (98.2 °F)    TempSrc:   Oral    SpO2: 98%  100% 100%   Weight:       Height:                                                  BP Readings from Last 3 Encounters:   10/09/20 130/78   10/07/20 133/77   05/05/20 (!) 120/59       NPO Status:                                                                                 BMI:   Wt Readings from Last 3 Encounters:   10/08/20 (!) 307 lb (139.3 kg)   10/07/20 (!) 307 lb (139.3 kg)   05/05/20 (!) 314 lb (142.4 kg)     Body mass index is 54.38 kg/m². CBC:   Lab Results   Component Value Date    WBC 12.4 10/09/2020    RBC 3.87 10/09/2020    RBC 4.66 07/09/2019    HGB 9.8 10/09/2020    HCT 28.9 10/09/2020    MCV 74.7 10/09/2020    RDW 19.2 10/09/2020     10/09/2020       CMP:   Lab Results   Component Value Date     10/09/2020    K 3.8 10/09/2020     10/09/2020    CO2 23 10/09/2020    BUN 9 10/09/2020    CREATININE 0.9 10/09/2020    GFRAA >60 10/09/2020    LABGLOM >60 10/09/2020    GLUCOSE 128 10/09/2020    GLUCOSE 89 05/03/2011    PROT 6.6 10/09/2020    CALCIUM 8.4 10/09/2020    BILITOT 1.2 10/09/2020    ALKPHOS 96 10/09/2020    AST 13 10/09/2020    ALT 9 10/09/2020       POC Tests: No results for input(s): POCGLU, POCNA, POCK, POCCL, POCBUN, POCHEMO, POCHCT in the last 72 hours.     Coags:   Lab Results   Component Value Date    PROTIME 12.9 02/24/2020    INR 1.1 02/24/2020    APTT 29.6 05/01/2019       HCG (If Applicable):   Lab Results   Component Value Date    PREGTESTUR NEGATIVE 10/08/2020        ABGs: No results found for: PHART, PO2ART, USY9WJQ, LDY4XPD, BEART, D2JJQHDS     Type & Screen (If Applicable):  No results found for: LABABO, LABRH    Drug/Infectious Status (If Applicable):  No results found for: HIV, HEPCAB    COVID-19 Screening (If Applicable): No results found for: COVID19    CXR 108/2020  FINDINGS:    No infiltrate or consolidation or effusion is identified.  The heart size is    normal.  There is a pardeep catheter along the left anterior chest wall with    the catheter tip in the SVC.              Impression    No acute abnormality visualized.         Previously seen airspace opacities within the lung bases no longer visualized. EKG 10/8/2020  Component  Value  Ref Range & Units  Status  Collected  Lab    Ventricular Rate  99  BPM  Final  10/08/2020  9:03 PM  [facility]    Atrial Rate  99  BPM  Final  10/08/2020  9:03 PM  [facility]    P-R Interval  140  ms  Final  10/08/2020  9:03 PM  [facility]    QRS Duration  88  ms  Final  10/08/2020  9:03 PM  [facility]    Q-T Interval  370  ms  Final  10/08/2020  9:03 PM  [facility]    QTc Calculation (Bazett)  474  ms  Final  10/08/2020  9:03 PM  [facility]    P Axis  46  degrees  Final  10/08/2020  9:03 PM  [facility]    R Axis  42  degrees  Final  10/08/2020  9:03 PM  [facility]    T Axis  25  degrees  Final  10/08/2020  9:03 PM  [facility]    Testing Performed By     Lab - Abbreviation  Name  Director  Address  Valid Date Range    360-[facility]  [facility] MUSE  Unknown  Unknown  04/18/16 0721-Present    Narrative & Impression     Normal sinus rhythm  Normal ECG  When compared with ECG of 07-OCT-2020 02:39,  No significant change was found     ECHO 8/4/2016   Findings      Left Ventricle   Left ventricular size is grossly normal.   Normal systolic function with LVEF estimated at 65%. Normal diastolic function for age. Right Ventricle   Normal right ventricle structure and function. Left Atrium   Normal left atrium by volume index. Right Atrium   Normal right atrium. Mitral Valve   Structurally normal mitral valves. Physiologic and/or trace mitral regurgitation is present. No evidence of hemodynamically significant mitral stenosis. Tricuspid Valve   Normal tricuspid valve structure and function. Mild tricuspid regurgitation.    RVSP

## 2020-10-09 NOTE — PROGRESS NOTES
HOSPITALIST PROGRESS NOTE  Date: 10/9/2020   Name: Katharine Moore   MRN: [id number]   YOB: 1995      Subjective/Interval Hx:   Patient states that she was informed by Dr. office to come to the hospital due to bacteremia patient is a sickle cell patient    Objective:   Physical Exam:   /78   Pulse 113   Temp 98.2 °F (36.8 °C) (Oral)   Resp 16   Ht 5' 3\" (1.6 m)   Wt (!) 307 lb (139.3 kg)   LMP 08/23/2020 (Exact Date)   SpO2 100%   BMI 54.38 kg/m²   General: no acute distress, well nourished and well hydrated  HEENT: NCAT  Heart: S1S2 RRR  Lungs: Clear to ascultation bilaterally, respiratory effort normal  Abdomen: soft, NT/ND, positive bowel sounds  Extremities: no pitting edema, nontender   Neuro: patient is awake, alert and orientated times 3, no gross deficits  Skin: no rashes or ecchymosis        Meds:   Meds:    sodium chloride flush  10 mL Intravenous 2 times per day    famotidine  20 mg Oral BID    enoxaparin  120 mg Subcutaneous BID    ferrous sulfate  325 mg Oral BID WC    folic acid  1 mg Oral Daily    hydroxyurea  500 mg Oral BID      Infusions:    sodium chloride 100 mL/hr at 10/09/20 0247     PRN Meds: oxyCODONE-acetaminophen, 1 tablet, Q4H PRN  sodium chloride flush, 10 mL, PRN  potassium chloride, 40 mEq, PRN    Or  potassium alternative oral replacement, 40 mEq, PRN    Or  potassium chloride, 10 mEq, PRN  acetaminophen, 650 mg, Q6H PRN    Or  acetaminophen, 650 mg, Q6H PRN  senna, 1 tablet, Daily PRN  promethazine, 12.5 mg, Q6H PRN    Or  ondansetron, 4 mg, Q6H PRN  HYDROmorphone, 1 mg, Q3H PRN  diphenhydrAMINE, 25 mg, Q6H PRN        Data/Labs:     Recent Labs     10/07/20  0234 10/08/20  1831 10/08/20  1931 10/09/20  0510   WBC 14.8* 14.0*  --  12.4*   HGB 10.7* 10.6*  --  9.8*   HCT 31.1* 31.3* 31.8* 28.9*    267  --  233      Recent Labs     10/07/20  0234 10/08/20  1831 10/09/20  0510    137 137   K 3.7 3.7 3.8    102 104   CO2 24 22 23   BUN 11 8 9   CREATININE 1.0 0.8 0.9     Recent Labs     10/07/20  0234 10/08/20  1831 10/09/20  0510   AST 11 14 13   ALT 8 10 9   BILITOT 1.3* 1.7* 1.2   ALKPHOS 99 93 96     No results for input(s): INR in the last 72 hours. No results for input(s): CKTOTAL, CKMB, CKMBINDEX, TROPONINT in the last 72 hours. I/O last 3 completed shifts: In: 7822 [P.O.:240; I.V.:988]  Out: -     Intake/Output Summary (Last 24 hours) at 10/9/2020 1518  Last data filed at 10/9/2020 1456  Gross per 24 hour   Intake 1228 ml   Output --   Net 1228 ml        Assessment/Plan:   1. Enterococcus bacteremia- patient will be seen by infectious disease she has a history ofCLABSI, will continue to follow cultures are pending  2. Sickle cell anemia-patient has a history is not in crisis at this time have been seen previously in the emergency department  3. Splenic infarction patient is being anticoagulated with enoxaparin stable at this time  4. Anemia chronic disease-ferrous sulfate twice daily due to sickle cell anemia    DVT Prophylaxis: Lovenox  Diet: DIET GENERAL;  Code Status: Full Code    Dispo:  When stable    Electronically signed by Coleta Lennox, MD on 10/9/2020 at 3:18 PM  [facility], [place] Hospitalist

## 2020-10-10 ENCOUNTER — ANESTHESIA (OUTPATIENT)
Dept: OPERATING ROOM | Age: 25
DRG: 315 | End: 2020-10-10
Payer: MEDICARE

## 2020-10-10 VITALS — OXYGEN SATURATION: 100 % | DIASTOLIC BLOOD PRESSURE: 64 MMHG | SYSTOLIC BLOOD PRESSURE: 115 MMHG

## 2020-10-10 LAB
ABO/RH: NORMAL
ALBUMIN SERPL-MCNC: 3.3 G/DL (ref 3.5–5.2)
ALP BLD-CCNC: 107 U/L (ref 35–104)
ALT SERPL-CCNC: 9 U/L (ref 0–32)
ANION GAP SERPL CALCULATED.3IONS-SCNC: 9 MMOL/L (ref 7–16)
ANTIBODY IDENTIFICATION: NORMAL
ANTIBODY SCREEN: NORMAL
AST SERPL-CCNC: 13 U/L (ref 0–31)
BASOPHILS ABSOLUTE: 0.01 E9/L (ref 0–0.2)
BASOPHILS RELATIVE PERCENT: 0.1 % (ref 0–2)
BILIRUB SERPL-MCNC: 0.8 MG/DL (ref 0–1.2)
BLOOD CULTURE, ROUTINE: ABNORMAL
BUN BLDV-MCNC: 5 MG/DL (ref 6–20)
CALCIUM SERPL-MCNC: 8.3 MG/DL (ref 8.6–10.2)
CHLORIDE BLD-SCNC: 107 MMOL/L (ref 98–107)
CO2: 24 MMOL/L (ref 22–29)
CREAT SERPL-MCNC: 0.8 MG/DL (ref 0.5–1)
CULTURE, BLOOD 2: ABNORMAL
DAT POLYSPECIFIC: NORMAL
DR. NOTIFY: NORMAL
EOSINOPHILS ABSOLUTE: 0.27 E9/L (ref 0.05–0.5)
EOSINOPHILS RELATIVE PERCENT: 2.6 % (ref 0–6)
GFR AFRICAN AMERICAN: >60
GFR NON-AFRICAN AMERICAN: >60 ML/MIN/1.73
GLUCOSE BLD-MCNC: 127 MG/DL (ref 74–99)
HCT VFR BLD CALC: 27.9 % (ref 34–48)
HEMOGLOBIN: 9.4 G/DL (ref 11.5–15.5)
IMMATURE GRANULOCYTES #: 0.04 E9/L
IMMATURE GRANULOCYTES %: 0.4 % (ref 0–5)
LV EF: 58 %
LVEF MODALITY: NORMAL
LYMPHOCYTES ABSOLUTE: 3.25 E9/L (ref 1.5–4)
LYMPHOCYTES RELATIVE PERCENT: 31.5 % (ref 20–42)
MCH RBC QN AUTO: 25.1 PG (ref 26–35)
MCHC RBC AUTO-ENTMCNC: 33.7 % (ref 32–34.5)
MCV RBC AUTO: 74.4 FL (ref 80–99.9)
MONOCYTES ABSOLUTE: 0.52 E9/L (ref 0.1–0.95)
MONOCYTES RELATIVE PERCENT: 5 % (ref 2–12)
NEUTROPHILS ABSOLUTE: 6.24 E9/L (ref 1.8–7.3)
NEUTROPHILS RELATIVE PERCENT: 60.4 % (ref 43–80)
ORGANISM: ABNORMAL
PDW BLD-RTO: 19.1 FL (ref 11.5–15)
PLATELET # BLD: 229 E9/L (ref 130–450)
PMV BLD AUTO: 10.7 FL (ref 7–12)
POTASSIUM REFLEX MAGNESIUM: 3.9 MMOL/L (ref 3.5–5)
RBC # BLD: 3.75 E12/L (ref 3.5–5.5)
SODIUM BLD-SCNC: 140 MMOL/L (ref 132–146)
TOTAL PROTEIN: 6.4 G/DL (ref 6.4–8.3)
URINE CULTURE, ROUTINE: NORMAL
WBC # BLD: 10.3 E9/L (ref 4.5–11.5)

## 2020-10-10 PROCEDURE — 2580000003 HC RX 258: Performed by: STUDENT IN AN ORGANIZED HEALTH CARE EDUCATION/TRAINING PROGRAM

## 2020-10-10 PROCEDURE — 36556 INSERT NON-TUNNEL CV CATH: CPT

## 2020-10-10 PROCEDURE — 86880 COOMBS TEST DIRECT: CPT

## 2020-10-10 PROCEDURE — 80053 COMPREHEN METABOLIC PANEL: CPT

## 2020-10-10 PROCEDURE — 6360000002 HC RX W HCPCS: Performed by: SPECIALIST

## 2020-10-10 PROCEDURE — 3600000013 HC SURGERY LEVEL 3 ADDTL 15MIN: Performed by: SURGERY

## 2020-10-10 PROCEDURE — 86902 BLOOD TYPE ANTIGEN DONOR EA: CPT

## 2020-10-10 PROCEDURE — 6370000000 HC RX 637 (ALT 250 FOR IP): Performed by: HOSPITALIST

## 2020-10-10 PROCEDURE — 6360000002 HC RX W HCPCS: Performed by: ANESTHESIOLOGY

## 2020-10-10 PROCEDURE — 3700000001 HC ADD 15 MINUTES (ANESTHESIA): Performed by: SURGERY

## 2020-10-10 PROCEDURE — 6360000002 HC RX W HCPCS

## 2020-10-10 PROCEDURE — 87070 CULTURE OTHR SPECIMN AEROBIC: CPT

## 2020-10-10 PROCEDURE — 86850 RBC ANTIBODY SCREEN: CPT

## 2020-10-10 PROCEDURE — 87186 SC STD MICRODIL/AGAR DIL: CPT

## 2020-10-10 PROCEDURE — 6360000002 HC RX W HCPCS: Performed by: INTERNAL MEDICINE

## 2020-10-10 PROCEDURE — 85660 RBC SICKLE CELL TEST: CPT

## 2020-10-10 PROCEDURE — 87077 CULTURE AEROBIC IDENTIFY: CPT

## 2020-10-10 PROCEDURE — 85025 COMPLETE CBC W/AUTO DIFF WBC: CPT

## 2020-10-10 PROCEDURE — 86922 COMPATIBILITY TEST ANTIGLOB: CPT

## 2020-10-10 PROCEDURE — 2060000000 HC ICU INTERMEDIATE R&B

## 2020-10-10 PROCEDURE — 2580000003 HC RX 258: Performed by: SPECIALIST

## 2020-10-10 PROCEDURE — 2500000003 HC RX 250 WO HCPCS: Performed by: SURGERY

## 2020-10-10 PROCEDURE — 6360000002 HC RX W HCPCS: Performed by: STUDENT IN AN ORGANIZED HEALTH CARE EDUCATION/TRAINING PROGRAM

## 2020-10-10 PROCEDURE — 2580000003 HC RX 258: Performed by: INTERNAL MEDICINE

## 2020-10-10 PROCEDURE — 86901 BLOOD TYPING SEROLOGIC RH(D): CPT

## 2020-10-10 PROCEDURE — 3600000003 HC SURGERY LEVEL 3 BASE: Performed by: SURGERY

## 2020-10-10 PROCEDURE — 86900 BLOOD TYPING SEROLOGIC ABO: CPT

## 2020-10-10 PROCEDURE — 0WP103Z REMOVAL OF INFUSION DEVICE FROM CRANIAL CAVITY, OPEN APPROACH: ICD-10-PCS | Performed by: SURGERY

## 2020-10-10 PROCEDURE — 86870 RBC ANTIBODY IDENTIFICATION: CPT

## 2020-10-10 PROCEDURE — 6370000000 HC RX 637 (ALT 250 FOR IP): Performed by: INTERNAL MEDICINE

## 2020-10-10 PROCEDURE — 7100000000 HC PACU RECOVERY - FIRST 15 MIN: Performed by: SURGERY

## 2020-10-10 PROCEDURE — 2580000003 HC RX 258

## 2020-10-10 PROCEDURE — 36415 COLL VENOUS BLD VENIPUNCTURE: CPT

## 2020-10-10 PROCEDURE — 93306 TTE W/DOPPLER COMPLETE: CPT

## 2020-10-10 PROCEDURE — 7100000001 HC PACU RECOVERY - ADDTL 15 MIN: Performed by: SURGERY

## 2020-10-10 PROCEDURE — 6370000000 HC RX 637 (ALT 250 FOR IP): Performed by: STUDENT IN AN ORGANIZED HEALTH CARE EDUCATION/TRAINING PROGRAM

## 2020-10-10 PROCEDURE — 0JPT3WZ REMOVAL OF TOTALLY IMPLANTABLE VASCULAR ACCESS DEVICE FROM TRUNK SUBCUTANEOUS TISSUE AND FASCIA, PERCUTANEOUS APPROACH: ICD-10-PCS | Performed by: SURGERY

## 2020-10-10 PROCEDURE — 3700000000 HC ANESTHESIA ATTENDED CARE: Performed by: SURGERY

## 2020-10-10 RX ORDER — LIDOCAINE HYDROCHLORIDE 10 MG/ML
INJECTION, SOLUTION INFILTRATION; PERINEURAL PRN
Status: DISCONTINUED | OUTPATIENT
Start: 2020-10-10 | End: 2020-10-10 | Stop reason: ALTCHOICE

## 2020-10-10 RX ORDER — POLYETHYLENE GLYCOL 3350 17 G/17G
17 POWDER, FOR SOLUTION ORAL DAILY
Status: DISCONTINUED | OUTPATIENT
Start: 2020-10-10 | End: 2020-10-13

## 2020-10-10 RX ORDER — DIPHENHYDRAMINE HYDROCHLORIDE 50 MG/ML
25 INJECTION INTRAMUSCULAR; INTRAVENOUS
Status: COMPLETED | OUTPATIENT
Start: 2020-10-10 | End: 2020-10-10

## 2020-10-10 RX ORDER — FENTANYL CITRATE 50 UG/ML
INJECTION, SOLUTION INTRAMUSCULAR; INTRAVENOUS PRN
Status: DISCONTINUED | OUTPATIENT
Start: 2020-10-10 | End: 2020-10-10 | Stop reason: SDUPTHER

## 2020-10-10 RX ORDER — PROPOFOL 10 MG/ML
INJECTION, EMULSION INTRAVENOUS CONTINUOUS PRN
Status: DISCONTINUED | OUTPATIENT
Start: 2020-10-10 | End: 2020-10-10 | Stop reason: SDUPTHER

## 2020-10-10 RX ORDER — FENTANYL CITRATE 50 UG/ML
INJECTION, SOLUTION INTRAMUSCULAR; INTRAVENOUS
Status: COMPLETED
Start: 2020-10-10 | End: 2020-10-10

## 2020-10-10 RX ORDER — DOCUSATE SODIUM 100 MG/1
100 CAPSULE, LIQUID FILLED ORAL 2 TIMES DAILY
Status: DISCONTINUED | OUTPATIENT
Start: 2020-10-10 | End: 2020-10-13

## 2020-10-10 RX ORDER — SODIUM CHLORIDE 9 MG/ML
INJECTION, SOLUTION INTRAVENOUS CONTINUOUS PRN
Status: DISCONTINUED | OUTPATIENT
Start: 2020-10-10 | End: 2020-10-10 | Stop reason: SDUPTHER

## 2020-10-10 RX ORDER — BUPIVACAINE HYDROCHLORIDE 2.5 MG/ML
INJECTION, SOLUTION EPIDURAL; INFILTRATION; INTRACAUDAL PRN
Status: DISCONTINUED | OUTPATIENT
Start: 2020-10-10 | End: 2020-10-10 | Stop reason: ALTCHOICE

## 2020-10-10 RX ORDER — OXYCODONE HYDROCHLORIDE 5 MG/1
5 TABLET ORAL EVERY 4 HOURS PRN
Status: DISCONTINUED | OUTPATIENT
Start: 2020-10-10 | End: 2020-10-17 | Stop reason: HOSPADM

## 2020-10-10 RX ORDER — OXYCODONE HYDROCHLORIDE 10 MG/1
10 TABLET ORAL EVERY 4 HOURS PRN
Status: DISCONTINUED | OUTPATIENT
Start: 2020-10-10 | End: 2020-10-17 | Stop reason: HOSPADM

## 2020-10-10 RX ORDER — SENNA AND DOCUSATE SODIUM 50; 8.6 MG/1; MG/1
2 TABLET, FILM COATED ORAL DAILY PRN
Status: DISCONTINUED | OUTPATIENT
Start: 2020-10-10 | End: 2020-10-17 | Stop reason: HOSPADM

## 2020-10-10 RX ORDER — PROMETHAZINE HYDROCHLORIDE 25 MG/ML
25 INJECTION, SOLUTION INTRAMUSCULAR; INTRAVENOUS PRN
Status: DISCONTINUED | OUTPATIENT
Start: 2020-10-10 | End: 2020-10-10 | Stop reason: HOSPADM

## 2020-10-10 RX ORDER — OXYCODONE HYDROCHLORIDE AND ACETAMINOPHEN 5; 325 MG/1; MG/1
2 TABLET ORAL EVERY 4 HOURS PRN
Status: DISCONTINUED | OUTPATIENT
Start: 2020-10-10 | End: 2020-10-10 | Stop reason: ALTCHOICE

## 2020-10-10 RX ORDER — LABETALOL HYDROCHLORIDE 5 MG/ML
5 INJECTION, SOLUTION INTRAVENOUS EVERY 10 MIN PRN
Status: DISCONTINUED | OUTPATIENT
Start: 2020-10-10 | End: 2020-10-10 | Stop reason: HOSPADM

## 2020-10-10 RX ORDER — MIDAZOLAM HYDROCHLORIDE 1 MG/ML
INJECTION INTRAMUSCULAR; INTRAVENOUS PRN
Status: DISCONTINUED | OUTPATIENT
Start: 2020-10-10 | End: 2020-10-10 | Stop reason: SDUPTHER

## 2020-10-10 RX ADMIN — FENTANYL CITRATE 100 MCG: 50 INJECTION, SOLUTION INTRAMUSCULAR; INTRAVENOUS at 11:41

## 2020-10-10 RX ADMIN — AMPICILLIN SODIUM 2 G: 2 INJECTION, POWDER, FOR SOLUTION INTRAMUSCULAR; INTRAVENOUS at 08:14

## 2020-10-10 RX ADMIN — SODIUM CHLORIDE, PRESERVATIVE FREE 10 ML: 5 INJECTION INTRAVENOUS at 17:12

## 2020-10-10 RX ADMIN — HYDROMORPHONE HYDROCHLORIDE 1 MG: 1 INJECTION, SOLUTION INTRAMUSCULAR; INTRAVENOUS; SUBCUTANEOUS at 09:17

## 2020-10-10 RX ADMIN — CEFTRIAXONE SODIUM 2 G: 2 INJECTION, POWDER, FOR SOLUTION INTRAMUSCULAR; INTRAVENOUS at 17:04

## 2020-10-10 RX ADMIN — DIPHENHYDRAMINE HYDROCHLORIDE 25 MG: 50 INJECTION, SOLUTION INTRAMUSCULAR; INTRAVENOUS at 13:57

## 2020-10-10 RX ADMIN — AMPICILLIN SODIUM 2 G: 2 INJECTION, POWDER, FOR SOLUTION INTRAMUSCULAR; INTRAVENOUS at 13:27

## 2020-10-10 RX ADMIN — OXYCODONE HYDROCHLORIDE 10 MG: 10 TABLET ORAL at 15:06

## 2020-10-10 RX ADMIN — HYDROMORPHONE HYDROCHLORIDE 1 MG: 1 INJECTION, SOLUTION INTRAMUSCULAR; INTRAVENOUS; SUBCUTANEOUS at 20:05

## 2020-10-10 RX ADMIN — AMPICILLIN SODIUM 2 G: 2 INJECTION, POWDER, FOR SOLUTION INTRAMUSCULAR; INTRAVENOUS at 19:58

## 2020-10-10 RX ADMIN — DIPHENHYDRAMINE HYDROCHLORIDE 25 MG: 50 INJECTION, SOLUTION INTRAMUSCULAR; INTRAVENOUS at 20:05

## 2020-10-10 RX ADMIN — FAMOTIDINE 20 MG: 20 TABLET, FILM COATED ORAL at 08:13

## 2020-10-10 RX ADMIN — FAMOTIDINE 20 MG: 20 TABLET, FILM COATED ORAL at 19:58

## 2020-10-10 RX ADMIN — HYDROMORPHONE HYDROCHLORIDE 0.5 MG: 1 INJECTION, SOLUTION INTRAMUSCULAR; INTRAVENOUS; SUBCUTANEOUS at 13:08

## 2020-10-10 RX ADMIN — SODIUM CHLORIDE, PRESERVATIVE FREE 10 ML: 5 INJECTION INTRAVENOUS at 17:04

## 2020-10-10 RX ADMIN — FERROUS SULFATE TAB 325 MG (65 MG ELEMENTAL FE) 325 MG: 325 (65 FE) TAB at 08:13

## 2020-10-10 RX ADMIN — HYDROMORPHONE HYDROCHLORIDE 0.5 MG: 1 INJECTION, SOLUTION INTRAMUSCULAR; INTRAVENOUS; SUBCUTANEOUS at 13:01

## 2020-10-10 RX ADMIN — FENTANYL CITRATE 100 MCG: 50 INJECTION, SOLUTION INTRAMUSCULAR; INTRAVENOUS at 12:51

## 2020-10-10 RX ADMIN — OXYCODONE HYDROCHLORIDE 10 MG: 10 TABLET ORAL at 21:19

## 2020-10-10 RX ADMIN — HYDROMORPHONE HYDROCHLORIDE 1 MG: 1 INJECTION, SOLUTION INTRAMUSCULAR; INTRAVENOUS; SUBCUTANEOUS at 06:11

## 2020-10-10 RX ADMIN — SODIUM CHLORIDE, PRESERVATIVE FREE 10 ML: 5 INJECTION INTRAVENOUS at 13:57

## 2020-10-10 RX ADMIN — SODIUM CHLORIDE: 9 INJECTION, SOLUTION INTRAVENOUS at 11:38

## 2020-10-10 RX ADMIN — HYDROMORPHONE HYDROCHLORIDE 1 MG: 1 INJECTION, SOLUTION INTRAMUSCULAR; INTRAVENOUS; SUBCUTANEOUS at 02:50

## 2020-10-10 RX ADMIN — DIPHENHYDRAMINE HYDROCHLORIDE 25 MG: 50 INJECTION, SOLUTION INTRAMUSCULAR; INTRAVENOUS at 06:14

## 2020-10-10 RX ADMIN — ENOXAPARIN SODIUM 120 MG: 150 INJECTION SUBCUTANEOUS at 19:58

## 2020-10-10 RX ADMIN — HYDROMORPHONE HYDROCHLORIDE 1 MG: 1 INJECTION, SOLUTION INTRAMUSCULAR; INTRAVENOUS; SUBCUTANEOUS at 17:04

## 2020-10-10 RX ADMIN — OXYCODONE AND ACETAMINOPHEN 1 TABLET: 5; 325 TABLET ORAL at 08:14

## 2020-10-10 RX ADMIN — OXYCODONE AND ACETAMINOPHEN 1 TABLET: 5; 325 TABLET ORAL at 04:00

## 2020-10-10 RX ADMIN — HYDROMORPHONE HYDROCHLORIDE 1 MG: 1 INJECTION, SOLUTION INTRAMUSCULAR; INTRAVENOUS; SUBCUTANEOUS at 23:05

## 2020-10-10 RX ADMIN — HYDROMORPHONE HYDROCHLORIDE 1 MG: 1 INJECTION, SOLUTION INTRAMUSCULAR; INTRAVENOUS; SUBCUTANEOUS at 13:57

## 2020-10-10 RX ADMIN — AMPICILLIN SODIUM 2 G: 2 INJECTION, POWDER, FOR SOLUTION INTRAMUSCULAR; INTRAVENOUS at 17:04

## 2020-10-10 RX ADMIN — FOLIC ACID 1 MG: 1 TABLET ORAL at 08:13

## 2020-10-10 RX ADMIN — DIPHENHYDRAMINE HYDROCHLORIDE 25 MG: 50 INJECTION, SOLUTION INTRAMUSCULAR; INTRAVENOUS at 12:58

## 2020-10-10 RX ADMIN — AMPICILLIN SODIUM 2 G: 2 INJECTION, POWDER, FOR SOLUTION INTRAMUSCULAR; INTRAVENOUS at 04:00

## 2020-10-10 RX ADMIN — PROPOFOL 125 MCG/KG/MIN: 10 INJECTION, EMULSION INTRAVENOUS at 11:41

## 2020-10-10 RX ADMIN — SODIUM CHLORIDE, PRESERVATIVE FREE 10 ML: 5 INJECTION INTRAVENOUS at 13:59

## 2020-10-10 RX ADMIN — FERROUS SULFATE TAB 325 MG (65 MG ELEMENTAL FE) 325 MG: 325 (65 FE) TAB at 17:04

## 2020-10-10 RX ADMIN — MIDAZOLAM 2 MG: 1 INJECTION INTRAMUSCULAR; INTRAVENOUS at 11:37

## 2020-10-10 RX ADMIN — SODIUM CHLORIDE: 9 INJECTION, SOLUTION INTRAVENOUS at 08:15

## 2020-10-10 RX ADMIN — HYDROXYUREA 500 MG: 500 CAPSULE ORAL at 19:59

## 2020-10-10 RX ADMIN — Medication 10 ML: at 19:59

## 2020-10-10 ASSESSMENT — PAIN DESCRIPTION - LOCATION
LOCATION: BACK;CHEST
LOCATION: BACK
LOCATION: BACK;CHEST
LOCATION: GENERALIZED
LOCATION: BACK;CHEST
LOCATION: GENERALIZED
LOCATION: BACK;CHEST

## 2020-10-10 ASSESSMENT — PAIN SCALES - GENERAL
PAINLEVEL_OUTOF10: 4
PAINLEVEL_OUTOF10: 7
PAINLEVEL_OUTOF10: 8
PAINLEVEL_OUTOF10: 10
PAINLEVEL_OUTOF10: 8
PAINLEVEL_OUTOF10: 7
PAINLEVEL_OUTOF10: 10
PAINLEVEL_OUTOF10: 9
PAINLEVEL_OUTOF10: 4
PAINLEVEL_OUTOF10: 10
PAINLEVEL_OUTOF10: 9
PAINLEVEL_OUTOF10: 8
PAINLEVEL_OUTOF10: 7
PAINLEVEL_OUTOF10: 9
PAINLEVEL_OUTOF10: 10

## 2020-10-10 ASSESSMENT — PAIN DESCRIPTION - DESCRIPTORS
DESCRIPTORS: ACHING
DESCRIPTORS: ACHING;DISCOMFORT;CONSTANT
DESCRIPTORS: ACHING
DESCRIPTORS: ACHING;DISCOMFORT;CONSTANT
DESCRIPTORS: ACHING
DESCRIPTORS: ACHING;CONSTANT;DISCOMFORT
DESCRIPTORS: ACHING

## 2020-10-10 ASSESSMENT — PAIN DESCRIPTION - PROGRESSION
CLINICAL_PROGRESSION: NOT CHANGED
CLINICAL_PROGRESSION: NOT CHANGED
CLINICAL_PROGRESSION: GRADUALLY IMPROVING
CLINICAL_PROGRESSION: GRADUALLY WORSENING
CLINICAL_PROGRESSION: GRADUALLY IMPROVING

## 2020-10-10 ASSESSMENT — PAIN - FUNCTIONAL ASSESSMENT
PAIN_FUNCTIONAL_ASSESSMENT: PREVENTS OR INTERFERES SOME ACTIVE ACTIVITIES AND ADLS

## 2020-10-10 ASSESSMENT — PAIN DESCRIPTION - PAIN TYPE
TYPE: CHRONIC PAIN;SURGICAL PAIN
TYPE: SURGICAL PAIN;CHRONIC PAIN
TYPE: CHRONIC PAIN;SURGICAL PAIN
TYPE: CHRONIC PAIN;SURGICAL PAIN
TYPE: CHRONIC PAIN

## 2020-10-10 ASSESSMENT — PAIN DESCRIPTION - FREQUENCY
FREQUENCY: CONTINUOUS

## 2020-10-10 ASSESSMENT — PULMONARY FUNCTION TESTS: PIF_VALUE: 0

## 2020-10-10 ASSESSMENT — PAIN DESCRIPTION - ONSET
ONSET: ON-GOING

## 2020-10-10 NOTE — PROGRESS NOTES
HOSPITALIST PROGRESS NOTE  Date: 10/10/2020   Name: Andria Burgess   MRN: [id number]   YOB: 1995      Subjective/Interval Hx:   Patient complains of joint pain due to the bacteremia and with the sickle cell is on her urine routine dose at home of her narcotic we will continue to monitor  Objective:   Physical Exam:   /72   Pulse 99   Temp 97.4 °F (36.3 °C) (Temporal)   Resp 18   Ht 5' 3\" (1.6 m)   Wt (!) 307 lb (139.3 kg)   LMP 08/23/2020 (Exact Date)   SpO2 100%   BMI 54.38 kg/m²   General: no acute distress, well nourished and well hydrated  HEENT: NCAT  Heart: S1S2 RRR  Lungs: Clear to ascultation bilaterally, respiratory effort normal  Abdomen: soft, NT/ND, positive bowel sounds  Extremities: no pitting edema, nontender   Neuro: patient is awake, alert and orientated times 3, no gross deficits  Skin: no rashes or ecchymosis        Meds:   Meds:    docusate sodium  100 mg Oral BID    polyethylene glycol  17 g Oral Daily    enoxaparin  120 mg Subcutaneous BID    ampicillin IV  2 g Intravenous 6 times per day    cefTRIAXone (ROCEPHIN) IV  2 g Intravenous Q24H    sodium chloride flush  10 mL Intravenous 2 times per day    famotidine  20 mg Oral BID    ferrous sulfate  325 mg Oral BID WC    folic acid  1 mg Oral Daily    hydroxyurea  500 mg Oral BID      Infusions:     PRN Meds: oxyCODONE, 5 mg, Q4H PRN    Or  oxyCODONE, 10 mg, Q4H PRN  sennosides-docusate sodium, 2 tablet, Daily PRN  perflutren lipid microspheres, 1.5 mL, ONCE PRN  sodium chloride flush, 10 mL, PRN  potassium chloride, 40 mEq, PRN    Or  potassium alternative oral replacement, 40 mEq, PRN    Or  potassium chloride, 10 mEq, PRN  acetaminophen, 650 mg, Q6H PRN    Or  acetaminophen, 650 mg, Q6H PRN  senna, 1 tablet, Daily PRN  promethazine, 12.5 mg, Q6H PRN    Or  ondansetron, 4 mg, Q6H PRN  HYDROmorphone, 1 mg, Q3H PRN  diphenhydrAMINE, 25 mg, Q6H PRN        Data/Labs:     Recent Labs     10/08/20  [id number]

## 2020-10-10 NOTE — PROGRESS NOTES
[address] [place] [address] [facility]  GIANFRANCOIDA  Progress Note  Face to face encounter  CC: Pain- generalized  SUBJECTIVE:  Patient is tolerating medications. No reported adverse drug reactions. ROS: No nausea, vomiting, diarrhea. No fevers, ++ pain. No increased shortness of breath    Mother at bedside    Medications:  Scheduled Meds:   docusate sodium  100 mg Oral BID    polyethylene glycol  17 g Oral Daily    enoxaparin  120 mg Subcutaneous BID    ampicillin IV  2 g Intravenous 6 times per day    cefTRIAXone (ROCEPHIN) IV  2 g Intravenous Q24H    sodium chloride flush  10 mL Intravenous 2 times per day    famotidine  20 mg Oral BID    ferrous sulfate  325 mg Oral BID WC    folic acid  1 mg Oral Daily    hydroxyurea  500 mg Oral BID     Continuous Infusions:  PRN Meds:oxyCODONE **OR** oxyCODONE, sennosides-docusate sodium, perflutren lipid microspheres, sodium chloride flush, potassium chloride **OR** potassium alternative oral replacement **OR** potassium chloride, acetaminophen **OR** acetaminophen, senna, promethazine **OR** ondansetron, HYDROmorphone, diphenhydrAMINE  OBJECTIVE:  Patient Vitals for the past 24 hrs:   BP Temp Temp src Pulse Resp SpO2   10/10/20 1345 111/72 97.4 °F (36.3 °C) Temporal 99 18 100 %   10/10/20 1330 102/63 98 °F (36.7 °C) Temporal 95 22 100 %   10/10/20 1315 129/85 -- -- 89 23 99 %   10/10/20 1300 135/85 -- -- 101 23 96 %   10/10/20 1251 116/67 98.2 °F (36.8 °C) -- 96 20 100 %   10/10/20 1250 116/67 98.2 °F (36.8 °C) Temporal 95 20 100 %   10/10/20 0745 (!) 93/58 97.7 °F (36.5 °C) Temporal 93 18 99 %   10/10/20 0000 110/75 98.1 °F (36.7 °C) Temporal 105 18 98 %   10/09/20 2015 134/77 98.2 °F (36.8 °C) Temporal 99 18 99 %     Constitutional: The patient is awake, alert, and oriented. In bed- in generalized pain  Skin: Warm and dry. No rashes were noted. Head: Eyes show round, and reactive pupils. No jaundice.    Mouth: Moist mucous membranes, no ulcerations, no lovenox- pt should receive vaccinations prior to discharge     Vaccine recommendations   · Rec:   · - pneumococcal vaccine (PCV 13) IM before discharge   · - meningococcal polysaccharide vaccine (Menactra) IM before discharge   · - meningococcal serotype B vaccome  · - HIB polysaccharide vaccine (ActHIB) IM before discharge  · - Tdap if appropriate   · - follow up at 2 months to get   · 1) pneumococcal polysaccharide vaccine (PPSV23)   · 2) meningococcal polysaccharide vaccine (Menactra) - second dose   ·    · - Patient will have to get second dose of PPSV23 at 5 years after the first dose and again at age 72. · - Need meningococcal vaccine booster every 5 years    PLAN:  · Continue ampicillin and rocephin   · Check cultures  · Cath tip removed- culture pending   · Will repeat blood cultures   · Family would like another opinion about spleen per surgery team.   · Monitor labs    [address] [email or web address]  2:46 PM  10/10/2020   Pt seen and examined. Above discussed agree with advanced practice nurse. Labs, cultures, and radiographs reviewed. Face to Face encounter occurred. Changes made as necessary.      Oanh Gibson MD

## 2020-10-10 NOTE — OP NOTE
Operative Note      Patient: Husam Thompson  YOB: 1995  MRN: [id number]    Date of Procedure: 10/10/2020    Pre-Op Diagnosis: INFECTED MEDIPORT    Post-Op Diagnosis: Same       Procedure(s):  insertion of right femoral catheter, ultrasound directed placement of triple lumen catheter and PORT REMOVAL    Surgeon(s):  Libby Strange MD    Assistant:   William Arias MD  Anesthesia: [facility]est[facility]    Estimated Blood Loss (mL): Minimal    Complications: None    Specimens:   ID Type Source Tests Collected by Time Destination   1 : mediport Specimen catheter site CULTURE, Stephanie Hunt MD 10/10/2020 1220    2 :  Catheter Tip Catheter Tip CULTURE, TIP Libby Strange MD 10/10/2020 1221            Drains: none    Findings: mediport    Detailed Description of Procedure: The patient was prepped and draped in the usual fashion. Under direct visualization, the Right femoral vein was accessed and a triple lumen catheter was placed using the Seldinger technique. No pictures were obtained as the printer was not set up. All ports were flushed and alka back easily. IV access was switched to her central line and withdrawn from her mediport. Her Left chest was prepped and draped in the usual fashion and an incision was made over the previous scar and carried down to the port with electrocautery. .  A 3-0 vicryl suture was used to close the catheter tract, and the catheter was removed and the tip sent for culture. Pressure was held over the access point for three minutes. The capsule was opened with electrocautery and the mediport was removed and sent for culture. The capsule was cauterized, the pocked closed with vicryl sutures and the incision closed in a layered fashion with vicryl and Monocryl sutures. The patient tolerated the procedure well. Dr. Mariella Nieves was present for the entire procedure.     Electronically signed by Monik Landeros MD on 10/10/2020 at 12:50 ADIA Garg

## 2020-10-10 NOTE — CONSULTS
Vascular Surgery Consultation Note    Reason for Consult:  bacteremia    HPI : This is a 22 y.o. female admitted on 10/8/2020  6:21 PM with sickel cell crisis, pain in her chest and arms. She has chronic issues with chest pain which remains stable at this time. She denies complaints currently in regards to her port. It is functioning well. She has no pain associated with it. She was noted to have enterococcus bacteremia. She has had multiple previous issues bacteremia. She also have issues with splenic infarct and is on lovenox       Vascular surgery is consulted in regards to bacteremia, removal of mediport. She normally follows with Dr. Augustus Hay in regareds to her ports. Most recently she had placed L IJ port 5/2019.       ROS : All others Negative if blank [], Positive if [x]  General Urinary   [x] Fevers [] Hematuria   [] Chills [] Dysuria   [] Weight Loss Vascular   Skin [] Claudication   [] Tissue Loss [] Rest Pain   Eyes Neurologic   [x] Wears Glasses/Contacts [] Stroke/TIA   [] Vision Changes [] Focal weakness   Respiratory [] Slurred Speech    [] Shortness of breath ENT   Cardiovascular [] Difficulty swallowing   [x] Chest Pain - chronic, associated with sickle sell  Endocrine    [] Shortness of breath with exertion [] Increased Thirst   Gastrointestinal    [] Abdominal Pain    [] Melena   [] Hematochezia         Past Medical History:   Diagnosis Date    CLABSI (central line-associated bloodstream infection) 8/3/2016    Deep vein thrombosis (DVT) of left upper extremity ([address] 8/12/2016    Disease of blood and blood forming organ     Heart murmur     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[address]     Miscarriage within last 12 months 04/2017    Obesity, Class III, BMI 40-49.9 (morbid obesity) ([address] 5/22/2014    PE (pulmonary embolism)     PE (pulmonary embolism)     Pneumonia     Psychiatric problem     Sickle cell anemia (HCC)     Thrombosis complicating venous access device ([address] 5/18/2017 Past Surgical History:   Procedure Laterality Date    INSERTION / REMOVAL / REPLACEMENT VENOUS ACCESS CATHETER Left 5/9/2019    PORT INSERTION performed by Glenis Carnes MD at [place] [address]      right    OTHER SURGICAL HISTORY  08/04/2016    removal of mediport    OTHER SURGICAL HISTORY Right 03/05/2018    TUNNELED VENOUS PORT REMOVAL & REINSERTION;DILATION OF VENOUS TRACT    TONSILLECTOMY      TUNNELED VENOUS PORT PLACEMENT  2014    and removed    TUNNELED VENOUS PORT PLACEMENT Right 01/23/2015    [place], removed 08/04/16 CLABSI    TUNNELED VENOUS PORT PLACEMENT Right 09/15/2016    CHEST    TUNNELED VENOUS PORT PLACEMENT Right 05/18/2017    SUBCLAVIAN     Current Medications:    sodium chloride 100 mL/hr at 10/09/20 2201      oxyCODONE-acetaminophen, perflutren lipid microspheres, sodium chloride flush, potassium chloride **OR** potassium alternative oral replacement **OR** potassium chloride, acetaminophen **OR** acetaminophen, senna, promethazine **OR** ondansetron, HYDROmorphone, diphenhydrAMINE    ampicillin IV  2 g Intravenous 6 times per day    cefTRIAXone (ROCEPHIN) IV  2 g Intravenous Q24H    sodium chloride flush  10 mL Intravenous 2 times per day    famotidine  20 mg Oral BID    enoxaparin  120 mg Subcutaneous BID    ferrous sulfate  325 mg Oral BID WC    folic acid  1 mg Oral Daily    hydroxyurea  500 mg Oral BID      Allergies:  Bactrim [sulfamethoxazole-trimethoprim]; Demerol hcl [meperidine]; Demerol hcl [meperidine];  Adhesive tape; and Bactrim [sulfamethoxazole-trimethoprim]  Social History     Socioeconomic History    Marital status: Single     Spouse name: Not on file    Number of children: Not on file    Years of education: Not on file    Highest education level: Not on file   Occupational History    Not on file   Social Needs    Financial resource strain: Not on file    Food insecurity     Worry: Not on file     Inability: Not on file   Jeannine Dye Transportation needs     Medical: Not on file     Non-medical: Not on file   Tobacco Use    Smoking status: Never Smoker    Smokeless tobacco: Never Used   Substance and Sexual Activity    Alcohol use: Yes     Comment: socially    Drug use: No    Sexual activity: Yes     Partners: Male   Lifestyle    Physical activity     Days per week: Not on file     Minutes per session: Not on file    Stress: Not on file   Relationships    Social connections     Talks on phone: Not on file     Gets together: Not on file     Attends Confucianism service: Not on file     Active member of club or organization: Not on file     Attends meetings of clubs or organizations: Not on file     Relationship status: Not on file    Intimate partner violence     Fear of current or ex partner: Not on file     Emotionally abused: Not on file     Physically abused: Not on file     Forced sexual activity: Not on file   Other Topics Concern    Not on file   Social History Narrative    Not on file     Family History   Problem Relation Age of Onset    High Blood Pressure Mother     Diabetes Mother     Cancer Maternal Grandmother         breast    Kidney Disease Paternal Grandmother      PHYSICAL EXAM:    /77   Pulse 99   Temp 98.2 °F (36.8 °C) (Temporal)   Resp 18   Ht 5' 3\" (1.6 m)   Wt (!) 307 lb (139.3 kg)   LMP 08/23/2020 (Exact Date)   SpO2 99%   BMI 54.38 kg/m²   CONSTITUTIONAL:   Awake, alert, cooperative  PSYCHIATRIC :  Oriented to time, place and person      Appropriate insight to disease process  EYES: Lids and lashes normal  ENT:  External ears and nose without lesions   Hearing deficits notnoted  NECK: Supple, symmetrical, trachea midline   Thyroid goiter not appreciated  LUNGS:  No increased work of breathing                 Good respiratory excursion  CARDIOVASCULAR:  regular rate and rhythm   ABDOMEN:  soft, non-distended, non-tender   Hernias no tnoted   Aorta is not palpable  Lymphatics : Cervical lymphadenopathy not noted     Femoral lymphadenopathy notnoted  SKIN:   Normal skin color   Texture and turgor normal, no induration  EXTREMITIES:   R UE 5/5 strength   No cyanosis noted in nail beds  L UE 5/5 strength   No cyanosis noted in nail beds  R LE Edema absent   Open wounds absent   L LE Edema absent   Open wound absent  R femoral 2+ L femoral 2+   R dorsalis pedis 1+ L dorsalis pedis 1+     LABS:    Lab Results   Component Value Date    WBC 12.4 (H) 10/09/2020    HGB 9.8 (L) 10/09/2020    HCT 28.9 (L) 10/09/2020     10/09/2020    PROTIME 12.9 (H) 02/24/2020    INR 1.1 02/24/2020    K 3.8 10/09/2020    BUN 9 10/09/2020    CREATININE 0.9 10/09/2020     Lab Results   Component Value Date    LABA1C 4.6 (L) 10/05/2017     No results found for: EAG  Lab Results   Component Value Date    LABALBU 3.4 (L) 10/09/2020      Radiology pertinent to vascular surgery evaluation reviewed    Assesment/Plan  Bacteremia  · Hold lovenox  · Removal of mediport  I reviewed the procedure with the patient and family as available. I discussed the procedure, risks, benefits, complications, and alternatives of the procedure. They understand and consent.   All questions were answered  Discussed with Dr. Granger Sites ID - will have peripheral IV placed for now  Hold on picc line  Discussed with IV team  Discussed with patient  Patient understands if an issues with placement of peripheral iv may need placement of alternative access like triple lumen or sheath for venous access until her cultures clear and she is hopefully able to have new port placed  Will not plan on placement of picc at this time    Yared Perez

## 2020-10-10 NOTE — PROGRESS NOTES
Per patient request, perfect serve message sent to Dr. Sonia Lehman for adjustment in pain medication.   [address]

## 2020-10-11 LAB
ALBUMIN SERPL-MCNC: 3.4 G/DL (ref 3.5–5.2)
ALP BLD-CCNC: 96 U/L (ref 35–104)
ALT SERPL-CCNC: 10 U/L (ref 0–32)
ANION GAP SERPL CALCULATED.3IONS-SCNC: 10 MMOL/L (ref 7–16)
AST SERPL-CCNC: 14 U/L (ref 0–31)
BASOPHILS ABSOLUTE: 0.02 E9/L (ref 0–0.2)
BASOPHILS RELATIVE PERCENT: 0.2 % (ref 0–2)
BILIRUB SERPL-MCNC: 0.8 MG/DL (ref 0–1.2)
BUN BLDV-MCNC: 6 MG/DL (ref 6–20)
CALCIUM SERPL-MCNC: 8 MG/DL (ref 8.6–10.2)
CHLORIDE BLD-SCNC: 105 MMOL/L (ref 98–107)
CO2: 24 MMOL/L (ref 22–29)
CREAT SERPL-MCNC: 0.9 MG/DL (ref 0.5–1)
EOSINOPHILS ABSOLUTE: 0.3 E9/L (ref 0.05–0.5)
EOSINOPHILS RELATIVE PERCENT: 2.4 % (ref 0–6)
GFR AFRICAN AMERICAN: >60
GFR NON-AFRICAN AMERICAN: >60 ML/MIN/1.73
GLUCOSE BLD-MCNC: 131 MG/DL (ref 74–99)
HCT VFR BLD CALC: 27.8 % (ref 34–48)
HEMOGLOBIN: 9.4 G/DL (ref 11.5–15.5)
IMMATURE GRANULOCYTES #: 0.07 E9/L
IMMATURE GRANULOCYTES %: 0.6 % (ref 0–5)
LYMPHOCYTES ABSOLUTE: 3.28 E9/L (ref 1.5–4)
LYMPHOCYTES RELATIVE PERCENT: 25.8 % (ref 20–42)
MCH RBC QN AUTO: 25.4 PG (ref 26–35)
MCHC RBC AUTO-ENTMCNC: 33.8 % (ref 32–34.5)
MCV RBC AUTO: 75.1 FL (ref 80–99.9)
MONOCYTES ABSOLUTE: 0.55 E9/L (ref 0.1–0.95)
MONOCYTES RELATIVE PERCENT: 4.3 % (ref 2–12)
NEUTROPHILS ABSOLUTE: 8.5 E9/L (ref 1.8–7.3)
NEUTROPHILS RELATIVE PERCENT: 66.7 % (ref 43–80)
PDW BLD-RTO: 19.2 FL (ref 11.5–15)
PLATELET # BLD: 243 E9/L (ref 130–450)
PMV BLD AUTO: 11 FL (ref 7–12)
POTASSIUM REFLEX MAGNESIUM: 3.7 MMOL/L (ref 3.5–5)
RBC # BLD: 3.7 E12/L (ref 3.5–5.5)
SODIUM BLD-SCNC: 139 MMOL/L (ref 132–146)
TOTAL PROTEIN: 6.3 G/DL (ref 6.4–8.3)
WBC # BLD: 12.7 E9/L (ref 4.5–11.5)

## 2020-10-11 PROCEDURE — 85025 COMPLETE CBC W/AUTO DIFF WBC: CPT

## 2020-10-11 PROCEDURE — 6370000000 HC RX 637 (ALT 250 FOR IP): Performed by: STUDENT IN AN ORGANIZED HEALTH CARE EDUCATION/TRAINING PROGRAM

## 2020-10-11 PROCEDURE — 6360000002 HC RX W HCPCS: Performed by: STUDENT IN AN ORGANIZED HEALTH CARE EDUCATION/TRAINING PROGRAM

## 2020-10-11 PROCEDURE — 2580000003 HC RX 258: Performed by: STUDENT IN AN ORGANIZED HEALTH CARE EDUCATION/TRAINING PROGRAM

## 2020-10-11 PROCEDURE — 36415 COLL VENOUS BLD VENIPUNCTURE: CPT

## 2020-10-11 PROCEDURE — 87040 BLOOD CULTURE FOR BACTERIA: CPT

## 2020-10-11 PROCEDURE — 1200000000 HC SEMI PRIVATE

## 2020-10-11 PROCEDURE — 36592 COLLECT BLOOD FROM PICC: CPT

## 2020-10-11 PROCEDURE — 80053 COMPREHEN METABOLIC PANEL: CPT

## 2020-10-11 RX ADMIN — AMPICILLIN SODIUM 2 G: 2 INJECTION, POWDER, FOR SOLUTION INTRAMUSCULAR; INTRAVENOUS at 05:35

## 2020-10-11 RX ADMIN — HYDROMORPHONE HYDROCHLORIDE 1 MG: 1 INJECTION, SOLUTION INTRAMUSCULAR; INTRAVENOUS; SUBCUTANEOUS at 18:51

## 2020-10-11 RX ADMIN — AMPICILLIN SODIUM 2 G: 2 INJECTION, POWDER, FOR SOLUTION INTRAMUSCULAR; INTRAVENOUS at 01:25

## 2020-10-11 RX ADMIN — DIPHENHYDRAMINE HYDROCHLORIDE 25 MG: 50 INJECTION, SOLUTION INTRAMUSCULAR; INTRAVENOUS at 21:45

## 2020-10-11 RX ADMIN — FOLIC ACID 1 MG: 1 TABLET ORAL at 08:28

## 2020-10-11 RX ADMIN — DIPHENHYDRAMINE HYDROCHLORIDE 25 MG: 50 INJECTION, SOLUTION INTRAMUSCULAR; INTRAVENOUS at 02:31

## 2020-10-11 RX ADMIN — FAMOTIDINE 20 MG: 20 TABLET, FILM COATED ORAL at 08:28

## 2020-10-11 RX ADMIN — AMPICILLIN SODIUM 2 G: 2 INJECTION, POWDER, FOR SOLUTION INTRAMUSCULAR; INTRAVENOUS at 09:40

## 2020-10-11 RX ADMIN — OXYCODONE HYDROCHLORIDE 10 MG: 10 TABLET ORAL at 01:25

## 2020-10-11 RX ADMIN — OXYCODONE HYDROCHLORIDE 10 MG: 10 TABLET ORAL at 06:40

## 2020-10-11 RX ADMIN — Medication 10 ML: at 21:58

## 2020-10-11 RX ADMIN — HYDROMORPHONE HYDROCHLORIDE 1 MG: 1 INJECTION, SOLUTION INTRAMUSCULAR; INTRAVENOUS; SUBCUTANEOUS at 21:45

## 2020-10-11 RX ADMIN — DIPHENHYDRAMINE HYDROCHLORIDE 25 MG: 50 INJECTION, SOLUTION INTRAMUSCULAR; INTRAVENOUS at 15:37

## 2020-10-11 RX ADMIN — SODIUM CHLORIDE, PRESERVATIVE FREE 10 ML: 5 INJECTION INTRAVENOUS at 15:39

## 2020-10-11 RX ADMIN — SODIUM CHLORIDE, PRESERVATIVE FREE 10 ML: 5 INJECTION INTRAVENOUS at 18:51

## 2020-10-11 RX ADMIN — SODIUM CHLORIDE, PRESERVATIVE FREE 10 ML: 5 INJECTION INTRAVENOUS at 09:40

## 2020-10-11 RX ADMIN — HYDROXYUREA 500 MG: 500 CAPSULE ORAL at 21:46

## 2020-10-11 RX ADMIN — ENOXAPARIN SODIUM 120 MG: 150 INJECTION SUBCUTANEOUS at 08:28

## 2020-10-11 RX ADMIN — FERROUS SULFATE TAB 325 MG (65 MG ELEMENTAL FE) 325 MG: 325 (65 FE) TAB at 08:28

## 2020-10-11 RX ADMIN — CEFTRIAXONE SODIUM 2 G: 2 INJECTION, POWDER, FOR SOLUTION INTRAMUSCULAR; INTRAVENOUS at 17:30

## 2020-10-11 RX ADMIN — SODIUM CHLORIDE, PRESERVATIVE FREE 10 ML: 5 INJECTION INTRAVENOUS at 15:37

## 2020-10-11 RX ADMIN — Medication 10 ML: at 08:29

## 2020-10-11 RX ADMIN — AMPICILLIN SODIUM 2 G: 2 INJECTION, POWDER, FOR SOLUTION INTRAMUSCULAR; INTRAVENOUS at 17:30

## 2020-10-11 RX ADMIN — ENOXAPARIN SODIUM 120 MG: 150 INJECTION SUBCUTANEOUS at 21:46

## 2020-10-11 RX ADMIN — HYDROMORPHONE HYDROCHLORIDE 1 MG: 1 INJECTION, SOLUTION INTRAMUSCULAR; INTRAVENOUS; SUBCUTANEOUS at 12:27

## 2020-10-11 RX ADMIN — OXYCODONE HYDROCHLORIDE 10 MG: 10 TABLET ORAL at 11:19

## 2020-10-11 RX ADMIN — SODIUM CHLORIDE, PRESERVATIVE FREE 10 ML: 5 INJECTION INTRAVENOUS at 17:30

## 2020-10-11 RX ADMIN — DOCUSATE SODIUM 100 MG: 100 CAPSULE, LIQUID FILLED ORAL at 21:57

## 2020-10-11 RX ADMIN — SODIUM CHLORIDE, PRESERVATIVE FREE 10 ML: 5 INJECTION INTRAVENOUS at 13:23

## 2020-10-11 RX ADMIN — AMPICILLIN SODIUM 2 G: 2 INJECTION, POWDER, FOR SOLUTION INTRAMUSCULAR; INTRAVENOUS at 13:23

## 2020-10-11 RX ADMIN — HYDROMORPHONE HYDROCHLORIDE 1 MG: 1 INJECTION, SOLUTION INTRAMUSCULAR; INTRAVENOUS; SUBCUTANEOUS at 02:31

## 2020-10-11 RX ADMIN — HYDROMORPHONE HYDROCHLORIDE 1 MG: 1 INJECTION, SOLUTION INTRAMUSCULAR; INTRAVENOUS; SUBCUTANEOUS at 15:38

## 2020-10-11 RX ADMIN — FERROUS SULFATE TAB 325 MG (65 MG ELEMENTAL FE) 325 MG: 325 (65 FE) TAB at 17:30

## 2020-10-11 RX ADMIN — HYDROXYUREA 500 MG: 500 CAPSULE ORAL at 08:28

## 2020-10-11 RX ADMIN — OXYCODONE HYDROCHLORIDE 10 MG: 10 TABLET ORAL at 22:48

## 2020-10-11 RX ADMIN — SODIUM CHLORIDE, PRESERVATIVE FREE 10 ML: 5 INJECTION INTRAVENOUS at 12:27

## 2020-10-11 RX ADMIN — AMPICILLIN SODIUM 2 G: 2 INJECTION, POWDER, FOR SOLUTION INTRAMUSCULAR; INTRAVENOUS at 21:46

## 2020-10-11 RX ADMIN — DIPHENHYDRAMINE HYDROCHLORIDE 25 MG: 50 INJECTION, SOLUTION INTRAMUSCULAR; INTRAVENOUS at 08:28

## 2020-10-11 RX ADMIN — HYDROMORPHONE HYDROCHLORIDE 1 MG: 1 INJECTION, SOLUTION INTRAMUSCULAR; INTRAVENOUS; SUBCUTANEOUS at 05:35

## 2020-10-11 RX ADMIN — FAMOTIDINE 20 MG: 20 TABLET, FILM COATED ORAL at 21:57

## 2020-10-11 RX ADMIN — OXYCODONE HYDROCHLORIDE 10 MG: 10 TABLET ORAL at 17:30

## 2020-10-11 RX ADMIN — HYDROMORPHONE HYDROCHLORIDE 1 MG: 1 INJECTION, SOLUTION INTRAMUSCULAR; INTRAVENOUS; SUBCUTANEOUS at 08:28

## 2020-10-11 ASSESSMENT — PAIN DESCRIPTION - LOCATION
LOCATION: GENERALIZED

## 2020-10-11 ASSESSMENT — PAIN SCALES - GENERAL
PAINLEVEL_OUTOF10: 6
PAINLEVEL_OUTOF10: 9
PAINLEVEL_OUTOF10: 7
PAINLEVEL_OUTOF10: 8
PAINLEVEL_OUTOF10: 8
PAINLEVEL_OUTOF10: 6
PAINLEVEL_OUTOF10: 7
PAINLEVEL_OUTOF10: 7
PAINLEVEL_OUTOF10: 9
PAINLEVEL_OUTOF10: 7
PAINLEVEL_OUTOF10: 10
PAINLEVEL_OUTOF10: 8
PAINLEVEL_OUTOF10: 7
PAINLEVEL_OUTOF10: 7
PAINLEVEL_OUTOF10: 9

## 2020-10-11 ASSESSMENT — PAIN DESCRIPTION - DESCRIPTORS
DESCRIPTORS: ACHING;CONSTANT;DISCOMFORT;SORE

## 2020-10-11 ASSESSMENT — PAIN DESCRIPTION - PROGRESSION
CLINICAL_PROGRESSION: NOT CHANGED

## 2020-10-11 ASSESSMENT — PAIN DESCRIPTION - PAIN TYPE
TYPE: CHRONIC PAIN

## 2020-10-11 ASSESSMENT — PAIN DESCRIPTION - ONSET
ONSET: ON-GOING

## 2020-10-11 ASSESSMENT — PAIN - FUNCTIONAL ASSESSMENT
PAIN_FUNCTIONAL_ASSESSMENT: ACTIVITIES ARE NOT PREVENTED
PAIN_FUNCTIONAL_ASSESSMENT: PREVENTS OR INTERFERES SOME ACTIVE ACTIVITIES AND ADLS
PAIN_FUNCTIONAL_ASSESSMENT: PREVENTS OR INTERFERES SOME ACTIVE ACTIVITIES AND ADLS

## 2020-10-11 ASSESSMENT — PAIN DESCRIPTION - FREQUENCY
FREQUENCY: CONTINUOUS

## 2020-10-11 NOTE — PROGRESS NOTES
VASCULAR SURGERY  DAILY PROGRESS NOTE  10/11/2020    Subjective:  No acute events overnight   States she is still has some minor pain on the left chest and left side of neck  Aside for the mild pain in her neck there is no other issue     Objective:  BP (!) 111/55   Pulse 106   Temp 98 °F (36.7 °C) (Temporal)   Resp 18   Ht 5' 3\" (1.6 m)   Wt (!) 307 lb (139.3 kg)   LMP 08/23/2020 (Exact Date)   SpO2 98%   BMI 54.38 kg/m²     GENERAL:  Laying in bed, awake, alert, cooperative, no apparent distress  HEAD: Normocephalic  LUNGS:  No increased work of breathing  CARDIOVASCULAR:  Mild tachycardic   ABDOMEN:  Soft, non-tender, non-distended  EXTREMITIES: No edema or swelling  SKIN: Warm and dry    Assessment/Plan:  22 y.o. female insertion of right femoral catheter, ultrasound directed placement of triple lumen catheter and PORT REMOVAL    No acute surgery intervention at this time  Wound looks to be healing well  Will discuss with Dr. London Aguero     Electronically signed by Patricia Burk DO on 10/11/2020 at 8:13 AM     Pt seen and examined  Left chest slight swelling  Incision intact    Likely new port when ok with ID  Will discuss with Dr. Naren Richardson

## 2020-10-11 NOTE — PROGRESS NOTES
reveals no wheezing, crackles, or rhonchi. Cardiovascular: S1 and S2 are rhythmic and regular. No murmurs appreciated. Abdomen: Positive bowel sounds to auscultation. Benign to palpation. Large- obese   Extremities: No clubbing, no cyanosis, no edema.   Right groin TLC  Medi-port removed    Laboratory and Tests Review:  Lab Results   Component Value Date    WBC 12.7 (H) 10/11/2020    WBC 10.3 10/10/2020    WBC 12.4 (H) 10/09/2020    HGB 9.4 (L) 10/11/2020    HCT 27.8 (L) 10/11/2020    MCV 75.1 (L) 10/11/2020     10/11/2020     Lab Results   Component Value Date    NEUTROABS 8.50 (H) 10/11/2020    NEUTROABS 6.24 10/10/2020    NEUTROABS 7.87 (H) 10/09/2020     Lab Results   Component Value Date    CRP 4.0 (H) 08/10/2016    CRP 1.7 (H) 08/02/2016    CRP 1.0 (H) 06/30/2016     Lab Results   Component Value Date    SEDRATE 10 10/26/2017    SEDRATE 29 (H) 08/10/2016    SEDRATE 10 08/02/2016     Lab Results   Component Value Date    ALT 10 10/11/2020    AST 14 10/11/2020    ALKPHOS 96 10/11/2020    BILITOT 0.8 10/11/2020     Lab Results   Component Value Date     10/11/2020    K 3.7 10/11/2020     10/11/2020    CO2 24 10/11/2020    BUN 6 10/11/2020    CREATININE 0.9 10/11/2020    GFRAA >60 10/11/2020    LABGLOM >60 10/11/2020    GLUCOSE 131 10/11/2020    GLUCOSE 89 05/03/2011    PROT 6.3 10/11/2020    LABALBU 3.4 10/11/2020    LABALBU 4.0 05/03/2011    CALCIUM 8.0 10/11/2020    BILITOT 0.8 10/11/2020    ALKPHOS 96 10/11/2020    AST 14 10/11/2020    ALT 10 10/11/2020     Radiology:  Reviewed     Microbiology:   10/7/2020- blood cx- VSE- amp sensitive  10/8/2020- blood cx- VSE  Tip cx- no growth to date   Urine cx- mixed gram positive organisms     ASSESSMENT:  · Enterococcus bacteremia   · Leukocytosis- improving   · CLABSI-  · 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- pt should receive vaccinations prior to discharge     Vaccine recommendations   · Rec:   · - pneumococcal vaccine (PCV 13) IM before discharge   · -

## 2020-10-11 NOTE — PROGRESS NOTES
HOSPITALIST PROGRESS NOTE  Date: 10/11/2020   Name: Jenna Canavan   MRN: [id number]   YOB: 1995       HOSPITAL COURSE: Ms. Anthony Albarado is a 80-year-old female who was called in by her doctors office for bacteremia. Patient has a past medical history of sickle cell anemia SC type.       Subjective/Interval Hx:   Patient complains of joint pain due to the bacteremia and with the sickle cell is on her urine routine dose at home of her narcotic we will continue to monitor  Objective:   Physical Exam:   BP (!) 111/55   Pulse 106   Temp 98 °F (36.7 °C) (Temporal)   Resp 18   Ht 5' 3\" (1.6 m)   Wt (!) 307 lb (139.3 kg)   LMP 08/23/2020 (Exact Date)   SpO2 98%   BMI 54.38 kg/m²   General: no acute distress, well nourished and well hydrated  HEENT: NCAT  Heart: S1S2 RRR  Lungs: Clear to ascultation bilaterally, respiratory effort normal  Abdomen: soft, NT/ND, positive bowel sounds  Extremities: no pitting edema, nontender   Neuro: patient is awake, alert and orientated times 3, no gross deficits  Skin: no rashes or ecchymosis        Meds:   Meds:    docusate sodium  100 mg Oral BID    polyethylene glycol  17 g Oral Daily    enoxaparin  120 mg Subcutaneous BID    ampicillin IV  2 g Intravenous 6 times per day    cefTRIAXone (ROCEPHIN) IV  2 g Intravenous Q24H    sodium chloride flush  10 mL Intravenous 2 times per day    famotidine  20 mg Oral BID    ferrous sulfate  325 mg Oral BID WC    folic acid  1 mg Oral Daily    hydroxyurea  500 mg Oral BID      Infusions:     PRN Meds: oxyCODONE, 5 mg, Q4H PRN    Or  oxyCODONE, 10 mg, Q4H PRN  sennosides-docusate sodium, 2 tablet, Daily PRN  perflutren lipid microspheres, 1.5 mL, ONCE PRN  sodium chloride flush, 10 mL, PRN  potassium chloride, 40 mEq, PRN    Or  potassium alternative oral replacement, 40 mEq, PRN    Or  potassium chloride, 10 mEq, PRN  acetaminophen, 650 mg, Q6H PRN    Or  acetaminophen, 650 mg, Q6H PRN  senna, 1 tablet, Daily PRN  promethazine, 12.5 mg, Q6H PRN    Or  ondansetron, 4 mg, Q6H PRN  HYDROmorphone, 1 mg, Q3H PRN  diphenhydrAMINE, 25 mg, Q6H PRN        Data/Labs:     Recent Labs     10/09/20  0510 10/10/20  0505 10/11/20  0540   WBC 12.4* 10.3 12.7*   HGB 9.8* 9.4* 9.4*   HCT 28.9* 27.9* 27.8*    229 243      Recent Labs     10/09/20  0510 10/10/20  0505 10/11/20  0540    140 139   K 3.8 3.9 3.7    107 105   CO2 23 24 24   BUN 9 5* 6   CREATININE 0.9 0.8 0.9     Recent Labs     10/09/20  0510 10/10/20  0505 10/11/20  0540   AST 13 13 14   ALT 9 9 10   BILITOT 1.2 0.8 0.8   ALKPHOS 96 107* 96     No results for input(s): INR in the last 72 hours. No results for input(s): CKTOTAL, CKMB, CKMBINDEX, TROPONINT in the last 72 hours. I/O last 3 completed shifts: In: 940 [P.O.:240; I.V.:600; IV Piggyback:100]  Out: -     Intake/Output Summary (Last 24 hours) at 10/11/2020 1221  Last data filed at 10/11/2020 0940  Gross per 24 hour   Intake 1040 ml   Output --   Net 1040 ml        Assessment/Plan:   1. Enterococcus bacteremia- patient will be seen by infectious disease she has a history ofCLABSI, will continue to follow cultures are pending  10/10/20-patient states she just does not feel well today probably due to the bacteremia we will continue to monitor and treat her infection. 2. Sickle cell anemia-patient has a history is not in crisis at this time have been seen previously in the emergency department  10/10/2020- complains of increased pain possibly due to sickling with bacteremia given her routine dose of narcotic from home as needed as needed  3. Splenic infarction patient is being anticoagulated with enoxaparin stable at this time  4. Anemia chronic disease-ferrous sulfate twice daily due to sickle cell anemia    DVT Prophylaxis: Lovenox  Diet: DIET GENERAL;  Code Status: Full Code    Dispo:  When stable    Electronically signed by Cathy Luna MD on 10/11/2020 at [address]

## 2020-10-11 NOTE — PROGRESS NOTES
Patient complaining of pain around where her port was removed, RN assessed- and it is red, tender to touch. No signs of bleeding, or glue separation. administered pain medications. Will continue to assess patient.

## 2020-10-11 NOTE — ANESTHESIA POSTPROCEDURE EVALUATION
Department of Anesthesiology  Postprocedure Note    Patient: Myrtle Navarrete  MRN: [id number]  [place]: 1995  Date of evaluation: 10/11/2020  Time:  7:04 AM     Procedure Summary     Date:  10/10/20 Room / Location:  [place] [address] / [facility]    Anesthesia Start:  1137 Anesthesia Stop:  1252    Procedure:  insertion of right femoral catheter, triple lumen catheter and PORT REMOVAL (N/A Chest) Diagnosis:  (MEDIPORT)    Surgeon:  Jose Morrissey MD Responsible Provider:  Kim Avendaño MD    Anesthesia Type:  MAC ASA Status:  3          Anesthesia Type: MAC    Chago Phase I: Chago Score: 10    Chago Phase II:      Last vitals: Reviewed and per EMR flowsheets.        Anesthesia Post Evaluation    Patient location during evaluation: PACU  Patient participation: complete - patient participated  Level of consciousness: awake  Airway patency: patent  Nausea & Vomiting: no nausea and no vomiting  Complications: no  Cardiovascular status: hemodynamically stable  Respiratory status: acceptable  Hydration status: stable

## 2020-10-12 LAB
ALBUMIN SERPL-MCNC: 3.5 G/DL (ref 3.5–5.2)
ALP BLD-CCNC: 112 U/L (ref 35–104)
ALT SERPL-CCNC: 11 U/L (ref 0–32)
ANION GAP SERPL CALCULATED.3IONS-SCNC: 11 MMOL/L (ref 7–16)
ANISOCYTOSIS: ABNORMAL
AST SERPL-CCNC: 20 U/L (ref 0–31)
ATYPICAL LYMPHOCYTE RELATIVE PERCENT: 0.9 % (ref 0–4)
BASOPHILIC STIPPLING: ABNORMAL
BASOPHILS ABSOLUTE: 0 E9/L (ref 0–0.2)
BASOPHILS RELATIVE PERCENT: 0.1 % (ref 0–2)
BILIRUB SERPL-MCNC: 0.8 MG/DL (ref 0–1.2)
BUN BLDV-MCNC: 4 MG/DL (ref 6–20)
BURR CELLS: ABNORMAL
CALCIUM SERPL-MCNC: 8.4 MG/DL (ref 8.6–10.2)
CHLORIDE BLD-SCNC: 103 MMOL/L (ref 98–107)
CO2: 26 MMOL/L (ref 22–29)
CREAT SERPL-MCNC: 0.8 MG/DL (ref 0.5–1)
CULTURE CATHETER TIP: NORMAL
EOSINOPHILS ABSOLUTE: 0 E9/L (ref 0.05–0.5)
EOSINOPHILS RELATIVE PERCENT: 1.5 % (ref 0–6)
GFR AFRICAN AMERICAN: >60
GFR NON-AFRICAN AMERICAN: >60 ML/MIN/1.73
GLUCOSE BLD-MCNC: 145 MG/DL (ref 74–99)
HCT VFR BLD CALC: 28.3 % (ref 34–48)
HEMOGLOBIN: 9.6 G/DL (ref 11.5–15.5)
HYPOCHROMIA: ABNORMAL
LYMPHOCYTES ABSOLUTE: 3.41 E9/L (ref 1.5–4)
LYMPHOCYTES RELATIVE PERCENT: 20.9 % (ref 20–42)
MCH RBC QN AUTO: 25.3 PG (ref 26–35)
MCHC RBC AUTO-ENTMCNC: 33.9 % (ref 32–34.5)
MCV RBC AUTO: 74.5 FL (ref 80–99.9)
MONOCYTES ABSOLUTE: 0.46 E9/L (ref 0.1–0.95)
MONOCYTES RELATIVE PERCENT: 2.6 % (ref 2–12)
NEUTROPHILS ABSOLUTE: 11.78 E9/L (ref 1.8–7.3)
NEUTROPHILS RELATIVE PERCENT: 75.7 % (ref 43–80)
PDW BLD-RTO: 18.9 FL (ref 11.5–15)
PLATELET # BLD: 258 E9/L (ref 130–450)
PMV BLD AUTO: 10.9 FL (ref 7–12)
POIKILOCYTES: ABNORMAL
POLYCHROMASIA: ABNORMAL
POTASSIUM REFLEX MAGNESIUM: 3.9 MMOL/L (ref 3.5–5)
RBC # BLD: 3.8 E12/L (ref 3.5–5.5)
SODIUM BLD-SCNC: 140 MMOL/L (ref 132–146)
TARGET CELLS: ABNORMAL
TOTAL PROTEIN: 7.1 G/DL (ref 6.4–8.3)
WBC # BLD: 15.5 E9/L (ref 4.5–11.5)

## 2020-10-12 PROCEDURE — 2580000003 HC RX 258: Performed by: STUDENT IN AN ORGANIZED HEALTH CARE EDUCATION/TRAINING PROGRAM

## 2020-10-12 PROCEDURE — 6360000002 HC RX W HCPCS: Performed by: FAMILY MEDICINE

## 2020-10-12 PROCEDURE — 2580000003 HC RX 258: Performed by: FAMILY MEDICINE

## 2020-10-12 PROCEDURE — 6360000002 HC RX W HCPCS: Performed by: STUDENT IN AN ORGANIZED HEALTH CARE EDUCATION/TRAINING PROGRAM

## 2020-10-12 PROCEDURE — 6360000002 HC RX W HCPCS: Performed by: HOSPITALIST

## 2020-10-12 PROCEDURE — 6370000000 HC RX 637 (ALT 250 FOR IP): Performed by: FAMILY MEDICINE

## 2020-10-12 PROCEDURE — 85025 COMPLETE CBC W/AUTO DIFF WBC: CPT

## 2020-10-12 PROCEDURE — 6370000000 HC RX 637 (ALT 250 FOR IP): Performed by: STUDENT IN AN ORGANIZED HEALTH CARE EDUCATION/TRAINING PROGRAM

## 2020-10-12 PROCEDURE — 2060000000 HC ICU INTERMEDIATE R&B

## 2020-10-12 PROCEDURE — 80053 COMPREHEN METABOLIC PANEL: CPT

## 2020-10-12 RX ORDER — DIPHENHYDRAMINE HYDROCHLORIDE 50 MG/ML
25 INJECTION INTRAMUSCULAR; INTRAVENOUS EVERY 6 HOURS PRN
Status: DISCONTINUED | OUTPATIENT
Start: 2020-10-12 | End: 2020-10-12

## 2020-10-12 RX ORDER — 0.9 % SODIUM CHLORIDE 0.9 %
1000 INTRAVENOUS SOLUTION INTRAVENOUS ONCE
Status: COMPLETED | OUTPATIENT
Start: 2020-10-12 | End: 2020-10-12

## 2020-10-12 RX ORDER — MORPHINE SULFATE 4 MG/ML
4 INJECTION, SOLUTION INTRAMUSCULAR; INTRAVENOUS ONCE
Status: COMPLETED | OUTPATIENT
Start: 2020-10-12 | End: 2020-10-12

## 2020-10-12 RX ORDER — HYDROXYZINE HYDROCHLORIDE 50 MG/ML
50 INJECTION, SOLUTION INTRAMUSCULAR ONCE
Status: COMPLETED | OUTPATIENT
Start: 2020-10-12 | End: 2020-10-12

## 2020-10-12 RX ORDER — SODIUM CHLORIDE 9 MG/ML
INJECTION, SOLUTION INTRAVENOUS CONTINUOUS
Status: DISCONTINUED | OUTPATIENT
Start: 2020-10-12 | End: 2020-10-14

## 2020-10-12 RX ORDER — DULOXETIN HYDROCHLORIDE 60 MG/1
60 CAPSULE, DELAYED RELEASE ORAL DAILY
Status: DISCONTINUED | OUTPATIENT
Start: 2020-10-12 | End: 2020-10-17 | Stop reason: HOSPADM

## 2020-10-12 RX ADMIN — HYDROXYUREA 500 MG: 500 CAPSULE ORAL at 08:54

## 2020-10-12 RX ADMIN — AMPICILLIN SODIUM 2 G: 2 INJECTION, POWDER, FOR SOLUTION INTRAMUSCULAR; INTRAVENOUS at 05:53

## 2020-10-12 RX ADMIN — ONDANSETRON 4 MG: 2 INJECTION INTRAMUSCULAR; INTRAVENOUS at 05:53

## 2020-10-12 RX ADMIN — SODIUM CHLORIDE 1000 ML: 9 INJECTION, SOLUTION INTRAVENOUS at 02:37

## 2020-10-12 RX ADMIN — FERROUS SULFATE TAB 325 MG (65 MG ELEMENTAL FE) 325 MG: 325 (65 FE) TAB at 08:48

## 2020-10-12 RX ADMIN — HYDROXYUREA 500 MG: 500 CAPSULE ORAL at 21:16

## 2020-10-12 RX ADMIN — MORPHINE SULFATE 4 MG: 4 INJECTION, SOLUTION INTRAMUSCULAR; INTRAVENOUS at 02:32

## 2020-10-12 RX ADMIN — ENOXAPARIN SODIUM 120 MG: 150 INJECTION SUBCUTANEOUS at 21:17

## 2020-10-12 RX ADMIN — DULOXETINE HYDROCHLORIDE 60 MG: 60 CAPSULE, DELAYED RELEASE ORAL at 02:46

## 2020-10-12 RX ADMIN — HYDROMORPHONE HYDROCHLORIDE 1 MG: 1 INJECTION, SOLUTION INTRAMUSCULAR; INTRAVENOUS; SUBCUTANEOUS at 04:19

## 2020-10-12 RX ADMIN — PROMETHAZINE HYDROCHLORIDE 12.5 MG: 25 TABLET ORAL at 09:02

## 2020-10-12 RX ADMIN — SODIUM CHLORIDE, PRESERVATIVE FREE 10 ML: 5 INJECTION INTRAVENOUS at 01:45

## 2020-10-12 RX ADMIN — OXYCODONE HYDROCHLORIDE 10 MG: 10 TABLET ORAL at 09:02

## 2020-10-12 RX ADMIN — SODIUM CHLORIDE: 9 INJECTION, SOLUTION INTRAVENOUS at 21:16

## 2020-10-12 RX ADMIN — HYDROMORPHONE HYDROCHLORIDE 0.5 MG: 1 INJECTION, SOLUTION INTRAMUSCULAR; INTRAVENOUS; SUBCUTANEOUS at 06:09

## 2020-10-12 RX ADMIN — AMPICILLIN SODIUM 2 G: 2 INJECTION, POWDER, FOR SOLUTION INTRAMUSCULAR; INTRAVENOUS at 14:10

## 2020-10-12 RX ADMIN — AMPICILLIN SODIUM 2 G: 2 INJECTION, POWDER, FOR SOLUTION INTRAMUSCULAR; INTRAVENOUS at 01:45

## 2020-10-12 RX ADMIN — CEFTRIAXONE SODIUM 2 G: 2 INJECTION, POWDER, FOR SOLUTION INTRAMUSCULAR; INTRAVENOUS at 16:08

## 2020-10-12 RX ADMIN — HYDROMORPHONE HYDROCHLORIDE 2 MG: 1 INJECTION, SOLUTION INTRAMUSCULAR; INTRAVENOUS; SUBCUTANEOUS at 18:16

## 2020-10-12 RX ADMIN — SODIUM CHLORIDE: 9 INJECTION, SOLUTION INTRAVENOUS at 04:37

## 2020-10-12 RX ADMIN — FERROUS SULFATE TAB 325 MG (65 MG ELEMENTAL FE) 325 MG: 325 (65 FE) TAB at 16:09

## 2020-10-12 RX ADMIN — HYDROMORPHONE HYDROCHLORIDE 2 MG: 1 INJECTION, SOLUTION INTRAMUSCULAR; INTRAVENOUS; SUBCUTANEOUS at 15:11

## 2020-10-12 RX ADMIN — ENOXAPARIN SODIUM 120 MG: 150 INJECTION SUBCUTANEOUS at 08:49

## 2020-10-12 RX ADMIN — DIPHENHYDRAMINE HYDROCHLORIDE 25 MG: 50 INJECTION, SOLUTION INTRAMUSCULAR; INTRAVENOUS at 11:48

## 2020-10-12 RX ADMIN — Medication 10 ML: at 21:35

## 2020-10-12 RX ADMIN — DIPHENHYDRAMINE HYDROCHLORIDE 25 MG: 50 INJECTION, SOLUTION INTRAMUSCULAR; INTRAVENOUS at 18:16

## 2020-10-12 RX ADMIN — SODIUM CHLORIDE, PRESERVATIVE FREE 10 ML: 5 INJECTION INTRAVENOUS at 02:35

## 2020-10-12 RX ADMIN — DIPHENHYDRAMINE HYDROCHLORIDE 25 MG: 50 INJECTION, SOLUTION INTRAMUSCULAR; INTRAVENOUS at 04:20

## 2020-10-12 RX ADMIN — OXYCODONE HYDROCHLORIDE 10 MG: 10 TABLET ORAL at 13:05

## 2020-10-12 RX ADMIN — HYDROMORPHONE HYDROCHLORIDE 1 MG: 1 INJECTION, SOLUTION INTRAMUSCULAR; INTRAVENOUS; SUBCUTANEOUS at 00:46

## 2020-10-12 RX ADMIN — HYDROMORPHONE HYDROCHLORIDE 2 MG: 1 INJECTION, SOLUTION INTRAMUSCULAR; INTRAVENOUS; SUBCUTANEOUS at 21:18

## 2020-10-12 RX ADMIN — AMPICILLIN SODIUM 2 G: 2 INJECTION, POWDER, FOR SOLUTION INTRAMUSCULAR; INTRAVENOUS at 21:34

## 2020-10-12 RX ADMIN — OXYCODONE HYDROCHLORIDE 10 MG: 10 TABLET ORAL at 03:16

## 2020-10-12 RX ADMIN — HYDROMORPHONE HYDROCHLORIDE 1 MG: 1 INJECTION, SOLUTION INTRAMUSCULAR; INTRAVENOUS; SUBCUTANEOUS at 07:53

## 2020-10-12 RX ADMIN — SODIUM CHLORIDE, PRESERVATIVE FREE 10 ML: 5 INJECTION INTRAVENOUS at 04:25

## 2020-10-12 RX ADMIN — SODIUM CHLORIDE, PRESERVATIVE FREE 10 ML: 5 INJECTION INTRAVENOUS at 00:46

## 2020-10-12 RX ADMIN — AMPICILLIN SODIUM 2 G: 2 INJECTION, POWDER, FOR SOLUTION INTRAMUSCULAR; INTRAVENOUS at 18:20

## 2020-10-12 RX ADMIN — HYDROXYZINE HYDROCHLORIDE 50 MG: 50 INJECTION, SOLUTION INTRAMUSCULAR at 02:44

## 2020-10-12 RX ADMIN — FOLIC ACID 1 MG: 1 TABLET ORAL at 08:49

## 2020-10-12 RX ADMIN — FAMOTIDINE 20 MG: 20 TABLET, FILM COATED ORAL at 21:17

## 2020-10-12 RX ADMIN — FAMOTIDINE 20 MG: 20 TABLET, FILM COATED ORAL at 08:49

## 2020-10-12 RX ADMIN — HYDROMORPHONE HYDROCHLORIDE 0.5 MG: 1 INJECTION, SOLUTION INTRAMUSCULAR; INTRAVENOUS; SUBCUTANEOUS at 01:45

## 2020-10-12 RX ADMIN — HYDROMORPHONE HYDROCHLORIDE 2 MG: 1 INJECTION, SOLUTION INTRAMUSCULAR; INTRAVENOUS; SUBCUTANEOUS at 11:51

## 2020-10-12 RX ADMIN — AMPICILLIN SODIUM 2 G: 2 INJECTION, POWDER, FOR SOLUTION INTRAMUSCULAR; INTRAVENOUS at 09:31

## 2020-10-12 RX ADMIN — HYDROMORPHONE HYDROCHLORIDE 0.5 MG: 1 INJECTION, SOLUTION INTRAMUSCULAR; INTRAVENOUS; SUBCUTANEOUS at 10:15

## 2020-10-12 ASSESSMENT — PAIN DESCRIPTION - LOCATION
LOCATION: GENERALIZED;BACK;LEG
LOCATION: GENERALIZED

## 2020-10-12 ASSESSMENT — PAIN SCALES - GENERAL
PAINLEVEL_OUTOF10: 10
PAINLEVEL_OUTOF10: 8
PAINLEVEL_OUTOF10: 10
PAINLEVEL_OUTOF10: 8
PAINLEVEL_OUTOF10: 5
PAINLEVEL_OUTOF10: 10
PAINLEVEL_OUTOF10: 6
PAINLEVEL_OUTOF10: 10
PAINLEVEL_OUTOF10: 7
PAINLEVEL_OUTOF10: 10
PAINLEVEL_OUTOF10: 10
PAINLEVEL_OUTOF10: 8
PAINLEVEL_OUTOF10: 10
PAINLEVEL_OUTOF10: 0
PAINLEVEL_OUTOF10: 10
PAINLEVEL_OUTOF10: 10

## 2020-10-12 ASSESSMENT — PAIN DESCRIPTION - DESCRIPTORS
DESCRIPTORS: ACHING;DISCOMFORT;NAGGING
DESCRIPTORS: DULL;SORE;DISCOMFORT
DESCRIPTORS: ACHING;DISCOMFORT;NAGGING
DESCRIPTORS: DISCOMFORT;DULL;CONSTANT
DESCRIPTORS: CONSTANT;DISCOMFORT;DULL
DESCRIPTORS: DISCOMFORT;DULL;SORE
DESCRIPTORS: CONSTANT;DISCOMFORT;SORE;THROBBING
DESCRIPTORS: SORE;DISCOMFORT;CONSTANT
DESCRIPTORS: ACHING;CONSTANT;DISCOMFORT

## 2020-10-12 ASSESSMENT — PAIN DESCRIPTION - PAIN TYPE
TYPE: ACUTE PAIN

## 2020-10-12 ASSESSMENT — PAIN DESCRIPTION - FREQUENCY
FREQUENCY: CONTINUOUS

## 2020-10-12 NOTE — CARE COORDINATION
SW spoke with patient in her room regarding likely need for IV ABX at home and [facility]. She is in agreement and states she lives with her mom and she will help with IVs if needed. She lives in [place], [place] and is agreeable to whatever [facility] goes to that area and takes insurance. She does Dr with [facility], [address] called [phone] and made referral.  Faxed all information to them at [phone]. They will have to check availability and let SW know. Will need scripts for ABX for home so they can cost out. The Plan for Transition of Care is related to the following treatment goals: to improve function after discharge, IV ABX at home. The Patient was provided with a choice of provider and agrees   with the discharge plan. [x] Yes [] No    Freedom of choice list was provided with basic dialogue that supports the patient's individualized plan of care/goals, treatment preferences and shares the quality data associated with the providers.  [x] Yes [] No

## 2020-10-12 NOTE — PROGRESS NOTES
HOSPITALIST PROGRESS NOTE  Date: 10/12/2020   Name: Christal Hope   MRN: [id number]   YOB: 1995       HOSPITAL COURSE: Ms. Alta Islas is a 30-year-old female who was called in by her doctors office for bacteremia. Patient has a past medical history of sickle cell anemia SC type. Subjective/Interval Hx:   Patient appears to be in a moderate sickle cell crisis that is probably brought on by the bacteremia she complains of pain and aching all over will place her on her routine hospital regimen for 1 day to see if he can take control of the pain.   Objective:   Physical Exam:   BP (!) 107/56   Pulse 114   Temp 97.5 °F (36.4 °C)   Resp 20   Ht 5' 3\" (1.6 m)   Wt (!) 307 lb (139.3 kg)   LMP 08/23/2020 (Exact Date)   SpO2 99%   BMI 54.38 kg/m²   General: no acute distress, well nourished and well hydrated  HEENT: NCAT  Heart: S1S2 RRR  Lungs: Clear to ascultation bilaterally, respiratory effort normal  Abdomen: soft, NT/ND, positive bowel sounds  Extremities: no pitting edema, nontender   Neuro: patient is awake, alert and orientated times 3, no gross deficits  Skin: no rashes or ecchymosis        Meds:   Meds:    DULoxetine  60 mg Oral Daily    docusate sodium  100 mg Oral BID    polyethylene glycol  17 g Oral Daily    enoxaparin  120 mg Subcutaneous BID    ampicillin IV  2 g Intravenous 6 times per day    cefTRIAXone (ROCEPHIN) IV  2 g Intravenous Q24H    sodium chloride flush  10 mL Intravenous 2 times per day    famotidine  20 mg Oral BID    ferrous sulfate  325 mg Oral BID WC    folic acid  1 mg Oral Daily    hydroxyurea  500 mg Oral BID      Infusions:    sodium chloride 150 mL/hr at 10/12/20 0437     PRN Meds: HYDROmorphone, 2 mg, Q3H PRN  oxyCODONE, 5 mg, Q4H PRN    Or  oxyCODONE, 10 mg, Q4H PRN  sennosides-docusate sodium, 2 tablet, Daily PRN  perflutren lipid microspheres, 1.5 mL, ONCE PRN  sodium chloride flush, 10 mL, PRN  potassium chloride, 40 mEq, PRN    Or  potassium alternative oral replacement, 40 mEq, PRN    Or  potassium chloride, 10 mEq, PRN  acetaminophen, 650 mg, Q6H PRN    Or  acetaminophen, 650 mg, Q6H PRN  senna, 1 tablet, Daily PRN  promethazine, 12.5 mg, Q6H PRN    Or  ondansetron, 4 mg, Q6H PRN  diphenhydrAMINE, 25 mg, Q6H PRN        Data/Labs:     Recent Labs     10/10/20  0505 10/11/20  0540 10/12/20  0530   WBC 10.3 12.7* 15.5*   HGB 9.4* 9.4* 9.6*   HCT 27.9* 27.8* 28.3*    243 258      Recent Labs     10/10/20  0505 10/11/20  0540 10/12/20  0530    139 140   K 3.9 3.7 3.9    105 103   CO2 24 24 26   BUN 5* 6 4*   CREATININE 0.8 0.9 0.8     Recent Labs     10/10/20  0505 10/11/20  0540 10/12/20  0530   AST 13 14 20   ALT 9 10 11   BILITOT 0.8 0.8 0.8   ALKPHOS 107* 96 112*     No results for input(s): INR in the last 72 hours. No results for input(s): CKTOTAL, CKMB, CKMBINDEX, TROPONINT in the last 72 hours. I/O last 3 completed shifts: In: 2634 [I.V.:2634]  Out: -     Intake/Output Summary (Last 24 hours) at 10/12/2020 1533  Last data filed at 10/12/2020 1421  Gross per 24 hour   Intake 2634 ml   Output --   Net 2634 ml        Assessment/Plan:   1. Enterococcus bacteremia- patient will be seen by infectious disease she has a history ofCLABSI, will continue to follow cultures are pending  10/10/20-patient states she just does not feel well today probably due to the bacteremia we will continue to monitor and treat her infection. 10/12/2020- cultures are growing gram-positive cocci, sensitivities are pending  2.  Sickle cell anemia-patient has a history is not in crisis at this time have been seen previously in the emergency department  10/10/2020- complains of increased pain possibly due to sickling with bacteremia given her routine dose of narcotic from home as needed as needed  10/12/2020- patient is in a sickle cell crisis probably brought on by the bacteremia we will put her on her routine hospital regimen x1 day with the 2 mg of

## 2020-10-12 NOTE — PROGRESS NOTES
[address] [place] [address] [facility]  Progress Note    CC: Pain- generalized    SUBJECTIVE:  Patient is tolerating medications. No reported adverse drug reactions. No nausea, vomiting, diarrhea. No fevers. Patient having increased pain in lower back & hips, likely r/t sickle cell     Medications:  Scheduled Meds:   DULoxetine  60 mg Oral Daily    docusate sodium  100 mg Oral BID    polyethylene glycol  17 g Oral Daily    enoxaparin  120 mg Subcutaneous BID    ampicillin IV  2 g Intravenous 6 times per day    cefTRIAXone (ROCEPHIN) IV  2 g Intravenous Q24H    sodium chloride flush  10 mL Intravenous 2 times per day    famotidine  20 mg Oral BID    ferrous sulfate  325 mg Oral BID WC    folic acid  1 mg Oral Daily    hydroxyurea  500 mg Oral BID     Continuous Infusions:   sodium chloride 150 mL/hr at 10/12/20 0437     PRN Meds:HYDROmorphone, HYDROmorphone, oxyCODONE **OR** oxyCODONE, sennosides-docusate sodium, perflutren lipid microspheres, sodium chloride flush, potassium chloride **OR** potassium alternative oral replacement **OR** potassium chloride, acetaminophen **OR** acetaminophen, senna, promethazine **OR** ondansetron, HYDROmorphone, diphenhydrAMINE  OBJECTIVE:  Patient Vitals for the past 24 hrs:   BP Temp Temp src Pulse Resp SpO2   10/12/20 0753 (!) 112/55 97.5 °F (36.4 °C) -- 110 20 99 %   10/12/20 0330 126/68 97.7 °F (36.5 °C) Temporal 114 16 97 %   10/12/20 0305 139/79 98.8 °F (37.1 °C) Temporal 105 16 --   10/12/20 0032 (!) 144/84 98.3 °F (36.8 °C) Temporal 104 16 98 %   10/11/20 1948 130/80 97.1 °F (36.2 °C) Temporal 102 16 98 %   10/11/20 1400 (!) 110/53 97.1 °F (36.2 °C) Temporal 106 16 99 %     Constitutional: The patient is awake, alert, and oriented. In bed, sleeping, arrousable- generalized pain - worse in hips/lower back   Skin: Warm and dry. No rashes were noted. Head: Eyes show round, and reactive pupils. No jaundice.    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- pt should receive vaccinations prior to discharge     Vaccine recommendations   · Rec:   · - pneumococcal vaccine (PCV 13) IM before discharge   · - meningococcal polysaccharide vaccine (Menactra) IM before discharge   · - meningococcal serotype B vaccome  · - HIB polysaccharide vaccine (ActHIB) IM before discharge  · - Tdap if appropriate   · - follow up at 2 months to get   · 1) pneumococcal polysaccharide vaccine (PPSV23)   · 2) meningococcal polysaccharide vaccine (Menactra) - second dose   ·    · - Patient will have to get second dose of PPSV23 at 5 years after the first dose and again at age 72. · - Need meningococcal vaccine booster every 5 years    PLAN:  · Continue ampicillin and rocephin   · Check cultures  · Cath tip removed- culture- no growth to date   · Await repeat blood cultures - sent 10/11/2020; pending   · if negative can place picc   · Patient is questioning if a new mediport is worth it, as she has had 4 & all 4 have eventually had to be removed r/t infection.   · Monitor labs    Guanakito Krause MD  11:28 AM  10/12/2020

## 2020-10-13 LAB
ALBUMIN SERPL-MCNC: 3.5 G/DL (ref 3.5–5.2)
ALP BLD-CCNC: 103 U/L (ref 35–104)
ALT SERPL-CCNC: 13 U/L (ref 0–32)
ANION GAP SERPL CALCULATED.3IONS-SCNC: 7 MMOL/L (ref 7–16)
AST SERPL-CCNC: 28 U/L (ref 0–31)
BASOPHILS ABSOLUTE: 0.01 E9/L (ref 0–0.2)
BASOPHILS RELATIVE PERCENT: 0.1 % (ref 0–2)
BILIRUB SERPL-MCNC: 0.8 MG/DL (ref 0–1.2)
BUN BLDV-MCNC: 3 MG/DL (ref 6–20)
CALCIUM SERPL-MCNC: 8.5 MG/DL (ref 8.6–10.2)
CHLORIDE BLD-SCNC: 104 MMOL/L (ref 98–107)
CO2: 26 MMOL/L (ref 22–29)
CREAT SERPL-MCNC: 0.8 MG/DL (ref 0.5–1)
EOSINOPHILS ABSOLUTE: 0.34 E9/L (ref 0.05–0.5)
EOSINOPHILS RELATIVE PERCENT: 2.8 % (ref 0–6)
GFR AFRICAN AMERICAN: >60
GFR NON-AFRICAN AMERICAN: >60 ML/MIN/1.73
GLUCOSE BLD-MCNC: 117 MG/DL (ref 74–99)
HCT VFR BLD CALC: 27.3 % (ref 34–48)
HEMOGLOBIN: 9.1 G/DL (ref 11.5–15.5)
IMMATURE GRANULOCYTES #: 0.13 E9/L
IMMATURE GRANULOCYTES %: 1.1 % (ref 0–5)
LYMPHOCYTES ABSOLUTE: 3.08 E9/L (ref 1.5–4)
LYMPHOCYTES RELATIVE PERCENT: 25.6 % (ref 20–42)
MCH RBC QN AUTO: 24.9 PG (ref 26–35)
MCHC RBC AUTO-ENTMCNC: 33.3 % (ref 32–34.5)
MCV RBC AUTO: 74.8 FL (ref 80–99.9)
MONOCYTES ABSOLUTE: 0.45 E9/L (ref 0.1–0.95)
MONOCYTES RELATIVE PERCENT: 3.7 % (ref 2–12)
NEUTROPHILS ABSOLUTE: 8.02 E9/L (ref 1.8–7.3)
NEUTROPHILS RELATIVE PERCENT: 66.7 % (ref 43–80)
PDW BLD-RTO: 19 FL (ref 11.5–15)
PLATELET # BLD: 250 E9/L (ref 130–450)
PMV BLD AUTO: 11.1 FL (ref 7–12)
POTASSIUM REFLEX MAGNESIUM: 4.1 MMOL/L (ref 3.5–5)
RBC # BLD: 3.65 E12/L (ref 3.5–5.5)
SODIUM BLD-SCNC: 137 MMOL/L (ref 132–146)
TOTAL PROTEIN: 6.5 G/DL (ref 6.4–8.3)
WBC # BLD: 12 E9/L (ref 4.5–11.5)

## 2020-10-13 PROCEDURE — 36415 COLL VENOUS BLD VENIPUNCTURE: CPT

## 2020-10-13 PROCEDURE — 6360000002 HC RX W HCPCS: Performed by: STUDENT IN AN ORGANIZED HEALTH CARE EDUCATION/TRAINING PROGRAM

## 2020-10-13 PROCEDURE — 6360000002 HC RX W HCPCS: Performed by: HOSPITALIST

## 2020-10-13 PROCEDURE — 80053 COMPREHEN METABOLIC PANEL: CPT

## 2020-10-13 PROCEDURE — 2580000003 HC RX 258: Performed by: STUDENT IN AN ORGANIZED HEALTH CARE EDUCATION/TRAINING PROGRAM

## 2020-10-13 PROCEDURE — 85025 COMPLETE CBC W/AUTO DIFF WBC: CPT

## 2020-10-13 PROCEDURE — 6370000000 HC RX 637 (ALT 250 FOR IP): Performed by: STUDENT IN AN ORGANIZED HEALTH CARE EDUCATION/TRAINING PROGRAM

## 2020-10-13 PROCEDURE — 2580000003 HC RX 258: Performed by: FAMILY MEDICINE

## 2020-10-13 PROCEDURE — 6360000002 HC RX W HCPCS: Performed by: INTERNAL MEDICINE

## 2020-10-13 PROCEDURE — 6370000000 HC RX 637 (ALT 250 FOR IP): Performed by: FAMILY MEDICINE

## 2020-10-13 PROCEDURE — 2060000000 HC ICU INTERMEDIATE R&B

## 2020-10-13 RX ORDER — LIDOCAINE HYDROCHLORIDE 10 MG/ML
5 INJECTION, SOLUTION EPIDURAL; INFILTRATION; INTRACAUDAL; PERINEURAL ONCE
Status: DISCONTINUED | OUTPATIENT
Start: 2020-10-13 | End: 2020-10-17 | Stop reason: HOSPADM

## 2020-10-13 RX ORDER — SODIUM CHLORIDE 0.9 % (FLUSH) 0.9 %
10 SYRINGE (ML) INJECTION PRN
Status: DISCONTINUED | OUTPATIENT
Start: 2020-10-13 | End: 2020-10-17 | Stop reason: HOSPADM

## 2020-10-13 RX ORDER — HEPARIN SODIUM (PORCINE) LOCK FLUSH IV SOLN 100 UNIT/ML 100 UNIT/ML
3 SOLUTION INTRAVENOUS EVERY 12 HOURS SCHEDULED
Status: DISCONTINUED | OUTPATIENT
Start: 2020-10-13 | End: 2020-10-17 | Stop reason: HOSPADM

## 2020-10-13 RX ORDER — HEPARIN SODIUM (PORCINE) LOCK FLUSH IV SOLN 100 UNIT/ML 100 UNIT/ML
3 SOLUTION INTRAVENOUS PRN
Status: DISCONTINUED | OUTPATIENT
Start: 2020-10-13 | End: 2020-10-17 | Stop reason: HOSPADM

## 2020-10-13 RX ADMIN — DIPHENHYDRAMINE HYDROCHLORIDE 25 MG: 50 INJECTION, SOLUTION INTRAMUSCULAR; INTRAVENOUS at 00:34

## 2020-10-13 RX ADMIN — SODIUM CHLORIDE, PRESERVATIVE FREE 10 ML: 5 INJECTION INTRAVENOUS at 00:33

## 2020-10-13 RX ADMIN — HYDROXYUREA 500 MG: 500 CAPSULE ORAL at 09:59

## 2020-10-13 RX ADMIN — HYDROMORPHONE HYDROCHLORIDE 2 MG: 1 INJECTION, SOLUTION INTRAMUSCULAR; INTRAVENOUS; SUBCUTANEOUS at 15:14

## 2020-10-13 RX ADMIN — HYDROMORPHONE HYDROCHLORIDE 2 MG: 1 INJECTION, SOLUTION INTRAMUSCULAR; INTRAVENOUS; SUBCUTANEOUS at 10:51

## 2020-10-13 RX ADMIN — DULOXETINE HYDROCHLORIDE 60 MG: 60 CAPSULE, DELAYED RELEASE ORAL at 09:58

## 2020-10-13 RX ADMIN — SODIUM CHLORIDE, PRESERVATIVE FREE 10 ML: 5 INJECTION INTRAVENOUS at 07:27

## 2020-10-13 RX ADMIN — HYDROMORPHONE HYDROCHLORIDE 2 MG: 1 INJECTION, SOLUTION INTRAMUSCULAR; INTRAVENOUS; SUBCUTANEOUS at 19:36

## 2020-10-13 RX ADMIN — FAMOTIDINE 20 MG: 20 TABLET, FILM COATED ORAL at 21:06

## 2020-10-13 RX ADMIN — HYDROMORPHONE HYDROCHLORIDE 2 MG: 1 INJECTION, SOLUTION INTRAMUSCULAR; INTRAVENOUS; SUBCUTANEOUS at 04:04

## 2020-10-13 RX ADMIN — HYDROMORPHONE HYDROCHLORIDE 2 MG: 1 INJECTION, SOLUTION INTRAMUSCULAR; INTRAVENOUS; SUBCUTANEOUS at 00:33

## 2020-10-13 RX ADMIN — AMPICILLIN SODIUM 2 G: 2 INJECTION, POWDER, FOR SOLUTION INTRAMUSCULAR; INTRAVENOUS at 21:06

## 2020-10-13 RX ADMIN — SODIUM CHLORIDE: 9 INJECTION, SOLUTION INTRAVENOUS at 11:50

## 2020-10-13 RX ADMIN — AMPICILLIN SODIUM 2 G: 2 INJECTION, POWDER, FOR SOLUTION INTRAMUSCULAR; INTRAVENOUS at 17:57

## 2020-10-13 RX ADMIN — SODIUM CHLORIDE: 9 INJECTION, SOLUTION INTRAVENOUS at 19:42

## 2020-10-13 RX ADMIN — AMPICILLIN SODIUM 2 G: 2 INJECTION, POWDER, FOR SOLUTION INTRAMUSCULAR; INTRAVENOUS at 05:36

## 2020-10-13 RX ADMIN — CEFTRIAXONE SODIUM 2 G: 2 INJECTION, POWDER, FOR SOLUTION INTRAMUSCULAR; INTRAVENOUS at 17:57

## 2020-10-13 RX ADMIN — ENOXAPARIN SODIUM 120 MG: 150 INJECTION SUBCUTANEOUS at 09:58

## 2020-10-13 RX ADMIN — HYDROMORPHONE HYDROCHLORIDE 2 MG: 1 INJECTION, SOLUTION INTRAMUSCULAR; INTRAVENOUS; SUBCUTANEOUS at 23:35

## 2020-10-13 RX ADMIN — FERROUS SULFATE TAB 325 MG (65 MG ELEMENTAL FE) 325 MG: 325 (65 FE) TAB at 09:59

## 2020-10-13 RX ADMIN — OXYCODONE HYDROCHLORIDE 10 MG: 10 TABLET ORAL at 13:19

## 2020-10-13 RX ADMIN — SODIUM CHLORIDE: 9 INJECTION, SOLUTION INTRAVENOUS at 04:04

## 2020-10-13 RX ADMIN — FAMOTIDINE 20 MG: 20 TABLET, FILM COATED ORAL at 09:59

## 2020-10-13 RX ADMIN — FOLIC ACID 1 MG: 1 TABLET ORAL at 09:59

## 2020-10-13 RX ADMIN — DIPHENHYDRAMINE HYDROCHLORIDE 25 MG: 50 INJECTION, SOLUTION INTRAMUSCULAR; INTRAVENOUS at 19:36

## 2020-10-13 RX ADMIN — AMPICILLIN SODIUM 2 G: 2 INJECTION, POWDER, FOR SOLUTION INTRAMUSCULAR; INTRAVENOUS at 01:50

## 2020-10-13 RX ADMIN — ENOXAPARIN SODIUM 120 MG: 150 INJECTION SUBCUTANEOUS at 21:06

## 2020-10-13 RX ADMIN — HYDROMORPHONE HYDROCHLORIDE 2 MG: 1 INJECTION, SOLUTION INTRAMUSCULAR; INTRAVENOUS; SUBCUTANEOUS at 07:26

## 2020-10-13 RX ADMIN — AMPICILLIN SODIUM 2 G: 2 INJECTION, POWDER, FOR SOLUTION INTRAMUSCULAR; INTRAVENOUS at 13:19

## 2020-10-13 RX ADMIN — FERROUS SULFATE TAB 325 MG (65 MG ELEMENTAL FE) 325 MG: 325 (65 FE) TAB at 17:58

## 2020-10-13 RX ADMIN — HYDROXYUREA 500 MG: 500 CAPSULE ORAL at 21:06

## 2020-10-13 RX ADMIN — AMPICILLIN SODIUM 2 G: 2 INJECTION, POWDER, FOR SOLUTION INTRAMUSCULAR; INTRAVENOUS at 10:00

## 2020-10-13 RX ADMIN — Medication 10 ML: at 21:30

## 2020-10-13 RX ADMIN — DIPHENHYDRAMINE HYDROCHLORIDE 25 MG: 50 INJECTION, SOLUTION INTRAMUSCULAR; INTRAVENOUS at 07:27

## 2020-10-13 ASSESSMENT — PAIN DESCRIPTION - DESCRIPTORS: DESCRIPTORS: SHARP;OTHER (COMMENT)

## 2020-10-13 ASSESSMENT — PAIN SCALES - GENERAL
PAINLEVEL_OUTOF10: 8
PAINLEVEL_OUTOF10: 10
PAINLEVEL_OUTOF10: 7
PAINLEVEL_OUTOF10: 10
PAINLEVEL_OUTOF10: 0
PAINLEVEL_OUTOF10: 9
PAINLEVEL_OUTOF10: 8
PAINLEVEL_OUTOF10: 10
PAINLEVEL_OUTOF10: 9
PAINLEVEL_OUTOF10: 9
PAINLEVEL_OUTOF10: 8

## 2020-10-13 ASSESSMENT — PAIN DESCRIPTION - LOCATION
LOCATION: GENERALIZED
LOCATION: BACK;HIP;LEG

## 2020-10-13 ASSESSMENT — PAIN DESCRIPTION - PAIN TYPE: TYPE: ACUTE PAIN

## 2020-10-13 NOTE — PROGRESS NOTES
[address] [place] [address] [facility]  Progress Note    CC: Pain- generalized    SUBJECTIVE:  Patient is tolerating medications. No reported adverse drug reactions. No nausea, vomiting, diarrhea. No fevers. Pain improving, wants TLC out of groin. Medications:  Scheduled Meds:   DULoxetine  60 mg Oral Daily    docusate sodium  100 mg Oral BID    polyethylene glycol  17 g Oral Daily    enoxaparin  120 mg Subcutaneous BID    ampicillin IV  2 g Intravenous 6 times per day    cefTRIAXone (ROCEPHIN) IV  2 g Intravenous Q24H    sodium chloride flush  10 mL Intravenous 2 times per day    famotidine  20 mg Oral BID    ferrous sulfate  325 mg Oral BID WC    folic acid  1 mg Oral Daily    hydroxyurea  500 mg Oral BID     Continuous Infusions:   sodium chloride 150 mL/hr at 10/13/20 0404     PRN Meds:HYDROmorphone, oxyCODONE **OR** oxyCODONE, sennosides-docusate sodium, perflutren lipid microspheres, sodium chloride flush, potassium chloride **OR** potassium alternative oral replacement **OR** potassium chloride, acetaminophen **OR** acetaminophen, senna, promethazine **OR** ondansetron, diphenhydrAMINE  OBJECTIVE:  Patient Vitals for the past 24 hrs:   BP Temp Temp src Pulse Resp SpO2   10/13/20 1045 (!) 140/73 98.5 °F (36.9 °C) Temporal 122 18 98 %   10/13/20 0700 113/67 97.9 °F (36.6 °C) Temporal 101 18 100 %   10/13/20 0033 129/76 98.4 °F (36.9 °C) Temporal 118 16 --   10/12/20 2030 121/84 97.2 °F (36.2 °C) Temporal 114 18 100 %   10/12/20 1500 (!) 121/57 97 °F (36.1 °C) Temporal 116 20 --   10/12/20 1151 (!) 107/56 -- -- 114 -- --     Constitutional: The patient is awake, alert, and oriented. In bed, pain improving   Skin: Warm and dry. No rashes were noted. Head: Eyes show round, and reactive pupils. No jaundice. Mouth: Moist mucous membranes, no ulcerations, no thrush. Neck: Supple to movements. No lymphadenopathy. Chest: No use of accessory muscles to breathe.  Symmetrical expansion. Auscultation reveals no wheezing, crackles, or rhonchi. Left chest mediport removal site well approximated, closed with surgical glue   Cardiovascular: S1 and S2 are rhythmic and regular. No murmurs appreciated. Abdomen: Positive bowel sounds to auscultation. Benign to palpation. Large- obese   Extremities: No clubbing, no cyanosis, no edema.   Right groin TLC 10/10/2020      Laboratory and Tests Review:  Lab Results   Component Value Date    WBC 12.0 (H) 10/13/2020    WBC 15.5 (H) 10/12/2020    WBC 12.7 (H) 10/11/2020    HGB 9.1 (L) 10/13/2020    HCT 27.3 (L) 10/13/2020    MCV 74.8 (L) 10/13/2020     10/13/2020     Lab Results   Component Value Date    NEUTROABS 8.02 (H) 10/13/2020    NEUTROABS 11.78 (H) 10/12/2020    NEUTROABS 8.50 (H) 10/11/2020     Lab Results   Component Value Date    CRP 4.0 (H) 08/10/2016    CRP 1.7 (H) 08/02/2016    CRP 1.0 (H) 06/30/2016     Lab Results   Component Value Date    SEDRATE 10 10/26/2017    SEDRATE 29 (H) 08/10/2016    SEDRATE 10 08/02/2016     Lab Results   Component Value Date    ALT 13 10/13/2020    AST 28 10/13/2020    ALKPHOS 103 10/13/2020    BILITOT 0.8 10/13/2020     Lab Results   Component Value Date     10/13/2020    K 4.1 10/13/2020     10/13/2020    CO2 26 10/13/2020    BUN 3 10/13/2020    CREATININE 0.8 10/13/2020    GFRAA >60 10/13/2020    LABGLOM >60 10/13/2020    GLUCOSE 117 10/13/2020    GLUCOSE 89 05/03/2011    PROT 6.5 10/13/2020    LABALBU 3.5 10/13/2020    LABALBU 4.0 05/03/2011    CALCIUM 8.5 10/13/2020    BILITOT 0.8 10/13/2020    ALKPHOS 103 10/13/2020    AST 28 10/13/2020    ALT 13 10/13/2020     Radiology:  Reviewed     Microbiology:   10/7/2020- blood cx- VSE- amp sensitive  10/8/2020- blood cx- VSE  Tip cx- no growth to date   Urine cx- mixed gram positive organisms     10/10/2020: Surgical site cx: VSE, CONS  10/11/2020: blood cultures: no growth to date     ASSESSMENT:  · Enterococcus bacteremia   · Leukocytosis- 12.0 - improving   · CLABSI  · 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- pt should receive vaccinations prior to discharge     Vaccine recommendations   · Rec:   · - pneumococcal vaccine (PCV 13) IM before discharge   · - meningococcal polysaccharide vaccine (Menactra) IM before discharge   · - meningococcal serotype B vaccome  · - HIB polysaccharide vaccine (ActHIB) IM before discharge  · - Tdap if appropriate   · - follow up at 2 months to get   · 1) pneumococcal polysaccharide vaccine (PPSV23)   · 2) meningococcal polysaccharide vaccine (Menactra) - second dose   ·    · - Patient will have to get second dose of PPSV23 at 5 years after the first dose and again at age 72.    · - Need meningococcal vaccine booster every 5 years    PLAN:  · Continue ampicillin and rocephin   · Check cultures  · Cath tip removed- culture- no growth to date   · Repeat blood cultures sent 10/11/2020- no growth to date   · PICC line ordered  · Remove TLC when PICC placed  · Monitor labs    Guanakito Krause MD  11:03 AM  10/13/2020

## 2020-10-13 NOTE — CARE COORDINATION
[facility] unable to accept patient due to staffing. Call placed and spoke with Ledy Cash at [facility] [phone]. Made referral and faxed all information to her at [phone]. Await acceptance. Need scripts prior to discharge.

## 2020-10-13 NOTE — CARE COORDINATION
Lenore Diaz at [facility] called SW back, they are able to accept referral and do Lauryn Pandey for IV ABX at discharge. She states they normally use [facility] for pharmacy. Have called and spoken with [facility], she will run benefits and speak with patient, she is also familiar with [facility] and can work with them. Still need scripts for [facility] prior to discharge.

## 2020-10-13 NOTE — PROGRESS NOTES
Hospitalist Progress Note      SYNOPSIS: Patient admitted on 10/8/2020  who was called in by her doctors office for bacteremia. Patient has a past medical history of sickle cell anemia SC type. SUBJECTIVE:  Stable overnight. No other overnight issues reported. Patient seen and examined  Records reviewed. Blood cultures Enterococcus faecalis. Patient complaining of hip pain, crying on examination      Temp (24hrs), Av.8 °F (36.6 °C), Min:97 °F (36.1 °C), Max:98.5 °F (36.9 °C)    DIET: DIET GENERAL;  CODE: Full Code    Intake/Output Summary (Last 24 hours) at 10/13/2020 1154  Last data filed at 10/13/2020 1026  Gross per 24 hour   Intake 5264 ml   Output --   Net 5264 ml       Review of Systems  All bolded are positive; please see HPI  General:  Fever, chills, diaphoresis, fatigue, malaise, night sweats, weight loss  Psychological:  Anxiety, disorientation, hallucinations. ENT:  Epistaxis, headaches, vertigo, visual changes. Cardiovascular:  Chest pain, irregular heartbeats, palpitations, paroxysmal nocturnal dyspnea. Respiratory:  Shortness of breath, coughing, sputum production, hemoptysis, wheezing, orthopnea.   Gastrointestinal:  Nausea, vomiting, diarrhea, heartburn, constipation, abdominal pain, hematemesis, hematochezia, melena, acholic stools  Genito-Urinary:  Dysuria, urgency, frequency, hematuria  Musculoskeletal:  Joint pain, joint stiffness, joint swelling, muscle pain  Neurology:  Headache, focal neurological deficits, weakness, numbness, paresthesia  Derm:  Rashes, ulcers, excoriations, bruising  Extremities:  Decreased ROM, peripheral edema, mottling      OBJECTIVE:    BP (!) 140/73   Pulse 122   Temp 98.5 °F (36.9 °C) (Temporal)   Resp 18   Ht 5' 3\" (1.6 m)   Wt (!) 307 lb (139.3 kg)   LMP 2020 (Exact Date)   SpO2 98%   BMI 54.38 kg/m²     General appearance:  awake, alert, and oriented to person, place, time, and purpose; appears stated age and cooperative; no apparent distress no labored breathing obese  HEENT:  Conjunctivae/corneas clear. Neck: Supple. No jugular venous distention. Respiratory: symmetrical; clear to auscultation bilaterally; no wheezes; no rhonchi; no rales  Cardiovascular: rhythm regular; rate controlled; no murmurs  Abdomen: Soft, nontender, nondistended  Extremities:  peripheral pulses present; no peripheral edema; no ulcers  Musculoskeletal: No clubbing, cyanosis, no bilateral lower extremity edema. Brisk capillary refill. Skin:  No rashes  on visible skin  Neurologic: awake, alert and following commands     ASSESSMENT and PLAN:    · Enterococcus bacteremia due to CLABSI- Infectious disease is following. Has had multiple episodes of CLABSI. Currently on rocephin and ampicillin. 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 Pain control  · Tachycardia-continue fluids. Patient in pain. Continue to monitor.            DISPOSITION: Ultimate plan is home with [facility]    Medications:  REVIEWED DAILY    Infusion Medications    sodium chloride 150 mL/hr at 10/13/20 1150     Scheduled Medications    lidocaine PF  5 mL Intradermal Once    heparin flush  3 mL Intravenous 2 times per day    DULoxetine  60 mg Oral Daily    docusate sodium  100 mg Oral BID    polyethylene glycol  17 g Oral Daily    enoxaparin  120 mg Subcutaneous BID    ampicillin IV  2 g Intravenous 6 times per day    cefTRIAXone (ROCEPHIN) IV  2 g Intravenous Q24H    sodium chloride flush  10 mL Intravenous 2 times per day    famotidine  20 mg Oral BID    ferrous sulfate  325 mg Oral BID WC    folic acid  1 mg Oral Daily    hydroxyurea  500 mg Oral BID     PRN Meds: sodium chloride flush, heparin flush, oxyCODONE **OR** oxyCODONE, sennosides-docusate sodium, perflutren lipid microspheres, sodium chloride flush, potassium chloride **OR** potassium alternative oral replacement **OR** potassium chloride, acetaminophen **OR** acetaminophen, senna, promethazine **OR** ondansetron, diphenhydrAMINE    Labs:     Recent Labs     10/11/20  0540 10/12/20  0530 10/13/20  0442   WBC 12.7* 15.5* 12.0*   HGB 9.4* 9.6* 9.1*   HCT 27.8* 28.3* 27.3*    258 250       Recent Labs     10/11/20  0540 10/12/20  0530 10/13/20  0442    140 137   K 3.7 3.9 4.1    103 104   CO2 24 26 26   BUN 6 4* 3*   CREATININE 0.9 0.8 0.8   CALCIUM 8.0* 8.4* 8.5*       Recent Labs     10/11/20  0540 10/12/20  0530 10/13/20  0442   PROT 6.3* 7.1 6.5   ALKPHOS 96 112* 103   ALT 10 11 13   AST 14 20 28   BILITOT 0.8 0.8 0.8       No results for input(s): INR in the last 72 hours. No results for input(s): Claire José Luis in the last 72 hours. Chronic labs:    Lab Results   Component Value Date    TSH 1.430 10/05/2017    INR 1.1 02/24/2020    LABA1C 4.6 (L) 10/05/2017       Radiology: REVIEWED DAILY    +++++++++++++++++++++++++++++++++++++++++++++++++  [address] [facility], [place]  +++++++++++++++++++++++++++++++++++++++++++++++++  NOTE: This report was transcribed using voice recognition software. Every effort was made to ensure accuracy; however, inadvertent computerized transcription errors may be present.

## 2020-10-14 LAB
ALBUMIN SERPL-MCNC: 3.2 G/DL (ref 3.5–5.2)
ALP BLD-CCNC: 95 U/L (ref 35–104)
ALT SERPL-CCNC: 11 U/L (ref 0–32)
ANION GAP SERPL CALCULATED.3IONS-SCNC: 10 MMOL/L (ref 7–16)
AST SERPL-CCNC: 16 U/L (ref 0–31)
BASOPHILS ABSOLUTE: 0.02 E9/L (ref 0–0.2)
BASOPHILS RELATIVE PERCENT: 0.2 % (ref 0–2)
BILIRUB SERPL-MCNC: 0.9 MG/DL (ref 0–1.2)
BLOOD BANK DISPENSE STATUS: NORMAL
BLOOD BANK PRODUCT CODE: NORMAL
BPU ID: NORMAL
BUN BLDV-MCNC: 4 MG/DL (ref 6–20)
CALCIUM SERPL-MCNC: 8.5 MG/DL (ref 8.6–10.2)
CHLORIDE BLD-SCNC: 102 MMOL/L (ref 98–107)
CO2: 26 MMOL/L (ref 22–29)
CREAT SERPL-MCNC: 0.8 MG/DL (ref 0.5–1)
CULTURE SURGICAL: ABNORMAL
CULTURE SURGICAL: ABNORMAL
DESCRIPTION BLOOD BANK: NORMAL
EOSINOPHILS ABSOLUTE: 0.34 E9/L (ref 0.05–0.5)
EOSINOPHILS RELATIVE PERCENT: 2.7 % (ref 0–6)
GFR AFRICAN AMERICAN: >60
GFR NON-AFRICAN AMERICAN: >60 ML/MIN/1.73
GLUCOSE BLD-MCNC: 121 MG/DL (ref 74–99)
HCT VFR BLD CALC: 26.5 % (ref 34–48)
HEMOGLOBIN: 8.9 G/DL (ref 11.5–15.5)
IMMATURE GRANULOCYTES #: 0.14 E9/L
IMMATURE GRANULOCYTES %: 1.1 % (ref 0–5)
LYMPHOCYTES ABSOLUTE: 3.08 E9/L (ref 1.5–4)
LYMPHOCYTES RELATIVE PERCENT: 24.4 % (ref 20–42)
MCH RBC QN AUTO: 25 PG (ref 26–35)
MCHC RBC AUTO-ENTMCNC: 33.6 % (ref 32–34.5)
MCV RBC AUTO: 74.4 FL (ref 80–99.9)
MONOCYTES ABSOLUTE: 0.43 E9/L (ref 0.1–0.95)
MONOCYTES RELATIVE PERCENT: 3.4 % (ref 2–12)
NEUTROPHILS ABSOLUTE: 8.62 E9/L (ref 1.8–7.3)
NEUTROPHILS RELATIVE PERCENT: 68.2 % (ref 43–80)
ORGANISM: ABNORMAL
ORGANISM: ABNORMAL
PDW BLD-RTO: 18.6 FL (ref 11.5–15)
PLATELET # BLD: 252 E9/L (ref 130–450)
PMV BLD AUTO: 11 FL (ref 7–12)
POTASSIUM REFLEX MAGNESIUM: 3.7 MMOL/L (ref 3.5–5)
RBC # BLD: 3.56 E12/L (ref 3.5–5.5)
SODIUM BLD-SCNC: 138 MMOL/L (ref 132–146)
TOTAL PROTEIN: 6.5 G/DL (ref 6.4–8.3)
WBC # BLD: 12.6 E9/L (ref 4.5–11.5)

## 2020-10-14 PROCEDURE — 85025 COMPLETE CBC W/AUTO DIFF WBC: CPT

## 2020-10-14 PROCEDURE — 6370000000 HC RX 637 (ALT 250 FOR IP): Performed by: STUDENT IN AN ORGANIZED HEALTH CARE EDUCATION/TRAINING PROGRAM

## 2020-10-14 PROCEDURE — 6360000002 HC RX W HCPCS: Performed by: STUDENT IN AN ORGANIZED HEALTH CARE EDUCATION/TRAINING PROGRAM

## 2020-10-14 PROCEDURE — 1200000000 HC SEMI PRIVATE

## 2020-10-14 PROCEDURE — 6370000000 HC RX 637 (ALT 250 FOR IP): Performed by: FAMILY MEDICINE

## 2020-10-14 PROCEDURE — 2580000003 HC RX 258: Performed by: STUDENT IN AN ORGANIZED HEALTH CARE EDUCATION/TRAINING PROGRAM

## 2020-10-14 PROCEDURE — 6360000002 HC RX W HCPCS: Performed by: INTERNAL MEDICINE

## 2020-10-14 PROCEDURE — 80053 COMPREHEN METABOLIC PANEL: CPT

## 2020-10-14 PROCEDURE — 36592 COLLECT BLOOD FROM PICC: CPT

## 2020-10-14 PROCEDURE — 2580000003 HC RX 258: Performed by: FAMILY MEDICINE

## 2020-10-14 PROCEDURE — 36415 COLL VENOUS BLD VENIPUNCTURE: CPT

## 2020-10-14 RX ADMIN — FERROUS SULFATE TAB 325 MG (65 MG ELEMENTAL FE) 325 MG: 325 (65 FE) TAB at 10:21

## 2020-10-14 RX ADMIN — HYDROMORPHONE HYDROCHLORIDE 2 MG: 1 INJECTION, SOLUTION INTRAMUSCULAR; INTRAVENOUS; SUBCUTANEOUS at 20:34

## 2020-10-14 RX ADMIN — HYDROMORPHONE HYDROCHLORIDE 2 MG: 1 INJECTION, SOLUTION INTRAMUSCULAR; INTRAVENOUS; SUBCUTANEOUS at 11:44

## 2020-10-14 RX ADMIN — FAMOTIDINE 20 MG: 20 TABLET, FILM COATED ORAL at 10:21

## 2020-10-14 RX ADMIN — DIPHENHYDRAMINE HYDROCHLORIDE 25 MG: 50 INJECTION, SOLUTION INTRAMUSCULAR; INTRAVENOUS at 11:39

## 2020-10-14 RX ADMIN — AMPICILLIN SODIUM 2 G: 2 INJECTION, POWDER, FOR SOLUTION INTRAMUSCULAR; INTRAVENOUS at 10:20

## 2020-10-14 RX ADMIN — AMPICILLIN SODIUM 2 G: 2 INJECTION, POWDER, FOR SOLUTION INTRAMUSCULAR; INTRAVENOUS at 05:51

## 2020-10-14 RX ADMIN — HYDROXYUREA 500 MG: 500 CAPSULE ORAL at 20:34

## 2020-10-14 RX ADMIN — FERROUS SULFATE TAB 325 MG (65 MG ELEMENTAL FE) 325 MG: 325 (65 FE) TAB at 17:25

## 2020-10-14 RX ADMIN — HYDROMORPHONE HYDROCHLORIDE 2 MG: 1 INJECTION, SOLUTION INTRAMUSCULAR; INTRAVENOUS; SUBCUTANEOUS at 03:29

## 2020-10-14 RX ADMIN — Medication 10 ML: at 20:34

## 2020-10-14 RX ADMIN — DIPHENHYDRAMINE HYDROCHLORIDE 25 MG: 50 INJECTION, SOLUTION INTRAMUSCULAR; INTRAVENOUS at 03:29

## 2020-10-14 RX ADMIN — SODIUM CHLORIDE: 9 INJECTION, SOLUTION INTRAVENOUS at 13:11

## 2020-10-14 RX ADMIN — OXYCODONE HYDROCHLORIDE 10 MG: 10 TABLET ORAL at 00:51

## 2020-10-14 RX ADMIN — AMPICILLIN SODIUM 2 G: 2 INJECTION, POWDER, FOR SOLUTION INTRAMUSCULAR; INTRAVENOUS at 20:55

## 2020-10-14 RX ADMIN — DIPHENHYDRAMINE HYDROCHLORIDE 25 MG: 50 INJECTION, SOLUTION INTRAMUSCULAR; INTRAVENOUS at 20:34

## 2020-10-14 RX ADMIN — Medication 10 ML: at 10:21

## 2020-10-14 RX ADMIN — HYDROXYUREA 500 MG: 500 CAPSULE ORAL at 10:24

## 2020-10-14 RX ADMIN — AMPICILLIN SODIUM 2 G: 2 INJECTION, POWDER, FOR SOLUTION INTRAMUSCULAR; INTRAVENOUS at 00:51

## 2020-10-14 RX ADMIN — DULOXETINE HYDROCHLORIDE 60 MG: 60 CAPSULE, DELAYED RELEASE ORAL at 10:24

## 2020-10-14 RX ADMIN — CEFTRIAXONE SODIUM 2 G: 2 INJECTION, POWDER, FOR SOLUTION INTRAMUSCULAR; INTRAVENOUS at 17:26

## 2020-10-14 RX ADMIN — HYDROMORPHONE HYDROCHLORIDE 2 MG: 1 INJECTION, SOLUTION INTRAMUSCULAR; INTRAVENOUS; SUBCUTANEOUS at 07:40

## 2020-10-14 RX ADMIN — AMPICILLIN SODIUM 2 G: 2 INJECTION, POWDER, FOR SOLUTION INTRAMUSCULAR; INTRAVENOUS at 13:09

## 2020-10-14 RX ADMIN — FAMOTIDINE 20 MG: 20 TABLET, FILM COATED ORAL at 20:33

## 2020-10-14 RX ADMIN — AMPICILLIN SODIUM 2 G: 2 INJECTION, POWDER, FOR SOLUTION INTRAMUSCULAR; INTRAVENOUS at 17:26

## 2020-10-14 RX ADMIN — FOLIC ACID 1 MG: 1 TABLET ORAL at 10:21

## 2020-10-14 RX ADMIN — HYDROMORPHONE HYDROCHLORIDE 2 MG: 1 INJECTION, SOLUTION INTRAMUSCULAR; INTRAVENOUS; SUBCUTANEOUS at 16:05

## 2020-10-14 RX ADMIN — ENOXAPARIN SODIUM 120 MG: 150 INJECTION SUBCUTANEOUS at 20:33

## 2020-10-14 ASSESSMENT — PAIN DESCRIPTION - FREQUENCY
FREQUENCY: CONTINUOUS

## 2020-10-14 ASSESSMENT — PAIN DESCRIPTION - LOCATION
LOCATION: GENERALIZED

## 2020-10-14 ASSESSMENT — PAIN DESCRIPTION - DESCRIPTORS
DESCRIPTORS: ACHING;CONSTANT;DISCOMFORT
DESCRIPTORS: ACHING;CONSTANT;STABBING;SHARP
DESCRIPTORS: ACHING;CONSTANT;DISCOMFORT

## 2020-10-14 ASSESSMENT — PAIN SCALES - GENERAL
PAINLEVEL_OUTOF10: 0
PAINLEVEL_OUTOF10: 7
PAINLEVEL_OUTOF10: 10
PAINLEVEL_OUTOF10: 8
PAINLEVEL_OUTOF10: 0
PAINLEVEL_OUTOF10: 10
PAINLEVEL_OUTOF10: 8
PAINLEVEL_OUTOF10: 4

## 2020-10-14 ASSESSMENT — PAIN DESCRIPTION - ONSET
ONSET: ON-GOING

## 2020-10-14 ASSESSMENT — PAIN DESCRIPTION - PAIN TYPE
TYPE: ACUTE PAIN

## 2020-10-14 ASSESSMENT — PAIN DESCRIPTION - ORIENTATION: ORIENTATION: RIGHT;LEFT

## 2020-10-14 NOTE — PROGRESS NOTES
[address] [place] [address] [facility]  Progress Note    CC: Pain- generalized    SUBJECTIVE:  Patient is tolerating medications. No reported adverse drug reactions. No nausea, vomiting, diarrhea. No fevers. PICC line today. Patient spoke with Dr. Pete Shanks and opted to delay insertion of a new mediport at this time      Medications:  Scheduled Meds:   lidocaine PF  5 mL Intradermal Once    heparin flush  3 mL Intravenous 2 times per day    DULoxetine  60 mg Oral Daily    enoxaparin  120 mg Subcutaneous BID    ampicillin IV  2 g Intravenous 6 times per day    cefTRIAXone (ROCEPHIN) IV  2 g Intravenous Q24H    sodium chloride flush  10 mL Intravenous 2 times per day    famotidine  20 mg Oral BID    ferrous sulfate  325 mg Oral BID WC    folic acid  1 mg Oral Daily    hydroxyurea  500 mg Oral BID     Continuous Infusions:   sodium chloride 150 mL/hr at 10/13/20 1942     PRN Meds:sodium chloride flush, heparin flush, HYDROmorphone, oxyCODONE **OR** oxyCODONE, sennosides-docusate sodium, perflutren lipid microspheres, sodium chloride flush, potassium chloride **OR** potassium alternative oral replacement **OR** potassium chloride, acetaminophen **OR** acetaminophen, senna, promethazine **OR** ondansetron, diphenhydrAMINE  OBJECTIVE:  Patient Vitals for the past 24 hrs:   BP Temp Temp src Pulse Resp SpO2   10/14/20 0800 115/60 96.6 °F (35.9 °C) Temporal 100 18 94 %   10/14/20 0051 134/88 98.5 °F (36.9 °C) Temporal 111 12 98 %   10/13/20 2330 124/68 98.2 °F (36.8 °C) Temporal 105 18 97 %   10/13/20 1530 127/64 97 °F (36.1 °C) Temporal 101 18 95 %   10/13/20 1245 123/66 98.6 °F (37 °C) Temporal 109 18 --     Constitutional: The patient is awake, alert, and oriented. Lying in bed, pain improving   Skin: Warm and dry. No rashes were noted. Head: Eyes show round, and reactive pupils. No jaundice. Mouth: Moist mucous membranes, no ulcerations, no thrush. Neck: Supple to movements.  No lymphadenopathy. Chest: No use of accessory muscles to breathe. Symmetrical expansion. Auscultation reveals no wheezing, crackles, or rhonchi. Left chest mediport removal site well approximated, closed with surgical glue   Cardiovascular: S1 and S2 are rhythmic and regular. No murmurs appreciated. Abdomen: Positive bowel sounds to auscultation. Benign to palpation. Large- obese   Extremities: No clubbing, no cyanosis, no edema.   Right groin TLC 10/10/2020      Laboratory and Tests Review:  Lab Results   Component Value Date    WBC 12.6 (H) 10/14/2020    WBC 12.0 (H) 10/13/2020    WBC 15.5 (H) 10/12/2020    HGB 8.9 (L) 10/14/2020    HCT 26.5 (L) 10/14/2020    MCV 74.4 (L) 10/14/2020     10/14/2020     Lab Results   Component Value Date    NEUTROABS 8.62 (H) 10/14/2020    NEUTROABS 8.02 (H) 10/13/2020    NEUTROABS 11.78 (H) 10/12/2020     Lab Results   Component Value Date    CRP 4.0 (H) 08/10/2016    CRP 1.7 (H) 08/02/2016    CRP 1.0 (H) 06/30/2016     Lab Results   Component Value Date    SEDRATE 10 10/26/2017    SEDRATE 29 (H) 08/10/2016    SEDRATE 10 08/02/2016     Lab Results   Component Value Date    ALT 11 10/14/2020    AST 16 10/14/2020    ALKPHOS 95 10/14/2020    BILITOT 0.9 10/14/2020     Lab Results   Component Value Date     10/14/2020    K 3.7 10/14/2020     10/14/2020    CO2 26 10/14/2020    BUN 4 10/14/2020    CREATININE 0.8 10/14/2020    GFRAA >60 10/14/2020    LABGLOM >60 10/14/2020    GLUCOSE 121 10/14/2020    GLUCOSE 89 05/03/2011    PROT 6.5 10/14/2020    LABALBU 3.2 10/14/2020    LABALBU 4.0 05/03/2011    CALCIUM 8.5 10/14/2020    BILITOT 0.9 10/14/2020    ALKPHOS 95 10/14/2020    AST 16 10/14/2020    ALT 11 10/14/2020     Radiology:  Reviewed     Microbiology:   10/7/2020- blood cx- VSE- amp sensitive  10/8/2020- blood cx- VSE  Tip cx- no growth to date   Urine cx- mixed gram positive organisms     10/10/2020: Surgical site cx: VSE, CONS  10/11/2020: blood cultures: no growth to date     ASSESSMENT:  · Enterococcus bacteremia   · Leukocytosis- 12.0 - improving   · CLABSI  · 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- pt should receive vaccinations prior to discharge     Vaccine recommendations   · Rec:   · - pneumococcal vaccine (PCV 13) IM before discharge   · - meningococcal polysaccharide vaccine (Menactra) IM before discharge   · - meningococcal serotype B vaccome  · - HIB polysaccharide vaccine (ActHIB) IM before discharge  · - Tdap if appropriate   · - follow up at 2 months to get   · 1) pneumococcal polysaccharide vaccine (PPSV23)   · 2) meningococcal polysaccharide vaccine (Menactra) - second dose   ·    · - Patient will have to get second dose of PPSV23 at 5 years after the first dose and again at age 72.    · - Need meningococcal vaccine booster every 5 years    PLAN:  · Continue ampicillin and rocephin   · Will need vaccinations prior to discharge  · Check cultures  · Cath tip removed- culture- no growth to date   · Repeat blood cultures sent 10/11/2020- no growth to date   · PICC line ordered  · Remove TLC when PICC placed  · Chris Mccarthy MD  12:22 PM  10/14/2020

## 2020-10-14 NOTE — PROGRESS NOTES
Hospitalist Progress Note      SYNOPSIS: Patient admitted on 10/8/2020  who was called in by her doctors office for bacteremia. Patient has a past medical history of sickle cell anemia SC type. SUBJECTIVE:  Stable overnight. No other overnight issues reported. Patient seen and examined  Records reviewed. Still awaiting PICC line  Patient complains of severe pain with sickle cell in hips and feet  Some swelling of her legs  Will stop fluids      Temp (24hrs), Av.2 °F (36.8 °C), Min:97 °F (36.1 °C), Max:98.6 °F (37 °C)    DIET: DIET GENERAL;  CODE: Full Code    Intake/Output Summary (Last 24 hours) at 10/14/2020 0744  Last data filed at 10/14/2020 7242  Gross per 24 hour   Intake 740 ml   Output --   Net 740 ml       Review of Systems  All bolded are positive; please see HPI  General:  Fever, chills, diaphoresis, fatigue, malaise, night sweats, weight loss  Psychological:  Anxiety, disorientation, hallucinations. ENT:  Epistaxis, headaches, vertigo, visual changes. Cardiovascular:  Chest pain, irregular heartbeats, palpitations, paroxysmal nocturnal dyspnea. Respiratory:  Shortness of breath, coughing, sputum production, hemoptysis, wheezing, orthopnea.   Gastrointestinal:  Nausea, vomiting, diarrhea, heartburn, constipation, abdominal pain, hematemesis, hematochezia, melena, acholic stools  Genito-Urinary:  Dysuria, urgency, frequency, hematuria  Musculoskeletal:  Joint pain, joint stiffness, joint swelling, muscle pain  Neurology:  Headache, focal neurological deficits, weakness, numbness, paresthesia  Derm:  Rashes, ulcers, excoriations, bruising  Extremities:  Decreased ROM, peripheral edema, mottling      OBJECTIVE:    /88   Pulse 111   Temp 98.5 °F (36.9 °C) (Temporal)   Resp 12   Ht 5' 3\" (1.6 m)   Wt (!) 307 lb (139.3 kg)   LMP 2020 (Exact Date)   SpO2 98%   BMI 54.38 kg/m²     General appearance:  awake, alert, and oriented to person, place, time, and purpose; appears oxyCODONE **OR** oxyCODONE, sennosides-docusate sodium, perflutren lipid microspheres, sodium chloride flush, potassium chloride **OR** potassium alternative oral replacement **OR** potassium chloride, acetaminophen **OR** acetaminophen, senna, promethazine **OR** ondansetron, diphenhydrAMINE    Labs:     Recent Labs     10/12/20  0530 10/13/20  0442 10/14/20  0600   WBC 15.5* 12.0* 12.6*   HGB 9.6* 9.1* 8.9*   HCT 28.3* 27.3* 26.5*    250 252       Recent Labs     10/12/20  0530 10/13/20  0442 10/14/20  0600    137 138   K 3.9 4.1 3.7    104 102   CO2 26 26 26   BUN 4* 3* 4*   CREATININE 0.8 0.8 0.8   CALCIUM 8.4* 8.5* 8.5*       Recent Labs     10/12/20  0530 10/13/20  0442 10/14/20  0600   PROT 7.1 6.5 6.5   ALKPHOS 112* 103 95   ALT 11 13 11   AST 20 28 16   BILITOT 0.8 0.8 0.9       No results for input(s): INR in the last 72 hours. No results for input(s): Mathieu Hug in the last 72 hours. Chronic labs:    Lab Results   Component Value Date    TSH 1.430 10/05/2017    INR 1.1 02/24/2020    LABA1C 4.6 (L) 10/05/2017       Radiology: REVIEWED DAILY    +++++++++++++++++++++++++++++++++++++++++++++++++  [address], [place]  +++++++++++++++++++++++++++++++++++++++++++++++++  NOTE: This report was transcribed using voice recognition software. Every effort was made to ensure accuracy; however, inadvertent computerized transcription errors may be present.

## 2020-10-14 NOTE — CARE COORDINATION
Discharge plan is to return home with [facility] for IV meds. Will follow.  Henrietta Khan RN

## 2020-10-14 NOTE — PROGRESS NOTES
Vascular Surgery Progress Note    CC: f/u CLABSI    HISTORY:  The patient is awake, alert, and oriented. IMPRESSION:  CLABSI s/p port removal.    PLAN:  Discussed with patient. At this time, no plans to reinsert new port. Can always reconsider as outpatient. OK for discharge from vascular. 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 A12    Acute cystitis with hematuria N30.01    Sickle cell disease, type SC ([facility]) D57.20    Thrombosis complicating venous access device ([facility]) N40.161V    Acute bilateral low back pain with sciatica M54.40    Hb-SS disease without crisis ([address] D57.1    Maternal sickle cell anemia complicating pregnancy in second trimester ([address] O99.012, D57.1    Encounter for fetal anatomic survey Z36.89    Suspected shortening of cervix not found Z03.75    Long term current use of anticoagulant therapy Z79.01    Personal history of PE (pulmonary embolism) Z86.711    Oligohydramnios, second trimester, not applicable or unspecified O41. 46X0    Maternal morbid obesity in second trimester, antepartum ([address] O99.212, E66.01    16 weeks gestation of pregnancy Z3A.16    Sickle cell crisis ([address] D57.00    21 weeks gestation of pregnancy Z3A.21    Sickle cell crisis acute chest syndrome ([address] D57.01    Community acquired pneumonia of right upper lobe of lung J18.9    Sickle cell anemia with pain ([facility]) D57.00    Lymphadenitis I88.9    Rectal bleeding K62.5    Anemia due to blood loss D50.0    Poor venous access I87.8    Sickle cell pain crisis ([facility]) D57.00    Red blood cell antibody positive R76.8    Thalassemia minor D56.3    Positive blood culture R78.81       Current Medications:    sodium chloride 150 mL/hr at 10/13/20 1942      sodium chloride flush, heparin flush, HYDROmorphone, oxyCODONE **OR** oxyCODONE, sennosides-docusate sodium, perflutren lipid microspheres, sodium chloride flush, potassium chloride **OR** potassium alternative oral replacement **OR** potassium chloride, acetaminophen **OR** acetaminophen, senna, promethazine **OR** ondansetron, diphenhydrAMINE    lidocaine PF  5 mL Intradermal Once    heparin flush  3 mL Intravenous 2 times per day    DULoxetine  60 mg Oral Daily    enoxaparin  120 mg Subcutaneous BID    ampicillin IV  2 g Intravenous 6 times per day    cefTRIAXone (ROCEPHIN) IV  2 g Intravenous Q24H    sodium chloride flush  10 mL Intravenous 2 times per day    famotidine  20 mg Oral BID    ferrous sulfate  325 mg Oral BID WC    folic acid  1 mg Oral Daily    hydroxyurea  500 mg Oral BID          PHYSICAL EXAM:    Vitals:    10/14/20 0800   BP: 115/60   Pulse: 100   Resp: 18   Temp: 96.6 °F (35.9 °C)   SpO2: 94%     CONSTITUTIONAL:  awake, alert, cooperative, no apparent distress  Port incision CDI.     LABS:    Lab Results   Component Value Date    WBC 12.6 (H) 10/14/2020    HGB 8.9 (L) 10/14/2020    HCT 26.5 (L) 10/14/2020     10/14/2020    PROTIME 12.9 (H) 02/24/2020    INR 1.1 02/24/2020    APTT 29.6 05/01/2019    K 3.7 10/14/2020    BUN 4 (L) 10/14/2020    CREATININE 0.8 10/14/2020       RADIOLOGY:

## 2020-10-15 LAB
ALBUMIN SERPL-MCNC: 3.2 G/DL (ref 3.5–5.2)
ALP BLD-CCNC: 93 U/L (ref 35–104)
ALT SERPL-CCNC: 8 U/L (ref 0–32)
ANION GAP SERPL CALCULATED.3IONS-SCNC: 9 MMOL/L (ref 7–16)
AST SERPL-CCNC: 12 U/L (ref 0–31)
BASOPHILS ABSOLUTE: 0.01 E9/L (ref 0–0.2)
BASOPHILS RELATIVE PERCENT: 0.1 % (ref 0–2)
BILIRUB SERPL-MCNC: 0.8 MG/DL (ref 0–1.2)
BUN BLDV-MCNC: 5 MG/DL (ref 6–20)
CALCIUM SERPL-MCNC: 8.7 MG/DL (ref 8.6–10.2)
CHLORIDE BLD-SCNC: 103 MMOL/L (ref 98–107)
CO2: 27 MMOL/L (ref 22–29)
CREAT SERPL-MCNC: 0.8 MG/DL (ref 0.5–1)
EOSINOPHILS ABSOLUTE: 0.26 E9/L (ref 0.05–0.5)
EOSINOPHILS RELATIVE PERCENT: 2.3 % (ref 0–6)
GFR AFRICAN AMERICAN: >60
GFR NON-AFRICAN AMERICAN: >60 ML/MIN/1.73
GLUCOSE BLD-MCNC: 100 MG/DL (ref 74–99)
HCT VFR BLD CALC: 25.8 % (ref 34–48)
HEMOGLOBIN: 8.7 G/DL (ref 11.5–15.5)
IMMATURE GRANULOCYTES #: 0.16 E9/L
IMMATURE GRANULOCYTES %: 1.4 % (ref 0–5)
INR BLD: 1.1
LYMPHOCYTES ABSOLUTE: 2.43 E9/L (ref 1.5–4)
LYMPHOCYTES RELATIVE PERCENT: 21.2 % (ref 20–42)
MCH RBC QN AUTO: 25 PG (ref 26–35)
MCHC RBC AUTO-ENTMCNC: 33.7 % (ref 32–34.5)
MCV RBC AUTO: 74.1 FL (ref 80–99.9)
MONOCYTES ABSOLUTE: 0.57 E9/L (ref 0.1–0.95)
MONOCYTES RELATIVE PERCENT: 5 % (ref 2–12)
NEUTROPHILS ABSOLUTE: 8.05 E9/L (ref 1.8–7.3)
NEUTROPHILS RELATIVE PERCENT: 70 % (ref 43–80)
PDW BLD-RTO: 18.6 FL (ref 11.5–15)
PLATELET # BLD: 247 E9/L (ref 130–450)
PMV BLD AUTO: 10.5 FL (ref 7–12)
POTASSIUM REFLEX MAGNESIUM: 4 MMOL/L (ref 3.5–5)
PROTHROMBIN TIME: 12.9 SEC (ref 9.3–12.4)
RBC # BLD: 3.48 E12/L (ref 3.5–5.5)
SODIUM BLD-SCNC: 139 MMOL/L (ref 132–146)
TOTAL PROTEIN: 6.4 G/DL (ref 6.4–8.3)
WBC # BLD: 11.5 E9/L (ref 4.5–11.5)

## 2020-10-15 PROCEDURE — 6370000000 HC RX 637 (ALT 250 FOR IP): Performed by: INTERNAL MEDICINE

## 2020-10-15 PROCEDURE — 6360000002 HC RX W HCPCS: Performed by: SPECIALIST

## 2020-10-15 PROCEDURE — 85025 COMPLETE CBC W/AUTO DIFF WBC: CPT

## 2020-10-15 PROCEDURE — 1200000000 HC SEMI PRIVATE

## 2020-10-15 PROCEDURE — 6360000002 HC RX W HCPCS: Performed by: STUDENT IN AN ORGANIZED HEALTH CARE EDUCATION/TRAINING PROGRAM

## 2020-10-15 PROCEDURE — 6370000000 HC RX 637 (ALT 250 FOR IP): Performed by: STUDENT IN AN ORGANIZED HEALTH CARE EDUCATION/TRAINING PROGRAM

## 2020-10-15 PROCEDURE — 85610 PROTHROMBIN TIME: CPT

## 2020-10-15 PROCEDURE — 6360000002 HC RX W HCPCS: Performed by: INTERNAL MEDICINE

## 2020-10-15 PROCEDURE — 2580000003 HC RX 258: Performed by: SPECIALIST

## 2020-10-15 PROCEDURE — 2580000003 HC RX 258: Performed by: STUDENT IN AN ORGANIZED HEALTH CARE EDUCATION/TRAINING PROGRAM

## 2020-10-15 PROCEDURE — 36415 COLL VENOUS BLD VENIPUNCTURE: CPT

## 2020-10-15 PROCEDURE — 6370000000 HC RX 637 (ALT 250 FOR IP): Performed by: FAMILY MEDICINE

## 2020-10-15 PROCEDURE — 84703 CHORIONIC GONADOTROPIN ASSAY: CPT

## 2020-10-15 PROCEDURE — 80053 COMPREHEN METABOLIC PANEL: CPT

## 2020-10-15 RX ORDER — CEFTRIAXONE 1 G/1
2 INJECTION, POWDER, FOR SOLUTION INTRAMUSCULAR; INTRAVENOUS EVERY 24 HOURS
Qty: 44 G | Refills: 0 | Status: SHIPPED | OUTPATIENT
Start: 2020-10-15 | End: 2020-11-06

## 2020-10-15 RX ORDER — BENZONATATE 100 MG/1
100 CAPSULE ORAL 3 TIMES DAILY PRN
Status: DISCONTINUED | OUTPATIENT
Start: 2020-10-15 | End: 2020-10-17 | Stop reason: HOSPADM

## 2020-10-15 RX ADMIN — OXYCODONE HYDROCHLORIDE 10 MG: 10 TABLET ORAL at 21:52

## 2020-10-15 RX ADMIN — HYDROMORPHONE HYDROCHLORIDE 2 MG: 1 INJECTION, SOLUTION INTRAMUSCULAR; INTRAVENOUS; SUBCUTANEOUS at 00:45

## 2020-10-15 RX ADMIN — OXYCODONE HYDROCHLORIDE 10 MG: 10 TABLET ORAL at 13:43

## 2020-10-15 RX ADMIN — AMPICILLIN SODIUM 2 G: 2 INJECTION, POWDER, FOR SOLUTION INTRAMUSCULAR; INTRAVENOUS at 06:42

## 2020-10-15 RX ADMIN — FOLIC ACID 1 MG: 1 TABLET ORAL at 08:26

## 2020-10-15 RX ADMIN — Medication 10 ML: at 08:28

## 2020-10-15 RX ADMIN — Medication 10 ML: at 20:05

## 2020-10-15 RX ADMIN — BENZONATATE 100 MG: 100 CAPSULE ORAL at 16:10

## 2020-10-15 RX ADMIN — FERROUS SULFATE TAB 325 MG (65 MG ELEMENTAL FE) 325 MG: 325 (65 FE) TAB at 16:43

## 2020-10-15 RX ADMIN — HYDROMORPHONE HYDROCHLORIDE 1 MG: 1 INJECTION, SOLUTION INTRAMUSCULAR; INTRAVENOUS; SUBCUTANEOUS at 20:04

## 2020-10-15 RX ADMIN — AMPICILLIN SODIUM 2 G: 2 INJECTION, POWDER, FOR SOLUTION INTRAMUSCULAR; INTRAVENOUS at 19:30

## 2020-10-15 RX ADMIN — DULOXETINE HYDROCHLORIDE 60 MG: 60 CAPSULE, DELAYED RELEASE ORAL at 08:26

## 2020-10-15 RX ADMIN — HYDROXYUREA 500 MG: 500 CAPSULE ORAL at 08:26

## 2020-10-15 RX ADMIN — HYDROXYUREA 500 MG: 500 CAPSULE ORAL at 20:04

## 2020-10-15 RX ADMIN — HYDROMORPHONE HYDROCHLORIDE 1 MG: 1 INJECTION, SOLUTION INTRAMUSCULAR; INTRAVENOUS; SUBCUTANEOUS at 12:08

## 2020-10-15 RX ADMIN — OXYCODONE HYDROCHLORIDE 10 MG: 10 TABLET ORAL at 17:42

## 2020-10-15 RX ADMIN — AMPICILLIN SODIUM 2 G: 2 INJECTION, POWDER, FOR SOLUTION INTRAMUSCULAR; INTRAVENOUS at 09:49

## 2020-10-15 RX ADMIN — HEPARIN 300 UNITS: 100 SYRINGE at 16:44

## 2020-10-15 RX ADMIN — DIPHENHYDRAMINE HYDROCHLORIDE 25 MG: 50 INJECTION, SOLUTION INTRAMUSCULAR; INTRAVENOUS at 16:05

## 2020-10-15 RX ADMIN — FAMOTIDINE 20 MG: 20 TABLET, FILM COATED ORAL at 08:26

## 2020-10-15 RX ADMIN — ENOXAPARIN SODIUM 120 MG: 150 INJECTION SUBCUTANEOUS at 08:27

## 2020-10-15 RX ADMIN — AMPICILLIN SODIUM 2 G: 2 INJECTION, POWDER, FOR SOLUTION INTRAMUSCULAR; INTRAVENOUS at 02:37

## 2020-10-15 RX ADMIN — FERROUS SULFATE TAB 325 MG (65 MG ELEMENTAL FE) 325 MG: 325 (65 FE) TAB at 08:26

## 2020-10-15 RX ADMIN — AMPICILLIN SODIUM 2 G: 2 INJECTION, POWDER, FOR SOLUTION INTRAMUSCULAR; INTRAVENOUS at 22:39

## 2020-10-15 RX ADMIN — HEPARIN 6 UNITS: 100 SYRINGE at 20:05

## 2020-10-15 RX ADMIN — HYDROMORPHONE HYDROCHLORIDE 1 MG: 1 INJECTION, SOLUTION INTRAMUSCULAR; INTRAVENOUS; SUBCUTANEOUS at 16:05

## 2020-10-15 RX ADMIN — FAMOTIDINE 20 MG: 20 TABLET, FILM COATED ORAL at 20:05

## 2020-10-15 RX ADMIN — HEPARIN 300 UNITS: 100 SYRINGE at 08:28

## 2020-10-15 RX ADMIN — SODIUM CHLORIDE, PRESERVATIVE FREE 10 ML: 5 INJECTION INTRAVENOUS at 16:44

## 2020-10-15 RX ADMIN — OXYCODONE HYDROCHLORIDE 10 MG: 10 TABLET ORAL at 09:52

## 2020-10-15 RX ADMIN — CEFTRIAXONE SODIUM 2 G: 2 INJECTION, POWDER, FOR SOLUTION INTRAMUSCULAR; INTRAVENOUS at 16:43

## 2020-10-15 RX ADMIN — HYDROMORPHONE HYDROCHLORIDE 2 MG: 1 INJECTION, SOLUTION INTRAMUSCULAR; INTRAVENOUS; SUBCUTANEOUS at 04:54

## 2020-10-15 RX ADMIN — AMPICILLIN SODIUM 2 G: 2 INJECTION, POWDER, FOR SOLUTION INTRAMUSCULAR; INTRAVENOUS at 15:07

## 2020-10-15 ASSESSMENT — PAIN DESCRIPTION - PAIN TYPE
TYPE: ACUTE PAIN

## 2020-10-15 ASSESSMENT — PAIN SCALES - GENERAL
PAINLEVEL_OUTOF10: 4
PAINLEVEL_OUTOF10: 7
PAINLEVEL_OUTOF10: 4
PAINLEVEL_OUTOF10: 4
PAINLEVEL_OUTOF10: 8
PAINLEVEL_OUTOF10: 0
PAINLEVEL_OUTOF10: 3
PAINLEVEL_OUTOF10: 8
PAINLEVEL_OUTOF10: 0
PAINLEVEL_OUTOF10: 0
PAINLEVEL_OUTOF10: 7

## 2020-10-15 ASSESSMENT — PAIN DESCRIPTION - DESCRIPTORS
DESCRIPTORS: ACHING;DISCOMFORT;CONSTANT
DESCRIPTORS: ACHING;DISCOMFORT;DULL
DESCRIPTORS: ACHING;CONSTANT;DISCOMFORT
DESCRIPTORS: ACHING;DISCOMFORT;DULL
DESCRIPTORS: ACHING;DISCOMFORT;CONSTANT
DESCRIPTORS: ACHING;CONSTANT;DISCOMFORT
DESCRIPTORS: ACHING;DISCOMFORT;CONSTANT

## 2020-10-15 ASSESSMENT — PAIN DESCRIPTION - ONSET
ONSET: ON-GOING

## 2020-10-15 ASSESSMENT — PAIN DESCRIPTION - FREQUENCY
FREQUENCY: CONTINUOUS

## 2020-10-15 ASSESSMENT — PAIN DESCRIPTION - PROGRESSION
CLINICAL_PROGRESSION: GRADUALLY WORSENING

## 2020-10-15 ASSESSMENT — PAIN - FUNCTIONAL ASSESSMENT
PAIN_FUNCTIONAL_ASSESSMENT: PREVENTS OR INTERFERES WITH MANY ACTIVE NOT PASSIVE ACTIVITIES
PAIN_FUNCTIONAL_ASSESSMENT: PREVENTS OR INTERFERES WITH MANY ACTIVE NOT PASSIVE ACTIVITIES
PAIN_FUNCTIONAL_ASSESSMENT: PREVENTS OR INTERFERES SOME ACTIVE ACTIVITIES AND ADLS
PAIN_FUNCTIONAL_ASSESSMENT: PREVENTS OR INTERFERES SOME ACTIVE ACTIVITIES AND ADLS

## 2020-10-15 ASSESSMENT — PAIN DESCRIPTION - LOCATION
LOCATION: GENERALIZED

## 2020-10-15 NOTE — CARE COORDINATION
Discharge plan is home with [facility] for infusions.  Carolina Mathews RN BSN

## 2020-10-15 NOTE — PROGRESS NOTES
Called floor and spoke to [facility], informing her of the need for the patient to be NPO after midnight, hold anticoagulants until after her procedure, and obtain a serum pregnancy test for planned PICC line placement in IR tomorrow. Patient was given Lovenox 120 mg on 10/15 at 0827 and per Dr. Karthikeyan Navarro this needs to be held so we can do the procedure Friday. Please call [phone] with any further questions.

## 2020-10-15 NOTE — PROGRESS NOTES
Pt complaining of pain this AM, attempted to give PRN oxy with morning med pass, pt refusing. States it doesn't help enough and she will wait for her dilaudid. Dilaudid discontinued and explained this to pt, offered oxy again, pt continuing to refuse and wanting the doctor called about getting dilaudid back. Charge RN notified and will speak with physician.

## 2020-10-15 NOTE — PROGRESS NOTES
Hospitalist Progress Note      SYNOPSIS: Patient admitted on 10/8/2020  who was called in by her doctors office for bacteremia. Patient has a past medical history of sickle cell anemia SC type. SUBJECTIVE:  Stable overnight. No other overnight issues reported. Patient seen and examined  Records reviewed. Still awaiting PICC line  Plan is for picc line by IR tomorrow  Was crying in pain to RN earlier  I have told her I am decreasing the dilaudid dose and that if she has pain she needs to take the oxy for breakthrough pain and that today is the last day for dilaudid because she will likely dc tomorrow  She is agreeable      Temp (24hrs), Av.3 °F (36.8 °C), Min:98.2 °F (36.8 °C), Max:98.4 °F (36.9 °C)    DIET: DIET GENERAL;  CODE: Full Code    Intake/Output Summary (Last 24 hours) at 10/15/2020 0840  Last data filed at 10/14/2020 1912  Gross per 24 hour   Intake 240 ml   Output --   Net 240 ml       Review of Systems  All bolded are positive; please see HPI  General:  Fever, chills, diaphoresis, fatigue, malaise, night sweats, weight loss  Psychological:  Anxiety, disorientation, hallucinations. ENT:  Epistaxis, headaches, vertigo, visual changes. Cardiovascular:  Chest pain, irregular heartbeats, palpitations, paroxysmal nocturnal dyspnea. Respiratory:  Shortness of breath, coughing, sputum production, hemoptysis, wheezing, orthopnea.   Gastrointestinal:  Nausea, vomiting, diarrhea, heartburn, constipation, abdominal pain, hematemesis, hematochezia, melena, acholic stools  Genito-Urinary:  Dysuria, urgency, frequency, hematuria  Musculoskeletal:  Joint pain, joint stiffness, joint swelling, muscle pain  Neurology:  Headache, focal neurological deficits, weakness, numbness, paresthesia  Derm:  Rashes, ulcers, excoriations, bruising  Extremities:  Decreased ROM, peripheral edema, mottling      OBJECTIVE:    /61   Pulse 98   Temp 98.2 °F (36.8 °C) (Temporal)   Resp 18   Ht 5' 3\" (1.6 m)   Wt (!) 307 lb (139.3 kg)   LMP 08/23/2020 (Exact Date)   SpO2 98%   BMI 54.38 kg/m²     General appearance:  awake, alert, and oriented to person, place, time, and purpose; appears stated age and cooperative; no apparent distress no labored breathing obese  HEENT:  Conjunctivae/corneas clear. Neck: Supple. No jugular venous distention. Respiratory: symmetrical; clear to auscultation bilaterally; no wheezes; no rhonchi; no rales  Cardiovascular: rhythm regular; rate controlled; no murmurs  Abdomen: Soft, nontender, nondistended  Extremities:  peripheral pulses present; no peripheral edema; no ulcers  Musculoskeletal: No clubbing, cyanosis, no bilateral lower extremity edema. Brisk capillary refill. Skin:  No rashes  on visible skin  Neurologic: awake, alert and following commands     ASSESSMENT and PLAN:    · Enterococcus bacteremia due to CLABSI- Infectious disease is following. Has had multiple episodes of CLABSI. Currently on rocephin and ampicillin. PICC line ordered by infectious disease. Still awaiting PICC line. · 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- Patient should receive vaccinations prior to discharge per ID note  · Sickle cell anemia- patient is in a sickle cell crisis probably brought on by the bacteremia. Pain control  · Sickle cell pain-  · Tachycardia- Improved. Fluids DC. Pain control           DISPOSITION: Ultimate plan is home with [facility].  Still awaiting PICC    Medications:  REVIEWED DAILY    Infusion Medications     Scheduled Medications    lidocaine PF  5 mL Intradermal Once    heparin flush  3 mL Intravenous 2 times per day    DULoxetine  60 mg Oral Daily    enoxaparin  120 mg Subcutaneous BID    ampicillin IV  2 g Intravenous 6 times per day    cefTRIAXone (ROCEPHIN) IV  2 g Intravenous Q24H    sodium chloride flush  10 mL Intravenous 2 times per day    famotidine  20 mg Oral BID    ferrous sulfate  325 mg Oral BID WC    folic acid  1 mg Oral Daily    hydroxyurea  500 mg Oral BID     PRN Meds: sodium chloride flush, heparin flush, oxyCODONE **OR** oxyCODONE, sennosides-docusate sodium, perflutren lipid microspheres, sodium chloride flush, potassium chloride **OR** potassium alternative oral replacement **OR** potassium chloride, acetaminophen **OR** acetaminophen, senna, promethazine **OR** ondansetron, diphenhydrAMINE    Labs:     Recent Labs     10/13/20  0442 10/14/20  0600 10/15/20  0640   WBC 12.0* 12.6* 11.5   HGB 9.1* 8.9* 8.7*   HCT 27.3* 26.5* 25.8*    252 247       Recent Labs     10/13/20  0442 10/14/20  0600 10/15/20  0640    138 139   K 4.1 3.7 4.0    102 103   CO2 26 26 27   BUN 3* 4* 5*   CREATININE 0.8 0.8 0.8   CALCIUM 8.5* 8.5* 8.7       Recent Labs     10/13/20  0442 10/14/20  0600 10/15/20  0640   PROT 6.5 6.5 6.4   ALKPHOS 103 95 93   ALT 13 11 8   AST 28 16 12   BILITOT 0.8 0.9 0.8       No results for input(s): INR in the last 72 hours. No results for input(s): Shamika Worden in the last 72 hours. Chronic labs:    Lab Results   Component Value Date    TSH 1.430 10/05/2017    INR 1.1 02/24/2020    LABA1C 4.6 (L) 10/05/2017       Radiology: REVIEWED DAILY    +++++++++++++++++++++++++++++++++++++++++++++++++  [id number] [facility]  +++++++++++++++++++++++++++++++++++++++++++++++++  NOTE: This report was transcribed using voice recognition software. Every effort was made to ensure accuracy; however, inadvertent computerized transcription errors may be present.

## 2020-10-15 NOTE — CARE COORDINATION
The pt will have her PICC line inserted tomorrow 10/16. Notified [facility] that the pt will be discharging tomorrow, 10/16. Prescriptions and MAR faxed to [phone]. Prescriptions also faxed to [facility], [phone].  Clair Meehan RN

## 2020-10-15 NOTE — PROGRESS NOTES
Dr Madisyn Andrews to take Oxy first. Beatriz Mensah only wants to take Dilaudid because that is the only med that helps sickle cell pain.

## 2020-10-15 NOTE — PROGRESS NOTES
[address] [place] [address] [facility]  Progress Note    CC: Pain- generalized    SUBJECTIVE:  Patient is tolerating medications. No reported adverse drug reactions. No nausea, vomiting, diarrhea. No fevers. Having leg and back pain. PICC line not yet placed   Dr. Louise Daily and opted to delay insertion of a new mediport at this time      Medications:  Scheduled Meds:   lidocaine PF  5 mL Intradermal Once    heparin flush  3 mL Intravenous 2 times per day    DULoxetine  60 mg Oral Daily    enoxaparin  120 mg Subcutaneous BID    ampicillin IV  2 g Intravenous 6 times per day    cefTRIAXone (ROCEPHIN) IV  2 g Intravenous Q24H    sodium chloride flush  10 mL Intravenous 2 times per day    famotidine  20 mg Oral BID    ferrous sulfate  325 mg Oral BID WC    folic acid  1 mg Oral Daily    hydroxyurea  500 mg Oral BID     Continuous Infusions:    PRN Meds:sodium chloride flush, heparin flush, oxyCODONE **OR** oxyCODONE, sennosides-docusate sodium, perflutren lipid microspheres, sodium chloride flush, potassium chloride **OR** potassium alternative oral replacement **OR** potassium chloride, acetaminophen **OR** acetaminophen, senna, promethazine **OR** ondansetron, diphenhydrAMINE  OBJECTIVE:  Patient Vitals for the past 24 hrs:   BP Temp Temp src Pulse Resp SpO2   10/15/20 0800 109/61 98.2 °F (36.8 °C) Temporal 98 18 98 %   10/14/20 2000 121/75 98.4 °F (36.9 °C) Temporal 99 18 95 %   10/14/20 1500 121/67 98.4 °F (36.9 °C) Temporal 100 18 95 %     Constitutional: The patient is awake, alert, and oriented. Lying in bed, pain improving   Skin: Warm and dry. No rashes were noted. Head: Eyes show round, and reactive pupils. No jaundice. Mouth: Moist mucous membranes, no ulcerations, no thrush. Neck: Supple to movements. No lymphadenopathy. Chest: No use of accessory muscles to breathe. Symmetrical expansion. Auscultation reveals no wheezing, crackles, or rhonchi.  Left chest mediport removal site well approximated, closed with surgical glue   Cardiovascular: S1 and S2 are rhythmic and regular. No murmurs appreciated. Abdomen: Positive bowel sounds to auscultation. Benign to palpation. Large- obese   Extremities: No clubbing, no cyanosis, no edema.   Right groin TLC 10/10/2020      Laboratory and Tests Review:  Lab Results   Component Value Date    WBC 11.5 10/15/2020    WBC 12.6 (H) 10/14/2020    WBC 12.0 (H) 10/13/2020    HGB 8.7 (L) 10/15/2020    HCT 25.8 (L) 10/15/2020    MCV 74.1 (L) 10/15/2020     10/15/2020     Lab Results   Component Value Date    NEUTROABS 8.05 (H) 10/15/2020    NEUTROABS 8.62 (H) 10/14/2020    NEUTROABS 8.02 (H) 10/13/2020     Lab Results   Component Value Date    CRP 4.0 (H) 08/10/2016    CRP 1.7 (H) 08/02/2016    CRP 1.0 (H) 06/30/2016     Lab Results   Component Value Date    SEDRATE 10 10/26/2017    SEDRATE 29 (H) 08/10/2016    SEDRATE 10 08/02/2016     Lab Results   Component Value Date    ALT 8 10/15/2020    AST 12 10/15/2020    ALKPHOS 93 10/15/2020    BILITOT 0.8 10/15/2020     Lab Results   Component Value Date     10/15/2020    K 4.0 10/15/2020     10/15/2020    CO2 27 10/15/2020    BUN 5 10/15/2020    CREATININE 0.8 10/15/2020    GFRAA >60 10/15/2020    LABGLOM >60 10/15/2020    GLUCOSE 100 10/15/2020    GLUCOSE 89 05/03/2011    PROT 6.4 10/15/2020    LABALBU 3.2 10/15/2020    LABALBU 4.0 05/03/2011    CALCIUM 8.7 10/15/2020    BILITOT 0.8 10/15/2020    ALKPHOS 93 10/15/2020    AST 12 10/15/2020    ALT 8 10/15/2020     Radiology:  Reviewed     Microbiology:   10/7/2020- blood cx- VSE- amp sensitive  10/8/2020- blood cx- VSE  Tip cx- no growth to date   Urine cx- mixed gram positive organisms     10/10/2020: Surgical site cx: VSE, CONS  10/11/2020: blood cultures: no growth to date     ASSESSMENT:  · Enterococcus bacteremia   · Leukocytosis- 12.0 - improving   · CLABSI  · 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- pt should receive vaccinations prior to discharge Vaccine recommendations   · Rec:   · - pneumococcal vaccine (PCV 13) IM before discharge   · - meningococcal polysaccharide vaccine (Menactra) IM before discharge   · - meningococcal serotype B vaccome  · - HIB polysaccharide vaccine (ActHIB) IM before discharge  · - Tdap if appropriate   · - follow up at 2 months to get   · 1) pneumococcal polysaccharide vaccine (PPSV23)   · 2) meningococcal polysaccharide vaccine (Menactra) - second dose   ·    · - Patient will have to get second dose of PPSV23 at 5 years after the first dose and again at age 72. · - Need meningococcal vaccine booster every 5 years    PLAN:  · Continue ampicillin and rocephin   · vaccinations prior to discharge-orders written chart  · Check cultures  · Cath tip removed- culture- no growth to date   · Repeat blood cultures sent 10/11/2020- no growth to date   · PICC line ordered  · Remove TLC when PICC placed  · Monitor labs    Manjeet Anderson CNP  9:15 AM  10/15/2020   Pt seen and examined. Above discussed agree with advanced practice nurse. Labs, cultures, and radiographs reviewed. Face to Face encounter occurred. Changes made as necessary.      Anuradha Gauthier MD

## 2020-10-16 ENCOUNTER — APPOINTMENT (OUTPATIENT)
Dept: INTERVENTIONAL RADIOLOGY/VASCULAR | Age: 25
DRG: 315 | End: 2020-10-16
Payer: MEDICARE

## 2020-10-16 LAB
ALBUMIN SERPL-MCNC: 3.4 G/DL (ref 3.5–5.2)
ALP BLD-CCNC: 89 U/L (ref 35–104)
ALT SERPL-CCNC: 8 U/L (ref 0–32)
ANION GAP SERPL CALCULATED.3IONS-SCNC: 8 MMOL/L (ref 7–16)
AST SERPL-CCNC: 12 U/L (ref 0–31)
BASOPHILS ABSOLUTE: 0.01 E9/L (ref 0–0.2)
BASOPHILS RELATIVE PERCENT: 0.1 % (ref 0–2)
BILIRUB SERPL-MCNC: 0.6 MG/DL (ref 0–1.2)
BLOOD CULTURE, ROUTINE: NORMAL
BUN BLDV-MCNC: 5 MG/DL (ref 6–20)
CALCIUM SERPL-MCNC: 8.6 MG/DL (ref 8.6–10.2)
CHLORIDE BLD-SCNC: 102 MMOL/L (ref 98–107)
CO2: 28 MMOL/L (ref 22–29)
CREAT SERPL-MCNC: 0.9 MG/DL (ref 0.5–1)
CULTURE, BLOOD 2: NORMAL
EOSINOPHILS ABSOLUTE: 0.25 E9/L (ref 0.05–0.5)
EOSINOPHILS RELATIVE PERCENT: 2.4 % (ref 0–6)
GFR AFRICAN AMERICAN: >60
GFR NON-AFRICAN AMERICAN: >60 ML/MIN/1.73
GLUCOSE BLD-MCNC: 105 MG/DL (ref 74–99)
HCG QUALITATIVE: NEGATIVE
HCT VFR BLD CALC: 25.7 % (ref 34–48)
HEMOGLOBIN: 8.8 G/DL (ref 11.5–15.5)
IMMATURE GRANULOCYTES #: 0.2 E9/L
IMMATURE GRANULOCYTES %: 1.9 % (ref 0–5)
LYMPHOCYTES ABSOLUTE: 2.37 E9/L (ref 1.5–4)
LYMPHOCYTES RELATIVE PERCENT: 22.4 % (ref 20–42)
MCH RBC QN AUTO: 25.7 PG (ref 26–35)
MCHC RBC AUTO-ENTMCNC: 34.2 % (ref 32–34.5)
MCV RBC AUTO: 74.9 FL (ref 80–99.9)
MONOCYTES ABSOLUTE: 0.61 E9/L (ref 0.1–0.95)
MONOCYTES RELATIVE PERCENT: 5.8 % (ref 2–12)
NEUTROPHILS ABSOLUTE: 7.12 E9/L (ref 1.8–7.3)
NEUTROPHILS RELATIVE PERCENT: 67.4 % (ref 43–80)
PDW BLD-RTO: 18.6 FL (ref 11.5–15)
PLATELET # BLD: 242 E9/L (ref 130–450)
PMV BLD AUTO: 10.4 FL (ref 7–12)
POTASSIUM REFLEX MAGNESIUM: 4 MMOL/L (ref 3.5–5)
RBC # BLD: 3.43 E12/L (ref 3.5–5.5)
SODIUM BLD-SCNC: 138 MMOL/L (ref 132–146)
TOTAL PROTEIN: 6.6 G/DL (ref 6.4–8.3)
WBC # BLD: 10.6 E9/L (ref 4.5–11.5)

## 2020-10-16 PROCEDURE — 77001 FLUOROGUIDE FOR VEIN DEVICE: CPT

## 2020-10-16 PROCEDURE — 2709999900 IR TUNNELED CVC PLACE WO SQ PORT/PUMP > 5 YEARS

## 2020-10-16 PROCEDURE — 77001 FLUOROGUIDE FOR VEIN DEVICE: CPT | Performed by: RADIOLOGY

## 2020-10-16 PROCEDURE — 6360000002 HC RX W HCPCS: Performed by: FAMILY MEDICINE

## 2020-10-16 PROCEDURE — 85025 COMPLETE CBC W/AUTO DIFF WBC: CPT

## 2020-10-16 PROCEDURE — 80053 COMPREHEN METABOLIC PANEL: CPT

## 2020-10-16 PROCEDURE — 36558 INSERT TUNNELED CV CATH: CPT

## 2020-10-16 PROCEDURE — 6360000002 HC RX W HCPCS: Performed by: STUDENT IN AN ORGANIZED HEALTH CARE EDUCATION/TRAINING PROGRAM

## 2020-10-16 PROCEDURE — 0JH63XZ INSERTION OF TUNNELED VASCULAR ACCESS DEVICE INTO CHEST SUBCUTANEOUS TISSUE AND FASCIA, PERCUTANEOUS APPROACH: ICD-10-PCS | Performed by: RADIOLOGY

## 2020-10-16 PROCEDURE — 6370000000 HC RX 637 (ALT 250 FOR IP): Performed by: STUDENT IN AN ORGANIZED HEALTH CARE EDUCATION/TRAINING PROGRAM

## 2020-10-16 PROCEDURE — 1200000000 HC SEMI PRIVATE

## 2020-10-16 PROCEDURE — 6360000002 HC RX W HCPCS: Performed by: SPECIALIST

## 2020-10-16 PROCEDURE — 02HV33Z INSERTION OF INFUSION DEVICE INTO SUPERIOR VENA CAVA, PERCUTANEOUS APPROACH: ICD-10-PCS | Performed by: RADIOLOGY

## 2020-10-16 PROCEDURE — 76937 US GUIDE VASCULAR ACCESS: CPT | Performed by: RADIOLOGY

## 2020-10-16 PROCEDURE — 2500000003 HC RX 250 WO HCPCS: Performed by: RADIOLOGY

## 2020-10-16 PROCEDURE — 36558 INSERT TUNNELED CV CATH: CPT | Performed by: RADIOLOGY

## 2020-10-16 PROCEDURE — 6370000000 HC RX 637 (ALT 250 FOR IP): Performed by: FAMILY MEDICINE

## 2020-10-16 PROCEDURE — 2580000003 HC RX 258: Performed by: RADIOLOGY

## 2020-10-16 PROCEDURE — 6360000002 HC RX W HCPCS: Performed by: INTERNAL MEDICINE

## 2020-10-16 PROCEDURE — 6360000002 HC RX W HCPCS: Performed by: RADIOLOGY

## 2020-10-16 PROCEDURE — 76937 US GUIDE VASCULAR ACCESS: CPT

## 2020-10-16 PROCEDURE — 2580000003 HC RX 258: Performed by: STUDENT IN AN ORGANIZED HEALTH CARE EDUCATION/TRAINING PROGRAM

## 2020-10-16 RX ORDER — FAMOTIDINE 20 MG/1
20 TABLET, FILM COATED ORAL 2 TIMES DAILY
Qty: 60 TABLET | Refills: 0 | Status: SHIPPED
Start: 2020-10-17 | End: 2020-10-16 | Stop reason: HOSPADM

## 2020-10-16 RX ORDER — FENTANYL CITRATE 50 UG/ML
INJECTION, SOLUTION INTRAMUSCULAR; INTRAVENOUS
Status: COMPLETED | OUTPATIENT
Start: 2020-10-16 | End: 2020-10-16

## 2020-10-16 RX ORDER — LIDOCAINE HYDROCHLORIDE 20 MG/ML
INJECTION, SOLUTION INFILTRATION; PERINEURAL
Status: COMPLETED | OUTPATIENT
Start: 2020-10-16 | End: 2020-10-16

## 2020-10-16 RX ORDER — FUROSEMIDE 10 MG/ML
40 INJECTION INTRAMUSCULAR; INTRAVENOUS ONCE
Status: COMPLETED | OUTPATIENT
Start: 2020-10-16 | End: 2020-10-16

## 2020-10-16 RX ORDER — MIDAZOLAM HYDROCHLORIDE 1 MG/ML
INJECTION INTRAMUSCULAR; INTRAVENOUS
Status: COMPLETED | OUTPATIENT
Start: 2020-10-16 | End: 2020-10-16

## 2020-10-16 RX ORDER — FOLIC ACID 1 MG/1
1 TABLET ORAL DAILY
Qty: 30 TABLET | Refills: 0 | Status: SHIPPED | OUTPATIENT
Start: 2020-10-16

## 2020-10-16 RX ORDER — DULOXETIN HYDROCHLORIDE 60 MG/1
60 CAPSULE, DELAYED RELEASE ORAL DAILY
Qty: 30 CAPSULE | Refills: 0 | Status: SHIPPED | OUTPATIENT
Start: 2020-10-17 | End: 2021-04-28

## 2020-10-16 RX ORDER — SODIUM CHLORIDE 0.9 % (FLUSH) 0.9 %
10 SYRINGE (ML) INJECTION PRN
Status: DISCONTINUED | OUTPATIENT
Start: 2020-10-16 | End: 2020-10-17 | Stop reason: HOSPADM

## 2020-10-16 RX ADMIN — FUROSEMIDE 40 MG: 10 INJECTION, SOLUTION INTRAVENOUS at 18:30

## 2020-10-16 RX ADMIN — DIPHENHYDRAMINE HYDROCHLORIDE 25 MG: 50 INJECTION, SOLUTION INTRAMUSCULAR; INTRAVENOUS at 01:04

## 2020-10-16 RX ADMIN — AMPICILLIN SODIUM 2 G: 2 INJECTION, POWDER, FOR SOLUTION INTRAMUSCULAR; INTRAVENOUS at 15:35

## 2020-10-16 RX ADMIN — HEPARIN 300 UNITS: 100 SYRINGE at 20:38

## 2020-10-16 RX ADMIN — HYDROMORPHONE HYDROCHLORIDE 1 MG: 1 INJECTION, SOLUTION INTRAMUSCULAR; INTRAVENOUS; SUBCUTANEOUS at 05:17

## 2020-10-16 RX ADMIN — FERROUS SULFATE TAB 325 MG (65 MG ELEMENTAL FE) 325 MG: 325 (65 FE) TAB at 17:13

## 2020-10-16 RX ADMIN — OXYCODONE HYDROCHLORIDE 10 MG: 10 TABLET ORAL at 18:38

## 2020-10-16 RX ADMIN — HYDROMORPHONE HYDROCHLORIDE 1 MG: 1 INJECTION, SOLUTION INTRAMUSCULAR; INTRAVENOUS; SUBCUTANEOUS at 01:04

## 2020-10-16 RX ADMIN — DULOXETINE HYDROCHLORIDE 60 MG: 60 CAPSULE, DELAYED RELEASE ORAL at 11:00

## 2020-10-16 RX ADMIN — DIPHENHYDRAMINE HYDROCHLORIDE 25 MG: 50 INJECTION, SOLUTION INTRAMUSCULAR; INTRAVENOUS at 11:00

## 2020-10-16 RX ADMIN — OXYCODONE HYDROCHLORIDE 10 MG: 10 TABLET ORAL at 23:32

## 2020-10-16 RX ADMIN — LIDOCAINE HYDROCHLORIDE 17 ML: 20 INJECTION, SOLUTION INFILTRATION; PERINEURAL at 10:04

## 2020-10-16 RX ADMIN — HYDROMORPHONE HYDROCHLORIDE 0.5 MG: 1 INJECTION, SOLUTION INTRAMUSCULAR; INTRAVENOUS; SUBCUTANEOUS at 20:39

## 2020-10-16 RX ADMIN — AMPICILLIN SODIUM 2 G: 2 INJECTION, POWDER, FOR SOLUTION INTRAMUSCULAR; INTRAVENOUS at 03:19

## 2020-10-16 RX ADMIN — HYDROMORPHONE HYDROCHLORIDE 1 MG: 1 INJECTION, SOLUTION INTRAMUSCULAR; INTRAVENOUS; SUBCUTANEOUS at 11:01

## 2020-10-16 RX ADMIN — HYDROXYUREA 500 MG: 500 CAPSULE ORAL at 20:35

## 2020-10-16 RX ADMIN — FERROUS SULFATE TAB 325 MG (65 MG ELEMENTAL FE) 325 MG: 325 (65 FE) TAB at 11:00

## 2020-10-16 RX ADMIN — AMPICILLIN SODIUM 2 G: 2 INJECTION, POWDER, FOR SOLUTION INTRAMUSCULAR; INTRAVENOUS at 07:08

## 2020-10-16 RX ADMIN — OXYCODONE HYDROCHLORIDE 10 MG: 10 TABLET ORAL at 03:20

## 2020-10-16 RX ADMIN — MIDAZOLAM 1 MG: 1 INJECTION INTRAMUSCULAR; INTRAVENOUS at 10:09

## 2020-10-16 RX ADMIN — AMPICILLIN SODIUM 2 G: 2 INJECTION, POWDER, FOR SOLUTION INTRAMUSCULAR; INTRAVENOUS at 18:31

## 2020-10-16 RX ADMIN — DIPHENHYDRAMINE HYDROCHLORIDE 25 MG: 50 INJECTION, SOLUTION INTRAMUSCULAR; INTRAVENOUS at 22:18

## 2020-10-16 RX ADMIN — FOLIC ACID 1 MG: 1 TABLET ORAL at 10:59

## 2020-10-16 RX ADMIN — FAMOTIDINE 20 MG: 20 TABLET, FILM COATED ORAL at 20:38

## 2020-10-16 RX ADMIN — SODIUM CHLORIDE, PRESERVATIVE FREE 10 ML: 5 INJECTION INTRAVENOUS at 22:18

## 2020-10-16 RX ADMIN — HYDROMORPHONE HYDROCHLORIDE 1 MG: 1 INJECTION, SOLUTION INTRAMUSCULAR; INTRAVENOUS; SUBCUTANEOUS at 15:35

## 2020-10-16 RX ADMIN — FENTANYL CITRATE 50 MCG: 50 INJECTION, SOLUTION INTRAMUSCULAR; INTRAVENOUS at 10:07

## 2020-10-16 RX ADMIN — AMPICILLIN SODIUM 2 G: 2 INJECTION, POWDER, FOR SOLUTION INTRAMUSCULAR; INTRAVENOUS at 23:41

## 2020-10-16 RX ADMIN — AMPICILLIN SODIUM 2 G: 2 INJECTION, POWDER, FOR SOLUTION INTRAMUSCULAR; INTRAVENOUS at 11:09

## 2020-10-16 RX ADMIN — FAMOTIDINE 20 MG: 20 TABLET, FILM COATED ORAL at 10:59

## 2020-10-16 RX ADMIN — CEFTRIAXONE SODIUM 2 G: 2 INJECTION, POWDER, FOR SOLUTION INTRAMUSCULAR; INTRAVENOUS at 17:14

## 2020-10-16 RX ADMIN — Medication 10 ML: at 20:34

## 2020-10-16 ASSESSMENT — PAIN SCALES - GENERAL
PAINLEVEL_OUTOF10: 8
PAINLEVEL_OUTOF10: 7
PAINLEVEL_OUTOF10: 8
PAINLEVEL_OUTOF10: 0
PAINLEVEL_OUTOF10: 8
PAINLEVEL_OUTOF10: 7
PAINLEVEL_OUTOF10: 8
PAINLEVEL_OUTOF10: 9
PAINLEVEL_OUTOF10: 8
PAINLEVEL_OUTOF10: 10

## 2020-10-16 ASSESSMENT — PAIN DESCRIPTION - ONSET
ONSET: ON-GOING
ONSET: ON-GOING

## 2020-10-16 ASSESSMENT — PAIN DESCRIPTION - LOCATION
LOCATION: BACK;HIP;LEG
LOCATION: BACK;HIP;LEG

## 2020-10-16 ASSESSMENT — PAIN DESCRIPTION - PROGRESSION
CLINICAL_PROGRESSION: NOT CHANGED
CLINICAL_PROGRESSION: NOT CHANGED

## 2020-10-16 ASSESSMENT — PAIN DESCRIPTION - PAIN TYPE
TYPE: ACUTE PAIN
TYPE: ACUTE PAIN

## 2020-10-16 ASSESSMENT — PAIN DESCRIPTION - ORIENTATION
ORIENTATION: RIGHT;LEFT;LOWER
ORIENTATION: RIGHT;LEFT;LOWER

## 2020-10-16 ASSESSMENT — PAIN - FUNCTIONAL ASSESSMENT
PAIN_FUNCTIONAL_ASSESSMENT: PREVENTS OR INTERFERES WITH MANY ACTIVE NOT PASSIVE ACTIVITIES
PAIN_FUNCTIONAL_ASSESSMENT: PREVENTS OR INTERFERES SOME ACTIVE ACTIVITIES AND ADLS

## 2020-10-16 ASSESSMENT — PAIN DESCRIPTION - FREQUENCY
FREQUENCY: CONTINUOUS
FREQUENCY: CONTINUOUS

## 2020-10-16 ASSESSMENT — PAIN DESCRIPTION - DESCRIPTORS
DESCRIPTORS: ACHING
DESCRIPTORS: ACHING

## 2020-10-16 NOTE — POST SEDATION
POST SEDATION NOTE:  Time: 8:27 PM    Cardiopulmonary: Vitals Signs Stable: yes    Level of Consciousness: alert    Reversal Agent Used: No    Complications: none    Follow-up/Observations: none    Pain Score: 1    Berta Oneal MD

## 2020-10-16 NOTE — PRE SEDATION
Radha Kimbrough II, MD  10/16/2020  9:54 AM        PRE-SEDATION PHYSICIAN ASSESSMENT:      1. HISTORY & PHYSICAL EXAMINATION:  Comments: none    Vitals:    10/16/20 0946   BP: (!) 130/90   Pulse: 89   Resp: 25   Temp:    SpO2: 98%       Allergies: Bactrim [sulfamethoxazole-trimethoprim]; Demerol hcl [meperidine]; Demerol hcl [meperidine]; Adhesive tape; and Bactrim [sulfamethoxazole-trimethoprim]    2. Heart and Lungs immediately prior to procedure demonstrate no contraindications to proceed      Chief Complaint: <principal problem not specified>    Drug: unknown  Tobacco: unknown    3. PAST ANESTHESIA EXPERIENCE:  unknown. 4. AIRWAY/TEETH/HEAD & NECK(Mallampati Classification):  II (soft palate, uvula, fauces visible)    5: NORMAL RANGE OF MOTION OF NECK: No    6. PATIENT WILL LIKELY TOLERATE PLAN OF MODERATE SEDATION    7. ASA 2.     Hilaria Ortiz MD

## 2020-10-16 NOTE — PROGRESS NOTES
[address] [place] [address] [facility]  Progress Note    CC: Pain- generalized    SUBJECTIVE:  Patient is tolerating medications. No reported adverse drug reactions. No nausea, vomiting, diarrhea. No fevers. Continues having leg and back pain. PICC line not yet placed.  Will be done in IR today   Dr. Harolyn Bernheim and opted to delay insertion of a new mediport at this time      Medications:  Scheduled Meds:   pneumococcal 13-valent conjugate  0.5 mL Intramuscular Prior to discharge    And    Meningococcal oligosacharide  0.5 mL Intramuscular Prior to discharge    And    meningococcal group B  0.5 mL Intramuscular Prior to discharge    And    haemophilus B polysac-tetanus toxoid (ActHIB)  0.5 mL Intramuscular Prior to discharge    Tetanus-Diphth-Acell Pertussis  0.5 mL Intramuscular Prior to discharge    lidocaine PF  5 mL Intradermal Once    heparin flush  3 mL Intravenous 2 times per day    DULoxetine  60 mg Oral Daily    [Held by provider] enoxaparin  120 mg Subcutaneous BID    ampicillin IV  2 g Intravenous 6 times per day    cefTRIAXone (ROCEPHIN) IV  2 g Intravenous Q24H    sodium chloride flush  10 mL Intravenous 2 times per day    famotidine  20 mg Oral BID    ferrous sulfate  325 mg Oral BID WC    folic acid  1 mg Oral Daily    hydroxyurea  500 mg Oral BID     Continuous Infusions:    PRN Meds:HYDROmorphone, benzonatate, sodium chloride flush, heparin flush, oxyCODONE **OR** oxyCODONE, sennosides-docusate sodium, perflutren lipid microspheres, sodium chloride flush, potassium chloride **OR** potassium alternative oral replacement **OR** potassium chloride, acetaminophen **OR** acetaminophen, senna, promethazine **OR** ondansetron, diphenhydrAMINE  OBJECTIVE:  Patient Vitals for the past 24 hrs:   BP Temp Temp src Pulse Resp SpO2   10/16/20 0740 (!) 120/59 98.1 °F (36.7 °C) Oral 92 16 97 %   10/16/20 0000 114/69 97.3 °F (36.3 °C) Temporal 99 16 98 %   10/15/20 1545 (!) 140/65 98.3 °F (36.8 °C) Temporal 80 17 99 %     Constitutional: The patient is awake, alert, and oriented. Lying in bed,  Skin: Warm and dry. No rashes were noted. Head: Eyes show round, and reactive pupils. No jaundice. Mouth: Moist mucous membranes, no ulcerations, no thrush. Neck: Supple to movements. No lymphadenopathy. Chest: No use of accessory muscles to breathe. Symmetrical expansion. Auscultation reveals no wheezing, crackles, or rhonchi. Left chest mediport removal site well approximated, closed with surgical glue   Cardiovascular: S1 and S2 are rhythmic and regular. No murmurs appreciated. Abdomen: Positive bowel sounds to auscultation. Benign to palpation. Large- obese   Extremities: No clubbing, no cyanosis, no edema.   Right groin TLC 10/10/2020      Laboratory and Tests Review:  Lab Results   Component Value Date    WBC 10.6 10/16/2020    WBC 11.5 10/15/2020    WBC 12.6 (H) 10/14/2020    HGB 8.8 (L) 10/16/2020    HCT 25.7 (L) 10/16/2020    MCV 74.9 (L) 10/16/2020     10/16/2020     Lab Results   Component Value Date    NEUTROABS 7.12 10/16/2020    NEUTROABS 8.05 (H) 10/15/2020    NEUTROABS 8.62 (H) 10/14/2020     Lab Results   Component Value Date    CRP 4.0 (H) 08/10/2016    CRP 1.7 (H) 08/02/2016    CRP 1.0 (H) 06/30/2016     Lab Results   Component Value Date    SEDRATE 10 10/26/2017    SEDRATE 29 (H) 08/10/2016    SEDRATE 10 08/02/2016     Lab Results   Component Value Date    ALT 8 10/16/2020    AST 12 10/16/2020    ALKPHOS 89 10/16/2020    BILITOT 0.6 10/16/2020     Lab Results   Component Value Date     10/16/2020    K 4.0 10/16/2020     10/16/2020    CO2 28 10/16/2020    BUN 5 10/16/2020    CREATININE 0.9 10/16/2020    GFRAA >60 10/16/2020    LABGLOM >60 10/16/2020    GLUCOSE 105 10/16/2020    GLUCOSE 89 05/03/2011    PROT 6.6 10/16/2020    LABALBU 3.4 10/16/2020    LABALBU 4.0 05/03/2011    CALCIUM 8.6 10/16/2020    BILITOT 0.6 10/16/2020    ALKPHOS 89 10/16/2020    AST 12 10/16/2020    ALT 8 10/16/2020     Radiology:  Reviewed     Microbiology:   10/7/2020- blood cx- VSE- amp sensitive  10/8/2020- blood cx- VSE  Tip cx- no growth to date   Urine cx- mixed gram positive organisms     10/10/2020: Surgical site cx: VSE, CONS  10/11/2020: blood cultures: no growth to date     ASSESSMENT:  · Enterococcus bacteremia   · Leukocytosis- 12.0 - improving   · CLABSI  · 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- pt should receive vaccinations prior to discharge     Vaccine recommendations   · Rec:   · - pneumococcal vaccine (PCV 13) IM before discharge   · - meningococcal polysaccharide vaccine (Menactra) IM before discharge   · - meningococcal serotype B vaccome  · - HIB polysaccharide vaccine (ActHIB) IM before discharge  · - Tdap if appropriate   · - follow up at 2 months to get   · 1) pneumococcal polysaccharide vaccine (PPSV23)   · 2) meningococcal polysaccharide vaccine (Menactra) - second dose   ·    · - Patient will have to get second dose of PPSV23 at 5 years after the first dose and again at age 72. · - Need meningococcal vaccine booster every 5 years    PLAN:  · Continue ampicillin and rocephin   · vaccinations prior to discharge-orders written chart  · Check cultures  · Cath tip removed- culture- no growth to date   · Repeat blood cultures sent 10/11/2020- no growth to date   · PICC line ordered  · Remove TLC when PICC placed  · Monitor labs    Lorena Patel CNP  9:00 AM  10/16/2020   Pt seen and examined. Above discussed agree with advanced practice nurse. Labs, cultures, and radiographs reviewed. Face to Face encounter occurred. Changes made as necessary.      Tabatha Wright MD

## 2020-10-16 NOTE — INTERVAL H&P NOTE
H&P Update    Patient's History and Physical  was reviewed. The patient appears likely to able to tolerate the procedure. Risk and benefits discussed including ultimate complications, possibly death and consent obtained.     Shahnaz Madera, II

## 2020-10-16 NOTE — CARE COORDINATION
The pt stated that her mother cannot pick her up until after 9PM. Offered transportation. She does not have any keys to get in the home. [facility]. The pt will receive her 11:00 AM does 10/17 and can be discharge after that.  Stephen Armstrong RN

## 2020-10-16 NOTE — CARE COORDINATION
Discharge plan is to return home with mother. [facility] care infusion services notified that the pt will be due for her medication at 1700. [place] [id number] notified and updated clinicals faxed. Still need home care orders, with addition of IV antibiotics administration.  Evelyn Buenrostro RN

## 2020-10-16 NOTE — PROGRESS NOTES
Hospitalist Progress Note      SYNOPSIS: Patient admitted on 10/8/2020  who was called in by her doctors office for bacteremia. Patient has a past medical history of sickle cell anemia SC type. SUBJECTIVE:  Stable overnight. No other overnight issues reported. Patient seen and examined  Records reviewed. Patient had PICC line placed  She is okay for discharge  However she says she has no ride  Cab was offered however she says no keys  DC tomorrow am      Temp (24hrs), Av.4 °F (36.3 °C), Min:96.9 °F (36.1 °C), Max:98.1 °F (36.7 °C)    DIET: DIET GENERAL;  CODE: Full Code    Intake/Output Summary (Last 24 hours) at 10/16/2020 9692  Last data filed at 10/15/2020 2239  Gross per 24 hour   Intake 590 ml   Output --   Net 590 ml       Review of Systems  All bolded are positive; please see HPI  General:  Fever, chills, diaphoresis, fatigue, malaise, night sweats, weight loss  Psychological:  Anxiety, disorientation, hallucinations. ENT:  Epistaxis, headaches, vertigo, visual changes. Cardiovascular:  Chest pain, irregular heartbeats, palpitations, paroxysmal nocturnal dyspnea. Respiratory:  Shortness of breath, coughing, sputum production, hemoptysis, wheezing, orthopnea.   Gastrointestinal:  Nausea, vomiting, diarrhea, heartburn, constipation, abdominal pain, hematemesis, hematochezia, melena, acholic stools  Genito-Urinary:  Dysuria, urgency, frequency, hematuria  Musculoskeletal:  Joint pain, joint stiffness, joint swelling, muscle pain  Neurology:  Headache, focal neurological deficits, weakness, numbness, paresthesia  Derm:  Rashes, ulcers, excoriations, bruising  Extremities:  Decreased ROM, peripheral edema, mottling      OBJECTIVE:    BP (!) 113/53   Pulse 90   Temp 96.9 °F (36.1 °C) (Temporal)   Resp 16   Ht 5' 3\" (1.6 m)   Wt (!) 307 lb (139.3 kg)   LMP 2020 (Exact Date)   SpO2 91%   BMI 54.38 kg/m²     General appearance:  awake, alert, and oriented to person, place, time, and purpose; appears stated age and cooperative; no apparent distress no labored breathing obese  HEENT:  Conjunctivae/corneas clear. Neck: Supple. No jugular venous distention. Respiratory: symmetrical; clear to auscultation bilaterally; no wheezes; no rhonchi; no rales  Cardiovascular: rhythm regular; rate controlled; no murmurs  Abdomen: Soft, nontender, nondistended  Extremities:  peripheral pulses present; no peripheral edema; no ulcers  Musculoskeletal: No clubbing, cyanosis, no bilateral lower extremity edema. Brisk capillary refill. Skin:  No rashes  on visible skin  Neurologic: awake, alert and following commands     ASSESSMENT and PLAN:    · Enterococcus bacteremia due to CLABSI- Infectious disease is following. Has had multiple episodes of CLABSI. Currently on rocephin and ampicillin. 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 Pain control   · Sickle cell pain-  · Tachycardia- Improved. Fluids DC. Pain control           DISPOSITION: Ultimate plan is home with [facility]. Was ok for dc however she has no ride.  Delay due to patient    Medications:  REVIEWED DAILY    Infusion Medications     Scheduled Medications    pneumococcal 13-valent conjugate  0.5 mL Intramuscular Prior to discharge    And    Meningococcal oligosacharide  0.5 mL Intramuscular Prior to discharge    And    meningococcal group B  0.5 mL Intramuscular Prior to discharge    And    haemophilus B polysac-tetanus toxoid (ActHIB)  0.5 mL Intramuscular Prior to discharge    Tetanus-Diphth-Acell Pertussis  0.5 mL Intramuscular Prior to discharge    lidocaine PF  5 mL Intradermal Once    heparin flush  3 mL Intravenous 2 times per day    DULoxetine  60 mg Oral Daily    [Held by provider] enoxaparin  120 mg Subcutaneous BID    ampicillin IV 2 g Intravenous 6 times per day    cefTRIAXone (ROCEPHIN) IV  2 g Intravenous Q24H    sodium chloride flush  10 mL Intravenous 2 times per day    famotidine  20 mg Oral BID    ferrous sulfate  325 mg Oral BID WC    folic acid  1 mg Oral Daily    hydroxyurea  500 mg Oral BID     PRN Meds: sodium chloride flush, HYDROmorphone, benzonatate, sodium chloride flush, heparin flush, oxyCODONE **OR** oxyCODONE, sennosides-docusate sodium, perflutren lipid microspheres, sodium chloride flush, potassium chloride **OR** potassium alternative oral replacement **OR** potassium chloride, acetaminophen **OR** acetaminophen, senna, promethazine **OR** ondansetron, diphenhydrAMINE    Labs:     Recent Labs     10/14/20  0600 10/15/20  0640 10/16/20  0515   WBC 12.6* 11.5 10.6   HGB 8.9* 8.7* 8.8*   HCT 26.5* 25.8* 25.7*    247 242       Recent Labs     10/14/20  0600 10/15/20  0640 10/16/20  0515    139 138   K 3.7 4.0 4.0    103 102   CO2 26 27 28   BUN 4* 5* 5*   CREATININE 0.8 0.8 0.9   CALCIUM 8.5* 8.7 8.6       Recent Labs     10/14/20  0600 10/15/20  0640 10/16/20  0515   PROT 6.5 6.4 6.6   ALKPHOS 95 93 89   ALT 11 8 8   AST 16 12 12   BILITOT 0.9 0.8 0.6       Recent Labs     10/15/20  0957   INR 1.1       No results for input(s): Kenneth Tejeda in the last 72 hours. Chronic labs:    Lab Results   Component Value Date    TSH 1.430 10/05/2017    INR 1.1 10/15/2020    LABA1C 4.6 (L) 10/05/2017       Radiology: REVIEWED DAILY    +++++++++++++++++++++++++++++++++++++++++++++++++  [address], [facility]  +++++++++++++++++++++++++++++++++++++++++++++++++  NOTE: This report was transcribed using voice recognition software. Every effort was made to ensure accuracy; however, inadvertent computerized transcription errors may be present.

## 2020-10-16 NOTE — BRIEF OP NOTE
Brief Postoperative Note    Yun Flank  YOB: 1995  [id number]    Pre-operative Diagnosis and Procedure: picc    Post-operative Diagnosis: Same    Anesthesia: Local    Estimated Blood Loss: < 10 cc    Surgeon: Nory SHIN     Complications: none    Specimen obtained: none     Findings: none     Moises Reynolds II   10/16/2020 9:57 AM

## 2020-10-17 VITALS
HEIGHT: 63 IN | SYSTOLIC BLOOD PRESSURE: 107 MMHG | HEART RATE: 85 BPM | WEIGHT: 293 LBS | OXYGEN SATURATION: 99 % | TEMPERATURE: 97.7 F | BODY MASS INDEX: 51.91 KG/M2 | RESPIRATION RATE: 16 BRPM | DIASTOLIC BLOOD PRESSURE: 59 MMHG

## 2020-10-17 LAB
ALBUMIN SERPL-MCNC: 3.7 G/DL (ref 3.5–5.2)
ALP BLD-CCNC: 87 U/L (ref 35–104)
ALT SERPL-CCNC: 7 U/L (ref 0–32)
ANION GAP SERPL CALCULATED.3IONS-SCNC: 9 MMOL/L (ref 7–16)
AST SERPL-CCNC: 11 U/L (ref 0–31)
BASOPHILS ABSOLUTE: 0.02 E9/L (ref 0–0.2)
BASOPHILS RELATIVE PERCENT: 0.2 % (ref 0–2)
BILIRUB SERPL-MCNC: 0.6 MG/DL (ref 0–1.2)
BUN BLDV-MCNC: 6 MG/DL (ref 6–20)
CALCIUM SERPL-MCNC: 8.8 MG/DL (ref 8.6–10.2)
CHLORIDE BLD-SCNC: 99 MMOL/L (ref 98–107)
CO2: 30 MMOL/L (ref 22–29)
CREAT SERPL-MCNC: 0.9 MG/DL (ref 0.5–1)
EOSINOPHILS ABSOLUTE: 0.3 E9/L (ref 0.05–0.5)
EOSINOPHILS RELATIVE PERCENT: 2.5 % (ref 0–6)
GFR AFRICAN AMERICAN: >60
GFR NON-AFRICAN AMERICAN: >60 ML/MIN/1.73
GLUCOSE BLD-MCNC: 103 MG/DL (ref 74–99)
HCT VFR BLD CALC: 26.8 % (ref 34–48)
HEMOGLOBIN: 9.1 G/DL (ref 11.5–15.5)
IMMATURE GRANULOCYTES #: 0.15 E9/L
IMMATURE GRANULOCYTES %: 1.2 % (ref 0–5)
LYMPHOCYTES ABSOLUTE: 2.59 E9/L (ref 1.5–4)
LYMPHOCYTES RELATIVE PERCENT: 21.6 % (ref 20–42)
MAGNESIUM: 2 MG/DL (ref 1.6–2.6)
MCH RBC QN AUTO: 25.1 PG (ref 26–35)
MCHC RBC AUTO-ENTMCNC: 34 % (ref 32–34.5)
MCV RBC AUTO: 74 FL (ref 80–99.9)
MONOCYTES ABSOLUTE: 0.72 E9/L (ref 0.1–0.95)
MONOCYTES RELATIVE PERCENT: 6 % (ref 2–12)
NEUTROPHILS ABSOLUTE: 8.23 E9/L (ref 1.8–7.3)
NEUTROPHILS RELATIVE PERCENT: 68.5 % (ref 43–80)
PDW BLD-RTO: 18.6 FL (ref 11.5–15)
PLATELET # BLD: 279 E9/L (ref 130–450)
PMV BLD AUTO: 10.7 FL (ref 7–12)
POTASSIUM REFLEX MAGNESIUM: 3.4 MMOL/L (ref 3.5–5)
RBC # BLD: 3.62 E12/L (ref 3.5–5.5)
SODIUM BLD-SCNC: 138 MMOL/L (ref 132–146)
TOTAL PROTEIN: 7 G/DL (ref 6.4–8.3)
WBC # BLD: 12 E9/L (ref 4.5–11.5)

## 2020-10-17 PROCEDURE — 6370000000 HC RX 637 (ALT 250 FOR IP): Performed by: STUDENT IN AN ORGANIZED HEALTH CARE EDUCATION/TRAINING PROGRAM

## 2020-10-17 PROCEDURE — 2580000003 HC RX 258: Performed by: STUDENT IN AN ORGANIZED HEALTH CARE EDUCATION/TRAINING PROGRAM

## 2020-10-17 PROCEDURE — 6360000002 HC RX W HCPCS: Performed by: STUDENT IN AN ORGANIZED HEALTH CARE EDUCATION/TRAINING PROGRAM

## 2020-10-17 PROCEDURE — 6370000000 HC RX 637 (ALT 250 FOR IP): Performed by: FAMILY MEDICINE

## 2020-10-17 PROCEDURE — 6360000002 HC RX W HCPCS: Performed by: SPECIALIST

## 2020-10-17 PROCEDURE — 83735 ASSAY OF MAGNESIUM: CPT

## 2020-10-17 PROCEDURE — 80053 COMPREHEN METABOLIC PANEL: CPT

## 2020-10-17 PROCEDURE — 85025 COMPLETE CBC W/AUTO DIFF WBC: CPT

## 2020-10-17 RX ADMIN — AMPICILLIN SODIUM 2 G: 2 INJECTION, POWDER, FOR SOLUTION INTRAMUSCULAR; INTRAVENOUS at 02:55

## 2020-10-17 RX ADMIN — ONDANSETRON 4 MG: 2 INJECTION INTRAMUSCULAR; INTRAVENOUS at 10:12

## 2020-10-17 RX ADMIN — HEPARIN 300 UNITS: 100 SYRINGE at 10:07

## 2020-10-17 RX ADMIN — OXYCODONE HYDROCHLORIDE 10 MG: 10 TABLET ORAL at 10:05

## 2020-10-17 RX ADMIN — Medication 10 ML: at 10:27

## 2020-10-17 RX ADMIN — DULOXETINE HYDROCHLORIDE 60 MG: 60 CAPSULE, DELAYED RELEASE ORAL at 10:02

## 2020-10-17 RX ADMIN — FOLIC ACID 1 MG: 1 TABLET ORAL at 10:03

## 2020-10-17 RX ADMIN — HYDROXYUREA 500 MG: 500 CAPSULE ORAL at 10:04

## 2020-10-17 RX ADMIN — FAMOTIDINE 20 MG: 20 TABLET, FILM COATED ORAL at 10:03

## 2020-10-17 RX ADMIN — AMPICILLIN SODIUM 2 G: 2 INJECTION, POWDER, FOR SOLUTION INTRAMUSCULAR; INTRAVENOUS at 10:04

## 2020-10-17 RX ADMIN — FERROUS SULFATE TAB 325 MG (65 MG ELEMENTAL FE) 325 MG: 325 (65 FE) TAB at 10:04

## 2020-10-17 RX ADMIN — POTASSIUM CHLORIDE 40 MEQ: 1500 TABLET, EXTENDED RELEASE ORAL at 10:07

## 2020-10-17 RX ADMIN — AMPICILLIN SODIUM 2 G: 2 INJECTION, POWDER, FOR SOLUTION INTRAMUSCULAR; INTRAVENOUS at 06:49

## 2020-10-17 ASSESSMENT — PAIN SCALES - GENERAL
PAINLEVEL_OUTOF10: 3
PAINLEVEL_OUTOF10: 7
PAINLEVEL_OUTOF10: 0
PAINLEVEL_OUTOF10: 8

## 2020-10-17 NOTE — DISCHARGE SUMMARY
Discharge Summary    Admit date: 10/8/2020    Discharge date and time: 10/17/2020 11:58 AM     Admitting Physician: Denis Blount DO     Consultants: Infectious disease    Admission Diagnoses:  Enterococcus bacteremia    Discharge Diagnoses and hospital course:   · Enterococcus bacteremia due to CLABSI- Infectious disease is following. Has had multiple episodes of CLABSI. Currently on rocephin and ampicillin. 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 Pain control   · Sickle cell pain-resolved  · Tachycardia- resolved        Discharge Exam:  Vitals:    10/17/20 0900   BP:    Pulse: 85   Resp: 16   Temp: 97.7 °F (36.5 °C)   SpO2: 99%       General appearance:  awake, alert, and oriented to person, place, time, and purpose; appears stated age and cooperative; no apparent distress no labored breathing obese  HEENT:  Conjunctivae/corneas clear. Neck: Supple. No jugular venous distention. Respiratory: symmetrical; clear to auscultation bilaterally; no wheezes; no rhonchi; no rales  Cardiovascular: rhythm regular; rate controlled; no murmurs  Abdomen: Soft, nontender, nondistended  Extremities:  peripheral pulses present; no peripheral edema; no ulcers  Musculoskeletal: No clubbing, cyanosis, no bilateral lower extremity edema. Brisk capillary refill. Skin:  No rashes  on visible skin  Neurologic: awake, alert and following commands     Disposition:  home  The patient's condition is good. At this time the patient is without objective evidence of an acute process requiring continuing hospitalization or inpatient management. They are stable for discharge with outpatient follow-up.      I have spoken with the patient and discussed the results of the current hospitalization, in addition to providing specific details for the plan of care and counseling regarding the diagnosis and prognosis. The plan has been discussed in detail and they are aware of the specific conditions for emergent return, as well as the importance of follow-up. Their questions are answered at this time and they are agreeable with the plan for discharge to home     Patient Instructions: Continue antibiotics as directed with home health care. Follow-up with infectious disease Dr. Fili Blair.  Follow-up PCP within 1 week. Discharge Medications:     Medication List      START taking these medications    ampicillin  infusion  Commonly known as:  OMNIPEN  Infuse 2,000 mg intravenously every 4 hours for 22 days Compound per protocol.      cefTRIAXone 1 g injection  Commonly known as:  Rocephin  Inject 2 g into the muscle every 24 hours for 22 days     DULoxetine 60 MG extended release capsule  Commonly known as:  CYMBALTA  Take 1 capsule by mouth daily        CHANGE how you take these medications    enoxaparin 150 MG/ML injection  Commonly known as:  LOVENOX  Inject 0.93 mLs into the skin every 12 hours  What changed:  how much to take        CONTINUE taking these medications    ferrous sulfate 325 (65 Fe) MG tablet  Commonly known as:  IRON 325  Take 1 tablet by mouth 2 times daily (with meals)     folic acid 1 MG tablet  Commonly known as:  FOLVITE  Take 1 tablet by mouth daily     hydroxyurea 500 MG chemo capsule  Commonly known as:  HYDREA        STOP taking these medications    azithromycin 250 MG tablet  Commonly known as:  Zithromax Z-Kirk     cefdinir 300 MG capsule  Commonly known as:  OMNICEF     escitalopram 5 MG tablet  Commonly known as:  LEXAPRO     ondansetron 4 MG disintegrating tablet  Commonly known as:  Zofran ODT           Where to Get Your Medications      These medications were sent to [facility] [address], [address] [address]    Phone:  [phone]

## 2020-10-17 NOTE — DISCHARGE INSTR - COC
Continuity of Care Form    Patient Name: Christiano Prasad   :  1995  MRN:  [id number]    Admit date:  10/8/2020  Discharge date:  10/17/2020    Code Status Order: Full Code   Advance Directives:   [facility] Documentation     Date/Time Healthcare Directive Type of Healthcare Directive Copy in [facility] [address] Agent's Name Healthcare Agent's Phone Number    10/08/20 2251  Yes, patient has an advance directive for healthcare treatment  Durable power of  for health care  --  Healthcare power of   Christiano Prasad  [id number]          Admitting Physician:  Speedy Pascual DO  PCP: Edmund Werner    Discharging Nurse: [facility] Unit/Room#: 2171/4084-D  Discharging Unit Phone Number: [phone]    Emergency Contact:   Extended Emergency Contact Information  Primary Emergency Contact: Sabasdelma Brown  Address: [address] [place],[address]           [address] Phone: [phone]  Mobile Phone: [phone]  Relation: Parent   needed? No  Secondary Emergency Contact: [facility] [address] Phone: [phone]  Mobile Phone: [phone]  Relation: Aunt/Uncle   needed?  No    Past Surgical History:  Past Surgical History:   Procedure Laterality Date    INSERTION / REMOVAL / REPLACEMENT VENOUS ACCESS CATHETER Left 2019    PORT INSERTION performed by Celina Cortez MD at [address] IR INS PICC VAD W SQ PORT GREATER THAN 5 Right 10/16/2020    tunneled picc inserted by Dr Wallace Werner II 10/16 R upper chest    IR TUNNELED CATHETER PLACEMENT GREATER THAN 5 YEARS  10/16/2020    IR TUNNELED CATHETER PLACEMENT GREATER THAN 5 YEARS 10/16/2020 Rick Raines MD [facility]ED[facility]    KNEE SURGERY      right    OTHER SURGICAL HISTORY  2016    removal of mediport    OTHER SURGICAL HISTORY Right 2018    TUNNELED VENOUS PORT REMOVAL & REINSERTION;DILATION OF VENOUS TRACT    PORT SURGERY N/A 10/10/2020    insertion of right femoral catheter, triple lumen catheter and PORT REMOVAL performed by Sushant Walls MD at [facility]  2014    and removed    TUNNELED VENOUS PORT PLACEMENT Right 01/23/2015    [place], removed 08/04/16 CLABSI    TUNNELED VENOUS PORT PLACEMENT Right 09/15/2016    CHEST    TUNNELED VENOUS PORT PLACEMENT Right 05/18/2017    SUBCLAVIAN       Immunization History:   Immunization History   Administered Date(s) Administered    DTP 1995, 1995, 02/19/1996, 07/01/1997    DTaP 1995, 1995, 02/19/1996, 07/01/1997, 09/22/2000    HIB PRP-T (ActHIB, Hiberix) 1995, 1995, 02/19/1996, 07/01/1997    Hepatitis A 09/13/2010    Hepatitis B 1995, 1995, 02/19/1996    Influenza Virus Vaccine 09/25/2014, 11/05/2015    Influenza, Quadv, 6 mo and older, IM, PF (Flulaval, Fluarix) 11/21/2018    Influenza, Eugena Demarco, IM, (6 mo and older Fluzone, Flulaval, Fluarix and 3 yrs and older Afluria) 10/31/2013, 09/20/2016    MMR 07/01/1997, 09/22/2000    Meningococcal MCV4O (Menveo) 10/30/2008    Meningococcal MCV4P (Menactra) 11/10/2014    Pneumococcal Conjugate 7-valent (Prevnar7) 02/06/2001    Pneumococcal Polysaccharide (Igwyyahyp13) 02/06/2001, 11/10/2014    Polio IPV (IPOL) 1995, 1995, 07/01/1997, 09/22/2000    Polio OPV 1995, 1995, 07/01/1997, 09/22/2000    Tdap (Boostrix, Adacel) 09/26/2006, 06/12/2017    Varicella (Varivax) 03/30/1999       Active Problems:  Patient Active Problem List   Diagnosis Code    Microcytic anemia D50.9    Obesity, Class III, BMI 40-49.9 (morbid obesity) (HCC) E66.01    Chronic pain syndrome G89.4    Chest pain R07.9    Nephrocalcinosis E83.59, N29    Psychogenic pain F45.41    Hb-SS disease with crisis ([facility] [address] D57.00    S/p Pulmonary embolism (HCC) I26.99    Numbness on right side R20.0    Gastroenteritis K52.9    Sickle-cell-hemoglobin C disease with crisis ([address] 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 A12    Acute cystitis with hematuria N30.01    Sickle cell disease, type SC (HCC) D57.20    Thrombosis complicating venous access device ([facility]) M59.304H    Acute bilateral low back pain with sciatica M54.40    Hb-SS disease without crisis ([address] D57.1    Maternal sickle cell anemia complicating pregnancy in second trimester ([address] O99.012, D57.1    Encounter for fetal anatomic survey Z36.89    Suspected shortening of cervix not found Z03.75    Long term current use of anticoagulant therapy Z79.01    Personal history of PE (pulmonary embolism) Z86.711    Oligohydramnios, second trimester, not applicable or unspecified O41. 02X0    Maternal morbid obesity in second trimester, antepartum ([address] O99.212, E66.01    16 weeks gestation of pregnancy Z3A.16    Sickle cell crisis ([address] D57.00    21 weeks gestation of pregnancy Z3A.21    Sickle cell crisis acute chest syndrome ([address] D57.01    Community acquired pneumonia of right upper lobe of lung J18.9    Sickle cell anemia with pain (HCC) D57.00    Lymphadenitis I88.9    Rectal bleeding K62.5    Anemia due to blood loss D50.0    Poor venous access I87.8    Sickle cell pain crisis ([address] D57.00    Red blood cell antibody positive R76.8    Thalassemia minor D56.3    Positive blood culture R78.81       Isolation/Infection:   Isolation          No Isolation        Patient Infection Status     Infection Onset Added Last Indicated Last Indicated By Review Planned Expiration Resolved Resolved By    None active    Resolved    MDRO (multi-drug resistant organism)  09/15/14 09/15/14 Rashel Rogers RN   04/24/15 Sara Peres RN    9/9/14 dante sena aria          Nurse Assessment:  Last Vital Signs: BP (!) 107/59   Pulse 85   Temp 97.7 °F (36.5 °C) (Temporal)   Resp 16   Ht 5' 3\" (1.6 m)   Wt (!) 307 lb (139.3 kg)   LMP 08/23/2020 (Exact Date)   SpO2 99%   BMI 54.38 kg/m²     Last documented pain score (0-10 scale): Pain Level: 8  Last Weight:   Wt Readings from Last 1 Encounters:   10/08/20 (!) 307 lb (139.3 kg)     Mental Status:  oriented and alert    IV Access:  - PICC - site  {Anatomy; iv placement site:70842}, insertion date: ***  10/16/2020  Nursing Mobility/ADLs:  Walking   Independent  Transfer  Independent  Bathing  Independent  Dressing  Independent  Toileting  Independent  Feeding  Independent  Med [address]  Med Delivery   whole    Wound Care Documentation and Therapy:        Elimination:  Continence:   · Bowel: No  · Bladder: No  Urinary Catheter: {Urinary Catheter:220735955}   Colostomy/Ileostomy/Ileal Conduit: {YES / SP:40916}       Date of Last BM: ***    Intake/Output Summary (Last 24 hours) at 10/17/2020 1038  Last data filed at 10/17/2020 0000  Gross per 24 hour   Intake 360 ml   Output --   Net 360 ml     I/O last 3 completed shifts: In: 360 [P.O.:360]  Out: -     Safety Concerns:     None    Impairments/Disabilities:      None    Nutrition Therapy:  Current Nutrition Therapy:   - Oral Diet:  General    Routes of Feeding: None  Liquids: No Restrictions  Daily Fluid Restriction: no  Last Modified Barium Swallow with Video (Video Swallowing Test): not done    Treatments at the Time of Hospital Discharge:   Respiratory Treatments: ***  Oxygen Therapy:  is not on home oxygen therapy.   Ventilator:    - No ventilator support    Rehab Therapies: ***  Weight Bearing Status/Restrictions: No weight bearing restirctions  Other Medical Equipment (for information only, NOT a DME order):  {EQUIPMENT:900743183}  Other Treatments: ***    Patient's personal belongings (please select all that are sent with patient):  {[facility] DME Belongings:269589248}    RN SIGNATURE:  Electronically signed by Oral Backer on 10/17/20 at 11:05 AM EDT    CASE MANAGEMENT/SOCIAL WORK SECTION    Inpatient Status Date: ***    Readmission Risk Assessment Score:  Readmission Risk              Risk of Unplanned Readmission:        17           Discharging to Facility/ Agency   · Name:   · Address:  · Phone:  · Fax:    Dialysis Facility (if applicable)   · Name:  · Address:  · Dialysis Schedule:  · Phone:  · Fax:    / signature: Electronically signed by Oral Backer on 10/17/20 at 11:05 AM EDT    PHYSICIAN SECTION    Prognosis: {Prognosis:7117840097}    Condition at Discharge: Peng Brigitte Curtis Patient Condition:[id number]}    Rehab Potential (if transferring to Rehab): {Prognosis:7454725097}    Recommended Labs or Other Treatments After Discharge: ***    Physician Certification: I certify the above information and transfer of Esdras Ureña  is necessary for the continuing treatment of the diagnosis listed and that she requires {Admit to Appropriate Level of Care:17893} for {GREATER/LESS:804254116} 30 days.      Update Admission H&P: {CHP DME Changes in [facility]}    PHYSICIAN SIGNATURE:  {Esignature:[id number]}

## 2020-10-17 NOTE — PROGRESS NOTES
Offered bath, patient refused at this time.   Has pending discharge today and says will get shower at home

## 2020-10-17 NOTE — PROGRESS NOTES
Patient refusing prescriptions for outpatient pharmacy, patient stated she already has both prescriptions at home. Duloxetine and    folic acid .  Notified outpatient pharmacy

## 2020-11-23 ENCOUNTER — TELEPHONE (OUTPATIENT)
Dept: VASCULAR SURGERY | Age: 25
End: 2020-11-23

## 2020-11-23 NOTE — TELEPHONE ENCOUNTER
Patient's cultures came back with no growth. OK from infectious disease standpoint to have port inserted.

## 2020-11-24 NOTE — PROGRESS NOTES
PATIENT AGREES TO HAVE COVID TESTING DONE 11/25/2020 AT [facility]. AGREES TO SELF ISOLATE UNTIL SURGERY DAY.

## 2020-11-24 NOTE — PROGRESS NOTES
[facility] 36 PRE-ADMISSION TESTING GENERAL INSTRUCTIONS- [facility]-phone number:[phone]    GENERAL INSTRUCTIONS  [x] Antibacterial Soap shower Night before and/or AM of Surgery  [] Live wipe instruction sheet and wipes given. [x] Nothing by mouth after midnight, including gum, candy, mints, or water. [x] You may brush your teeth, gargle, but do NOT swallow water. []Hibiclens shower  the night before and the morning of surgery. Do not use             Hibiclens on your face or head. [x]No smoking, chewing tobacco, illegal drugs, or alcohol within 24 hours of your surgery. [x] Jewelry, valuables or body piercing's should not be brought to the hospital. All body and/or tongue piercing's must be removed prior to arriving to hospital.  ALL hair pins must be removed. [x] Do not wear makeup, lotions, powders, deodorant. Nail polish as directed by the nurse. [x] Arrange transportation with a responsible adult  to and from the hospital. If you do not have a responsible adult  to transport you, you will need to make arrangements with a medical transportation company (i.e. [employer]. A Uber/taxi/bus is not appropriate unless you are accompanied by a responsible adult ). Arrange for someone to be with you for the remainder of the day and for 24 hours after your procedure due to having had anesthesia. Who will be your  for transportation?_____COLBY YEE_____________   Who will be staying with you for 24 hrs after your procedure?___MOM_______________  [x] Bring insurance card and photo ID.  [] Transfusion Bracelet: Please bring with you to hospital, day of surgery  [x] Bring urine specimen day of surgery. Any small container is acceptable. [] Use inhalers the morning of surgery and bring with you to hospital.  [] Bring copy of living will or healthcare power of  papers to be placed in your electronic record.   [] CPAP/BI-PAP: Please bring your machine if you are to spend the night in the hospital.     PARKING INSTRUCTIONS:   [x] Arrival Time:__1100___________  [x] PLEASE ARRIVE 11/30/2020 AT 1100 AM. SURGERY WITH DR MARAVILLA. YOU WILL BE DIRECTED TO PRE OP. [x] To reach the [facility] from [address] [place] [address], upon entering the hospital, take elevator B to the 3rd floor. EDUCATION INSTRUCTIONS:      [] Knee or hip replacement booklet & exercise pamphlets given. [] Frida 77 placed in chart. [] Pre-admission Testing educational folder given  [x] Incentive Spirometry,coughing & deep breathing exercises reviewed. [x]Medication information sheet(s)   [x]Fluoroscopy-Xray used in surgery reviewed with patient. Educational pamphlet placed in chart. [x]Pain: Post-op pain is normal and to be expected. You will be asked to rate your pain from 0-10(a zero is not acceptable-education is needed). Your post-op pain goal is:  [x] Ask your nurse for your pain medication. [] Joint camp offered. [] Joint replacement booklets given. [] Other:___________________________    MEDICATION INSTRUCTIONS:   [x]Bring a complete list of your medications, please write the last time you took the medicine, give this list to the nurse. [x] Take the following medications the morning of surgery with 1-2 ounces of water: SEE LIST  [x] Stop herbal supplements and vitamins 5 days before your surgery. [] DO NOT take any diabetic medicine the morning of surgery. Follow instructions for insulin the day before surgery. [] If you are diabetic and your blood sugar is low or you feel symptomatic, you may drink 1-2 ounces of apple juice or take a glucose tablet. The morning of your procedure, you may call the pre-op area if you have concerns about your blood sugar [phone]. [] Use your inhalers the morning of surgery. Bring your emergency inhaler with you day of surgery.    [] Follow physician instructions regarding any blood thinners you may be taking. WHAT TO EXPECT:  [x] The day of surgery you will be greeted and checked in by the [employer].  In addition, you will be registered in the [facility] by a Patient Access Representative. Please bring your photo ID and insurance card. A nurse will greet you in accordance to the time you are needed in the pre-op area to prepare you for surgery. Please do not be discouraged if you are not greeted in the order you arrive as there are many variables that are involved in patient preparation. Your patience is greatly appreciated as you wait for your nurse. Please bring in items such as: books, magazines, newspapers, electronics, or any other items  to occupy your time in the waiting area. [x]  Delays may occur with surgery and staff will make a sincere effort to keep you informed of delays. If any delays occur with your procedure, we apologize ahead of time for your inconvenience as we recognize the value of your time.

## 2020-11-25 ENCOUNTER — HOSPITAL ENCOUNTER (OUTPATIENT)
Age: 25
Discharge: HOME | End: 2020-11-27
Payer: MEDICARE

## 2020-11-25 PROCEDURE — U0003 INFECTIOUS AGENT DETECTION BY NUCLEIC ACID (DNA OR RNA); SEVERE ACUTE RESPIRATORY SYNDROME CORONAVIRUS 2 (SARS-COV-2) (CORONAVIRUS DISEASE [COVID-19]), AMPLIFIED PROBE TECHNIQUE, MAKING USE OF HIGH THROUGHPUT TECHNOLOGIES AS DESCRIBED BY CMS-2020-01-R: HCPCS

## 2020-11-26 LAB
SARS-COV-2: NOT DETECTED
SOURCE: NORMAL

## 2020-11-30 ENCOUNTER — HOSPITAL ENCOUNTER (OUTPATIENT)
Age: 25
Setting detail: OUTPATIENT SURGERY
Discharge: HOME | End: 2020-11-30
Attending: SURGERY | Admitting: SURGERY
Payer: MEDICARE

## 2020-11-30 ENCOUNTER — ANESTHESIA (OUTPATIENT)
Dept: OPERATING ROOM | Age: 25
End: 2020-11-30
Payer: MEDICARE

## 2020-11-30 ENCOUNTER — ANESTHESIA EVENT (OUTPATIENT)
Dept: OPERATING ROOM | Age: 25
End: 2020-11-30
Payer: MEDICARE

## 2020-11-30 VITALS
DIASTOLIC BLOOD PRESSURE: 67 MMHG | SYSTOLIC BLOOD PRESSURE: 103 MMHG | RESPIRATION RATE: 36 BRPM | OXYGEN SATURATION: 79 %

## 2020-11-30 VITALS
OXYGEN SATURATION: 99 % | RESPIRATION RATE: 20 BRPM | SYSTOLIC BLOOD PRESSURE: 107 MMHG | HEART RATE: 82 BPM | TEMPERATURE: 98 F | HEIGHT: 63 IN | BODY MASS INDEX: 51.91 KG/M2 | WEIGHT: 293 LBS | DIASTOLIC BLOOD PRESSURE: 60 MMHG

## 2020-11-30 LAB
ANION GAP SERPL CALCULATED.3IONS-SCNC: 10 MMOL/L (ref 7–16)
BUN BLDV-MCNC: 8 MG/DL (ref 6–20)
CALCIUM SERPL-MCNC: 9.4 MG/DL (ref 8.6–10.2)
CHLORIDE BLD-SCNC: 102 MMOL/L (ref 98–107)
CO2: 26 MMOL/L (ref 22–29)
CREAT SERPL-MCNC: 0.8 MG/DL (ref 0.5–1)
GFR AFRICAN AMERICAN: >60
GFR NON-AFRICAN AMERICAN: >60 ML/MIN/1.73
GLUCOSE BLD-MCNC: 96 MG/DL (ref 74–99)
HCG, URINE, POC: NEGATIVE
HCT VFR BLD CALC: 32.8 % (ref 34–48)
HEMOGLOBIN: 11.5 G/DL (ref 11.5–15.5)
Lab: NORMAL
MCH RBC QN AUTO: 23.6 PG (ref 26–35)
MCHC RBC AUTO-ENTMCNC: 35.1 % (ref 32–34.5)
MCV RBC AUTO: 67.2 FL (ref 80–99.9)
NEGATIVE QC PASS/FAIL: NORMAL
PDW BLD-RTO: 17.5 FL (ref 11.5–15)
PLATELET # BLD: 270 E9/L (ref 130–450)
PMV BLD AUTO: 10.7 FL (ref 7–12)
POSITIVE QC PASS/FAIL: NORMAL
POTASSIUM REFLEX MAGNESIUM: 4 MMOL/L (ref 3.5–5)
RBC # BLD: 4.88 E12/L (ref 3.5–5.5)
SODIUM BLD-SCNC: 138 MMOL/L (ref 132–146)
WBC # BLD: 8.8 E9/L (ref 4.5–11.5)

## 2020-11-30 PROCEDURE — 2500000003 HC RX 250 WO HCPCS: Performed by: SURGERY

## 2020-11-30 PROCEDURE — 2580000003 HC RX 258: Performed by: SURGERY

## 2020-11-30 PROCEDURE — 6360000002 HC RX W HCPCS: Performed by: NURSE ANESTHETIST, CERTIFIED REGISTERED

## 2020-11-30 PROCEDURE — 3700000001 HC ADD 15 MINUTES (ANESTHESIA): Performed by: SURGERY

## 2020-11-30 PROCEDURE — 2500000003 HC RX 250 WO HCPCS: Performed by: NURSE ANESTHETIST, CERTIFIED REGISTERED

## 2020-11-30 PROCEDURE — 3700000000 HC ANESTHESIA ATTENDED CARE: Performed by: SURGERY

## 2020-11-30 PROCEDURE — 3600000003 HC SURGERY LEVEL 3 BASE: Performed by: SURGERY

## 2020-11-30 PROCEDURE — 7100000010 HC PHASE II RECOVERY - FIRST 15 MIN: Performed by: SURGERY

## 2020-11-30 PROCEDURE — 77001 FLUOROGUIDE FOR VEIN DEVICE: CPT | Performed by: SURGERY

## 2020-11-30 PROCEDURE — 3600000013 HC SURGERY LEVEL 3 ADDTL 15MIN: Performed by: SURGERY

## 2020-11-30 PROCEDURE — 6360000002 HC RX W HCPCS: Performed by: SURGERY

## 2020-11-30 PROCEDURE — 7100000011 HC PHASE II RECOVERY - ADDTL 15 MIN: Performed by: SURGERY

## 2020-11-30 PROCEDURE — C1769 GUIDE WIRE: HCPCS | Performed by: SURGERY

## 2020-11-30 PROCEDURE — 36415 COLL VENOUS BLD VENIPUNCTURE: CPT

## 2020-11-30 PROCEDURE — 2709999900 HC NON-CHARGEABLE SUPPLY: Performed by: SURGERY

## 2020-11-30 PROCEDURE — 85027 COMPLETE CBC AUTOMATED: CPT

## 2020-11-30 PROCEDURE — 80048 BASIC METABOLIC PNL TOTAL CA: CPT

## 2020-11-30 PROCEDURE — C1788 PORT, INDWELLING, IMP: HCPCS | Performed by: SURGERY

## 2020-11-30 PROCEDURE — 6360000002 HC RX W HCPCS: Performed by: ANESTHESIOLOGY

## 2020-11-30 PROCEDURE — 36561 INSERT TUNNELED CV CATH: CPT | Performed by: SURGERY

## 2020-11-30 DEVICE — PORT INFUS 8FR PWR INJ CT FOR VASC ACCS CATH: Type: IMPLANTABLE DEVICE | Site: CHEST | Status: FUNCTIONAL

## 2020-11-30 RX ORDER — SODIUM CHLORIDE 0.9 % (FLUSH) 0.9 %
10 SYRINGE (ML) INJECTION PRN
Status: DISCONTINUED | OUTPATIENT
Start: 2020-11-30 | End: 2020-11-30 | Stop reason: HOSPADM

## 2020-11-30 RX ORDER — SODIUM CHLORIDE 0.9 % (FLUSH) 0.9 %
10 SYRINGE (ML) INJECTION EVERY 12 HOURS SCHEDULED
Status: DISCONTINUED | OUTPATIENT
Start: 2020-11-30 | End: 2020-11-30 | Stop reason: HOSPADM

## 2020-11-30 RX ORDER — HYDRALAZINE HYDROCHLORIDE 20 MG/ML
5 INJECTION INTRAMUSCULAR; INTRAVENOUS EVERY 10 MIN PRN
Status: DISCONTINUED | OUTPATIENT
Start: 2020-11-30 | End: 2020-11-30 | Stop reason: HOSPADM

## 2020-11-30 RX ORDER — FENTANYL CITRATE 50 UG/ML
INJECTION, SOLUTION INTRAMUSCULAR; INTRAVENOUS PRN
Status: DISCONTINUED | OUTPATIENT
Start: 2020-11-30 | End: 2020-11-30 | Stop reason: SDUPTHER

## 2020-11-30 RX ORDER — PHENYLEPHRINE HCL IN 0.9% NACL 1 MG/10 ML
SYRINGE (ML) INTRAVENOUS PRN
Status: DISCONTINUED | OUTPATIENT
Start: 2020-11-30 | End: 2020-11-30 | Stop reason: SDUPTHER

## 2020-11-30 RX ORDER — PROMETHAZINE HYDROCHLORIDE 25 MG/ML
6.25 INJECTION, SOLUTION INTRAMUSCULAR; INTRAVENOUS
Status: COMPLETED | OUTPATIENT
Start: 2020-11-30 | End: 2020-11-30

## 2020-11-30 RX ORDER — SODIUM CHLORIDE 9 MG/ML
INJECTION, SOLUTION INTRAVENOUS CONTINUOUS
Status: DISCONTINUED | OUTPATIENT
Start: 2020-11-30 | End: 2020-11-30 | Stop reason: HOSPADM

## 2020-11-30 RX ORDER — LABETALOL HYDROCHLORIDE 5 MG/ML
5 INJECTION, SOLUTION INTRAVENOUS EVERY 10 MIN PRN
Status: DISCONTINUED | OUTPATIENT
Start: 2020-11-30 | End: 2020-11-30 | Stop reason: HOSPADM

## 2020-11-30 RX ORDER — MIDAZOLAM HYDROCHLORIDE 1 MG/ML
INJECTION INTRAMUSCULAR; INTRAVENOUS PRN
Status: DISCONTINUED | OUTPATIENT
Start: 2020-11-30 | End: 2020-11-30 | Stop reason: SDUPTHER

## 2020-11-30 RX ORDER — HEPARIN SODIUM (PORCINE) LOCK FLUSH IV SOLN 100 UNIT/ML 100 UNIT/ML
SOLUTION INTRAVENOUS PRN
Status: DISCONTINUED | OUTPATIENT
Start: 2020-11-30 | End: 2020-11-30 | Stop reason: ALTCHOICE

## 2020-11-30 RX ORDER — PROPOFOL 10 MG/ML
INJECTION, EMULSION INTRAVENOUS CONTINUOUS PRN
Status: DISCONTINUED | OUTPATIENT
Start: 2020-11-30 | End: 2020-11-30 | Stop reason: SDUPTHER

## 2020-11-30 RX ADMIN — CEFAZOLIN 3 G: 1 INJECTION, POWDER, FOR SOLUTION INTRAMUSCULAR; INTRAVENOUS; PARENTERAL at 12:30

## 2020-11-30 RX ADMIN — PROMETHAZINE HYDROCHLORIDE 6.25 MG: 25 INJECTION INTRAMUSCULAR; INTRAVENOUS at 14:24

## 2020-11-30 RX ADMIN — HYDROMORPHONE HYDROCHLORIDE 0.25 MG: 1 INJECTION, SOLUTION INTRAMUSCULAR; INTRAVENOUS; SUBCUTANEOUS at 15:30

## 2020-11-30 RX ADMIN — HYDROMORPHONE HYDROCHLORIDE 0.25 MG: 1 INJECTION, SOLUTION INTRAMUSCULAR; INTRAVENOUS; SUBCUTANEOUS at 15:20

## 2020-11-30 RX ADMIN — HYDROMORPHONE HYDROCHLORIDE 0.25 MG: 1 INJECTION, SOLUTION INTRAMUSCULAR; INTRAVENOUS; SUBCUTANEOUS at 15:15

## 2020-11-30 RX ADMIN — HYDROMORPHONE HYDROCHLORIDE 0.5 MG: 1 INJECTION, SOLUTION INTRAMUSCULAR; INTRAVENOUS; SUBCUTANEOUS at 14:15

## 2020-11-30 RX ADMIN — Medication 50 MCG: at 13:31

## 2020-11-30 RX ADMIN — HYDROMORPHONE HYDROCHLORIDE 0.25 MG: 1 INJECTION, SOLUTION INTRAMUSCULAR; INTRAVENOUS; SUBCUTANEOUS at 15:25

## 2020-11-30 RX ADMIN — Medication 100 MCG: at 13:15

## 2020-11-30 RX ADMIN — HYDROMORPHONE HYDROCHLORIDE 0.5 MG: 1 INJECTION, SOLUTION INTRAMUSCULAR; INTRAVENOUS; SUBCUTANEOUS at 14:39

## 2020-11-30 RX ADMIN — FENTANYL CITRATE 50 MCG: 50 INJECTION, SOLUTION INTRAMUSCULAR; INTRAVENOUS at 12:41

## 2020-11-30 RX ADMIN — HYDROMORPHONE HYDROCHLORIDE 0.5 MG: 1 INJECTION, SOLUTION INTRAMUSCULAR; INTRAVENOUS; SUBCUTANEOUS at 14:30

## 2020-11-30 RX ADMIN — HYDROMORPHONE HYDROCHLORIDE 0.5 MG: 1 INJECTION, SOLUTION INTRAMUSCULAR; INTRAVENOUS; SUBCUTANEOUS at 14:10

## 2020-11-30 RX ADMIN — FENTANYL CITRATE 50 MCG: 50 INJECTION, SOLUTION INTRAMUSCULAR; INTRAVENOUS at 12:59

## 2020-11-30 RX ADMIN — SODIUM CHLORIDE: 9 INJECTION, SOLUTION INTRAVENOUS at 12:28

## 2020-11-30 RX ADMIN — MIDAZOLAM 2 MG: 1 INJECTION INTRAMUSCULAR; INTRAVENOUS at 12:43

## 2020-11-30 RX ADMIN — Medication 50 MCG: at 13:23

## 2020-11-30 RX ADMIN — FENTANYL CITRATE 100 MCG: 50 INJECTION, SOLUTION INTRAMUSCULAR; INTRAVENOUS at 13:58

## 2020-11-30 RX ADMIN — MIDAZOLAM 2 MG: 1 INJECTION INTRAMUSCULAR; INTRAVENOUS at 12:28

## 2020-11-30 RX ADMIN — PROPOFOL 100 MCG/KG/MIN: 10 INJECTION, EMULSION INTRAVENOUS at 12:41

## 2020-11-30 RX ADMIN — Medication 50 MCG: at 13:38

## 2020-11-30 ASSESSMENT — PAIN SCALES - GENERAL
PAINLEVEL_OUTOF10: 5
PAINLEVEL_OUTOF10: 3
PAINLEVEL_OUTOF10: 4
PAINLEVEL_OUTOF10: 6
PAINLEVEL_OUTOF10: 9
PAINLEVEL_OUTOF10: 10
PAINLEVEL_OUTOF10: 10
PAINLEVEL_OUTOF10: 9
PAINLEVEL_OUTOF10: 5
PAINLEVEL_OUTOF10: 5
PAINLEVEL_OUTOF10: 10

## 2020-11-30 ASSESSMENT — LIFESTYLE VARIABLES: SMOKING_STATUS: 0

## 2020-11-30 ASSESSMENT — PAIN DESCRIPTION - DESCRIPTORS
DESCRIPTORS: ACHING;DISCOMFORT;DULL

## 2020-11-30 ASSESSMENT — PAIN DESCRIPTION - LOCATION
LOCATION: CHEST;LEG
LOCATION: BACK;CHEST;LEG
LOCATION: CHEST;LEG
LOCATION: CHEST;LEG
LOCATION: BACK;CHEST;LEG
LOCATION: BACK;CHEST;LEG
LOCATION: CHEST;LEG

## 2020-11-30 ASSESSMENT — PAIN - FUNCTIONAL ASSESSMENT: PAIN_FUNCTIONAL_ASSESSMENT: 0-10

## 2020-11-30 ASSESSMENT — PAIN DESCRIPTION - PAIN TYPE
TYPE: SURGICAL PAIN
TYPE: SURGICAL PAIN

## 2020-11-30 NOTE — ANESTHESIA PRE PROCEDURE
Department of Anesthesiology  Preprocedure Note       Name:  Rand Casarez   Age:  22 y.o.  :  1995                                          MRN:  [id number]         Date:  2020      Surgeon: Mercy Romero):  Maryellen Nielsen MD    Procedure: Procedure(s):  RIGHT FEMORAL CATHETER REMOVAL  PORT INSERTION    Medications prior to admission:   Prior to Admission medications    Medication Sig Start Date End Date Taking? Authorizing Provider   DULoxetine (CYMBALTA) 60 MG extended release capsule Take 1 capsule by mouth daily 10/17/20  Yes Cecelia Asp, DO   folic acid (FOLVITE) 1 MG tablet Take 1 tablet by mouth daily 10/16/20   Aliya Bentley,    enoxaparin (LOVENOX) 150 MG/ML injection Inject 0.93 mLs into the skin every 12 hours  Patient taking differently: Inject 120 mg into the skin every 12 hours  10/26/19   Semaj Rodriguez MD   hydroxyurea (HYDREA) 500 MG chemo capsule Take 500 mg by mouth 2 times daily    Historical Provider, MD   ferrous sulfate 325 (65 FE) MG tablet Take 1 tablet by mouth 2 times daily (with meals) 17   [place] Force, DO       Current medications:    Current Facility-Administered Medications   Medication Dose Route Frequency Provider Last Rate Last Dose    0.9 % sodium chloride infusion   Intravenous Continuous Maryellen Nielsen MD        sodium chloride flush 0.9 % injection 10 mL  10 mL Intravenous 2 times per day Maryellen Nielsen MD        sodium chloride flush 0.9 % injection 10 mL  10 mL Intravenous PRN Maryellen Nielsen MD        ceFAZolin (ANCEF) 3 g in dextrose 5 % 100 mL IVPB  3 g Intravenous On Call to [facility] [address] [place]           Allergies:     Allergies   Allergen Reactions    Bactrim [Sulfamethoxazole-Trimethoprim]     Demerol Hcl [Meperidine] Hives    Demerol Hcl [Meperidine]     Adhesive Tape Dermatitis, Rash and Itching    Bactrim [Sulfamethoxazole-Trimethoprim] Rash     Rash, itchy       Problem List:    Patient Active Problem List Diagnosis Code    Microcytic anemia D50.9    Obesity, Class III, BMI 40-49.9 (morbid obesity) ([facility]) E66.01    Chronic pain syndrome G89.4    Chest pain R07.9    Nephrocalcinosis E83.59, N29    Psychogenic pain F45.41    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    Thrombosis complicating venous access device ([facility]) K67.972A    Acute bilateral low back pain with sciatica M54.40    Hb-SS disease without crisis ([facility] D57.1    Maternal sickle cell anemia complicating pregnancy in second trimester ([facility] O99.012, D57.1    Encounter for fetal anatomic survey Z36.89    Suspected shortening of cervix not found Z03.75    Long term current use of anticoagulant therapy Z79.01    Personal history of PE (pulmonary embolism) Z86.711    Oligohydramnios, second trimester, not applicable or unspecified O41. 46X0    Maternal morbid obesity in second trimester, antepartum ([facility] O99.212, E66.01    16 weeks gestation of pregnancy Z3A.16    Sickle cell crisis ([facility] D57.00    21 weeks gestation of pregnancy Z3A.21    Sickle cell crisis acute chest syndrome ([facility] D57.01    Community acquired pneumonia of right upper lobe of lung J18.9    Sickle cell anemia with pain ([facility]) D57.00    Lymphadenitis I88.9    Rectal bleeding K62.5    Anemia due to blood loss D50.0    Poor venous access I87.8    Sickle cell pain crisis ([facility]) D57.00    Red blood cell antibody positive R76.8    Thalassemia minor D56.3    Positive blood culture R78.81       Past Medical History:        Diagnosis Date    CLABSI (central line-associated bloodstream infection) 8/3/2016    Deep vein thrombosis (DVT) of left upper extremity ([facility] 8/12/2016    Disease of blood and blood forming organ     Heart murmur     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     Miscarriage within last 12 months 04/2017    Obesity, Class III, BMI 40-49.9 (morbid obesity) ([facility] 5/22/2014    PE (pulmonary embolism)     PE (pulmonary embolism)     Pneumonia     Psychiatric problem     Sickle cell anemia (HCC)     Thrombosis complicating venous access device ([facility] 5/18/2017       Past Surgical History:        Procedure Laterality Date    INSERTION / REMOVAL / REPLACEMENT VENOUS ACCESS CATHETER Left 5/9/2019    PORT INSERTION performed by Natalia Walker MD at [facility] 22 IR INS PICC VAD W SQ PORT GREATER THAN 5 Right 10/16/2020    tunneled picc inserted by Dr Heidi Corona II 10/16 R upper chest    IR TUNNELED CATHETER PLACEMENT GREATER THAN 5 YEARS  10/16/2020    IR TUNNELED CATHETER PLACEMENT GREATER THAN 5 YEARS 10/16/2020 Antonio Fu MD [facility]    KNEE SURGERY      right    OTHER SURGICAL HISTORY  08/04/2016    removal of mediport    OTHER SURGICAL HISTORY Right 03/05/2018    TUNNELED VENOUS PORT REMOVAL & REINSERTION;DILATION OF VENOUS TRACT    PORT SURGERY N/A 10/10/2020    insertion of right femoral catheter, triple lumen catheter and PORT REMOVAL performed by Nan Lucia MD at [address] [place] [address] TUNNELED VENOUS PORT PLACEMENT  2014    and removed    TUNNELED VENOUS PORT PLACEMENT Right 01/23/2015    Del[place], removed 08/04/16 CLABSI    TUNNELED VENOUS PORT PLACEMENT Right 09/15/2016    CHEST    TUNNELED VENOUS PORT PLACEMENT Right 05/18/2017    SUBCLAVIAN       Social History:    Social History     Tobacco Use    Smoking status: Never Smoker    Smokeless tobacco: Never Used   Substance Use Topics    Alcohol use:  Yes Comment: socially                                Counseling given: Not Answered      Vital Signs (Current):   Vitals:    11/24/20 1052 11/30/20 1118   BP:  133/82   Pulse:  85   Resp:  18   Temp:  97 °F (36.1 °C)   TempSrc:  Temporal   SpO2:  99%   Weight: (!) 312 lb (141.5 kg) (!) 312 lb (141.5 kg)   Height: 5' 3\" (1.6 m) 5' 3\" (1.6 m)                                              BP Readings from Last 3 Encounters:   11/30/20 133/82   10/17/20 (!) 107/59   10/10/20 115/64       NPO Status: Time of last liquid consumption: 2330                        Time of last solid consumption: 2330                        Date of last liquid consumption: 11/29/20                        Date of last solid food consumption: 11/29/20    BMI:   Wt Readings from Last 3 Encounters:   11/30/20 (!) 312 lb (141.5 kg)   10/08/20 (!) 307 lb (139.3 kg)   10/07/20 (!) 307 lb (139.3 kg)     Body mass index is 55.27 kg/m². CBC:   Lab Results   Component Value Date    WBC 12.0 10/17/2020    RBC 3.62 10/17/2020    RBC 4.66 07/09/2019    HGB 9.1 10/17/2020    HCT 26.8 10/17/2020    MCV 74.0 10/17/2020    RDW 18.6 10/17/2020     10/17/2020       CMP:   Lab Results   Component Value Date     10/17/2020    K 3.4 10/17/2020    CL 99 10/17/2020    CO2 30 10/17/2020    BUN 6 10/17/2020    CREATININE 0.9 10/17/2020    GFRAA >60 10/17/2020    LABGLOM >60 10/17/2020    GLUCOSE 103 10/17/2020    GLUCOSE 89 05/03/2011    PROT 7.0 10/17/2020    CALCIUM 8.8 10/17/2020    BILITOT 0.6 10/17/2020    ALKPHOS 87 10/17/2020    AST 11 10/17/2020    ALT 7 10/17/2020       POC Tests: No results for input(s): POCGLU, POCNA, POCK, POCCL, POCBUN, POCHEMO, POCHCT in the last 72 hours.     Coags:   Lab Results   Component Value Date    PROTIME 12.9 10/15/2020    INR 1.1 10/15/2020    APTT 29.6 05/01/2019       HCG (If Applicable):   Lab Results   Component Value Date    PREGTESTUR NEGATIVE 10/08/2020        ABGs: No results found for: PHART, PO2ART, WNI7YPN, UUR7JIV, BEART, Q8KLNLDI     Type & Screen (If Applicable):  No results found for: LABABO, LABRH    Drug/Infectious Status (If Applicable):  No results found for: HIV, HEPCAB    COVID-19 Screening (If Applicable):   Lab Results   Component Value Date    COVID19 Not Detected 11/25/2020       /2020  FINDINGS:    No infiltrate or consolidation or effusion is identified.  The heart size is    normal.  There is a pardeep catheter along the left anterior chest wall with    the catheter tip in the SVC.              Impression    No acute abnormality visualized.         Previously seen airspace opacities within the lung bases no longer visualized. EKG 10/8/2020  Component  Value  Ref Range & Units  Status  Collected  Lab    Ventricular Rate  99  BPM  Final  10/08/2020  9:03 PM  [facility]    Atrial Rate  99  BPM  Final  10/08/2020  9:03 PM  [facility]    P-R Interval  140  ms  Final  10/08/2020  9:03 PM  [facility]    QRS Duration  88  ms  Final  10/08/2020  9:03 PM  [facility]    Q-T Interval  370  ms  Final  10/08/2020  9:03 PM  [facility]    QTc Calculation (Bazett)  474  ms  Final  10/08/2020  9:03 PM  [facility]    P Axis  46  degrees  Final  10/08/2020  9:03 PM  [facility]    R Axis  42  degrees  Final  10/08/2020  9:03 PM  [facility]    T Axis  25  degrees  Final  10/08/2020  9:03 PM  [facility]    Testing Performed By     Lab - Abbreviation  Name  Director  Address  Valid Date Range    360-[facility]  [facility] MUSE  Unknown  Unknown  04/18/16 0721-Present    Narrative & Impression     Normal sinus rhythm  Normal ECG  When compared with ECG of 07-OCT-2020 02:39,  No significant change was found     ECHO 8/4/2016   Findings      Left Ventricle   Left ventricular size is grossly normal.   Normal systolic function with LVEF estimated at 65%. Normal diastolic function for age. Right Ventricle   Normal right ventricle structure and function. Left Atrium   Normal left atrium by volume index.       Right Atrium   Normal right atrium. Mitral Valve   Structurally normal mitral valves. Physiologic and/or trace mitral regurgitation is present. No evidence of hemodynamically significant mitral stenosis. Tricuspid Valve   Normal tricuspid valve structure and function. Mild tricuspid regurgitation. RVSP estimated at 30 mmHg. Aortic Valve   Trileaflet aortic valve with good leaflet separation. No hemodynamically significant aortic stenosis or regurgitation. Pulmonic Valve   Pulmonary valve not well visualized. Pericardial Effusion   No evidence for hemodynamically significant pericardial effusion. Pleural Effusion   No evidence of pleural effusion. Aorta   Normal aortic root and ascending aorta. Miscellaneous   The inferior vena cava diameter is normal with normal respiratory   variation. Conclusions      Summary   Normal study for age. No change compared to echo from 4/24/15. Anesthesia Evaluation  Patient summary reviewed no history of anesthetic complications:   Airway: Mallampati: II  TM distance: >3 FB   Neck ROM: full  Mouth opening: > = 3 FB Dental:          Pulmonary: breath sounds clear to auscultation  (+) sleep apnea (denies): on noncompliant,      (-) pneumonia and not a current smoker                          ROS comment: Patient denies sleep apnea   Cardiovascular:    (+) valvular problems/murmurs (murmur as child):,       ECG reviewed  Rhythm: regular  Rate: normal  Echocardiogram reviewed         Beta Blocker:  Not on Beta Blocker         Neuro/Psych:   (+) neuromuscular disease (Chronic pain syndrome):, depression/anxiety             GI/Hepatic/Renal:   (+) morbid obesity          Endo/Other:    (+) blood dyscrasia (sickle cell, Thalassemia minor ): anticoagulation therapy:., .                  ROS comment: Brittle sickle cell. Last transfusion 3 months ago. In ER with 'flare' 3 weeks ago. She was hydrated, given pain meds and sent home. Multiple mediports.   Last removed because of infection Abdominal:   (+) obese,         Vascular:   + DVT, PE. Anesthesia Plan      MAC     ASA 3     (L chest port)  Induction: intravenous. Anesthetic plan and risks discussed with patient. Use of blood products discussed with patient whom consented to blood products. Plan discussed with CRNA and attending. Paolo Louis MD   11/30/2020    DOS STAFF ADDENDUM:    Patient seen and chart reviewed. Physical exam and history updated as indicated. NPO status confirmed. Anesthesia options and plan discussed including risks benefits with patient/legal guardian and family as available. Concerns and questions addressed. Consent verbalized to proceed.   Anesthesia plan, options and intraoperative/postoperative concerns discussed with care team.    Paolo Louis MD  11/30/2020  11:52 AM

## 2020-11-30 NOTE — OP NOTE
Operative Note      Patient: Cecil Barajas  YOB: 1995  MRN: [id number]    Date of Procedure: 11/30/2020    Pre-Op Diagnosis: SICKLE CELL disease    Post-Op Diagnosis: Same       Procedure(s): Insertion of central venous catheter with subcutaneous reservoir. Surgeon(s):  Coco Chaves MD    Assistant:   Resident: Camilla Patterson MD    Anesthesia: Monitor Anesthesia Care    Estimated Blood Loss (mL): less than 50     Complications: None    Specimens:   * No specimens in log *    Implants:  Implant Name Type Inv. Item Serial No.  Lot No. LRB No. Used Action   PORT INFUS 8FR PWR INJ CT FOR VASC ACCS CATH  PORT INFUS 8FR PWR INJ CT FOR VASC ACCS CATH  [employer] [id number] Right 1 Implanted         Drains: * No LDAs found *    Findings: right internal jugular tunneled central venous catheter, exchanged for port. Detailed Description of Procedure: The patient was identified and the procedure was confirmed. The right neck, chest and tunneled central venous catheter were prepped and draped in the usual sterile fashion. Next, 1% lidocaine mixed with 0.25% Marcaine was used for local anesthesia. A small incision was made above the tunneled catheter. The right internal jugular ventral venous catheter was cut and a guidewire was threaded through the existing tunneled catheter and was advanced into the inferior vena cava under fluoroscopic guidance. A second skin incision was made below the clavicle and a pocket was made in the subcutaneous tissue for the reservoir. The catheter was pulled through a subcutaneous tunnel from the inferior chest incision to the smaller incision just superior. The introducer was passed over the wire then the catheter was passed through the introducer and the tip was positioned in the superior vena cava right atrial junction under fluoroscopic guidance. The catheter was connected to the reservoir and the locking hub was engaged.   The port was noted to

## 2020-11-30 NOTE — H&P
Vascular Surgery History & Physical Exam      Chief Complaint: Poor venous access    HISTORY OF PRESENT ILLNESS:                The patient is a 22 y.o. female who presents to the hospital for elective port insertion. She denies any problems since the last office visit. IMPRESSION:   Active Hospital Problems    Diagnosis    Poor venous access [I87.8]    Sickle-cell-hemoglobin C disease with crisis ([facility] [D57.219]       PLAN:  Insertion of venous port. I reviewed the procedure with the patient. I discussed the risks, benefits, and alternatives of the procedure. The patient understands and consents. All questions were answered.         Past Medical History:   Diagnosis Date    CLABSI (central line-associated bloodstream infection) 8/3/2016    Deep vein thrombosis (DVT) of left upper extremity (HCC) 8/12/2016    Disease of blood and blood forming organ     Heart murmur     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[facility]     Miscarriage within last 12 months 04/2017    Obesity, Class III, BMI 40-49.9 (morbid obesity) ([facility] 5/22/2014    PE (pulmonary embolism)     PE (pulmonary embolism)     Pneumonia     Psychiatric problem     Sickle cell anemia (HCC)     Thrombosis complicating venous access device ([facility] 5/18/2017        Past Surgical History:   Procedure Laterality Date    INSERTION / REMOVAL / REPLACEMENT VENOUS ACCESS CATHETER Left 5/9/2019    PORT INSERTION performed by Paty Bond MD at [facility] 22 IR INS PICC VAD W SQ PORT GREATER THAN 5 Right 10/16/2020    tunneled picc inserted by Dr Nafisa Erazo II 10/16 R upper chest    IR TUNNELED CATHETER PLACEMENT GREATER THAN 5 YEARS  10/16/2020    IR TUNNELED CATHETER PLACEMENT GREATER THAN 5 YEARS 10/16/2020 Kaity Goins MD [facility]    KNEE SURGERY      right    OTHER SURGICAL HISTORY  08/04/2016    removal of mediport    OTHER SURGICAL HISTORY Right 03/05/2018    TUNNELED VENOUS PORT REMOVAL & REINSERTION;DILATION OF VENOUS TRACT    PORT Inability: Not on file    Transportation needs     Medical: Not on file     Non-medical: Not on file   Tobacco Use    Smoking status: Never Smoker    Smokeless tobacco: Never Used   Substance and Sexual Activity    Alcohol use: Yes     Comment: socially    Drug use: No    Sexual activity: Yes     Partners: Male   Lifestyle    Physical activity     Days per week: Not on file     Minutes per session: Not on file    Stress: Not on file   Relationships    Social connections     Talks on phone: Not on file     Gets together: Not on file     Attends Sikh service: Not on file     Active member of club or organization: Not on file     Attends meetings of clubs or organizations: Not on file     Relationship status: Not on file    Intimate partner violence     Fear of current or ex partner: Not on file     Emotionally abused: Not on file     Physically abused: Not on file     Forced sexual activity: Not on file   Other Topics Concern    Not on file   Social History Narrative    Not on file        Family History   Problem Relation Age of Onset    High Blood Pressure Mother     Diabetes Mother     Cancer Maternal Grandmother         breast    Kidney Disease Paternal Grandmother        REVIEW OF SYSTEMS:  The chart was reviewed.     PHYSICAL EXAM:    Vitals:    11/30/20 1118   BP: 133/82   Pulse: 85   Resp: 18   Temp: 97 °F (36.1 °C)   SpO2: 99%     CONSTITUTIONAL:  awake, alert, cooperative, no apparent distress, and appears stated age  NECK:  supple, symmetrical, trachea midline  LUNGS:  no increased work of breathing, good air exchange and clear to auscultation  CARDIOVASCULAR:  regular rate and rhythm  ABDOMEN:  soft, non-distended and non-tender    LABS:    Lab Results   Component Value Date    WBC 8.8 11/30/2020    HGB 11.5 11/30/2020    HCT 32.8 (L) 11/30/2020     11/30/2020    PROTIME 12.9 (H) 10/15/2020    INR 1.1 10/15/2020    APTT 29.6 05/01/2019    K 3.4 (L) 10/17/2020    BUN 6 10/17/2020 CREATININE 0.9 10/17/2020       RADIOLOGY:

## 2020-11-30 NOTE — BRIEF OP NOTE
Brief Postoperative Note      Patient: Eliseo Griffin  YOB: 1995  MRN: [id number]    Date of Procedure: 11/30/2020    Pre-Op Diagnosis: SICKLE CELL    Post-Op Diagnosis: Same       Procedure(s):  RIGHT SUBCLAVIAN CATHETER REMOVAL  PORT INSERTION    Surgeon(s):  Glenis Carnes MD    Assistant:  Resident: Sandeep Beltrán MD    Anesthesia: Monitor Anesthesia Care    Estimated Blood Loss (mL): less than 50     Complications: None    Specimens:   * No specimens in log *    Implants:  Implant Name Type Inv. Item Serial No.  Lot No. LRB No. Used Action   PORT INFUS 8FR PWR INJ CT FOR VASC ACCS CATH  PORT INFUS 8FR PWR INJ CT FOR VASC ACCS CATH  [employer] [id number] Right 1 Implanted         Drains: * No LDAs found *    Findings: Right internal jugular tunneled catheter exchanged for tunneled port.      Electronically signed by Sandeep Beltrán MD on 11/30/2020 at 2:13 PM

## 2020-11-30 NOTE — PROGRESS NOTES
Patient cell phone and mask from home both in clean utility room. Patients mom left before she could give her them.

## 2020-12-03 NOTE — ANESTHESIA POSTPROCEDURE EVALUATION
Department of Anesthesiology  Postprocedure Note    Patient: Wolf Li  MRN: [id number]  [place]: 1995  Date of evaluation: 12/3/2020  Time:  12:08 PM     Procedure Summary     Date:  11/30/20 Room / Location:  [facility] / [facility]    Anesthesia Start:  2962 Anesthesia Stop:  1410    Procedures:       RIGHT SUBCLAVIAN CATHETER REMOVAL (Right )      PORT INSERTION (Right ) Diagnosis:  (SICKLE CELL)    Surgeon:  Luba Olmedo MD Responsible Provider:  Catrina Raya MD    Anesthesia Type:  MAC ASA Status:  3          Anesthesia Type: MAC    Chago Phase I: Chago Score: 10    Chago Phase II: Chago Score: 10    Last vitals: Reviewed and per EMR flowsheets.        Anesthesia Post Evaluation    Patient location during evaluation: PACU  Patient participation: complete - patient participated  Level of consciousness: awake and alert  Airway patency: patent  Nausea & Vomiting: no nausea and no vomiting  Complications: no  Cardiovascular status: hemodynamically stable and blood pressure returned to baseline  Respiratory status: acceptable  Hydration status: euvolemic

## 2021-03-07 PROCEDURE — 99283 EMERGENCY DEPT VISIT LOW MDM: CPT

## 2021-03-07 PROCEDURE — 96376 TX/PRO/DX INJ SAME DRUG ADON: CPT

## 2021-03-07 PROCEDURE — 96375 TX/PRO/DX INJ NEW DRUG ADDON: CPT

## 2021-03-07 PROCEDURE — 96374 THER/PROPH/DIAG INJ IV PUSH: CPT

## 2021-03-07 ASSESSMENT — PAIN DESCRIPTION - DESCRIPTORS: DESCRIPTORS: ACHING;DISCOMFORT

## 2021-03-07 ASSESSMENT — PAIN DESCRIPTION - PAIN TYPE: TYPE: ACUTE PAIN

## 2021-03-08 ENCOUNTER — HOSPITAL ENCOUNTER (EMERGENCY)
Age: 26
Discharge: HOME | End: 2021-03-08
Attending: EMERGENCY MEDICINE
Payer: MEDICARE

## 2021-03-08 ENCOUNTER — APPOINTMENT (OUTPATIENT)
Dept: GENERAL RADIOLOGY | Age: 26
End: 2021-03-08
Payer: MEDICARE

## 2021-03-08 VITALS
SYSTOLIC BLOOD PRESSURE: 137 MMHG | HEIGHT: 63 IN | OXYGEN SATURATION: 95 % | BODY MASS INDEX: 51.91 KG/M2 | TEMPERATURE: 96.8 F | HEART RATE: 89 BPM | DIASTOLIC BLOOD PRESSURE: 62 MMHG | RESPIRATION RATE: 16 BRPM | WEIGHT: 293 LBS

## 2021-03-08 DIAGNOSIS — D57.00 SICKLE CELL CRISIS (HCC): Primary | ICD-10-CM

## 2021-03-08 LAB
ALBUMIN SERPL-MCNC: 3.8 G/DL (ref 3.5–5.2)
ALP BLD-CCNC: 95 U/L (ref 35–104)
ALT SERPL-CCNC: 7 U/L (ref 0–32)
ANION GAP SERPL CALCULATED.3IONS-SCNC: 10 MMOL/L (ref 7–16)
ANISOCYTOSIS: ABNORMAL
AST SERPL-CCNC: 12 U/L (ref 0–31)
BASOPHILS ABSOLUTE: 0.03 E9/L (ref 0–0.2)
BASOPHILS RELATIVE PERCENT: 0.2 % (ref 0–2)
BILIRUB SERPL-MCNC: 1.3 MG/DL (ref 0–1.2)
BUN BLDV-MCNC: 7 MG/DL (ref 6–20)
CALCIUM SERPL-MCNC: 8.8 MG/DL (ref 8.6–10.2)
CHLORIDE BLD-SCNC: 101 MMOL/L (ref 98–107)
CO2: 26 MMOL/L (ref 22–29)
CREAT SERPL-MCNC: 0.8 MG/DL (ref 0.5–1)
EKG ATRIAL RATE: 83 BPM
EKG P AXIS: 47 DEGREES
EKG P-R INTERVAL: 146 MS
EKG Q-T INTERVAL: 396 MS
EKG QRS DURATION: 92 MS
EKG QTC CALCULATION (BAZETT): 465 MS
EKG R AXIS: 33 DEGREES
EKG T AXIS: 18 DEGREES
EKG VENTRICULAR RATE: 83 BPM
EOSINOPHILS ABSOLUTE: 0.09 E9/L (ref 0.05–0.5)
EOSINOPHILS RELATIVE PERCENT: 0.7 % (ref 0–6)
GFR AFRICAN AMERICAN: >60
GFR NON-AFRICAN AMERICAN: >60 ML/MIN/1.73
GLUCOSE BLD-MCNC: 96 MG/DL (ref 74–99)
HCT VFR BLD CALC: 31.5 % (ref 34–48)
HEMOGLOBIN: 10.5 G/DL (ref 11.5–15.5)
HYPOCHROMIA: ABNORMAL
IMMATURE GRANULOCYTES #: 0.07 E9/L
IMMATURE GRANULOCYTES %: 0.5 % (ref 0–5)
IMMATURE RETIC FRACT: 28.6 % (ref 3–15.9)
LYMPHOCYTES ABSOLUTE: 4.07 E9/L (ref 1.5–4)
LYMPHOCYTES RELATIVE PERCENT: 30.8 % (ref 20–42)
MCH RBC QN AUTO: 22.6 PG (ref 26–35)
MCHC RBC AUTO-ENTMCNC: 33.3 % (ref 32–34.5)
MCV RBC AUTO: 67.7 FL (ref 80–99.9)
MONOCYTES ABSOLUTE: 0.64 E9/L (ref 0.1–0.95)
MONOCYTES RELATIVE PERCENT: 4.8 % (ref 2–12)
NEUTROPHILS ABSOLUTE: 8.32 E9/L (ref 1.8–7.3)
NEUTROPHILS RELATIVE PERCENT: 63 % (ref 43–80)
OVALOCYTES: ABNORMAL
PDW BLD-RTO: 16.8 FL (ref 11.5–15)
PLATELET # BLD: 311 E9/L (ref 130–450)
PMV BLD AUTO: 11.1 FL (ref 7–12)
POIKILOCYTES: ABNORMAL
POLYCHROMASIA: ABNORMAL
POTASSIUM REFLEX MAGNESIUM: 3.6 MMOL/L (ref 3.5–5)
RBC # BLD: 4.65 E12/L (ref 3.5–5.5)
RETIC HGB EQUIVALENT: 27.9 PG (ref 28.2–36.6)
RETICULOCYTE ABSOLUTE COUNT: 0.12 E12/L
RETICULOCYTE COUNT PCT: 2.5 % (ref 0.4–1.9)
SCHISTOCYTES: ABNORMAL
SODIUM BLD-SCNC: 137 MMOL/L (ref 132–146)
TARGET CELLS: ABNORMAL
TEAR DROP CELLS: ABNORMAL
TOTAL PROTEIN: 7.3 G/DL (ref 6.4–8.3)
TROPONIN: <0.01 NG/ML (ref 0–0.03)
WBC # BLD: 13.2 E9/L (ref 4.5–11.5)

## 2021-03-08 PROCEDURE — 93005 ELECTROCARDIOGRAM TRACING: CPT | Performed by: STUDENT IN AN ORGANIZED HEALTH CARE EDUCATION/TRAINING PROGRAM

## 2021-03-08 PROCEDURE — 6360000002 HC RX W HCPCS: Performed by: EMERGENCY MEDICINE

## 2021-03-08 PROCEDURE — 84484 ASSAY OF TROPONIN QUANT: CPT

## 2021-03-08 PROCEDURE — 71045 X-RAY EXAM CHEST 1 VIEW: CPT

## 2021-03-08 PROCEDURE — 6360000002 HC RX W HCPCS: Performed by: STUDENT IN AN ORGANIZED HEALTH CARE EDUCATION/TRAINING PROGRAM

## 2021-03-08 PROCEDURE — 93010 ELECTROCARDIOGRAM REPORT: CPT | Performed by: INTERNAL MEDICINE

## 2021-03-08 PROCEDURE — 96376 TX/PRO/DX INJ SAME DRUG ADON: CPT

## 2021-03-08 PROCEDURE — 85045 AUTOMATED RETICULOCYTE COUNT: CPT

## 2021-03-08 PROCEDURE — 80053 COMPREHEN METABOLIC PANEL: CPT

## 2021-03-08 PROCEDURE — 85025 COMPLETE CBC W/AUTO DIFF WBC: CPT

## 2021-03-08 PROCEDURE — 96375 TX/PRO/DX INJ NEW DRUG ADDON: CPT

## 2021-03-08 PROCEDURE — 96374 THER/PROPH/DIAG INJ IV PUSH: CPT

## 2021-03-08 RX ORDER — ONDANSETRON 2 MG/ML
4 INJECTION INTRAMUSCULAR; INTRAVENOUS ONCE
Status: COMPLETED | OUTPATIENT
Start: 2021-03-08 | End: 2021-03-08

## 2021-03-08 RX ORDER — HEPARIN SODIUM (PORCINE) LOCK FLUSH IV SOLN 100 UNIT/ML 100 UNIT/ML
100 SOLUTION INTRAVENOUS ONCE
Status: COMPLETED | OUTPATIENT
Start: 2021-03-08 | End: 2021-03-08

## 2021-03-08 RX ADMIN — HYDROMORPHONE HYDROCHLORIDE 1 MG: 1 INJECTION, SOLUTION INTRAMUSCULAR; INTRAVENOUS; SUBCUTANEOUS at 04:23

## 2021-03-08 RX ADMIN — SODIUM CHLORIDE, PRESERVATIVE FREE 100 UNITS: 5 INJECTION INTRAVENOUS at 05:44

## 2021-03-08 RX ADMIN — HYDROMORPHONE HYDROCHLORIDE 1 MG: 1 INJECTION, SOLUTION INTRAMUSCULAR; INTRAVENOUS; SUBCUTANEOUS at 02:11

## 2021-03-08 RX ADMIN — ONDANSETRON 4 MG: 2 INJECTION INTRAMUSCULAR; INTRAVENOUS at 02:11

## 2021-03-08 ASSESSMENT — ENCOUNTER SYMPTOMS
COUGH: 0
BACK PAIN: 1
VOMITING: 0
COLOR CHANGE: 0
SHORTNESS OF BREATH: 0
TROUBLE SWALLOWING: 0
PHOTOPHOBIA: 0
ABDOMINAL DISTENTION: 0

## 2021-03-08 ASSESSMENT — PAIN SCALES - GENERAL: PAINLEVEL_OUTOF10: 9

## 2021-03-08 NOTE — ED PROVIDER NOTES
[facility]  Department of Emergency Medicine     Written by: Zeke Saucedo DO  Patient Name: Patricia Hall  Attending Provider: Mookie Melo DO  Admit Date: 3/8/2021 12:39 AM  MRN: [id number]                   : 1995        Chief Complaint   Patient presents with    Sickle Cell Pain Crisis     bodywide pain, ongoing for 3-4 days. states home meds are not helping. last took PO dilaudid, 4mg,  4 hours pta    - Chief complaint    HPI   Patient is a 22year old female who presents for chest, leg and low back pain. Nothing makes it better or worse. Duration 4 days. She has a history of sickle cell disease, last crisis was in February; she was ultimately treated with tordol and discharged. Today her chest pain is substernal without radiation, without diaphoresis. Associated with nausea and is tender to palpation. She admits to a history of acute chest syndrome before; and today's encounter does no      Review of Systems   Constitutional: Negative for diaphoresis, fatigue and fever. HENT: Negative for hearing loss and trouble swallowing. Eyes: Negative for photophobia and visual disturbance. Respiratory: Negative for cough and shortness of breath. Cardiovascular: Positive for chest pain. Gastrointestinal: Negative for abdominal distention and vomiting. Endocrine: Negative for cold intolerance and heat intolerance. Genitourinary: Negative for difficulty urinating, flank pain and hematuria. Musculoskeletal: Positive for back pain and myalgias. Skin: Negative for color change and pallor. Neurological: Negative for dizziness, speech difficulty and weakness. Hematological: Negative for adenopathy. Does not bruise/bleed easily. Psychiatric/Behavioral: Negative for agitation and confusion. The patient is not nervous/anxious. Physical Exam  Constitutional:       Appearance: Normal appearance. She is normal weight.    HENT:      Head: Normocephalic and atraumatic. Right Ear: External ear normal.      Left Ear: External ear normal.      Nose: Nose normal.      Mouth/Throat:      Mouth: Mucous membranes are moist.   Eyes:      Extraocular Movements: Extraocular movements intact. Cardiovascular:      Rate and Rhythm: Normal rate and regular rhythm. Pulses: Normal pulses. Heart sounds: Normal heart sounds. Comments: Chest is tender to palpation mid-sternum; without radiation  Pulmonary:      Effort: Pulmonary effort is normal.      Breath sounds: Normal breath sounds. Abdominal:      General: Abdomen is flat. Bowel sounds are normal.      Palpations: Abdomen is soft. Musculoskeletal: Normal range of motion. General: Tenderness present. Comments: Tenderness about L4-L5 paraspinal   Skin:     General: Skin is warm. Capillary Refill: Capillary refill takes less than 2 seconds. Neurological:      General: No focal deficit present. Mental Status: She is alert. Psychiatric:         Mood and Affect: Mood normal.          --------------------------------------------- PAST HISTORY ---------------------------------------------  Past Medical History:  has a past medical history of CLABSI (central line-associated bloodstream infection), Deep vein thrombosis (DVT) of left upper extremity ([address] Disease of blood and blood forming organ, Heart murmur, 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[address] Miscarriage within last 12 months, Obesity, Class III, BMI 40-49.9 (morbid obesity) ([address] PE (pulmonary embolism), PE (pulmonary embolism), Pneumonia, Psychiatric problem, Sickle cell anemia ([address] and Thrombosis complicating venous access device ([address] Past Surgical History:  has a past surgical history that includes knee surgery; Tonsillectomy; Tunneled venous port placement (2014); Tunneled venous port placement (Right, 01/23/2015); other surgical history (08/04/2016); Tunneled venous port placement (Right, 09/15/2016);  Tunneled venous port placement (Right, 05/18/2017); other surgical history (Right, 03/05/2018); INSERTION / REMOVAL / REPLACEMENT VENOUS ACCESS CATHETER (Left, 5/9/2019); [address] Surgery (N/A, 10/10/2020); IR INSERT PICC VAD W SQ PORT >5 YEARS (Right, 10/16/2020); IR TUNNELED CVC PLACE WO SQ PORT/PUMP > 5 YEARS (10/16/2020); Catheter Removal (Right, 11/30/2020); and Port Surgery (Right, 11/30/2020). Social History:  reports that she has never smoked. She has never used smokeless tobacco. She reports current alcohol use. She reports that she does not use drugs. Family History: family history includes Cancer in her maternal grandmother; Diabetes in her mother; High Blood Pressure in her mother; Kidney Disease in her paternal grandmother. The patients home medications have been reviewed.     Allergies: Bactrim [sulfamethoxazole-trimethoprim], Demerol hcl [meperidine], Demerol hcl [meperidine], Adhesive tape, and Bactrim [sulfamethoxazole-trimethoprim]    -------------------------------------------------- RESULTS -------------------------------------------------  Labs:  Results for orders placed or performed during the hospital encounter of 03/08/21   CBC Auto Differential   Result Value Ref Range    WBC 13.2 (H) 4.5 - 11.5 E9/L    RBC 4.65 3.50 - 5.50 E12/L    Hemoglobin 10.5 (L) 11.5 - 15.5 g/dL    Hematocrit 31.5 (L) 34.0 - 48.0 %    MCV 67.7 (L) 80.0 - 99.9 fL    MCH 22.6 (L) 26.0 - 35.0 pg    MCHC 33.3 32.0 - 34.5 %    RDW 16.8 (H) 11.5 - 15.0 fL    Platelets 999 352 - 171 E9/L    MPV 11.1 7.0 - 12.0 fL    Neutrophils % 63.0 43.0 - 80.0 %    Immature Granulocytes % 0.5 0.0 - 5.0 %    Lymphocytes % 30.8 20.0 - 42.0 %    Monocytes % 4.8 2.0 - 12.0 %    Eosinophils % 0.7 0.0 - 6.0 %    Basophils % 0.2 0.0 - 2.0 %    Neutrophils Absolute 8.32 (H) 1.80 - 7.30 E9/L    Immature Granulocytes # 0.07 E9/L    Lymphocytes Absolute 4.07 (H) 1.50 - 4.00 E9/L    Monocytes Absolute 0.64 0.10 - 0.95 E9/L    Eosinophils Absolute 0.09 0.05 - 0.50 E9/L    Basophils Absolute 0.03 0.00 - 0.20 E9/L    Anisocytosis 1+     Polychromasia 1+     Hypochromia 1+     Poikilocytes 1+     Schistocytes 1+     Ovalocytes 1+     Target Cells 1+     Tear Drop Cells 1+    Comprehensive Metabolic Panel w/ Reflex to MG   Result Value Ref Range    Sodium 137 132 - 146 mmol/L    Potassium reflex Magnesium 3.6 3.5 - 5.0 mmol/L    Chloride 101 98 - 107 mmol/L    CO2 26 22 - 29 mmol/L    Anion Gap 10 7 - 16 mmol/L    Glucose 96 74 - 99 mg/dL    BUN 7 6 - 20 mg/dL    CREATININE 0.8 0.5 - 1.0 mg/dL    GFR Non-African American >60 >=60 mL/min/1.73    GFR African American >60     Calcium 8.8 8.6 - 10.2 mg/dL    Total Protein 7.3 6.4 - 8.3 g/dL    Albumin 3.8 3.5 - 5.2 g/dL    Total Bilirubin 1.3 (H) 0.0 - 1.2 mg/dL    Alkaline Phosphatase 95 35 - 104 U/L    ALT 7 0 - 32 U/L    AST 12 0 - 31 U/L   Troponin   Result Value Ref Range    Troponin <0.01 0.00 - 0.03 ng/mL   Reticulocytes   Result Value Ref Range    Retic Ct Pct 2.5 (H) 0.4 - 1.9 %    Retic Ct Abs 0.120 E12/L    Immature Retic Fract 28.6 (H) 3.0 - 15.9 %    Retic HGB Equivalent 27.9 (L) 28.2 - 36.6 pg   EKG 12 Lead   Result Value Ref Range    Ventricular Rate 83 BPM    Atrial Rate 83 BPM    P-R Interval 146 ms    QRS Duration 92 ms    Q-T Interval 396 ms    QTc Calculation (Bazett) 465 ms    P Axis 47 degrees    R Axis 33 degrees    T Axis 18 degrees       Radiology:  XR CHEST PORTABLE   Final Result   No radiographic evidence of acute cardiopulmonary disease.             ------------------------- NURSING NOTES AND VITALS REVIEWED ---------------------------  Date / Time Roomed:  3/8/2021 12:39 AM  ED Bed Assignment:  14A/14A-14    The nursing notes within the ED encounter and vital signs as below have been reviewed.    /62   Pulse 89   Temp 96.8 °F (36 °C)   Resp 16   Ht 5' 3\" (1.6 m)   Wt (!) 312 lb (141.5 kg)   LMP 02/14/2021   SpO2 95%   BMI 55.27 kg/m²   Oxygen Saturation Interpretation: Normal      ------------------------------------------ PROGRESS NOTES ------------------------------------------  5:09 AM EST  I have spoken with the patient and discussed todays results, in addition to providing specific details for the plan of care and counseling regarding the diagnosis and prognosis. Their questions are answered at this time and they are agreeable with the plan. I discussed at length with them reasons for immediate return here for re evaluation. They will followup with their primary care physician by calling their office tomorrow. --------------------------------- ADDITIONAL PROVIDER NOTES ---------------------------------  At this time the patient is without objective evidence of an acute process requiring hospitalization or inpatient management. They have remained hemodynamically stable throughout their entire ED visit and are stable for discharge with outpatient follow-up. The plan has been discussed in detail and they are aware of the specific conditions for emergent return, as well as the importance of follow-up. New Prescriptions    No medications on file       Diagnosis:  1. Sickle cell crisis ([facility]        Disposition:  Patient's disposition: Discharge to home  Patient's condition is stable. [facility]     ED Course as of Mar 08 0453   Mon Mar 08, 2021   0319 Patient re-examined and re-assessed. Positive response to dilaudid. Analgesic effects wearing off, and will provide another dose at this time. Otherwise, no new complaints    [TK]   0450 Responding well to Dilaudid. Appears comfortable. Discussed with patient when analgesia wears off if pain is manageable she can be safely discharged to follow up with hematologist. Understands and is agreeable    [TK]      ED Course User Index  [TK] Tonya Cervantes DO        Patient has a history of sickle cell disease and was evaluated for generalized pain notably chest, and low back.  Concern for acute chest syndrome was evaluated

## 2021-03-22 ENCOUNTER — APPOINTMENT (OUTPATIENT)
Dept: GENERAL RADIOLOGY | Age: 26
End: 2021-03-22
Payer: MEDICARE

## 2021-03-22 ENCOUNTER — HOSPITAL ENCOUNTER (EMERGENCY)
Age: 26
Discharge: HOME | End: 2021-03-22
Attending: EMERGENCY MEDICINE
Payer: MEDICARE

## 2021-03-22 VITALS
TEMPERATURE: 97.1 F | RESPIRATION RATE: 16 BRPM | DIASTOLIC BLOOD PRESSURE: 69 MMHG | HEART RATE: 86 BPM | SYSTOLIC BLOOD PRESSURE: 139 MMHG | OXYGEN SATURATION: 97 %

## 2021-03-22 DIAGNOSIS — D57.00 SICKLE CELL ANEMIA WITH PAIN (HCC): Primary | ICD-10-CM

## 2021-03-22 LAB
ANION GAP SERPL CALCULATED.3IONS-SCNC: 8 MMOL/L (ref 7–16)
BASOPHILS ABSOLUTE: 0.03 E9/L (ref 0–0.2)
BASOPHILS RELATIVE PERCENT: 0.3 % (ref 0–2)
BUN BLDV-MCNC: 12 MG/DL (ref 6–20)
CALCIUM SERPL-MCNC: 9.3 MG/DL (ref 8.6–10.2)
CHLORIDE BLD-SCNC: 103 MMOL/L (ref 98–107)
CO2: 27 MMOL/L (ref 22–29)
CREAT SERPL-MCNC: 0.9 MG/DL (ref 0.5–1)
EOSINOPHILS ABSOLUTE: 0.13 E9/L (ref 0.05–0.5)
EOSINOPHILS RELATIVE PERCENT: 1.2 % (ref 0–6)
GFR AFRICAN AMERICAN: >60
GFR NON-AFRICAN AMERICAN: >60 ML/MIN/1.73
GLUCOSE BLD-MCNC: 102 MG/DL (ref 74–99)
HCT VFR BLD CALC: 32.7 % (ref 34–48)
HEMOGLOBIN: 11.1 G/DL (ref 11.5–15.5)
IMMATURE GRANULOCYTES #: 0.05 E9/L
IMMATURE GRANULOCYTES %: 0.5 % (ref 0–5)
IMMATURE RETIC FRACT: 18.7 % (ref 3–15.9)
LYMPHOCYTES ABSOLUTE: 3.38 E9/L (ref 1.5–4)
LYMPHOCYTES RELATIVE PERCENT: 31 % (ref 20–42)
MCH RBC QN AUTO: 22.9 PG (ref 26–35)
MCHC RBC AUTO-ENTMCNC: 33.9 % (ref 32–34.5)
MCV RBC AUTO: 67.6 FL (ref 80–99.9)
MONOCYTES ABSOLUTE: 0.66 E9/L (ref 0.1–0.95)
MONOCYTES RELATIVE PERCENT: 6.1 % (ref 2–12)
NEUTROPHILS ABSOLUTE: 6.65 E9/L (ref 1.8–7.3)
NEUTROPHILS RELATIVE PERCENT: 60.9 % (ref 43–80)
PDW BLD-RTO: 17.1 FL (ref 11.5–15)
PLATELET # BLD: 263 E9/L (ref 130–450)
PMV BLD AUTO: 11.3 FL (ref 7–12)
POTASSIUM REFLEX MAGNESIUM: 4.6 MMOL/L (ref 3.5–5)
RBC # BLD: 4.84 E12/L (ref 3.5–5.5)
RETIC HGB EQUIVALENT: 26.2 PG (ref 28.2–36.6)
RETICULOCYTE ABSOLUTE COUNT: 0.09 E12/L
RETICULOCYTE COUNT PCT: 1.9 % (ref 0.4–1.9)
SODIUM BLD-SCNC: 138 MMOL/L (ref 132–146)
TROPONIN: <0.01 NG/ML (ref 0–0.03)
WBC # BLD: 10.9 E9/L (ref 4.5–11.5)

## 2021-03-22 PROCEDURE — 2580000003 HC RX 258: Performed by: NURSE PRACTITIONER

## 2021-03-22 PROCEDURE — 93005 ELECTROCARDIOGRAM TRACING: CPT | Performed by: NURSE PRACTITIONER

## 2021-03-22 PROCEDURE — 2580000003 HC RX 258: Performed by: EMERGENCY MEDICINE

## 2021-03-22 PROCEDURE — 85045 AUTOMATED RETICULOCYTE COUNT: CPT

## 2021-03-22 PROCEDURE — 80048 BASIC METABOLIC PNL TOTAL CA: CPT

## 2021-03-22 PROCEDURE — 96376 TX/PRO/DX INJ SAME DRUG ADON: CPT

## 2021-03-22 PROCEDURE — 96374 THER/PROPH/DIAG INJ IV PUSH: CPT

## 2021-03-22 PROCEDURE — 96375 TX/PRO/DX INJ NEW DRUG ADDON: CPT

## 2021-03-22 PROCEDURE — 84484 ASSAY OF TROPONIN QUANT: CPT

## 2021-03-22 PROCEDURE — 85025 COMPLETE CBC W/AUTO DIFF WBC: CPT

## 2021-03-22 PROCEDURE — 71045 X-RAY EXAM CHEST 1 VIEW: CPT

## 2021-03-22 PROCEDURE — 99284 EMERGENCY DEPT VISIT MOD MDM: CPT

## 2021-03-22 PROCEDURE — 6360000002 HC RX W HCPCS: Performed by: EMERGENCY MEDICINE

## 2021-03-22 RX ORDER — 0.9 % SODIUM CHLORIDE 0.9 %
1000 INTRAVENOUS SOLUTION INTRAVENOUS ONCE
Status: COMPLETED | OUTPATIENT
Start: 2021-03-22 | End: 2021-03-22

## 2021-03-22 RX ORDER — MORPHINE SULFATE 4 MG/ML
4 INJECTION, SOLUTION INTRAMUSCULAR; INTRAVENOUS ONCE
Status: COMPLETED | OUTPATIENT
Start: 2021-03-22 | End: 2021-03-22

## 2021-03-22 RX ORDER — 0.9 % SODIUM CHLORIDE 0.9 %
1000 INTRAVENOUS SOLUTION INTRAVENOUS ONCE
Status: DISCONTINUED | OUTPATIENT
Start: 2021-03-22 | End: 2021-03-22

## 2021-03-22 RX ORDER — HEPARIN SODIUM (PORCINE) LOCK FLUSH IV SOLN 100 UNIT/ML 100 UNIT/ML
100 SOLUTION INTRAVENOUS ONCE
Status: COMPLETED | OUTPATIENT
Start: 2021-03-22 | End: 2021-03-22

## 2021-03-22 RX ORDER — KETOROLAC TROMETHAMINE 30 MG/ML
15 INJECTION, SOLUTION INTRAMUSCULAR; INTRAVENOUS ONCE
Status: COMPLETED | OUTPATIENT
Start: 2021-03-22 | End: 2021-03-22

## 2021-03-22 RX ADMIN — HYDROMORPHONE HYDROCHLORIDE 1 MG: 1 INJECTION, SOLUTION INTRAMUSCULAR; INTRAVENOUS; SUBCUTANEOUS at 16:32

## 2021-03-22 RX ADMIN — SODIUM CHLORIDE 1000 ML: 9 INJECTION, SOLUTION INTRAVENOUS at 15:01

## 2021-03-22 RX ADMIN — MORPHINE SULFATE 4 MG: 4 INJECTION, SOLUTION INTRAMUSCULAR; INTRAVENOUS at 15:00

## 2021-03-22 RX ADMIN — HEPARIN 100 UNITS: 100 SYRINGE at 19:35

## 2021-03-22 RX ADMIN — KETOROLAC TROMETHAMINE 15 MG: 30 INJECTION, SOLUTION INTRAMUSCULAR at 14:45

## 2021-03-22 RX ADMIN — SODIUM CHLORIDE 1000 ML: 9 INJECTION, SOLUTION INTRAVENOUS at 16:32

## 2021-03-22 RX ADMIN — HYDROMORPHONE HYDROCHLORIDE 1 MG: 1 INJECTION, SOLUTION INTRAMUSCULAR; INTRAVENOUS; SUBCUTANEOUS at 18:47

## 2021-03-22 ASSESSMENT — PAIN SCALES - GENERAL
PAINLEVEL_OUTOF10: 9
PAINLEVEL_OUTOF10: 8
PAINLEVEL_OUTOF10: 7
PAINLEVEL_OUTOF10: 9
PAINLEVEL_OUTOF10: 8
PAINLEVEL_OUTOF10: 8

## 2021-03-22 ASSESSMENT — PAIN DESCRIPTION - LOCATION
LOCATION: GENERALIZED
LOCATION: BACK;CHEST;LEG

## 2021-03-22 ASSESSMENT — PAIN DESCRIPTION - DESCRIPTORS: DESCRIPTORS: SHARP

## 2021-03-22 NOTE — ED PROVIDER NOTES
(2014); Tunneled venous port placement (Right, 01/23/2015); other surgical history (08/04/2016); Tunneled venous port placement (Right, 09/15/2016); Tunneled venous port placement (Right, 05/18/2017); other surgical history (Right, 03/05/2018); INSERTION / REMOVAL / REPLACEMENT VENOUS ACCESS CATHETER (Left, 5/9/2019); [address] Surgery (N/A, 10/10/2020); IR INSERT PICC VAD W SQ PORT >5 YEARS (Right, 10/16/2020); IR TUNNELED CVC PLACE WO SQ PORT/PUMP > 5 YEARS (10/16/2020); Catheter Removal (Right, 11/30/2020); and Port Surgery (Right, 11/30/2020). Social History:  reports that she has never smoked. She has never used smokeless tobacco. She reports current alcohol use. She reports that she does not use drugs. Family History: family history includes Cancer in her maternal grandmother; Diabetes in her mother; High Blood Pressure in her mother; Kidney Disease in her paternal grandmother. . Unless otherwise noted, family history is non contributory    The patients home medications have been reviewed. Allergies: Bactrim [sulfamethoxazole-trimethoprim], Demerol hcl [meperidine], Demerol hcl [meperidine], Adhesive tape, and Bactrim [sulfamethoxazole-trimethoprim]        ---------------------------------------------------PHYSICAL EXAM--------------------------------------    Constitutional/General: Alert and oriented x3  Head: Normocephalic and atraumatic  Eyes: PERRL, EOMI, sclera non icteric  Mouth: Oropharynx clear, handling secretions, no trismus, no asymmetry of the posterior oropharynx or uvular edema  Neck: Supple, full ROM, no stridor, no meningeal signs  Respiratory: Lungs clear to auscultation bilaterally, no wheezes, rales, or rhonchi. Not in respiratory distress  Cardiovascular:  Regular rate. Regular rhythm. No murmurs, no aortic murmurs, no gallops, or rubs. 2+ distal pulses. Equal extremity pulses. Chest: No chest wall tenderness  GI:  Abdomen Soft, Non tender, Non distended.  No rebound, guarding, or rigidity. No pulsatile masses. Musculoskeletal: Moves all extremities x 4. Warm and well perfused, no clubbing, cyanosis, or edema. Capillary refill <3 seconds  Integument: skin warm and dry. No rashes. Neurologic: GCS 15, no focal deficits, symmetric strength 5/5 in the upper and lower extremities bilaterally  Psychiatric: Normal Affect          -------------------------------------------------- RESULTS -------------------------------------------------  I have personally reviewed all laboratory and imaging results for this patient. Results are listed below.      LABS: (Lab results interpreted by me)  Results for orders placed or performed during the hospital encounter of 03/22/21   Troponin   Result Value Ref Range    Troponin <0.01 0.00 - 0.03 ng/mL   CBC Auto Differential   Result Value Ref Range    WBC 10.9 4.5 - 11.5 E9/L    RBC 4.84 3.50 - 5.50 E12/L    Hemoglobin 11.1 (L) 11.5 - 15.5 g/dL    Hematocrit 32.7 (L) 34.0 - 48.0 %    MCV 67.6 (L) 80.0 - 99.9 fL    MCH 22.9 (L) 26.0 - 35.0 pg    MCHC 33.9 32.0 - 34.5 %    RDW 17.1 (H) 11.5 - 15.0 fL    Platelets 132 403 - 048 E9/L    MPV 11.3 7.0 - 12.0 fL    Neutrophils % 60.9 43.0 - 80.0 %    Immature Granulocytes % 0.5 0.0 - 5.0 %    Lymphocytes % 31.0 20.0 - 42.0 %    Monocytes % 6.1 2.0 - 12.0 %    Eosinophils % 1.2 0.0 - 6.0 %    Basophils % 0.3 0.0 - 2.0 %    Neutrophils Absolute 6.65 1.80 - 7.30 E9/L    Immature Granulocytes # 0.05 E9/L    Lymphocytes Absolute 3.38 1.50 - 4.00 E9/L    Monocytes Absolute 0.66 0.10 - 0.95 E9/L    Eosinophils Absolute 0.13 0.05 - 0.50 E9/L    Basophils Absolute 0.03 0.00 - 0.20 N7/Q   Basic Metabolic Panel w/ Reflex to MG   Result Value Ref Range    Sodium 138 132 - 146 mmol/L    Potassium reflex Magnesium 4.6 3.5 - 5.0 mmol/L    Chloride 103 98 - 107 mmol/L    CO2 27 22 - 29 mmol/L    Anion Gap 8 7 - 16 mmol/L    Glucose 102 (H) 74 - 99 mg/dL    BUN 12 6 - 20 mg/dL    CREATININE 0.9 0.5 - 1.0 mg/dL    GFR Non- American >60 >=60 mL/min/1.73    GFR African American >60     Calcium 9.3 8.6 - 10.2 mg/dL   RETICULOCYTES   Result Value Ref Range    Retic Ct Pct 1.9 0.4 - 1.9 %    Retic Ct Abs 0.090 E12/L    Immature Retic Fract 18.7 (H) 3.0 - 15.9 %    Retic HGB Equivalent 26.2 (L) 28.2 - 36.6 pg   ,       RADIOLOGY:  Interpreted by Radiologist unless otherwise specified  XR CHEST PORTABLE   Final Result   Mild pulmonary vascular congestion. No focal airspace opacity or pleural   effusion. EKG Interpretation  Interpreted by emergency department physician, Dr. Ivette Gomez    Date of EKG: 3/22/21  Time: 1437    Rhythm: normal sinus   Rate: 87  Axis: normal  Conduction: normal  ST Segments: no acute change  T Waves: no acute change    Clinical Impression: No findings suggestive of acute ischemia or injury  Comparison to prior EKG: stable as compared to patient's most recent EKG      ------------------------- NURSING NOTES AND VITALS REVIEWED ---------------------------   The nursing notes within the ED encounter and vital signs as below have been reviewed by myself  /69   Pulse 86   Temp 97.1 °F (36.2 °C)   Resp 16   LMP 02/14/2021   SpO2 97%     Oxygen Saturation Interpretation: Normal    The patients available past medical records and past encounters were reviewed.         ------------------------------ ED COURSE/MEDICAL DECISION MAKING----------------------  Medications   0.9 % sodium chloride bolus (0 mLs Intravenous Stopped 3/22/21 1632)   ketorolac (TORADOL) injection 15 mg (15 mg Intravenous Given 3/22/21 1445)   morphine sulfate (PF) injection 4 mg (4 mg Intravenous Given 3/22/21 1500)   0.9 % sodium chloride bolus (0 mLs Intravenous Stopped 3/22/21 1930)   HYDROmorphone (DILAUDID) injection 1 mg (1 mg Intravenous Given 3/22/21 1632)   HYDROmorphone (DILAUDID) injection 1 mg (1 mg Intravenous Given 3/22/21 2967)   heparin flush 100 UNIT/ML injection 100 Units (100 Units Intracatheter Given 3/22/21 1935)           The cardiac monitor revealed sinus rhythm with a heart rate in the 80s as interpreted by me. The cardiac monitor was ordered secondary to the patient's chest pain and to monitor for patient for dysrhythmia. CPT 24764           Medical Decision Making:     I, Dr. Carin Vuong, am the primary provider of record    26-year-old female with history of sickle cell disease presenting with typical pain when she has exacerbation. She arrives hemodynamically stable and well-appearing, no increased work of breathing or hypoxia. She is afebrile. Metabolic panel is within acceptable limits. Hemoglobin is at baseline, baseline reticulocyte count. Chest x-ray is unremarkable. She was given IV fluids as well as morphine and Toradol, increased to Dilaudid and needing 2 doses for improvement. She is comfortable with discharge home to continue oral Dilaudid, PCP follow-up with instruction to return for any changes in condition or new symptoms. Re-Evaluations: This patient's ED course included: a personal history and physicial examination, re-evaluation prior to disposition, multiple bedside re-evaluations, IV medications, cardiac monitoring and continuous pulse oximetry    This patient has remained hemodynamically stable, improved and been closely monitored during their ED course. Counseling: The emergency provider has spoken with the patient and discussed todays results, in addition to providing specific details for the plan of care and counseling regarding the diagnosis and prognosis. Questions are answered at this time and they are agreeable with the plan.       --------------------------------- IMPRESSION AND DISPOSITION ---------------------------------    IMPRESSION  1. Sickle cell anemia with pain (HCC)        DISPOSITION  Disposition: Discharge to home  Patient condition is stable        NOTE: This report was transcribed using voice recognition software.  Every effort was made to ensure accuracy; however, inadvertent computerized transcription errors may be present        Morgan Garcia,   03/22/21 1955

## 2021-03-23 LAB
EKG ATRIAL RATE: 87 BPM
EKG P AXIS: 54 DEGREES
EKG P-R INTERVAL: 150 MS
EKG Q-T INTERVAL: 374 MS
EKG QRS DURATION: 90 MS
EKG QTC CALCULATION (BAZETT): 450 MS
EKG R AXIS: 36 DEGREES
EKG T AXIS: 15 DEGREES
EKG VENTRICULAR RATE: 87 BPM

## 2021-03-23 PROCEDURE — 93010 ELECTROCARDIOGRAM REPORT: CPT | Performed by: INTERNAL MEDICINE

## 2021-04-28 VITALS
RESPIRATION RATE: 18 BRPM | TEMPERATURE: 96.9 F | SYSTOLIC BLOOD PRESSURE: 141 MMHG | WEIGHT: 293 LBS | HEIGHT: 63 IN | HEART RATE: 98 BPM | BODY MASS INDEX: 51.91 KG/M2 | DIASTOLIC BLOOD PRESSURE: 82 MMHG | OXYGEN SATURATION: 98 %

## 2021-04-28 PROCEDURE — 99283 EMERGENCY DEPT VISIT LOW MDM: CPT

## 2021-04-28 RX ORDER — HYDROMORPHONE HYDROCHLORIDE 4 MG/1
4 TABLET ORAL EVERY 4 HOURS PRN
COMMUNITY
Start: 2020-09-01

## 2021-04-28 ASSESSMENT — PAIN DESCRIPTION - DESCRIPTORS: DESCRIPTORS: ACHING;CONSTANT;DISCOMFORT

## 2021-04-28 ASSESSMENT — PAIN DESCRIPTION - ORIENTATION: ORIENTATION: RIGHT;LEFT

## 2021-04-29 ENCOUNTER — HOSPITAL ENCOUNTER (EMERGENCY)
Age: 26
Discharge: HOME | End: 2021-04-29
Attending: EMERGENCY MEDICINE
Payer: MEDICARE

## 2021-04-29 DIAGNOSIS — N30.00 ACUTE CYSTITIS WITHOUT HEMATURIA: ICD-10-CM

## 2021-04-29 DIAGNOSIS — D57.00 SICKLE CELL ANEMIA WITH PAIN (HCC): Primary | ICD-10-CM

## 2021-04-29 LAB
ANION GAP SERPL CALCULATED.3IONS-SCNC: 9 MMOL/L (ref 7–16)
ANISOCYTOSIS: ABNORMAL
BACTERIA: ABNORMAL /HPF
BASOPHILS ABSOLUTE: 0 E9/L (ref 0–0.2)
BASOPHILS RELATIVE PERCENT: 0 % (ref 0–2)
BILIRUBIN URINE: NEGATIVE
BLOOD, URINE: NEGATIVE
BUN BLDV-MCNC: 9 MG/DL (ref 6–20)
CALCIUM SERPL-MCNC: 8.8 MG/DL (ref 8.6–10.2)
CHLORIDE BLD-SCNC: 101 MMOL/L (ref 98–107)
CLARITY: CLEAR
CO2: 25 MMOL/L (ref 22–29)
COLOR: YELLOW
CREAT SERPL-MCNC: 0.8 MG/DL (ref 0.5–1)
EKG ATRIAL RATE: 73 BPM
EKG P AXIS: 53 DEGREES
EKG P-R INTERVAL: 150 MS
EKG Q-T INTERVAL: 390 MS
EKG QRS DURATION: 84 MS
EKG QTC CALCULATION (BAZETT): 429 MS
EKG R AXIS: 37 DEGREES
EKG T AXIS: 23 DEGREES
EKG VENTRICULAR RATE: 73 BPM
EOSINOPHILS ABSOLUTE: 0.42 E9/L (ref 0.05–0.5)
EOSINOPHILS RELATIVE PERCENT: 3.5 % (ref 0–6)
EPITHELIAL CELLS, UA: ABNORMAL /HPF
GFR AFRICAN AMERICAN: >60
GFR NON-AFRICAN AMERICAN: >60 ML/MIN/1.73
GLUCOSE BLD-MCNC: 99 MG/DL (ref 74–99)
GLUCOSE URINE: NEGATIVE MG/DL
HCG, URINE, POC: NEGATIVE
HCT VFR BLD CALC: 31.8 % (ref 34–48)
HEMOGLOBIN: 10.9 G/DL (ref 11.5–15.5)
IMMATURE RETIC FRACT: 18.9 % (ref 3–15.9)
KETONES, URINE: NEGATIVE MG/DL
LEUKOCYTE ESTERASE, URINE: ABNORMAL
LYMPHOCYTES ABSOLUTE: 3.84 E9/L (ref 1.5–4)
LYMPHOCYTES RELATIVE PERCENT: 31.6 % (ref 20–42)
Lab: NORMAL
MAGNESIUM: 1.9 MG/DL (ref 1.6–2.6)
MCH RBC QN AUTO: 23.1 PG (ref 26–35)
MCHC RBC AUTO-ENTMCNC: 34.3 % (ref 32–34.5)
MCV RBC AUTO: 67.5 FL (ref 80–99.9)
METAMYELOCYTES RELATIVE PERCENT: 0.9 % (ref 0–1)
MONOCYTES ABSOLUTE: 0.36 E9/L (ref 0.1–0.95)
MONOCYTES RELATIVE PERCENT: 2.6 % (ref 2–12)
NEGATIVE QC PASS/FAIL: NORMAL
NEUTROPHILS ABSOLUTE: 7.44 E9/L (ref 1.8–7.3)
NEUTROPHILS RELATIVE PERCENT: 61.4 % (ref 43–80)
NITRITE, URINE: NEGATIVE
NUCLEATED RED BLOOD CELLS: 0 /100 WBC
PDW BLD-RTO: 18.2 FL (ref 11.5–15)
PH UA: 5.5 (ref 5–9)
PLATELET # BLD: 280 E9/L (ref 130–450)
PMV BLD AUTO: 10.9 FL (ref 7–12)
POIKILOCYTES: ABNORMAL
POLYCHROMASIA: ABNORMAL
POSITIVE QC PASS/FAIL: NORMAL
POTASSIUM SERPL-SCNC: 4.1 MMOL/L (ref 3.5–5)
PROTEIN UA: NEGATIVE MG/DL
RBC # BLD: 4.71 E12/L (ref 3.5–5.5)
RBC UA: ABNORMAL /HPF (ref 0–2)
RETIC HGB EQUIVALENT: 27.5 PG (ref 28.2–36.6)
RETICULOCYTE ABSOLUTE COUNT: 0.1 E12/L
RETICULOCYTE COUNT PCT: 2.1 % (ref 0.4–1.9)
SODIUM BLD-SCNC: 135 MMOL/L (ref 132–146)
SPECIFIC GRAVITY UA: 1.02 (ref 1–1.03)
TARGET CELLS: ABNORMAL
UROBILINOGEN, URINE: 0.2 E.U./DL
WBC # BLD: 12 E9/L (ref 4.5–11.5)
WBC UA: ABNORMAL /HPF (ref 0–5)

## 2021-04-29 PROCEDURE — 96376 TX/PRO/DX INJ SAME DRUG ADON: CPT

## 2021-04-29 PROCEDURE — 93010 ELECTROCARDIOGRAM REPORT: CPT | Performed by: INTERNAL MEDICINE

## 2021-04-29 PROCEDURE — 93005 ELECTROCARDIOGRAM TRACING: CPT | Performed by: NURSE PRACTITIONER

## 2021-04-29 PROCEDURE — 80048 BASIC METABOLIC PNL TOTAL CA: CPT

## 2021-04-29 PROCEDURE — 81001 URINALYSIS AUTO W/SCOPE: CPT

## 2021-04-29 PROCEDURE — 83735 ASSAY OF MAGNESIUM: CPT

## 2021-04-29 PROCEDURE — 96374 THER/PROPH/DIAG INJ IV PUSH: CPT

## 2021-04-29 PROCEDURE — 2580000003 HC RX 258: Performed by: EMERGENCY MEDICINE

## 2021-04-29 PROCEDURE — 6360000002 HC RX W HCPCS: Performed by: EMERGENCY MEDICINE

## 2021-04-29 PROCEDURE — 85025 COMPLETE CBC W/AUTO DIFF WBC: CPT

## 2021-04-29 PROCEDURE — 85045 AUTOMATED RETICULOCYTE COUNT: CPT

## 2021-04-29 RX ORDER — 0.9 % SODIUM CHLORIDE 0.9 %
1000 INTRAVENOUS SOLUTION INTRAVENOUS ONCE
Status: COMPLETED | OUTPATIENT
Start: 2021-04-29 | End: 2021-04-29

## 2021-04-29 RX ORDER — CEFDINIR 300 MG/1
300 CAPSULE ORAL 2 TIMES DAILY
Qty: 14 CAPSULE | Refills: 0 | Status: SHIPPED | OUTPATIENT
Start: 2021-04-29 | End: 2021-05-06

## 2021-04-29 RX ADMIN — HYDROMORPHONE HYDROCHLORIDE 1 MG: 1 INJECTION, SOLUTION INTRAMUSCULAR; INTRAVENOUS; SUBCUTANEOUS at 01:16

## 2021-04-29 RX ADMIN — SODIUM CHLORIDE 1000 ML: 9 INJECTION, SOLUTION INTRAVENOUS at 01:16

## 2021-04-29 RX ADMIN — HYDROMORPHONE HYDROCHLORIDE 1 MG: 1 INJECTION, SOLUTION INTRAMUSCULAR; INTRAVENOUS; SUBCUTANEOUS at 02:00

## 2021-04-29 ASSESSMENT — PAIN SCALES - GENERAL
PAINLEVEL_OUTOF10: 9
PAINLEVEL_OUTOF10: 7

## 2021-04-29 NOTE — ED PROVIDER NOTES
Department of Emergency Medicine   ED  Provider Note  Admit Date/RoomTime: 4/29/2021 12:15 AM  ED Room: 27/27          History of Present Illness:  4/29/21, Time: 12:21 AM EDT  Chief Complaint   Patient presents with    Sickle Cell Pain Crisis     Back and Leg Pain                Nico Castillo is a 22 y.o. female presenting to the ED for lower back, hip and leg pain, beginning 2 days ago. The complaint has been persistent, moderate in severity, and worsened by nothing. Patient has history of sickle cell anemia. She states that when she typically has pain crises she has pain in her lower back that radiates through her hips and legs. She states that this pain is similar to previous episodes. She denies any falls or trauma. She denies any dysuria. No urine/fecal incontinence/retention. She denies any weakness to the legs, no numbness or tingling. Review of Systems:   Pertinent positives and negatives are stated within HPI, all other systems reviewed and are negative.        --------------------------------------------- PAST HISTORY ---------------------------------------------  Past Medical History:  has a past medical history of CLABSI (central line-associated bloodstream infection), Deep vein thrombosis (DVT) of left upper extremity ([address] Disease of blood and blood forming organ, Heart murmur, 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[address] Miscarriage within last 12 months, Obesity, Class III, BMI 40-49.9 (morbid obesity) ([address] PE (pulmonary embolism), PE (pulmonary embolism), Pneumonia, Psychiatric problem, Sickle cell anemia ([address] and Thrombosis complicating venous access device ([address] Past Surgical History:  has a past surgical history that includes knee surgery; Tonsillectomy; Tunneled venous port placement (2014); Tunneled venous port placement (Right, 01/23/2015); other surgical history (08/04/2016); Tunneled venous port placement (Right, 09/15/2016);  Tunneled venous port placement (Right, 05/18/2017); other surgical history (Right, 03/05/2018); INSERTION / REMOVAL / REPLACEMENT VENOUS ACCESS CATHETER (Left, 5/9/2019); [address] Surgery (N/A, 10/10/2020); IR INSERT PICC VAD W SQ PORT >5 YEARS (Right, 10/16/2020); IR TUNNELED CVC PLACE WO SQ PORT/PUMP > 5 YEARS (10/16/2020); Catheter Removal (Right, 11/30/2020); and Port Surgery (Right, 11/30/2020). Social History:  reports that she has never smoked. She has never used smokeless tobacco. She reports current alcohol use. She reports that she does not use drugs. Family History: family history includes Cancer in her maternal grandmother; Diabetes in her mother; High Blood Pressure in her mother; Kidney Disease in her paternal grandmother. . Unless otherwise noted, family history is non contributory    The patients home medications have been reviewed. Allergies: Bactrim [sulfamethoxazole-trimethoprim], Demerol hcl [meperidine], Demerol hcl [meperidine], Adhesive tape, and Bactrim [sulfamethoxazole-trimethoprim]        ---------------------------------------------------PHYSICAL EXAM--------------------------------------    Constitutional/General: Alert and oriented x3  Head: Normocephalic and atraumatic  Eyes: PERRL, EOMI, sclera non icteric  Mouth: Oropharynx clear, handling secretions, no trismus, no asymmetry of the posterior oropharynx or uvular edema  Neck: Supple, full ROM, no stridor, no meningeal signs  Respiratory: Lungs clear to auscultation bilaterally, no wheezes, rales, or rhonchi. Not in respiratory distress  Cardiovascular:  Regular rate. Regular rhythm. No murmurs, no aortic murmurs, no gallops, or rubs. 2+ distal pulses. Equal extremity pulses. Chest: No chest wall tenderness  GI:  Abdomen Soft, Non tender, Non distended. No rebound, guarding, or rigidity. No pulsatile masses. Musculoskeletal: Moves all extremities x 4. Warm and well perfused, no clubbing, cyanosis, or edema. Capillary refill <3 seconds  Integument: skin warm and dry. No rashes. Neurologic: GCS 15, no focal deficits, symmetric strength 5/5 in the upper and lower extremities bilaterally  Psychiatric: Normal Affect          -------------------------------------------------- RESULTS -------------------------------------------------  I have personally reviewed all laboratory and imaging results for this patient. Results are listed below.      LABS: (Lab results interpreted by me)  Results for orders placed or performed during the hospital encounter of 04/29/21   CBC Auto Differential   Result Value Ref Range    WBC 12.0 (H) 4.5 - 11.5 E9/L    RBC 4.71 3.50 - 5.50 E12/L    Hemoglobin 10.9 (L) 11.5 - 15.5 g/dL    Hematocrit 31.8 (L) 34.0 - 48.0 %    MCV 67.5 (L) 80.0 - 99.9 fL    MCH 23.1 (L) 26.0 - 35.0 pg    MCHC 34.3 32.0 - 34.5 %    RDW 18.2 (H) 11.5 - 15.0 fL    Platelets 701 801 - 446 E9/L    MPV 10.9 7.0 - 12.0 fL    Neutrophils % 61.4 43.0 - 80.0 %    Lymphocytes % 31.6 20.0 - 42.0 %    Monocytes % 2.6 2.0 - 12.0 %    Eosinophils % 3.5 0.0 - 6.0 %    Basophils % 0.0 0.0 - 2.0 %    Neutrophils Absolute 7.44 (H) 1.80 - 7.30 E9/L    Lymphocytes Absolute 3.84 1.50 - 4.00 E9/L    Monocytes Absolute 0.36 0.10 - 0.95 E9/L    Eosinophils Absolute 0.42 0.05 - 0.50 E9/L    Basophils Absolute 0.00 0.00 - 0.20 E9/L    Metamyelocytes Relative 0.9 0.0 - 1.0 %    nRBC 0.0 /100 WBC    Anisocytosis 2+     Polychromasia 1+     Poikilocytes 1+     Target Cells 1+    Reticulocytes   Result Value Ref Range    Retic Ct Pct 2.1 (H) 0.4 - 1.9 %    Retic Ct Abs 0.098 E12/L    Immature Retic Fract 18.9 (H) 3.0 - 15.9 %    Retic HGB Equivalent 27.5 (L) 28.2 - 36.6 pg   Basic Metabolic Panel   Result Value Ref Range    Sodium 135 132 - 146 mmol/L    Potassium 4.1 3.5 - 5.0 mmol/L    Chloride 101 98 - 107 mmol/L    CO2 25 22 - 29 mmol/L    Anion Gap 9 7 - 16 mmol/L    Glucose 99 74 - 99 mg/dL    BUN 9 6 - 20 mg/dL    CREATININE 0.8 0.5 - 1.0 mg/dL    GFR Non-African American >60 >=60 mL/min/1.73    GFR African kg)   SpO2 98%   BMI 56.33 kg/m²     Oxygen Saturation Interpretation: Normal    The patients available past medical records and past encounters were reviewed. ------------------------------ ED COURSE/MEDICAL DECISION MAKING----------------------  Medications   HYDROmorphone (DILAUDID) injection 1 mg (1 mg Intravenous Given 4/29/21 0116)   0.9 % sodium chloride bolus (0 mLs Intravenous Stopped 4/29/21 0217)   HYDROmorphone (DILAUDID) injection 1 mg (1 mg Intravenous Given 4/29/21 0200)           The cardiac monitor revealed sinus rhythm with a heart rate in the 70s as interpreted by me. The cardiac monitor was ordered secondary to the patient's chest pain and to monitor for patient for dysrhythmia. CPT [id number]           Medical Decision Making:     I, Dr. Celena Bhakta, am the primary provider of record    42-year-old female with history of sickle cell anemia presenting with low back and leg pain. She has no trauma, this is typical of pain exacerbation with her disease. She arrives hemodynamically stable and well-appearing. She has no red flag symptoms to suggest spinal emergency. Metabolic panel is within acceptable limits, no leukocytosis. No anemia. Patient's reticulocyte count is within acceptable limits. Patient does appear to have UTI. She will be given Omnicef. After pain management she is resting comfortably, EKG shows no signs of ischemia or injury. Comfortable with discharge home, antibiotic for UTI, PCP follow-up with instruction to return for any changes in condition or new symptoms. Re-Evaluations: This patient's ED course included: a personal history and physicial examination, re-evaluation prior to disposition, IV medications, cardiac monitoring and continuous pulse oximetry    This patient has remained hemodynamically stable, improved and been closely monitored during their ED course. Counseling:    The emergency provider has spoken with the patient and discussed todays results, in addition to providing specific details for the plan of care and counseling regarding the diagnosis and prognosis. Questions are answered at this time and they are agreeable with the plan.       --------------------------------- IMPRESSION AND DISPOSITION ---------------------------------    IMPRESSION  1. Sickle cell anemia with pain (HCC)    2. Acute cystitis without hematuria        DISPOSITION  Disposition: Discharge to home  Patient condition is stable        NOTE: This report was transcribed using voice recognition software.  Every effort was made to ensure accuracy; however, inadvertent computerized transcription errors may be present        Promise Pina DO  04/29/21 1818

## 2021-04-29 NOTE — ED NOTES
FIRST PROVIDER CONTACT ASSESSMENT NOTE      Department of Emergency Medicine   ED  First Provider Note   4/28/21  8:25 PM EDT    Chief Complaint: Sickle Cell Pain Crisis (Back and Leg Pain)      History of Present Illness:    Tash Chang is a 22 y.o. female who presents to the ED by private car for normal sickle cell pain in her back and legs. She normally takes dilaudid 4 mg for her pain with last dose given at 4:30 PM prior to arrival.  Patient reports the pain  has been ongoing for the past 3 days and states that her last crisis was 3 weeks ago. She denies any chest pain or shortness of breath, headaches, abdominal pain, urinary symptoms or any URI symptoms. Focused Screening Exam:  Constitutional:  Alert, appears stated age and is in no distress.   *    *ALLERGIES*     Bactrim [sulfamethoxazole-trimethoprim], Demerol hcl [meperidine], Demerol hcl [meperidine], Adhesive tape, and Bactrim [sulfamethoxazole-trimethoprim]     ED Triage Vitals   BP Temp Temp src Pulse Resp SpO2 Height Weight   -- -- -- -- -- -- -- --        Initial Plan of Care:  Initiate Treatment-Testing, Proceed toTreatment Area When Bed Available for ED Attending/MLP to Continue Care    -----------------END OF FIRST PROVIDER CONTACT ASSESSMENT NOTE--------------  Electronically signed by SOLOMON Allen CNP   DD: 4/28/21     SOLOMON Allen CNP  04/29/21 0005

## 2021-08-20 ENCOUNTER — TELEPHONE (OUTPATIENT)
Dept: VASCULAR SURGERY | Age: 26
End: 2021-08-20

## 2021-08-23 NOTE — PROGRESS NOTES
Patient agreed to COVID test on 8/25 at the  [facility], drive thru 7 am- 10 am, walk in 8 am - 4 pm patient aware if walk in to arrive no later than 3:45 pm located at  [address] [place] [address]. Patient instructed to bring ID. Patient instructed to self isolate until day of surgery.

## 2021-08-25 ENCOUNTER — NURSE ONLY (OUTPATIENT)
Dept: PRIMARY CARE CLINIC | Age: 26
End: 2021-08-25

## 2021-08-26 ENCOUNTER — HOSPITAL ENCOUNTER (OUTPATIENT)
Age: 26
Discharge: HOME | End: 2021-08-28
Payer: MEDICARE

## 2021-08-26 DIAGNOSIS — U07.1 COVID-19: ICD-10-CM

## 2021-08-26 PROCEDURE — U0003 INFECTIOUS AGENT DETECTION BY NUCLEIC ACID (DNA OR RNA); SEVERE ACUTE RESPIRATORY SYNDROME CORONAVIRUS 2 (SARS-COV-2) (CORONAVIRUS DISEASE [COVID-19]), AMPLIFIED PROBE TECHNIQUE, MAKING USE OF HIGH THROUGHPUT TECHNOLOGIES AS DESCRIBED BY CMS-2020-01-R: HCPCS

## 2021-08-26 PROCEDURE — U0005 INFEC AGEN DETEC AMPLI PROBE: HCPCS

## 2021-08-26 RX ORDER — CYCLOBENZAPRINE HCL 10 MG
TABLET ORAL
COMMUNITY
End: 2021-10-05

## 2021-08-26 RX ORDER — FENTANYL 25 UG/H
PATCH TRANSDERMAL
COMMUNITY
Start: 2021-08-13 | End: 2021-08-26

## 2021-08-26 RX ORDER — DULOXETIN HYDROCHLORIDE 60 MG/1
CAPSULE, DELAYED RELEASE ORAL
COMMUNITY

## 2021-08-26 NOTE — PROGRESS NOTES
[address] PRE-ADMISSION TESTING GENERAL INSTRUCTIONS- [facility]-phone number:[phone]    GENERAL INSTRUCTIONS  [x] Antibacterial Soap shower Night before and/or AM of Surgery  [] Live wipe instruction sheet and wipes given. [x] Nothing by mouth after midnight, including gum, candy, mints, or water. [x] You may brush your teeth, gargle, but do NOT swallow water. []Hibiclens shower  the night before and the morning of surgery. Do not use             Hibiclens on your face or head. [x]No smoking, chewing tobacco, illegal drugs, or alcohol within 24 hours of your surgery. [x] Jewelry, valuables or body piercing's should not be brought to the hospital. All body and/or tongue piercing's must be removed prior to arriving to hospital.  ALL hair pins must be removed. [x] Do not wear makeup, lotions, powders, deodorant. Nail polish as directed by the nurse. [x] Arrange transportation with a responsible adult  to and from the hospital. If you do not have a responsible adult  to transport you, you will need to make arrangements with a medical transportation company (i.e. [employer]. A Uber/taxi/bus is not appropriate unless you are accompanied by a responsible adult ). Arrange for someone to be with you for the remainder of the day and for 24 hours after your procedure due to having had anesthesia. Who will be your  for transportation?_____MOM_____________   Who will be staying with you for 24 hrs after your procedure?_____MOM_____________  [x] Bring insurance card and photo ID.  [] Transfusion Bracelet: Please bring with you to hospital, day of surgery  [x] Bring urine specimen day of surgery. Any small container is acceptable. [] Use inhalers the morning of surgery and bring with you to hospital.  [] Bring copy of living will or healthcare power of  papers to be placed in your electronic record.   [] CPAP/BI-PAP: Please bring your machine if you are to spend the night in the hospital.     PARKING INSTRUCTIONS:   [x] Arrival Time:___0945__________  · [x] Parking lot '\"I\"  is located on [facility] (the corner of [facility] and [facility]). To enter, press the button and the gate will lift. A free token will be provided to exit the lot. One car per patient is allowed to park in this lot. All other cars are to park on [address] [place] [address] either in the parking garage or the handicap lot. [] To reach the [facility] lobby from [address] [place] [address], upon entering the hospital, take elevator B to the 3rd floor. EDUCATION INSTRUCTIONS:      [] Knee or hip replacement booklet & exercise pamphlets given. [] [address] placed in chart. [] Pre-admission Testing educational folder given  [] Incentive Spirometry,coughing & deep breathing exercises reviewed. []Medication information sheet(s)   []Fluoroscopy-Xray used in surgery reviewed with patient. Educational pamphlet placed in chart. []Pain: Post-op pain is normal and to be expected. You will be asked to rate your pain from 0-10(a zero is not acceptable-education is needed). Your post-op pain goal is:  [] Ask your nurse for your pain medication. [] Joint camp offered. [] Joint replacement booklets given. [] Other:___________________________    MEDICATION INSTRUCTIONS:   [x]Bring a complete list of your medications, please write the last time you took the medicine, give this list to the nurse. [x] Take the following medications the morning of surgery with 1-2 ounces of water:   [x] Stop herbal supplements and vitamins 5 days before your surgery. [] DO NOT take any diabetic medicine the morning of surgery. Follow instructions for insulin the day before surgery. [] If you are diabetic and your blood sugar is low or you feel symptomatic, you may drink 1-2 ounces of apple juice or take a glucose tablet.   The morning of your procedure, you may call the pre-op area if you have concerns about your blood sugar [phone]. [] Use your inhalers the morning of surgery. Bring your emergency inhaler with you day of surgery. [] Follow physician instructions regarding any blood thinners you may be taking. WHAT TO EXPECT:  [x] The day of surgery you will be greeted and checked in by the [employer].  In addition, you will be registered in the [facility] by a Patient Access Representative. Please bring your photo ID and insurance card. A nurse will greet you in accordance to the time you are needed in the pre-op area to prepare you for surgery. Please do not be discouraged if you are not greeted in the order you arrive as there are many variables that are involved in patient preparation. Your patience is greatly appreciated as you wait for your nurse. Please bring in items such as: books, magazines, newspapers, electronics, or any other items  to occupy your time in the waiting area. [x]  Delays may occur with surgery and staff will make a sincere effort to keep you informed of delays. If any delays occur with your procedure, we apologize ahead of time for your inconvenience as we recognize the value of your time.

## 2021-08-27 LAB — SARS-COV-2, PCR: NOT DETECTED

## 2021-08-29 ENCOUNTER — ANESTHESIA EVENT (OUTPATIENT)
Dept: OPERATING ROOM | Age: 26
End: 2021-08-29
Payer: MEDICARE

## 2021-08-30 ENCOUNTER — ANESTHESIA (OUTPATIENT)
Dept: OPERATING ROOM | Age: 26
End: 2021-08-30
Payer: MEDICARE

## 2021-08-30 ENCOUNTER — APPOINTMENT (OUTPATIENT)
Dept: GENERAL RADIOLOGY | Age: 26
End: 2021-08-30
Attending: SURGERY
Payer: MEDICARE

## 2021-08-30 ENCOUNTER — HOSPITAL ENCOUNTER (OUTPATIENT)
Age: 26
Setting detail: OUTPATIENT SURGERY
Discharge: HOME | End: 2021-08-30
Attending: SURGERY | Admitting: SURGERY
Payer: MEDICARE

## 2021-08-30 VITALS
BODY MASS INDEX: 51.91 KG/M2 | RESPIRATION RATE: 20 BRPM | SYSTOLIC BLOOD PRESSURE: 110 MMHG | HEART RATE: 88 BPM | HEIGHT: 63 IN | OXYGEN SATURATION: 95 % | TEMPERATURE: 97.9 F | WEIGHT: 293 LBS | DIASTOLIC BLOOD PRESSURE: 60 MMHG

## 2021-08-30 VITALS — DIASTOLIC BLOOD PRESSURE: 72 MMHG | SYSTOLIC BLOOD PRESSURE: 118 MMHG | OXYGEN SATURATION: 95 %

## 2021-08-30 DIAGNOSIS — Z01.812 PRE-OPERATIVE LABORATORY EXAMINATION: ICD-10-CM

## 2021-08-30 DIAGNOSIS — U07.1 COVID-19: Primary | ICD-10-CM

## 2021-08-30 PROBLEM — Z78.9 PROBLEM WITH VASCULAR ACCESS: Chronic | Status: ACTIVE | Noted: 2021-08-30

## 2021-08-30 LAB
HCG, URINE, POC: NEGATIVE
Lab: NORMAL
METER GLUCOSE: 106 MG/DL (ref 74–99)
NEGATIVE QC PASS/FAIL: NORMAL
POSITIVE QC PASS/FAIL: NORMAL

## 2021-08-30 PROCEDURE — 7100000011 HC PHASE II RECOVERY - ADDTL 15 MIN: Performed by: SURGERY

## 2021-08-30 PROCEDURE — 6360000002 HC RX W HCPCS: Performed by: ANESTHESIOLOGY

## 2021-08-30 PROCEDURE — 6360000002 HC RX W HCPCS

## 2021-08-30 PROCEDURE — C1725 CATH, TRANSLUMIN NON-LASER: HCPCS | Performed by: SURGERY

## 2021-08-30 PROCEDURE — 82962 GLUCOSE BLOOD TEST: CPT

## 2021-08-30 PROCEDURE — 7100000010 HC PHASE II RECOVERY - FIRST 15 MIN: Performed by: SURGERY

## 2021-08-30 PROCEDURE — 71045 X-RAY EXAM CHEST 1 VIEW: CPT

## 2021-08-30 PROCEDURE — 6360000002 HC RX W HCPCS: Performed by: NURSE PRACTITIONER

## 2021-08-30 PROCEDURE — 3700000001 HC ADD 15 MINUTES (ANESTHESIA): Performed by: SURGERY

## 2021-08-30 PROCEDURE — 2580000003 HC RX 258: Performed by: SURGERY

## 2021-08-30 PROCEDURE — 7100000000 HC PACU RECOVERY - FIRST 15 MIN: Performed by: SURGERY

## 2021-08-30 PROCEDURE — 7100000001 HC PACU RECOVERY - ADDTL 15 MIN: Performed by: SURGERY

## 2021-08-30 PROCEDURE — 6360000002 HC RX W HCPCS: Performed by: SURGERY

## 2021-08-30 PROCEDURE — 2500000003 HC RX 250 WO HCPCS: Performed by: SURGERY

## 2021-08-30 PROCEDURE — 3600000013 HC SURGERY LEVEL 3 ADDTL 15MIN: Performed by: SURGERY

## 2021-08-30 PROCEDURE — 2580000003 HC RX 258: Performed by: NURSE PRACTITIONER

## 2021-08-30 PROCEDURE — 2709999900 HC NON-CHARGEABLE SUPPLY: Performed by: SURGERY

## 2021-08-30 PROCEDURE — 36582 REPLACE TUNNELED CV CATH: CPT | Performed by: SURGERY

## 2021-08-30 PROCEDURE — C1788 PORT, INDWELLING, IMP: HCPCS | Performed by: SURGERY

## 2021-08-30 PROCEDURE — 37248 TRLUML BALO ANGIOP 1ST VEIN: CPT | Performed by: SURGERY

## 2021-08-30 PROCEDURE — 2580000003 HC RX 258

## 2021-08-30 PROCEDURE — 3600000003 HC SURGERY LEVEL 3 BASE: Performed by: SURGERY

## 2021-08-30 PROCEDURE — 3700000000 HC ANESTHESIA ATTENDED CARE: Performed by: SURGERY

## 2021-08-30 PROCEDURE — C1894 INTRO/SHEATH, NON-LASER: HCPCS | Performed by: SURGERY

## 2021-08-30 PROCEDURE — C1769 GUIDE WIRE: HCPCS | Performed by: SURGERY

## 2021-08-30 DEVICE — PORT INFUS 8FR PWR INJ CT FOR VASC ACCS CATH: Type: IMPLANTABLE DEVICE | Site: CHEST | Status: FUNCTIONAL

## 2021-08-30 RX ORDER — PROMETHAZINE HYDROCHLORIDE 25 MG/ML
6.25 INJECTION, SOLUTION INTRAMUSCULAR; INTRAVENOUS EVERY 10 MIN PRN
Status: DISCONTINUED | OUTPATIENT
Start: 2021-08-30 | End: 2021-08-30 | Stop reason: HOSPADM

## 2021-08-30 RX ORDER — SODIUM CHLORIDE 9 MG/ML
INJECTION, SOLUTION INTRAVENOUS CONTINUOUS
Status: CANCELLED | OUTPATIENT
Start: 2021-08-30

## 2021-08-30 RX ORDER — HYDROCODONE BITARTRATE AND ACETAMINOPHEN 5; 325 MG/1; MG/1
2 TABLET ORAL PRN
Status: DISCONTINUED | OUTPATIENT
Start: 2021-08-30 | End: 2021-08-30 | Stop reason: HOSPADM

## 2021-08-30 RX ORDER — HEPARIN SODIUM (PORCINE) LOCK FLUSH IV SOLN 100 UNIT/ML 100 UNIT/ML
SOLUTION INTRAVENOUS PRN
Status: DISCONTINUED | OUTPATIENT
Start: 2021-08-30 | End: 2021-08-30 | Stop reason: ALTCHOICE

## 2021-08-30 RX ORDER — HYDROCODONE BITARTRATE AND ACETAMINOPHEN 5; 325 MG/1; MG/1
1 TABLET ORAL PRN
Status: DISCONTINUED | OUTPATIENT
Start: 2021-08-30 | End: 2021-08-30 | Stop reason: HOSPADM

## 2021-08-30 RX ORDER — PROPOFOL 10 MG/ML
INJECTION, EMULSION INTRAVENOUS PRN
Status: DISCONTINUED | OUTPATIENT
Start: 2021-08-30 | End: 2021-08-30 | Stop reason: SDUPTHER

## 2021-08-30 RX ORDER — MEPERIDINE HYDROCHLORIDE 25 MG/ML
12.5 INJECTION INTRAMUSCULAR; INTRAVENOUS; SUBCUTANEOUS EVERY 5 MIN PRN
Status: DISCONTINUED | OUTPATIENT
Start: 2021-08-30 | End: 2021-08-30 | Stop reason: HOSPADM

## 2021-08-30 RX ORDER — SODIUM CHLORIDE 0.9 % (FLUSH) 0.9 %
5-40 SYRINGE (ML) INJECTION PRN
Status: DISCONTINUED | OUTPATIENT
Start: 2021-08-30 | End: 2021-08-30 | Stop reason: HOSPADM

## 2021-08-30 RX ORDER — ACETAMINOPHEN 325 MG/1
650 TABLET ORAL EVERY 4 HOURS PRN
Status: DISCONTINUED | OUTPATIENT
Start: 2021-08-30 | End: 2021-08-30 | Stop reason: HOSPADM

## 2021-08-30 RX ORDER — SODIUM CHLORIDE 0.9 % (FLUSH) 0.9 %
5-40 SYRINGE (ML) INJECTION EVERY 12 HOURS SCHEDULED
Status: DISCONTINUED | OUTPATIENT
Start: 2021-08-30 | End: 2021-08-30 | Stop reason: HOSPADM

## 2021-08-30 RX ORDER — SODIUM CHLORIDE 9 MG/ML
25 INJECTION, SOLUTION INTRAVENOUS PRN
Status: DISCONTINUED | OUTPATIENT
Start: 2021-08-30 | End: 2021-08-30 | Stop reason: HOSPADM

## 2021-08-30 RX ORDER — FENTANYL CITRATE 50 UG/ML
INJECTION, SOLUTION INTRAMUSCULAR; INTRAVENOUS
Status: DISCONTINUED
Start: 2021-08-30 | End: 2021-08-30 | Stop reason: HOSPADM

## 2021-08-30 RX ORDER — ONDANSETRON 2 MG/ML
4 INJECTION INTRAMUSCULAR; INTRAVENOUS EVERY 8 HOURS PRN
Status: DISCONTINUED | OUTPATIENT
Start: 2021-08-30 | End: 2021-08-30 | Stop reason: HOSPADM

## 2021-08-30 RX ORDER — MORPHINE SULFATE 2 MG/ML
2 INJECTION, SOLUTION INTRAMUSCULAR; INTRAVENOUS EVERY 5 MIN PRN
Status: DISCONTINUED | OUTPATIENT
Start: 2021-08-30 | End: 2021-08-30 | Stop reason: HOSPADM

## 2021-08-30 RX ORDER — SODIUM CHLORIDE 9 MG/ML
INJECTION, SOLUTION INTRAVENOUS CONTINUOUS PRN
Status: DISCONTINUED | OUTPATIENT
Start: 2021-08-30 | End: 2021-08-30 | Stop reason: SDUPTHER

## 2021-08-30 RX ORDER — MORPHINE SULFATE 2 MG/ML
1 INJECTION, SOLUTION INTRAMUSCULAR; INTRAVENOUS EVERY 5 MIN PRN
Status: DISCONTINUED | OUTPATIENT
Start: 2021-08-30 | End: 2021-08-30 | Stop reason: HOSPADM

## 2021-08-30 RX ORDER — MIDAZOLAM HYDROCHLORIDE 1 MG/ML
INJECTION INTRAMUSCULAR; INTRAVENOUS PRN
Status: DISCONTINUED | OUTPATIENT
Start: 2021-08-30 | End: 2021-08-30 | Stop reason: SDUPTHER

## 2021-08-30 RX ORDER — FENTANYL CITRATE 50 UG/ML
INJECTION, SOLUTION INTRAMUSCULAR; INTRAVENOUS PRN
Status: DISCONTINUED | OUTPATIENT
Start: 2021-08-30 | End: 2021-08-30 | Stop reason: SDUPTHER

## 2021-08-30 RX ADMIN — SODIUM CHLORIDE: 9 INJECTION, SOLUTION INTRAVENOUS at 11:33

## 2021-08-30 RX ADMIN — CEFAZOLIN 3000 MG: 10 INJECTION, POWDER, FOR SOLUTION INTRAVENOUS at 11:34

## 2021-08-30 RX ADMIN — HYDROMORPHONE HYDROCHLORIDE 0.5 MG: 1 INJECTION, SOLUTION INTRAMUSCULAR; INTRAVENOUS; SUBCUTANEOUS at 14:10

## 2021-08-30 RX ADMIN — FENTANYL CITRATE 50 MCG: 50 INJECTION, SOLUTION INTRAMUSCULAR; INTRAVENOUS at 11:49

## 2021-08-30 RX ADMIN — PROPOFOL 100 MG: 10 INJECTION, EMULSION INTRAVENOUS at 11:43

## 2021-08-30 RX ADMIN — HYDROMORPHONE HYDROCHLORIDE 0.5 MG: 1 INJECTION, SOLUTION INTRAMUSCULAR; INTRAVENOUS; SUBCUTANEOUS at 13:05

## 2021-08-30 RX ADMIN — HYDROMORPHONE HYDROCHLORIDE 0.5 MG: 1 INJECTION, SOLUTION INTRAMUSCULAR; INTRAVENOUS; SUBCUTANEOUS at 13:00

## 2021-08-30 RX ADMIN — HYDROMORPHONE HYDROCHLORIDE 2 MG: 1 INJECTION, SOLUTION INTRAMUSCULAR; INTRAVENOUS; SUBCUTANEOUS at 13:35

## 2021-08-30 RX ADMIN — FENTANYL CITRATE 50 MCG: 50 INJECTION, SOLUTION INTRAMUSCULAR; INTRAVENOUS at 11:36

## 2021-08-30 RX ADMIN — SODIUM CHLORIDE 25 ML: 9 INJECTION, SOLUTION INTRAVENOUS at 10:28

## 2021-08-30 RX ADMIN — PROPOFOL 75 MG: 10 INJECTION, EMULSION INTRAVENOUS at 11:36

## 2021-08-30 RX ADMIN — HYDROMORPHONE HYDROCHLORIDE 0.5 MG: 1 INJECTION, SOLUTION INTRAMUSCULAR; INTRAVENOUS; SUBCUTANEOUS at 13:15

## 2021-08-30 RX ADMIN — HYDROMORPHONE HYDROCHLORIDE 0.5 MG: 1 INJECTION, SOLUTION INTRAMUSCULAR; INTRAVENOUS; SUBCUTANEOUS at 13:10

## 2021-08-30 RX ADMIN — HYDROMORPHONE HYDROCHLORIDE 0.5 MG: 1 INJECTION, SOLUTION INTRAMUSCULAR; INTRAVENOUS; SUBCUTANEOUS at 14:05

## 2021-08-30 RX ADMIN — MIDAZOLAM 2 MG: 1 INJECTION INTRAMUSCULAR; INTRAVENOUS at 11:33

## 2021-08-30 ASSESSMENT — PAIN - FUNCTIONAL ASSESSMENT: PAIN_FUNCTIONAL_ASSESSMENT: 0-10

## 2021-08-30 ASSESSMENT — PAIN DESCRIPTION - PAIN TYPE
TYPE: CHRONIC PAIN

## 2021-08-30 ASSESSMENT — PAIN DESCRIPTION - DESCRIPTORS
DESCRIPTORS: ACHING;DISCOMFORT
DESCRIPTORS: CONSTANT;ACHING
DESCRIPTORS: ACHING;DISCOMFORT

## 2021-08-30 ASSESSMENT — PAIN DESCRIPTION - LOCATION
LOCATION: BACK
LOCATION: BACK;CHEST;LEG
LOCATION: BACK

## 2021-08-30 ASSESSMENT — PAIN SCALES - GENERAL
PAINLEVEL_OUTOF10: 8
PAINLEVEL_OUTOF10: 10
PAINLEVEL_OUTOF10: 4
PAINLEVEL_OUTOF10: 10
PAINLEVEL_OUTOF10: 10
PAINLEVEL_OUTOF10: 8
PAINLEVEL_OUTOF10: 10
PAINLEVEL_OUTOF10: 4
PAINLEVEL_OUTOF10: 8

## 2021-08-30 ASSESSMENT — PAIN DESCRIPTION - FREQUENCY
FREQUENCY: INTERMITTENT
FREQUENCY: INTERMITTENT

## 2021-08-30 ASSESSMENT — PAIN DESCRIPTION - ORIENTATION
ORIENTATION: RIGHT;LEFT

## 2021-08-30 ASSESSMENT — PULMONARY FUNCTION TESTS
PIF_VALUE: 0
PIF_VALUE: 1

## 2021-08-30 ASSESSMENT — PAIN DESCRIPTION - ONSET: ONSET: ON-GOING

## 2021-08-30 NOTE — ANESTHESIA PRE PROCEDURE
Department of Anesthesiology  Preprocedure Note       Name:  Munir Staples   Age:  32 y.o.  :  1995                                          MRN:  [id number]         Date:  2021      Surgeon: Kahlil Powell):  Natalia Walker MD    Procedure: Procedure(s):  PORT REMOVAL AND REINSERTION  PORT INSERTION    Medications prior to admission:   Prior to Admission medications    Medication Sig Start Date End Date Taking? Authorizing Provider   enoxaparin (LOVENOX) 150 MG/ML injection Inject 1 mg/kg into the skin every 12 hours   Yes Historical Provider, MD   HYDROmorphone (DILAUDID) 4 MG tablet Take 4 mg by mouth every 4 hours as needed. 20  Yes Historical Provider, MD   folic acid (FOLVITE) 1 MG tablet Take 1 tablet by mouth daily 10/16/20  Yes Jarod Law DO   hydroxyurea (HYDREA) 500 MG chemo capsule Take 500 mg by mouth 2 times daily   Yes Historical Provider, MD   ferrous sulfate 325 (65 FE) MG tablet Take 1 tablet by mouth 2 times daily (with meals) 17  Yes Santiago Bush,    cyclobenzaprine (FLEXERIL) 10 MG tablet Take by mouth  Patient not taking: Reported on 2021    Historical Provider, MD   DULoxetine (CYMBALTA) 60 MG extended release capsule Take by mouth  Patient not taking: Reported on 2021    Historical Provider, MD       Current medications:    Current Facility-Administered Medications   Medication Dose Route Frequency Provider Last Rate Last Admin    sodium chloride flush 0.9 % injection 5-40 mL  5-40 mL IntraVENous 2 times per day SOLOMON Lr CNP        sodium chloride flush 0.9 % injection 5-40 mL  5-40 mL IntraVENous PRN SOLOMON Lr - CNP        0.9 % sodium chloride infusion  25 mL IntraVENous PRN SOLOMON Lr  mL/hr at 21 1028 25 mL at 21 1028    ceFAZolin (ANCEF) 3,000 mg in dextrose 5 % 100 mL IVPB  3,000 mg IntraVENous On Call to [address] [facility], APRN - CNP           Allergies:     Allergies   Allergen Reactions    Bactrim [Sulfamethoxazole-Trimethoprim]     Demerol Hcl [Meperidine] Hives    Demerol Hcl [Meperidine]     Adhesive Tape Dermatitis, Rash and Itching    Bactrim [Sulfamethoxazole-Trimethoprim] Rash     Rash, itchy       Problem List:    Patient Active Problem List   Diagnosis Code    Microcytic anemia D50.9    Obesity, Class III, BMI 40-49.9 (morbid obesity) ([facility]) E66.01    Chronic pain syndrome G89.4    Chest pain R07.9    Nephrocalcinosis E83.59, N29    Psychogenic pain F45.41    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    Thrombosis complicating venous access device ([facility]) X54.205Z    Acute bilateral low back pain with sciatica M54.40    Hb-SS disease without crisis ([facility] [address] D57.1    Maternal sickle cell anemia complicating pregnancy in second trimester ([facility] [address] O99.012, D57.1    Encounter for fetal anatomic survey Z36.89    Suspected shortening of cervix not found Z03.75    Long term current use of anticoagulant therapy Z79.01    Personal history of PE (pulmonary embolism) Z86.711    Oligohydramnios, second trimester, not applicable or unspecified O41. 46X0    Maternal morbid obesity in second trimester, antepartum ([facility] [address] O99.212, E66.01    16 weeks gestation of pregnancy Z3A.16    Sickle cell crisis ([facility] [address] D57.00    21 weeks gestation of pregnancy Z3A.21    Sickle cell crisis acute chest syndrome ([facility] [address] D57.01    Community acquired pneumonia of right upper lobe of lung J18.9    Sickle cell anemia with pain (HCC) D57.00    Lymphadenitis I88.9    Rectal bleeding K62.5    Anemia due to blood loss D50.0    Poor venous access I87.8    Sickle cell pain crisis (HCC) D57.00    Red blood cell antibody positive R76.8    Thalassemia minor D56.3    Positive blood culture R78.81    Problem with vascular access Z78.9       Past Medical History:        Diagnosis Date    CLABSI (central line-associated bloodstream infection) 8/3/2016    Deep vein thrombosis (DVT) of left upper extremity (HCC) 8/12/2016    Disease of blood and blood forming organ     Heart murmur     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[facility]     Miscarriage within last 12 months 04/2017    Obesity, Class III, BMI 40-49.9 (morbid obesity) ([facility] 5/22/2014    PE (pulmonary embolism)     PE (pulmonary embolism)     Pneumonia     Psychiatric problem     Sickle cell anemia (HCC)     Thrombosis complicating venous access device ([facility] [address] 5/18/2017       Past Surgical History:        Procedure Laterality Date    CATHETER REMOVAL Right 11/30/2020    RIGHT SUBCLAVIAN CATHETER REMOVAL performed by Christos Ross MD at [address] [place] [address] / REMOVAL / REPLACEMENT VENOUS ACCESS CATHETER Left 5/9/2019    PORT INSERTION performed by Christos Ross MD at [place] [address] IR INS PICC VAD W SQ PORT GREATER THAN 5 Right 10/16/2020    tunneled picc inserted by Dr Delgado Lucas II 10/16 R upper chest    IR TUNNELED CATHETER PLACEMENT GREATER THAN 5 YEARS  10/16/2020    IR TUNNELED CATHETER PLACEMENT GREATER THAN 5 YEARS 10/16/2020 Cheryl Castro MD [facility]    KNEE SURGERY      right    OTHER SURGICAL HISTORY  08/04/2016    removal of mediport    OTHER SURGICAL HISTORY Right 03/05/2018    TUNNELED VENOUS PORT REMOVAL & REINSERTION;DILATION OF VENOUS TRACT    PORT SURGERY N/A 10/10/2020    insertion of right femoral catheter, triple lumen catheter and PORT REMOVAL performed by [facility]rey Fiore MD at [facility] 11/30/2020    PORT INSERTION performed by Contreras Cat MD at [address]  [facility] TUNNELED VENOUS PORT PLACEMENT  2014    and removed    TUNNELED VENOUS PORT PLACEMENT Right 01/23/2015    Delatore, removed 08/04/16 CLABSI    TUNNELED VENOUS PORT PLACEMENT Right 09/15/2016    CHEST    TUNNELED VENOUS PORT PLACEMENT Right 05/18/2017    SUBCLAVIAN       Social History:    Social History     Tobacco Use    Smoking status: Never Smoker    Smokeless tobacco: Never Used   Substance Use Topics    Alcohol use: Yes     Comment: socially                                Counseling given: Not Answered      Vital Signs (Current):   Vitals:    08/26/21 1041 08/30/21 1006   BP:  (!) 141/86   Pulse:  85   Resp:  16   Temp:  36.4 °C (97.5 °F)   TempSrc:  Temporal   SpO2:  96%   Weight: (!) 314 lb (142.4 kg) (!) 314 lb (142.4 kg)   Height: 5' 3\" (1.6 m) 5' 3\" (1.6 m)                                              BP Readings from Last 3 Encounters:   08/30/21 (!) 141/86   04/28/21 (!) 141/82   04/19/21 (!) 143/67       NPO Status: Time of last liquid consumption: 2330                        Time of last solid consumption: 2300                        Date of last liquid consumption: 08/29/21                        Date of last solid food consumption: 08/29/21    BMI:   Wt Readings from Last 3 Encounters:   08/30/21 (!) 314 lb (142.4 kg)   04/28/21 (!) 318 lb (144.2 kg)   04/19/21 (!) 312 lb (141.5 kg)     Body mass index is 55.62 kg/m².     CBC:   Lab Results   Component Value Date    WBC 12.0 04/29/2021    RBC 4.71 04/29/2021    RBC 4.73 04/19/2021    HGB 10.9 04/29/2021    HCT 31.8 04/29/2021    MCV 67.5 04/29/2021    RDW 18.2 04/29/2021     04/29/2021       CMP:   Lab Results   Component Value Date     04/29/2021    K 4.1 04/29/2021    K 4.6 03/22/2021     04/29/2021    CO2 25 04/29/2021    BUN 9 04/29/2021    CREATININE 0.8 04/29/2021    GFRAA >60 04/29/2021    LABGLOM >60 04/29/2021    GLUCOSE 99 04/29/2021    GLUCOSE 89 05/03/2011    PROT 7.3 03/08/2021    CALCIUM 8.8 04/29/2021    BILITOT 1.3 03/08/2021    ALKPHOS 95 03/08/2021    AST 12 03/08/2021    ALT 7 03/08/2021       POC Tests: No results for input(s): POCGLU, POCNA, POCK, POCCL, POCBUN, POCHEMO, POCHCT in the last 72 hours. Coags:   Lab Results   Component Value Date    PROTIME 12.9 10/15/2020    INR 1.1 10/15/2020    APTT 29.6 05/01/2019       HCG (If Applicable):   Lab Results   Component Value Date    PREGTESTUR NEGATIVE 10/08/2020        ABGs: No results found for: PHART, PO2ART, YVC4SMI, MQQ4PKD, BEART, A3LRJLLQ     Type & Screen (If Applicable):  No results found for: LABABO, LABRH    Drug/Infectious Status (If Applicable):  No results found for: HIV, HEPCAB    COVID-19 Screening (If Applicable):   Lab Results   Component Value Date    COVID19 Not Detected 08/25/2021     EKG 4/29/2021  Ventricular Rate BPM 73    Atrial Rate BPM 73    P-R Interval ms 150    QRS Duration ms 84    Q-T Interval ms 390    QTc Calculation (Bazett) ms 429    P Axis degrees 53    R Axis degrees 37    T Axis degrees 23    Resulting Agency  [facility]      Narrative & Impression    Sinus rhythm with marked sinus arrhythmia  Otherwise normal ECG  When compared with ECG of 05-MAY-2020 20:24,  Nonspecific T wave abnormality, improved in Inferior leads     ECHO 10/10/2020  Findings      Left Ventricle   Normal left ventricle size and systolic function. Ejection fraction is visually estimated at 55-60%. No regional wall motion abnormalities seen. Normal left ventricle wall thickness. Normal diastolic function. Right Ventricle   Normal right ventricular size and function. TAPSE 23 mm. Left Atrium   Left atrium is of normal size. Right Atrium   Normal right atrium size. Mitral Valve   Mild mitral annular calcification. No evidence of mitral valve stenosis. Physiologic and/or trace mitral regurgitation is present.    No evidence of mass or vegetation noted on the mitral valve. Tricuspid Valve   The tricuspid valve was not well visualized. Aortic Valve   The aortic valve appears mildly sclerotic. The aortic valve is trileaflet. No hemodynamically significant aortic stenosis is present. No evidence of mass or vegetation noted on the aortic valve. Pulmonic Valve   The pulmonic valve was not well visualized. Pericardial Effusion   No evidence for hemodynamically significant pericardial effusion. Pleural Effusion   No evidence of pleural effusion. Aorta   Normal aortic root and ascending aorta. Miscellaneous   Inferior Vena Cava not well visualized. Conclusions      Summary   Normal left ventricle size and systolic function. Ejection fraction is visually estimated at 55-60%. No regional wall motion abnormalities seen. Normal left ventricle wall thickness. Normal diastolic function. Normal right ventricular size and function. TAPSE 23 mm. Physiologic and/or trace mitral regurgitation is present. No hemodynamically significant aortic stenosis is present. The tricuspid valve was not well visualized. No evidence for hemodynamically significant pericardial effusion. No echocardiographic evidence of endocarditis noted but Tricuspid valve   was not well visualized. Please consider KAE, if clinical suspicion for endocarditis is high.       Anesthesia Evaluation  Patient summary reviewed and Nursing notes reviewed no history of anesthetic complications:   Airway: Mallampati: II  TM distance: >3 FB   Neck ROM: full  Mouth opening: > = 3 FB Dental:      Comment: Denies any cracked or loose teeth    Pulmonary:Negative Pulmonary ROS   (+) decreased breath sounds,                             Cardiovascular:Negative CV ROS  Exercise tolerance: good (>4 METS),         ECG reviewed  Rhythm: regular    Echocardiogram reviewed                  Neuro/Psych:   Negative Neuro/Psych ROS GI/Hepatic/Renal: Neg GI/Hepatic/Renal ROS            Endo/Other:    (+) blood dyscrasia ( sickle cell)::., .                 Abdominal:             Vascular:           ROS comment: History of Blood clots, lovenox twice daily. Other Findings:             Anesthesia Plan      MAC     ASA 3       Induction: intravenous. MIPS: Postoperative opioids intended and Prophylactic antiemetics administered. Anesthetic plan and risks discussed with patient. Use of blood products discussed with patient whom consented to blood products. Plan discussed with CRNA and attending. Shantell Kwong RN   8/30/2021        Pt seen, examined, chart reviewed, plan discussed.   Iesha Go MD  8/30/2021  12:16 PM

## 2021-08-30 NOTE — ANESTHESIA POSTPROCEDURE EVALUATION
Department of Anesthesiology  Postprocedure Note    Patient: Kirsten Rivas  MRN: [id number]  YOB: 1995  Date of evaluation: 8/30/2021  Time:  4:07 PM     Procedure Summary     Date: 08/30/21 Room / Location: [facility] / [facility]    Anesthesia Start: 1133 Anesthesia Stop:     Procedures:       PORT REMOVAL AND REINSERTION (Right Chest)      PORT INSERTION (Right Chest) Diagnosis: (SICKLE CELL ANEMIA)    Surgeons: Inés Marrufo MD Responsible Provider: Flavia Francois MD    Anesthesia Type: MAC ASA Status: 3          Anesthesia Type: MAC    Chago Phase I: Chago Score: 10    Chaog Phase II: Chago Score: 10    Last vitals: Reviewed and per EMR flowsheets.        Anesthesia Post Evaluation    Patient location during evaluation: PACU  Patient participation: complete - patient participated  Level of consciousness: awake  Pain score: 3  Airway patency: patent  Nausea & Vomiting: no nausea and no vomiting  Complications: no  Cardiovascular status: blood pressure returned to baseline  Respiratory status: acceptable  Hydration status: euvolemic

## 2021-08-30 NOTE — PROGRESS NOTES
Patient ambulated to void with assist. Patient then sitting up in bed taking PO. Family bedside. Patient admitted to [facility] & placed on appropriate monitors. Cart low, locked with siderails up. Call light within reach.

## 2021-08-30 NOTE — OP NOTE
Operative Note      Patient: Lorena Zelaya  YOB: 1995  MRN: [id number]    Date of Procedure: 8/30/2021    Pre-Op Diagnosis: SICKLE CELL ANEMIA, problem with vascular access    Post-Op Diagnosis: Same       Procedure(s):  PORT REMOVAL AND REINSERTION  PORT INSERTION, venous angioplasty superior vena cava    Surgeon(s):  Roni Pope MD    Assistant:   Surgical Assistant: Van Anders  Resident: Bonita Chan MD    Anesthesia: Monitor Anesthesia Care    Estimated Blood Loss (mL): Minimal    Complications: None    Specimens:   * No specimens in log *    Implants:  Implant Name Type Inv. Item Serial No.  Lot No. LRB No. Used Action   PORT INFUS 8FR PWR INJ CT FOR VASC ACCS CATH  PORT INFUS 8FR PWR INJ CT FOR VASC ACCS CATH  [employer] [id number] Right 1 Implanted         Drains: * No LDAs found *    Findings:     Detailed Description of Procedure: The patient was identified and the procedure was confirmed. The right neck and chest and the existing catheter were prepped and draped in the usual sterile fashion. Next, 1% lidocaine mixed with 0.25% Marcaine was used for local anesthesia. An incision was made in the neck over the catheter tunnel and continued through the subcutaneous tissue. The catheter was freed from the surrounding tissues, clamped, and divided. The tunneled and external portion was removed from the field. A guidewire was advanced through the indwelling portion of the catheter which was then removed. An 8-Ukrainian sheath was inserted over the wire and into the brachiocephalic vein. A 12 mm x 8 cm balloon was inserted and inflated in the superior vena cava and brachiocephalic vein. The balloon and sheath were then removed over the wire. The new catheter was passed through the introducer and the tip was positioned in the superior vena cava right atrial junction under fluoroscopic guidance.     Next, an incision was made through the previous scar over the reservoir and carried down through the subtendinous tissue. The port was identified and freed from the surrounding tissues. The port was removed. The new catheter was then pulled through the subcutaneous tunnel into the reservoir incision. The catheter was cut to the appropriate length and attached to the reservoir and the locking hub was secured. The new port was anchored in 3 positions. The reservoir was noted to withdraw and flush blood easily and was flushed with heparinized saline solution. The incision was approximated with multiple layers of Vicryl suture and Dermabond was applied over the incision in the operating room. Needle sponge instrument counts reported correct x2. The patient tolerated the procedure was transferred to the recovery area in satisfactory condition.       Electronically signed by Luci Frias MD on 8/30/2021 at 12:45 PM

## 2021-08-30 NOTE — H&P
VASCULAR SURGERY  HISTORY AND PHYSICAL  8/30/2021    CC: Vascular access problem    HPI  Anjali Madera is a 32 y.o. female with history of sickle cell anemia who presents for evaluation of malfunctioning RIJ tunneled port. Patient had port placed 11/2020. States the last time they tried to use port they could not draw blood nor flush. She presents for port removal, reinsertion.       Past Medical History:   Diagnosis Date    CLABSI (central line-associated bloodstream infection) 8/3/2016    Deep vein thrombosis (DVT) of left upper extremity (HCC) 8/12/2016    Disease of blood and blood forming organ     Heart murmur     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[facility] [address]     Miscarriage within last 12 months 04/2017    Obesity, Class III, BMI 40-49.9 (morbid obesity) ([facility] [address] 5/22/2014    PE (pulmonary embolism)     PE (pulmonary embolism)     Pneumonia     Psychiatric problem     Sickle cell anemia (HCC)     Thrombosis complicating venous access device ([facility] [address] 5/18/2017       Past Surgical History:   Procedure Laterality Date    CATHETER REMOVAL Right 11/30/2020    RIGHT SUBCLAVIAN CATHETER REMOVAL performed by Yazan Devi MD at [address] [facility] [address] / REMOVAL / [address] Left 5/9/2019    PORT INSERTION performed by Yazan Devi MD at [facility] IR INS PICC VAD W SQ PORT GREATER THAN 5 Right 10/16/2020    tunneled picc inserted by Dr Neno Hayes II 10/16 R upper chest    IR TUNNELED CATHETER PLACEMENT GREATER THAN 5 YEARS  10/16/2020    IR TUNNELED CATHETER PLACEMENT GREATER THAN 5 YEARS 10/16/2020 Lili Redd MD [facility]    KNEE SURGERY      right    OTHER SURGICAL HISTORY  08/04/2016    removal of mediport    OTHER SURGICAL HISTORY Right 03/05/2018    TUNNELED VENOUS PORT REMOVAL & REINSERTION;DILATION OF VENOUS TRACT    PORT SURGERY N/A 10/10/2020    insertion of right femoral catheter, triple lumen catheter and PORT REMOVAL performed by Leila Gonzalez MD at [facility] OR    PORT SURGERY Right 11/30/2020    PORT INSERTION performed by Kyra Maurice MD at [address]  [facility] TUNNELED VENOUS PORT PLACEMENT  2014    and removed    TUNNELED VENOUS PORT PLACEMENT Right 01/23/2015    Delatore, removed 08/04/16 CLABSI    TUNNELED VENOUS PORT PLACEMENT Right 09/15/2016    CHEST    TUNNELED VENOUS PORT PLACEMENT Right 05/18/2017    SUBCLAVIAN       Prior to Admission medications    Medication Sig Start Date End Date Taking? Authorizing Provider   enoxaparin (LOVENOX) 150 MG/ML injection Inject 1 mg/kg into the skin every 12 hours   Yes Historical Provider, MD   HYDROmorphone (DILAUDID) 4 MG tablet Take 4 mg by mouth every 4 hours as needed.   9/1/20  Yes Historical Provider, MD   folic acid (FOLVITE) 1 MG tablet Take 1 tablet by mouth daily 10/16/20  Yes Chikis Evans DO   hydroxyurea (HYDREA) 500 MG chemo capsule Take 500 mg by mouth 2 times daily   Yes Historical Provider, MD   ferrous sulfate 325 (65 FE) MG tablet Take 1 tablet by mouth 2 times daily (with meals) 2/27/17  Yes Blanca Magaña,    cyclobenzaprine (FLEXERIL) 10 MG tablet Take by mouth  Patient not taking: Reported on 8/26/2021    Historical Provider, MD   DULoxetine (CYMBALTA) 60 MG extended release capsule Take by mouth  Patient not taking: Reported on 8/26/2021    Historical Provider, MD       Allergies   Allergen Reactions    Bactrim [Sulfamethoxazole-Trimethoprim]     Demerol Hcl [Meperidine] Hives    Demerol Hcl [Meperidine]     Adhesive Tape Dermatitis, Rash and Itching    Bactrim [Sulfamethoxazole-Trimethoprim] Rash     Rash, itchy       Family History   Problem Relation Age of Onset    High Blood Pressure Mother     Diabetes Mother     Cancer Maternal Grandmother         breast    Kidney Disease Paternal Grandmother        Social History     Tobacco Use    Smoking status: Never Smoker    Smokeless tobacco: Never Used   Vaping Use    Vaping Use: Never used   Substance Use Topics    Alcohol use: Yes     Comment: socially    Drug use: No         Review of Systems: pertinent ROS listed in HPI, all others negative       PHYSICAL EXAM:    Vitals:    08/30/21 1006   BP: (!) 141/86   Pulse: 85   Resp: 16   Temp: 97.5 °F (36.4 °C)   SpO2: 96%       GENERAL:  NAD. A&Ox3. HEAD:  Normocephalic, atraumatic. EYES:   No scleral icterus. PERRLA. LUNGS:  No increased work of breathing. CARDIOVASCULAR: RR  ABDOMEN:  Soft, non-distended, non-tender  SKIN: Scar right neck and right chest with palpable port      LABS:  CBC  No results for input(s): WBC, HGB, HCT, PLT in the last 72 hours. BMP  No results for input(s): NA, K, CL, CO2, BUN, CREATININE, CALCIUM in the last 72 hours. Invalid input(s): GLU  Liver Function  No results for input(s): AMYLASE, LIPASE, BILITOT, BILIDIR, AST, ALT, ALKPHOS, PROT, LABALBU in the last 72 hours. No results for input(s): LACTATE in the last 72 hours. No results for input(s): INR, PTT in the last 72 hours. Invalid input(s): PT    RADIOLOGY    No results found. ASSESSMENT/PLAN:  32 y.o. female with malfunction port for removal and reinsertion.    -The risks, benefits, alternatives, and potential complications of the procedure, including the risks of bleeding, infection, injury to surrounding structures, need for additional procedures, and death were explained to the patient. All questions were answered. The patient understands and agrees to proceed with the procedure. Plan discussed with Dr. Berlin Parham.     Anderson Guillermo MD  Resident, PGY-3  8/30/2021  10:52 AM

## 2021-09-01 ENCOUNTER — TELEPHONE (OUTPATIENT)
Dept: VASCULAR SURGERY | Age: 26
End: 2021-09-01

## 2021-09-13 ENCOUNTER — TELEPHONE (OUTPATIENT)
Dept: VASCULAR SURGERY | Age: 26
End: 2021-09-13

## 2021-10-04 ENCOUNTER — TELEPHONE (OUTPATIENT)
Dept: VASCULAR SURGERY | Age: 26
End: 2021-10-04

## 2021-10-05 ENCOUNTER — OFFICE VISIT (OUTPATIENT)
Dept: VASCULAR SURGERY | Age: 26
End: 2021-10-05

## 2021-10-05 DIAGNOSIS — I87.8 POOR VENOUS ACCESS: Primary | Chronic | ICD-10-CM

## 2021-10-05 PROCEDURE — 99024 POSTOP FOLLOW-UP VISIT: CPT | Performed by: PHYSICIAN ASSISTANT

## 2021-10-05 NOTE — PROGRESS NOTES
10/5/2021    Brijesh Davis  1995    Chief Complaint   Patient presents with    Post-Op Check     s/p port removal/reinsertion. Patient returns for evaluation of her port. She has had it accessed multiple times since it was inserted. A couple of times it was accessed the nurse had a difficult time getting around the scar tissue. Otherwise it is functioning well. She denies pain.         Procedure Laterality Date    CATHETER REMOVAL Right 11/30/2020    RIGHT SUBCLAVIAN CATHETER REMOVAL performed by Hilary Valera MD at [facility] [address] Right 8/30/2021    PORT REMOVAL AND REINSERTION performed by Hilary Valera MD at [address] [place] [address] [address] / REMOVAL / [address] Left 5/9/2019    PORT INSERTION performed by Hilary Valera MD at [facility] 22 IR INS PICC VAD W SQ PORT GREATER THAN 5 Right 10/16/2020    tunneled picc inserted by Dr Moisés Lemus II 10/16 R upper chest    IR TUNNELED CATHETER PLACEMENT GREATER THAN 5 YEARS  10/16/2020    IR TUNNELED CATHETER PLACEMENT GREATER THAN 5 YEARS 10/16/2020 Ethel Díaz MD [facility]    KNEE SURGERY      right    OTHER SURGICAL HISTORY  08/04/2016    removal of mediport    OTHER SURGICAL HISTORY Right 03/05/2018    TUNNELED VENOUS PORT REMOVAL & REINSERTION;DILATION OF VENOUS TRACT    PORT SURGERY N/A 10/10/2020    insertion of right femoral catheter, triple lumen catheter and PORT REMOVAL performed by Tegan Dela Cruz MD at [facility] Right 11/30/2020    PORT INSERTION performed by Hilary Valera MD at [facility] Right 8/30/2021    PORT INSERTION performed by Hilary Valera MD at [address] [place] [address] TUNNELED VENOUS PORT PLACEMENT  2014    and removed    TUNNELED VENOUS PORT PLACEMENT Right 01/23/2015    Delatore, removed 08/04/16 [facility]    TUNNELED VENOUS PORT PLACEMENT Right 09/15/2016    CHEST    TUNNELED VENOUS PORT PLACEMENT Right 05/18/2017    SUBCLAVIAN PHYSICAL EXAM:    There were no vitals filed for this visit. CONSTITUTIONAL:  awake, alert, cooperative, no apparent distress  LUNGS:  no increased work of breathing, good air exchange  CARDIOVASCULAR:  regular rate and rhythm  ABDOMEN:  soft, non-distended and non-tender  Left port palpable in left upper chest.  No erythema or swelling. Right      Left   Brachial     Radial 3 3   Femoral     Popliteal     Dorsalis Pedis     Posterior Tibial     (3=normal, 2=diminished, 1=barely palpable, 4=widened)    Assessment:    Problem List Items Addressed This Visit     Poor venous access - Primary (Chronic)        Plan: I reviewed with the patient that she can resume all activities as tolerated. From my standpoint the port can be accessed and should be flushed regularly. We will see her back as needed. Pt seen and plan reviewed with Dr. Karen Jameson.      Shayy Crook PA-C

## 2023-09-22 NOTE — PROGRESS NOTES
Hospitalist Progress Note      PCP: Elaine Caruso    Date of Admission: 5/1/2019  Days in the hospital: 2    Chief Complaint: sickle cell crisis     Hospital Course:     Admitted for sickle cell crisis. Heme/onc was consulted. IV fluids and pain meds were started. Clinically improving. Subjective  Patient seen and examined at bedside. NAD, feels better today. Patient denies any fevers, chills, chest pain, shortness of breath, nausea, vomiting. Medications:  Reviewed    Infusion Medications    0.9% NaCl with KCl 20 mEq 125 mL/hr at 05/04/19 2206     Scheduled Medications    folic acid  1 mg Oral BID    sodium chloride flush  10 mL Intravenous 2 times per day    enoxaparin  120 mg Subcutaneous BID    escitalopram  5 mg Oral Daily    ferrous sulfate  325 mg Oral BID WC    senna  2 tablet Oral BID     PRN Meds: diphenhydrAMINE, sodium chloride flush, magnesium hydroxide, ondansetron, HYDROmorphone      Intake/Output Summary (Last 24 hours) at 5/5/2019 0734  Last data filed at 5/5/2019 0606  Gross per 24 hour   Intake 1570 ml   Output 2400 ml   Net -830 ml       Exam:    /73   Pulse 94   Temp 98.1 °F (36.7 °C) (Oral)   Resp 18   Ht 5' 3\" (1.6 m)   Wt (!) 305 lb 4.8 oz (138.5 kg)   LMP 02/17/2019 Comment: recent miscarriage  SpO2 99%   BMI 54.08 kg/m²     General appearance: No apparent distress, appears stated age and cooperative. HEENT: Conjunctivae/corneas clear. Pupils equal and round. No injections noted. Neck: Supple. No lesions, scars or masses. Trachea midline. Respiratory:  Normal respiratory effort. Clear to auscultation, bilaterally without Rales/Wheezes/Rhonchi. Cardiovascular: Regular rate and rhythm with normal S1/S2 without murmurs, rubs or gallops. Abdomen: Soft, non-tender, non-distended with normal bowel sounds. Musculoskeletal: No clubbing, cyanosis or edema bilaterally. Brisk capillary refill. 2+ lower extremity pulses (dorsalis pedis).    Skin:  No rashes Neurologic: awake, alert and following commands       Labs:   Recent Labs     05/03/19  0607 05/04/19  0605 05/05/19  0614   WBC 10.2 11.0 11.5   HGB 9.2* 9.5* 9.6*   HCT 27.3* 28.9* 28.7*    281 290     Recent Labs     05/03/19  0607      K 4.4   *   CO2 21*   BUN 8   CREATININE 0.8   CALCIUM 8.3*     Recent Labs     05/03/19  0607   AST 19   ALT 12   BILITOT 0.6   ALKPHOS 99     No results for input(s): INR in the last 72 hours. No results for input(s): Ana Luisa Dark in the last 72 hours. Imaging:  XR CHEST STANDARD (2 VW)   Final Result   No acute cardiopulmonary process. Assessment/Plan:  Active Hospital Problems    Diagnosis Date Noted    Sickle cell crisis [facility]) [D57.00] 12/13/2017     22 yo female hx SCS PE on lovenox 120 bid,  came to ER with 4 days of typical of her SCC pain chest lower back legs. Denies any recent fever, chills, nausea, vomiting, URI symptoms or cough. In ER HD stable, no distress given iv pain meds ivf and being admitted, ua negative, trop negative ekg/cxr normal        SCC   HX PE       Pain control  ivf  Heme/onc input noted  Home med  Possible discharge     DVT Prophylaxis: lovenox  Diet: DIET FULL LIQUID;  Code Status: Full Code     PT/OT Eval Status: ordered     Dispo - inpt           Sara Manual MD  [employer]  Please contact me through perfect serve    NOTE: This report was transcribed using voice recognition software. Every effort was made to ensure accuracy; however, inadvertent computerized transcription errors may be present. none

## 2023-10-22 ENCOUNTER — APPOINTMENT (OUTPATIENT)
Dept: RADIOLOGY | Facility: HOSPITAL | Age: 28
End: 2023-10-22
Payer: MEDICARE

## 2023-10-22 ENCOUNTER — HOSPITAL ENCOUNTER (EMERGENCY)
Facility: HOSPITAL | Age: 28
Discharge: HOME | End: 2023-10-22
Attending: EMERGENCY MEDICINE
Payer: MEDICARE

## 2023-10-22 VITALS
RESPIRATION RATE: 18 BRPM | BODY MASS INDEX: 51.91 KG/M2 | SYSTOLIC BLOOD PRESSURE: 131 MMHG | DIASTOLIC BLOOD PRESSURE: 76 MMHG | WEIGHT: 293 LBS | OXYGEN SATURATION: 99 % | HEIGHT: 63 IN | TEMPERATURE: 98.1 F | HEART RATE: 76 BPM

## 2023-10-22 DIAGNOSIS — D57.00 SICKLE CELL DISEASE WITH CRISIS (MULTI): Primary | ICD-10-CM

## 2023-10-22 LAB
ALBUMIN SERPL BCP-MCNC: 3.9 G/DL (ref 3.4–5)
ALP SERPL-CCNC: 74 U/L (ref 33–110)
ALT SERPL W P-5'-P-CCNC: 8 U/L (ref 7–45)
ANION GAP SERPL CALC-SCNC: 10 MMOL/L (ref 10–20)
APPEARANCE UR: CLEAR
AST SERPL W P-5'-P-CCNC: 10 U/L (ref 9–39)
B-HCG SERPL-ACNC: <2 MIU/ML
BASOPHILS # BLD AUTO: 0.02 X10*3/UL (ref 0–0.1)
BASOPHILS NFR BLD AUTO: 0.2 %
BILIRUB SERPL-MCNC: 1.2 MG/DL (ref 0–1.2)
BILIRUB UR STRIP.AUTO-MCNC: NEGATIVE MG/DL
BUN SERPL-MCNC: 9 MG/DL (ref 6–23)
CALCIUM SERPL-MCNC: 8.9 MG/DL (ref 8.6–10.3)
CHLORIDE SERPL-SCNC: 104 MMOL/L (ref 98–107)
CO2 SERPL-SCNC: 27 MMOL/L (ref 21–32)
COLOR UR: YELLOW
CREAT SERPL-MCNC: 0.82 MG/DL (ref 0.5–1.05)
EOSINOPHIL # BLD AUTO: 0.18 X10*3/UL (ref 0–0.7)
EOSINOPHIL NFR BLD AUTO: 1.8 %
ERYTHROCYTE [DISTWIDTH] IN BLOOD BY AUTOMATED COUNT: 18 % (ref 11.5–14.5)
GFR SERPL CREATININE-BSD FRML MDRD: >90 ML/MIN/1.73M*2
GLUCOSE SERPL-MCNC: 101 MG/DL (ref 74–99)
GLUCOSE UR STRIP.AUTO-MCNC: NEGATIVE MG/DL
HCT VFR BLD AUTO: 29.6 % (ref 36–46)
HGB BLD-MCNC: 10.1 G/DL (ref 12–16)
HGB RETIC QN: 26 PG (ref 28–38)
HOLD SPECIMEN: NORMAL
HOLD SPECIMEN: NORMAL
IMM GRANULOCYTES # BLD AUTO: 0.01 X10*3/UL (ref 0–0.7)
IMM GRANULOCYTES NFR BLD AUTO: 0.1 % (ref 0–0.9)
IMMATURE RETIC FRACTION: 14.5 %
KETONES UR STRIP.AUTO-MCNC: NEGATIVE MG/DL
LACTATE SERPL-SCNC: 1.1 MMOL/L (ref 0.4–2)
LEUKOCYTE ESTERASE UR QL STRIP.AUTO: NEGATIVE
LYMPHOCYTES # BLD AUTO: 4.43 X10*3/UL (ref 1.2–4.8)
LYMPHOCYTES NFR BLD AUTO: 44 %
MCH RBC QN AUTO: 22.2 PG (ref 26–34)
MCHC RBC AUTO-ENTMCNC: 34.1 G/DL (ref 32–36)
MCV RBC AUTO: 65 FL (ref 80–100)
MONOCYTES # BLD AUTO: 0.62 X10*3/UL (ref 0.1–1)
MONOCYTES NFR BLD AUTO: 6.2 %
NEUTROPHILS # BLD AUTO: 4.8 X10*3/UL (ref 1.2–7.7)
NEUTROPHILS NFR BLD AUTO: 47.7 %
NITRITE UR QL STRIP.AUTO: NEGATIVE
NRBC BLD-RTO: 0 /100 WBCS (ref 0–0)
PH UR STRIP.AUTO: 6 [PH]
PLATELET # BLD AUTO: 248 X10*3/UL (ref 150–450)
PMV BLD AUTO: 11 FL (ref 7.5–11.5)
POTASSIUM SERPL-SCNC: 3.5 MMOL/L (ref 3.5–5.3)
PROT SERPL-MCNC: 7.6 G/DL (ref 6.4–8.2)
PROT UR STRIP.AUTO-MCNC: NEGATIVE MG/DL
RBC # BLD AUTO: 4.55 X10*6/UL (ref 4–5.2)
RBC # UR STRIP.AUTO: NEGATIVE /UL
RETICS #: 0.08 X10*6/UL (ref 0.02–0.08)
RETICS/RBC NFR AUTO: 1.7 % (ref 0.5–2)
SARS-COV-2 RNA RESP QL NAA+PROBE: NOT DETECTED
SODIUM SERPL-SCNC: 137 MMOL/L (ref 136–145)
SP GR UR STRIP.AUTO: 1.01
UROBILINOGEN UR STRIP.AUTO-MCNC: <2 MG/DL
WBC # BLD AUTO: 10.1 X10*3/UL (ref 4.4–11.3)

## 2023-10-22 PROCEDURE — 71045 X-RAY EXAM CHEST 1 VIEW: CPT | Mod: FY

## 2023-10-22 PROCEDURE — 85045 AUTOMATED RETICULOCYTE COUNT: CPT | Performed by: EMERGENCY MEDICINE

## 2023-10-22 PROCEDURE — 2500000004 HC RX 250 GENERAL PHARMACY W/ HCPCS (ALT 636 FOR OP/ED): Mod: SE | Performed by: EMERGENCY MEDICINE

## 2023-10-22 PROCEDURE — 83605 ASSAY OF LACTIC ACID: CPT | Performed by: EMERGENCY MEDICINE

## 2023-10-22 PROCEDURE — 99284 EMERGENCY DEPT VISIT MOD MDM: CPT | Performed by: EMERGENCY MEDICINE

## 2023-10-22 PROCEDURE — 81003 URINALYSIS AUTO W/O SCOPE: CPT | Performed by: EMERGENCY MEDICINE

## 2023-10-22 PROCEDURE — 85025 COMPLETE CBC W/AUTO DIFF WBC: CPT | Performed by: EMERGENCY MEDICINE

## 2023-10-22 PROCEDURE — 71045 X-RAY EXAM CHEST 1 VIEW: CPT | Performed by: STUDENT IN AN ORGANIZED HEALTH CARE EDUCATION/TRAINING PROGRAM

## 2023-10-22 PROCEDURE — 96375 TX/PRO/DX INJ NEW DRUG ADDON: CPT

## 2023-10-22 PROCEDURE — 80053 COMPREHEN METABOLIC PANEL: CPT | Performed by: EMERGENCY MEDICINE

## 2023-10-22 PROCEDURE — 2500000004 HC RX 250 GENERAL PHARMACY W/ HCPCS (ALT 636 FOR OP/ED): Mod: SE

## 2023-10-22 PROCEDURE — 96361 HYDRATE IV INFUSION ADD-ON: CPT

## 2023-10-22 PROCEDURE — 36415 COLL VENOUS BLD VENIPUNCTURE: CPT | Performed by: EMERGENCY MEDICINE

## 2023-10-22 PROCEDURE — 84702 CHORIONIC GONADOTROPIN TEST: CPT | Performed by: EMERGENCY MEDICINE

## 2023-10-22 PROCEDURE — 96376 TX/PRO/DX INJ SAME DRUG ADON: CPT

## 2023-10-22 PROCEDURE — 96374 THER/PROPH/DIAG INJ IV PUSH: CPT

## 2023-10-22 PROCEDURE — 2500000001 HC RX 250 WO HCPCS SELF ADMINISTERED DRUGS (ALT 637 FOR MEDICARE OP): Mod: SE | Performed by: EMERGENCY MEDICINE

## 2023-10-22 PROCEDURE — 87635 SARS-COV-2 COVID-19 AMP PRB: CPT | Performed by: EMERGENCY MEDICINE

## 2023-10-22 RX ORDER — FERROUS SULFATE 325(65) MG
325 TABLET, DELAYED RELEASE (ENTERIC COATED) ORAL
Status: ON HOLD | COMMUNITY

## 2023-10-22 RX ORDER — HYDROXYUREA 500 MG/1
500 CAPSULE ORAL 3 TIMES DAILY
Status: ON HOLD | COMMUNITY
Start: 2020-10-12

## 2023-10-22 RX ORDER — DIPHENHYDRAMINE HYDROCHLORIDE 50 MG/ML
25 INJECTION INTRAMUSCULAR; INTRAVENOUS ONCE
Status: COMPLETED | OUTPATIENT
Start: 2023-10-22 | End: 2023-10-22

## 2023-10-22 RX ORDER — CYANOCOBALAMIN/FOLIC ACID 1MG-400MCG
1000 LOZENGE SUBLINGUAL
Status: ON HOLD | COMMUNITY
Start: 2023-01-06 | End: 2024-06-08 | Stop reason: ENTERED-IN-ERROR

## 2023-10-22 RX ORDER — FOLIC ACID 1 MG/1
1 TABLET ORAL DAILY
Status: ON HOLD | COMMUNITY
Start: 2020-10-16

## 2023-10-22 RX ORDER — HEPARIN SODIUM (PORCINE) LOCK FLUSH IV SOLN 100 UNIT/ML 100 UNIT/ML
5 SOLUTION INTRAVENOUS ONCE
Status: DISCONTINUED | OUTPATIENT
Start: 2023-10-22 | End: 2023-10-22 | Stop reason: HOSPADM

## 2023-10-22 RX ORDER — KETOROLAC TROMETHAMINE 30 MG/ML
30 INJECTION, SOLUTION INTRAMUSCULAR; INTRAVENOUS ONCE
Status: COMPLETED | OUTPATIENT
Start: 2023-10-22 | End: 2023-10-22

## 2023-10-22 RX ORDER — KETOROLAC TROMETHAMINE 30 MG/ML
INJECTION, SOLUTION INTRAMUSCULAR; INTRAVENOUS
Status: COMPLETED
Start: 2023-10-22 | End: 2023-10-22

## 2023-10-22 RX ORDER — NALOXONE HYDROCHLORIDE 1 MG/ML
INJECTION INTRAMUSCULAR; INTRAVENOUS; SUBCUTANEOUS AS NEEDED
Status: ON HOLD | COMMUNITY
Start: 2023-07-25 | End: 2024-06-08 | Stop reason: WASHOUT

## 2023-10-22 RX ORDER — FAMOTIDINE 20 MG/1
20 TABLET, FILM COATED ORAL ONCE
Status: DISCONTINUED | OUTPATIENT
Start: 2023-10-22 | End: 2023-10-22 | Stop reason: HOSPADM

## 2023-10-22 RX ORDER — DOCUSATE SODIUM 100 MG/1
100 CAPSULE, LIQUID FILLED ORAL 2 TIMES DAILY
Status: ON HOLD | COMMUNITY
Start: 2022-01-26 | End: 2024-06-08 | Stop reason: ENTERED-IN-ERROR

## 2023-10-22 RX ORDER — ONDANSETRON HYDROCHLORIDE 2 MG/ML
4 INJECTION, SOLUTION INTRAVENOUS ONCE
Status: COMPLETED | OUTPATIENT
Start: 2023-10-22 | End: 2023-10-22

## 2023-10-22 RX ORDER — ENOXAPARIN SODIUM 150 MG/ML
150 INJECTION SUBCUTANEOUS 2 TIMES DAILY
Status: ON HOLD | COMMUNITY
End: 2024-06-08 | Stop reason: ENTERED-IN-ERROR

## 2023-10-22 RX ORDER — HEPARIN 100 UNIT/ML
SYRINGE INTRAVENOUS
Status: COMPLETED
Start: 2023-10-22 | End: 2023-10-22

## 2023-10-22 RX ORDER — HYDROMORPHONE HYDROCHLORIDE 1 MG/ML
2 INJECTION, SOLUTION INTRAMUSCULAR; INTRAVENOUS; SUBCUTANEOUS ONCE
Status: COMPLETED | OUTPATIENT
Start: 2023-10-22 | End: 2023-10-22

## 2023-10-22 RX ORDER — CHOLECALCIFEROL (VITAMIN D3) 25 MCG
1 TABLET,CHEWABLE ORAL
Status: ON HOLD | COMMUNITY
Start: 2022-12-01 | End: 2024-06-08 | Stop reason: ENTERED-IN-ERROR

## 2023-10-22 RX ORDER — FAMOTIDINE 10 MG/ML
INJECTION INTRAVENOUS
Status: DISCONTINUED
Start: 2023-10-22 | End: 2023-10-22 | Stop reason: HOSPADM

## 2023-10-22 RX ORDER — FAMOTIDINE 10 MG/ML
20 INJECTION INTRAVENOUS ONCE
Status: COMPLETED | OUTPATIENT
Start: 2023-10-22 | End: 2023-10-22

## 2023-10-22 RX ORDER — DULOXETIN HYDROCHLORIDE 60 MG/1
1 CAPSULE, DELAYED RELEASE ORAL DAILY
Status: ON HOLD | COMMUNITY
Start: 2023-09-19

## 2023-10-22 RX ADMIN — KETOROLAC TROMETHAMINE 30 MG: 30 INJECTION, SOLUTION INTRAMUSCULAR; INTRAVENOUS at 18:40

## 2023-10-22 RX ADMIN — KETOROLAC TROMETHAMINE 30 MG: 30 INJECTION, SOLUTION INTRAMUSCULAR at 18:40

## 2023-10-22 RX ADMIN — FAMOTIDINE 20 MG: 10 INJECTION INTRAVENOUS at 18:50

## 2023-10-22 RX ADMIN — Medication 500 UNITS: at 20:49

## 2023-10-22 RX ADMIN — SODIUM CHLORIDE 2000 ML: 9 INJECTION, SOLUTION INTRAVENOUS at 18:40

## 2023-10-22 RX ADMIN — ONDANSETRON 4 MG: 2 INJECTION INTRAMUSCULAR; INTRAVENOUS at 18:38

## 2023-10-22 RX ADMIN — HYDROMORPHONE HYDROCHLORIDE 2 MG: 1 INJECTION, SOLUTION INTRAMUSCULAR; INTRAVENOUS; SUBCUTANEOUS at 20:11

## 2023-10-22 RX ADMIN — DIPHENHYDRAMINE HYDROCHLORIDE 25 MG: 50 INJECTION INTRAMUSCULAR; INTRAVENOUS at 18:40

## 2023-10-22 RX ADMIN — HYDROMORPHONE HYDROCHLORIDE 2 MG: 1 INJECTION, SOLUTION INTRAMUSCULAR; INTRAVENOUS; SUBCUTANEOUS at 18:39

## 2023-10-22 ASSESSMENT — COLUMBIA-SUICIDE SEVERITY RATING SCALE - C-SSRS
2. HAVE YOU ACTUALLY HAD ANY THOUGHTS OF KILLING YOURSELF?: NO
6. HAVE YOU EVER DONE ANYTHING, STARTED TO DO ANYTHING, OR PREPARED TO DO ANYTHING TO END YOUR LIFE?: NO
1. IN THE PAST MONTH, HAVE YOU WISHED YOU WERE DEAD OR WISHED YOU COULD GO TO SLEEP AND NOT WAKE UP?: NO

## 2023-10-22 ASSESSMENT — PAIN - FUNCTIONAL ASSESSMENT: PAIN_FUNCTIONAL_ASSESSMENT: 0-10

## 2023-10-22 ASSESSMENT — PAIN SCALES - GENERAL
PAINLEVEL_OUTOF10: 10 - WORST POSSIBLE PAIN
PAINLEVEL_OUTOF10: 10 - WORST POSSIBLE PAIN
PAINLEVEL_OUTOF10: 8
PAINLEVEL_OUTOF10: 10 - WORST POSSIBLE PAIN

## 2023-10-22 ASSESSMENT — PAIN DESCRIPTION - PROGRESSION: CLINICAL_PROGRESSION: NOT CHANGED

## 2023-10-22 NOTE — ED PROVIDER NOTES
Chief complaint sickle cell crisis  History of present illness this patient states that she is having a sickle cell crisis flareup.  Patient states she has had sickle cell most of her life.  She has periodic crises.  The patient states that for the past 5 days she has been trying to manage her pain at home unsuccessfully.  Patient states she has diffuse pain all over her body including her chest.  She also states she had a free fever at home 100.4 and was concerned she might have pneumonia because she has been coughing producing yellow sputum.  Patient denies any shortness of breath the patient states she has pain everywhere which is typical for her sickle cell general body pain.  The patient has pain in her chest area back area arms and legs and the pain is diffuse and very typical for her sickle cell exacerbations.  The patient had a blood transfusion many years ago but none recently.  She denies any vomiting or diarrhea.  The patient does have a right upper chest Mediport'   patient has been to [facility] for her sickle cell treatments in the past was in the area here today.  The patient has a protocol that she carries with her for her sickle cell protocol which includes a combination of drugs and IV fluids including IV Toradol, IV Dilaudid, normal saline, Benadryl, and Pepcid IV  Social history non-smoker  Past medical history pulmonary embolism patient is on Lovenox injections twice a day and has not missed any of your injections  The patient is allergic to Demerol but can take Dilaudid for pain    physical exam:    General: Vitals noted, mild pain distress. Afebrile. Alert and oriented  x 4 .  Pupils equal and reactive bilaterally    EENT: TMs clear. Posterior oropharynx unremarkable. No meningismus. No LAD.     Cardiac: Regular, rate, rhythm, no murmurs rubs or gallops.   Mediport noted in the right upper chest area    Pulmonary: Lungs clear bilaterally with good aeration. No adventitious breath sounds. No wheezes  rales or rhonchi.     Abdomen: Soft, nonsurgical. Nontender. No peritoneal signs. Normoactive bowel sounds. No pulsatile masses.     Extremities: No peripheral edema. Negative Homans bilaterally, no cords.    Skin: No rash. Intact.     Neuro: No focal neurologic deficits, NIH score of 0. Cranial nerves normal as tested from II through XII.          Frandy Arreola MD  10/22/23 5529

## 2023-10-23 NOTE — ED PROVIDER NOTES
HPI   Chief Complaint   Patient presents with    Sickle Cell Pain Crisis     Transition of care note  Received report from Dr Arreola at 1954.  Patient seen and examined at 1955.        History provided by:  Patient   used: No      Patient has h/o SS with frequent pain exacerbations. She is seen by [facility] hematology as well as pain management. She is on daily opiates nd also has a care plan from [facility] for pain flares. She presents now with a typical exacerbation with pain in back, legs and arms. [place] has already received phase one of her care plan, but says she thinks she needs phase 2 and a box lunch.                  Burton Coma Scale Score: 15                  Patient History   History reviewed. No pertinent past medical history.  Past Surgical History:   Procedure Laterality Date    CT ABDOMEN PELVIS ANGIOGRAM W AND/OR WO IV CONTRAST  4/9/2020    CT ABDOMEN PELVIS ANGIOGRAM W AND/OR WO IV CONTRAST 4/9/2020 [facility]    CT NECK ANGIO W AND WO IV CONTRAST  9/27/2019    CT NECK ANGIO W AND WO IV CONTRAST 9/27/2019 [facility]    IR CVC TUNNELED  8/24/2020    IR CVC TUNNELED 8/24/2020 [facility]    OTHER SURGICAL HISTORY  09/22/2020    Venous access port placement    OTHER SURGICAL HISTORY  09/22/2020    Tonsillectomy with adenoidectomy    OTHER SURGICAL HISTORY  09/22/2020    Knee surgery     No family history on file.  Social History     Tobacco Use    Smoking status: Not on file    Smokeless tobacco: Not on file   Substance Use Topics    Alcohol use: Not on file    Drug use: Not on file       Physical Exam   ED Triage Vitals [10/22/23 1811]   Temp Heart Rate Resp BP   36.2 °C (97.2 °F) 77 16 144/90      SpO2 Temp Source Heart Rate Source Patient Position   99 % Temporal Monitor --      BP Location FiO2 (%)     -- --       Physical Exam  Vitals reviewed.   Constitutional:       Appearance: Normal appearance.   HENT:      Head: Normocephalic.   Cardiovascular:      Rate  and Rhythm: Normal rate and regular rhythm.      Pulses: Normal pulses.      Heart sounds: Normal heart sounds.   Pulmonary:      Effort: Pulmonary effort is normal.      Breath sounds: Normal breath sounds.   Musculoskeletal:      Cervical back: Normal range of motion.   Neurological:      Mental Status: She is alert.   Psychiatric:         Mood and Affect: Mood normal.         ED Course & [facility]   ED Course as of 10/22/23 2044   Sun Oct 22, 2023   1903 EKG interpreted by me normal sinus rhythm, rate 84, normal axis, mild nonspecific ST flattening noted, no signs of STEMI [AG]   1904 CBC and Auto Differential(!)  CBC reveals white count of 10,100 hemoglobin 10.1 [AG]   1904 Reticulocyte count 1.7% [AG]   1920 XR chest 1 view  Chest x-ray reveals no acute changes [AG]   1924 Urinalysis normal [AG]   1924 I reexamined the patient at 7:25 PM.  She states she is feeling a little bit better.  I will be signing this patient over to the care of Dr. Lenore Duran [AG]   1935 COVID swab is negative [AG]   1936 Patient states that her pain was originally a 10 on a scale of 1-10 and now it is an 8 level  Preg test negative [AG]      ED Course User Index  [AG] Frandy Arreola MD         Diagnoses as of 10/22/23 2044   Sickle cell disease with crisis (CMS/HCC)     2041 -- now eating [employer]. Feels ready to go home.    Labs Reviewed   CBC WITH AUTO DIFFERENTIAL - Abnormal       Result Value    WBC 10.1      nRBC 0.0      RBC 4.55      Hemoglobin 10.1 (*)     Hematocrit 29.6 (*)     MCV 65 (*)     MCH 22.2 (*)     MCHC 34.1      RDW 18.0 (*)     Platelets 248      MPV 11.0      Neutrophils % 47.7      Immature Granulocytes %, Automated 0.1      Lymphocytes % 44.0      Monocytes % 6.2      Eosinophils % 1.8      Basophils % 0.2      Neutrophils Absolute 4.80      Immature Granulocytes Absolute, Automated 0.01      Lymphocytes Absolute 4.43      Monocytes Absolute 0.62      Eosinophils Absolute 0.18      Basophils Absolute  0.02     COMPREHENSIVE METABOLIC PANEL - Abnormal    Glucose 101 (*)     Sodium 137      Potassium 3.5      Chloride 104      Bicarbonate 27      Anion Gap 10      Urea Nitrogen 9      Creatinine 0.82      eGFR >90      Calcium 8.9      Albumin 3.9      Alkaline Phosphatase 74      Total Protein 7.6      AST 10      Bilirubin, Total 1.2      ALT 8     RETICULOCYTES - Abnormal    Retic % 1.7      Retic Absolute 0.078      Reticulocyte Hemoglobin 26 (*)     Immature Retic fraction 14.5     LACTATE - Normal    Lactate 1.1      Narrative:     Venipuncture immediately after or during the administration of Metamizole may lead to falsely low results. Testing should be performed immediately  prior to Metamizole dosing.   SARS-COV-2 PCR, SCREEN ASYMPTOMATIC - Normal    Coronavirus 2019, PCR Not Detected      Narrative:     This assay has received FDA Emergency Use Authorization (EUA) and is only authorized for the duration of time that circumstances exist to justify the authorization of the emergency use of in vitro diagnostic tests for the detection of SARS-CoV-2 virus and/or diagnosis of COVID-19 infection under section 564(b)(1) of the Act, 21 U.S.C. 360bbb-3(b)(1). This assay is an in vitro diagnostic nucleic acid amplification test for the qualitative detection of SARS-CoV-2 from nasopharyngeal specimens and has been validated for use at [facility]. Negative results do not preclude COVID-19 infections and should not be used as the sole basis for diagnosis, treatment, or other management decisions.     URINALYSIS WITH REFLEX MICROSCOPIC - Normal    Color, Urine Yellow      Appearance, Urine Clear      Specific Gravity, Urine 1.011      pH, Urine 6.0      Protein, Urine NEGATIVE      Glucose, Urine NEGATIVE      Blood, Urine NEGATIVE      Ketones, Urine NEGATIVE      Bilirubin, Urine NEGATIVE      Urobilinogen, Urine <2.0      Nitrite, Urine NEGATIVE      Leukocyte Esterase, Urine NEGATIVE      HUMAN CHORIONIC GONADOTROPIN, SERUM QUANTITATIVE - Normal    HCG, Beta-Quantitative <2      Narrative:      Total HCG measurement is performed using the [employer]beth B[employer]bara Access   Immunoassay which detects intact HCG and free beta HCG subunit.    This test is not indicated for use as a tumor marker.   HCG testing is performed using a different test methodology at [facility] than other [facility] hospitals. Direct result comparison   should only be made within the same method.         XR chest 1 view   Final Result   No acute cardiopulmonary process.        Stable minimal scarring in the right lung base likely relates to   sickle cell disease.        MACRO:   None.        Signed by: Frandy Berumen 10/22/2023 7:03 PM   Dictation workstation:   [id number]        ED Medication Administration from 10/22/2023 1806 to 10/22/2023 2024         Date/Time Order Dose Route Action Action by     10/22/2023 1838 EDT ondansetron (Zofran) injection 4 mg 4 mg intravenous Given Distelrath, J     10/22/2023 1839 EDT HYDROmorphone (Dilaudid) injection 2 mg 2 mg intravenous Given Distelrath, J     10/22/2023 1840 EDT diphenhydrAMINE (BENADryl) injection 25 mg 25 mg intravenous Given Distelrath, J     10/22/2023 1840 EDT famotidine (Pepcid) tablet 20 mg 20 mg oral Not Given Distelrath, J     10/22/2023 1840 EDT ketorolac (Toradol) injection 30 mg 30 mg intravenous Given Distelrath, J     10/22/2023 1840 EDT sodium chloride 0.9 % bolus 2,000 mL 2,000 mL intravenous New Bag Distelrath, J     10/22/2023 1850 EDT famotidine PF (Pepcid) injection 20 mg 20 mg intravenous Given Distelrath, J     10/22/2023 1855 EDT famotidine PF (Pepcid) injection 20 mg 20 mg intravenous Not Given Distelrath, J     10/22/2023 2011 EDT HYDROmorphone (Dilaudid) injection 2 mg 2 mg intravenous Given CHRISTIAN Whitaker              Medical Decision Making    This patient presented with typical exacerbation of her sickle cell pain.  She was treated with  medications per her reported care pathway with improvement.  Taking oral  fluids well in the emergency department.  No signs of acute sepsis, acute chest syndrome, ischemia, sepsis.  Reticulocyte count is within her usual baseline on review of the electronic medical records.  Patient is  to continue her usual chronic pending medication protocol and to follow-up with her hematologist and pain management doctor as well as her primary care doctor.    Results of exam and any testing were discussed with patient/family. To the best of my ability, I answered all questions. At this time, there is no indication for admission/transfer or further diagnostic testing. Patient understands to return for any new or worsening symptoms, or failure to improve as anticipated. The importance of follow-up was stressed.      Diagnoses as of 10/22/23 2044   Sickle cell disease with crisis (CMS/[facility])         Procedure  Procedures     Lenore Duran MD  10/22/23 2049     Professional collateral

## 2023-10-25 ENCOUNTER — APPOINTMENT (OUTPATIENT)
Dept: RADIOLOGY | Facility: HOSPITAL | Age: 28
End: 2023-10-25
Payer: MEDICARE

## 2023-10-25 ENCOUNTER — HOSPITAL ENCOUNTER (EMERGENCY)
Facility: HOSPITAL | Age: 28
Discharge: HOME | End: 2023-10-25
Attending: EMERGENCY MEDICINE
Payer: MEDICARE

## 2023-10-25 VITALS
OXYGEN SATURATION: 100 % | HEIGHT: 63 IN | TEMPERATURE: 97.1 F | SYSTOLIC BLOOD PRESSURE: 130 MMHG | RESPIRATION RATE: 17 BRPM | BODY MASS INDEX: 51.91 KG/M2 | DIASTOLIC BLOOD PRESSURE: 77 MMHG | HEART RATE: 66 BPM | WEIGHT: 293 LBS

## 2023-10-25 DIAGNOSIS — S09.90XA CLOSED HEAD INJURY, INITIAL ENCOUNTER: ICD-10-CM

## 2023-10-25 DIAGNOSIS — T81.509D: ICD-10-CM

## 2023-10-25 DIAGNOSIS — K80.20 GALLSTONES: ICD-10-CM

## 2023-10-25 DIAGNOSIS — T07.XXXA MULTIPLE CONTUSIONS: Primary | ICD-10-CM

## 2023-10-25 PROCEDURE — 70450 CT HEAD/BRAIN W/O DYE: CPT | Mod: MG

## 2023-10-25 PROCEDURE — 72131 CT LUMBAR SPINE W/O DYE: CPT | Mod: MG

## 2023-10-25 PROCEDURE — 72128 CT CHEST SPINE W/O DYE: CPT | Performed by: RADIOLOGY

## 2023-10-25 PROCEDURE — 70450 CT HEAD/BRAIN W/O DYE: CPT | Performed by: RADIOLOGY

## 2023-10-25 PROCEDURE — 99285 EMERGENCY DEPT VISIT HI MDM: CPT | Mod: 25 | Performed by: EMERGENCY MEDICINE

## 2023-10-25 PROCEDURE — 2500000001 HC RX 250 WO HCPCS SELF ADMINISTERED DRUGS (ALT 637 FOR MEDICARE OP): Mod: SE | Performed by: EMERGENCY MEDICINE

## 2023-10-25 PROCEDURE — 72125 CT NECK SPINE W/O DYE: CPT | Performed by: RADIOLOGY

## 2023-10-25 PROCEDURE — 72131 CT LUMBAR SPINE W/O DYE: CPT | Performed by: RADIOLOGY

## 2023-10-25 PROCEDURE — 72128 CT CHEST SPINE W/O DYE: CPT | Mod: MG

## 2023-10-25 PROCEDURE — 72125 CT NECK SPINE W/O DYE: CPT | Mod: MF

## 2023-10-25 RX ORDER — HYDROCODONE BITARTRATE AND ACETAMINOPHEN 5; 325 MG/1; MG/1
1 TABLET ORAL ONCE
Status: COMPLETED | OUTPATIENT
Start: 2023-10-25 | End: 2023-10-25

## 2023-10-25 RX ADMIN — HYDROCODONE BITARTRATE AND ACETAMINOPHEN 1 TABLET: 5; 325 TABLET ORAL at 19:10

## 2023-10-25 ASSESSMENT — PAIN SCALES - GENERAL
PAINLEVEL_OUTOF10: 10 - WORST POSSIBLE PAIN
PAINLEVEL_OUTOF10: 10 - WORST POSSIBLE PAIN

## 2023-10-25 ASSESSMENT — PAIN DESCRIPTION - LOCATION: LOCATION: FACE

## 2023-10-25 ASSESSMENT — PAIN - FUNCTIONAL ASSESSMENT: PAIN_FUNCTIONAL_ASSESSMENT: 0-10

## 2023-10-25 NOTE — DISCHARGE INSTRUCTIONS
Continue home medications.    Follow-up with your dentist regarding a possible tooth fragment in your maxillary sinus.    Follow close head injury instructions carefully.    Talk to your primary care doctor regarding the gallstones and changes in diet and medication.  At some point you may require surgery if these cause further problems.    Apply ice to reduce swelling and pain from the multiple contusions.    Return for worsening symptoms or concerns.
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
## 2023-10-27 ENCOUNTER — HOSPITAL ENCOUNTER (EMERGENCY)
Facility: HOSPITAL | Age: 28
Discharge: HOME | End: 2023-10-27
Attending: FAMILY MEDICINE
Payer: MEDICARE

## 2023-10-27 ENCOUNTER — APPOINTMENT (OUTPATIENT)
Dept: RADIOLOGY | Facility: HOSPITAL | Age: 28
End: 2023-10-27
Payer: MEDICARE

## 2023-10-27 VITALS
BODY MASS INDEX: 51.91 KG/M2 | HEART RATE: 79 BPM | SYSTOLIC BLOOD PRESSURE: 134 MMHG | TEMPERATURE: 97.9 F | OXYGEN SATURATION: 100 % | RESPIRATION RATE: 18 BRPM | HEIGHT: 63 IN | DIASTOLIC BLOOD PRESSURE: 80 MMHG | WEIGHT: 293 LBS

## 2023-10-27 DIAGNOSIS — D57.00 SICKLE CELL CRISIS (MULTI): Primary | ICD-10-CM

## 2023-10-27 LAB
ALBUMIN SERPL BCP-MCNC: 3.4 G/DL (ref 3.4–5)
ALP SERPL-CCNC: 76 U/L (ref 33–110)
ALT SERPL W P-5'-P-CCNC: 8 U/L (ref 7–45)
ANION GAP SERPL CALC-SCNC: 10 MMOL/L (ref 10–20)
APPEARANCE UR: ABNORMAL
AST SERPL W P-5'-P-CCNC: 11 U/L (ref 9–39)
BACTERIA #/AREA URNS AUTO: ABNORMAL /HPF
BASOPHILS # BLD AUTO: 0.04 X10*3/UL (ref 0–0.1)
BASOPHILS NFR BLD AUTO: 0.4 %
BILIRUB SERPL-MCNC: 0.9 MG/DL (ref 0–1.2)
BILIRUB UR STRIP.AUTO-MCNC: NEGATIVE MG/DL
BUN SERPL-MCNC: 7 MG/DL (ref 6–23)
CALCIUM SERPL-MCNC: 8.4 MG/DL (ref 8.6–10.3)
CHLORIDE SERPL-SCNC: 105 MMOL/L (ref 98–107)
CO2 SERPL-SCNC: 28 MMOL/L (ref 21–32)
COLOR UR: YELLOW
CREAT SERPL-MCNC: 0.86 MG/DL (ref 0.5–1.05)
EOSINOPHIL # BLD AUTO: 0.26 X10*3/UL (ref 0–0.7)
EOSINOPHIL NFR BLD AUTO: 2.5 %
ERYTHROCYTE [DISTWIDTH] IN BLOOD BY AUTOMATED COUNT: 18.1 % (ref 11.5–14.5)
GFR SERPL CREATININE-BSD FRML MDRD: >90 ML/MIN/1.73M*2
GLUCOSE SERPL-MCNC: 115 MG/DL (ref 74–99)
GLUCOSE UR STRIP.AUTO-MCNC: NEGATIVE MG/DL
HCG UR QL IA.RAPID: NEGATIVE
HCT VFR BLD AUTO: 28.3 % (ref 36–46)
HGB BLD-MCNC: 9.5 G/DL (ref 12–16)
HGB RETIC QN: 25 PG (ref 28–38)
HYPOCHROMIA BLD QL SMEAR: NORMAL
IMM GRANULOCYTES # BLD AUTO: 0.02 X10*3/UL (ref 0–0.7)
IMM GRANULOCYTES NFR BLD AUTO: 0.2 % (ref 0–0.9)
IMMATURE RETIC FRACTION: 19.1 %
KETONES UR STRIP.AUTO-MCNC: NEGATIVE MG/DL
LACTATE SERPL-SCNC: 1.1 MMOL/L (ref 0.4–2)
LEUKOCYTE ESTERASE UR QL STRIP.AUTO: ABNORMAL
LYMPHOCYTES # BLD AUTO: 5.32 X10*3/UL (ref 1.2–4.8)
LYMPHOCYTES NFR BLD AUTO: 52.1 %
MCH RBC QN AUTO: 22.2 PG (ref 26–34)
MCHC RBC AUTO-ENTMCNC: 33.6 G/DL (ref 32–36)
MCV RBC AUTO: 66 FL (ref 80–100)
MONOCYTES # BLD AUTO: 0.55 X10*3/UL (ref 0.1–1)
MONOCYTES NFR BLD AUTO: 5.4 %
NEUTROPHILS # BLD AUTO: 4.02 X10*3/UL (ref 1.2–7.7)
NEUTROPHILS NFR BLD AUTO: 39.4 %
NITRITE UR QL STRIP.AUTO: NEGATIVE
NRBC BLD-RTO: 0 /100 WBCS (ref 0–0)
PH UR STRIP.AUTO: 5 [PH]
PLATELET # BLD AUTO: 258 X10*3/UL (ref 150–450)
PMV BLD AUTO: 10.9 FL (ref 7.5–11.5)
POTASSIUM SERPL-SCNC: 3.6 MMOL/L (ref 3.5–5.3)
PROT SERPL-MCNC: 6.7 G/DL (ref 6.4–8.2)
PROT UR STRIP.AUTO-MCNC: NEGATIVE MG/DL
RBC # BLD AUTO: 4.28 X10*6/UL (ref 4–5.2)
RBC # UR STRIP.AUTO: ABNORMAL /UL
RBC #/AREA URNS AUTO: >20 /HPF
RBC MORPH BLD: NORMAL
RETICS #: 0.08 X10*6/UL (ref 0.02–0.08)
RETICS/RBC NFR AUTO: 1.8 % (ref 0.5–2)
SODIUM SERPL-SCNC: 139 MMOL/L (ref 136–145)
SP GR UR STRIP.AUTO: 1.01
SQUAMOUS #/AREA URNS AUTO: ABNORMAL /HPF
UROBILINOGEN UR STRIP.AUTO-MCNC: <2 MG/DL
WBC # BLD AUTO: 10.2 X10*3/UL (ref 4.4–11.3)
WBC #/AREA URNS AUTO: ABNORMAL /HPF

## 2023-10-27 PROCEDURE — 81025 URINE PREGNANCY TEST: CPT | Performed by: FAMILY MEDICINE

## 2023-10-27 PROCEDURE — 2500000004 HC RX 250 GENERAL PHARMACY W/ HCPCS (ALT 636 FOR OP/ED): Mod: SE | Performed by: FAMILY MEDICINE

## 2023-10-27 PROCEDURE — 94760 N-INVAS EAR/PLS OXIMETRY 1: CPT | Mod: CCI

## 2023-10-27 PROCEDURE — 99284 EMERGENCY DEPT VISIT MOD MDM: CPT | Mod: 25 | Performed by: FAMILY MEDICINE

## 2023-10-27 PROCEDURE — 71045 X-RAY EXAM CHEST 1 VIEW: CPT

## 2023-10-27 PROCEDURE — 96374 THER/PROPH/DIAG INJ IV PUSH: CPT

## 2023-10-27 PROCEDURE — 71045 X-RAY EXAM CHEST 1 VIEW: CPT | Performed by: RADIOLOGY

## 2023-10-27 PROCEDURE — 85045 AUTOMATED RETICULOCYTE COUNT: CPT | Performed by: FAMILY MEDICINE

## 2023-10-27 PROCEDURE — 80053 COMPREHEN METABOLIC PANEL: CPT | Performed by: FAMILY MEDICINE

## 2023-10-27 PROCEDURE — 96361 HYDRATE IV INFUSION ADD-ON: CPT

## 2023-10-27 PROCEDURE — 81001 URINALYSIS AUTO W/SCOPE: CPT | Performed by: FAMILY MEDICINE

## 2023-10-27 PROCEDURE — 83605 ASSAY OF LACTIC ACID: CPT | Performed by: FAMILY MEDICINE

## 2023-10-27 PROCEDURE — 36415 COLL VENOUS BLD VENIPUNCTURE: CPT | Performed by: FAMILY MEDICINE

## 2023-10-27 PROCEDURE — 85025 COMPLETE CBC W/AUTO DIFF WBC: CPT | Performed by: FAMILY MEDICINE

## 2023-10-27 PROCEDURE — 96375 TX/PRO/DX INJ NEW DRUG ADDON: CPT

## 2023-10-27 PROCEDURE — 82805 BLOOD GASES W/O2 SATURATION: CPT | Performed by: FAMILY MEDICINE

## 2023-10-27 PROCEDURE — 2500000004 HC RX 250 GENERAL PHARMACY W/ HCPCS (ALT 636 FOR OP/ED): Mod: SE

## 2023-10-27 RX ORDER — HEPARIN 100 UNIT/ML
5 SYRINGE INTRAVENOUS ONCE
Status: DISCONTINUED | OUTPATIENT
Start: 2023-10-27 | End: 2023-10-28 | Stop reason: HOSPADM

## 2023-10-27 RX ORDER — KETOROLAC TROMETHAMINE 15 MG/ML
15 INJECTION, SOLUTION INTRAMUSCULAR; INTRAVENOUS ONCE
Status: COMPLETED | OUTPATIENT
Start: 2023-10-27 | End: 2023-10-27

## 2023-10-27 RX ORDER — DIPHENHYDRAMINE HYDROCHLORIDE 50 MG/ML
50 INJECTION INTRAMUSCULAR; INTRAVENOUS ONCE
Status: COMPLETED | OUTPATIENT
Start: 2023-10-27 | End: 2023-10-27

## 2023-10-27 RX ORDER — HEPARIN 100 UNIT/ML
SYRINGE INTRAVENOUS
Status: COMPLETED
Start: 2023-10-27 | End: 2023-10-27

## 2023-10-27 RX ADMIN — KETOROLAC TROMETHAMINE 15 MG: 15 INJECTION, SOLUTION INTRAMUSCULAR; INTRAVENOUS at 20:38

## 2023-10-27 RX ADMIN — SODIUM CHLORIDE 1000 ML: 9 INJECTION, SOLUTION INTRAVENOUS at 20:39

## 2023-10-27 RX ADMIN — Medication 500 UNITS: at 22:33

## 2023-10-27 RX ADMIN — DIPHENHYDRAMINE HYDROCHLORIDE 50 MG: 50 INJECTION INTRAMUSCULAR; INTRAVENOUS at 20:39

## 2023-10-27 ASSESSMENT — PAIN - FUNCTIONAL ASSESSMENT: PAIN_FUNCTIONAL_ASSESSMENT: 0-10

## 2023-10-27 ASSESSMENT — PAIN SCALES - GENERAL
PAINLEVEL_OUTOF10: 9

## 2023-10-27 NOTE — ED TRIAGE NOTES
Patient states she is to the ED after speaking to her hematologist who directed her to the Ed if her symptoms persist. Patient admits to being in a sickle cell crisis X 1 week. Patient has taken morphine and percocet prior to arrival today.

## 2023-10-28 LAB
BASE EXCESS BLDV CALC-SCNC: 4.8 MMOL/L (ref -2–3)
BODY TEMPERATURE: 37 DEGREES CELSIUS
HCO3 BLDV-SCNC: 30.4 MMOL/L (ref 22–26)
INHALED O2 CONCENTRATION: 21 %
OXYHGB MFR BLDV: 62.6 % (ref 45–75)
PCO2 BLDV: 49 MM HG (ref 41–51)
PH BLDV: 7.4 PH (ref 7.33–7.43)
PO2 BLDV: 36 MM HG (ref 35–45)
SAO2 % BLDV: 64 % (ref 45–75)

## 2023-10-28 NOTE — ED PROVIDER NOTES
HPI   Chief Complaint   Patient presents with    Sickle Cell Pain Crisis     X 1 week. Patient states she took morphine at home at 0800 and percocet at 1000 and 2pm.        28-year-old female with sickle cell disease, chronic back pain, morbid obesity, and DVT/PE comes to the ED with complaint of feeling she is having another sickle cell crisis.  She reports her last visit was 5 days ago for the same presentation where she was treated in the ED and discharged to follow-up.  Patient reports has not yet followed up with her hematologist.  Patient reports he is continue to use her home medications for management of her chronic pain and sickle cell crisis but feels they are not helping enough.  Patient reports today's flareup is similar to her baseline when she gets a flareup typically that brings her to the ED.  Patient states she has diffuse pain all over her body and her back/hips and going down her legs.  She reports no other associate symptoms or complaints.  Patient in the ED is alert, cooperative, appears anxious, uncomfortable, but in no distress.  Patient currently denies headache, blurred vision, neck pain, chest pain, abdominal pain, shortness of breath, fever/chills, falls or trauma, recent travel, sick contacts, loss sensation, numbness or tingling, ataxia, loss of , nausea/vomiting/diarrhea, dysuria, dizziness, and weakness.      History provided by:  Patient and friend   used: No                        [place] Coma Scale Score: 10                  Patient History   History reviewed. No pertinent past medical history.  Past Surgical History:   Procedure Laterality Date    CT ABDOMEN PELVIS ANGIOGRAM W AND/OR WO IV CONTRAST  4/9/2020    CT ABDOMEN PELVIS ANGIOGRAM W AND/OR WO IV CONTRAST 4/9/2020 [facility]    CT NECK ANGIO W AND WO IV CONTRAST  9/27/2019    CT NECK ANGIO W AND WO IV CONTRAST 9/27/2019 [facility]    IR CVC TUNNELED  8/24/2020    IR CVC TUNNELED  8/24/2020 [facility]    OTHER SURGICAL HISTORY  09/22/2020    Venous access port placement    OTHER SURGICAL HISTORY  09/22/2020    Tonsillectomy with adenoidectomy    OTHER SURGICAL HISTORY  09/22/2020    Knee surgery     No family history on file.  Social History     Tobacco Use    Smoking status: Never    Smokeless tobacco: Never   Substance Use Topics    Alcohol use: Not on file    Drug use: Not on file       Physical Exam   ED Triage Vitals [10/27/23 1955]   Temp Heart Rate Resp BP   36.6 °C (97.9 °F) 62 19 153/87      SpO2 Temp src Heart Rate Source Patient Position   100 % -- -- Sitting      BP Location FiO2 (%)     Left arm --       Physical Exam  Vitals and nursing note reviewed.   Constitutional:       General: She is not in acute distress.     Appearance: She is well-developed.   HENT:      Head: Normocephalic and atraumatic.   Eyes:      Conjunctiva/sclera: Conjunctivae normal.   Cardiovascular:      Rate and Rhythm: Normal rate and regular rhythm.      Heart sounds: No murmur heard.  Pulmonary:      Effort: Pulmonary effort is normal. No respiratory distress.      Breath sounds: Normal breath sounds.   Abdominal:      Palpations: Abdomen is soft.      Tenderness: There is no abdominal tenderness.   Musculoskeletal:         General: No swelling.      Cervical back: Neck supple.   Skin:     General: Skin is warm and dry.      Capillary Refill: Capillary refill takes less than 2 seconds.   Neurological:      Mental Status: She is alert.   Psychiatric:         Mood and Affect: Mood normal.         ED Course & MDM   Diagnoses as of 10/27/23 9533   Sickle cell crisis (CMS/HCC)     Labs Reviewed   CBC WITH AUTO DIFFERENTIAL - Abnormal       Result Value    WBC 10.2      nRBC 0.0      RBC 4.28      Hemoglobin 9.5 (*)     Hematocrit 28.3 (*)     MCV 66 (*)     MCH 22.2 (*)     MCHC 33.6      RDW 18.1 (*)     Platelets 258      MPV 10.9      Neutrophils % 39.4      Immature Granulocytes %, Automated 0.2       Lymphocytes % 52.1      Monocytes % 5.4      Eosinophils % 2.5      Basophils % 0.4      Neutrophils Absolute 4.02      Immature Granulocytes Absolute, Automated 0.02      Lymphocytes Absolute 5.32 (*)     Monocytes Absolute 0.55      Eosinophils Absolute 0.26      Basophils Absolute 0.04     COMPREHENSIVE METABOLIC PANEL - Abnormal    Glucose 115 (*)     Sodium 139      Potassium 3.6      Chloride 105      Bicarbonate 28      Anion Gap 10      Urea Nitrogen 7      Creatinine 0.86      eGFR >90      Calcium 8.4 (*)     Albumin 3.4      Alkaline Phosphatase 76      Total Protein 6.7      AST 11      Bilirubin, Total 0.9      ALT 8     URINALYSIS WITH REFLEX MICROSCOPIC - Abnormal    Color, Urine Yellow      Appearance, Urine Hazy (*)     Specific Gravity, Urine 1.010      pH, Urine 5.0      Protein, Urine NEGATIVE      Glucose, Urine NEGATIVE      Blood, Urine LARGE (3+) (*)     Ketones, Urine NEGATIVE      Bilirubin, Urine NEGATIVE      Urobilinogen, Urine <2.0      Nitrite, Urine NEGATIVE      Leukocyte Esterase, Urine SMALL (1+) (*)    RETICULOCYTES - Abnormal    Retic % 1.8      Retic Absolute 0.078      Reticulocyte Hemoglobin 25 (*)     Immature Retic fraction 19.1 (*)    MICROSCOPIC ONLY, URINE - Abnormal    WBC, Urine 1-5      RBC, Urine >20 (*)     Squamous Epithelial Cells, Urine 10-25 (FEW)      Bacteria, Urine 1+ (*)    HCG, URINE, QUALITATIVE - Normal    HCG, Urine NEGATIVE     LACTATE - Normal    Lactate 1.1      Narrative:     Venipuncture immediately after or during the administration of Metamizole may lead to falsely low results. Testing should be performed immediately  prior to Metamizole dosing.   BLOOD GAS VENOUS   MORPHOLOGY    RBC Morphology See Below      Hypochromia Mild         XR chest 1 view   Final Result   1. Mild central vascular congestion.   2. Slight interstitial prominence and hazy left lower lobe opacity,   likely related to mild edema and atelectasis versus airspace disease.              Signed by: Alexey Correa 10/27/2023 9:50 PM   Dictation workstation:   [id number]          Medical Decision Making  Patient upon arrival to the ED appeared to be anxious and uncomfortable but in no distress stable vital signs.  Discussed with patient/from the presenting complaints clinical findings.  Reviewed them patient's epic chart and counseled them on sick cell crisis and appropriate approach to management/treatments.  After assessment and evaluation IV fluids are started, labs sent, imaging ordered, given IV Toradol, IV Benadryl, placed on cardiac monitor, and observed.  After treatment and a period of rest patient is reassessed and found to be feeling a little better, pain was mildly improved, patient had no new physical complaints, tolerated p.o. challenge, and vital signs are stable throughout.  Patient's final results were reviewed/discussed and compared to previous sickle cell crises.  Patient was advised this time that her findings were consistent with her baseline and recommendations were to continue her home medications as prescribed for management of her chronic pain and sickle cell crises.  Patient was also advised to contact her hematologist to follow-up and reassess and neck several days since he had several flareups the past several weeks.  Patient also vies return to ED if she feels her symptoms continue persist or worsen despite use of oral medications for reevaluation assessment in the ED.  Patient stable this time and discharged home with friend.    Amount and/or Complexity of Data Reviewed  External Data Reviewed: labs, radiology, ECG and notes.  Labs: ordered. Decision-making details documented in ED Course.  Radiology: ordered. Decision-making details documented in ED Course.    Risk  OTC drugs.  Prescription drug management.        Procedure  Procedures     Jimmy Tsai MD  10/27/23 8160

## 2023-11-30 ENCOUNTER — HOSPITAL ENCOUNTER (OUTPATIENT)
Dept: CARDIOLOGY | Facility: HOSPITAL | Age: 28
Discharge: HOME | End: 2023-11-30
Payer: MEDICARE

## 2023-11-30 PROCEDURE — 93005 ELECTROCARDIOGRAM TRACING: CPT

## 2023-12-13 LAB
HOLD SPECIMEN: NORMAL
HOLD SPECIMEN: NORMAL

## 2023-12-24 LAB
ATRIAL RATE: 84 BPM
P AXIS: 56 DEGREES
P OFFSET: 196 MS
P ONSET: 142 MS
PR INTERVAL: 146 MS
Q ONSET: 215 MS
QRS COUNT: 14 BEATS
QRS DURATION: 98 MS
QT INTERVAL: 382 MS
QTC CALCULATION(BAZETT): 451 MS
QTC FREDERICIA: 427 MS
R AXIS: 23 DEGREES
T AXIS: 4 DEGREES
T OFFSET: 406 MS
VENTRICULAR RATE: 84 BPM

## 2024-02-15 ENCOUNTER — TELEPHONE (OUTPATIENT)
Dept: HEMATOLOGY/ONCOLOGY | Facility: HOSPITAL | Age: 29
End: 2024-02-15

## 2024-03-14 ENCOUNTER — HOSPITAL ENCOUNTER (EMERGENCY)
Facility: HOSPITAL | Age: 29
Discharge: HOME | End: 2024-03-14
Attending: FAMILY MEDICINE
Payer: MEDICARE

## 2024-03-14 ENCOUNTER — APPOINTMENT (OUTPATIENT)
Dept: CARDIOLOGY | Facility: HOSPITAL | Age: 29
End: 2024-03-14
Payer: MEDICARE

## 2024-03-14 VITALS
HEIGHT: 63 IN | HEART RATE: 95 BPM | SYSTOLIC BLOOD PRESSURE: 146 MMHG | OXYGEN SATURATION: 99 % | WEIGHT: 293 LBS | RESPIRATION RATE: 20 BRPM | BODY MASS INDEX: 51.91 KG/M2 | DIASTOLIC BLOOD PRESSURE: 89 MMHG | TEMPERATURE: 97.7 F

## 2024-03-14 DIAGNOSIS — D57.00 SICKLE CELL DISEASE WITH CRISIS (MULTI): Primary | ICD-10-CM

## 2024-03-14 LAB
ALBUMIN SERPL BCP-MCNC: 3.7 G/DL (ref 3.4–5)
ALP SERPL-CCNC: 83 U/L (ref 33–110)
ALT SERPL W P-5'-P-CCNC: 6 U/L (ref 7–45)
ANION GAP SERPL CALC-SCNC: 9 MMOL/L (ref 10–20)
AST SERPL W P-5'-P-CCNC: 11 U/L (ref 9–39)
ATRIAL RATE: 87 BPM
BASOPHILS # BLD AUTO: 0.02 X10*3/UL (ref 0–0.1)
BASOPHILS NFR BLD AUTO: 0.1 %
BILIRUB SERPL-MCNC: 0.9 MG/DL (ref 0–1.2)
BUN SERPL-MCNC: 8 MG/DL (ref 6–23)
CALCIUM SERPL-MCNC: 8.4 MG/DL (ref 8.6–10.3)
CHLORIDE SERPL-SCNC: 103 MMOL/L (ref 98–107)
CO2 SERPL-SCNC: 27 MMOL/L (ref 21–32)
CREAT SERPL-MCNC: 0.87 MG/DL (ref 0.5–1.05)
EGFRCR SERPLBLD CKD-EPI 2021: >90 ML/MIN/1.73M*2
EOSINOPHIL # BLD AUTO: 0.14 X10*3/UL (ref 0–0.7)
EOSINOPHIL NFR BLD AUTO: 1 %
ERYTHROCYTE [DISTWIDTH] IN BLOOD BY AUTOMATED COUNT: 19.5 % (ref 11.5–14.5)
GLUCOSE SERPL-MCNC: 126 MG/DL (ref 74–99)
HCT VFR BLD AUTO: 29.2 % (ref 36–46)
HGB BLD-MCNC: 9.9 G/DL (ref 12–16)
HGB RETIC QN: 24 PG (ref 28–38)
IMM GRANULOCYTES # BLD AUTO: 0.06 X10*3/UL (ref 0–0.7)
IMM GRANULOCYTES NFR BLD AUTO: 0.4 % (ref 0–0.9)
IMMATURE RETIC FRACTION: 25.8 %
LDH SERPL L TO P-CCNC: 138 U/L (ref 84–246)
LYMPHOCYTES # BLD AUTO: 3.21 X10*3/UL (ref 1.2–4.8)
LYMPHOCYTES NFR BLD AUTO: 23.9 %
MCH RBC QN AUTO: 22.8 PG (ref 26–34)
MCHC RBC AUTO-ENTMCNC: 33.9 G/DL (ref 32–36)
MCV RBC AUTO: 67 FL (ref 80–100)
MONOCYTES # BLD AUTO: 0.83 X10*3/UL (ref 0.1–1)
MONOCYTES NFR BLD AUTO: 6.2 %
NEUTROPHILS # BLD AUTO: 9.19 X10*3/UL (ref 1.2–7.7)
NEUTROPHILS NFR BLD AUTO: 68.4 %
NRBC BLD-RTO: 0 /100 WBCS (ref 0–0)
P AXIS: 56 DEGREES
P OFFSET: 197 MS
P ONSET: 147 MS
PLATELET # BLD AUTO: 315 X10*3/UL (ref 150–450)
POTASSIUM SERPL-SCNC: 3.4 MMOL/L (ref 3.5–5.3)
PR INTERVAL: 144 MS
PROT SERPL-MCNC: 7.5 G/DL (ref 6.4–8.2)
Q ONSET: 219 MS
QRS COUNT: 14 BEATS
QRS DURATION: 84 MS
QT INTERVAL: 390 MS
QTC CALCULATION(BAZETT): 469 MS
QTC FREDERICIA: 441 MS
R AXIS: 42 DEGREES
RBC # BLD AUTO: 4.35 X10*6/UL (ref 4–5.2)
RETICS #: 0.12 X10*6/UL (ref 0.02–0.08)
RETICS/RBC NFR AUTO: 2.7 % (ref 0.5–2)
SICKLE CELL SCREEN: POSITIVE
SODIUM SERPL-SCNC: 136 MMOL/L (ref 136–145)
T AXIS: -1 DEGREES
T OFFSET: 414 MS
VENTRICULAR RATE: 87 BPM
WBC # BLD AUTO: 13.5 X10*3/UL (ref 4.4–11.3)

## 2024-03-14 PROCEDURE — 85025 COMPLETE CBC W/AUTO DIFF WBC: CPT | Performed by: FAMILY MEDICINE

## 2024-03-14 PROCEDURE — 85660 RBC SICKLE CELL TEST: CPT | Mod: CONLAB | Performed by: FAMILY MEDICINE

## 2024-03-14 PROCEDURE — 80053 COMPREHEN METABOLIC PANEL: CPT | Performed by: FAMILY MEDICINE

## 2024-03-14 PROCEDURE — 96376 TX/PRO/DX INJ SAME DRUG ADON: CPT

## 2024-03-14 PROCEDURE — 96375 TX/PRO/DX INJ NEW DRUG ADDON: CPT

## 2024-03-14 PROCEDURE — 2500000004 HC RX 250 GENERAL PHARMACY W/ HCPCS (ALT 636 FOR OP/ED): Mod: SE

## 2024-03-14 PROCEDURE — 93005 ELECTROCARDIOGRAM TRACING: CPT

## 2024-03-14 PROCEDURE — 96374 THER/PROPH/DIAG INJ IV PUSH: CPT

## 2024-03-14 PROCEDURE — 85045 AUTOMATED RETICULOCYTE COUNT: CPT | Performed by: FAMILY MEDICINE

## 2024-03-14 PROCEDURE — 2500000004 HC RX 250 GENERAL PHARMACY W/ HCPCS (ALT 636 FOR OP/ED): Mod: SE | Performed by: FAMILY MEDICINE

## 2024-03-14 PROCEDURE — 83615 LACTATE (LD) (LDH) ENZYME: CPT | Performed by: FAMILY MEDICINE

## 2024-03-14 PROCEDURE — 99284 EMERGENCY DEPT VISIT MOD MDM: CPT | Mod: 25

## 2024-03-14 PROCEDURE — 96361 HYDRATE IV INFUSION ADD-ON: CPT

## 2024-03-14 RX ORDER — HYDROMORPHONE HYDROCHLORIDE 1 MG/ML
1 INJECTION, SOLUTION INTRAMUSCULAR; INTRAVENOUS; SUBCUTANEOUS ONCE
Status: COMPLETED | OUTPATIENT
Start: 2024-03-14 | End: 2024-03-14

## 2024-03-14 RX ORDER — OXYCODONE HCL 10 MG/1
10 TABLET, FILM COATED, EXTENDED RELEASE ORAL
Status: ON HOLD | COMMUNITY
End: 2024-06-08 | Stop reason: ENTERED-IN-ERROR

## 2024-03-14 RX ORDER — ONDANSETRON HYDROCHLORIDE 2 MG/ML
INJECTION, SOLUTION INTRAVENOUS
Status: COMPLETED
Start: 2024-03-14 | End: 2024-03-14

## 2024-03-14 RX ORDER — ONDANSETRON 4 MG/1
4 TABLET, FILM COATED ORAL ONCE
Status: DISCONTINUED | OUTPATIENT
Start: 2024-03-14 | End: 2024-03-14

## 2024-03-14 RX ORDER — HYDROMORPHONE HYDROCHLORIDE 1 MG/ML
INJECTION, SOLUTION INTRAMUSCULAR; INTRAVENOUS; SUBCUTANEOUS
Status: COMPLETED
Start: 2024-03-14 | End: 2024-03-14

## 2024-03-14 RX ORDER — HYDROMORPHONE HYDROCHLORIDE 1 MG/ML
1 INJECTION, SOLUTION INTRAMUSCULAR; INTRAVENOUS; SUBCUTANEOUS ONCE
Status: DISCONTINUED | OUTPATIENT
Start: 2024-03-14 | End: 2024-03-14

## 2024-03-14 RX ORDER — ONDANSETRON HYDROCHLORIDE 2 MG/ML
4 INJECTION, SOLUTION INTRAVENOUS ONCE
Status: COMPLETED | OUTPATIENT
Start: 2024-03-14 | End: 2024-03-14

## 2024-03-14 RX ADMIN — HYDROMORPHONE HYDROCHLORIDE 1 MG: 1 INJECTION, SOLUTION INTRAMUSCULAR; INTRAVENOUS; SUBCUTANEOUS at 03:43

## 2024-03-14 RX ADMIN — ONDANSETRON HYDROCHLORIDE 4 MG: 2 INJECTION, SOLUTION INTRAVENOUS at 02:27

## 2024-03-14 RX ADMIN — SODIUM CHLORIDE 1000 ML: 9 INJECTION, SOLUTION INTRAVENOUS at 02:28

## 2024-03-14 RX ADMIN — ONDANSETRON 4 MG: 2 INJECTION INTRAMUSCULAR; INTRAVENOUS at 02:27

## 2024-03-14 RX ADMIN — HYDROMORPHONE HYDROCHLORIDE 1 MG: 1 INJECTION, SOLUTION INTRAMUSCULAR; INTRAVENOUS; SUBCUTANEOUS at 02:27

## 2024-03-14 ASSESSMENT — COLUMBIA-SUICIDE SEVERITY RATING SCALE - C-SSRS
1. IN THE PAST MONTH, HAVE YOU WISHED YOU WERE DEAD OR WISHED YOU COULD GO TO SLEEP AND NOT WAKE UP?: NO
6. HAVE YOU EVER DONE ANYTHING, STARTED TO DO ANYTHING, OR PREPARED TO DO ANYTHING TO END YOUR LIFE?: NO
2. HAVE YOU ACTUALLY HAD ANY THOUGHTS OF KILLING YOURSELF?: NO

## 2024-03-14 ASSESSMENT — PAIN - FUNCTIONAL ASSESSMENT: PAIN_FUNCTIONAL_ASSESSMENT: 0-10

## 2024-03-14 ASSESSMENT — PAIN SCALES - GENERAL: PAINLEVEL_OUTOF10: 10 - WORST POSSIBLE PAIN

## 2024-03-14 NOTE — DISCHARGE INSTRUCTIONS
As discussed you need to call your sickle cell doctor specialist for evaluation and outpatient visit.  If any worsening symptoms new symptom return to ER.  Continue taking home medication however we will not be prescribing narcotic for home use.  It needs to be done through your sickle cell specialist.

## 2024-03-14 NOTE — ED PROVIDER NOTES
HPI   Chief Complaint   Patient presents with    Sickle Cell Pain Crisis       HPI  Patient with history of sickle cell disease with chronic pain disorder has been taking Percocet despite taking Percocet she has been in pain all over the body and decided come to ER by squad.  She denies any chest pain pain in arms or jaw course of blackout or pass out.   Denies any fever chills cough sore throat stuffy nose runny nose.  Patient denies pregnancy.  She has been under care of specialist in [facility].  Patient has history of port in the right side of chest which got clotted and needed to be removed in the past no recent surgical procedure though.      Family history: Reviewed  Social history: Reviewed, denies substance abuse  Past medical history:  Review of systems review of systems as above in HPI otherwise negative.               [place] Coma Scale Score: 15                     Patient History   Past Medical History:   Diagnosis Date    Sickle cell anemia with crisis (CMS/HCC)      Past Surgical History:   Procedure Laterality Date    CT ABDOMEN PELVIS ANGIOGRAM W AND/OR WO IV CONTRAST  4/9/2020    CT ABDOMEN PELVIS ANGIOGRAM W AND/OR WO IV CONTRAST 4/9/2020 [facility]    CT ANGIO NECK  9/27/2019    CT NECK ANGIO W AND WO IV CONTRAST 9/27/2019 [facility]    IR CVC TUNNELED  8/24/2020    IR CVC TUNNELED 8/24/2020 [facility]    OTHER SURGICAL HISTORY  09/22/2020    Venous access port placement    OTHER SURGICAL HISTORY  09/22/2020    Tonsillectomy with adenoidectomy    OTHER SURGICAL HISTORY  09/22/2020    Knee surgery     No family history on file.  Social History     Tobacco Use    Smoking status: Never    Smokeless tobacco: Never   Vaping Use    Vaping Use: Never used   Substance Use Topics    Alcohol use: Never    Drug use: Never   EKG obtained at 121 hours showed normal sinus rhythm rate of 87.  Nonspecific T wave abnormality.  Prolonged QT interval.  No ST-T evaluation.  No  STEMI.  Abnormal EKG .  I read this EKG.    Physical Exam   ED Triage Vitals [03/14/24 0155]   Temperature Heart Rate Respirations BP   36.5 °C (97.7 °F) 95 20 (!) 131/104      SpO2 Temp Source Heart Rate Source Patient Position   97 % Tympanic Monitor --      BP Location FiO2 (%)     -- --       Physical Exam    CONSTITUTIONAL: Moderately obese female patient was awake and alert and cooperative very anxious and complaining of pain however the diverted attention.  A lot more comfortable.  She was talking and breathing without acute distress no tachypnea hypoxemia respite distress noted alert oriented x 3.     HENMT: The airway was intact. There was no ear or nose discharge. No drooling or stridor. Neck was supple. Talking and breathing comfortably.      EYES: Clear bilaterally, pupils equal, round and reactive to light. CARDIOVASCULAR: Regular rate and rhythm. No friction rub or murmur good peripheral pulses no peripheral edema. Calf muscle nontender Homans' sign negative bilaterally.     RESPIRATORY: Patient was breathing comfortably. No tachypnea no respiratory distress.  On auscultation he has diminished breath sounds crackles in the lung bases.  However has good skin perfusion.   GASTROINTESTINAL: Abdomen soft positive bowel sounds mildly obese abdomen no guarding or rebound surgery.  She has pain all over the body but no significant tenderness upon palpation abdomen.      GENITOURINARY:  No costovertebral angle tenderness.   MUSCULOSKELETAL: Head was normocephalic atraumatic cervical thoracic lumbar spine nontender.  Chest was nontender  Both upper extremity good motion nontender intact distal pulse intact sensation, Patient noted a muscular type pain in the back arms and legs without any calf muscle pain chest pain or shortness of breath.    NEUROLOGICAL: Awake alert pleasant and cooperative. No motor or sensory deficit no arms selective noted. Intact neurovascular function and motor function. No facial  drooping or drooling. Talking and breathing comfortably.  Cranial nerves II to XII grossly intact. No arms selective noted. No nystagmus.      SKIN: Skin normal color for race, warm, dry and intact. No evidence of trauma or lesions.     PSYCHIATRIC: Awake alert and without acute distress. No obvious depression, no suicidal thoughts or ideation. Appropriate mood. Talking and normal tone. HEME/LYMPH: No adenopathy or splenomegaly.        CRITICAL CARE    VITAL SIGNS:                DISPOSITION      ED Course & MDM   ED Course as of 03/14/24 0326   Thu Mar 14, 2024   0159 Sickle cell screen [SP]      ED Course User Index  [SP] Cecy Valerio MD         Diagnoses as of 03/14/24 0326   Sickle cell disease with crisis (CMS/HCC)       Medical Decision Making  28-year-old -American female patient with history of sickle cell disease has been to ER multiple times the past has been on oxycodone multiple times multiple times a day to the home medication not helping she has been any pain out of the body similar to March in the past when she had crisis and decided come to ER for evaluation.  Denies any chest pain or short of breath abdominal pain vomiting or diarrhea blood in stool or black stool.  She denies pregnancy.  Denies blackout or pass out.    Patient with moderate to severe pain.  She had poor IV access however we was able to establish IV and left arm labs are drawn and she was given IV fluid.  She was also given Dilaudid and Zofran 1 mg 4 mg respectively but she continued pain I ordered second dose.  She does have elevated reticulocyte counts see lab reports as documented her white count of 13,000 H&H is 9.9 and 29.2.    Procedure  Procedures     Cecy Valerio MD  03/14/24 0258       Cecy Valerio MD  03/14/24 0334

## 2024-05-14 ENCOUNTER — APPOINTMENT (OUTPATIENT)
Dept: CARDIOLOGY | Facility: HOSPITAL | Age: 29
End: 2024-05-14
Payer: MEDICARE

## 2024-05-14 ENCOUNTER — HOSPITAL ENCOUNTER (EMERGENCY)
Facility: HOSPITAL | Age: 29
Discharge: HOME | End: 2024-05-15
Attending: EMERGENCY MEDICINE
Payer: MEDICARE

## 2024-05-14 ENCOUNTER — APPOINTMENT (OUTPATIENT)
Dept: RADIOLOGY | Facility: HOSPITAL | Age: 29
End: 2024-05-14
Payer: MEDICARE

## 2024-05-14 DIAGNOSIS — D57.00 SICKLE CELL PAIN CRISIS (MULTI): Primary | ICD-10-CM

## 2024-05-14 DIAGNOSIS — E87.6 HYPOKALEMIA: ICD-10-CM

## 2024-05-14 LAB
ALBUMIN SERPL BCP-MCNC: 4.1 G/DL (ref 3.4–5)
ALP SERPL-CCNC: 108 U/L (ref 33–110)
ALT SERPL W P-5'-P-CCNC: 8 U/L (ref 7–45)
ANION GAP SERPL CALC-SCNC: 10 MMOL/L (ref 10–20)
AST SERPL W P-5'-P-CCNC: 16 U/L (ref 9–39)
B-HCG SERPL-ACNC: <2 MIU/ML
BASOPHILS # BLD AUTO: 0.02 X10*3/UL (ref 0–0.1)
BASOPHILS NFR BLD AUTO: 0.2 %
BILIRUB SERPL-MCNC: 1.1 MG/DL (ref 0–1.2)
BUN SERPL-MCNC: 6 MG/DL (ref 6–23)
CALCIUM SERPL-MCNC: 8.8 MG/DL (ref 8.6–10.3)
CARDIAC TROPONIN I PNL SERPL HS: 3 NG/L (ref 0–13)
CHLORIDE SERPL-SCNC: 103 MMOL/L (ref 98–107)
CO2 SERPL-SCNC: 28 MMOL/L (ref 21–32)
CREAT SERPL-MCNC: 0.9 MG/DL (ref 0.5–1.05)
D DIMER PPP FEU-MCNC: 497 NG/ML FEU
EGFRCR SERPLBLD CKD-EPI 2021: 89 ML/MIN/1.73M*2
EOSINOPHIL # BLD AUTO: 0.31 X10*3/UL (ref 0–0.7)
EOSINOPHIL NFR BLD AUTO: 2.7 %
ERYTHROCYTE [DISTWIDTH] IN BLOOD BY AUTOMATED COUNT: 18.3 % (ref 11.5–14.5)
GLUCOSE SERPL-MCNC: 121 MG/DL (ref 74–99)
HCT VFR BLD AUTO: 32.7 % (ref 36–46)
HGB BLD-MCNC: 11.1 G/DL (ref 12–16)
HGB RETIC QN: 24 PG (ref 28–38)
IMM GRANULOCYTES # BLD AUTO: 0.03 X10*3/UL (ref 0–0.7)
IMM GRANULOCYTES NFR BLD AUTO: 0.3 % (ref 0–0.9)
IMMATURE RETIC FRACTION: 19.8 %
LYMPHOCYTES # BLD AUTO: 5.98 X10*3/UL (ref 1.2–4.8)
LYMPHOCYTES NFR BLD AUTO: 52.7 %
MAGNESIUM SERPL-MCNC: 1.74 MG/DL (ref 1.6–2.4)
MCH RBC QN AUTO: 22.3 PG (ref 26–34)
MCHC RBC AUTO-ENTMCNC: 33.9 G/DL (ref 32–36)
MCV RBC AUTO: 66 FL (ref 80–100)
MONOCYTES # BLD AUTO: 0.58 X10*3/UL (ref 0.1–1)
MONOCYTES NFR BLD AUTO: 5.1 %
NEUTROPHILS # BLD AUTO: 4.42 X10*3/UL (ref 1.2–7.7)
NEUTROPHILS NFR BLD AUTO: 39 %
NRBC BLD-RTO: 0 /100 WBCS (ref 0–0)
PLATELET # BLD AUTO: 355 X10*3/UL (ref 150–450)
POTASSIUM SERPL-SCNC: 3.1 MMOL/L (ref 3.5–5.3)
PROT SERPL-MCNC: 8 G/DL (ref 6.4–8.2)
RBC # BLD AUTO: 4.98 X10*6/UL (ref 4–5.2)
RETICS #: 0.08 X10*6/UL (ref 0.02–0.08)
RETICS/RBC NFR AUTO: 1.6 % (ref 0.5–2)
SODIUM SERPL-SCNC: 138 MMOL/L (ref 136–145)
WBC # BLD AUTO: 11.3 X10*3/UL (ref 4.4–11.3)

## 2024-05-14 PROCEDURE — 85045 AUTOMATED RETICULOCYTE COUNT: CPT | Performed by: EMERGENCY MEDICINE

## 2024-05-14 PROCEDURE — 85025 COMPLETE CBC W/AUTO DIFF WBC: CPT | Performed by: EMERGENCY MEDICINE

## 2024-05-14 PROCEDURE — 84702 CHORIONIC GONADOTROPIN TEST: CPT | Performed by: EMERGENCY MEDICINE

## 2024-05-14 PROCEDURE — 96375 TX/PRO/DX INJ NEW DRUG ADDON: CPT

## 2024-05-14 PROCEDURE — 2500000006 HC RX 250 W HCPCS SELF ADMINISTERED DRUGS (ALT 637 FOR ALL PAYERS): Mod: SE | Performed by: EMERGENCY MEDICINE

## 2024-05-14 PROCEDURE — 71045 X-RAY EXAM CHEST 1 VIEW: CPT

## 2024-05-14 PROCEDURE — 80053 COMPREHEN METABOLIC PANEL: CPT | Performed by: EMERGENCY MEDICINE

## 2024-05-14 PROCEDURE — 84484 ASSAY OF TROPONIN QUANT: CPT | Performed by: EMERGENCY MEDICINE

## 2024-05-14 PROCEDURE — 96376 TX/PRO/DX INJ SAME DRUG ADON: CPT

## 2024-05-14 PROCEDURE — 83735 ASSAY OF MAGNESIUM: CPT | Performed by: EMERGENCY MEDICINE

## 2024-05-14 PROCEDURE — 85379 FIBRIN DEGRADATION QUANT: CPT | Performed by: EMERGENCY MEDICINE

## 2024-05-14 PROCEDURE — 96361 HYDRATE IV INFUSION ADD-ON: CPT

## 2024-05-14 PROCEDURE — 93005 ELECTROCARDIOGRAM TRACING: CPT

## 2024-05-14 PROCEDURE — 99284 EMERGENCY DEPT VISIT MOD MDM: CPT | Mod: 25

## 2024-05-14 PROCEDURE — 96374 THER/PROPH/DIAG INJ IV PUSH: CPT

## 2024-05-14 PROCEDURE — 2500000004 HC RX 250 GENERAL PHARMACY W/ HCPCS (ALT 636 FOR OP/ED): Mod: SE | Performed by: EMERGENCY MEDICINE

## 2024-05-14 PROCEDURE — 71045 X-RAY EXAM CHEST 1 VIEW: CPT | Performed by: STUDENT IN AN ORGANIZED HEALTH CARE EDUCATION/TRAINING PROGRAM

## 2024-05-14 RX ORDER — HYDROMORPHONE HYDROCHLORIDE 1 MG/ML
1 INJECTION, SOLUTION INTRAMUSCULAR; INTRAVENOUS; SUBCUTANEOUS ONCE
Status: COMPLETED | OUTPATIENT
Start: 2024-05-14 | End: 2024-05-14

## 2024-05-14 RX ORDER — POTASSIUM CHLORIDE 20 MEQ/1
40 TABLET, EXTENDED RELEASE ORAL ONCE
Status: COMPLETED | OUTPATIENT
Start: 2024-05-14 | End: 2024-05-14

## 2024-05-14 RX ORDER — ONDANSETRON HYDROCHLORIDE 2 MG/ML
4 INJECTION, SOLUTION INTRAVENOUS ONCE
Status: COMPLETED | OUTPATIENT
Start: 2024-05-14 | End: 2024-05-14

## 2024-05-14 RX ADMIN — POTASSIUM CHLORIDE 40 MEQ: 1500 TABLET, EXTENDED RELEASE ORAL at 23:39

## 2024-05-14 RX ADMIN — HYDROMORPHONE HYDROCHLORIDE 1 MG: 1 INJECTION, SOLUTION INTRAMUSCULAR; INTRAVENOUS; SUBCUTANEOUS at 23:40

## 2024-05-14 RX ADMIN — HYDROMORPHONE HYDROCHLORIDE 1 MG: 1 INJECTION, SOLUTION INTRAMUSCULAR; INTRAVENOUS; SUBCUTANEOUS at 22:32

## 2024-05-14 RX ADMIN — SODIUM CHLORIDE 1000 ML: 9 INJECTION, SOLUTION INTRAVENOUS at 22:33

## 2024-05-14 RX ADMIN — ONDANSETRON 4 MG: 2 INJECTION INTRAMUSCULAR; INTRAVENOUS at 22:31

## 2024-05-14 ASSESSMENT — PAIN SCALES - GENERAL
PAINLEVEL_OUTOF10: 9
PAINLEVEL_OUTOF10: 9

## 2024-05-14 ASSESSMENT — PAIN - FUNCTIONAL ASSESSMENT: PAIN_FUNCTIONAL_ASSESSMENT: 0-10

## 2024-05-14 ASSESSMENT — PAIN DESCRIPTION - LOCATION: LOCATION: CHEST

## 2024-05-14 ASSESSMENT — PAIN DESCRIPTION - DIRECTION: RADIATING_TOWARDS: BACK

## 2024-05-15 ENCOUNTER — APPOINTMENT (OUTPATIENT)
Dept: CARDIOLOGY | Facility: HOSPITAL | Age: 29
End: 2024-05-15
Payer: MEDICARE

## 2024-05-15 ENCOUNTER — HOSPITAL ENCOUNTER (OUTPATIENT)
Dept: CARDIOLOGY | Facility: HOSPITAL | Age: 29
Discharge: HOME | End: 2024-05-15
Payer: MEDICARE

## 2024-05-15 ENCOUNTER — APPOINTMENT (OUTPATIENT)
Dept: RADIOLOGY | Facility: HOSPITAL | Age: 29
End: 2024-05-15
Payer: MEDICARE

## 2024-05-15 VITALS
TEMPERATURE: 96.9 F | SYSTOLIC BLOOD PRESSURE: 126 MMHG | WEIGHT: 293 LBS | BODY MASS INDEX: 51.91 KG/M2 | HEIGHT: 63 IN | RESPIRATION RATE: 25 BRPM | DIASTOLIC BLOOD PRESSURE: 75 MMHG | HEART RATE: 89 BPM | OXYGEN SATURATION: 100 %

## 2024-05-15 VITALS
RESPIRATION RATE: 20 BRPM | HEART RATE: 89 BPM | SYSTOLIC BLOOD PRESSURE: 152 MMHG | WEIGHT: 293 LBS | TEMPERATURE: 98.7 F | HEIGHT: 63 IN | OXYGEN SATURATION: 99 % | BODY MASS INDEX: 51.91 KG/M2 | DIASTOLIC BLOOD PRESSURE: 98 MMHG

## 2024-05-15 DIAGNOSIS — R11.2 NAUSEA AND VOMITING, UNSPECIFIED VOMITING TYPE: Primary | ICD-10-CM

## 2024-05-15 DIAGNOSIS — R07.9 CHEST PAIN AT REST: ICD-10-CM

## 2024-05-15 DIAGNOSIS — R10.12 LEFT UPPER QUADRANT ABDOMINAL PAIN: ICD-10-CM

## 2024-05-15 DIAGNOSIS — D57.00 SICKLE CELL DISEASE WITH CRISIS (MULTI): ICD-10-CM

## 2024-05-15 PROBLEM — E87.6 HYPOKALEMIA: Status: ACTIVE | Noted: 2024-05-15

## 2024-05-15 LAB
ALBUMIN SERPL BCP-MCNC: 3.8 G/DL (ref 3.4–5)
ALP SERPL-CCNC: 94 U/L (ref 33–110)
ALT SERPL W P-5'-P-CCNC: 11 U/L (ref 7–45)
ANION GAP SERPL CALC-SCNC: 11 MMOL/L (ref 10–20)
AST SERPL W P-5'-P-CCNC: 22 U/L (ref 9–39)
BASOPHILS # BLD AUTO: 0.02 X10*3/UL (ref 0–0.1)
BASOPHILS NFR BLD AUTO: 0.2 %
BILIRUB SERPL-MCNC: 1.1 MG/DL (ref 0–1.2)
BUN SERPL-MCNC: 5 MG/DL (ref 6–23)
CALCIUM SERPL-MCNC: 8.3 MG/DL (ref 8.6–10.3)
CARDIAC TROPONIN I PNL SERPL HS: 3 NG/L (ref 0–13)
CARDIAC TROPONIN I PNL SERPL HS: <3 NG/L (ref 0–13)
CARDIAC TROPONIN I PNL SERPL HS: <3 NG/L (ref 0–13)
CHLORIDE SERPL-SCNC: 104 MMOL/L (ref 98–107)
CO2 SERPL-SCNC: 27 MMOL/L (ref 21–32)
CREAT SERPL-MCNC: 0.82 MG/DL (ref 0.5–1.05)
EGFRCR SERPLBLD CKD-EPI 2021: >90 ML/MIN/1.73M*2
EOSINOPHIL # BLD AUTO: 0.35 X10*3/UL (ref 0–0.7)
EOSINOPHIL NFR BLD AUTO: 2.7 %
ERYTHROCYTE [DISTWIDTH] IN BLOOD BY AUTOMATED COUNT: 18.2 % (ref 11.5–14.5)
GLUCOSE SERPL-MCNC: 97 MG/DL (ref 74–99)
HCT VFR BLD AUTO: 32.9 % (ref 36–46)
HGB BLD-MCNC: 11.1 G/DL (ref 12–16)
HOLD SPECIMEN: NORMAL
IMM GRANULOCYTES # BLD AUTO: 0.06 X10*3/UL (ref 0–0.7)
IMM GRANULOCYTES NFR BLD AUTO: 0.5 % (ref 0–0.9)
LYMPHOCYTES # BLD AUTO: 4.99 X10*3/UL (ref 1.2–4.8)
LYMPHOCYTES NFR BLD AUTO: 38.7 %
MAGNESIUM SERPL-MCNC: 1.76 MG/DL (ref 1.6–2.4)
MCH RBC QN AUTO: 21.8 PG (ref 26–34)
MCHC RBC AUTO-ENTMCNC: 33.7 G/DL (ref 32–36)
MCV RBC AUTO: 65 FL (ref 80–100)
MONOCYTES # BLD AUTO: 0.81 X10*3/UL (ref 0.1–1)
MONOCYTES NFR BLD AUTO: 6.3 %
NEUTROPHILS # BLD AUTO: 6.68 X10*3/UL (ref 1.2–7.7)
NEUTROPHILS NFR BLD AUTO: 51.6 %
NRBC BLD-RTO: 0 /100 WBCS (ref 0–0)
PLATELET # BLD AUTO: 340 X10*3/UL (ref 150–450)
POTASSIUM SERPL-SCNC: 3.6 MMOL/L (ref 3.5–5.3)
PROT SERPL-MCNC: 7.8 G/DL (ref 6.4–8.2)
RBC # BLD AUTO: 5.1 X10*6/UL (ref 4–5.2)
SODIUM SERPL-SCNC: 138 MMOL/L (ref 136–145)
WBC # BLD AUTO: 12.9 X10*3/UL (ref 4.4–11.3)

## 2024-05-15 PROCEDURE — 2500000004 HC RX 250 GENERAL PHARMACY W/ HCPCS (ALT 636 FOR OP/ED): Mod: SE

## 2024-05-15 PROCEDURE — 85025 COMPLETE CBC W/AUTO DIFF WBC: CPT | Performed by: EMERGENCY MEDICINE

## 2024-05-15 PROCEDURE — 2500000004 HC RX 250 GENERAL PHARMACY W/ HCPCS (ALT 636 FOR OP/ED): Mod: SE | Performed by: EMERGENCY MEDICINE

## 2024-05-15 PROCEDURE — 71260 CT THORAX DX C+: CPT | Performed by: RADIOLOGY

## 2024-05-15 PROCEDURE — 93005 ELECTROCARDIOGRAM TRACING: CPT

## 2024-05-15 PROCEDURE — 80053 COMPREHEN METABOLIC PANEL: CPT | Performed by: EMERGENCY MEDICINE

## 2024-05-15 PROCEDURE — 84484 ASSAY OF TROPONIN QUANT: CPT | Performed by: EMERGENCY MEDICINE

## 2024-05-15 PROCEDURE — 96375 TX/PRO/DX INJ NEW DRUG ADDON: CPT | Mod: 59

## 2024-05-15 PROCEDURE — 74177 CT ABD & PELVIS W/CONTRAST: CPT

## 2024-05-15 PROCEDURE — 96374 THER/PROPH/DIAG INJ IV PUSH: CPT | Mod: 59

## 2024-05-15 PROCEDURE — 83735 ASSAY OF MAGNESIUM: CPT | Performed by: EMERGENCY MEDICINE

## 2024-05-15 PROCEDURE — 99285 EMERGENCY DEPT VISIT HI MDM: CPT | Mod: 25

## 2024-05-15 PROCEDURE — 2500000005 HC RX 250 GENERAL PHARMACY W/O HCPCS: Mod: SE | Performed by: EMERGENCY MEDICINE

## 2024-05-15 PROCEDURE — 74177 CT ABD & PELVIS W/CONTRAST: CPT | Performed by: RADIOLOGY

## 2024-05-15 PROCEDURE — 96361 HYDRATE IV INFUSION ADD-ON: CPT

## 2024-05-15 PROCEDURE — 2550000001 HC RX 255 CONTRASTS: Mod: SE | Performed by: EMERGENCY MEDICINE

## 2024-05-15 RX ORDER — HYDROMORPHONE HYDROCHLORIDE 1 MG/ML
INJECTION, SOLUTION INTRAMUSCULAR; INTRAVENOUS; SUBCUTANEOUS
Status: COMPLETED
Start: 2024-05-15 | End: 2024-05-15

## 2024-05-15 RX ORDER — ONDANSETRON HYDROCHLORIDE 2 MG/ML
INJECTION, SOLUTION INTRAVENOUS
Status: COMPLETED
Start: 2024-05-15 | End: 2024-05-15

## 2024-05-15 RX ORDER — DIPHENHYDRAMINE HYDROCHLORIDE 50 MG/ML
50 INJECTION INTRAMUSCULAR; INTRAVENOUS ONCE
Status: COMPLETED | OUTPATIENT
Start: 2024-05-15 | End: 2024-05-15

## 2024-05-15 RX ORDER — DIPHENHYDRAMINE HYDROCHLORIDE 50 MG/ML
INJECTION INTRAMUSCULAR; INTRAVENOUS
Status: COMPLETED
Start: 2024-05-15 | End: 2024-05-15

## 2024-05-15 RX ORDER — ONDANSETRON HYDROCHLORIDE 2 MG/ML
4 INJECTION, SOLUTION INTRAVENOUS ONCE
Status: COMPLETED | OUTPATIENT
Start: 2024-05-15 | End: 2024-05-15

## 2024-05-15 RX ORDER — ONDANSETRON 4 MG/1
4 TABLET, ORALLY DISINTEGRATING ORAL ONCE
Status: COMPLETED | OUTPATIENT
Start: 2024-05-15 | End: 2024-05-15

## 2024-05-15 RX ORDER — POTASSIUM CHLORIDE 750 MG/1
10 TABLET, FILM COATED, EXTENDED RELEASE ORAL 2 TIMES DAILY
Qty: 10 TABLET | Refills: 0 | Status: SHIPPED | OUTPATIENT
Start: 2024-05-15 | End: 2024-05-20

## 2024-05-15 RX ORDER — HYDROMORPHONE HYDROCHLORIDE 1 MG/ML
1 INJECTION, SOLUTION INTRAMUSCULAR; INTRAVENOUS; SUBCUTANEOUS ONCE
Status: COMPLETED | OUTPATIENT
Start: 2024-05-15 | End: 2024-05-15

## 2024-05-15 RX ADMIN — DIPHENHYDRAMINE HYDROCHLORIDE 50 MG: 50 INJECTION, SOLUTION INTRAMUSCULAR; INTRAVENOUS at 00:13

## 2024-05-15 RX ADMIN — DIPHENHYDRAMINE HYDROCHLORIDE 50 MG: 50 INJECTION INTRAMUSCULAR; INTRAVENOUS at 07:12

## 2024-05-15 RX ADMIN — DIPHENHYDRAMINE HYDROCHLORIDE 50 MG: 50 INJECTION INTRAMUSCULAR; INTRAVENOUS at 00:13

## 2024-05-15 RX ADMIN — SODIUM CHLORIDE 1000 ML: 9 INJECTION, SOLUTION INTRAVENOUS at 07:13

## 2024-05-15 RX ADMIN — ONDANSETRON 4 MG: 2 INJECTION INTRAMUSCULAR; INTRAVENOUS at 07:12

## 2024-05-15 RX ADMIN — HYDROMORPHONE HYDROCHLORIDE 1 MG: 1 INJECTION, SOLUTION INTRAMUSCULAR; INTRAVENOUS; SUBCUTANEOUS at 07:12

## 2024-05-15 RX ADMIN — DIPHENHYDRAMINE HYDROCHLORIDE 50 MG: 50 INJECTION, SOLUTION INTRAMUSCULAR; INTRAVENOUS at 07:12

## 2024-05-15 RX ADMIN — IOHEXOL 75 ML: 350 INJECTION, SOLUTION INTRAVENOUS at 07:31

## 2024-05-15 RX ADMIN — ONDANSETRON 4 MG: 4 TABLET, ORALLY DISINTEGRATING ORAL at 09:58

## 2024-05-15 RX ADMIN — ONDANSETRON HYDROCHLORIDE 4 MG: 2 INJECTION, SOLUTION INTRAVENOUS at 07:12

## 2024-05-15 ASSESSMENT — HEART SCORE
TROPONIN: LESS THAN OR EQUAL TO NORMAL LIMIT
AGE: <45
ECG: NON-SPECIFIC REPOLARIZATION DISTURBANCE
HISTORY: SLIGHTLY SUSPICIOUS
RISK FACTORS: 1-2 RISK FACTORS
HEART SCORE: 2

## 2024-05-15 ASSESSMENT — PAIN SCALES - GENERAL
PAINLEVEL_OUTOF10: 10 - WORST POSSIBLE PAIN
PAINLEVEL_OUTOF10: 10 - WORST POSSIBLE PAIN
PAINLEVEL_OUTOF10: 5 - MODERATE PAIN
PAINLEVEL_OUTOF10: 4

## 2024-05-15 ASSESSMENT — PAIN - FUNCTIONAL ASSESSMENT: PAIN_FUNCTIONAL_ASSESSMENT: 0-10

## 2024-05-15 ASSESSMENT — COLUMBIA-SUICIDE SEVERITY RATING SCALE - C-SSRS
6. HAVE YOU EVER DONE ANYTHING, STARTED TO DO ANYTHING, OR PREPARED TO DO ANYTHING TO END YOUR LIFE?: NO
2. HAVE YOU ACTUALLY HAD ANY THOUGHTS OF KILLING YOURSELF?: NO
1. IN THE PAST MONTH, HAVE YOU WISHED YOU WERE DEAD OR WISHED YOU COULD GO TO SLEEP AND NOT WAKE UP?: NO

## 2024-05-15 NOTE — ED PROVIDER NOTES
"HPI   Chief Complaint   Patient presents with    Vomiting     Pt to ED after worsening symptoms since being discharged  for \"sickle cell crisis\" including vomiting         History provided by:  Patient, medical records and EMS personnel   used: No         This patient presents to the emergency department via ambulance for evaluation of chest, abdomen, flank pain and vomiting.  Patient has a history of sickle cell disease and had been having flareup of her painful crisis for the past 2 or 3 days.  She was seen in the emergency department yesterday and treated with IV fluids and analgesics.  Patient says she was fine when she went home and went to bed but awakened with recurrence of pain and now vomiting.  No fever, cough, congestion, URI symptoms.  Denies any fall or injury.    On last visit patient had negative pregnancy test, normal EKGs, normal troponin and normal D-dimer.  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               [place] Coma Scale Score: 15   HEART Score: 2                   Patient History   Past Medical History:   Diagnosis Date    Sickle cell anemia with crisis (Multi)      Past Surgical History:   Procedure Laterality Date    CT ABDOMEN PELVIS ANGIOGRAM W AND/OR WO IV CONTRAST  4/9/2020    CT ABDOMEN PELVIS ANGIOGRAM W AND/OR WO IV CONTRAST 4/9/2020 [facility]    CT ANGIO NECK  9/27/2019    CT NECK ANGIO W AND WO IV CONTRAST 9/27/2019 [facility]    IR CVC TUNNELED  8/24/2020    IR CVC TUNNELED 8/24/2020 [facility]GAC[facility]    OTHER SURGICAL HISTORY  09/22/2020    Venous access port placement    OTHER SURGICAL HISTORY  09/22/2020    Tonsillectomy with adenoidectomy    OTHER SURGICAL HISTORY  09/22/2020    Knee surgery     No family history on file.  Social History     Tobacco Use    Smoking status: Never    Smokeless tobacco: Never   Vaping Use    Vaping status: Never Used   Substance Use Topics    Alcohol use: " Never    Drug use: Never       Physical Exam   ED Triage Vitals [05/15/24 0700]   Temperature Heart Rate Respirations BP   37.1 °C (98.7 °F) 86 20 (!) 145/99      SpO2 Temp Source Heart Rate Source Patient Position   100 % Oral -- --      BP Location FiO2 (%)     -- --       Physical Exam  Vitals reviewed.   Constitutional:       Appearance: She is obese.   HENT:      Head: Normocephalic and atraumatic.      Mouth/Throat:      Mouth: Mucous membranes are moist.   Cardiovascular:      Rate and Rhythm: Normal rate and regular rhythm.      Pulses: Normal pulses.      Heart sounds: Normal heart sounds.   Pulmonary:      Effort: Pulmonary effort is normal.      Breath sounds: Normal breath sounds.   Abdominal:      Palpations: Abdomen is soft.      Tenderness: There is abdominal tenderness. There is no guarding or rebound.      Comments: LUQ   Musculoskeletal:         General: Normal range of motion.      Cervical back: Normal range of motion.   Skin:     General: Skin is warm and dry.      Capillary Refill: Capillary refill takes less than 2 seconds.   Neurological:      General: No focal deficit present.      Mental Status: She is alert and oriented to person, place, and time.   Psychiatric:         Mood and Affect: Mood normal.     EKG  0710 --twelve-lead EKG was obtained and read by me. This demonstrates normal sinus rhythm with a rate of 84, QTc 465, left axis deviation, but no ectopy, no ischemia, no pericarditis. There was no change compared to most recent prior EKG. 5/14/24    ED Course & MDM   Diagnoses as of 05/15/24 0733   Nausea and vomiting, unspecified vomiting type   Left upper quadrant abdominal pain   Chest pain at rest   Sickle cell disease with crisis (Multi)       Medical Decision Making  Patient presents emergency department the above history and physical.  Patient had IV access established and was hydrated with normal saline.  She was treated with Benadryl, Dilaudid, Zofran for her pain and  vomiting.  Due to her recurrent symptoms and now the addition of abdominal pain and vomiting CT imaging of chest abdomen pelvis obtained to evaluate for acute surgical or infectious process.  Case will be signed out to Dr. Ho the dayshift physician to follow-up on test results, response to therapy and make final disposition.    Procedure  Procedures    Diagnoses as of 05/15/24 0715   Nausea and vomiting, unspecified vomiting type   Left upper quadrant abdominal pain   Chest pain at rest   Sickle cell disease with crisis (Multi)        Lenore Duran MD  05/15/24 8160

## 2024-05-15 NOTE — PROGRESS NOTES
"Emergency Medicine Transition of Care Note.    I received Taylor Hampton in signout from Dr. Duran.  Please see the previous ED provider note for all HPI, PE and MDM up to the time of signout at 07:00. This is in addition to the primary record.    In brief Taylor Hampton is an 28 y.o. female presenting for chest pain and abdominal pain.  She has received  Benadryl Dilaudid and Zofran.    Chief Complaint   Patient presents with    Vomiting     Pt to ED after worsening symptoms since being discharged  for \"sickle cell crisis\" including vomiting     At the time of signout we were awaiting:       CT chest abdomen pelvis are pending.  Reevaluate after imaging and pain medications.    CT chest abdomen pelvis w IV contrast   Final Result   CHEST:   1.  Unremarkable exam. No acute thoracic findings.        ABDOMEN-PELVIS:   1.  Enlarged spleen. Otherwise unremarkable exam. No acute findings             MACRO:   None        Signed by: Joseph Schoenberger 5/15/2024 8:11 AM   Dictation workstation:   [id number]          09:46 -laboratory studies unremarkable.  Troponin negative x 2.  Doubt acute coronary syndrome.  CT chest abdomen pelvis shows no acute findings in the chest.  Enlarged spleen otherwise unremarkable CT abdomen/pelvis.  No bowel obstruction.    On repeat evaluation, abdominal pain has improved.  Nausea has improved.  Chest pain has improved.  She is tolerating oral intake.  She had mild ongoing persistent nausea.  She was given Zofran ODT.  Reviewed OARRS.  She has outpatient oral Dilaudid 84 tablets/month.  She was advised to continue her Dilaudid as needed for pain.  She was referred to her primary care physician and advised to follow-up with her hematologist in the office.  She was advised to return in 12 hours or sooner with increasing or worsening abdominal pain chest pain persistent vomiting or other concerns.  Patient agreeable with plan and verbalized understanding.  Discharged home.      ED Course as " of 05/15/24 0945   Wed May 15, 2024   0824 ECG 12 lead  EKG shows sinus rhythm with rate of 94.  Borderline prolonged QT.  No acute ST-T changes.  No acute injury pattern.  Unchanged from prior. [BT]      ED Course User Index  [BT] Andi Vela DO         Diagnoses as of 05/15/24 0945   Nausea and vomiting, unspecified vomiting type   Left upper quadrant abdominal pain   Chest pain at rest   Sickle cell disease with crisis (Multi)       Medical Decision Making      Final diagnoses:   [R11.2] Nausea and vomiting, unspecified vomiting type   [R10.12] Left upper quadrant abdominal pain   [R07.9] Chest pain at rest   [D57.00] Sickle cell disease with crisis (Multi)           Procedure  Procedures    Andi Vela DO

## 2024-05-15 NOTE — Clinical Note
Taylor Hampton was seen and treated in our emergency department on 5/15/2024.  She may return to work on 05/16/2024.       If you have any questions or concerns, please don't hesitate to call.      Andi Vela, DO

## 2024-05-15 NOTE — ED PROVIDER NOTES
HPI   Chief Complaint   Patient presents with    Chest Pain         History provided by:  Patient, medical records and EMS personnel   used: No         This patient presents to the emergency department via ambulance for evaluation of left-sided chest pain.  Patient states she has had left-sided chest pain for the past 2 days.  Is sharp in nature and worse with deep breathing it does radiate some into her back.  Patient states is the same pain she always gets with her sickle cell episodes.  She has previous history of pulmonary embolism but has been compliant on her Xarelto.  She denies any recent fall or injury.  No abdominal pain, or vomiting, but she has had some nausea.  No swelling of feet or legs.  No trauma.  No recent cough, congestion, URI symptoms.  No rash.  No redness or swelling of joints.  She says she has taken all of the pain medication that is prescribed for this at home but is still unable to get comfortable.  Pain has been constant for the last 2 days.               [place] Coma Scale Score: 15                     Patient History   Past Medical History:   Diagnosis Date    Sickle cell anemia with crisis (Multi)      Past Surgical History:   Procedure Laterality Date    CT ABDOMEN PELVIS ANGIOGRAM W AND/OR WO IV CONTRAST  4/9/2020    CT ABDOMEN PELVIS ANGIOGRAM W AND/OR WO IV CONTRAST 4/9/2020 [facility]    CT ANGIO NECK  9/27/2019    CT NECK ANGIO W AND WO IV CONTRAST 9/27/2019 [facility]    IR CVC TUNNELED  8/24/2020    IR CVC TUNNELED 8/24/2020 [facility] CL[facility]ICAL [facility]GAC[facility]    OTHER SURGICAL HISTORY  09/22/2020    Venous access port placement    OTHER SURGICAL HISTORY  09/22/2020    Tonsillectomy with adenoidectomy    OTHER SURGICAL HISTORY  09/22/2020    Knee surgery     No family history on file.  Social History     Tobacco Use    Smoking status: Never    Smokeless tobacco: Never   Vaping Use    Vaping status: Never Used   Substance Use Topics    Alcohol use:  Never    Drug use: Never       Physical Exam   ED Triage Vitals [05/14/24 2217]   Temperature Heart Rate Respirations BP   36.1 °C (96.9 °F) 96 19 139/89      SpO2 Temp src Heart Rate Source Patient Position   100 % -- -- --      BP Location FiO2 (%)     -- --       Physical Exam  Vitals reviewed.   Constitutional:       Appearance: She is obese.   HENT:      Head: Normocephalic and atraumatic.   Eyes:      Extraocular Movements: Extraocular movements intact.      Pupils: Pupils are equal, round, and reactive to light.   Cardiovascular:      Rate and Rhythm: Normal rate and regular rhythm.      Pulses:           Carotid pulses are 2+ on the right side and 2+ on the left side.       Radial pulses are 2+ on the right side and 2+ on the left side.        Dorsalis pedis pulses are 2+ on the right side and 2+ on the left side.        Posterior tibial pulses are 2+ on the right side and 2+ on the left side.      Heart sounds: Normal heart sounds.   Pulmonary:      Effort: Pulmonary effort is normal.      Breath sounds: Normal breath sounds.   Chest:      Chest wall: Tenderness present.   Abdominal:      General: Bowel sounds are normal.      Palpations: Abdomen is soft.   Musculoskeletal:         General: Normal range of motion.      Cervical back: Normal range of motion and neck supple.      Right lower leg: No edema.      Left lower leg: No edema.   Skin:     General: Skin is warm and dry.   Neurological:      General: No focal deficit present.      Mental Status: She is alert.   Psychiatric:         Mood and Affect: Mood normal.     EKG  2220 -- Twelve-lead EKG was obtained and read by me. This demonstrates NSR rate 96, normal axes, normal intervals, no ectopy, inferior T inversion without ST changes. Compared to most recent prior EKG. 3/14/24, Qtc is normal.  2349 -- Twelve-lead EKG was obtained and read by me. This demonstrates NSR at 95, no ectopy, no ischemia, no pericarditis. Inferior T inversions without ST  changes. There was no change compared to most recent prior EKG. Arrival    Labs Reviewed   CBC WITH AUTO DIFFERENTIAL - Abnormal       Result Value    WBC 11.3      nRBC 0.0      RBC 4.98      Hemoglobin 11.1 (*)     Hematocrit 32.7 (*)     MCV 66 (*)     MCH 22.3 (*)     MCHC 33.9      RDW 18.3 (*)     Platelets 355      Neutrophils % 39.0      Immature Granulocytes %, Automated 0.3      Lymphocytes % 52.7      Monocytes % 5.1      Eosinophils % 2.7      Basophils % 0.2      Neutrophils Absolute 4.42      Immature Granulocytes Absolute, Automated 0.03      Lymphocytes Absolute 5.98 (*)     Monocytes Absolute 0.58      Eosinophils Absolute 0.31      Basophils Absolute 0.02     COMPREHENSIVE METABOLIC PANEL - Abnormal    Glucose 121 (*)     Sodium 138      Potassium 3.1 (*)     Chloride 103      Bicarbonate 28      Anion Gap 10      Urea Nitrogen 6      Creatinine 0.90      eGFR 89      Calcium 8.8      Albumin 4.1      Alkaline Phosphatase 108      Total Protein 8.0      AST 16      Bilirubin, Total 1.1      ALT 8     RETICULOCYTES - Abnormal    Retic % 1.6      Retic Absolute 0.082      Reticulocyte Hemoglobin 24 (*)     Immature Retic fraction 19.8 (*)    MAGNESIUM - Normal    Magnesium 1.74     HUMAN CHORIONIC GONADOTROPIN, SERUM QUANTITATIVE - Normal    HCG, Beta-Quantitative <2      Narrative:      Total HCG measurement is performed using the [employer] Access   Immunoassay which detects intact HCG and free beta HCG subunit.    This test is not indicated for use as a tumor marker.   HCG testing is performed using a different test methodology at [facility] than other [facility]. Direct result comparison   should only be made within the same method.       D-DIMER, VTE EXCLUSION - Normal    D-Dimer, Quantitative VTE Exclusion 497      Narrative:     The VTE Exclusion D-Dimer assay is reported in ng/mL Fibrinogen Equivalent Units (FEU).    Per 's instructions for use, a value  of less than 500 ng/mL (FEU) may help to exclude DVT or PE in outpatients when the assay is used with a clinical pretest probability assessment.([facility] must utilize and document eCalc 'Wells Score Deep Vein Thrombosis Risk' for DVT exclusion only. Emergency Department should utilize  Guidelines for Emergency Department Use of the VTE Exclusion D-Dimer and Clinical Pretest probability assessment model for DVT or PE exclusion.)   SERIAL TROPONIN-INITIAL - Normal    Troponin I, High Sensitivity 3      Narrative:     Less than 99th percentile of normal range cutoff-  Female and children under 18 years old <14 ng/L; Male <21 ng/L: Negative  Repeat testing should be performed if clinically indicated.     Female and children under 18 years old 14-50 ng/L; Male 21-50 ng/L:  Consistent with possible cardiac damage and possible increased clinical   risk. Serial measurements may help to assess extent of myocardial damage.     >50 ng/L: Consistent with cardiac damage, increased clinical risk and  myocardial infarction. Serial measurements may help assess extent of   myocardial damage.      NOTE: Children less than 1 year old may have higher baseline troponin   levels and results should be interpreted in conjunction with the overall   clinical context.     NOTE: Troponin I testing is performed using a different   testing methodology at [facility] than at other   [facility]. Direct result comparisons should only   be made within the same method.   SERIAL TROPONIN, 1 HOUR - Normal    Troponin I, High Sensitivity 3      Narrative:     Less than 99th percentile of normal range cutoff-  Female and children under 18 years old <14 ng/L; Male <21 ng/L: Negative  Repeat testing should be performed if clinically indicated.     Female and children under 18 years old 14-50 ng/L; Male 21-50 ng/L:  Consistent with possible cardiac damage and possible increased clinical   risk. Serial measurements may help to assess extent  of myocardial damage.     >50 ng/L: Consistent with cardiac damage, increased clinical risk and  myocardial infarction. Serial measurements may help assess extent of   myocardial damage.      NOTE: Children less than 1 year old may have higher baseline troponin   levels and results should be interpreted in conjunction with the overall   clinical context.     NOTE: Troponin I testing is performed using a different   testing methodology at [facility] than at other   [facility]. Direct result comparisons should only   be made within the same method.   GREEN TOP    Extra Tube Hold for add-ons.     GRAY TOP    Extra Tube Hold for add-ons.     TROPONIN SERIES- (INITIAL, 1 HR)    Narrative:     The following orders were created for panel order Troponin I Series, High Sensitivity (0, 1 HR).  Procedure                               Abnormality         Status                     ---------                               -----------         ------                     Troponin I, High Sensiti...[[id number]]  Normal              Final result               Troponin, High Sensitivi...[[id number]]  Normal              Final result                 Please view results for these tests on the individual orders.     XR chest 1 view   Final Result   No acute cardiopulmonary process.             MACRO:   None.        Signed by: Frandy Berumen 5/15/2024 12:04 AM   Dictation workstation:   [id number]        ED Medication Administration from 05/14/2024 2212 to 05/15/2024 0023         Date/Time Order Dose Route Action Action by     05/14/2024 2231 EDT ondansetron (Zofran) injection 4 mg 4 mg intravenous Given STANLEY Ferreira     05/14/2024 2232 EDT HYDROmorphone (Dilaudid) injection 1 mg 1 mg intravenous Given STANLEY Ferreira     05/14/2024 2233 EDT sodium chloride 0.9 % bolus 1,000 mL 1,000 mL intravenous New Bag STANLEY Ferreira     05/14/2024 2339 EDT potassium chloride CR (Klor-Con M20) ER tablet 40 mEq 40 mEq oral Given STANLEY Ferreira      05/14/2024 2340 EDT HYDROmorphone (Dilaudid) injection 1 mg 1 mg intravenous Given STANLEY Ferreira     05/15/2024 0013 EDT diphenhydrAMINE (BENADryl) injection 50 mg 50 mg intravenous Given STANLEY Ferreira          Compared labs to [facility] from 5/3/24 WBC 12.5 Hgb 10.8, hematocrit 30.9,  retic 1.9    ED Course & MDM   ED Course as of 05/15/24 0026   Tue May 14, 2024   2301 D-Dimer, Quantitative VTE Exclusion: 497 [MN]   2301 Troponin I, High Sensitivity: 3 [MN]   2318 POTASSIUM(!): 3.1  Replacement ordered [MN]   2321 Patient reassessed [MN]   Wed May 15, 2024   0017 Patient reassessed [MN]      ED Course User Index  [MN] Lenore Duran MD         Diagnoses as of 05/15/24 0026   Sickle cell pain crisis (Multi)   Hypokalemia       Medical Decision Making  Patient presents emergency department with the above history and physical.  No signs of sepsis, dehydration, hemodynamically significant arrhythmia or coronary ischemia.  EKG and troponin negative x 2.  D-dimer is normal.  Patient feels pain is identical to her usual sickle cell flares; therefore given IV Zofran, IV Dilaudid and IV Benadryl as per her usual treatment plan.    Hypokalemia 3.1 is identified.  Oral correction provided.  Patient states that she is always got a low potassium; therefore prescription for potassium provided.  Patient's hemoglobin is stable as is her reticulocyte count as compared to most recent prior outpatient values from [place] earlier this month.    Patient presented to the emergency department with complaint of chest pain. Evaluation in the emergency department included consideration for STEMI,  acute coronary syndrome, pneumonia, pneumothorax, perforated ulcer, esophageal rupture, venous thromboembolism, acute abdomen. At this time, there is no evidence of acute life-threatening etiology for their symptoms.      Results of exam and any testing were discussed with patient/family. To the best of my ability, I answered all questions. At this  time, there is no indication for admission/transfer or further diagnostic testing. Patient understands to return for any new or worsening symptoms, or failure to improve as anticipated. The importance of follow-up was stressed.    Procedure  Procedures    Diagnoses as of 05/15/24 0026   Sickle cell pain crisis (Multi)   Hypokalemia        Lenore Duran MD  05/15/24 0307

## 2024-05-17 LAB
ATRIAL RATE: 84 BPM
ATRIAL RATE: 94 BPM
ATRIAL RATE: 95 BPM
ATRIAL RATE: 96 BPM
P AXIS: 45 DEGREES
P AXIS: 51 DEGREES
P AXIS: 58 DEGREES
P AXIS: 70 DEGREES
P OFFSET: 187 MS
P OFFSET: 194 MS
P OFFSET: 199 MS
P OFFSET: 202 MS
P ONSET: 132 MS
P ONSET: 139 MS
P ONSET: 148 MS
P ONSET: 150 MS
PR INTERVAL: 136 MS
PR INTERVAL: 138 MS
PR INTERVAL: 156 MS
PR INTERVAL: 170 MS
Q ONSET: 217 MS
Q ONSET: 218 MS
QRS COUNT: 13 BEATS
QRS COUNT: 16 BEATS
QRS DURATION: 86 MS
QRS DURATION: 86 MS
QRS DURATION: 88 MS
QRS DURATION: 96 MS
QT INTERVAL: 372 MS
QT INTERVAL: 382 MS
QT INTERVAL: 384 MS
QT INTERVAL: 394 MS
QTC CALCULATION(BAZETT): 465 MS
QTC CALCULATION(BAZETT): 467 MS
QTC CALCULATION(BAZETT): 480 MS
QTC CALCULATION(BAZETT): 482 MS
QTC FREDERICIA: 433 MS
QTC FREDERICIA: 440 MS
QTC FREDERICIA: 446 MS
QTC FREDERICIA: 446 MS
R AXIS: 30 DEGREES
R AXIS: 39 DEGREES
R AXIS: 40 DEGREES
R AXIS: 49 DEGREES
T AXIS: -14 DEGREES
T AXIS: -20 DEGREES
T AXIS: 18 DEGREES
T AXIS: 25 DEGREES
T OFFSET: 403 MS
T OFFSET: 409 MS
T OFFSET: 409 MS
T OFFSET: 414 MS
VENTRICULAR RATE: 84 BPM
VENTRICULAR RATE: 94 BPM
VENTRICULAR RATE: 95 BPM
VENTRICULAR RATE: 96 BPM

## 2024-06-08 ENCOUNTER — APPOINTMENT (OUTPATIENT)
Dept: RADIOLOGY | Facility: HOSPITAL | Age: 29
DRG: 417 | End: 2024-06-08
Payer: MEDICARE

## 2024-06-08 ENCOUNTER — HOSPITAL ENCOUNTER (INPATIENT)
Facility: HOSPITAL | Age: 29
DRG: 417 | End: 2024-06-08
Attending: EMERGENCY MEDICINE | Admitting: SURGERY
Payer: MEDICARE

## 2024-06-08 ENCOUNTER — CLINICAL SUPPORT (OUTPATIENT)
Dept: EMERGENCY MEDICINE | Facility: HOSPITAL | Age: 29
DRG: 417 | End: 2024-06-08
Payer: MEDICARE

## 2024-06-08 DIAGNOSIS — K81.0 ACUTE CHOLECYSTITIS: ICD-10-CM

## 2024-06-08 DIAGNOSIS — Z98.890 POST-OPERATIVE STATE: ICD-10-CM

## 2024-06-08 DIAGNOSIS — D57.00 SICKLE CELL DISEASE WITH CRISIS (MULTI): ICD-10-CM

## 2024-06-08 DIAGNOSIS — K80.20 SYMPTOMATIC CHOLELITHIASIS: Primary | ICD-10-CM

## 2024-06-08 LAB
ALBUMIN SERPL BCP-MCNC: 3.7 G/DL (ref 3.4–5)
ALP SERPL-CCNC: 76 U/L (ref 33–110)
ALT SERPL W P-5'-P-CCNC: 4 U/L (ref 7–45)
ANION GAP SERPL CALC-SCNC: 13 MMOL/L (ref 10–20)
APPEARANCE UR: CLEAR
AST SERPL W P-5'-P-CCNC: 10 U/L (ref 9–39)
ATRIAL RATE: 55 BPM
BASOPHILS # BLD AUTO: 0.01 X10*3/UL (ref 0–0.1)
BASOPHILS NFR BLD AUTO: 0.1 %
BILIRUB SERPL-MCNC: 2.7 MG/DL (ref 0–1.2)
BILIRUB UR STRIP.AUTO-MCNC: NEGATIVE MG/DL
BUN SERPL-MCNC: 11 MG/DL (ref 6–23)
CALCIUM SERPL-MCNC: 9 MG/DL (ref 8.6–10.6)
CHLORIDE SERPL-SCNC: 105 MMOL/L (ref 98–107)
CO2 SERPL-SCNC: 24 MMOL/L (ref 21–32)
COLOR UR: NORMAL
CREAT SERPL-MCNC: 0.86 MG/DL (ref 0.5–1.05)
EGFRCR SERPLBLD CKD-EPI 2021: >90 ML/MIN/1.73M*2
EOSINOPHIL # BLD AUTO: 0.02 X10*3/UL (ref 0–0.7)
EOSINOPHIL NFR BLD AUTO: 0.2 %
ERYTHROCYTE [DISTWIDTH] IN BLOOD BY AUTOMATED COUNT: 21 % (ref 11.5–14.5)
FLUAV RNA RESP QL NAA+PROBE: NOT DETECTED
FLUBV RNA RESP QL NAA+PROBE: NOT DETECTED
GLUCOSE SERPL-MCNC: 105 MG/DL (ref 74–99)
GLUCOSE UR STRIP.AUTO-MCNC: NORMAL MG/DL
HCT VFR BLD AUTO: 28.3 % (ref 36–46)
HGB BLD-MCNC: 10.1 G/DL (ref 12–16)
HGB RETIC QN: 26 PG (ref 28–38)
HOLD SPECIMEN: NORMAL
IMM GRANULOCYTES # BLD AUTO: 0.06 X10*3/UL (ref 0–0.7)
IMM GRANULOCYTES NFR BLD AUTO: 0.5 % (ref 0–0.9)
IMMATURE RETIC FRACTION: 31.4 %
INR PPP: 1.3 (ref 0.9–1.1)
KETONES UR STRIP.AUTO-MCNC: NEGATIVE MG/DL
LACTATE SERPL-SCNC: 0.9 MMOL/L (ref 0.4–2)
LEUKOCYTE ESTERASE UR QL STRIP.AUTO: NEGATIVE
LIPASE SERPL-CCNC: 27 U/L (ref 9–82)
LYMPHOCYTES # BLD AUTO: 2.49 X10*3/UL (ref 1.2–4.8)
LYMPHOCYTES NFR BLD AUTO: 22.1 %
MCH RBC QN AUTO: 22.2 PG (ref 26–34)
MCHC RBC AUTO-ENTMCNC: 35.7 G/DL (ref 32–36)
MCV RBC AUTO: 62 FL (ref 80–100)
MONOCYTES # BLD AUTO: 0.44 X10*3/UL (ref 0.1–1)
MONOCYTES NFR BLD AUTO: 3.9 %
NEUTROPHILS # BLD AUTO: 8.23 X10*3/UL (ref 1.2–7.7)
NEUTROPHILS NFR BLD AUTO: 73.2 %
NITRITE UR QL STRIP.AUTO: NEGATIVE
NRBC BLD-RTO: 0.2 /100 WBCS (ref 0–0)
P AXIS: 41 DEGREES
P OFFSET: 195 MS
P ONSET: 145 MS
PH UR STRIP.AUTO: 7 [PH]
PLATELET # BLD AUTO: 255 X10*3/UL (ref 150–450)
POLYCHROMASIA BLD QL SMEAR: NORMAL
POTASSIUM SERPL-SCNC: 3.6 MMOL/L (ref 3.5–5.3)
PR INTERVAL: 144 MS
PREGNANCY TEST URINE, POC: NEGATIVE
PROT SERPL-MCNC: 7.3 G/DL (ref 6.4–8.2)
PROT UR STRIP.AUTO-MCNC: NEGATIVE MG/DL
PROTHROMBIN TIME: 14.2 SECONDS (ref 9.8–12.8)
Q ONSET: 217 MS
QRS COUNT: 9 BEATS
QRS DURATION: 92 MS
QT INTERVAL: 450 MS
QTC CALCULATION(BAZETT): 430 MS
QTC FREDERICIA: 437 MS
R AXIS: 21 DEGREES
RBC # BLD AUTO: 4.55 X10*6/UL (ref 4–5.2)
RBC # UR STRIP.AUTO: NEGATIVE /UL
RBC MORPH BLD: NORMAL
RETICS #: 0.19 X10*6/UL (ref 0.02–0.08)
RETICS/RBC NFR AUTO: 4.2 % (ref 0.5–2)
SARS-COV-2 RNA RESP QL NAA+PROBE: NOT DETECTED
SODIUM SERPL-SCNC: 138 MMOL/L (ref 136–145)
SP GR UR STRIP.AUTO: 1.01
T AXIS: 21 DEGREES
T OFFSET: 442 MS
TARGETS BLD QL SMEAR: NORMAL
UROBILINOGEN UR STRIP.AUTO-MCNC: NORMAL MG/DL
VENTRICULAR RATE: 55 BPM
WBC # BLD AUTO: 11.3 X10*3/UL (ref 4.4–11.3)

## 2024-06-08 PROCEDURE — 1100000001 HC PRIVATE ROOM DAILY

## 2024-06-08 PROCEDURE — 93005 ELECTROCARDIOGRAM TRACING: CPT

## 2024-06-08 PROCEDURE — 87636 SARSCOV2 & INF A&B AMP PRB: CPT | Performed by: EMERGENCY MEDICINE

## 2024-06-08 PROCEDURE — 2550000001 HC RX 255 CONTRASTS: Mod: SE | Performed by: EMERGENCY MEDICINE

## 2024-06-08 PROCEDURE — 71275 CT ANGIOGRAPHY CHEST: CPT

## 2024-06-08 PROCEDURE — 83690 ASSAY OF LIPASE: CPT | Performed by: EMERGENCY MEDICINE

## 2024-06-08 PROCEDURE — 85610 PROTHROMBIN TIME: CPT | Performed by: EMERGENCY MEDICINE

## 2024-06-08 PROCEDURE — 71045 X-RAY EXAM CHEST 1 VIEW: CPT

## 2024-06-08 PROCEDURE — 2500000004 HC RX 250 GENERAL PHARMACY W/ HCPCS (ALT 636 FOR OP/ED)

## 2024-06-08 PROCEDURE — 81025 URINE PREGNANCY TEST: CPT | Performed by: EMERGENCY MEDICINE

## 2024-06-08 PROCEDURE — 76705 ECHO EXAM OF ABDOMEN: CPT

## 2024-06-08 PROCEDURE — 96361 HYDRATE IV INFUSION ADD-ON: CPT

## 2024-06-08 PROCEDURE — 85025 COMPLETE CBC W/AUTO DIFF WBC: CPT | Performed by: EMERGENCY MEDICINE

## 2024-06-08 PROCEDURE — 74177 CT ABD & PELVIS W/CONTRAST: CPT | Performed by: STUDENT IN AN ORGANIZED HEALTH CARE EDUCATION/TRAINING PROGRAM

## 2024-06-08 PROCEDURE — 96375 TX/PRO/DX INJ NEW DRUG ADDON: CPT

## 2024-06-08 PROCEDURE — 80053 COMPREHEN METABOLIC PANEL: CPT | Performed by: EMERGENCY MEDICINE

## 2024-06-08 PROCEDURE — 2500000004 HC RX 250 GENERAL PHARMACY W/ HCPCS (ALT 636 FOR OP/ED): Mod: SE | Performed by: EMERGENCY MEDICINE

## 2024-06-08 PROCEDURE — 71275 CT ANGIOGRAPHY CHEST: CPT | Performed by: RADIOLOGY

## 2024-06-08 PROCEDURE — 81003 URINALYSIS AUTO W/O SCOPE: CPT | Performed by: EMERGENCY MEDICINE

## 2024-06-08 PROCEDURE — 85045 AUTOMATED RETICULOCYTE COUNT: CPT | Performed by: EMERGENCY MEDICINE

## 2024-06-08 PROCEDURE — 74177 CT ABD & PELVIS W/CONTRAST: CPT

## 2024-06-08 PROCEDURE — 76705 ECHO EXAM OF ABDOMEN: CPT | Performed by: STUDENT IN AN ORGANIZED HEALTH CARE EDUCATION/TRAINING PROGRAM

## 2024-06-08 PROCEDURE — 83605 ASSAY OF LACTIC ACID: CPT | Performed by: EMERGENCY MEDICINE

## 2024-06-08 PROCEDURE — 99285 EMERGENCY DEPT VISIT HI MDM: CPT

## 2024-06-08 PROCEDURE — C9113 INJ PANTOPRAZOLE SODIUM, VIA: HCPCS | Mod: SE | Performed by: EMERGENCY MEDICINE

## 2024-06-08 PROCEDURE — 2500000004 HC RX 250 GENERAL PHARMACY W/ HCPCS (ALT 636 FOR OP/ED): Mod: SE

## 2024-06-08 PROCEDURE — 99285 EMERGENCY DEPT VISIT HI MDM: CPT | Performed by: EMERGENCY MEDICINE

## 2024-06-08 PROCEDURE — 96374 THER/PROPH/DIAG INJ IV PUSH: CPT | Mod: MUE

## 2024-06-08 PROCEDURE — 71045 X-RAY EXAM CHEST 1 VIEW: CPT | Performed by: RADIOLOGY

## 2024-06-08 PROCEDURE — 96376 TX/PRO/DX INJ SAME DRUG ADON: CPT

## 2024-06-08 RX ORDER — HYDROXYUREA 500 MG/1
500 CAPSULE ORAL 3 TIMES DAILY
Status: DISCONTINUED | OUTPATIENT
Start: 2024-06-08 | End: 2024-06-22 | Stop reason: HOSPADM

## 2024-06-08 RX ORDER — METHOCARBAMOL 100 MG/ML
1000 INJECTION, SOLUTION INTRAMUSCULAR; INTRAVENOUS EVERY 8 HOURS
Status: DISCONTINUED | OUTPATIENT
Start: 2024-06-08 | End: 2024-06-12

## 2024-06-08 RX ORDER — METOCLOPRAMIDE HYDROCHLORIDE 5 MG/ML
10 INJECTION INTRAMUSCULAR; INTRAVENOUS ONCE
Status: COMPLETED | OUTPATIENT
Start: 2024-06-08 | End: 2024-06-08

## 2024-06-08 RX ORDER — HYDROMORPHONE HYDROCHLORIDE 1 MG/ML
0.4 INJECTION, SOLUTION INTRAMUSCULAR; INTRAVENOUS; SUBCUTANEOUS EVERY 2 HOUR PRN
Status: DISCONTINUED | OUTPATIENT
Start: 2024-06-08 | End: 2024-06-09

## 2024-06-08 RX ORDER — HYDROMORPHONE HYDROCHLORIDE 1 MG/ML
0.2 INJECTION, SOLUTION INTRAMUSCULAR; INTRAVENOUS; SUBCUTANEOUS EVERY 4 HOURS PRN
Status: DISCONTINUED | OUTPATIENT
Start: 2024-06-08 | End: 2024-06-08

## 2024-06-08 RX ORDER — OXYCODONE HYDROCHLORIDE 5 MG/1
5 TABLET ORAL EVERY 6 HOURS PRN
Status: DISCONTINUED | OUTPATIENT
Start: 2024-06-08 | End: 2024-06-08

## 2024-06-08 RX ORDER — HYDROMORPHONE HYDROCHLORIDE 2 MG/1
1-2 TABLET ORAL EVERY 4 HOURS PRN
COMMUNITY

## 2024-06-08 RX ORDER — ONDANSETRON HYDROCHLORIDE 2 MG/ML
INJECTION, SOLUTION INTRAVENOUS
Status: COMPLETED
Start: 2024-06-08 | End: 2024-06-08

## 2024-06-08 RX ORDER — NALOXONE HYDROCHLORIDE 0.4 MG/ML
0.2 INJECTION, SOLUTION INTRAMUSCULAR; INTRAVENOUS; SUBCUTANEOUS EVERY 5 MIN PRN
Status: DISCONTINUED | OUTPATIENT
Start: 2024-06-08 | End: 2024-06-22 | Stop reason: HOSPADM

## 2024-06-08 RX ORDER — HYDROMORPHONE HYDROCHLORIDE 1 MG/ML
1 INJECTION, SOLUTION INTRAMUSCULAR; INTRAVENOUS; SUBCUTANEOUS ONCE
Status: COMPLETED | OUTPATIENT
Start: 2024-06-08 | End: 2024-06-08

## 2024-06-08 RX ORDER — SODIUM CHLORIDE, SODIUM LACTATE, POTASSIUM CHLORIDE, CALCIUM CHLORIDE 600; 310; 30; 20 MG/100ML; MG/100ML; MG/100ML; MG/100ML
125 INJECTION, SOLUTION INTRAVENOUS CONTINUOUS
Status: DISCONTINUED | OUTPATIENT
Start: 2024-06-08 | End: 2024-06-12

## 2024-06-08 RX ORDER — COLCHICINE 0.6 MG/1
0.6 TABLET ORAL
COMMUNITY

## 2024-06-08 RX ORDER — ALBUTEROL SULFATE 0.83 MG/ML
2.5 SOLUTION RESPIRATORY (INHALATION) EVERY 4 HOURS PRN
COMMUNITY

## 2024-06-08 RX ORDER — HYDROMORPHONE HYDROCHLORIDE 1 MG/ML
0.5 INJECTION, SOLUTION INTRAMUSCULAR; INTRAVENOUS; SUBCUTANEOUS ONCE
Status: COMPLETED | OUTPATIENT
Start: 2024-06-08 | End: 2024-06-08

## 2024-06-08 RX ORDER — DULOXETIN HYDROCHLORIDE 60 MG/1
60 CAPSULE, DELAYED RELEASE ORAL DAILY
Status: DISCONTINUED | OUTPATIENT
Start: 2024-06-08 | End: 2024-06-22 | Stop reason: HOSPADM

## 2024-06-08 RX ORDER — PREGABALIN 50 MG/1
50 CAPSULE ORAL NIGHTLY
Status: DISCONTINUED | OUTPATIENT
Start: 2024-06-08 | End: 2024-06-22 | Stop reason: HOSPADM

## 2024-06-08 RX ORDER — ONDANSETRON HYDROCHLORIDE 2 MG/ML
4 INJECTION, SOLUTION INTRAVENOUS ONCE
Status: COMPLETED | OUTPATIENT
Start: 2024-06-08 | End: 2024-06-08

## 2024-06-08 RX ORDER — FOLIC ACID 1 MG/1
1 TABLET ORAL DAILY
Status: DISCONTINUED | OUTPATIENT
Start: 2024-06-08 | End: 2024-06-22 | Stop reason: HOSPADM

## 2024-06-08 RX ORDER — ALBUTEROL SULFATE 0.83 MG/ML
2.5 SOLUTION RESPIRATORY (INHALATION) EVERY 4 HOURS PRN
Status: DISCONTINUED | OUTPATIENT
Start: 2024-06-08 | End: 2024-06-22 | Stop reason: HOSPADM

## 2024-06-08 RX ORDER — PREGABALIN 50 MG/1
50 CAPSULE ORAL NIGHTLY
COMMUNITY

## 2024-06-08 RX ORDER — ONDANSETRON HYDROCHLORIDE 2 MG/ML
4 INJECTION, SOLUTION INTRAVENOUS EVERY 6 HOURS PRN
Status: DISCONTINUED | OUTPATIENT
Start: 2024-06-08 | End: 2024-06-09

## 2024-06-08 RX ORDER — POLYETHYLENE GLYCOL 3350 17 G/17G
17 POWDER, FOR SOLUTION ORAL DAILY PRN
Status: DISCONTINUED | OUTPATIENT
Start: 2024-06-08 | End: 2024-06-12

## 2024-06-08 RX ORDER — NALOXONE HYDROCHLORIDE 4 MG/.1ML
1 SPRAY NASAL AS NEEDED
COMMUNITY
End: 2024-06-22 | Stop reason: HOSPADM

## 2024-06-08 RX ORDER — KETOROLAC TROMETHAMINE 30 MG/ML
30 INJECTION, SOLUTION INTRAMUSCULAR; INTRAVENOUS EVERY 6 HOURS SCHEDULED
Status: DISPENSED | OUTPATIENT
Start: 2024-06-09 | End: 2024-06-12

## 2024-06-08 RX ORDER — POLYETHYLENE GLYCOL 3350 17 G/17G
17 POWDER, FOR SOLUTION ORAL DAILY PRN
COMMUNITY
End: 2024-06-22 | Stop reason: HOSPADM

## 2024-06-08 RX ORDER — KETOROLAC TROMETHAMINE 30 MG/ML
30 INJECTION, SOLUTION INTRAMUSCULAR; INTRAVENOUS EVERY 6 HOURS SCHEDULED
Status: DISCONTINUED | OUTPATIENT
Start: 2024-06-09 | End: 2024-06-08

## 2024-06-08 RX ORDER — PANTOPRAZOLE SODIUM 40 MG/10ML
40 INJECTION, POWDER, LYOPHILIZED, FOR SOLUTION INTRAVENOUS ONCE
Status: COMPLETED | OUTPATIENT
Start: 2024-06-08 | End: 2024-06-08

## 2024-06-08 RX ORDER — HYDROMORPHONE HYDROCHLORIDE 1 MG/ML
0.2 INJECTION, SOLUTION INTRAMUSCULAR; INTRAVENOUS; SUBCUTANEOUS ONCE
Status: COMPLETED | OUTPATIENT
Start: 2024-06-08 | End: 2024-06-08

## 2024-06-08 RX ORDER — BUPRENORPHINE HYDROCHLORIDE AND NALOXONE HYDROCHLORIDE DIHYDRATE 2; .5 MG/1; MG/1
2 TABLET SUBLINGUAL 3 TIMES DAILY
Status: DISCONTINUED | OUTPATIENT
Start: 2024-06-08 | End: 2024-06-12

## 2024-06-08 RX ORDER — ONDANSETRON HYDROCHLORIDE 2 MG/ML
4 INJECTION, SOLUTION INTRAVENOUS EVERY 8 HOURS PRN
Status: DISCONTINUED | OUTPATIENT
Start: 2024-06-08 | End: 2024-06-08

## 2024-06-08 RX ORDER — OXYCODONE HYDROCHLORIDE 5 MG/1
5 TABLET ORAL EVERY 4 HOURS PRN
Status: DISCONTINUED | OUTPATIENT
Start: 2024-06-08 | End: 2024-06-09

## 2024-06-08 RX ORDER — DOCUSATE SODIUM 100 MG/1
100 CAPSULE, LIQUID FILLED ORAL 2 TIMES DAILY
COMMUNITY

## 2024-06-08 RX ORDER — SENNOSIDES 8.6 MG/1
1 TABLET ORAL DAILY PRN
COMMUNITY
End: 2024-06-22 | Stop reason: HOSPADM

## 2024-06-08 RX ORDER — ACETAMINOPHEN 325 MG/1
650 TABLET ORAL EVERY 6 HOURS
Status: DISCONTINUED | OUTPATIENT
Start: 2024-06-08 | End: 2024-06-09

## 2024-06-08 RX ORDER — BUPRENORPHINE HYDROCHLORIDE AND NALOXONE HYDROCHLORIDE DIHYDRATE 2; .5 MG/1; MG/1
1.5 TABLET SUBLINGUAL 3 TIMES DAILY
COMMUNITY

## 2024-06-08 RX ORDER — MUPIROCIN 20 MG/G
OINTMENT TOPICAL 2 TIMES DAILY
Status: ON HOLD | COMMUNITY
End: 2024-06-08 | Stop reason: WASHOUT

## 2024-06-08 RX ORDER — HYDROMORPHONE HYDROCHLORIDE 1 MG/ML
INJECTION, SOLUTION INTRAMUSCULAR; INTRAVENOUS; SUBCUTANEOUS
Status: COMPLETED
Start: 2024-06-08 | End: 2024-06-08

## 2024-06-08 RX ORDER — ACETAMINOPHEN 500 MG
2 TABLET ORAL EVERY 6 HOURS PRN
COMMUNITY

## 2024-06-08 RX ORDER — PREDNISONE 20 MG/1
20 TABLET ORAL
COMMUNITY
End: 2024-06-22 | Stop reason: HOSPADM

## 2024-06-08 RX ORDER — SENNOSIDES 8.6 MG/1
1 TABLET ORAL DAILY PRN
Status: DISCONTINUED | OUTPATIENT
Start: 2024-06-08 | End: 2024-06-12

## 2024-06-08 RX ORDER — OXYCODONE HYDROCHLORIDE 5 MG/1
10 TABLET ORAL EVERY 4 HOURS PRN
Status: DISCONTINUED | OUTPATIENT
Start: 2024-06-08 | End: 2024-06-09

## 2024-06-08 RX ORDER — PREDNISONE 20 MG/1
20 TABLET ORAL
Status: DISCONTINUED | OUTPATIENT
Start: 2024-06-09 | End: 2024-06-22 | Stop reason: HOSPADM

## 2024-06-08 RX ORDER — COLCHICINE 0.6 MG/1
0.6 TABLET ORAL 2 TIMES DAILY
Status: DISCONTINUED | OUTPATIENT
Start: 2024-06-08 | End: 2024-06-22 | Stop reason: HOSPADM

## 2024-06-08 RX ADMIN — PIPERACILLIN SODIUM AND TAZOBACTAM SODIUM 3.38 G: 3; .375 INJECTION, SOLUTION INTRAVENOUS at 14:13

## 2024-06-08 RX ADMIN — HYDROMORPHONE HYDROCHLORIDE 1 MG: 1 INJECTION, SOLUTION INTRAMUSCULAR; INTRAVENOUS; SUBCUTANEOUS at 11:20

## 2024-06-08 RX ADMIN — ONDANSETRON HYDROCHLORIDE 4 MG: 2 INJECTION, SOLUTION INTRAVENOUS at 11:25

## 2024-06-08 RX ADMIN — METHOCARBAMOL 1000 MG: 100 INJECTION INTRAMUSCULAR; INTRAVENOUS at 22:50

## 2024-06-08 RX ADMIN — HYDROMORPHONE HYDROCHLORIDE 0.5 MG: 1 INJECTION, SOLUTION INTRAMUSCULAR; INTRAVENOUS; SUBCUTANEOUS at 15:37

## 2024-06-08 RX ADMIN — HYDROMORPHONE HYDROCHLORIDE 0.2 MG: 1 INJECTION, SOLUTION INTRAMUSCULAR; INTRAVENOUS; SUBCUTANEOUS at 12:55

## 2024-06-08 RX ADMIN — SODIUM CHLORIDE, POTASSIUM CHLORIDE, SODIUM LACTATE AND CALCIUM CHLORIDE 1000 ML: 600; 310; 30; 20 INJECTION, SOLUTION INTRAVENOUS at 10:32

## 2024-06-08 RX ADMIN — SODIUM CHLORIDE, POTASSIUM CHLORIDE, SODIUM LACTATE AND CALCIUM CHLORIDE 125 ML/HR: 600; 310; 30; 20 INJECTION, SOLUTION INTRAVENOUS at 14:13

## 2024-06-08 RX ADMIN — ONDANSETRON 4 MG: 2 INJECTION INTRAMUSCULAR; INTRAVENOUS at 09:11

## 2024-06-08 RX ADMIN — PANTOPRAZOLE SODIUM 40 MG: 40 INJECTION, POWDER, FOR SOLUTION INTRAVENOUS at 10:32

## 2024-06-08 RX ADMIN — ONDANSETRON 4 MG: 2 INJECTION INTRAMUSCULAR; INTRAVENOUS at 11:25

## 2024-06-08 RX ADMIN — IOHEXOL 100 ML: 350 INJECTION, SOLUTION INTRAVENOUS at 11:09

## 2024-06-08 RX ADMIN — ONDANSETRON 4 MG: 2 INJECTION INTRAMUSCULAR; INTRAVENOUS at 17:39

## 2024-06-08 RX ADMIN — HYDROMORPHONE HYDROCHLORIDE 1 MG: 1 INJECTION, SOLUTION INTRAMUSCULAR; INTRAVENOUS; SUBCUTANEOUS at 09:11

## 2024-06-08 RX ADMIN — HYDROMORPHONE HYDROCHLORIDE 0.2 MG: 1 INJECTION, SOLUTION INTRAMUSCULAR; INTRAVENOUS; SUBCUTANEOUS at 20:28

## 2024-06-08 RX ADMIN — METOCLOPRAMIDE 10 MG: 5 INJECTION, SOLUTION INTRAMUSCULAR; INTRAVENOUS at 12:55

## 2024-06-08 SDOH — SOCIAL STABILITY: SOCIAL INSECURITY: DO YOU FEEL UNSAFE GOING BACK TO THE PLACE WHERE YOU ARE LIVING?: NO

## 2024-06-08 SDOH — SOCIAL STABILITY: SOCIAL INSECURITY: ARE YOU OR HAVE YOU BEEN THREATENED OR ABUSED PHYSICALLY, EMOTIONALLY, OR SEXUALLY BY ANYONE?: NO

## 2024-06-08 SDOH — SOCIAL STABILITY: SOCIAL INSECURITY: WERE YOU ABLE TO COMPLETE ALL THE BEHAVIORAL HEALTH SCREENINGS?: YES

## 2024-06-08 SDOH — SOCIAL STABILITY: SOCIAL INSECURITY: ARE THERE ANY APPARENT SIGNS OF INJURIES/BEHAVIORS THAT COULD BE RELATED TO ABUSE/NEGLECT?: NO

## 2024-06-08 SDOH — SOCIAL STABILITY: SOCIAL INSECURITY: DOES ANYONE TRY TO KEEP YOU FROM HAVING/CONTACTING OTHER FRIENDS OR DOING THINGS OUTSIDE YOUR HOME?: NO

## 2024-06-08 SDOH — SOCIAL STABILITY: SOCIAL INSECURITY: HAVE YOU HAD ANY THOUGHTS OF HARMING ANYONE ELSE?: NO

## 2024-06-08 SDOH — SOCIAL STABILITY: SOCIAL INSECURITY: DO YOU FEEL ANYONE HAS EXPLOITED OR TAKEN ADVANTAGE OF YOU FINANCIALLY OR OF YOUR PERSONAL PROPERTY?: NO

## 2024-06-08 SDOH — SOCIAL STABILITY: SOCIAL INSECURITY: HAS ANYONE EVER THREATENED TO HURT YOUR FAMILY OR YOUR PETS?: NO

## 2024-06-08 SDOH — SOCIAL STABILITY: SOCIAL INSECURITY: ABUSE: ADULT

## 2024-06-08 SDOH — SOCIAL STABILITY: SOCIAL INSECURITY: HAVE YOU HAD THOUGHTS OF HARMING ANYONE ELSE?: NO

## 2024-06-08 ASSESSMENT — COGNITIVE AND FUNCTIONAL STATUS - GENERAL
PATIENT BASELINE BEDBOUND: NO
MOBILITY SCORE: 24
DAILY ACTIVITIY SCORE: 24

## 2024-06-08 ASSESSMENT — PATIENT HEALTH QUESTIONNAIRE - PHQ9
1. LITTLE INTEREST OR PLEASURE IN DOING THINGS: NOT AT ALL
SUM OF ALL RESPONSES TO PHQ9 QUESTIONS 1 & 2: 0
2. FEELING DOWN, DEPRESSED OR HOPELESS: NOT AT ALL

## 2024-06-08 ASSESSMENT — ACTIVITIES OF DAILY LIVING (ADL)
PATIENT'S MEMORY ADEQUATE TO SAFELY COMPLETE DAILY ACTIVITIES?: YES
FEEDING YOURSELF: INDEPENDENT
BATHING: INDEPENDENT
GROOMING: INDEPENDENT
JUDGMENT_ADEQUATE_SAFELY_COMPLETE_DAILY_ACTIVITIES: YES
ADEQUATE_TO_COMPLETE_ADL: YES
HEARING - LEFT EAR: FUNCTIONAL
HEARING - RIGHT EAR: FUNCTIONAL
LACK_OF_TRANSPORTATION: PATIENT DECLINED
TOILETING: INDEPENDENT
DRESSING YOURSELF: INDEPENDENT
WALKS IN HOME: INDEPENDENT

## 2024-06-08 ASSESSMENT — LIFESTYLE VARIABLES
HAVE PEOPLE ANNOYED YOU BY CRITICIZING YOUR DRINKING: NO
EVER HAD A DRINK FIRST THING IN THE MORNING TO STEADY YOUR NERVES TO GET RID OF A HANGOVER: NO
HAVE YOU EVER FELT YOU SHOULD CUT DOWN ON YOUR DRINKING: NO
HOW OFTEN DO YOU HAVE 6 OR MORE DRINKS ON ONE OCCASION: NEVER
SKIP TO QUESTIONS 9-10: 1
EVER FELT BAD OR GUILTY ABOUT YOUR DRINKING: NO
HOW OFTEN DO YOU HAVE A DRINK CONTAINING ALCOHOL: NEVER
HOW MANY STANDARD DRINKS CONTAINING ALCOHOL DO YOU HAVE ON A TYPICAL DAY: PATIENT DOES NOT DRINK
AUDIT-C TOTAL SCORE: 0
AUDIT-C TOTAL SCORE: 0
TOTAL SCORE: 0

## 2024-06-08 ASSESSMENT — PAIN SCALES - GENERAL
PAINLEVEL_OUTOF10: 10 - WORST POSSIBLE PAIN
PAINLEVEL_OUTOF10: 9
PAINLEVEL_OUTOF10: 8

## 2024-06-08 ASSESSMENT — PAIN - FUNCTIONAL ASSESSMENT
PAIN_FUNCTIONAL_ASSESSMENT: 0-10
PAIN_FUNCTIONAL_ASSESSMENT: 0-10

## 2024-06-08 ASSESSMENT — COLUMBIA-SUICIDE SEVERITY RATING SCALE - C-SSRS
1. IN THE PAST MONTH, HAVE YOU WISHED YOU WERE DEAD OR WISHED YOU COULD GO TO SLEEP AND NOT WAKE UP?: NO
2. HAVE YOU ACTUALLY HAD ANY THOUGHTS OF KILLING YOURSELF?: NO
6. HAVE YOU EVER DONE ANYTHING, STARTED TO DO ANYTHING, OR PREPARED TO DO ANYTHING TO END YOUR LIFE?: NO

## 2024-06-08 ASSESSMENT — PAIN DESCRIPTION - LOCATION: LOCATION: ABDOMEN

## 2024-06-08 NOTE — H&P
[facility]  ACUTE CARE SURGERY - HISTORY AND PHYSICAL / CONSULT    Patient Name: Taylor Hampton  MRN: [id number]  Admit Date: 608  : 1995  AGE: 28 y.o.   GENDER: female  ==============================================================================  TODAY'S ASSESSMENT AND PLAN OF CARE:  29yo F with hx of sickle cell disease and PE on xarelto who presents to the ED with RUQ abdominal pain, n/v, decreased PO tolerance, and diarrhea that began two days ago. Patient is afebrile and nontachycardic. Labs remarkable for Tbili 2.7 (likely 2/2 rapid heme turnover). While CT shows normal GB and RUQ US shows cholelithiasis with no signs of GB inflammation, exam and history are consistent with possible acute cholecystitis. Furthermore, patient is at high risk of further gallstone formation in setting of sickle cell disease. Will obtain HIDA to further assess.    Plan:     - Admit to ACS  - HIDA scan to further assess for cholecystitis  - CLD, mIVF as patient not tolerating PO well  - Zosyn  - AM labs  - Home meds, hold home anticoagulation  - Heme consult for co-management of pain in setting of sickle cell disease  - SCDs, activity as tolerated    Discussed with Dr. Dayanna Armstrong MD  Acute Care Surgery [id number]    ==============================================================================  CHIEF COMPLAINT/REASON FOR CONSULT:  Taylor Hampton is a 29yo F with hx of sickle cell disease and PE on xarelto who presents to the ED with RUQ abdominal pain, n/v, decreased PO tolerance, and diarrhea that began two days ago. She has never had symptoms/pain like this before; states this pain is inconsistent with her typical pain crises. She does not typically have abdominal pain or nausea after eating and denies triggering events prior to symptom onset. Denies f/c, CP, SOB.    PAST MEDICAL HISTORY:   PMH:   Past Medical History:   Diagnosis Date    Sickle cell anemia with crisis  (Multi)      PSH:   Past Surgical History:   Procedure Laterality Date    CT ABDOMEN PELVIS ANGIOGRAM W AND/OR WO IV CONTRAST  4/9/2020    CT ABDOMEN PELVIS ANGIOGRAM W AND/OR WO IV CONTRAST 4/9/2020 [facility]    CT ANGIO NECK  9/27/2019    CT NECK ANGIO W AND WO IV CONTRAST 9/27/2019 [facility]    IR CVC TUNNELED  8/24/2020    IR CVC TUNNELED 8/24/2020 [facility]    OTHER SURGICAL HISTORY  09/22/2020    Venous access port placement    OTHER SURGICAL HISTORY  09/22/2020    Tonsillectomy with adenoidectomy    OTHER SURGICAL HISTORY  09/22/2020    Knee surgery     FH: Noncontributory    SOCIAL HISTORY:    Smoking:    Social History     Tobacco Use   Smoking Status Never   Smokeless Tobacco Never       Alcohol:    Social History     Substance and Sexual Activity   Alcohol Use Never       Drug use: Denies    MEDICATIONS:   Prior to Admission medications    Medication Sig Start Date End Date Taking? Authorizing Provider   cyanocobalamin, vitamin B-12, 1,000 mcg lozenge Place 1 each under the tongue. 12/1/22   Historical Provider, MD   cyanocobalamin/folic acid (vitamin B12-folic acid) 1,000-400 mcg lozenge Place 1,000 mcg under the tongue. 1/6/23   Historical Provider, MD   docusate sodium (Colace) 100 mg capsule Take 1 capsule (100 mg) by mouth 2 times a day. 1/26/22   Historical Provider, MD   DULoxetine (Cymbalta) 60 mg DR capsule Take 1 capsule (60 mg) by mouth once daily. 9/19/23   Historical Provider, MD   enoxaparin (Lovenox) 150 mg/mL injection Inject 1 mL (150 mg) under the skin twice a day.    Historical Provider, MD   ferrous sulfate 325 (65 Fe) MG EC tablet Take 1 tablet (325 mg) by mouth once daily with a meal.    Historical Provider, MD   folic acid (Folvite) 1 mg tablet Take by mouth. 10/16/20   Historical Provider, MD   hydroxyurea (Hydrea) 500 mg capsule Take 1 capsule (500 mg total) by mouth once daily. 10/12/20   Historical Provider, MD   naloxone (Narcan) 1 mg/mL  injection if needed. 7/25/23   Historical Provider, MD   oxyCODONE ER (OxyCONTIN) 10 mg 12 hr tablet 1 tablet (10 mg). Do not crush, chew, or split.    Historical Provider, MD     ALLERGIES:   Allergies   Allergen Reactions    Crizanlizumab-Tmca Anaphylaxis    Meperidine Hives and Rash    Sulfamethoxazole-Trimethoprim Hives and Rash     Rash, itchy    Trimethoprim Hives    Adhesive Dermatitis, Itching and Rash    Coconut Rash    Sulfamethoxazole Hives and Rash       REVIEW OF SYSTEMS:  12-point ROS negative except per HPI    PHYSICAL EXAM:  Gen: non toxic appearing, NAD  HEENT: normocephalic, sclerae anicteric  CV: RR  Pulm: nonlabored on RA  Abd: soft, nondistended, TTP in RUQ, no rebound or guarding  MSK: BROWN  Neuro: awake and alert, no focal deficits  Skin: warm and dry    IMAGING SUMMARY:   RUQ US    IMPRESSION:  1. Cholelithiasis without evidence of acute cholecystitis.  2. Hepatomegaly with slightly increased echogenicity suggestive of  underlying steatosis or hepatocellular disease.  3. No hydronephrosis.    LABS:  Results from last 7 days   Lab Units 06/08/24  0912   WBC AUTO x10*3/uL 11.3   HEMOGLOBIN g/dL 10.1*   HEMATOCRIT % 28.3*   PLATELETS AUTO x10*3/uL 255   NEUTROS PCT AUTO % 73.2   LYMPHS PCT AUTO % 22.1   MONOS PCT AUTO % 3.9   EOS PCT AUTO % 0.2     Results from last 7 days   Lab Units 06/08/24  0912   INR  1.3*     Results from last 7 days   Lab Units 06/08/24  0912   SODIUM mmol/L 138   POTASSIUM mmol/L 3.6   CHLORIDE mmol/L 105   CO2 mmol/L 24   BUN mg/dL 11   CREATININE mg/dL 0.86   CALCIUM mg/dL 9.0   PROTEIN TOTAL g/dL 7.3   BILIRUBIN TOTAL mg/dL 2.7*   ALK PHOS U/L 76   ALT U/L 4*   AST U/L 10   GLUCOSE mg/dL 105*     Results from last 7 days   Lab Units 06/08/24  0912   BILIRUBIN TOTAL mg/dL 2.7*           I have reviewed all laboratory and imaging results ordered/pertinent for this encounter.

## 2024-06-08 NOTE — NURSING NOTE
Report called from [facility] (ED) at 1428. Patient arrived to the unit at 1517, stable, with belongings. ACS made aware.

## 2024-06-08 NOTE — ED TRIAGE NOTES
PT REPORTS FOR POSS SCC, CHEST BACK AND LEGS WITH ADDITIONAL ABD PAIN PAIN VOMITING AND DIARRHEA. VOMITING AND DIARRHEA X2 DAYS. DENIES FEVER, CHILLS, URINARY SYMPTOMS, SOB, HA, DIZZINESS, BV.

## 2024-06-08 NOTE — ED PROVIDER NOTES
CC: Sickle Cell Pain Crisis     HPI:  28 year old female with PMH significant for obesity, sickle cell disease (Hb SC disease), DVT on Xarelto, chronic opioid therapy with Suboxone who presented to the hospital due to complaints of N/V/D and chest pain and back pain. Patient reports has been having N/V/D, abdominal pain, back pain for the past 2 days and unable to tolerate PO. Patient was seen at outside ED yesterday for similar complaints, was discharged after pain control. Patient states current pain in her back and legs is consistent with prior sickle cell pain crises, however N/V is new. She denies fevers, chills, recently being ill or any sick contacts. Does note chest pan worsens with deep inspiration.  Notes a history of DVT on xarelto. Denies hemoptysis or melena, SOB.     Of note, patient recently discharged June 4 from [facility] after admission for sickle cell pain crisis.       Limitations to History: None    Additional History Obtained from: n/a    Records Reviewed: Recent available ED and inpatient notes reviewed in EMR.    PMHx/PSHx:  Per HPI.   - has a past medical history of Sickle cell anemia with crisis (Multi).  - has a past surgical history that includes Other surgical history (09/22/2020); Other surgical history (09/22/2020); Other surgical history (09/22/2020); CT angio neck (9/27/2019); CT angio abdomen pelvis w and or wo IV IV contrast (4/9/2020); and IR CVC tunneled (8/24/2020).    Medications: Reviewed in EMR. See EMR for complete list of medications and doses.    Allergies:  Crizanlizumab-tmca, Meperidine, Sulfamethoxazole-trimethoprim, Trimethoprim, Adhesive, Coconut, and Sulfamethoxazole    Social History:  - Tobacco:  reports that she has never smoked. She has never used smokeless tobacco.   - Alcohol:  reports no history of alcohol use.   - Illicit Drugs:  reports no history of drug use.   ???????????????????????????????????????????????????????????????  Triage Vitals:  T 36.1 °C (97  °F)  HR 60  /68  RR 18  O2 98 %      PHYSICAL EXAM:   VS: As documented in the triage note and EMR flowsheet from this visit were reviewed.  Gen: Well developed. Well nourished.  Appears comfortable.  Eyes: PERRL, EOMI. Clear scerla.  HENT: NC/AT, Mucosal membranes moist.   Resp: Non-labored breathing on RA, CTAB, no wheezes or crackles  CV: RRR, nl S1, S2  Abd: Soft, non-distended, positive conner's sign, diffuse tenderness, mainly in RUQ, no rebound or guarding. Bedside ultrasound performed showing stone in gallbladder without evidence of pericholecystic fluid.  Ext: no LE edema, pulses full and equal  Skin: No systemic rashes or lesions.  Neuro:  AAOx3, speech fluent, MAEx4, no focal deficit  Psych:  Appropriate mood and affect  ???????????????????????????????????????????????????????????????    ED Labs/Imaging:   Labs Reviewed - No data to display  No orders to display       Medical Decision Making:  ED Course & MDM   Diagnoses as of 06/08/24 1339   Symptomatic cholelithiasis     28 year old female with PMH significant for obesity, sickle cell disease (Hb SC disease), DVT on Xarelto, chronic opioid therapy with Suboxone who presented to the hospital due to complaints of N/V/D and chest pain and back pain. Differentials considered include cholecystis vs symptomatic cholelithiasis given positive conner's sign and symptoms. Will obtain formal RUQ ultrasound and basic labs. Will also obtain CT A/P. Given patient has pleuritic CP and cough, will obtain CXR and CTA chest to rule out PE given history, although patient states is compliant with xarelto and pain is chronic and consistent with sickle cell crises. Will also obtain UA and bHCG. Will give zofran and dilaudid for analgesia and 1L LR bolus for inability to tolerate PO. Pending labwork and imaging,     Imaging negative for PE or any acute intraabdominal pathology. Only significant for cholelithiasis. On reassessment, patient still complaining of  abdominal pain, inability to tolerate PO, and nausea after 2x doses zofran. ACS consulted. Patient requires admission for pain control and inability to tolerate PO. Patient assessed at bedside by ACS and recommends admission to their service for symptomatic cholelithiasis/ early cholecystitis. Patient remained afebrile and nontoxic appearing with a non-peritonitic abdomen throughout her ED stay. Patient admitted to ACS in stable condition.      Social Determinants Limiting Care:  None identified    Disposition:  Admit to ACS    Patient seen and discussed with attending physician.    Procedures ? SmartLinks last updated 6/8/2024 8:28 AM         Loc Steele MD  Resident  06/08/24 2358       Gomez Talbert MD  06/08/24 0588

## 2024-06-08 NOTE — PROGRESS NOTES
Pharmacy Medication History Review    Taylor Hampton is a 28 y.o. female admitted for Acute cholecystitis. Pharmacy reviewed the patient's kpirb-uw-[id number] medications and allergies for accuracy.    The list below reflects the updated PTA list. Comments regarding how patient may be taking medications differently can be found in the Admit Orders Activity  Prior to Admission Medications   Prescriptions Last Dose Informant Patient Reported? Taking?   DULoxetine (Cymbalta) 60 mg DR capsule Past Week at Thursday Self Yes Yes   Sig: Take 1 capsule (60 mg) by mouth once daily.   HYDROmorphone (Dilaudid) 2 mg tablet Past Week at Thursday Self Yes Yes   Sig: Take 1-2 tablets (2-4 mg) by mouth every 4 hours if needed for severe pain (7 - 10).   acetaminophen (Tylenol) 500 mg tablet Past Week at Thursday Self Yes Yes   Sig: Take 2 tablets (1,000 mg) by mouth every 6 hours if needed for mild pain (1 - 3).   albuterol 2.5 mg /3 mL (0.083 %) nebulizer solution Past Month Self Yes Yes   Sig: Take 3 mL (2.5 mg) by nebulization every 4 hours if needed for wheezing.   buprenorphine-naloxone (Suboxone) 2-0.5 mg SL tablet Past Week at Thursday Self Yes Yes   Sig: Place 1.5 tablets under the tongue 3 times a day.   colchicine 0.6 mg tablet Past Week at Thursday Self Yes Yes   Sig: Take 1 tablet (0.6 mg) by mouth 2 times a day.   docusate sodium (Colace) 100 mg capsule More than a month Self Yes No   Sig: Take 1 capsule (100 mg) by mouth 2 times a day.   ferrous sulfate 325 (65 Fe) MG EC tablet Past Week at Thursday Self Yes Yes   Sig: Take 1 tablet by mouth once daily with breakfast.   folic acid (Folvite) 1 mg tablet Past Week at Thursday Self Yes Yes   Sig: Take 1 tablet (1 mg) by mouth once daily.   hydroxyurea (Hydrea) 500 mg capsule Past Week at Thursday Self Yes Yes   Sig: Take 1 capsule (500 mg total) by mouth 3 times a day.   naloxone (Narcan) 4 mg/0.1 mL nasal spray   Yes No   Sig: Administer 1 spray (4 mg) into affected  nostril(s) if needed for opioid reversal. May repeat every 2-3 minutes if needed, alternating nostrils, until medical assistance becomes available.   polyethylene glycol (Glycolax, Miralax) 17 gram packet More than a month Self Yes No   Sig: Take 17 g by mouth once daily as needed.   predniSONE (Deltasone) 20 mg tablet Past Week at Thursday Self Yes Yes   Sig: Take 1 tablet (20 mg) by mouth 2 times daily (morning and late afternoon).   pregabalin (Lyrica) 50 mg capsule Past Week at Thursday Self Yes Yes   Sig: Take 1 capsule (50 mg) by mouth once daily at bedtime.   rivaroxaban (Xarelto) 20 mg tablet Past Week at Thursday Self Yes Yes   Sig: Take 1 tablet (20 mg) by mouth once daily in the evening. Take with meals. Take with food.   sennosides (Senokot) 8.6 mg tablet More than a month Self Yes No   Sig: Take 1 tablet (8.6 mg) by mouth once daily as needed for constipation.   sodium chloride (Ocean) 0.65 % nasal spray Past Week at Tuesday Self Yes Yes   Sig: Administer 2 sprays into each nostril 5 times a day. PRN   voxelotor (OXBRYTA) 500 mg tablet tablet Unknown Self Yes No   Sig: Take 3 tablets (1,500 mg) by mouth once daily.      Facility-Administered Medications: None        The list below reflects the updated allergy list. Please review each documented allergy for additional clarification and justification.  Allergies  Reviewed by Breanna Phoenix on 6/8/2024        Severity Reactions Comments    Crizanlizumab-tmca High Anaphylaxis     Meperidine Medium Hives, Rash     Sulfamethoxazole-trimethoprim Medium Hives, Rash Rash, itchy    Trimethoprim Not Specified Hives     Adhesive Low Dermatitis, Itching, Rash     Coconut Low Rash     Sulfamethoxazole Low Hives, Rash             Patient accepts M2B at discharge. Pharmacy has been updated to [facility].    Sources used to complete the med history include out patient fill history, OARRS, and patient interview (patient was a good historian.).    Below are  additional concerns with the patient's PTA list.  Patient had a list of her home meds that she pulled from her mychart. Patient was not sure about Voxelotor, she has not had a refill since December.     BreAnna Phoenix, CPhT  Transitions of Care   Meds [facility]  Please reach out via Secure Chat for questions, or if no response call [phone] or vocera [facility]

## 2024-06-09 ENCOUNTER — APPOINTMENT (OUTPATIENT)
Dept: RADIOLOGY | Facility: HOSPITAL | Age: 29
DRG: 417 | End: 2024-06-09
Payer: MEDICARE

## 2024-06-09 LAB
ALBUMIN SERPL BCP-MCNC: 3.5 G/DL (ref 3.4–5)
ALP SERPL-CCNC: 68 U/L (ref 33–110)
ALT SERPL W P-5'-P-CCNC: 4 U/L (ref 7–45)
ANION GAP SERPL CALC-SCNC: 16 MMOL/L (ref 10–20)
AST SERPL W P-5'-P-CCNC: 11 U/L (ref 9–39)
BASOPHILS # BLD AUTO: 0 X10*3/UL (ref 0–0.1)
BASOPHILS NFR BLD AUTO: 0 %
BILIRUB SERPL-MCNC: 2.9 MG/DL (ref 0–1.2)
BUN SERPL-MCNC: 9 MG/DL (ref 6–23)
CALCIUM SERPL-MCNC: 8.8 MG/DL (ref 8.6–10.6)
CHLORIDE SERPL-SCNC: 105 MMOL/L (ref 98–107)
CO2 SERPL-SCNC: 23 MMOL/L (ref 21–32)
CREAT SERPL-MCNC: 0.85 MG/DL (ref 0.5–1.05)
EGFRCR SERPLBLD CKD-EPI 2021: >90 ML/MIN/1.73M*2
EOSINOPHIL # BLD AUTO: 0.09 X10*3/UL (ref 0–0.7)
EOSINOPHIL NFR BLD AUTO: 0.9 %
ERYTHROCYTE [DISTWIDTH] IN BLOOD BY AUTOMATED COUNT: 20.6 % (ref 11.5–14.5)
GLUCOSE SERPL-MCNC: 84 MG/DL (ref 74–99)
HCT VFR BLD AUTO: 26.3 % (ref 36–46)
HGB BLD-MCNC: 9 G/DL (ref 12–16)
HGB RETIC QN: 25 PG (ref 28–38)
HYPOCHROMIA BLD QL SMEAR: NORMAL
IMM GRANULOCYTES # BLD AUTO: 0.03 X10*3/UL (ref 0–0.7)
IMM GRANULOCYTES NFR BLD AUTO: 0.3 % (ref 0–0.9)
IMMATURE RETIC FRACTION: 33.4 %
LDH SERPL L TO P-CCNC: 208 U/L (ref 84–246)
LYMPHOCYTES # BLD AUTO: 3.21 X10*3/UL (ref 1.2–4.8)
LYMPHOCYTES NFR BLD AUTO: 31.2 %
MAGNESIUM SERPL-MCNC: 1.9 MG/DL (ref 1.6–2.4)
MCH RBC QN AUTO: 22.8 PG (ref 26–34)
MCHC RBC AUTO-ENTMCNC: 34.2 G/DL (ref 32–36)
MCV RBC AUTO: 67 FL (ref 80–100)
MONOCYTES # BLD AUTO: 0.59 X10*3/UL (ref 0.1–1)
MONOCYTES NFR BLD AUTO: 5.7 %
NEUTROPHILS # BLD AUTO: 6.36 X10*3/UL (ref 1.2–7.7)
NEUTROPHILS NFR BLD AUTO: 61.9 %
NRBC BLD-RTO: 0 /100 WBCS (ref 0–0)
PLATELET # BLD AUTO: 242 X10*3/UL (ref 150–450)
POLYCHROMASIA BLD QL SMEAR: NORMAL
POTASSIUM SERPL-SCNC: 3.5 MMOL/L (ref 3.5–5.3)
PROT SERPL-MCNC: 6.9 G/DL (ref 6.4–8.2)
RBC # BLD AUTO: 3.95 X10*6/UL (ref 4–5.2)
RBC MORPH BLD: NORMAL
RETICS #: 0.15 X10*6/UL (ref 0.02–0.08)
RETICS/RBC NFR AUTO: 3.9 % (ref 0.5–2)
SCHISTOCYTES BLD QL SMEAR: NORMAL
SODIUM SERPL-SCNC: 140 MMOL/L (ref 136–145)
TARGETS BLD QL SMEAR: NORMAL
WBC # BLD AUTO: 10.3 X10*3/UL (ref 4.4–11.3)

## 2024-06-09 PROCEDURE — 83735 ASSAY OF MAGNESIUM: CPT

## 2024-06-09 PROCEDURE — 99232 SBSQ HOSP IP/OBS MODERATE 35: CPT | Performed by: STUDENT IN AN ORGANIZED HEALTH CARE EDUCATION/TRAINING PROGRAM

## 2024-06-09 PROCEDURE — 80053 COMPREHEN METABOLIC PANEL: CPT

## 2024-06-09 PROCEDURE — 2500000004 HC RX 250 GENERAL PHARMACY W/ HCPCS (ALT 636 FOR OP/ED): Performed by: STUDENT IN AN ORGANIZED HEALTH CARE EDUCATION/TRAINING PROGRAM

## 2024-06-09 PROCEDURE — 2780000003 HC OR 278 NO HCPCS

## 2024-06-09 PROCEDURE — 2500000004 HC RX 250 GENERAL PHARMACY W/ HCPCS (ALT 636 FOR OP/ED)

## 2024-06-09 PROCEDURE — 36410 VNPNXR 3YR/> PHY/QHP DX/THER: CPT

## 2024-06-09 PROCEDURE — 83021 HEMOGLOBIN CHROMOTOGRAPHY: CPT | Performed by: STUDENT IN AN ORGANIZED HEALTH CARE EDUCATION/TRAINING PROGRAM

## 2024-06-09 PROCEDURE — 1100000001 HC PRIVATE ROOM DAILY

## 2024-06-09 PROCEDURE — 2500000002 HC RX 250 W HCPCS SELF ADMINISTERED DRUGS (ALT 637 FOR MEDICARE OP, ALT 636 FOR OP/ED)

## 2024-06-09 PROCEDURE — 36415 COLL VENOUS BLD VENIPUNCTURE: CPT | Performed by: STUDENT IN AN ORGANIZED HEALTH CARE EDUCATION/TRAINING PROGRAM

## 2024-06-09 PROCEDURE — 94645 CONT INHLJ TX EACH ADDL HOUR: CPT

## 2024-06-09 PROCEDURE — 85025 COMPLETE CBC W/AUTO DIFF WBC: CPT

## 2024-06-09 PROCEDURE — 83615 LACTATE (LD) (LDH) ENZYME: CPT | Performed by: STUDENT IN AN ORGANIZED HEALTH CARE EDUCATION/TRAINING PROGRAM

## 2024-06-09 PROCEDURE — C1751 CATH, INF, PER/CENT/MIDLINE: HCPCS

## 2024-06-09 PROCEDURE — 85045 AUTOMATED RETICULOCYTE COUNT: CPT | Performed by: STUDENT IN AN ORGANIZED HEALTH CARE EDUCATION/TRAINING PROGRAM

## 2024-06-09 PROCEDURE — 94644 CONT INHLJ TX 1ST HOUR: CPT

## 2024-06-09 PROCEDURE — 36415 COLL VENOUS BLD VENIPUNCTURE: CPT

## 2024-06-09 RX ORDER — HYDROMORPHONE HYDROCHLORIDE 1 MG/ML
0.2 INJECTION, SOLUTION INTRAMUSCULAR; INTRAVENOUS; SUBCUTANEOUS EVERY 2 HOUR PRN
Status: DISCONTINUED | OUTPATIENT
Start: 2024-06-09 | End: 2024-06-09

## 2024-06-09 RX ORDER — ACETAMINOPHEN 10 MG/ML
1000 INJECTION, SOLUTION INTRAVENOUS EVERY 6 HOURS SCHEDULED
Status: DISPENSED | OUTPATIENT
Start: 2024-06-09 | End: 2024-06-10

## 2024-06-09 RX ORDER — LIDOCAINE HYDROCHLORIDE 10 MG/ML
5 INJECTION INFILTRATION; PERINEURAL ONCE
Status: DISCONTINUED | OUTPATIENT
Start: 2024-06-09 | End: 2024-06-22 | Stop reason: HOSPADM

## 2024-06-09 RX ORDER — POTASSIUM CHLORIDE 14.9 MG/ML
20 INJECTION INTRAVENOUS
Status: DISPENSED | OUTPATIENT
Start: 2024-06-09 | End: 2024-06-09

## 2024-06-09 RX ORDER — ONDANSETRON HYDROCHLORIDE 2 MG/ML
4 INJECTION, SOLUTION INTRAVENOUS EVERY 4 HOURS PRN
Status: DISCONTINUED | OUTPATIENT
Start: 2024-06-09 | End: 2024-06-22 | Stop reason: HOSPADM

## 2024-06-09 RX ORDER — HYDROMORPHONE HCL/0.9% NACL/PF 15 MG/30ML
PATIENT CONTROLLED ANALGESIA SYRINGE INTRAVENOUS CONTINUOUS
Status: DISCONTINUED | OUTPATIENT
Start: 2024-06-09 | End: 2024-06-14

## 2024-06-09 RX ORDER — NALOXONE HYDROCHLORIDE 0.4 MG/ML
0.2 INJECTION, SOLUTION INTRAMUSCULAR; INTRAVENOUS; SUBCUTANEOUS AS NEEDED
Status: DISCONTINUED | OUTPATIENT
Start: 2024-06-09 | End: 2024-06-22 | Stop reason: HOSPADM

## 2024-06-09 RX ORDER — HYDROMORPHONE HYDROCHLORIDE 1 MG/ML
0.4 INJECTION, SOLUTION INTRAMUSCULAR; INTRAVENOUS; SUBCUTANEOUS EVERY 2 HOUR PRN
Status: DISCONTINUED | OUTPATIENT
Start: 2024-06-09 | End: 2024-06-09

## 2024-06-09 RX ADMIN — ONDANSETRON 4 MG: 2 INJECTION INTRAMUSCULAR; INTRAVENOUS at 11:08

## 2024-06-09 RX ADMIN — ACETAMINOPHEN 1000 MG: 1000 INJECTION INTRAVENOUS at 23:04

## 2024-06-09 RX ADMIN — HYDROMORPHONE HYDROCHLORIDE 0.4 MG: 1 INJECTION, SOLUTION INTRAMUSCULAR; INTRAVENOUS; SUBCUTANEOUS at 00:02

## 2024-06-09 RX ADMIN — ONDANSETRON 4 MG: 2 INJECTION INTRAMUSCULAR; INTRAVENOUS at 00:09

## 2024-06-09 RX ADMIN — SODIUM CHLORIDE, POTASSIUM CHLORIDE, SODIUM LACTATE AND CALCIUM CHLORIDE 125 ML/HR: 600; 310; 30; 20 INJECTION, SOLUTION INTRAVENOUS at 20:48

## 2024-06-09 RX ADMIN — METHOCARBAMOL 1000 MG: 100 INJECTION INTRAMUSCULAR; INTRAVENOUS at 21:58

## 2024-06-09 RX ADMIN — KETOROLAC TROMETHAMINE 30 MG: 30 INJECTION, SOLUTION INTRAMUSCULAR; INTRAVENOUS at 23:15

## 2024-06-09 RX ADMIN — KETOROLAC TROMETHAMINE 30 MG: 30 INJECTION, SOLUTION INTRAMUSCULAR; INTRAVENOUS at 06:26

## 2024-06-09 RX ADMIN — METHOCARBAMOL 1000 MG: 100 INJECTION INTRAMUSCULAR; INTRAVENOUS at 06:26

## 2024-06-09 RX ADMIN — PIPERACILLIN SODIUM AND TAZOBACTAM SODIUM 3.38 G: 3; .375 INJECTION, SOLUTION INTRAVENOUS at 00:02

## 2024-06-09 RX ADMIN — HYDROMORPHONE HYDROCHLORIDE: 10 INJECTION, SOLUTION INTRAMUSCULAR; INTRAVENOUS; SUBCUTANEOUS at 11:55

## 2024-06-09 RX ADMIN — HYDROMORPHONE HYDROCHLORIDE 0.4 MG: 1 INJECTION, SOLUTION INTRAMUSCULAR; INTRAVENOUS; SUBCUTANEOUS at 04:20

## 2024-06-09 RX ADMIN — POTASSIUM CHLORIDE 20 MEQ: 14.9 INJECTION, SOLUTION INTRAVENOUS at 14:55

## 2024-06-09 RX ADMIN — PIPERACILLIN SODIUM AND TAZOBACTAM SODIUM 3.38 G: 3; .375 INJECTION, SOLUTION INTRAVENOUS at 11:08

## 2024-06-09 RX ADMIN — SODIUM CHLORIDE, POTASSIUM CHLORIDE, SODIUM LACTATE AND CALCIUM CHLORIDE 125 ML/HR: 600; 310; 30; 20 INJECTION, SOLUTION INTRAVENOUS at 08:07

## 2024-06-09 RX ADMIN — ALBUTEROL SULFATE 2.5 MG: 2.5 SOLUTION RESPIRATORY (INHALATION) at 04:20

## 2024-06-09 RX ADMIN — HYDROMORPHONE HYDROCHLORIDE 0.4 MG: 1 INJECTION, SOLUTION INTRAMUSCULAR; INTRAVENOUS; SUBCUTANEOUS at 11:08

## 2024-06-09 RX ADMIN — ONDANSETRON 4 MG: 2 INJECTION INTRAMUSCULAR; INTRAVENOUS at 17:53

## 2024-06-09 RX ADMIN — METHOCARBAMOL 1000 MG: 100 INJECTION INTRAMUSCULAR; INTRAVENOUS at 14:54

## 2024-06-09 RX ADMIN — PIPERACILLIN SODIUM AND TAZOBACTAM SODIUM 3.38 G: 3; .375 INJECTION, SOLUTION INTRAVENOUS at 06:43

## 2024-06-09 RX ADMIN — POTASSIUM CHLORIDE 20 MEQ: 14.9 INJECTION, SOLUTION INTRAVENOUS at 20:48

## 2024-06-09 RX ADMIN — KETOROLAC TROMETHAMINE 30 MG: 30 INJECTION, SOLUTION INTRAMUSCULAR; INTRAVENOUS at 11:08

## 2024-06-09 RX ADMIN — KETOROLAC TROMETHAMINE 30 MG: 30 INJECTION, SOLUTION INTRAMUSCULAR; INTRAVENOUS at 17:53

## 2024-06-09 ASSESSMENT — PAIN SCALES - GENERAL
PAINLEVEL_OUTOF10: 6
PAINLEVEL_OUTOF10: 8
PAINLEVEL_OUTOF10: 9
PAINLEVEL_OUTOF10: 8
PAINLEVEL_OUTOF10: 7
PAINLEVEL_OUTOF10: 7

## 2024-06-09 ASSESSMENT — PAIN - FUNCTIONAL ASSESSMENT
PAIN_FUNCTIONAL_ASSESSMENT: 0-10
PAIN_FUNCTIONAL_ASSESSMENT: 0-10

## 2024-06-09 ASSESSMENT — PAIN DESCRIPTION - DESCRIPTORS: DESCRIPTORS: DISCOMFORT;ACHING;CRAMPING

## 2024-06-09 ASSESSMENT — PAIN DESCRIPTION - LOCATION: LOCATION: ABDOMEN

## 2024-06-09 NOTE — CONSULTS
HEMATOLOGY AND MEDICAL ONCOLOGY  -----------------------------------------------------------------------------------  HEMATOLOGY    Reason For Consult  HbSC, cholecystitis     History Of Present Illness  Taylor Hampton is a 28 y.o. female presenting with a medical history of HbSC disease, under follow-up with Autumn Dang at [facility]. The patient has been under closer monitoring and management due to prior events of PE (2015) and thrombosis of the vein in the left (2016) and right arms (2023_associated with her MediPort). She has been under anticoagulation with Xarelto. She had acute chest syndrome a couple of times in her childhood. No recent events of that. Her baseline Hb is 10-11 gr/dl. Pain crisis twice a month with the requirement of ER visits both on May 15/2024, and May 28/2024.  The patient does not have a history of stroke and has been taking Hydromorphone, Suboxone, Hydrea, Voxelotor, and Tylenol. The placement of another MediPort is pending, and she plans to engage in outpatient exchanges with her hematology team at [facility].  The patient presented with abdominal pain in the right upper quadrant, a different pain compared with her usual pain crisis. Initial CBC showed WBC 10.3, Hgb 9, , Retics 4.2, total bili 2.9, 3 weeks ago it was 1.1. the patient underwent a CT PE that was reported negative for PE, with no evidence of pneumonia or infiltrates. She had an abdominal US thas was evaluated by surgical team and is suspicious for cholecystitis; however, the CT abdominal said normal gallbladder. The surgical team has ordered a HIDA scan to assess and define gall bladder surgery.  Hematology has been consulted regarding additional inputs and insights for managing her SCD before surgery.  We met the patient on the floor. She is OK, with no oxygen requirement, pain under control with a PCA pump, and looking hydrated and in good clinical condition. Manifested, has not been able to eat, and has been having  nausea and emesis. There is no chest pain, dyspnea, fever, pain in her limbs, or other symptoms.      Past Medical History  She has a past medical history of Sickle cell anemia with crisis (Multi).  Anticoagulant long-term use 4/14/2024  Blood clot due to device, implant, or graft 2016  left arm  Heart murmur  History of pulmonary embolism 04/2015  Obstructive airway disease (HCC) 02/2018  MINIMAL  Recurrent UTI  Shortness of breath  Sickle cell disease (HCC)    Surgical History  She has a past surgical history that includes Other surgical history (09/22/2020); Other surgical history (09/22/2020); Other surgical history (09/22/2020); CT angio neck (9/27/2019); CT angio abdomen pelvis w and or wo IV IV contrast (4/9/2020); and IR CVC tunneled (8/24/2020).  BRONCHOSCOPY 06/2018  MEDIT PORT, DUAL LUMEN  PAST SURGICAL HISTORY OF Right 02/2019  mediport removed  TONSILLECTOMY HX  VAGINAL DELIVERY HX    Social History  She reports that she has never smoked. She has never used smokeless tobacco. She reports that she does not drink alcohol and does not use drugs.    Family History  No family history on file.  Diabetes Mother  Thyroid Mother  other (sickle trait) Mother  Stroke Father 53  Hypertension Father  other (sickle trait) Father  None Sister  None Brother  Cancer Maternal Grandmother  lymph nodes  Breast Cancer Maternal Grandmother  Kidney Disease Maternal Grandmother  Asthma Maternal Grandfather  Heart disease Maternal Grandfather  Diabetes Maternal Grandfather  Hypertension Maternal Grandfather  other (gout) Maternal Grandfather  Kidney Disease Paternal Grandmother  Diabetes Paternal Grandmother  No Known Problems Paternal Grandfather  None Brother  other (sickle cell trait) Brother  other (sickle cell trait) Brother  Kidney Disease Brother  kidney removed  None Brother  other (premature) Son  passed at birth @ 22 weeks gestation  Thyroid Maternal Aunt    Allergies  Crizanlizumab-tmca, Meperidine,  Sulfamethoxazole-trimethoprim, Trimethoprim, Adhesive, Coconut, and Sulfamethoxazole    Current inpatient medications    Current Facility-Administered Medications:     acetaminophen (Ofirmev) injection 1,000 mg, 1,000 mg, intravenous, q6h [facility], Randall Mar MD    albuterol 2.5 mg /3 mL (0.083 %) nebulizer solution 2.5 mg, 2.5 mg, nebulization, q4h PRN, Nelida Nieves MD, 2.5 mg at 06/09/24 0420    buprenorphine-naloxone (Suboxone) 2-0.5 mg per SL tablet 2 tablet, 2 tablet, sublingual, TID, Nelida Nieves MD    colchicine tablet 0.6 mg, 0.6 mg, oral, BID, Nelida Nieves MD    DULoxetine (Cymbalta) DR capsule 60 mg, 60 mg, oral, Daily, Nelida Nieves MD    folic acid (Folvite) tablet 1 mg, 1 mg, oral, Daily, Nelida Nieves MD    hydromorphone PCA 0.5 mg/mL in NS opioid naive, , intravenous, Continuous, Randall Mar MD, New Syringe/Cartridge at 06/09/24 1155    [Held by provider] hydroxyurea (Hydrea) capsule 500 mg, 500 mg, oral, TID, Nelida Nieves MD    ketorolac (Toradol) injection 30 mg, 30 mg, intravenous, q6h SHAVON, Nelida Nieves MD, 30 mg at 06/09/24 1108    lactated Ringer's infusion, 125 mL/hr, intravenous, Continuous, Yanelis Armstrong MD, Last Rate: 125 mL/hr at 06/09/24 1337, 125 mL/hr at 06/09/24 1337    methocarbamol (Robaxin) injection 1,000 mg, 1,000 mg, intravenous, q8h, Nelida Nieves MD, 1,000 mg at 06/09/24 0626    naloxone (Narcan) injection 0.2 mg, 0.2 mg, intravenous, q5 min PRN, Yanelis Armstrong MD    naloxone (Narcan) injection 0.2 mg, 0.2 mg, intravenous, PRN, Randall Mar MD    ondansetron (Zofran) injection 4 mg, 4 mg, intravenous, q6h PRN, Mary Church MD, 4 mg at 06/09/24 1108    piperacillin-tazobactam-dextrose (Zosyn) IV 3.375 g, 3.375 g, intravenous, q6h, Yanelis Armstrong MD, Stopped at 06/09/24 1138    polyethylene glycol (Glycolax, Miralax) packet 17 g, 17 g, oral, Daily PRN, Nelida Nieves MD    potassium chloride 20 mEq in 100 mL IV premix, 20 mEq,  intravenous, q2h, Randall Mar MD    [Held by provider] predniSONE (Deltasone) tablet 20 mg, 20 mg, oral, BID, Nelida Nieves MD    pregabalin (Lyrica) capsule 50 mg, 50 mg, oral, Nightly, Nelida Nieves MD    [Held by provider] rivaroxaban (Xarelto) tablet 20 mg, 20 mg, oral, Daily with evening meal, Nelida Nieves MD    sennosides (Senokot) tablet 8.6 mg, 1 tablet, oral, Daily PRN, Nelida Nieves MD    voxelotor (OXBRYTA) tablet 1,500 mg, 1,500 mg, oral, Daily, Nelida Nieves MD     Current outpatient medications    HYDROmorphone (DILAUDID) 2 mg tablet Take 1 tablet by mouth every 4 hours as needed for pain for up to 14 days.  rivaroxaban (XARELTO) 20 mg tablet Take 1 tablet by mouth daily with dinner.  acetaminophen (TYLENOL) 500 mg tablet Take 2 tablets by mouth every 8 hours as needed for pain. Do not take with percocet.  pregabalin (LYRICA) 50 mg capsule Take 1 capsule by mouth daily at bedtime for 30 days.  buprenorphine-naloxone (SUBOXONE) 2-0.5 mg film Dissolve 1.5 Film under the tongue three times a day for 15 days. Allow to dissolve under your tongue for 3 minutes with each dose and do not rinse your mouth for 5 min thereafter.  albuterol (PROVENTIL) 2.5 mg /3 mL (0.083 %) nebulizer solution Use 3 mL via nebulizer four times a day as needed for wheezing/shortness of breath. OVER 5-15 MINUTES. FOR WHEEZING AND SHORTNESS OF BREATH.  hydroxyurea (HYDREA) 500 mg capsule Take 3 capsules by mouth once daily.  DULoxetine (CYMBALTA) 60 mg capsule Take 1 capsule by mouth once daily.  naloxone 4 mg/actuation nasal spray (NARCAN) Use 1 spray in one nostril as needed for overdose. May repeat every 2 to 3 min in alternating nostrils until medical assistance is available  polyethylene glycol 3350 (MIRALAX) 17 gram/dose powder Take 17 g by mouth once daily. Dissolve dose in 4 - 8 ounces of liquid and take as directed.  naloxone 4 mg/actuation nasal spray (NARCAN) Use 1 spray in one nostril as needed for overdose.  "May repeat every 2 to 3 min in alternating nostrils until medical assistance is available  cyanocobalamin/folic acid (VITAMIN B12-FOLIC ACID) 1,000-400 mcg lozg Dissolve 1,000 mcg under the tongue.    Review of Systems  No urinary symptoms, no diarrhea, no constipation, no cough, no upper respiratory symptoms at this time. No chest pain, frequently she has some muscles spasms on legs. She manifested that her heart rate has been low during the nurse evaluation. HR in the low 50s.     Physical Exam  GEN: She is not in acute distress.  HENT: Normocephalic and atraumatic. Mucous membranes are moist. Oropharynx is clear. No scleral icterus.  CARD: Normal rate and regular rhythm. Normal heart sounds.  PULM: Pulmonary effort is normal. Normal breath sounds.  ABD: Bowel sounds are low. Mild pain in RUQ, soft.  MSK: No swelling, deformity or signs of injury.  Skin: Warm and dry. Capillary refill takes less than 2 seconds.  CNS: No focal deficit present. She is alert and oriented to person, place, and time. Mood and Affect are normal, Behavior normal.     Last Recorded Vitals  Blood pressure 129/61, pulse 62, temperature 36.3 °C (97.3 °F), temperature source Temporal, resp. rate 18, height 1.6 m (5' 2.99\"), weight 143 kg (315 lb 0.6 oz), SpO2 97%.    Relevant Results    Results from last 7 days   Lab Units 06/09/24  0532 06/08/24  0912   WBC AUTO x10*3/uL 10.3 11.3   HEMOGLOBIN g/dL 9.0* 10.1*   HEMATOCRIT % 26.3* 28.3*   PLATELETS AUTO x10*3/uL 242 255   NEUTROS PCT AUTO % 61.9 73.2   LYMPHS PCT AUTO % 31.2 22.1   MONOS PCT AUTO % 5.7 3.9   EOS PCT AUTO % 0.9 0.2     Results from last 7 days   Lab Units 06/09/24  0532 06/08/24  0912   SODIUM mmol/L 140 138   POTASSIUM mmol/L 3.5 3.6   CHLORIDE mmol/L 105 105   CO2 mmol/L 23 24   BUN mg/dL 9 11   CREATININE mg/dL 0.85 0.86   CALCIUM mg/dL 8.8 9.0   PROTEIN TOTAL g/dL 6.9 7.3   BILIRUBIN TOTAL mg/dL 2.9* 2.7*   ALK PHOS U/L 68 76   ALT U/L 4* 4*   AST U/L 11 10   GLUCOSE mg/dL " 84 105*     Results from last 7 days   Lab Units 24  0912   INR  1.3*     Results from last 7 days   Lab Units 24  0532   MAGNESIUM mg/dL 1.90         [id number]  CT A/P  IMPRESSION:  1.  No acute pathology within the abdomen or pelvis.  2. Stable hepatosplenomegaly dating back to 2019 CT. Correlate with liver function tests.    CT PE  IMPRESSION:  1. Limited exam as described. No evidence of acute pulmonary embolism to segmental level. Please note that, assessment of subsegmental branches is limited and small peripheral emboli are not entirely excluded.  2. Trace bilateral pleural effusions and bibasilar atelectasis.    US gall blader  IMPRESSION:  1. Cholelithiasis without evidence of acute cholecystitis.  2. Hepatomegaly with slightly increased echogenicity suggestive of underlying steatosis or hepatocellular disease.  3. No hydronephrosis.       Assessment/Plan   In summary, this is a 28-year-old lady with a medical history of HbSC disease, under follow-up with Autumn Dang at [facility],  prior events of PE () and thrombosis of the vein in the left () and right arms (_associated with her MediPort). She has been under anticoagulation with Xarelto. She had acute chest syndrome a couple of times in her childhood. No recent events of that. Her baseline Hb is 10-11 gr/dl. Pain crisis twice a month with the requirement of ER visits both on May 15/2024, and May . Outpatient management with Hydromorphone, Suboxone, Hydrea, Voxelotor, and Tylenol, presenting to the ER with with RUQ abdominal different to usual pain crisis. CBC showed WBC 10.3, Hgb 9, , Retics 4.2, total bili 2.9, 3 weeks ago it was 1.1. the patient underwent a CT PE that was reported negative for PE, with no evidence of pneumonia or infiltrates. The surgical team evaluated and planned for a HIDA scan to assess and define gall bladder surgery. Hematology has been consulted regarding additional inputs  and insights for managing her SCD before surgery. The patient is in good clinical condition at this time, manifested abdominal and GI ailments.    Impression:  HbSC disease  History of pain crisis  History of PE and thrombotic events in upper extremities  History of remote acute chest syndrome    Recommendations:  After a detailed review of her clinical situation, antecedents and risk, we recommend a comprehensive management as follows:    Keep appropriate hydration avoiding fluid overload.  Maintain appropriate SpO2 > 95%, oxygen if needed.  Avoid atelectasis; early mobilization and incentive spirometer is highly recommended.  Pain management per protocol, agree with PCA pump at this time as initiated per surgical team.  Follow up hemolysis labs, including getting LDH, bilis, haptoglobin, daily CBC with differential.  Obtain a Hgb identification to assess Hgb A, F, S levels.  Agree with continuing her antibiotics as per primary team.  Regarding the Xarelto, understanding the patient's thrombotic event risks, we recommend a heparin drip low-intensity bridge while waiting for surgery to reduce the risk of new PE or any new blood clot. The heparin drip must be placed on hold 6 hours before surgery.  Assess if she will be long time waiting for surgery. If surgery to be done, we recommend is possible use least invasive technique if possible.  Monitor for pulmonary complications (atelectasis, acute chest syndrome) and vaso-occlusive crisis.  Avoid steroids, hypoxia, cold temperatures in the OR, or any medications that can precipitate sickling.  Despite having HbSC and having a moderate-risk surgical intervention, the patient has a history of various blood clotting events and acute chest pain (high-risk features). In her case, it is advisable to aim for a pre-surgery hemoglobin level of 10 - 11 grams per deciliter (g/dL) (not exceeding 11 to prevent hyper viscosity) in order to reduce the risk of complications during the  surgery. Therefore, she may require a simple blood transfusion to achieve a target hemoglobin level of 10-11 g/dL at the time of surgery. Ideally, the transfused blood leukocyte reduced and all antigens should be matched, including CDE and Comfort, if possible.  The patient did mention her heart rate has been low during nurse assessments. We recommend to obtain an EKG and echocardiogram.      Patient commented and assessed with Dr. Sunil MD. The patient's questions, doubts and concerns were addressed and resolved apropriately.   A thorough and detailed explanation of the patient's case was provided. The patient demonstrated an understanding of the information and agreed with the proposed treatment plan and laboratory tests.      -----------------------------------------------------------------------------------  Koko Harris MD  Hematology and Medical Oncology Fellow  [facility] [employer], [id number]

## 2024-06-09 NOTE — CARE PLAN
The patient's goals for the shift include pain control.     The clinical goals for the shift include patient will maintain pain level less than 5/10 throughout this shift.    Over the shift, the patient did  make progress toward her goals and her pain level was managed with a new pain regime that was initiated.

## 2024-06-09 NOTE — CARE PLAN
Problem: Pain - Adult  Goal: Verbalizes/displays adequate comfort level or baseline comfort level  Outcome: Progressing     Problem: Safety - Adult  Goal: Free from fall injury  Outcome: Progressing   The patient's goals for the shift include      The clinical goals for the shift include patient will rate pain less than 6/10 be end of shift

## 2024-06-09 NOTE — PROGRESS NOTES
[facility]  ACUTE CARE SURGERY - HISTORY AND PHYSICAL / CONSULT    Patient Name: Taylor Hampton  MRN: [id number]  Admit Date: 608  : 1995  AGE: 28 y.o.   GENDER: female  ==============================================================================  TODAY'S ASSESSMENT AND PLAN OF CARE:  27yo F with hx of sickle cell disease and PE on xarelto who presents to the ED with RUQ abdominal pain, n/v, decreased PO tolerance, and diarrhea that began two days ago. Patient is afebrile and nontachycardic. Labs remarkable for Tbili 2.7 (likely 2/2 rapid heme turnover). While CT shows normal GB and RUQ US shows cholelithiasis with no signs of GB inflammation, exam and history are consistent with possible acute cholecystitis. Furthermore, patient is at high risk of further gallstone formation in setting of sickle cell disease. Will obtain HIDA to further assess. Continues to have ongoing pain in abdomen, chest, legs, and back.     Plan:     - HIDA scan to further assess for cholecystitis  - CLD, mIVF as patient not tolerating PO well  - Zosyn  - will obtain sickle labs   - Home meds, hold home anticoagulation  - Heme consult for co-management of pain in setting of sickle cell disease  - SCDs, activity as tolerated    Discussed with Dr. Dayanna Mar MD  Acute Care Surgery [id number]    ==============================================================================  CHIEF COMPLAINT/REASON FOR CONSULT:  Ongoing whole body pain     PAST MEDICAL HISTORY:   PMH:   Past Medical History:   Diagnosis Date    Sickle cell anemia with crisis (Multi)      PSH:   Past Surgical History:   Procedure Laterality Date    CT ABDOMEN PELVIS ANGIOGRAM W AND/OR WO IV CONTRAST  2020    CT ABDOMEN PELVIS ANGIOGRAM W AND/OR WO IV CONTRAST 2020 [facility]    CT ANGIO NECK  2019    CT NECK ANGIO W AND WO IV CONTRAST 2019 [facility]    IR CVC TUNNELED  2020    IR CVC  TUNNELED 8/24/2020 [facility]    OTHER SURGICAL HISTORY  09/22/2020    Venous access port placement    OTHER SURGICAL HISTORY  09/22/2020    Tonsillectomy with adenoidectomy    OTHER SURGICAL HISTORY  09/22/2020    Knee surgery     FH: Noncontributory    SOCIAL HISTORY:    Smoking:    Social History     Tobacco Use   Smoking Status Never   Smokeless Tobacco Never       Alcohol:    Social History     Substance and Sexual Activity   Alcohol Use Never       Drug use: Denies    MEDICATIONS:   Prior to Admission medications    Medication Sig Start Date End Date Taking? Authorizing Provider   cyanocobalamin, vitamin B-12, 1,000 mcg lozenge Place 1 each under the tongue. 12/1/22   Historical Provider, MD   cyanocobalamin/folic acid (vitamin B12-folic acid) 1,000-400 mcg lozenge Place 1,000 mcg under the tongue. 1/6/23   Historical Provider, MD   docusate sodium (Colace) 100 mg capsule Take 1 capsule (100 mg) by mouth 2 times a day. 1/26/22   Historical Provider, MD   DULoxetine (Cymbalta) 60 mg DR capsule Take 1 capsule (60 mg) by mouth once daily. 9/19/23   Historical Provider, MD   enoxaparin (Lovenox) 150 mg/mL injection Inject 1 mL (150 mg) under the skin twice a day.    Historical Provider, MD   ferrous sulfate 325 (65 Fe) MG EC tablet Take 1 tablet (325 mg) by mouth once daily with a meal.    Historical Provider, MD   folic acid (Folvite) 1 mg tablet Take by mouth. 10/16/20   Historical Provider, MD   hydroxyurea (Hydrea) 500 mg capsule Take 1 capsule (500 mg total) by mouth once daily. 10/12/20   Historical Provider, MD   naloxone (Narcan) 1 mg/mL injection if needed. 7/25/23   Historical Provider, MD   oxyCODONE ER (OxyCONTIN) 10 mg 12 hr tablet 1 tablet (10 mg). Do not crush, chew, or split.    Historical Provider, MD     ALLERGIES:   Allergies   Allergen Reactions    Crizanlizumab-Tmca Anaphylaxis    Meperidine Hives and Rash    Sulfamethoxazole-Trimethoprim Hives and Rash     Rash, itchy     Trimethoprim Hives    Adhesive Dermatitis, Itching and Rash    Coconut Rash    Sulfamethoxazole Hives and Rash       REVIEW OF SYSTEMS:  12-point ROS negative except per HPI    PHYSICAL EXAM:  Gen: non toxic appearing, NAD  HEENT: normocephalic, sclerae anicteric  CV: RR  Pulm: nonlabored on RA  Abd: soft, nondistended, TTP in RUQ, no rebound or guarding  MSK: BROWN  Neuro: awake and alert, no focal deficits  Skin: warm and dry    IMAGING SUMMARY:   RUQ US    IMPRESSION:  1. Cholelithiasis without evidence of acute cholecystitis.  2. Hepatomegaly with slightly increased echogenicity suggestive of  underlying steatosis or hepatocellular disease.  3. No hydronephrosis.    LABS:  Results from last 7 days   Lab Units 06/09/24  0532 06/08/24  0912   WBC AUTO x10*3/uL 10.3 11.3   HEMOGLOBIN g/dL 9.0* 10.1*   HEMATOCRIT % 26.3* 28.3*   PLATELETS AUTO x10*3/uL 242 255   NEUTROS PCT AUTO % 61.9 73.2   LYMPHS PCT AUTO % 31.2 22.1   MONOS PCT AUTO % 5.7 3.9   EOS PCT AUTO % 0.9 0.2     Results from last 7 days   Lab Units 06/08/24  0912   INR  1.3*     Results from last 7 days   Lab Units 06/09/24  0532 06/08/24  0912   SODIUM mmol/L 140 138   POTASSIUM mmol/L 3.5 3.6   CHLORIDE mmol/L 105 105   CO2 mmol/L 23 24   BUN mg/dL 9 11   CREATININE mg/dL 0.85 0.86   CALCIUM mg/dL 8.8 9.0   PROTEIN TOTAL g/dL 6.9 7.3   BILIRUBIN TOTAL mg/dL 2.9* 2.7*   ALK PHOS U/L 68 76   ALT U/L 4* 4*   AST U/L 11 10   GLUCOSE mg/dL 84 105*     Results from last 7 days   Lab Units 06/09/24  0532 06/08/24  0912   BILIRUBIN TOTAL mg/dL 2.9* 2.7*           I have reviewed all laboratory and imaging results ordered/pertinent for this encounter.

## 2024-06-10 ENCOUNTER — ANESTHESIA EVENT (OUTPATIENT)
Dept: OPERATING ROOM | Facility: HOSPITAL | Age: 29
End: 2024-06-10
Payer: MEDICARE

## 2024-06-10 ENCOUNTER — APPOINTMENT (OUTPATIENT)
Dept: RADIOLOGY | Facility: HOSPITAL | Age: 29
DRG: 417 | End: 2024-06-10
Payer: MEDICARE

## 2024-06-10 LAB
ABO GROUP (TYPE) IN BLOOD: NORMAL
ALBUMIN SERPL BCP-MCNC: 3.8 G/DL (ref 3.4–5)
ANION GAP SERPL CALC-SCNC: 16 MMOL/L (ref 10–20)
ANTIBODY SCREEN: NORMAL
APTT PPP: 33 SECONDS (ref 27–38)
BUN SERPL-MCNC: 8 MG/DL (ref 6–23)
CALCIUM SERPL-MCNC: 8.8 MG/DL (ref 8.6–10.6)
CHLORIDE SERPL-SCNC: 102 MMOL/L (ref 98–107)
CO2 SERPL-SCNC: 22 MMOL/L (ref 21–32)
CREAT SERPL-MCNC: 0.78 MG/DL (ref 0.5–1.05)
DAT-POLYSPECIFIC: NORMAL
EGFRCR SERPLBLD CKD-EPI 2021: >90 ML/MIN/1.73M*2
ERYTHROCYTE [DISTWIDTH] IN BLOOD BY AUTOMATED COUNT: 20.7 % (ref 11.5–14.5)
GLUCOSE SERPL-MCNC: 70 MG/DL (ref 74–99)
HCT VFR BLD AUTO: 29.8 % (ref 36–46)
HGB BLD-MCNC: 9.8 G/DL (ref 12–16)
MAGNESIUM SERPL-MCNC: 1.84 MG/DL (ref 1.6–2.4)
MCH RBC QN AUTO: 22.3 PG (ref 26–34)
MCHC RBC AUTO-ENTMCNC: 32.9 G/DL (ref 32–36)
MCV RBC AUTO: 68 FL (ref 80–100)
NRBC BLD-RTO: 0 /100 WBCS (ref 0–0)
PHOSPHATE SERPL-MCNC: 3.1 MG/DL (ref 2.5–4.9)
PLATELET # BLD AUTO: 216 X10*3/UL (ref 150–450)
POTASSIUM SERPL-SCNC: 4.1 MMOL/L (ref 3.5–5.3)
RBC # BLD AUTO: 4.4 X10*6/UL (ref 4–5.2)
RH FACTOR (ANTIGEN D): NORMAL
SODIUM SERPL-SCNC: 136 MMOL/L (ref 136–145)
UFH PPP CHRO-ACNC: 0.1 IU/ML
WBC # BLD AUTO: 10.7 X10*3/UL (ref 4.4–11.3)

## 2024-06-10 PROCEDURE — 83735 ASSAY OF MAGNESIUM: CPT | Performed by: STUDENT IN AN ORGANIZED HEALTH CARE EDUCATION/TRAINING PROGRAM

## 2024-06-10 PROCEDURE — 2500000004 HC RX 250 GENERAL PHARMACY W/ HCPCS (ALT 636 FOR OP/ED)

## 2024-06-10 PROCEDURE — 36415 COLL VENOUS BLD VENIPUNCTURE: CPT

## 2024-06-10 PROCEDURE — 86880 COOMBS TEST DIRECT: CPT

## 2024-06-10 PROCEDURE — 85730 THROMBOPLASTIN TIME PARTIAL: CPT

## 2024-06-10 PROCEDURE — 1100000001 HC PRIVATE ROOM DAILY

## 2024-06-10 PROCEDURE — 2500000004 HC RX 250 GENERAL PHARMACY W/ HCPCS (ALT 636 FOR OP/ED): Performed by: STUDENT IN AN ORGANIZED HEALTH CARE EDUCATION/TRAINING PROGRAM

## 2024-06-10 PROCEDURE — 86870 RBC ANTIBODY IDENTIFICATION: CPT

## 2024-06-10 PROCEDURE — 86901 BLOOD TYPING SEROLOGIC RH(D): CPT

## 2024-06-10 PROCEDURE — 78227 HEPATOBIL SYST IMAGE W/DRUG: CPT

## 2024-06-10 PROCEDURE — 85520 HEPARIN ASSAY: CPT

## 2024-06-10 PROCEDURE — 86922 COMPATIBILITY TEST ANTIGLOB: CPT

## 2024-06-10 PROCEDURE — A9537 TC99M MEBROFENIN: HCPCS | Performed by: EMERGENCY MEDICINE

## 2024-06-10 PROCEDURE — 99233 SBSQ HOSP IP/OBS HIGH 50: CPT | Performed by: STUDENT IN AN ORGANIZED HEALTH CARE EDUCATION/TRAINING PROGRAM

## 2024-06-10 PROCEDURE — 3430000001 HC RX 343 DIAGNOSTIC RADIOPHARMACEUTICALS: Performed by: EMERGENCY MEDICINE

## 2024-06-10 PROCEDURE — 85027 COMPLETE CBC AUTOMATED: CPT | Performed by: STUDENT IN AN ORGANIZED HEALTH CARE EDUCATION/TRAINING PROGRAM

## 2024-06-10 PROCEDURE — 84100 ASSAY OF PHOSPHORUS: CPT | Performed by: STUDENT IN AN ORGANIZED HEALTH CARE EDUCATION/TRAINING PROGRAM

## 2024-06-10 RX ORDER — DIPHENHYDRAMINE HYDROCHLORIDE 50 MG/ML
10 INJECTION INTRAMUSCULAR; INTRAVENOUS ONCE
Status: DISCONTINUED | OUTPATIENT
Start: 2024-06-10 | End: 2024-06-10

## 2024-06-10 RX ORDER — DIPHENHYDRAMINE HYDROCHLORIDE 50 MG/ML
10 INJECTION INTRAMUSCULAR; INTRAVENOUS ONCE
Status: COMPLETED | OUTPATIENT
Start: 2024-06-10 | End: 2024-06-10

## 2024-06-10 RX ORDER — KIT FOR THE PREPARATION OF TECHNETIUM TC 99M MEBROFENIN 45 MG/10ML
5.57 INJECTION, POWDER, LYOPHILIZED, FOR SOLUTION INTRAVENOUS
Status: COMPLETED | OUTPATIENT
Start: 2024-06-10 | End: 2024-06-10

## 2024-06-10 RX ORDER — HEPARIN SODIUM 10000 [USP'U]/100ML
0-4000 INJECTION, SOLUTION INTRAVENOUS CONTINUOUS
Status: DISCONTINUED | OUTPATIENT
Start: 2024-06-10 | End: 2024-06-12

## 2024-06-10 RX ORDER — DIPHENHYDRAMINE HCL 25 MG
25 CAPSULE ORAL ONCE
Status: DISCONTINUED | OUTPATIENT
Start: 2024-06-10 | End: 2024-06-10

## 2024-06-10 RX ADMIN — ACETAMINOPHEN 1000 MG: 1000 INJECTION INTRAVENOUS at 08:58

## 2024-06-10 RX ADMIN — PIPERACILLIN SODIUM AND TAZOBACTAM SODIUM 3.38 G: 3; .375 INJECTION, SOLUTION INTRAVENOUS at 16:30

## 2024-06-10 RX ADMIN — HEPARIN SODIUM 1000 UNITS/HR: 10000 INJECTION, SOLUTION INTRAVENOUS at 17:09

## 2024-06-10 RX ADMIN — ONDANSETRON 4 MG: 2 INJECTION INTRAMUSCULAR; INTRAVENOUS at 00:51

## 2024-06-10 RX ADMIN — ONDANSETRON 4 MG: 2 INJECTION INTRAMUSCULAR; INTRAVENOUS at 20:42

## 2024-06-10 RX ADMIN — PIPERACILLIN SODIUM AND TAZOBACTAM SODIUM 3.38 G: 3; .375 INJECTION, SOLUTION INTRAVENOUS at 06:39

## 2024-06-10 RX ADMIN — KETOROLAC TROMETHAMINE 30 MG: 30 INJECTION, SOLUTION INTRAMUSCULAR; INTRAVENOUS at 06:20

## 2024-06-10 RX ADMIN — METHOCARBAMOL 1000 MG: 100 INJECTION INTRAMUSCULAR; INTRAVENOUS at 14:10

## 2024-06-10 RX ADMIN — KIT FOR THE PREPARATION OF TECHNETIUM TC 99M MEBROFENIN 5.57 MILLICURIE: 45 INJECTION, POWDER, LYOPHILIZED, FOR SOLUTION INTRAVENOUS at 11:56

## 2024-06-10 RX ADMIN — PIPERACILLIN SODIUM AND TAZOBACTAM SODIUM 3.38 G: 3; .375 INJECTION, SOLUTION INTRAVENOUS at 00:00

## 2024-06-10 RX ADMIN — ONDANSETRON 4 MG: 2 INJECTION INTRAMUSCULAR; INTRAVENOUS at 06:46

## 2024-06-10 RX ADMIN — HYDROMORPHONE HYDROCHLORIDE: 10 INJECTION, SOLUTION INTRAMUSCULAR; INTRAVENOUS; SUBCUTANEOUS at 06:14

## 2024-06-10 RX ADMIN — METHOCARBAMOL 1000 MG: 100 INJECTION INTRAMUSCULAR; INTRAVENOUS at 22:20

## 2024-06-10 RX ADMIN — KETOROLAC TROMETHAMINE 30 MG: 30 INJECTION, SOLUTION INTRAMUSCULAR; INTRAVENOUS at 14:12

## 2024-06-10 RX ADMIN — ONDANSETRON 4 MG: 2 INJECTION INTRAMUSCULAR; INTRAVENOUS at 16:23

## 2024-06-10 RX ADMIN — METHOCARBAMOL 1000 MG: 100 INJECTION INTRAMUSCULAR; INTRAVENOUS at 06:39

## 2024-06-10 RX ADMIN — PIPERACILLIN SODIUM AND TAZOBACTAM SODIUM 3.38 G: 3; .375 INJECTION, SOLUTION INTRAVENOUS at 22:21

## 2024-06-10 RX ADMIN — KETOROLAC TROMETHAMINE 30 MG: 30 INJECTION, SOLUTION INTRAMUSCULAR; INTRAVENOUS at 20:42

## 2024-06-10 RX ADMIN — DIPHENHYDRAMINE HYDROCHLORIDE 10 MG: 50 INJECTION, SOLUTION INTRAMUSCULAR; INTRAVENOUS at 00:57

## 2024-06-10 ASSESSMENT — PAIN SCALES - GENERAL
PAINLEVEL_OUTOF10: 9
PAINLEVEL_OUTOF10: 10 - WORST POSSIBLE PAIN

## 2024-06-10 ASSESSMENT — PAIN DESCRIPTION - LOCATION
LOCATION: GENERALIZED
LOCATION: GENERALIZED

## 2024-06-10 ASSESSMENT — PAIN - FUNCTIONAL ASSESSMENT: PAIN_FUNCTIONAL_ASSESSMENT: 0-10

## 2024-06-10 ASSESSMENT — ACTIVITIES OF DAILY LIVING (ADL): LACK_OF_TRANSPORTATION: NO

## 2024-06-10 NOTE — CARE PLAN
The patient's goals for the shift include      The clinical goals for the shift include pt pain will be controlled

## 2024-06-10 NOTE — PROGRESS NOTES
"Taylor Hampton is a 28 y.o. female on day 2 of admission presenting with Acute cholecystitis.    Subjective   Reports pain not well controlled. Mostly RUQ, but also chest and back.  She has a mild cough, non productive.    Objective     Physical Exam    GEN: She is not in acute distress.  HENT: Normocephalic and atraumatic. Mucous membranes are moist. Oropharynx is clear. No scleral icterus.  CARD: Normal rate and regular rhythm. Normal heart sounds.  PULM: Pulmonary effort is normal. Normal breath sounds. On RA  ABD: Bowel sounds are low. Mild pain in RUQ, soft.  MSK: No swelling, deformity or signs of injury.  Skin: Warm and dry. Capillary refill takes less than 2 seconds.  CNS: No focal deficit present. She is alert and oriented to person, place, and time. Mood and Affect are normal, Behavior normal.    Last Recorded Vitals  Blood pressure 113/68, pulse 55, temperature 36.1 °C (97 °F), temperature source Temporal, resp. rate 17, height 1.6 m (5' 2.99\"), weight 143 kg (315 lb 0.6 oz), SpO2 99%.  Intake/Output last 3 Shifts:  I/O last 3 completed shifts:  In: 2000 (14 mL/kg) [I.V.:2000 (14 mL/kg)]  Out: 3400 (23.8 mL/kg) [Urine:3400 (0.7 mL/kg/hr)]  Weight: 142.9 kg     Relevant Results  Lab Results   Component Value Date    WBC 10.7 06/10/2024    HGB 9.8 (L) 06/10/2024    HCT 29.8 (L) 06/10/2024    MCV 68 (L) 06/10/2024     06/10/2024     Lab Results   Component Value Date    CREATININE 0.78 06/10/2024    BUN 8 06/10/2024     06/10/2024    K 4.1 06/10/2024     06/10/2024    CO2 22 06/10/2024     Lab Results   Component Value Date    ALT 4 (L) 06/09/2024    AST 11 06/09/2024    GGT 27 02/13/2019    ALKPHOS 68 06/09/2024    BILITOT 2.9 (H) 06/09/2024        Retic 4.2%    HIDA scan  IMPRESSION:  Non-visualization of the gallbladder, which can be seen in the  appropriate clinical setting with cystic duct obstruction / acute  cholecystitis.    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     CBC showed WBC 10.3, Hgb 9, , Retics 4.2, total bili 2.9, 3 weeks ago it was 1.1. the patient underwent a CT PE that was reported negative for PE, with no evidence of pneumonia or infiltrates. US with cholelithiasis. HIDA scan today positive for cholecystitis and planned for cholecystectomy tomorrow. Hematology following for keri-op recs.      She has baseline Hb is 10-11 gr/dl. HbS 47%. Pain crisis twice a month with the requirement of ER visits both on May 15/2024, and May 28/2024. Outpatient management with Hydromorphone, Suboxone, Hydrea, Voxelotor, and Tylenol. She is currently stable on PCA pump. Hgb 9.8, .    Recommendations:  Keep appropriate hydration avoiding fluid overload.  Maintain appropriate SpO2 > 95%, oxygen if needed.  Avoid atelectasis; early mobilization and incentive spirometer is highly recommended.  Pain management per protocol, agree with PCA pump at this time as initiated per surgical team. Consider increasing dose, since pain is not well controlled.   Daily hemolysis labs, including getting LDH, bilis, haptoglobin, CBC with differential.  Regarding the anticoagulation, continue hep gtt until surgical intervention  If surgery to be done, we recommend is possible use least invasive technique if possible.  Avoid hypoxia, cold temperatures in the OR, or any medications that can precipitate sickling.  In the pos-op period high risk for pulmonary complications (atelectasis, acute chest syndrome) and vaso-occlusive crisis. Monitor symptoms closely.   Goal hemoglobin level of 10  (not exceeding 11  to prevent hyper viscosity) in order to reduce the risk of complications during the surgery. Given current Hgb 9.8 no need for transfusion.    Seen and discussed with Dr Ochoa. Hematology will continue to follow.    Toshia Baltazar MD  Hematology Oncology fellow, PGY-5  Pager [phone]

## 2024-06-10 NOTE — POST-PROCEDURE NOTE
MIDLINE PLACEMENT   Pre-Procedure Checklist:  Emergent Line Insertion: No  Type of Line to be Placed: Midline  Consent Obtained: Yes  Emergency Medication Necessary: No  Patient Identified with 2 Independent Identifiers: Yes  Review of Allergies, Anticoagulation, Relevant Labs, ECG/Telemetry: Yes  Risks/Benefits/Alternatives Discussed with Patient/POA/Legal Representative: Yes  Stop Sign on Door: Yes  Time Out Performed: Yes  Catheter Exchange: No     Positioning Checklist:  All People, Including Patient, in the Room with Cap and Mask: Yes  Fluoroscopy Used to Identify Vessel and Guide Insertion: No   Sterile Cover Used: Yes  Full Barrier Precautions Followed (Mask, Cap, Gown, Gloves): Yes  Hands Washed: Yes  Monitors Attached with Sound Alarms On: No  Full Body Sterile Drape (Head-to-Toe) Used to Cover Patient: Yes  Trendelenburg Position (For IJ and Subclavian): No  CHG Skin Prep Used and Allowed to Air Dry to Skin Procedure: Yes     Procedure Checklist:  Blood Aspirated From All Lumens, All Ports Subsequently Flushed: Yes  Catheter Caps Placed on All Lumens; Lumens Clamped: Yes  Maintain Guidewire Control Throughout, Ensuring Guidewire Removal: Yes  Maintain Sterile Field Throughout Insertion: Yes  Catheter Secured: Yes  Confirmatory Test of Venous Placement: Non-Pulsatile Blood     Post Procedure Checklist:  Date and Time Written on Dressing: Yes  Sharp and Wire Count and Safe Disposal of all Sharps/Wires: Yes  Sterile Dressing Applied Per Protocol: Yes       MIDLINE Insertion Details:  Size (Fr): 3  Lumen Type: Single  Catheter to Vein Ratio Less Than 50%: Yes  Total Length (cm): 10  External Length (cm): 0  Orientation: right  Location: basilic  Site Prep: Chlorohexidine; Usual sterile procedure followed  Local Anesthetic: Injectable/Subcutaneous  Indication: Iv medication  Insertion Team Members in the Room: Nurse, LPN  Initial Extremity Circumference (cm): 50  Insertion Attempts:1  Patient Tolerance:  Tolerated Well, Age Appropriate  Comfort Measures: Subcutaneous anesthetic; Verbal  Procedure Location: Bedside  Safety Measures: Patient specific safety measures addressed with RN  Estimated Blood Loss (mL): 1  Vessel Fully Compressible Proximally and Distally to Insertion Site: Yes  Brisk Blood Return Obtained and Line Draws Easily: Yes  Tip Location: distal axilla  Line Confirmation: blood return  Lot #: [id number]  : Bard  Line Exp Date: 9/30/2025  Securement: Stat Lock  Post Procedure Checklist: Handoff with RN; Obtain all new IV tubing prior to use; Bed at lowest level and wheels locked; Line discharge information at bedside.  Additional Details: Line was inserted using Modified Seldinger's Technique.   Placed by: Vanessa Dias RN-[facility]

## 2024-06-10 NOTE — PROGRESS NOTES
[facility]  ACUTE CARE SURGERY - PROGRESS NOTE    Patient Name: Taylor Hampton  MRN: [id number]  Admit Date: 608  : 1995  AGE: 28 y.o.   GENDER: female  ==============================================================================  TODAY'S ASSESSMENT AND PLAN OF CARE:  29yo F with hx of sickle cell disease and PE on xarelto who presents to the ED with RUQ abdominal pain, n/v, decreased PO tolerance, and diarrhea that began two days ago. Patient is afebrile and nontachycardic. Labs remarkable for Tbili 2.7 (likely 2/2 rapid heme turnover). While CT shows normal GB and RUQ US shows cholelithiasis with no signs of GB inflammation, exam and history are consistent with possible acute cholecystitis. Furthermore, patient is at high risk of further gallstone formation in setting of sickle cell disease.     Plan:   - Will obtain HIDA today to further assess.  - start low intensity heparin gtt    Neuro  - pain control with home suboxone  - dilaudid PCA  - home duloxetine  - ketorolac  - robaxin 1g q8h  - lyrica    GI  - NPO, mIVF as patient not tolerating PO well    Heme  - Heme consult for co-management of pain in setting of sickle cell disease  - hep gtt, low intensity, no bolus; holding home AC (xarelto)  - home folic acid, colchicine  - pending sickle labs     ID  - Zosyn  - daily CBC    - LR at 125 mL/hr  - SCDs, activity as tolerated    Discussed with Dr. Ramirez.    Karina Tello, PGY-1  ACS Service  [id number]    ==============================================================================  CHIEF COMPLAINT / EVENTS LAST 24HRS / HPI:  IV access issues overnight, plan for midline this morning. Received 1x dose benadryl for itching. Pain is well controlled on current regimen per patient.     Good UOP, Bmx1.    PHYSICAL EXAM:  Heart Rate:  [50-66]   Temp:  [35.6 °C (96.1 °F)-36.5 °C (97.7 °F)]   Resp:  [17-18]   BP: (100-128)/(63-84)   SpO2:  [95 %-99 %]   Constitutional: no acute  distress  Neuro: A/O x4, no gross deficits   Psych: normal affect  HEENT: No deformities, no scleral icterus   Cardiac: RRR  Pulmonary: unlabored respirations   Abdomen: soft, non distended, TTP in RUQ, no rebound or guarding  Skin: warm and dry overall    Extremities: no swelling noted  MSK: moving all four      IMAGING SUMMARY:  (summary of new imaging findings, not a copy of dictation)  NA    LABS:  Results from last 7 days   Lab Units 06/09/24  0532 06/08/24  0912   WBC AUTO x10*3/uL 10.3 11.3   HEMOGLOBIN g/dL 9.0* 10.1*   HEMATOCRIT % 26.3* 28.3*   PLATELETS AUTO x10*3/uL 242 255   NEUTROS PCT AUTO % 61.9 73.2   LYMPHS PCT AUTO % 31.2 22.1   MONOS PCT AUTO % 5.7 3.9   EOS PCT AUTO % 0.9 0.2     Results from last 7 days   Lab Units 06/08/24  0912   INR  1.3*     Results from last 7 days   Lab Units 06/09/24  0532 06/08/24  0912   SODIUM mmol/L 140 138   POTASSIUM mmol/L 3.5 3.6   CHLORIDE mmol/L 105 105   CO2 mmol/L 23 24   BUN mg/dL 9 11   CREATININE mg/dL 0.85 0.86   CALCIUM mg/dL 8.8 9.0   PROTEIN TOTAL g/dL 6.9 7.3   BILIRUBIN TOTAL mg/dL 2.9* 2.7*   ALK PHOS U/L 68 76   ALT U/L 4* 4*   AST U/L 11 10   GLUCOSE mg/dL 84 105*     Results from last 7 days   Lab Units 06/09/24  0532 06/08/24 0912   BILIRUBIN TOTAL mg/dL 2.9* 2.7*           I have reviewed all medications, laboratory results, and imaging pertinent for today's encounter.

## 2024-06-10 NOTE — NURSING NOTE
George consulted by bedside RN for PIV placement. Vast RN to bedside to assess. Scanned patients bilateral upper extremities via ultrasound, noted vessels bifurcate which inteferes with catheter advance, other vessels dissipate along vessel pathway. Bedside RN made aware, patient will need alternative access.

## 2024-06-10 NOTE — CARE PLAN
The patient's goals for the shift include      The clinical goals for the shift include pt pain will be controlled    Over the shift, the patient did not make progress toward the following goals.

## 2024-06-10 NOTE — PROGRESS NOTES
06/10/24 0958   Discharge Planning   Living Arrangements Alone   Support Systems Parent   Type of Residence Private residence   Do you have animals or pets at home? No   Who is requesting discharge planning? Provider   Home or Post Acute Services None   Patient expects to be discharged to: home   Does the patient need discharge transport arranged? No   Financial Resource Strain   How hard is it for you to pay for the very basics like food, housing, medical care, and heating? Not hard   Housing Stability   In the last 12 months, was there a time when you were not able to pay the mortgage or rent on time? N   In the last 12 months, how many places have you lived? 1   In the last 12 months, was there a time when you did not have a steady place to sleep or slept in a shelter (including now)? N   Transportation Needs   In the past 12 months, has lack of transportation kept you from medical appointments or from getting medications? no   In the past 12 months, has lack of transportation kept you from meetings, work, or from getting things needed for daily living? No   Patient Choice   Provider Choice list and CMS website (https://medicare.gov/care-compare#search) for post-acute Quality and Resource Measure Data were provided and reviewed with: Patient   Patient / Family choosing to utilize agency / facility established prior to hospitalization Yes     Transitional Care Coordination Progress Note:  Patient discussed during interdisciplinary rounds.   Team members present: VICTOR HUGO ALMANZA  Plan per Medical/Surgical team: getting HIDA today and possibly taking out her gall bladder after  Payor: Medicaid  Discharge disposition: home  Potential Barriers: none  ADOD: End of week    Transitional Care Coordinator Note: Met with patient to discuss discharge planning s/p admission.  Patient lives home with alone.  She is independent in all ADL's. Requires no assist devices for ambulation.  Patient does not have active home care or home  care needs.  Demographics and contact information confirmed.  Patient uses [facility] pharmacy [address], [place] [address]  Phone: [phone]. Primary TCC will continue to monitor patient for all home going needs.      Jessica Rick RN TCC.

## 2024-06-11 ENCOUNTER — ANESTHESIA (OUTPATIENT)
Dept: OPERATING ROOM | Facility: HOSPITAL | Age: 29
End: 2024-06-11
Payer: MEDICARE

## 2024-06-11 LAB
ALBUMIN SERPL BCP-MCNC: 3.3 G/DL (ref 3.4–5)
ANION GAP SERPL CALC-SCNC: 13 MMOL/L (ref 10–20)
BASOPHILS # BLD MANUAL: 0 X10*3/UL (ref 0–0.1)
BASOPHILS NFR BLD MANUAL: 0 %
BB ANTIBODY IDENTIFICATION: NORMAL
BILIRUB DIRECT SERPL-MCNC: 0.5 MG/DL (ref 0–0.3)
BILIRUB SERPL-MCNC: 2.3 MG/DL (ref 0–1.2)
BUN SERPL-MCNC: 8 MG/DL (ref 6–23)
CALCIUM SERPL-MCNC: 8.5 MG/DL (ref 8.6–10.6)
CASE #: NORMAL
CHLORIDE SERPL-SCNC: 103 MMOL/L (ref 98–107)
CO2 SERPL-SCNC: 26 MMOL/L (ref 21–32)
CREAT SERPL-MCNC: 0.84 MG/DL (ref 0.5–1.05)
EGFRCR SERPLBLD CKD-EPI 2021: >90 ML/MIN/1.73M*2
EOSINOPHIL # BLD MANUAL: 0.2 X10*3/UL (ref 0–0.7)
EOSINOPHIL NFR BLD MANUAL: 1.7 %
ERYTHROCYTE [DISTWIDTH] IN BLOOD BY AUTOMATED COUNT: 19.9 % (ref 11.5–14.5)
ERYTHROCYTE [DISTWIDTH] IN BLOOD BY AUTOMATED COUNT: 19.9 % (ref 11.5–14.5)
GLUCOSE SERPL-MCNC: 68 MG/DL (ref 74–99)
HAPTOGLOB SERPL-MCNC: 117 MG/DL (ref 37–246)
HCT VFR BLD AUTO: 24.6 % (ref 36–46)
HCT VFR BLD AUTO: 24.6 % (ref 36–46)
HEMOGLOBIN A2: 3.5 % (ref 2–3.5)
HEMOGLOBIN C: 48.2 %
HEMOGLOBIN F: 0.6 % (ref 0–2)
HEMOGLOBIN IDENTIFICATION INTERPRETATION: ABNORMAL
HEMOGLOBIN S: 47.7 %
HGB BLD-MCNC: 8.6 G/DL (ref 12–16)
HGB BLD-MCNC: 8.6 G/DL (ref 12–16)
HOLD SPECIMEN: NORMAL
IMM GRANULOCYTES # BLD AUTO: 0.03 X10*3/UL (ref 0–0.7)
IMM GRANULOCYTES NFR BLD AUTO: 0.3 % (ref 0–0.9)
LDH SERPL L TO P-CCNC: 151 U/L (ref 84–246)
LYMPHOCYTES # BLD MANUAL: 6.89 X10*3/UL (ref 1.2–4.8)
LYMPHOCYTES NFR BLD MANUAL: 57.9 %
MAGNESIUM SERPL-MCNC: 1.9 MG/DL (ref 1.6–2.4)
MCH RBC QN AUTO: 22.5 PG (ref 26–34)
MCH RBC QN AUTO: 22.5 PG (ref 26–34)
MCHC RBC AUTO-ENTMCNC: 35 G/DL (ref 32–36)
MCHC RBC AUTO-ENTMCNC: 35 G/DL (ref 32–36)
MCV RBC AUTO: 64 FL (ref 80–100)
MCV RBC AUTO: 64 FL (ref 80–100)
MONOCYTES # BLD MANUAL: 0.21 X10*3/UL (ref 0.1–1)
MONOCYTES NFR BLD MANUAL: 1.8 %
NEUTS SEG # BLD MANUAL: 4.38 X10*3/UL (ref 1.2–7)
NEUTS SEG NFR BLD MANUAL: 36.8 %
NRBC BLD-RTO: 0 /100 WBCS (ref 0–0)
NRBC BLD-RTO: 0 /100 WBCS (ref 0–0)
PATH REVIEW-HGB IDENTIFICATION: ABNORMAL
PHOSPHATE SERPL-MCNC: 3.1 MG/DL (ref 2.5–4.9)
PLATELET # BLD AUTO: 220 X10*3/UL (ref 150–450)
PLATELET # BLD AUTO: 220 X10*3/UL (ref 150–450)
POTASSIUM SERPL-SCNC: 3.8 MMOL/L (ref 3.5–5.3)
RBC # BLD AUTO: 3.83 X10*6/UL (ref 4–5.2)
RBC # BLD AUTO: 3.83 X10*6/UL (ref 4–5.2)
RBC MORPH BLD: ABNORMAL
SODIUM SERPL-SCNC: 138 MMOL/L (ref 136–145)
TARGETS BLD QL SMEAR: ABNORMAL
TOTAL CELLS COUNTED BLD: 114
UFH PPP CHRO-ACNC: 0.1 IU/ML
UFH PPP CHRO-ACNC: 0.3 IU/ML
VARIANT LYMPHS # BLD MANUAL: 0.21 X10*3/UL (ref 0–0.5)
VARIANT LYMPHS NFR BLD: 1.8 %
WBC # BLD AUTO: 11.9 X10*3/UL (ref 4.4–11.3)
WBC # BLD AUTO: 11.9 X10*3/UL (ref 4.4–11.3)

## 2024-06-11 PROCEDURE — 2500000004 HC RX 250 GENERAL PHARMACY W/ HCPCS (ALT 636 FOR OP/ED)

## 2024-06-11 PROCEDURE — 82247 BILIRUBIN TOTAL: CPT

## 2024-06-11 PROCEDURE — 84100 ASSAY OF PHOSPHORUS: CPT

## 2024-06-11 PROCEDURE — 83735 ASSAY OF MAGNESIUM: CPT

## 2024-06-11 PROCEDURE — 1100000001 HC PRIVATE ROOM DAILY

## 2024-06-11 PROCEDURE — 85520 HEPARIN ASSAY: CPT

## 2024-06-11 PROCEDURE — 83615 LACTATE (LD) (LDH) ENZYME: CPT

## 2024-06-11 PROCEDURE — 82248 BILIRUBIN DIRECT: CPT

## 2024-06-11 PROCEDURE — 2500000001 HC RX 250 WO HCPCS SELF ADMINISTERED DRUGS (ALT 637 FOR MEDICARE OP)

## 2024-06-11 PROCEDURE — 36430 TRANSFUSION BLD/BLD COMPNT: CPT

## 2024-06-11 PROCEDURE — 85027 COMPLETE CBC AUTOMATED: CPT | Mod: 91

## 2024-06-11 PROCEDURE — P9016 RBC LEUKOCYTES REDUCED: HCPCS

## 2024-06-11 PROCEDURE — 85027 COMPLETE CBC AUTOMATED: CPT

## 2024-06-11 PROCEDURE — 83010 ASSAY OF HAPTOGLOBIN QUANT: CPT

## 2024-06-11 PROCEDURE — 85007 BL SMEAR W/DIFF WBC COUNT: CPT

## 2024-06-11 RX ORDER — HYDROMORPHONE HYDROCHLORIDE 1 MG/ML
0.5 INJECTION, SOLUTION INTRAMUSCULAR; INTRAVENOUS; SUBCUTANEOUS ONCE
Status: COMPLETED | OUTPATIENT
Start: 2024-06-11 | End: 2024-06-11

## 2024-06-11 RX ORDER — ACETAMINOPHEN 10 MG/ML
1000 INJECTION, SOLUTION INTRAVENOUS EVERY 6 HOURS SCHEDULED
Status: COMPLETED | OUTPATIENT
Start: 2024-06-11 | End: 2024-06-11

## 2024-06-11 RX ORDER — DIPHENHYDRAMINE HCL 25 MG
25 CAPSULE ORAL ONCE
Status: DISCONTINUED | OUTPATIENT
Start: 2024-06-11 | End: 2024-06-12

## 2024-06-11 RX ADMIN — KETOROLAC TROMETHAMINE 30 MG: 30 INJECTION, SOLUTION INTRAMUSCULAR; INTRAVENOUS at 14:42

## 2024-06-11 RX ADMIN — HYDROMORPHONE HYDROCHLORIDE: 10 INJECTION, SOLUTION INTRAMUSCULAR; INTRAVENOUS; SUBCUTANEOUS at 20:15

## 2024-06-11 RX ADMIN — PIPERACILLIN SODIUM AND TAZOBACTAM SODIUM 3.38 G: 3; .375 INJECTION, SOLUTION INTRAVENOUS at 16:57

## 2024-06-11 RX ADMIN — HYDROMORPHONE HYDROCHLORIDE 0.5 MG: 1 INJECTION, SOLUTION INTRAMUSCULAR; INTRAVENOUS; SUBCUTANEOUS at 05:58

## 2024-06-11 RX ADMIN — PIPERACILLIN SODIUM AND TAZOBACTAM SODIUM 3.38 G: 3; .375 INJECTION, SOLUTION INTRAVENOUS at 22:10

## 2024-06-11 RX ADMIN — ACETAMINOPHEN 1000 MG: 1000 INJECTION INTRAVENOUS at 11:27

## 2024-06-11 RX ADMIN — ONDANSETRON 4 MG: 2 INJECTION INTRAMUSCULAR; INTRAVENOUS at 12:31

## 2024-06-11 RX ADMIN — KETOROLAC TROMETHAMINE 30 MG: 30 INJECTION, SOLUTION INTRAMUSCULAR; INTRAVENOUS at 07:31

## 2024-06-11 RX ADMIN — HEPARIN SODIUM 1000 UNITS/HR: 10000 INJECTION, SOLUTION INTRAVENOUS at 17:37

## 2024-06-11 RX ADMIN — HYDROMORPHONE HYDROCHLORIDE: 10 INJECTION, SOLUTION INTRAMUSCULAR; INTRAVENOUS; SUBCUTANEOUS at 06:44

## 2024-06-11 RX ADMIN — ACETAMINOPHEN 1000 MG: 1000 INJECTION INTRAVENOUS at 18:34

## 2024-06-11 RX ADMIN — ACETAMINOPHEN 1000 MG: 1000 INJECTION INTRAVENOUS at 23:44

## 2024-06-11 RX ADMIN — KETOROLAC TROMETHAMINE 30 MG: 30 INJECTION, SOLUTION INTRAMUSCULAR; INTRAVENOUS at 02:05

## 2024-06-11 RX ADMIN — PIPERACILLIN SODIUM AND TAZOBACTAM SODIUM 3.38 G: 3; .375 INJECTION, SOLUTION INTRAVENOUS at 09:59

## 2024-06-11 RX ADMIN — ONDANSETRON 4 MG: 2 INJECTION INTRAMUSCULAR; INTRAVENOUS at 17:36

## 2024-06-11 RX ADMIN — ACETAMINOPHEN 1000 MG: 1000 INJECTION INTRAVENOUS at 05:58

## 2024-06-11 RX ADMIN — PIPERACILLIN SODIUM AND TAZOBACTAM SODIUM 3.38 G: 3; .375 INJECTION, SOLUTION INTRAVENOUS at 03:55

## 2024-06-11 RX ADMIN — METHOCARBAMOL 1000 MG: 100 INJECTION INTRAMUSCULAR; INTRAVENOUS at 05:58

## 2024-06-11 RX ADMIN — METHOCARBAMOL 1000 MG: 100 INJECTION INTRAMUSCULAR; INTRAVENOUS at 14:42

## 2024-06-11 RX ADMIN — METHOCARBAMOL 1000 MG: 100 INJECTION INTRAMUSCULAR; INTRAVENOUS at 22:00

## 2024-06-11 RX ADMIN — ONDANSETRON 4 MG: 2 INJECTION INTRAMUSCULAR; INTRAVENOUS at 22:00

## 2024-06-11 RX ADMIN — ONDANSETRON 4 MG: 2 INJECTION INTRAMUSCULAR; INTRAVENOUS at 05:58

## 2024-06-11 RX ADMIN — KETOROLAC TROMETHAMINE 30 MG: 30 INJECTION, SOLUTION INTRAMUSCULAR; INTRAVENOUS at 20:15

## 2024-06-11 ASSESSMENT — COGNITIVE AND FUNCTIONAL STATUS - GENERAL
WALKING IN HOSPITAL ROOM: A LITTLE
CLIMB 3 TO 5 STEPS WITH RAILING: A LITTLE
MOBILITY SCORE: 22
DAILY ACTIVITIY SCORE: 24

## 2024-06-11 ASSESSMENT — PAIN - FUNCTIONAL ASSESSMENT
PAIN_FUNCTIONAL_ASSESSMENT: 0-10

## 2024-06-11 ASSESSMENT — PAIN SCALES - GENERAL
PAINLEVEL_OUTOF10: 9
PAINLEVEL_OUTOF10: 8
PAINLEVEL_OUTOF10: 10 - WORST POSSIBLE PAIN
PAINLEVEL_OUTOF10: 10 - WORST POSSIBLE PAIN
PAINLEVEL_OUTOF10: 7

## 2024-06-11 ASSESSMENT — PAIN DESCRIPTION - LOCATION: LOCATION: ABDOMEN

## 2024-06-11 NOTE — CARE PLAN
The patient's goals for the shift include      The clinical goals for the shift include pt will have pain control    Over the shift, the patient did not make progress toward the following goals.

## 2024-06-11 NOTE — NURSING NOTE
PICC team notified patient midline leaking during blood transfusion.  ACS team notified that blood transfusion stopped at this time.

## 2024-06-11 NOTE — PROGRESS NOTES
Called to assess MIDLINE d/t leaking blood around insertion site and blood saturated Guardiva.   Assessed midline with ultrasound and verified patency. Flushed 40mL of NS with no swelling at area of distal tip, and no pain, or sign of infiltration. Changed bandage and statlock then flushed again with no sign of leaking. There is brisk blood return when arm is extended, there is scant blood return when arm is adducted indicating tip is occluded by pressure. Patient advised to rest arm in an abducted position when possible.     Bedside nurse informed of assessment.

## 2024-06-11 NOTE — INTERVAL H&P NOTE
28 y.o. female Hospital Day #3  Procedure(s):  Cholecystectomy Laparoscopy  Pre-Op Diagnosis Codes:     * Acute cholecystitis [K81.0]     HIDA scan reviewed demonstrating to excretion into the gall bladder. Will proceed with cholecystectomy    H&P reviewed. The patient was examined and there are no changes to the H&P.

## 2024-06-11 NOTE — PROGRESS NOTES
[facility]  ACUTE CARE SURGERY - PROGRESS NOTE    Patient Name: Taylor Hampton  MRN: [id number]  Admit Date: 608  : 1995  AGE: 28 y.o.   GENDER: female  ==============================================================================  TODAY'S ASSESSMENT AND PLAN OF CARE:  29yo F with hx of sickle cell disease and PE on xarelto who presents to the ED with RUQ abdominal pain, n/v, decreased PO tolerance, and diarrhea that began two days ago. Patient is afebrile and nontachycardic. Labs remarkable for Tbili 2.7 (likely 2/2 rapid heme turnover). While CT shows normal GB and RUQ US shows cholelithiasis with no signs of GB inflammation, exam and history are consistent with possible acute cholecystitis. Furthermore, patient is at high risk of further gallstone formation in setting of sickle cell disease.     Plan:   - 1u pRBCs to keep Hgb approx 10  - OR delayed to tomorrow, CLD for now and NPO midnight    Neuro  - pain control with home suboxone  - dilaudid PCA  - home duloxetine  - ketorolac  - robaxin 1g q8h  - lyrica    GI  - NPO, mIVF as patient not tolerating PO well    Heme  - Heme consult for co-management of pain in setting of sickle cell disease  - hep gtt, low intensity, no bolus; holding home AC (xarelto)  - home folic acid, colchicine  - Daily hemolysis labs, including getting LDH, bilis, haptoglobin, CBC with differential.    ID  - Zosyn  - daily CBC    - LR at 125 mL/hr  - SCDs, activity as tolerated    Discussed with Dr. Ramirez.    Nelida Nieves MD  [facility]  [id number]    ==============================================================================  CHIEF COMPLAINT / EVENTS LAST 24HRS / HPI:  LACI. Continues to have abdominal pain. Interval of PCA increased    PHYSICAL EXAM:  Heart Rate:  [46-62]   Temp:  [35.5 °C (95.9 °F)-36.3 °C (97.3 °F)]   Resp:  [16-17]   BP: (109-125)/(60-85)   SpO2:  [94 %-98 %]   Constitutional: no acute distress  Neuro: A/O x4, no gross deficits    Psych: normal affect  HEENT: No deformities, no scleral icterus   Cardiac: RRR  Pulmonary: unlabored respirations   Abdomen: soft, non distended, TTP in RUQ, no rebound or guarding  Skin: warm and dry overall    Extremities: no swelling noted  MSK: moving all four      IMAGING SUMMARY:  (summary of new imaging findings, not a copy of dictation)  NA    LABS:  Results from last 7 days   Lab Units 06/11/24  0508 06/10/24  1618 06/09/24  0532 06/08/24  0912   WBC AUTO x10*3/uL 11.9*  11.9* 10.7 10.3 11.3   HEMOGLOBIN g/dL 8.6*  8.6* 9.8* 9.0* 10.1*   HEMATOCRIT % 24.6*  24.6* 29.8* 26.3* 28.3*   PLATELETS AUTO x10*3/uL 220  220 216 242 255   NEUTROS PCT AUTO %  --   --  61.9 73.2   LYMPHO PCT MAN % 57.9  --   --   --    LYMPHS PCT AUTO %  --   --  31.2 22.1   MONO PCT MAN % 1.8  --   --   --    MONOS PCT AUTO %  --   --  5.7 3.9   EOSINO PCT MAN % 1.7  --   --   --    EOS PCT AUTO %  --   --  0.9 0.2     Results from last 7 days   Lab Units 06/10/24  1618 06/08/24  0912   APTT seconds 33  --    INR   --  1.3*     Results from last 7 days   Lab Units 06/11/24  0551 06/11/24  0508 06/10/24  1618 06/09/24  0532 06/08/24  0912   SODIUM mmol/L  --  138 136 140 138   POTASSIUM mmol/L  --  3.8 4.1 3.5 3.6   CHLORIDE mmol/L  --  103 102 105 105   CO2 mmol/L  --  26 22 23 24   BUN mg/dL  --  8 8 9 11   CREATININE mg/dL  --  0.84 0.78 0.85 0.86   CALCIUM mg/dL  --  8.5* 8.8 8.8 9.0   PROTEIN TOTAL g/dL  --   --   --  6.9 7.3   BILIRUBIN TOTAL mg/dL 2.3*  --   --  2.9* 2.7*   ALK PHOS U/L  --   --   --  68 76   ALT U/L  --   --   --  4* 4*   AST U/L  --   --   --  11 10   GLUCOSE mg/dL  --  68* 70* 84 105*     Results from last 7 days   Lab Units 06/11/24  0551 06/09/24  0532 06/08/24  0912   BILIRUBIN TOTAL mg/dL 2.3* 2.9* 2.7*   BILIRUBIN DIRECT mg/dL 0.5*  --   --            I have reviewed all medications, laboratory results, and imaging pertinent for today's encounter.

## 2024-06-12 ENCOUNTER — ANESTHESIA (OUTPATIENT)
Dept: OPERATING ROOM | Facility: HOSPITAL | Age: 29
End: 2024-06-12
Payer: MEDICARE

## 2024-06-12 ENCOUNTER — ANESTHESIA EVENT (OUTPATIENT)
Dept: OPERATING ROOM | Facility: HOSPITAL | Age: 29
End: 2024-06-12
Payer: MEDICARE

## 2024-06-12 PROBLEM — F41.9 ANXIETY: Status: ACTIVE | Noted: 2024-06-12

## 2024-06-12 PROBLEM — F32.A DEPRESSION: Status: ACTIVE | Noted: 2024-06-12

## 2024-06-12 PROBLEM — I26.99 PULMONARY EMBOLUS (MULTI): Status: ACTIVE | Noted: 2024-06-12

## 2024-06-12 LAB
ALBUMIN SERPL BCP-MCNC: 3.8 G/DL (ref 3.4–5)
ANION GAP SERPL CALC-SCNC: 15 MMOL/L (ref 10–20)
BILIRUB DIRECT SERPL-MCNC: 0.5 MG/DL (ref 0–0.3)
BILIRUB SERPL-MCNC: 2.4 MG/DL (ref 0–1.2)
BUN SERPL-MCNC: 6 MG/DL (ref 6–23)
CALCIUM SERPL-MCNC: 9.1 MG/DL (ref 8.6–10.6)
CHLORIDE SERPL-SCNC: 103 MMOL/L (ref 98–107)
CO2 SERPL-SCNC: 25 MMOL/L (ref 21–32)
CREAT SERPL-MCNC: 0.86 MG/DL (ref 0.5–1.05)
EGFRCR SERPLBLD CKD-EPI 2021: >90 ML/MIN/1.73M*2
ERYTHROCYTE [DISTWIDTH] IN BLOOD BY AUTOMATED COUNT: 20.7 % (ref 11.5–14.5)
GLUCOSE SERPL-MCNC: 58 MG/DL (ref 74–99)
HCT VFR BLD AUTO: 29.1 % (ref 36–46)
HGB BLD-MCNC: 10 G/DL (ref 12–16)
IMM GRANULOCYTES # BLD AUTO: 0.03 X10*3/UL (ref 0–0.7)
IMM GRANULOCYTES NFR BLD AUTO: 0.3 % (ref 0–0.9)
LDH SERPL L TO P-CCNC: 167 U/L (ref 84–246)
MAGNESIUM SERPL-MCNC: 2.01 MG/DL (ref 1.6–2.4)
MCH RBC QN AUTO: 23.1 PG (ref 26–34)
MCHC RBC AUTO-ENTMCNC: 34.4 G/DL (ref 32–36)
MCV RBC AUTO: 67 FL (ref 80–100)
NRBC BLD-RTO: 0 /100 WBCS (ref 0–0)
PATH REV-IMMUNOHEMATOLOGY-PR30: NORMAL
PHOSPHATE SERPL-MCNC: 3 MG/DL (ref 2.5–4.9)
PLATELET # BLD AUTO: 220 X10*3/UL (ref 150–450)
POTASSIUM SERPL-SCNC: 3.6 MMOL/L (ref 3.5–5.3)
RBC # BLD AUTO: 4.32 X10*6/UL (ref 4–5.2)
SODIUM SERPL-SCNC: 139 MMOL/L (ref 136–145)
UFH PPP CHRO-ACNC: 0.1 IU/ML
WBC # BLD AUTO: 10.2 X10*3/UL (ref 4.4–11.3)

## 2024-06-12 PROCEDURE — 82248 BILIRUBIN DIRECT: CPT

## 2024-06-12 PROCEDURE — 2500000004 HC RX 250 GENERAL PHARMACY W/ HCPCS (ALT 636 FOR OP/ED)

## 2024-06-12 PROCEDURE — 2500000005 HC RX 250 GENERAL PHARMACY W/O HCPCS

## 2024-06-12 PROCEDURE — 83615 LACTATE (LD) (LDH) ENZYME: CPT

## 2024-06-12 PROCEDURE — 0FT44ZZ RESECTION OF GALLBLADDER, PERCUTANEOUS ENDOSCOPIC APPROACH: ICD-10-PCS | Performed by: SURGERY

## 2024-06-12 PROCEDURE — 85520 HEPARIN ASSAY: CPT

## 2024-06-12 PROCEDURE — 83735 ASSAY OF MAGNESIUM: CPT

## 2024-06-12 PROCEDURE — 2500000001 HC RX 250 WO HCPCS SELF ADMINISTERED DRUGS (ALT 637 FOR MEDICARE OP): Performed by: STUDENT IN AN ORGANIZED HEALTH CARE EDUCATION/TRAINING PROGRAM

## 2024-06-12 PROCEDURE — 2500000001 HC RX 250 WO HCPCS SELF ADMINISTERED DRUGS (ALT 637 FOR MEDICARE OP)

## 2024-06-12 PROCEDURE — 47562 LAPAROSCOPIC CHOLECYSTECTOMY: CPT | Performed by: SURGERY

## 2024-06-12 PROCEDURE — 85027 COMPLETE CBC AUTOMATED: CPT

## 2024-06-12 PROCEDURE — 1100000001 HC PRIVATE ROOM DAILY

## 2024-06-12 PROCEDURE — 99223 1ST HOSP IP/OBS HIGH 75: CPT

## 2024-06-12 PROCEDURE — 76942 ECHO GUIDE FOR BIOPSY: CPT

## 2024-06-12 PROCEDURE — 3600000003 HC OR TIME - INITIAL BASE CHARGE - PROCEDURE LEVEL THREE: Performed by: SURGERY

## 2024-06-12 PROCEDURE — 80069 RENAL FUNCTION PANEL: CPT

## 2024-06-12 PROCEDURE — 7100000002 HC RECOVERY ROOM TIME - EACH INCREMENTAL 1 MINUTE: Performed by: SURGERY

## 2024-06-12 PROCEDURE — 3700000001 HC GENERAL ANESTHESIA TIME - INITIAL BASE CHARGE: Performed by: SURGERY

## 2024-06-12 PROCEDURE — 7100000001 HC RECOVERY ROOM TIME - INITIAL BASE CHARGE: Performed by: SURGERY

## 2024-06-12 PROCEDURE — 2500000004 HC RX 250 GENERAL PHARMACY W/ HCPCS (ALT 636 FOR OP/ED): Performed by: STUDENT IN AN ORGANIZED HEALTH CARE EDUCATION/TRAINING PROGRAM

## 2024-06-12 PROCEDURE — 83010 ASSAY OF HAPTOGLOBIN QUANT: CPT

## 2024-06-12 PROCEDURE — 82247 BILIRUBIN TOTAL: CPT

## 2024-06-12 PROCEDURE — 3600000008 HC OR TIME - EACH INCREMENTAL 1 MINUTE - PROCEDURE LEVEL THREE: Performed by: SURGERY

## 2024-06-12 PROCEDURE — 2720000007 HC OR 272 NO HCPCS: Performed by: SURGERY

## 2024-06-12 PROCEDURE — 3700000002 HC GENERAL ANESTHESIA TIME - EACH INCREMENTAL 1 MINUTE: Performed by: SURGERY

## 2024-06-12 RX ORDER — POTASSIUM CHLORIDE 14.9 MG/ML
20 INJECTION INTRAVENOUS
Status: DISCONTINUED | OUTPATIENT
Start: 2024-06-12 | End: 2024-06-12

## 2024-06-12 RX ORDER — ONDANSETRON HYDROCHLORIDE 2 MG/ML
INJECTION, SOLUTION INTRAVENOUS AS NEEDED
Status: DISCONTINUED | OUTPATIENT
Start: 2024-06-12 | End: 2024-06-12

## 2024-06-12 RX ORDER — METHOCARBAMOL 500 MG/1
1000 TABLET, FILM COATED ORAL EVERY 8 HOURS SCHEDULED
Status: DISCONTINUED | OUTPATIENT
Start: 2024-06-12 | End: 2024-06-22 | Stop reason: HOSPADM

## 2024-06-12 RX ORDER — ROPIVACAINE HYDROCHLORIDE 5 MG/ML
INJECTION, SOLUTION EPIDURAL; INFILTRATION; PERINEURAL AS NEEDED
Status: DISCONTINUED | OUTPATIENT
Start: 2024-06-12 | End: 2024-06-12

## 2024-06-12 RX ORDER — SODIUM CHLORIDE, SODIUM LACTATE, POTASSIUM CHLORIDE, CALCIUM CHLORIDE 600; 310; 30; 20 MG/100ML; MG/100ML; MG/100ML; MG/100ML
100 INJECTION, SOLUTION INTRAVENOUS CONTINUOUS
Status: DISCONTINUED | OUTPATIENT
Start: 2024-06-12 | End: 2024-06-12 | Stop reason: HOSPADM

## 2024-06-12 RX ORDER — FENTANYL CITRATE 50 UG/ML
INJECTION, SOLUTION INTRAMUSCULAR; INTRAVENOUS AS NEEDED
Status: DISCONTINUED | OUTPATIENT
Start: 2024-06-12 | End: 2024-06-12

## 2024-06-12 RX ORDER — HYDROMORPHONE HYDROCHLORIDE 1 MG/ML
0.5 INJECTION, SOLUTION INTRAMUSCULAR; INTRAVENOUS; SUBCUTANEOUS EVERY 5 MIN PRN
Status: DISCONTINUED | OUTPATIENT
Start: 2024-06-12 | End: 2024-06-12 | Stop reason: HOSPADM

## 2024-06-12 RX ORDER — DIPHENHYDRAMINE HYDROCHLORIDE 50 MG/ML
10 INJECTION INTRAMUSCULAR; INTRAVENOUS ONCE
Status: COMPLETED | OUTPATIENT
Start: 2024-06-12 | End: 2024-06-12

## 2024-06-12 RX ORDER — ACETAMINOPHEN 10 MG/ML
INJECTION, SOLUTION INTRAVENOUS AS NEEDED
Status: DISCONTINUED | OUTPATIENT
Start: 2024-06-12 | End: 2024-06-12

## 2024-06-12 RX ORDER — ALBUTEROL SULFATE 0.83 MG/ML
2.5 SOLUTION RESPIRATORY (INHALATION) ONCE AS NEEDED
Status: DISCONTINUED | OUTPATIENT
Start: 2024-06-12 | End: 2024-06-12 | Stop reason: HOSPADM

## 2024-06-12 RX ORDER — NEOSTIGMINE METHYLSULFATE 1 MG/ML
INJECTION, SOLUTION INTRAVENOUS AS NEEDED
Status: DISCONTINUED | OUTPATIENT
Start: 2024-06-12 | End: 2024-06-12

## 2024-06-12 RX ORDER — PROPOFOL 10 MG/ML
INJECTION, EMULSION INTRAVENOUS AS NEEDED
Status: DISCONTINUED | OUTPATIENT
Start: 2024-06-12 | End: 2024-06-12

## 2024-06-12 RX ORDER — METHOCARBAMOL 100 MG/ML
1000 INJECTION, SOLUTION INTRAMUSCULAR; INTRAVENOUS ONCE
Status: COMPLETED | OUTPATIENT
Start: 2024-06-12 | End: 2024-06-12

## 2024-06-12 RX ORDER — HYDROMORPHONE HYDROCHLORIDE 1 MG/ML
0.2 INJECTION, SOLUTION INTRAMUSCULAR; INTRAVENOUS; SUBCUTANEOUS EVERY 5 MIN PRN
Status: DISCONTINUED | OUTPATIENT
Start: 2024-06-12 | End: 2024-06-12 | Stop reason: HOSPADM

## 2024-06-12 RX ORDER — ROCURONIUM BROMIDE 10 MG/ML
INJECTION, SOLUTION INTRAVENOUS AS NEEDED
Status: DISCONTINUED | OUTPATIENT
Start: 2024-06-12 | End: 2024-06-12

## 2024-06-12 RX ORDER — LABETALOL HYDROCHLORIDE 5 MG/ML
5 INJECTION, SOLUTION INTRAVENOUS ONCE AS NEEDED
Status: DISCONTINUED | OUTPATIENT
Start: 2024-06-12 | End: 2024-06-12 | Stop reason: HOSPADM

## 2024-06-12 RX ORDER — DIPHENHYDRAMINE HYDROCHLORIDE 50 MG/ML
25 INJECTION INTRAMUSCULAR; INTRAVENOUS ONCE AS NEEDED
Status: DISCONTINUED | OUTPATIENT
Start: 2024-06-12 | End: 2024-06-12 | Stop reason: HOSPADM

## 2024-06-12 RX ORDER — SODIUM CHLORIDE, SODIUM LACTATE, POTASSIUM CHLORIDE, CALCIUM CHLORIDE 600; 310; 30; 20 MG/100ML; MG/100ML; MG/100ML; MG/100ML
INJECTION, SOLUTION INTRAVENOUS CONTINUOUS PRN
Status: DISCONTINUED | OUTPATIENT
Start: 2024-06-12 | End: 2024-06-12

## 2024-06-12 RX ORDER — ACETAMINOPHEN 325 MG/1
650 TABLET ORAL EVERY 4 HOURS PRN
Status: DISCONTINUED | OUTPATIENT
Start: 2024-06-12 | End: 2024-06-12 | Stop reason: HOSPADM

## 2024-06-12 RX ORDER — OXYCODONE HYDROCHLORIDE 5 MG/1
10 TABLET ORAL EVERY 4 HOURS PRN
Status: DISCONTINUED | OUTPATIENT
Start: 2024-06-12 | End: 2024-06-12 | Stop reason: HOSPADM

## 2024-06-12 RX ORDER — ACETAMINOPHEN 325 MG/1
650 TABLET ORAL EVERY 6 HOURS
Status: DISCONTINUED | OUTPATIENT
Start: 2024-06-12 | End: 2024-06-22 | Stop reason: HOSPADM

## 2024-06-12 RX ORDER — LIDOCAINE HCL/PF 100 MG/5ML
SYRINGE (ML) INTRAVENOUS AS NEEDED
Status: DISCONTINUED | OUTPATIENT
Start: 2024-06-12 | End: 2024-06-12

## 2024-06-12 RX ORDER — HYDROMORPHONE HYDROCHLORIDE 1 MG/ML
INJECTION, SOLUTION INTRAMUSCULAR; INTRAVENOUS; SUBCUTANEOUS AS NEEDED
Status: DISCONTINUED | OUTPATIENT
Start: 2024-06-12 | End: 2024-06-12

## 2024-06-12 RX ORDER — ENOXAPARIN SODIUM 100 MG/ML
60 INJECTION SUBCUTANEOUS EVERY 12 HOURS SCHEDULED
Status: DISCONTINUED | OUTPATIENT
Start: 2024-06-13 | End: 2024-06-21

## 2024-06-12 RX ORDER — LIDOCAINE HYDROCHLORIDE 10 MG/ML
0.1 INJECTION INFILTRATION; PERINEURAL ONCE
Status: DISCONTINUED | OUTPATIENT
Start: 2024-06-12 | End: 2024-06-12 | Stop reason: HOSPADM

## 2024-06-12 RX ORDER — MIDAZOLAM HYDROCHLORIDE 1 MG/ML
INJECTION INTRAMUSCULAR; INTRAVENOUS AS NEEDED
Status: DISCONTINUED | OUTPATIENT
Start: 2024-06-12 | End: 2024-06-12

## 2024-06-12 RX ORDER — ONDANSETRON HYDROCHLORIDE 2 MG/ML
4 INJECTION, SOLUTION INTRAVENOUS ONCE AS NEEDED
Status: DISCONTINUED | OUTPATIENT
Start: 2024-06-12 | End: 2024-06-12 | Stop reason: HOSPADM

## 2024-06-12 RX ORDER — OXYCODONE HYDROCHLORIDE 5 MG/1
5 TABLET ORAL EVERY 4 HOURS PRN
Status: DISCONTINUED | OUTPATIENT
Start: 2024-06-12 | End: 2024-06-12 | Stop reason: HOSPADM

## 2024-06-12 RX ORDER — GLYCOPYRROLATE 0.2 MG/ML
INJECTION INTRAMUSCULAR; INTRAVENOUS AS NEEDED
Status: DISCONTINUED | OUTPATIENT
Start: 2024-06-12 | End: 2024-06-12

## 2024-06-12 RX ADMIN — POTASSIUM CHLORIDE 20 MEQ: 14.9 INJECTION, SOLUTION INTRAVENOUS at 09:54

## 2024-06-12 RX ADMIN — ACETAMINOPHEN 650 MG: 325 TABLET ORAL at 17:43

## 2024-06-12 RX ADMIN — DIPHENHYDRAMINE HYDROCHLORIDE 10 MG: 50 INJECTION, SOLUTION INTRAMUSCULAR; INTRAVENOUS at 01:15

## 2024-06-12 RX ADMIN — SODIUM CHLORIDE, POTASSIUM CHLORIDE, SODIUM LACTATE AND CALCIUM CHLORIDE 125 ML/HR: 600; 310; 30; 20 INJECTION, SOLUTION INTRAVENOUS at 04:59

## 2024-06-12 RX ADMIN — ONDANSETRON 4 MG: 2 INJECTION INTRAMUSCULAR; INTRAVENOUS at 02:00

## 2024-06-12 RX ADMIN — HYDROMORPHONE HYDROCHLORIDE 0.5 MG: 1 INJECTION, SOLUTION INTRAMUSCULAR; INTRAVENOUS; SUBCUTANEOUS at 15:30

## 2024-06-12 RX ADMIN — HYDROMORPHONE HYDROCHLORIDE 0.5 MG: 1 INJECTION, SOLUTION INTRAMUSCULAR; INTRAVENOUS; SUBCUTANEOUS at 14:49

## 2024-06-12 RX ADMIN — HYDROMORPHONE HYDROCHLORIDE: 10 INJECTION, SOLUTION INTRAMUSCULAR; INTRAVENOUS; SUBCUTANEOUS at 15:46

## 2024-06-12 RX ADMIN — PIPERACILLIN SODIUM AND TAZOBACTAM SODIUM 3.38 G: 3; .375 INJECTION, SOLUTION INTRAVENOUS at 03:54

## 2024-06-12 RX ADMIN — HYDROMORPHONE HYDROCHLORIDE 0.5 MG: 1 INJECTION, SOLUTION INTRAMUSCULAR; INTRAVENOUS; SUBCUTANEOUS at 14:44

## 2024-06-12 RX ADMIN — METHOCARBAMOL TABLETS 1000 MG: 500 TABLET, COATED ORAL at 22:40

## 2024-06-12 RX ADMIN — METHOCARBAMOL TABLETS 1000 MG: 500 TABLET, COATED ORAL at 17:43

## 2024-06-12 RX ADMIN — HYDROMORPHONE HYDROCHLORIDE: 10 INJECTION, SOLUTION INTRAMUSCULAR; INTRAVENOUS; SUBCUTANEOUS at 07:35

## 2024-06-12 RX ADMIN — PIPERACILLIN SODIUM AND TAZOBACTAM SODIUM 3.38 G: 3; .375 INJECTION, SOLUTION INTRAVENOUS at 09:54

## 2024-06-12 RX ADMIN — HYDROMORPHONE HYDROCHLORIDE 0.5 MG: 1 INJECTION, SOLUTION INTRAMUSCULAR; INTRAVENOUS; SUBCUTANEOUS at 15:07

## 2024-06-12 RX ADMIN — PREGABALIN 50 MG: 50 CAPSULE ORAL at 22:40

## 2024-06-12 RX ADMIN — HYDROMORPHONE HYDROCHLORIDE 0.5 MG: 1 INJECTION, SOLUTION INTRAMUSCULAR; INTRAVENOUS; SUBCUTANEOUS at 14:39

## 2024-06-12 RX ADMIN — Medication 2 L/MIN: at 16:00

## 2024-06-12 RX ADMIN — METHOCARBAMOL 1000 MG: 100 INJECTION INTRAMUSCULAR; INTRAVENOUS at 14:39

## 2024-06-12 RX ADMIN — METHOCARBAMOL 1000 MG: 100 INJECTION INTRAMUSCULAR; INTRAVENOUS at 05:42

## 2024-06-12 RX ADMIN — HYDROMORPHONE HYDROCHLORIDE 0.5 MG: 1 INJECTION, SOLUTION INTRAMUSCULAR; INTRAVENOUS; SUBCUTANEOUS at 14:54

## 2024-06-12 RX ADMIN — HYDROMORPHONE HYDROCHLORIDE: 10 INJECTION, SOLUTION INTRAMUSCULAR; INTRAVENOUS; SUBCUTANEOUS at 23:13

## 2024-06-12 SDOH — HEALTH STABILITY: MENTAL HEALTH: CURRENT SMOKER: 0

## 2024-06-12 ASSESSMENT — COGNITIVE AND FUNCTIONAL STATUS - GENERAL
WALKING IN HOSPITAL ROOM: A LITTLE
MOBILITY SCORE: 24
CLIMB 3 TO 5 STEPS WITH RAILING: A LITTLE
MOBILITY SCORE: 22
DAILY ACTIVITIY SCORE: 24

## 2024-06-12 ASSESSMENT — PAIN SCALES - GENERAL
PAINLEVEL_OUTOF10: 9
PAINLEVEL_OUTOF10: 0 - NO PAIN
PAINLEVEL_OUTOF10: 10 - WORST POSSIBLE PAIN
PAINLEVEL_OUTOF10: 8
PAINLEVEL_OUTOF10: 9
PAINLEVEL_OUTOF10: 10 - WORST POSSIBLE PAIN
PAINLEVEL_OUTOF10: 9
PAINLEVEL_OUTOF10: 10 - WORST POSSIBLE PAIN
PAIN_LEVEL: 0

## 2024-06-12 ASSESSMENT — PAIN - FUNCTIONAL ASSESSMENT
PAIN_FUNCTIONAL_ASSESSMENT: 0-10
PAIN_FUNCTIONAL_ASSESSMENT: UNABLE TO SELF-REPORT
PAIN_FUNCTIONAL_ASSESSMENT: 0-10
PAIN_FUNCTIONAL_ASSESSMENT: 0-10

## 2024-06-12 NOTE — ANESTHESIA POSTPROCEDURE EVALUATION
Patient: Taylor Hampton    Procedure Summary       Date: 06/12/24 Room / Location: [facility] OR 11 / [facility] OR    Anesthesia Start: 1125 Anesthesia Stop: 1440    Procedure: Cholecystectomy Laparoscopy, Possible Open Cholecystectomy (Abdomen) Diagnosis:       Acute cholecystitis      (Acute cholecystitis [K81.0])    Surgeons: Orville Ramirez MD Responsible Provider: Aroldo Amaral DO    Anesthesia Type: general ASA Status: 3            Anesthesia Type: general    Vitals Value Taken Time   /87 06/12/24 1440   Temp 36.4 06/12/24 1440   Pulse 80 06/12/24 1440   Resp 15 06/12/24 1440   SpO2 95 06/12/24 1440       Anesthesia Post Evaluation    Patient location during evaluation: bedside  Patient participation: complete - patient participated  Level of consciousness: awake and alert  Pain score: 0  Pain management: adequate  Multimodal analgesia pain management approach  Airway patency: patent  Cardiovascular status: acceptable and blood pressure returned to baseline  Respiratory status: acceptable  Hydration status: acceptable  Postoperative Nausea and Vomiting: none        There were no known notable events for this encounter.

## 2024-06-12 NOTE — OP NOTE
Cholecystectomy Laparoscopy, Possible Open Cholecystectomy Operative Note     Date: 2024  OR Location: [facility] OR    Name: Taylor Hampton : 1995, Age: 28 y.o., MRN: [id number], Sex: female    Diagnosis  Pre-op Diagnosis     * Acute cholecystitis [K81.0] Post-op Diagnosis     * Acute cholecystitis [K81.0]     Procedures  Cholecystectomy Laparoscopy, Possible Open Cholecystectomy  [address] - [place] LAPS SURG CHOLECYSTECTOMY W/CHOLANGIOGRAPHY      Surgeons      * Orville Ramirez - Primary    Resident/Fellow/Other Assistant:  Surgeons and Role:     * Randall Mar MD - Resident - Assisting     * Lavon Raymundo MD - Resident - Assisting    Procedure Summary  Anesthesia: General  ASA: III  Anesthesia Staff: Anesthesiologist: Aroldo Amaral DO  Anesthesia Resident: Gennaro Arroyo MD  Estimated Blood Loss: 10 mL  Intra-op Medications:   Administrations occurring from 1030 to 1320 on 24:   Medication Name Total Dose   albuterol 2.5 mg /3 mL (0.083 %) nebulizer solution 2.5 mg Cannot be calculated   buprenorphine-naloxone (Suboxone) 2-0.5 mg per SL tablet 2 tablet Cannot be calculated   colchicine tablet 0.6 mg Cannot be calculated   diphenhydrAMINE (BENADryl) capsule 25 mg Cannot be calculated   DULoxetine (Cymbalta) DR capsule 60 mg Cannot be calculated   folic acid (Folvite) tablet 1 mg Cannot be calculated   lidocaine (Xylocaine) 10 mg/mL (1 %) injection 50 mg Cannot be calculated   methocarbamol (Robaxin) injection 1,000 mg Cannot be calculated   naloxone (Narcan) injection 0.2 mg Cannot be calculated   naloxone (Narcan) injection 0.2 mg Cannot be calculated   ondansetron (Zofran) injection 4 mg Cannot be calculated   piperacillin-tazobactam-dextrose (Zosyn) IV 3.375 g Cannot be calculated   polyethylene glycol (Glycolax, Miralax) packet 17 g Cannot be calculated   pregabalin (Lyrica) capsule 50 mg Cannot be calculated   sennosides (Senokot) tablet 8.6 mg Cannot be calculated   voxelotor (OXBRYTA) tablet 1,500 mg  Cannot be calculated   potassium chloride 20 mEq in 100 mL IV premix Cannot be calculated              Anesthesia Record               Intraprocedure I/O Totals          Intake    Dexmedetomidine 0.00 mL    The total shown is the total volume documented since Anesthesia Start was filed.    lactated Ringer's 600.00 mL    acetaminophen (Ofirmev) injection 100.00 mL    Total Intake 700 mL          Specimen:   ID Type Source Tests Collected by Time   1 : Gallbladder Tissue GALLBLADDER CHOLECYSTECTOMY SURGICAL PATHOLOGY EXAM Orville Ramirez MD 6/12/2024 1315        Staff:   Circulator: Giovanna  Circulator: Yolande Guzmanub Person: Natalie Ballard Person: Valentina Ayala Circulator: Audrey Ayala Scrub: Giovanna         Drains and/or Catheters: * None in log *    Tourniquet Times:         Implants:     Findings:  Grade I cholecystitis    Indications: Taylor Hampton is an 28 y.o. female who is having surgery for Acute cholecystitis [K81.0].     The patient was seen in the preoperative area. The risks, benefits, complications, treatment options, non-operative alternatives, expected recovery and outcomes were discussed with the patient. The possibilities of reaction to medication, pulmonary aspiration, injury to surrounding structures, bleeding, recurrent infection, the need for additional procedures, failure to diagnose a condition, and creating a complication requiring transfusion or operation were discussed with the patient. The patient concurred with the proposed plan, giving informed consent.  The site of surgery was properly noted/marked if necessary per policy. The patient has been actively warmed in preoperative area. Preoperative antibiotics have been ordered and given within 1 hours of incision. Venous thrombosis prophylaxis have been ordered including bilateral sequential compression devices    Procedure Details: Patient was properly identified in preop and taken to the operating room and placed on operating table in  the supine position.  After adequate general endotracheal anesthesia and placement of bilateral lower extremity pneumatic compression devices the abdomen was then prepped and draped in the standard surgical fashion.  A infra-umbilical incision was made with a #15 scalpel blade.  The incision was carried down to the underlying adipose tissue layers down to the level of the anterior abdominal wall fascia.  The anterior abdominal wall fascia was grasped between clamps and incised with a #15 scalpel blade.  The incision was carried down to the level of the peritoneal lining which was grasped between clamps and incised with Metzenbaum scissors gaining access to the intra-abdominal cavity.  Following placement of stay sutures a Sood trocar was then placed without difficulty.  CO2 gas was then used to insufflate the abdomen creating an adequate pneumoperitoneum.  Next under direct visualization a 10 mm trocar was then placed in the subxiphoid area followed by two 5 mm trocars placed in the right lateral abdomen.  Via one of the laterally placed trocars a grasper was then used to grasp the fundus of the gallbladder and this was elevated towards the diaphragm.  Via the second laterally placed trocar site a grasper was then used to grasp the infundibulum and with downward and lateral traction the triangle of Calot was then adequately exposed.  Attention was then turned towards the right upper quadrant where an acutely inflamed gallbladder was encountered.  Using a [place] dissector via the subxiphoid trocar site the triangle of Calot was identified and dissected in its entirety with identification of the cystic duct and cystic artery traversing their normal anatomic paths.  Both the cystic duct and cystic arteries were then clipped distally and proximally and divided between clips within EndoShears.  Next the gallbladder was then dissected from the liver bed via electric Bovie cautery.  After complete excision the  gallbladder was then placed within an Endo Catch bag and removed via the supra umbilical trocar site. After successful of the removal of the gallbladder the trocar was then replaced and the laparoscope reintroduced.  Under direct visualization the liver bed was inspected and found to be hemostatic.  The previously cut clipped amputated cystic duct and cystic artery were noted to have clips in place and no evidence of bile leak or bleeding respectively.  The right upper quadrant was then irrigated with saline till clear and suctioned dry.  The liver bed was then dressed with a Surgicel dressing to assist with nonsurgical bleeding.  After hemostasis was ensured all trocars were then removed under direct visualization without any gross evidence of bleeding from the trocar sites.  The supraumbilical trocar and laparoscope were then removed in their entirety allowing for resolution of the pneumoperitoneum.  Next the fascial edges of the supraumbilical trocar site were then reapproximated with interrupted 0 Vicryl sutures.  All trocar sites were then cleaned and dried followed by reapproximation of the skin edges with 4-0 Monocryl sutures in a subcuticular fashion.  All skin repairs were cleaned and dried followed by placement of Dermabond.     Complications:  None; patient tolerated the procedure well.    Disposition: PACU - hemodynamically stable.  Condition: stable         Additional Details:     Attending Attestation: I was present and scrubbed for the entire procedure.    Orville Ramirez  Phone Number: [phone]

## 2024-06-12 NOTE — BRIEF OP NOTE
Date: 2024 - 2024  OR Location: [facility] OR    Name: Taylor Hampton : 1995, Age: 28 y.o., MRN: [id number], Sex: female    Diagnosis  Pre-op Diagnosis     * Acute cholecystitis [K81.0] Post-op Diagnosis     * Acute cholecystitis [K81.0]     Procedures  Laparoscopic cholecystectomy     Surgeons      * Orville Ramirez - Primary    Resident/Fellow/Other Assistant:  Surgeons and Role:     * Randall Mar MD - Resident - Assisting     * Lavon Raymundo MD - Resident - Assisting    Procedure Summary  Anesthesia: General  ASA: III  Anesthesia Staff: Anesthesiologist: Aroldo Amaral DO  Anesthesia Resident: Gennaro Arroyo MD  Estimated Blood Loss: 10mL  Intra-op Medications:   Administrations occurring from 1030 to 1320 on 24:   Medication Name Total Dose   albuterol 2.5 mg /3 mL (0.083 %) nebulizer solution 2.5 mg Cannot be calculated   buprenorphine-naloxone (Suboxone) 2-0.5 mg per SL tablet 2 tablet Cannot be calculated   colchicine tablet 0.6 mg Cannot be calculated   diphenhydrAMINE (BENADryl) capsule 25 mg Cannot be calculated   DULoxetine (Cymbalta) DR capsule 60 mg Cannot be calculated   folic acid (Folvite) tablet 1 mg Cannot be calculated   lidocaine (Xylocaine) 10 mg/mL (1 %) injection 50 mg Cannot be calculated   methocarbamol (Robaxin) injection 1,000 mg Cannot be calculated   naloxone (Narcan) injection 0.2 mg Cannot be calculated   naloxone (Narcan) injection 0.2 mg Cannot be calculated   ondansetron (Zofran) injection 4 mg Cannot be calculated   piperacillin-tazobactam-dextrose (Zosyn) IV 3.375 g Cannot be calculated   polyethylene glycol (Glycolax, Miralax) packet 17 g Cannot be calculated   pregabalin (Lyrica) capsule 50 mg Cannot be calculated   sennosides (Senokot) tablet 8.6 mg Cannot be calculated   voxelotor (OXBRYTA) tablet 1,500 mg Cannot be calculated   potassium chloride 20 mEq in 100 mL IV premix Cannot be calculated              Anesthesia Record               Intraprocedure I/O  Totals          Intake    Dexmedetomidine 0.00 mL    The total shown is the total volume documented since Anesthesia Start was filed.    acetaminophen (Ofirmev) injection 100.00 mL    Total Intake 100 mL          Specimen:   ID Type Source Tests Collected by Time   1 : Gallbladder Tissue GALLBLADDER CHOLECYSTECTOMY SURGICAL PATHOLOGY EXAM Orville Ramirez MD 6/12/2024 0489        Staff:   Sinulator: Giovanna  Circulator: Yolande Ballard Person: Natalie Ballard Person: Valentina Ayala Circulator: Audrey Ayala Scrub: Giovanna          Findings: fatty replaced liver, acutely inflammed gallbaldder    Complications:  None; patient tolerated the procedure well.     Disposition: PACU - hemodynamically stable.  Condition: stable  Specimens Collected:   ID Type Source Tests Collected by Time   1 : Gallbladder Tissue GALLBLADDER CHOLECYSTECTOMY SURGICAL PATHOLOGY EXAM Orville Ramirez MD 6/12/2024 9850     Attending Attestation:     Orville Ramirez  Phone Number: [phone]

## 2024-06-12 NOTE — ANESTHESIA PREPROCEDURE EVALUATION
Patient: Taylor Hampton    Procedure Information       Date/Time: 06/12/24 1030    Procedure: Cholecystectomy Laparoscopy, Possible Cholangiogram, Possible Open Cholecystectomy    Location: [facility] OR 11 / [facility] OR    Surgeons: Orville Ramirez MD            Relevant Problems   Anesthesia (within normal limits)      Cardiac  Acute chest @ 8-8yo   (+) Chest pain at rest      Pulmonary   (+) Pulmonary embolus (Multi)      Neuro   (+) Anxiety   (+) Depression      GI  Acute Cholecystitis      /Renal (within normal limits)      Liver   (+) Acute cholecystitis   (+) Symptomatic cholelithiasis      Hematology   (+) Sickle cell disease with crisis (Multi)       Clinical information reviewed:    Allergies  Meds               NPO Detail:  No data recorded     Physical Exam    Airway  Mallampati: II  TM distance: >3 FB  Neck ROM: full     Cardiovascular   Rhythm: regular  Rate: normal     Dental    Pulmonary   Breath sounds clear to auscultation     Abdominal            Anesthesia Plan    History of general anesthesia?: yes  History of complications of general anesthesia?: no    ASA 3     general     The patient is not a current smoker.    intravenous induction   Postoperative administration of opioids is intended.  Trial extubation is planned.  Anesthetic plan and risks discussed with patient.  Use of blood products discussed with patient who consented to blood products.    Plan discussed with attending.

## 2024-06-12 NOTE — H&P
"History Of Present Illness  Taylor Hampton is a 28 y.o. female presenting with acute cholecystitis.     Past Medical History  Past Medical History:   Diagnosis Date    Sickle cell anemia with crisis (Multi)        Surgical History  Past Surgical History:   Procedure Laterality Date    CT ABDOMEN PELVIS ANGIOGRAM W AND/OR WO IV CONTRAST  4/9/2020    CT ABDOMEN PELVIS ANGIOGRAM W AND/OR WO IV CONTRAST 4/9/2020 [facility]    CT ANGIO NECK  9/27/2019    CT NECK ANGIO W AND WO IV CONTRAST 9/27/2019 [facility]    IR CVC TUNNELED  8/24/2020    IR CVC TUNNELED 8/24/2020 [facility]    OTHER SURGICAL HISTORY  09/22/2020    Venous access port placement    OTHER SURGICAL HISTORY  09/22/2020    Tonsillectomy with adenoidectomy    OTHER SURGICAL HISTORY  09/22/2020    Knee surgery        Social History  She reports that she has never smoked. She has never used smokeless tobacco. She reports that she does not drink alcohol and does not use drugs.    Family History  No family history on file.     Allergies  Crizanlizumab-tmca, Meperidine, Sulfamethoxazole-trimethoprim, Trimethoprim, Adhesive, Coconut, and Sulfamethoxazole    Review of Systems   Negative unless otherwise specified in HPI.     Physical Exam   Constitutional: no acute distress  Neuro: A/O x4, no gross deficits   Psych: normal affect  HEENT: No deformities, no scleral icterus   Cardiac: RRR  Pulmonary: unlabored respirations   Abdomen: soft, non distended, RUQ tender  Skin: warm and dry overall    Extremities: no swelling noted  MSK: moving all four    Last Recorded Vitals  Blood pressure 136/83, pulse 53, temperature 35.7 °C (96.3 °F), temperature source Temporal, resp. rate 16, height 1.6 m (5' 2.99\"), weight 143 kg (315 lb 0.6 oz), SpO2 96%.    Relevant Results      Reviewed HIDA     Assessment/Plan   Principal Problem:    Acute cholecystitis  Active Problems:    Symptomatic cholelithiasis      28 y.o. female Hospital Day " #4  Procedure(s):  Cholecystectomy Laparoscopy, Possible Cholangiogram, Possible Open Cholecystectomy  Pre-Op Diagnosis Codes:     * Acute cholecystitis [K81.0]       Seen and discussed with Dr. James Raymundo MD  Acute care surgery [id number]

## 2024-06-12 NOTE — PROCEDURES
Peripheral Block    Patient location during procedure: pre-op  Start time: 6/12/2024 2:18 PM  End time: 6/12/2024 2:28 PM  Reason for block: at surgeon's request and post-op pain management  Staffing  Performed: resident   Authorized by: Courtney Joy MD    Performed by: Courtney Joy MD  Preanesthetic Checklist  Completed: patient identified, IV checked, site marked, risks and benefits discussed, surgical consent, monitors and equipment checked, pre-op evaluation and timeout performed   Timeout performed at: 6/12/2024 2:18 PM  Peripheral Block  Patient position: laying flat  Prep: ChloraPrep  Patient monitoring: heart rate and continuous pulse ox  Block type: QL  Laterality: B/L  Injection technique: single-shot  Guidance: ultrasound guided  Local infiltration: ropivacaine  Infiltration strength: 0.5 %  Dose: 30 mL  Needle  Needle type: Tuohy   Needle gauge: 26 G  Needle length: 8 cm  Needle localization: ultrasound guidance     image stored in chart  Assessment  Injection assessment: negative aspiration for heme, no paresthesia on injection, incremental injection and local visualized surrounding nerve on ultrasound  Additional Notes  QL single shot. informed consent obtained. risks and benefits discussed. ASA monitors placed, timeout performed. Pt positioned, prepped with chlorhexidine, draped with sterile towels. Ultrasound guidance used with visualization of the needle throughout duration of the procedure. Aspiration was negative. A total of 30 cc 0.5% ropivacaine, 50mcg epinephrine, and 4mg decadron injected between both sides. Patient tolerated procedure well.     Timeout by OR nurse

## 2024-06-12 NOTE — CARE PLAN
Problem: Pain - Adult  Goal: Verbalizes/displays adequate comfort level or baseline comfort level  Outcome: Progressing     Problem: Safety - Adult  Goal: Free from fall injury  Outcome: Progressing     Problem: Discharge Planning  Goal: Discharge to home or other facility with appropriate resources  Outcome: Progressing     Problem: Chronic Conditions and Co-morbidities  Goal: Patient's chronic conditions and co-morbidity symptoms are monitored and maintained or improved  Outcome: Progressing     Problem: Pain  Goal: Takes deep breaths with improved pain control throughout the shift  Outcome: Progressing  Goal: Turns in bed with improved pain control throughout the shift  Outcome: Progressing  Goal: Walks with improved pain control throughout the shift  Outcome: Progressing  Goal: Performs ADL's with improved pain control throughout shift  Outcome: Progressing  Goal: Participates in PT with improved pain control throughout the shift  Outcome: Progressing  Goal: Free from opioid side effects throughout the shift  Outcome: Progressing  Goal: Free from acute confusion related to pain meds throughout the shift  Outcome: Progressing     Problem: Pain  Goal: My pain/discomfort is manageable  Outcome: Progressing     Problem: Safety  Goal: Patient will be injury free during hospitalization  Outcome: Progressing  Goal: I will remain free of falls  Outcome: Progressing     Problem: Daily Care  Goal: Daily care needs are met  Outcome: Progressing     Problem: Psychosocial Needs  Goal: Demonstrates ability to cope with hospitalization/illness  Outcome: Progressing  Goal: Collaborate with me, my family, and caregiver to identify my specific goals  Outcome: Progressing     Problem: Discharge Barriers  Goal: My discharge needs are met  Outcome: Progressing   The patient's goals for the shift include

## 2024-06-12 NOTE — ANESTHESIA PROCEDURE NOTES
Airway  Date/Time: 6/12/2024 11:45 AM  Urgency: elective    Airway not difficult    Staffing  Performed: resident   Authorized by: Aroldo Amaral DO    Performed by: Gennaro Arroyo MD  Patient location during procedure: OR    Indications and Patient Condition  Indications for airway management: anesthesia  Spontaneous ventilation: present  Sedation level: deep  Preoxygenated: yes  Patient position: sniffing  Mask difficulty assessment: 1 - vent by mask  Planned trial extubation    Final Airway Details  Final airway type: endotracheal airway      Successful airway: ETT  Cuffed: yes   Successful intubation technique: direct laryngoscopy  Facilitating devices/methods: intubating stylet  Endotracheal tube insertion site: oral  Blade: Marian  Blade size: #3  ETT size (mm): 7.0  Cormack-Lehane Classification: grade I - full view of glottis  Placement verified by: chest auscultation and capnometry   Measured from: teeth  ETT to teeth (cm): 20  Number of attempts at approach: 1  Number of other approaches attempted: 0

## 2024-06-12 NOTE — CONSULTS
Taylor Hampton is a 28 y.o. year old female patient w/ sickle cell disease (on suboxone at home) who presents for lap dao with Dr. Navarrete on 6/12. Acute Pain consulted for block for postoperative pain control.     Anticipated Postop Pain Issues -   Palliative: typically relieved with IV analgesics and regional local anesthetics  Provocative: typically with movement  Quality: typically burning and aching  Radiation: typically none  Severity: typically severe 8-10/10  Timing: typically constant    Past Medical History:   Diagnosis Date    Sickle cell anemia with crisis (Multi)         Past Surgical History:   Procedure Laterality Date    CT ABDOMEN PELVIS ANGIOGRAM W AND/OR WO IV CONTRAST  4/9/2020    CT ABDOMEN PELVIS ANGIOGRAM W AND/OR WO IV CONTRAST 4/9/2020 [facility]    CT ANGIO NECK  9/27/2019    CT NECK ANGIO W AND WO IV CONTRAST 9/27/2019 [facility]    IR CVC TUNNELED  8/24/2020    IR CVC TUNNELED 8/24/2020 [facility]    OTHER SURGICAL HISTORY  09/22/2020    Venous access port placement    OTHER SURGICAL HISTORY  09/22/2020    Tonsillectomy with adenoidectomy    OTHER SURGICAL HISTORY  09/22/2020    Knee surgery        No family history on file.     Social History     Socioeconomic History    Marital status: Single     Spouse name: Not on file    Number of children: Not on file    Years of education: Not on file    Highest education level: Not on file   Occupational History    Not on file   Tobacco Use    Smoking status: Never    Smokeless tobacco: Never   Vaping Use    Vaping status: Never Used   Substance and Sexual Activity    Alcohol use: Never    Drug use: Never    Sexual activity: Defer   Other Topics Concern    Not on file   Social History Narrative    Not on file     Social Determinants of Health     Financial Resource Strain: Low Risk  (6/10/2024)    Overall Financial Resource Strain (CARDIA)     Difficulty of Paying Living Expenses: Not hard at all   Food Insecurity: No  Food Insecurity (5/30/2024)    Received from [facility]    Hunger Vital Sign     Worried About Running Out of Food in the Last Year: Never true     Ran Out of Food in the Last Year: Never true   Transportation Needs: No Transportation Needs (6/10/2024)    PRAPARE - Transportation     Lack of Transportation (Medical): No     Lack of Transportation (Non-Medical): No   Physical Activity: Unknown (4/8/2021)    Received from [facility]    Exercise Vital Sign     Days of Exercise per Week: 0 days     Minutes of Exercise per Session: Not on file   Stress: No Stress Concern Present (4/8/2021)    Received from [facility]    [place] [employer] - Occupational Stress Questionnaire     Feeling of Stress : Only a little   Social Connections: Unknown (10/15/2019)    Received from [facility]    Social Connection and Isolation Panel [NHANES]     Frequency of Communication with Friends and Family: More than three times a week     Frequency of Social Gatherings with Friends and Family: Twice a week     Attends Yazidi Services: Not on file     Active Member of Clubs or Organizations: Not on file     Attends Club or Organization Meetings: Not on file     Marital Status: Never    Intimate Partner Violence: Unknown (3/24/2024)    Received from [employer]    Humiliation, Afraid, Rape, and Kick questionnaire     Fear of Current or Ex-Partner: No     Emotionally Abused: No     Physically Abused: No     Sexually Abused: Not on file   Housing Stability: Low Risk  (6/10/2024)    Housing Stability Vital Sign     Unable to Pay for Housing in the Last Year: No     Number of Places Lived in the Last Year: 1     Unstable Housing in the Last Year: No        Allergies   Allergen Reactions    Crizanlizumab-Tmca Anaphylaxis    Meperidine Hives and Rash    Sulfamethoxazole-Trimethoprim Hives and Rash     Rash, itchy    Trimethoprim Hives    Adhesive Dermatitis, Itching and Rash    Coconut Rash     Sulfamethoxazole Hives and Rash         Review of Systems  Gen: No fatigue, anorexia, insomnia, fever.   Eyes: No vision loss, double vision, drainage, eye pain.   ENT: No pharyngitis, dry mouth, no hearing changes or ear discharge  Cardiac: No chest pain, palpitations, syncope, near syncope.   Pulmonary: No shortness of breath, cough, hemoptysis.   Heme/lymph: No swollen glands, fever, bleeding.   GI: No abdominal pain, change in bowel habits, melena, hematemesis, hematochezia, nausea, vomiting, diarrhea.   : No discharge, dysuria, frequency, urgency, hematuria.  Endo: No polyuria or weight loss.   Musculoskeletal: Negative for any pain or loss of ROM/weakness  Skin: No rashes or lesions  Neuro: Normal speech, no numbness or weakness. No gait difficulties  Review of systems is otherwise negative unless stated above or in history of present illness.    Physical Exam:  Constitutional:  no distress, alert and cooperative  Eyes: clear sclera  Head/Neck: No apparent injury, trachea midline  Respiratory/Thorax: Patent airways, thorax symmetric, breathing comfortably  Cardiovascular: no pitting edema  Gastrointestinal: Nondistended  Musculoskeletal: ROM intact  Extremities: no clubbing  Neurological: alert, karimi x4  Psychological: Appropriate affect    Results for orders placed or performed during the hospital encounter of 06/08/24 (from the past 24 hour(s))   Heparin Assay, UFH   Result Value Ref Range    Heparin Unfractionated 0.1 See Comment Below for Therapeutic Ranges IU/mL   Heparin Assay, UFH   Result Value Ref Range    Heparin Unfractionated 0.1 See Comment Below for Therapeutic Ranges IU/mL   Renal Function Panel   Result Value Ref Range    Glucose 58 (L) 74 - 99 mg/dL    Sodium 139 136 - 145 mmol/L    Potassium 3.6 3.5 - 5.3 mmol/L    Chloride 103 98 - 107 mmol/L    Bicarbonate 25 21 - 32 mmol/L    Anion Gap 15 10 - 20 mmol/L    Urea Nitrogen 6 6 - 23 mg/dL    Creatinine 0.86 0.50 - 1.05 mg/dL    eGFR >90 >60  mL/min/1.73m*2    Calcium 9.1 8.6 - 10.6 mg/dL    Phosphorus 3.0 2.5 - 4.9 mg/dL    Albumin 3.8 3.4 - 5.0 g/dL   Magnesium   Result Value Ref Range    Magnesium 2.01 1.60 - 2.40 mg/dL   CBC and Auto Differential   Result Value Ref Range    WBC 10.2 4.4 - 11.3 x10*3/uL    nRBC 0.0 0.0 - 0.0 /100 WBCs    RBC 4.32 4.00 - 5.20 x10*6/uL    Hemoglobin 10.0 (L) 12.0 - 16.0 g/dL    Hematocrit 29.1 (L) 36.0 - 46.0 %    MCV 67 (L) 80 - 100 fL    MCH 23.1 (L) 26.0 - 34.0 pg    MCHC 34.4 32.0 - 36.0 g/dL    RDW 20.7 (H) 11.5 - 14.5 %    Platelets 220 150 - 450 x10*3/uL    Immature Granulocytes %, Automated 0.3 0.0 - 0.9 %    Immature Granulocytes Absolute, Automated 0.03 0.00 - 0.70 x10*3/uL   Bilirubin, Total   Result Value Ref Range    Bilirubin, Total 2.4 (H) 0.0 - 1.2 mg/dL   Bilirubin, Direct   Result Value Ref Range    Bilirubin, Direct 0.5 (H) 0.0 - 0.3 mg/dL   Lactate dehydrogenase   Result Value Ref Range     84 - 246 U/L   Prepare RBC: 1 Units, Leukocytes Reduced (CMV reduced risk), Hgb S Negative   Result Value Ref Range    PRODUCT CODE J6983I87     Unit Number [id number]     Unit ABO O     Unit RH POS     XM INTEP COMP     Dispense Status XM     Blood Expiration Date July 09, 2024 23:59 EDT     PRODUCT BLOOD TYPE 5100     UNIT VOLUME 350       Taylor Hampton is a 28 y.o. year old female patient w/ sickle cell disease (on suboxone at home) who presents for neeru dc with Dr. Navarrete on 6/12. Acute Pain consulted for block for postoperative pain control.     Plan:  - BL QL SS blocks performed post-operatively on 6/12  - Please be aware of local anesthetic toxic dose and absorption variability before considering lidocaine patches  - Acute pain service will follow   - Rest of pain management per primary team    Acute Pain Resident  pg [phone] ph [phone]

## 2024-06-12 NOTE — SIGNIFICANT EVENT
Postoperative Check    S:    POD 0 from a laparoscopic cholecystectomy. Patient endorses back, chest, abdominal, and leg pain unchanged from prior. Denies nausea or vomiting. Has urinated since OR but has not passed flatus or made a bowel movement. States she is hungry and wants to eat.    O:   Vital signs are stable, normotensive, afebrile, no new or worsening oxygen requirement, not tachycardic  Visit Vitals  /73   Pulse 64   Temp 36.5 °C (97.7 °F) (Temporal)   Resp 16      Constitutional: no acute distress, uncomfortable  Cardiac: nontachycardic  Pulmonary: Unlabored respirations on room air  Abdomen: obese, soft, nondistended, appropriately TTP in RUQ, lap incisions c/d/I covered with dermabond  GI: Voiding    A/P:  Overall, patient is doing well postoperatively with no acute concerns. Pain is similar to endorsed sickle cell pain prior to surgery. Pain control with tylenol and robaxin hardik and PCA. Start regular diet.    Nelida Nieves MD  ACS  [id number]

## 2024-06-13 LAB
ALBUMIN SERPL BCP-MCNC: 3.7 G/DL (ref 3.4–5)
ANION GAP SERPL CALC-SCNC: 15 MMOL/L (ref 10–20)
BASOPHILS # BLD MANUAL: 0 X10*3/UL (ref 0–0.1)
BASOPHILS NFR BLD MANUAL: 0 %
BILIRUB DIRECT SERPL-MCNC: 0.5 MG/DL (ref 0–0.3)
BILIRUB SERPL-MCNC: 1.7 MG/DL (ref 0–1.2)
BLASTS # BLD MANUAL: 0 X10*3/UL
BLASTS NFR BLD MANUAL: 0 %
BLOOD EXPIRATION DATE: NORMAL
BUN SERPL-MCNC: 9 MG/DL (ref 6–23)
CALCIUM SERPL-MCNC: 9 MG/DL (ref 8.6–10.6)
CHLORIDE SERPL-SCNC: 102 MMOL/L (ref 98–107)
CO2 SERPL-SCNC: 23 MMOL/L (ref 21–32)
CREAT SERPL-MCNC: 0.83 MG/DL (ref 0.5–1.05)
DISPENSE STATUS: NORMAL
EGFRCR SERPLBLD CKD-EPI 2021: >90 ML/MIN/1.73M*2
EOSINOPHIL # BLD MANUAL: 0 X10*3/UL (ref 0–0.7)
EOSINOPHIL NFR BLD MANUAL: 0 %
ERYTHROCYTE [DISTWIDTH] IN BLOOD BY AUTOMATED COUNT: 21.1 % (ref 11.5–14.5)
GLUCOSE SERPL-MCNC: 139 MG/DL (ref 74–99)
HAPTOGLOB SERPL-MCNC: 95 MG/DL (ref 37–246)
HCT VFR BLD AUTO: 29.6 % (ref 36–46)
HGB BLD-MCNC: 10.1 G/DL (ref 12–16)
IMM GRANULOCYTES # BLD AUTO: 0.09 X10*3/UL (ref 0–0.7)
IMM GRANULOCYTES NFR BLD AUTO: 0.5 % (ref 0–0.9)
LYMPHOCYTES # BLD MANUAL: 1.34 X10*3/UL (ref 1.2–4.8)
LYMPHOCYTES NFR BLD MANUAL: 8 %
MAGNESIUM SERPL-MCNC: 1.69 MG/DL (ref 1.6–2.4)
MCH RBC QN AUTO: 22.6 PG (ref 26–34)
MCHC RBC AUTO-ENTMCNC: 34.1 G/DL (ref 32–36)
MCV RBC AUTO: 66 FL (ref 80–100)
METAMYELOCYTES # BLD MANUAL: 0 X10*3/UL
METAMYELOCYTES NFR BLD MANUAL: 0 %
MONOCYTES # BLD MANUAL: 0.84 X10*3/UL (ref 0.1–1)
MONOCYTES NFR BLD MANUAL: 5 %
MYELOCYTES # BLD MANUAL: 0 X10*3/UL
MYELOCYTES NFR BLD MANUAL: 0 %
NEUTROPHILS # BLD MANUAL: 14.44 X10*3/UL (ref 1.2–7.7)
NEUTS BAND # BLD MANUAL: 1.34 X10*3/UL (ref 0–0.7)
NEUTS BAND NFR BLD MANUAL: 8 %
NEUTS SEG # BLD MANUAL: 13.1 X10*3/UL (ref 1.2–7)
NEUTS SEG NFR BLD MANUAL: 78 %
NRBC BLD-RTO: 0 /100 WBCS (ref 0–0)
PHOSPHATE SERPL-MCNC: 2 MG/DL (ref 2.5–4.9)
PLASMA CELLS # BLD MANUAL: 0 X10*3/UL
PLASMA CELLS NFR BLD MANUAL: 0 %
PLATELET # BLD AUTO: 255 X10*3/UL (ref 150–450)
POTASSIUM SERPL-SCNC: 3.8 MMOL/L (ref 3.5–5.3)
PRODUCT BLOOD TYPE: 5100
PRODUCT CODE: NORMAL
PROMYELOCYTES # BLD MANUAL: 0 X10*3/UL
PROMYELOCYTES NFR BLD MANUAL: 0 %
RBC # BLD AUTO: 4.46 X10*6/UL (ref 4–5.2)
RBC MORPH BLD: ABNORMAL
SCHISTOCYTES BLD QL SMEAR: ABNORMAL
SODIUM SERPL-SCNC: 136 MMOL/L (ref 136–145)
TARGETS BLD QL SMEAR: ABNORMAL
TOTAL CELLS COUNTED BLD: 100
UNIT ABO: NORMAL
UNIT NUMBER: NORMAL
UNIT RH: NORMAL
UNIT VOLUME: 350
VARIANT LYMPHS # BLD MANUAL: 0.17 X10*3/UL (ref 0–0.5)
VARIANT LYMPHS NFR BLD: 1 %
WBC # BLD AUTO: 16.8 X10*3/UL (ref 4.4–11.3)
XM INTEP: NORMAL

## 2024-06-13 PROCEDURE — 99231 SBSQ HOSP IP/OBS SF/LOW 25: CPT

## 2024-06-13 PROCEDURE — 2500000001 HC RX 250 WO HCPCS SELF ADMINISTERED DRUGS (ALT 637 FOR MEDICARE OP): Performed by: PHYSICIAN ASSISTANT

## 2024-06-13 PROCEDURE — 84520 ASSAY OF UREA NITROGEN: CPT | Performed by: STUDENT IN AN ORGANIZED HEALTH CARE EDUCATION/TRAINING PROGRAM

## 2024-06-13 PROCEDURE — 2500000004 HC RX 250 GENERAL PHARMACY W/ HCPCS (ALT 636 FOR OP/ED): Performed by: STUDENT IN AN ORGANIZED HEALTH CARE EDUCATION/TRAINING PROGRAM

## 2024-06-13 PROCEDURE — 1100000001 HC PRIVATE ROOM DAILY

## 2024-06-13 PROCEDURE — 85027 COMPLETE CBC AUTOMATED: CPT | Performed by: STUDENT IN AN ORGANIZED HEALTH CARE EDUCATION/TRAINING PROGRAM

## 2024-06-13 PROCEDURE — 2500000001 HC RX 250 WO HCPCS SELF ADMINISTERED DRUGS (ALT 637 FOR MEDICARE OP)

## 2024-06-13 PROCEDURE — 82247 BILIRUBIN TOTAL: CPT | Performed by: STUDENT IN AN ORGANIZED HEALTH CARE EDUCATION/TRAINING PROGRAM

## 2024-06-13 PROCEDURE — 2500000001 HC RX 250 WO HCPCS SELF ADMINISTERED DRUGS (ALT 637 FOR MEDICARE OP): Performed by: STUDENT IN AN ORGANIZED HEALTH CARE EDUCATION/TRAINING PROGRAM

## 2024-06-13 PROCEDURE — 83735 ASSAY OF MAGNESIUM: CPT | Performed by: STUDENT IN AN ORGANIZED HEALTH CARE EDUCATION/TRAINING PROGRAM

## 2024-06-13 PROCEDURE — 85007 BL SMEAR W/DIFF WBC COUNT: CPT | Performed by: STUDENT IN AN ORGANIZED HEALTH CARE EDUCATION/TRAINING PROGRAM

## 2024-06-13 PROCEDURE — 2500000004 HC RX 250 GENERAL PHARMACY W/ HCPCS (ALT 636 FOR OP/ED)

## 2024-06-13 PROCEDURE — 82248 BILIRUBIN DIRECT: CPT | Performed by: STUDENT IN AN ORGANIZED HEALTH CARE EDUCATION/TRAINING PROGRAM

## 2024-06-13 RX ORDER — DIPHENHYDRAMINE HCL 25 MG
25 CAPSULE ORAL EVERY 6 HOURS PRN
Status: DISCONTINUED | OUTPATIENT
Start: 2024-06-13 | End: 2024-06-22 | Stop reason: HOSPADM

## 2024-06-13 RX ORDER — MAGNESIUM SULFATE HEPTAHYDRATE 40 MG/ML
4 INJECTION, SOLUTION INTRAVENOUS ONCE
Status: COMPLETED | OUTPATIENT
Start: 2024-06-13 | End: 2024-06-13

## 2024-06-13 RX ADMIN — PREGABALIN 50 MG: 50 CAPSULE ORAL at 21:21

## 2024-06-13 RX ADMIN — METHOCARBAMOL TABLETS 1000 MG: 500 TABLET, COATED ORAL at 13:31

## 2024-06-13 RX ADMIN — ACETAMINOPHEN 650 MG: 325 TABLET ORAL at 22:46

## 2024-06-13 RX ADMIN — MAGNESIUM SULFATE HEPTAHYDRATE 4 G: 40 INJECTION, SOLUTION INTRAVENOUS at 10:01

## 2024-06-13 RX ADMIN — ACETAMINOPHEN 650 MG: 325 TABLET ORAL at 05:30

## 2024-06-13 RX ADMIN — HYDROMORPHONE HYDROCHLORIDE: 10 INJECTION, SOLUTION INTRAMUSCULAR; INTRAVENOUS; SUBCUTANEOUS at 05:43

## 2024-06-13 RX ADMIN — ACETAMINOPHEN 650 MG: 325 TABLET ORAL at 17:12

## 2024-06-13 RX ADMIN — METHOCARBAMOL TABLETS 1000 MG: 500 TABLET, COATED ORAL at 21:21

## 2024-06-13 RX ADMIN — ENOXAPARIN SODIUM 60 MG: 60 INJECTION SUBCUTANEOUS at 21:21

## 2024-06-13 RX ADMIN — HYDROMORPHONE HYDROCHLORIDE: 10 INJECTION, SOLUTION INTRAMUSCULAR; INTRAVENOUS; SUBCUTANEOUS at 17:44

## 2024-06-13 RX ADMIN — DIPHENHYDRAMINE HYDROCHLORIDE 25 MG: 25 CAPSULE ORAL at 15:31

## 2024-06-13 RX ADMIN — HYDROMORPHONE HYDROCHLORIDE: 10 INJECTION, SOLUTION INTRAMUSCULAR; INTRAVENOUS; SUBCUTANEOUS at 11:53

## 2024-06-13 RX ADMIN — FOLIC ACID 1 MG: 1 TABLET ORAL at 08:21

## 2024-06-13 RX ADMIN — ENOXAPARIN SODIUM 60 MG: 60 INJECTION SUBCUTANEOUS at 08:18

## 2024-06-13 RX ADMIN — ACETAMINOPHEN 650 MG: 325 TABLET ORAL at 11:53

## 2024-06-13 RX ADMIN — COLCHICINE 0.6 MG: 0.6 TABLET ORAL at 21:21

## 2024-06-13 RX ADMIN — METHOCARBAMOL TABLETS 1000 MG: 500 TABLET, COATED ORAL at 05:30

## 2024-06-13 ASSESSMENT — COGNITIVE AND FUNCTIONAL STATUS - GENERAL
MOBILITY SCORE: 23
DAILY ACTIVITIY SCORE: 24
CLIMB 3 TO 5 STEPS WITH RAILING: A LITTLE

## 2024-06-13 ASSESSMENT — PAIN SCALES - GENERAL
PAINLEVEL_OUTOF10: 5 - MODERATE PAIN
PAINLEVEL_OUTOF10: 8
PAINLEVEL_OUTOF10: 10 - WORST POSSIBLE PAIN

## 2024-06-13 ASSESSMENT — PAIN - FUNCTIONAL ASSESSMENT
PAIN_FUNCTIONAL_ASSESSMENT: 0-10
PAIN_FUNCTIONAL_ASSESSMENT: 0-10

## 2024-06-13 ASSESSMENT — PAIN DESCRIPTION - LOCATION: LOCATION: GENERALIZED

## 2024-06-13 ASSESSMENT — PAIN DESCRIPTION - DESCRIPTORS: DESCRIPTORS: ACHING;DISCOMFORT;SHOOTING

## 2024-06-13 NOTE — PROGRESS NOTES
[facility]  ACUTE CARE SURGERY - PROGRESS NOTE    Patient Name: Taylor Hampton  MRN: [id number]  Admit Date: 608  : 1995  AGE: 28 y.o.   GENDER: female  ==============================================================================  TODAY'S ASSESSMENT AND PLAN OF CARE:  29yo F with hx of sickle cell disease and PE on xarelto who presents to the ED with RUQ abdominal pain, n/v, decreased PO tolerance, and diarrhea that began two days ago. Patient is afebrile and nontachycardic. Labs remarkable for Tbili 2.7 (likely 2/2 rapid heme turnover). While CT shows normal GB and RUQ US shows cholelithiasis with no signs of GB inflammation, exam and history are consistent with possible acute cholecystitis.     : lap cholecystectomy    Plan:   - regular diet  - heme recs for restarting sickle cell meds  - continue PCA  - DVT lovenox start    Neuro  - pain control with home suboxone  - dilaudid PCA  - home duloxetine  - ketorolac  - robaxin 1g q8h  - lyrica    GI  - regular diet    Heme  - Heme consult for co-management of pain in setting of sickle cell disease  - holding home AC (xarelto)/therapeutic anticoagulation for 1 week post op  - home folic acid, colchicine  - Daily hemolysis labs, including getting LDH, bilis, haptoglobin, CBC with differential.    ID  - dc Zosyn  - daily CBC  - WBC 16 today    - LR at 125 mL/hr  - SCDs, lovenox, activity as tolerated    Discussed with Dr. Ramirez.    Kairna Tello MD  ACS  [id number]    ==============================================================================  CHIEF COMPLAINT / EVENTS LAST 24HRS / HPI:  NAEON. Sore at umbilical incision. Tolerated some food yesterday without issue. Pain is improved overall, but still has back and full body aches.    PHYSICAL EXAM:  Heart Rate:  []   Temp:  [36 °C (96.8 °F)-36.5 °C (97.7 °F)]   Resp:  [13-20]   BP: (114-139)/(62-90)   SpO2:  [92 %-100 %]   Constitutional: no acute distress  Neuro: A/O  x4, no gross deficits   Psych: normal affect  HEENT: No deformities, no scleral icterus   Cardiac: RRR  Pulmonary: unlabored respirations   Abdomen: soft, non distended, soreness at incision sites. Port sites and umbilical incision well appearing with no opening, closed with skin glue, no rebound or guarding  Skin: warm and dry overall    Extremities: no swelling noted  MSK: moving all four      IMAGING SUMMARY:  (summary of new imaging findings, not a copy of dictation)  NA    LABS:  Results from last 7 days   Lab Units 06/13/24  0559 06/12/24  0610 06/11/24  0508 06/10/24  1618 06/09/24  0532 06/08/24  0912   WBC AUTO x10*3/uL 16.8* 10.2 11.9*  11.9*   < > 10.3 11.3   HEMOGLOBIN g/dL 10.1* 10.0* 8.6*  8.6*   < > 9.0* 10.1*   HEMATOCRIT % 29.6* 29.1* 24.6*  24.6*   < > 26.3* 28.3*   PLATELETS AUTO x10*3/uL 255 220 220  220   < > 242 255   NEUTROS PCT AUTO %  --   --   --   --  61.9 73.2   LYMPHO PCT MAN %  --   --  57.9  --   --   --    LYMPHS PCT AUTO %  --   --   --   --  31.2 22.1   MONO PCT MAN %  --   --  1.8  --   --   --    MONOS PCT AUTO %  --   --   --   --  5.7 3.9   EOSINO PCT MAN %  --   --  1.7  --   --   --    EOS PCT AUTO %  --   --   --   --  0.9 0.2    < > = values in this interval not displayed.     Results from last 7 days   Lab Units 06/10/24  1618 06/08/24  0912   APTT seconds 33  --    INR   --  1.3*     Results from last 7 days   Lab Units 06/13/24  0559 06/12/24  0610 06/11/24  0551 06/11/24  0508 06/10/24  1618 06/09/24  0532 06/08/24  0912   SODIUM mmol/L 136 139  --  138   < > 140 138   POTASSIUM mmol/L 3.8 3.6  --  3.8   < > 3.5 3.6   CHLORIDE mmol/L 102 103  --  103   < > 105 105   CO2 mmol/L 23 25  --  26   < > 23 24   BUN mg/dL 9 6  --  8   < > 9 11   CREATININE mg/dL 0.83 0.86  --  0.84   < > 0.85 0.86   CALCIUM mg/dL 9.0 9.1  --  8.5*   < > 8.8 9.0   PROTEIN TOTAL g/dL  --   --   --   --   --  6.9 7.3   BILIRUBIN TOTAL mg/dL 1.7* 2.4* 2.3*  --   --  2.9* 2.7*   ALK PHOS U/L  --    --   --   --   --  68 76   ALT U/L  --   --   --   --   --  4* 4*   AST U/L  --   --   --   --   --  11 10   GLUCOSE mg/dL 139* 58*  --  68*   < > 84 105*    < > = values in this interval not displayed.     Results from last 7 days   Lab Units 06/13/24  0559 06/12/24  0610 06/11/24  0551   BILIRUBIN TOTAL mg/dL 1.7* 2.4* 2.3*   BILIRUBIN DIRECT mg/dL 0.5* 0.5* 0.5*           I have reviewed all medications, laboratory results, and imaging pertinent for today's encounter.

## 2024-06-13 NOTE — NURSING NOTE
Page sent to ACS about patient complaints of itching and benadryl request.     Page returned, team will place orders.

## 2024-06-13 NOTE — PROGRESS NOTES
Taylor Hampton is a 28 y.o. year old female patient w/ sickle cell disease (on suboxone at home) who presents for lap dao with Dr. Navarrete on 6/12. Acute Pain consulted for block for postoperative pain control.     Postop Pain HPI -   Palliative: relieved with IV analgesics and regional local anesthetics  Provocative: movement  Quality:  burning and aching  Radiation:  none  Severity:  7/10  Timing: constant    24-HOUR OPIOID CONSUMPTION:  Dilaudid PCA     Scheduled medications  acetaminophen, 650 mg, oral, q6h  colchicine, 0.6 mg, oral, BID  DULoxetine, 60 mg, oral, Daily  enoxaparin, 60 mg, subcutaneous, q12h SHAVON  folic acid, 1 mg, oral, Daily  [Held by provider] hydroxyurea, 500 mg, oral, TID  lidocaine, 5 mL, infiltration, Once  magnesium sulfate, 4 g, intravenous, Once  methocarbamol, 1,000 mg, oral, q8h SHAVON  [Held by provider] predniSONE, 20 mg, oral, BID  pregabalin, 50 mg, oral, Nightly  [Held by provider] rivaroxaban, 20 mg, oral, Daily with evening meal      Continuous medications  HYDROmorphone,       PRN medications  PRN medications: albuterol, naloxone, naloxone, ondansetron     Physical Exam:  Constitutional:  no distress, alert and cooperative  Eyes: clear sclera  Head/Neck: No apparent injury, trachea midline  Respiratory/Thorax: Patent airways, thorax symmetric, breathing comfortably  Cardiovascular: no pitting edema  Gastrointestinal: Nondistended  Musculoskeletal: ROM intact  Extremities: no clubbing  Neurological: alert, karimi x4  Psychological: Appropriate affect    Results for orders placed or performed during the hospital encounter of 06/08/24 (from the past 24 hour(s))   Renal Function Panel   Result Value Ref Range    Glucose 139 (H) 74 - 99 mg/dL    Sodium 136 136 - 145 mmol/L    Potassium 3.8 3.5 - 5.3 mmol/L    Chloride 102 98 - 107 mmol/L    Bicarbonate 23 21 - 32 mmol/L    Anion Gap 15 10 - 20 mmol/L    Urea Nitrogen 9 6 - 23 mg/dL    Creatinine 0.83 0.50 - 1.05 mg/dL    eGFR >90 >60  mL/min/1.73m*2    Calcium 9.0 8.6 - 10.6 mg/dL    Phosphorus 2.0 (L) 2.5 - 4.9 mg/dL    Albumin 3.7 3.4 - 5.0 g/dL   Magnesium   Result Value Ref Range    Magnesium 1.69 1.60 - 2.40 mg/dL   CBC and Auto Differential   Result Value Ref Range    WBC 16.8 (H) 4.4 - 11.3 x10*3/uL    nRBC 0.0 0.0 - 0.0 /100 WBCs    RBC 4.46 4.00 - 5.20 x10*6/uL    Hemoglobin 10.1 (L) 12.0 - 16.0 g/dL    Hematocrit 29.6 (L) 36.0 - 46.0 %    MCV 66 (L) 80 - 100 fL    MCH 22.6 (L) 26.0 - 34.0 pg    MCHC 34.1 32.0 - 36.0 g/dL    RDW 21.1 (H) 11.5 - 14.5 %    Platelets 255 150 - 450 x10*3/uL    Immature Granulocytes %, Automated 0.5 0.0 - 0.9 %    Immature Granulocytes Absolute, Automated 0.09 0.00 - 0.70 x10*3/uL   Bilirubin, Total   Result Value Ref Range    Bilirubin, Total 1.7 (H) 0.0 - 1.2 mg/dL   Bilirubin, Direct   Result Value Ref Range    Bilirubin, Direct 0.5 (H) 0.0 - 0.3 mg/dL      Taylor Hampton is a 28 y.o. year old female patient w/ sickle cell disease (on suboxone at home) who presents for neeru dao with Dr. Navarrete on 6/12. Acute Pain consulted for block for postoperative pain control.      Plan:  - BL QL SS blocks performed post-operatively on 6/12  - recommend 1 g Magnesium over 2 hrs   - Consider adding Toradol   - Acute pain service will sign off   - Rest of pain management per primary team     Acute Pain Resident  pg [phone] ph [phone]

## 2024-06-14 LAB
ALBUMIN SERPL BCP-MCNC: 3.4 G/DL (ref 3.4–5)
ANION GAP SERPL CALC-SCNC: 13 MMOL/L (ref 10–20)
BASOPHILS # BLD MANUAL: 0 X10*3/UL (ref 0–0.1)
BASOPHILS NFR BLD MANUAL: 0 %
BILIRUB DIRECT SERPL-MCNC: 0.2 MG/DL (ref 0–0.3)
BILIRUB SERPL-MCNC: 0.9 MG/DL (ref 0–1.2)
BUN SERPL-MCNC: 10 MG/DL (ref 6–23)
CALCIUM SERPL-MCNC: 8.4 MG/DL (ref 8.6–10.6)
CHLORIDE SERPL-SCNC: 107 MMOL/L (ref 98–107)
CO2 SERPL-SCNC: 25 MMOL/L (ref 21–32)
CREAT SERPL-MCNC: 0.69 MG/DL (ref 0.5–1.05)
DACRYOCYTES BLD QL SMEAR: ABNORMAL
EGFRCR SERPLBLD CKD-EPI 2021: >90 ML/MIN/1.73M*2
EOSINOPHIL # BLD MANUAL: 0 X10*3/UL (ref 0–0.7)
EOSINOPHIL NFR BLD MANUAL: 0 %
ERYTHROCYTE [DISTWIDTH] IN BLOOD BY AUTOMATED COUNT: 20.9 % (ref 11.5–14.5)
GLUCOSE SERPL-MCNC: 136 MG/DL (ref 74–99)
HCT VFR BLD AUTO: 26 % (ref 36–46)
HGB BLD-MCNC: 9.2 G/DL (ref 12–16)
IMM GRANULOCYTES # BLD AUTO: 0.07 X10*3/UL (ref 0–0.7)
IMM GRANULOCYTES NFR BLD AUTO: 0.4 % (ref 0–0.9)
LYMPHOCYTES # BLD MANUAL: 4.24 X10*3/UL (ref 1.2–4.8)
LYMPHOCYTES NFR BLD MANUAL: 27.2 %
MAGNESIUM SERPL-MCNC: 1.56 MG/DL (ref 1.6–2.4)
MCH RBC QN AUTO: 23 PG (ref 26–34)
MCHC RBC AUTO-ENTMCNC: 35.4 G/DL (ref 32–36)
MCV RBC AUTO: 65 FL (ref 80–100)
MONOCYTES # BLD MANUAL: 0.28 X10*3/UL (ref 0.1–1)
MONOCYTES NFR BLD MANUAL: 1.8 %
NEUTS SEG # BLD MANUAL: 11.08 X10*3/UL (ref 1.2–7)
NEUTS SEG NFR BLD MANUAL: 71 %
NRBC BLD-RTO: 0 /100 WBCS (ref 0–0)
PHOSPHATE SERPL-MCNC: 2.5 MG/DL (ref 2.5–4.9)
PLATELET # BLD AUTO: 234 X10*3/UL (ref 150–450)
POTASSIUM SERPL-SCNC: 3.8 MMOL/L (ref 3.5–5.3)
RBC # BLD AUTO: 4 X10*6/UL (ref 4–5.2)
RBC MORPH BLD: ABNORMAL
SCHISTOCYTES BLD QL SMEAR: ABNORMAL
SODIUM SERPL-SCNC: 141 MMOL/L (ref 136–145)
TARGETS BLD QL SMEAR: ABNORMAL
TOTAL CELLS COUNTED BLD: 114
WBC # BLD AUTO: 15.6 X10*3/UL (ref 4.4–11.3)

## 2024-06-14 PROCEDURE — 85027 COMPLETE CBC AUTOMATED: CPT | Performed by: STUDENT IN AN ORGANIZED HEALTH CARE EDUCATION/TRAINING PROGRAM

## 2024-06-14 PROCEDURE — 83735 ASSAY OF MAGNESIUM: CPT | Performed by: STUDENT IN AN ORGANIZED HEALTH CARE EDUCATION/TRAINING PROGRAM

## 2024-06-14 PROCEDURE — 1100000001 HC PRIVATE ROOM DAILY

## 2024-06-14 PROCEDURE — 85007 BL SMEAR W/DIFF WBC COUNT: CPT | Performed by: STUDENT IN AN ORGANIZED HEALTH CARE EDUCATION/TRAINING PROGRAM

## 2024-06-14 PROCEDURE — 82247 BILIRUBIN TOTAL: CPT | Performed by: STUDENT IN AN ORGANIZED HEALTH CARE EDUCATION/TRAINING PROGRAM

## 2024-06-14 PROCEDURE — RXMED WILLOW AMBULATORY MEDICATION CHARGE

## 2024-06-14 PROCEDURE — 2500000002 HC RX 250 W HCPCS SELF ADMINISTERED DRUGS (ALT 637 FOR MEDICARE OP, ALT 636 FOR OP/ED): Mod: MUE | Performed by: STUDENT IN AN ORGANIZED HEALTH CARE EDUCATION/TRAINING PROGRAM

## 2024-06-14 PROCEDURE — 2500000004 HC RX 250 GENERAL PHARMACY W/ HCPCS (ALT 636 FOR OP/ED): Performed by: STUDENT IN AN ORGANIZED HEALTH CARE EDUCATION/TRAINING PROGRAM

## 2024-06-14 PROCEDURE — 2500000001 HC RX 250 WO HCPCS SELF ADMINISTERED DRUGS (ALT 637 FOR MEDICARE OP)

## 2024-06-14 PROCEDURE — 82248 BILIRUBIN DIRECT: CPT | Performed by: STUDENT IN AN ORGANIZED HEALTH CARE EDUCATION/TRAINING PROGRAM

## 2024-06-14 PROCEDURE — 2500000004 HC RX 250 GENERAL PHARMACY W/ HCPCS (ALT 636 FOR OP/ED)

## 2024-06-14 PROCEDURE — 2500000001 HC RX 250 WO HCPCS SELF ADMINISTERED DRUGS (ALT 637 FOR MEDICARE OP): Performed by: STUDENT IN AN ORGANIZED HEALTH CARE EDUCATION/TRAINING PROGRAM

## 2024-06-14 PROCEDURE — 80069 RENAL FUNCTION PANEL: CPT | Performed by: STUDENT IN AN ORGANIZED HEALTH CARE EDUCATION/TRAINING PROGRAM

## 2024-06-14 RX ORDER — METHOCARBAMOL 500 MG/1
1000 TABLET, FILM COATED ORAL EVERY 8 HOURS SCHEDULED
Qty: 56 TABLET | Refills: 0 | Status: SHIPPED | OUTPATIENT
Start: 2024-06-14

## 2024-06-14 RX ORDER — OXYCODONE HYDROCHLORIDE 5 MG/1
5 TABLET ORAL EVERY 6 HOURS PRN
Qty: 12 TABLET | Refills: 0 | Status: SHIPPED | OUTPATIENT
Start: 2024-06-14 | End: 2024-06-22 | Stop reason: HOSPADM

## 2024-06-14 RX ORDER — KETOROLAC TROMETHAMINE 30 MG/ML
15 INJECTION, SOLUTION INTRAMUSCULAR; INTRAVENOUS EVERY 6 HOURS SCHEDULED
Status: DISCONTINUED | OUTPATIENT
Start: 2024-06-14 | End: 2024-06-17

## 2024-06-14 RX ORDER — HYDROMORPHONE HCL/0.9% NACL/PF 15 MG/30ML
PATIENT CONTROLLED ANALGESIA SYRINGE INTRAVENOUS CONTINUOUS
Status: DISCONTINUED | OUTPATIENT
Start: 2024-06-14 | End: 2024-06-15

## 2024-06-14 RX ORDER — MAGNESIUM SULFATE HEPTAHYDRATE 40 MG/ML
4 INJECTION, SOLUTION INTRAVENOUS ONCE
Status: COMPLETED | OUTPATIENT
Start: 2024-06-14 | End: 2024-06-14

## 2024-06-14 RX ADMIN — ACETAMINOPHEN 650 MG: 325 TABLET ORAL at 19:15

## 2024-06-14 RX ADMIN — METHOCARBAMOL TABLETS 1000 MG: 500 TABLET, COATED ORAL at 21:21

## 2024-06-14 RX ADMIN — ENOXAPARIN SODIUM 60 MG: 60 INJECTION SUBCUTANEOUS at 21:21

## 2024-06-14 RX ADMIN — KETOROLAC TROMETHAMINE 15 MG: 30 INJECTION, SOLUTION INTRAMUSCULAR; INTRAVENOUS at 16:15

## 2024-06-14 RX ADMIN — COLCHICINE 0.6 MG: 0.6 TABLET ORAL at 08:33

## 2024-06-14 RX ADMIN — ACETAMINOPHEN 650 MG: 325 TABLET ORAL at 06:10

## 2024-06-14 RX ADMIN — HYDROMORPHONE HYDROCHLORIDE: 10 INJECTION, SOLUTION INTRAMUSCULAR; INTRAVENOUS; SUBCUTANEOUS at 00:09

## 2024-06-14 RX ADMIN — ACETAMINOPHEN 650 MG: 325 TABLET ORAL at 13:27

## 2024-06-14 RX ADMIN — KETOROLAC TROMETHAMINE 15 MG: 30 INJECTION, SOLUTION INTRAMUSCULAR; INTRAVENOUS at 08:33

## 2024-06-14 RX ADMIN — HYDROMORPHONE HYDROCHLORIDE: 10 INJECTION, SOLUTION INTRAMUSCULAR; INTRAVENOUS; SUBCUTANEOUS at 16:16

## 2024-06-14 RX ADMIN — PREGABALIN 50 MG: 50 CAPSULE ORAL at 21:21

## 2024-06-14 RX ADMIN — COLCHICINE 0.6 MG: 0.6 TABLET ORAL at 21:21

## 2024-06-14 RX ADMIN — MAGNESIUM SULFATE 4 G: 4 INJECTION INTRAVENOUS at 13:43

## 2024-06-14 RX ADMIN — METHOCARBAMOL TABLETS 1000 MG: 500 TABLET, COATED ORAL at 13:27

## 2024-06-14 RX ADMIN — KETOROLAC TROMETHAMINE 15 MG: 30 INJECTION, SOLUTION INTRAMUSCULAR; INTRAVENOUS at 21:21

## 2024-06-14 RX ADMIN — ENOXAPARIN SODIUM 60 MG: 60 INJECTION SUBCUTANEOUS at 08:34

## 2024-06-14 RX ADMIN — FOLIC ACID 1 MG: 1 TABLET ORAL at 08:33

## 2024-06-14 RX ADMIN — METHOCARBAMOL TABLETS 1000 MG: 500 TABLET, COATED ORAL at 06:10

## 2024-06-14 RX ADMIN — DULOXETINE HYDROCHLORIDE 60 MG: 60 CAPSULE, DELAYED RELEASE ORAL at 08:33

## 2024-06-14 RX ADMIN — HYDROMORPHONE HYDROCHLORIDE: 10 INJECTION, SOLUTION INTRAMUSCULAR; INTRAVENOUS; SUBCUTANEOUS at 08:31

## 2024-06-14 ASSESSMENT — PAIN SCALES - GENERAL
PAINLEVEL_OUTOF10: 6
PAINLEVEL_OUTOF10: 8
PAINLEVEL_OUTOF10: 7
PAINLEVEL_OUTOF10: 8
PAINLEVEL_OUTOF10: 5 - MODERATE PAIN
PAINLEVEL_OUTOF10: 6
PAINLEVEL_OUTOF10: 6

## 2024-06-14 ASSESSMENT — COGNITIVE AND FUNCTIONAL STATUS - GENERAL
CLIMB 3 TO 5 STEPS WITH RAILING: A LITTLE
DAILY ACTIVITIY SCORE: 24
MOBILITY SCORE: 23

## 2024-06-14 ASSESSMENT — PAIN DESCRIPTION - LOCATION
LOCATION: ABDOMEN
LOCATION: GENERALIZED
LOCATION: LEG

## 2024-06-14 ASSESSMENT — PAIN DESCRIPTION - DESCRIPTORS: DESCRIPTORS: ACHING;DISCOMFORT

## 2024-06-14 ASSESSMENT — PAIN - FUNCTIONAL ASSESSMENT: PAIN_FUNCTIONAL_ASSESSMENT: 0-10

## 2024-06-14 NOTE — HOSPITAL COURSE
29yo F with hx of sickle cell disease and PE on xarelto who presented with cholelithiasis with acute cholecystitis. Patient went to the OR on 6/12 for laparoscopic cholecystectomy. Post operatively pain management was difficult to achieve given patients sickle cell history.

## 2024-06-14 NOTE — PROGRESS NOTES
[facility]  ACUTE CARE SURGERY - PROGRESS NOTE    Patient Name: Taylor Hampton  MRN: [id number]  Admit Date: 608  : 1995  AGE: 28 y.o.   GENDER: female  ==============================================================================  TODAY'S ASSESSMENT AND PLAN OF CARE:  29yo F with hx of sickle cell disease and PE on xarelto who presents to the ED with RUQ abdominal pain, n/v, decreased PO tolerance, and diarrhea that began two days ago. Patient is afebrile and nontachycardic. Labs remarkable for Tbili 2.7 (likely 2/2 rapid heme turnover). While CT shows normal GB and RUQ US shows cholelithiasis with no signs of GB inflammation, exam and history are consistent with possible acute cholecystitis.     : lap cholecystectomy    Patient is tolerated meals consistently, most of pain is at umbilical incision. No nausea or vomiting. Remains on PCA, will try toradol for better pain control. Encouraged to stay well hydrated to decrease sickle symptoms. Unable to draw labs this morning, will reattempt later this morning.     Plan:   - regular diet  - toradol q15h for poor pain control  - continue PCA  - DVT lovenox - should continue/discharge on lovenox for DVT prophylaxis until home AC is restarted  - good PO hydration    Neuro  - pain control with home suboxone  - dilaudid PCA  - toradol 15mg q6h  - home duloxetine  - robaxin 1g q8h  - lyrica    GI  - regular diet    Heme  - Heme consult for co-management of pain in setting of sickle cell disease  - hydroxyurea, prednisone, oxbrita HELD until outpatient follow up with [facility] hematologist. Confirm appointment with patient.  - holding home AC (xarelto)/therapeutic anticoagulation for 1 week post op. Bridge with DVT lovenox  - home folic acid, colchicine  - Daily hemolysis labs, including getting LDH, bilis, haptoglobin, CBC with differential.    ID  - dc Zosyn  - daily CBC  - WBC 16 today    - LR at 125 mL/hr  - SCDs, lovenox, activity  as tolerated    Discussed with Dr. Ramirez.    Karina Tello MD  [facility]  [id number]    ==============================================================================  CHIEF COMPLAINT / EVENTS LAST 24HRS / HPI:  NAEON. Pain at umbilical incision. Tolerated multiple meals yesterday without issue. Is drinking water and ginger ale throughout the day.     PHYSICAL EXAM:  Heart Rate:  [54-79]   Temp:  [35.8 °C (96.4 °F)-36.7 °C (98.1 °F)]   Resp:  [18]   BP: (104-143)/(64-76)   SpO2:  [93 %-96 %]   Constitutional: no acute distress, nontoxic, uncomfortable sitting on side of bed, able to converse  Neuro: A/O x4, no gross deficits   Psych: normal affect  HEENT: No deformities, no scleral icterus   Cardiac: RRR  Pulmonary: unlabored respirations   Abdomen: soft, non distended, soreness at incision sites. Port sites and umbilical incision well appearing with no opening, closed with skin glue, no rebound or guarding. Tenderness at umbilical incision  Skin: warm and dry overall    Extremities: no swelling noted  MSK: moving all four      IMAGING SUMMARY:  (summary of new imaging findings, not a copy of dictation)  NA    LABS:  Results from last 7 days   Lab Units 06/13/24  0559 06/12/24  0610 06/11/24  0508 06/10/24  1618 06/09/24  0532 06/08/24  0912   WBC AUTO x10*3/uL 16.8* 10.2 11.9*  11.9*   < > 10.3 11.3   HEMOGLOBIN g/dL 10.1* 10.0* 8.6*  8.6*   < > 9.0* 10.1*   HEMATOCRIT % 29.6* 29.1* 24.6*  24.6*   < > 26.3* 28.3*   PLATELETS AUTO x10*3/uL 255 220 220  220   < > 242 255   NEUTROS PCT AUTO %  --   --   --   --  61.9 73.2   LYMPHO PCT MAN % 8.0  --  57.9  --   --   --    LYMPHS PCT AUTO %  --   --   --   --  31.2 22.1   MONO PCT MAN % 5.0  --  1.8  --   --   --    MONOS PCT AUTO %  --   --   --   --  5.7 3.9   EOSINO PCT MAN % 0.0  --  1.7  --   --   --    EOS PCT AUTO %  --   --   --   --  0.9 0.2    < > = values in this interval not displayed.     Results from last 7 days   Lab Units 06/10/24  1618 06/08/24  0912   APTT  seconds 33  --    INR   --  1.3*     Results from last 7 days   Lab Units 06/13/24  0559 06/12/24  0610 06/11/24  0551 06/11/24  0508 06/10/24  1618 06/09/24  0532 06/08/24  0912   SODIUM mmol/L 136 139  --  138   < > 140 138   POTASSIUM mmol/L 3.8 3.6  --  3.8   < > 3.5 3.6   CHLORIDE mmol/L 102 103  --  103   < > 105 105   CO2 mmol/L 23 25  --  26   < > 23 24   BUN mg/dL 9 6  --  8   < > 9 11   CREATININE mg/dL 0.83 0.86  --  0.84   < > 0.85 0.86   CALCIUM mg/dL 9.0 9.1  --  8.5*   < > 8.8 9.0   PROTEIN TOTAL g/dL  --   --   --   --   --  6.9 7.3   BILIRUBIN TOTAL mg/dL 1.7* 2.4* 2.3*  --   --  2.9* 2.7*   ALK PHOS U/L  --   --   --   --   --  68 76   ALT U/L  --   --   --   --   --  4* 4*   AST U/L  --   --   --   --   --  11 10   GLUCOSE mg/dL 139* 58*  --  68*   < > 84 105*    < > = values in this interval not displayed.     Results from last 7 days   Lab Units 06/13/24  0559 06/12/24  0610 06/11/24  0551   BILIRUBIN TOTAL mg/dL 1.7* 2.4* 2.3*   BILIRUBIN DIRECT mg/dL 0.5* 0.5* 0.5*           I have reviewed all medications, laboratory results, and imaging pertinent for today's encounter.

## 2024-06-14 NOTE — CARE PLAN
The patient's goals for the shift include      The clinical goals for the shift include Patient will have pain relief and have no injury throughout shift    Over the shift, the patient did not make progress toward the following goals. Barriers to progression include chronic/acute pain. Recommendations to address these barriers include intermittent pain medication.

## 2024-06-15 LAB
ALBUMIN SERPL BCP-MCNC: 3.3 G/DL (ref 3.4–5)
ANION GAP SERPL CALC-SCNC: 9 MMOL/L (ref 10–20)
BASOPHILS # BLD AUTO: 0.02 X10*3/UL (ref 0–0.1)
BASOPHILS NFR BLD AUTO: 0.2 %
BILIRUB DIRECT SERPL-MCNC: 0.2 MG/DL (ref 0–0.3)
BILIRUB SERPL-MCNC: 0.8 MG/DL (ref 0–1.2)
BUN SERPL-MCNC: 9 MG/DL (ref 6–23)
CALCIUM SERPL-MCNC: 8.3 MG/DL (ref 8.6–10.6)
CHLORIDE SERPL-SCNC: 107 MMOL/L (ref 98–107)
CO2 SERPL-SCNC: 28 MMOL/L (ref 21–32)
CREAT SERPL-MCNC: 0.67 MG/DL (ref 0.5–1.05)
EGFRCR SERPLBLD CKD-EPI 2021: >90 ML/MIN/1.73M*2
EOSINOPHIL # BLD AUTO: 0.13 X10*3/UL (ref 0–0.7)
EOSINOPHIL NFR BLD AUTO: 1.3 %
ERYTHROCYTE [DISTWIDTH] IN BLOOD BY AUTOMATED COUNT: 21.5 % (ref 11.5–14.5)
GLUCOSE SERPL-MCNC: 85 MG/DL (ref 74–99)
HCT VFR BLD AUTO: 25.9 % (ref 36–46)
HGB BLD-MCNC: 8.5 G/DL (ref 12–16)
IMM GRANULOCYTES # BLD AUTO: 0.03 X10*3/UL (ref 0–0.7)
IMM GRANULOCYTES NFR BLD AUTO: 0.3 % (ref 0–0.9)
LYMPHOCYTES # BLD AUTO: 4.93 X10*3/UL (ref 1.2–4.8)
LYMPHOCYTES NFR BLD AUTO: 49.5 %
MAGNESIUM SERPL-MCNC: 1.6 MG/DL (ref 1.6–2.4)
MCH RBC QN AUTO: 22.8 PG (ref 26–34)
MCHC RBC AUTO-ENTMCNC: 32.8 G/DL (ref 32–36)
MCV RBC AUTO: 70 FL (ref 80–100)
MONOCYTES # BLD AUTO: 0.79 X10*3/UL (ref 0.1–1)
MONOCYTES NFR BLD AUTO: 7.9 %
NEUTROPHILS # BLD AUTO: 4.05 X10*3/UL (ref 1.2–7.7)
NEUTROPHILS NFR BLD AUTO: 40.8 %
NRBC BLD-RTO: 0 /100 WBCS (ref 0–0)
PHOSPHATE SERPL-MCNC: 3.4 MG/DL (ref 2.5–4.9)
PLATELET # BLD AUTO: 199 X10*3/UL (ref 150–450)
POTASSIUM SERPL-SCNC: 3.5 MMOL/L (ref 3.5–5.3)
RBC # BLD AUTO: 3.72 X10*6/UL (ref 4–5.2)
RBC MORPH BLD: NORMAL
SODIUM SERPL-SCNC: 140 MMOL/L (ref 136–145)
TARGETS BLD QL SMEAR: NORMAL
WBC # BLD AUTO: 10 X10*3/UL (ref 4.4–11.3)

## 2024-06-15 PROCEDURE — 2500000002 HC RX 250 W HCPCS SELF ADMINISTERED DRUGS (ALT 637 FOR MEDICARE OP, ALT 636 FOR OP/ED): Mod: MUE | Performed by: STUDENT IN AN ORGANIZED HEALTH CARE EDUCATION/TRAINING PROGRAM

## 2024-06-15 PROCEDURE — 1100000001 HC PRIVATE ROOM DAILY

## 2024-06-15 PROCEDURE — 36415 COLL VENOUS BLD VENIPUNCTURE: CPT | Performed by: STUDENT IN AN ORGANIZED HEALTH CARE EDUCATION/TRAINING PROGRAM

## 2024-06-15 PROCEDURE — 84100 ASSAY OF PHOSPHORUS: CPT | Performed by: STUDENT IN AN ORGANIZED HEALTH CARE EDUCATION/TRAINING PROGRAM

## 2024-06-15 PROCEDURE — 2500000001 HC RX 250 WO HCPCS SELF ADMINISTERED DRUGS (ALT 637 FOR MEDICARE OP): Performed by: STUDENT IN AN ORGANIZED HEALTH CARE EDUCATION/TRAINING PROGRAM

## 2024-06-15 PROCEDURE — 82248 BILIRUBIN DIRECT: CPT | Performed by: STUDENT IN AN ORGANIZED HEALTH CARE EDUCATION/TRAINING PROGRAM

## 2024-06-15 PROCEDURE — 83735 ASSAY OF MAGNESIUM: CPT | Performed by: STUDENT IN AN ORGANIZED HEALTH CARE EDUCATION/TRAINING PROGRAM

## 2024-06-15 PROCEDURE — 85025 COMPLETE CBC W/AUTO DIFF WBC: CPT | Performed by: STUDENT IN AN ORGANIZED HEALTH CARE EDUCATION/TRAINING PROGRAM

## 2024-06-15 PROCEDURE — 2500000004 HC RX 250 GENERAL PHARMACY W/ HCPCS (ALT 636 FOR OP/ED)

## 2024-06-15 PROCEDURE — 82247 BILIRUBIN TOTAL: CPT | Performed by: STUDENT IN AN ORGANIZED HEALTH CARE EDUCATION/TRAINING PROGRAM

## 2024-06-15 PROCEDURE — 2500000004 HC RX 250 GENERAL PHARMACY W/ HCPCS (ALT 636 FOR OP/ED): Performed by: STUDENT IN AN ORGANIZED HEALTH CARE EDUCATION/TRAINING PROGRAM

## 2024-06-15 PROCEDURE — 2500000001 HC RX 250 WO HCPCS SELF ADMINISTERED DRUGS (ALT 637 FOR MEDICARE OP)

## 2024-06-15 RX ORDER — POTASSIUM CHLORIDE 1.5 G/1.58G
40 POWDER, FOR SOLUTION ORAL ONCE
Status: COMPLETED | OUTPATIENT
Start: 2024-06-15 | End: 2024-06-15

## 2024-06-15 RX ORDER — OXYCODONE HYDROCHLORIDE 5 MG/1
5 TABLET ORAL EVERY 4 HOURS PRN
Status: DISCONTINUED | OUTPATIENT
Start: 2024-06-15 | End: 2024-06-17

## 2024-06-15 RX ORDER — HYDROMORPHONE HYDROCHLORIDE 1 MG/ML
0.2 INJECTION, SOLUTION INTRAMUSCULAR; INTRAVENOUS; SUBCUTANEOUS
Status: DISCONTINUED | OUTPATIENT
Start: 2024-06-15 | End: 2024-06-17

## 2024-06-15 RX ORDER — HYDROMORPHONE HYDROCHLORIDE 1 MG/ML
0.2 INJECTION, SOLUTION INTRAMUSCULAR; INTRAVENOUS; SUBCUTANEOUS
Status: DISCONTINUED | OUTPATIENT
Start: 2024-06-15 | End: 2024-06-15

## 2024-06-15 RX ORDER — OXYCODONE HYDROCHLORIDE 5 MG/1
10 TABLET ORAL EVERY 4 HOURS PRN
Status: DISCONTINUED | OUTPATIENT
Start: 2024-06-15 | End: 2024-06-17

## 2024-06-15 RX ORDER — MAGNESIUM SULFATE HEPTAHYDRATE 40 MG/ML
4 INJECTION, SOLUTION INTRAVENOUS ONCE
Status: COMPLETED | OUTPATIENT
Start: 2024-06-15 | End: 2024-06-15

## 2024-06-15 RX ADMIN — METHOCARBAMOL TABLETS 1000 MG: 500 TABLET, COATED ORAL at 14:00

## 2024-06-15 RX ADMIN — KETOROLAC TROMETHAMINE 15 MG: 30 INJECTION, SOLUTION INTRAMUSCULAR; INTRAVENOUS at 12:00

## 2024-06-15 RX ADMIN — ACETAMINOPHEN 650 MG: 325 TABLET ORAL at 23:26

## 2024-06-15 RX ADMIN — ENOXAPARIN SODIUM 60 MG: 60 INJECTION SUBCUTANEOUS at 21:20

## 2024-06-15 RX ADMIN — ACETAMINOPHEN 650 MG: 325 TABLET ORAL at 18:11

## 2024-06-15 RX ADMIN — KETOROLAC TROMETHAMINE 15 MG: 30 INJECTION, SOLUTION INTRAMUSCULAR; INTRAVENOUS at 05:38

## 2024-06-15 RX ADMIN — ENOXAPARIN SODIUM 60 MG: 60 INJECTION SUBCUTANEOUS at 09:34

## 2024-06-15 RX ADMIN — COLCHICINE 0.6 MG: 0.6 TABLET ORAL at 09:33

## 2024-06-15 RX ADMIN — OXYCODONE HYDROCHLORIDE 10 MG: 5 TABLET ORAL at 09:43

## 2024-06-15 RX ADMIN — METHOCARBAMOL TABLETS 1000 MG: 500 TABLET, COATED ORAL at 05:38

## 2024-06-15 RX ADMIN — FOLIC ACID 1 MG: 1 TABLET ORAL at 09:33

## 2024-06-15 RX ADMIN — KETOROLAC TROMETHAMINE 15 MG: 30 INJECTION, SOLUTION INTRAMUSCULAR; INTRAVENOUS at 23:26

## 2024-06-15 RX ADMIN — OXYCODONE HYDROCHLORIDE 10 MG: 5 TABLET ORAL at 21:19

## 2024-06-15 RX ADMIN — METHOCARBAMOL TABLETS 1000 MG: 500 TABLET, COATED ORAL at 21:19

## 2024-06-15 RX ADMIN — PREGABALIN 50 MG: 50 CAPSULE ORAL at 21:19

## 2024-06-15 RX ADMIN — KETOROLAC TROMETHAMINE 15 MG: 30 INJECTION, SOLUTION INTRAMUSCULAR; INTRAVENOUS at 18:13

## 2024-06-15 RX ADMIN — POTASSIUM CHLORIDE 40 MEQ: 1.5 POWDER, FOR SOLUTION ORAL at 09:33

## 2024-06-15 RX ADMIN — OXYCODONE HYDROCHLORIDE 10 MG: 5 TABLET ORAL at 17:08

## 2024-06-15 RX ADMIN — COLCHICINE 0.6 MG: 0.6 TABLET ORAL at 21:19

## 2024-06-15 RX ADMIN — DULOXETINE HYDROCHLORIDE 60 MG: 60 CAPSULE, DELAYED RELEASE ORAL at 09:33

## 2024-06-15 RX ADMIN — MAGNESIUM SULFATE 4 G: 4 INJECTION INTRAVENOUS at 07:45

## 2024-06-15 ASSESSMENT — COGNITIVE AND FUNCTIONAL STATUS - GENERAL
MOBILITY SCORE: 24
MOBILITY SCORE: 24
DAILY ACTIVITIY SCORE: 24
DAILY ACTIVITIY SCORE: 24

## 2024-06-15 ASSESSMENT — PAIN SCALES - GENERAL
PAINLEVEL_OUTOF10: 8
PAINLEVEL_OUTOF10: 6
PAINLEVEL_OUTOF10: 8
PAINLEVEL_OUTOF10: 7
PAINLEVEL_OUTOF10: 6

## 2024-06-15 ASSESSMENT — PAIN DESCRIPTION - LOCATION
LOCATION: ABDOMEN

## 2024-06-15 ASSESSMENT — PAIN - FUNCTIONAL ASSESSMENT: PAIN_FUNCTIONAL_ASSESSMENT: 0-10

## 2024-06-15 NOTE — PROGRESS NOTES
[facility]  ACUTE CARE SURGERY - PROGRESS NOTE    Patient Name: Taylor Hampton  MRN: [id number]  Admit Date: 608  : 1995  AGE: 28 y.o.   GENDER: female  ==============================================================================  TODAY'S ASSESSMENT AND PLAN OF CARE:  29yo F with hx of sickle cell disease and PE on xarelto who presents to the ED with RUQ abdominal pain, n/v, decreased PO tolerance, and diarrhea that began two days ago. Patient is afebrile and nontachycardic. Labs remarkable for Tbili 2.7 (likely 2/2 rapid heme turnover). While CT shows normal GB and RUQ US shows cholelithiasis with no signs of GB inflammation, exam and history are consistent with possible acute cholecystitis.     : lap cholecystectomy    Continues to endorse pain but improved from yesterday. Started to wean PCA yesterday and will discontinue and transition to oral analgesics today. Toradol to remain until order expires s/p 3 day course. Tolerating a regular diet. Encouraged to stay well hydrated to decrease sickle symptoms.     Plan:   - oral pain control with tylenol, robaxin, oxy, and breakthrough dilaudid  - DVT lovenox - should continue/discharge on lovenox for DVT prophylaxis until home AC is restarted  - good PO hydration    Neuro  - pain control with home suboxone  - oral tylenol, robaxin, oxy 5/10 q4h prn, dilaudid breakthrough  - toradol 15mg q6h  - home duloxetine  - robaxin 1g q8h  - lyrica    GI  - regular diet    Heme  - Heme consult for co-management of pain in setting of sickle cell disease  - hydroxyurea, prednisone, oxbrita HELD until outpatient follow up with [facility] hematologist. Confirm appointment with patient.  - holding home AC (xarelto)/therapeutic anticoagulation for 1 week post op. Bridge with DVT lovenox  - home folic acid, colchicine  - Daily hemolysis labs, including getting LDH, bilis, haptoglobin, CBC with differential.    ID  - dc Zosyn  - daily CBC    -  SCDs, lovenox, activity as tolerated    Discussed with Dr. Ramirez.    Nelida Nieves MD  [facility]  [id number]    ==============================================================================  CHIEF COMPLAINT / EVENTS LAST 24HRS / HPI:  LACI. Continues to have pain at umbilical incision but improved from yesterday. Tolerating diet without nausea or vomiting.     PHYSICAL EXAM:  Heart Rate:  [61-90]   Temp:  [36.1 °C (97 °F)-36.8 °C (98.2 °F)]   Resp:  [18]   BP: (109-132)/(64-83)   SpO2:  [94 %-99 %]   Constitutional: no acute distress, nontoxic, uncomfortable  Neuro: A/O x4, no gross deficits   Psych: normal affect  HEENT: No deformities, no scleral icterus   Cardiac: RRR  Pulmonary: unlabored respirations   Abdomen: soft, non distended,. Port sites and umbilical incision well appearing with no opening, closed with skin glue, no rebound or guarding. Tenderness at umbilical incision  Skin: warm and dry overall    Extremities: no swelling noted  MSK: moving all four      IMAGING SUMMARY:  (summary of new imaging findings, not a copy of dictation)  NA    LABS:  Results from last 7 days   Lab Units 06/15/24  0529 06/14/24  0924 06/13/24  0559 06/10/24  1618 06/09/24  0532   WBC AUTO x10*3/uL 10.0 15.6* 16.8*   < > 10.3   HEMOGLOBIN g/dL 8.5* 9.2* 10.1*   < > 9.0*   HEMATOCRIT % 25.9* 26.0* 29.6*   < > 26.3*   PLATELETS AUTO x10*3/uL 199 234 255   < > 242   NEUTROS PCT AUTO % 40.8  --   --   --  61.9   LYMPHO PCT MAN %  --  27.2 8.0   < >  --    LYMPHS PCT AUTO % 49.5  --   --   --  31.2   MONO PCT MAN %  --  1.8 5.0   < >  --    MONOS PCT AUTO % 7.9  --   --   --  5.7   EOSINO PCT MAN %  --  0.0 0.0   < >  --    EOS PCT AUTO % 1.3  --   --   --  0.9    < > = values in this interval not displayed.     Results from last 7 days   Lab Units 06/10/24  1618   APTT seconds 33     Results from last 7 days   Lab Units 06/15/24  0529 06/14/24  0924 06/13/24  0559 06/10/24  1618 06/09/24  0532   SODIUM mmol/L 140 141 136   < > 140    POTASSIUM mmol/L 3.5 3.8 3.8   < > 3.5   CHLORIDE mmol/L 107 107 102   < > 105   CO2 mmol/L 28 25 23   < > 23   BUN mg/dL 9 10 9   < > 9   CREATININE mg/dL 0.67 0.69 0.83   < > 0.85   CALCIUM mg/dL 8.3* 8.4* 9.0   < > 8.8   PROTEIN TOTAL g/dL  --   --   --   --  6.9   BILIRUBIN TOTAL mg/dL 0.8 0.9 1.7*   < > 2.9*   ALK PHOS U/L  --   --   --   --  68   ALT U/L  --   --   --   --  4*   AST U/L  --   --   --   --  11   GLUCOSE mg/dL 85 136* 139*   < > 84    < > = values in this interval not displayed.     Results from last 7 days   Lab Units 06/15/24  0529 06/14/24  0924 06/13/24  0559   BILIRUBIN TOTAL mg/dL 0.8 0.9 1.7*   BILIRUBIN DIRECT mg/dL 0.2 0.2 0.5*           I have reviewed all medications, laboratory results, and imaging pertinent for today's encounter.

## 2024-06-16 VITALS
OXYGEN SATURATION: 95 % | TEMPERATURE: 96.6 F | RESPIRATION RATE: 15 BRPM | WEIGHT: 293 LBS | HEIGHT: 63 IN | DIASTOLIC BLOOD PRESSURE: 82 MMHG | BODY MASS INDEX: 51.91 KG/M2 | SYSTOLIC BLOOD PRESSURE: 135 MMHG | HEART RATE: 50 BPM

## 2024-06-16 LAB
ALBUMIN SERPL BCP-MCNC: 3.6 G/DL (ref 3.4–5)
ANION GAP SERPL CALC-SCNC: 13 MMOL/L (ref 10–20)
BASOPHILS # BLD AUTO: 0.03 X10*3/UL (ref 0–0.1)
BASOPHILS NFR BLD AUTO: 0.2 %
BILIRUB DIRECT SERPL-MCNC: 0.2 MG/DL (ref 0–0.3)
BILIRUB SERPL-MCNC: 1.3 MG/DL (ref 0–1.2)
BUN SERPL-MCNC: 7 MG/DL (ref 6–23)
CALCIUM SERPL-MCNC: 8.9 MG/DL (ref 8.6–10.6)
CHLORIDE SERPL-SCNC: 103 MMOL/L (ref 98–107)
CO2 SERPL-SCNC: 25 MMOL/L (ref 21–32)
CREAT SERPL-MCNC: 0.68 MG/DL (ref 0.5–1.05)
EGFRCR SERPLBLD CKD-EPI 2021: >90 ML/MIN/1.73M*2
EOSINOPHIL # BLD AUTO: 0.26 X10*3/UL (ref 0–0.7)
EOSINOPHIL NFR BLD AUTO: 2.2 %
ERYTHROCYTE [DISTWIDTH] IN BLOOD BY AUTOMATED COUNT: 21.3 % (ref 11.5–14.5)
GLUCOSE SERPL-MCNC: 91 MG/DL (ref 74–99)
HCT VFR BLD AUTO: 30 % (ref 36–46)
HGB BLD-MCNC: 9.9 G/DL (ref 12–16)
IMM GRANULOCYTES # BLD AUTO: 0.04 X10*3/UL (ref 0–0.7)
IMM GRANULOCYTES NFR BLD AUTO: 0.3 % (ref 0–0.9)
LYMPHOCYTES # BLD AUTO: 3.99 X10*3/UL (ref 1.2–4.8)
LYMPHOCYTES NFR BLD AUTO: 33.2 %
MAGNESIUM SERPL-MCNC: 1.95 MG/DL (ref 1.6–2.4)
MCH RBC QN AUTO: 22.6 PG (ref 26–34)
MCHC RBC AUTO-ENTMCNC: 33 G/DL (ref 32–36)
MCV RBC AUTO: 68 FL (ref 80–100)
MONOCYTES # BLD AUTO: 0.91 X10*3/UL (ref 0.1–1)
MONOCYTES NFR BLD AUTO: 7.6 %
NEUTROPHILS # BLD AUTO: 6.78 X10*3/UL (ref 1.2–7.7)
NEUTROPHILS NFR BLD AUTO: 56.5 %
NRBC BLD-RTO: 0 /100 WBCS (ref 0–0)
PHOSPHATE SERPL-MCNC: 3 MG/DL (ref 2.5–4.9)
PLATELET # BLD AUTO: 253 X10*3/UL (ref 150–450)
POTASSIUM SERPL-SCNC: 3.4 MMOL/L (ref 3.5–5.3)
RBC # BLD AUTO: 4.39 X10*6/UL (ref 4–5.2)
RBC MORPH BLD: NORMAL
SODIUM SERPL-SCNC: 138 MMOL/L (ref 136–145)
TARGETS BLD QL SMEAR: NORMAL
WBC # BLD AUTO: 12 X10*3/UL (ref 4.4–11.3)

## 2024-06-16 PROCEDURE — 2500000002 HC RX 250 W HCPCS SELF ADMINISTERED DRUGS (ALT 637 FOR MEDICARE OP, ALT 636 FOR OP/ED): Mod: MUE | Performed by: STUDENT IN AN ORGANIZED HEALTH CARE EDUCATION/TRAINING PROGRAM

## 2024-06-16 PROCEDURE — 82248 BILIRUBIN DIRECT: CPT | Performed by: STUDENT IN AN ORGANIZED HEALTH CARE EDUCATION/TRAINING PROGRAM

## 2024-06-16 PROCEDURE — 2500000004 HC RX 250 GENERAL PHARMACY W/ HCPCS (ALT 636 FOR OP/ED)

## 2024-06-16 PROCEDURE — 2500000001 HC RX 250 WO HCPCS SELF ADMINISTERED DRUGS (ALT 637 FOR MEDICARE OP)

## 2024-06-16 PROCEDURE — 36415 COLL VENOUS BLD VENIPUNCTURE: CPT | Performed by: STUDENT IN AN ORGANIZED HEALTH CARE EDUCATION/TRAINING PROGRAM

## 2024-06-16 PROCEDURE — 83735 ASSAY OF MAGNESIUM: CPT | Performed by: STUDENT IN AN ORGANIZED HEALTH CARE EDUCATION/TRAINING PROGRAM

## 2024-06-16 PROCEDURE — 85025 COMPLETE CBC W/AUTO DIFF WBC: CPT | Performed by: STUDENT IN AN ORGANIZED HEALTH CARE EDUCATION/TRAINING PROGRAM

## 2024-06-16 PROCEDURE — 84100 ASSAY OF PHOSPHORUS: CPT | Performed by: STUDENT IN AN ORGANIZED HEALTH CARE EDUCATION/TRAINING PROGRAM

## 2024-06-16 PROCEDURE — 84075 ASSAY ALKALINE PHOSPHATASE: CPT

## 2024-06-16 PROCEDURE — 1100000001 HC PRIVATE ROOM DAILY

## 2024-06-16 PROCEDURE — 2500000004 HC RX 250 GENERAL PHARMACY W/ HCPCS (ALT 636 FOR OP/ED): Performed by: STUDENT IN AN ORGANIZED HEALTH CARE EDUCATION/TRAINING PROGRAM

## 2024-06-16 PROCEDURE — 2500000001 HC RX 250 WO HCPCS SELF ADMINISTERED DRUGS (ALT 637 FOR MEDICARE OP): Performed by: STUDENT IN AN ORGANIZED HEALTH CARE EDUCATION/TRAINING PROGRAM

## 2024-06-16 PROCEDURE — 82247 BILIRUBIN TOTAL: CPT | Performed by: STUDENT IN AN ORGANIZED HEALTH CARE EDUCATION/TRAINING PROGRAM

## 2024-06-16 PROCEDURE — 2500000002 HC RX 250 W HCPCS SELF ADMINISTERED DRUGS (ALT 637 FOR MEDICARE OP, ALT 636 FOR OP/ED)

## 2024-06-16 RX ORDER — POTASSIUM CHLORIDE 20 MEQ/1
40 TABLET, EXTENDED RELEASE ORAL ONCE
Status: COMPLETED | OUTPATIENT
Start: 2024-06-16 | End: 2024-06-16

## 2024-06-16 RX ORDER — POTASSIUM CHLORIDE 20 MEQ/1
20 TABLET, EXTENDED RELEASE ORAL ONCE
Status: COMPLETED | OUTPATIENT
Start: 2024-06-16 | End: 2024-06-16

## 2024-06-16 RX ADMIN — COLCHICINE 0.6 MG: 0.6 TABLET ORAL at 20:42

## 2024-06-16 RX ADMIN — FOLIC ACID 1 MG: 1 TABLET ORAL at 08:56

## 2024-06-16 RX ADMIN — ACETAMINOPHEN 650 MG: 325 TABLET ORAL at 23:48

## 2024-06-16 RX ADMIN — OXYCODONE HYDROCHLORIDE 10 MG: 5 TABLET ORAL at 04:30

## 2024-06-16 RX ADMIN — POTASSIUM CHLORIDE 40 MEQ: 1500 TABLET, EXTENDED RELEASE ORAL at 08:55

## 2024-06-16 RX ADMIN — KETOROLAC TROMETHAMINE 15 MG: 30 INJECTION, SOLUTION INTRAMUSCULAR; INTRAVENOUS at 18:49

## 2024-06-16 RX ADMIN — ENOXAPARIN SODIUM 60 MG: 60 INJECTION SUBCUTANEOUS at 20:42

## 2024-06-16 RX ADMIN — PREGABALIN 50 MG: 50 CAPSULE ORAL at 20:43

## 2024-06-16 RX ADMIN — ACETAMINOPHEN 650 MG: 325 TABLET ORAL at 11:45

## 2024-06-16 RX ADMIN — OXYCODONE HYDROCHLORIDE 10 MG: 5 TABLET ORAL at 11:02

## 2024-06-16 RX ADMIN — OXYCODONE HYDROCHLORIDE 10 MG: 5 TABLET ORAL at 20:42

## 2024-06-16 RX ADMIN — KETOROLAC TROMETHAMINE 15 MG: 30 INJECTION, SOLUTION INTRAMUSCULAR; INTRAVENOUS at 12:00

## 2024-06-16 RX ADMIN — POTASSIUM CHLORIDE 20 MEQ: 1500 TABLET, EXTENDED RELEASE ORAL at 15:46

## 2024-06-16 RX ADMIN — ENOXAPARIN SODIUM 60 MG: 60 INJECTION SUBCUTANEOUS at 09:03

## 2024-06-16 RX ADMIN — METHOCARBAMOL TABLETS 1000 MG: 500 TABLET, COATED ORAL at 18:49

## 2024-06-16 RX ADMIN — COLCHICINE 0.6 MG: 0.6 TABLET ORAL at 08:56

## 2024-06-16 RX ADMIN — ONDANSETRON 4 MG: 2 INJECTION INTRAMUSCULAR; INTRAVENOUS at 20:47

## 2024-06-16 RX ADMIN — DULOXETINE HYDROCHLORIDE 60 MG: 60 CAPSULE, DELAYED RELEASE ORAL at 08:55

## 2024-06-16 RX ADMIN — METHOCARBAMOL TABLETS 1000 MG: 500 TABLET, COATED ORAL at 06:21

## 2024-06-16 RX ADMIN — KETOROLAC TROMETHAMINE 15 MG: 30 INJECTION, SOLUTION INTRAMUSCULAR; INTRAVENOUS at 23:48

## 2024-06-16 RX ADMIN — OXYCODONE HYDROCHLORIDE 10 MG: 5 TABLET ORAL at 15:47

## 2024-06-16 RX ADMIN — KETOROLAC TROMETHAMINE 15 MG: 30 INJECTION, SOLUTION INTRAMUSCULAR; INTRAVENOUS at 06:21

## 2024-06-16 RX ADMIN — ACETAMINOPHEN 650 MG: 325 TABLET ORAL at 04:32

## 2024-06-16 RX ADMIN — ACETAMINOPHEN 650 MG: 325 TABLET ORAL at 18:50

## 2024-06-16 RX ADMIN — METHOCARBAMOL TABLETS 1000 MG: 500 TABLET, COATED ORAL at 20:50

## 2024-06-16 RX ADMIN — ONDANSETRON 4 MG: 2 INJECTION INTRAMUSCULAR; INTRAVENOUS at 15:47

## 2024-06-16 RX ADMIN — ONDANSETRON 4 MG: 2 INJECTION INTRAMUSCULAR; INTRAVENOUS at 06:24

## 2024-06-16 ASSESSMENT — PAIN SCALES - GENERAL
PAINLEVEL_OUTOF10: 8
PAINLEVEL_OUTOF10: 6
PAINLEVEL_OUTOF10: 7
PAINLEVEL_OUTOF10: 6
PAINLEVEL_OUTOF10: 5 - MODERATE PAIN

## 2024-06-16 ASSESSMENT — COGNITIVE AND FUNCTIONAL STATUS - GENERAL
MOBILITY SCORE: 24
DAILY ACTIVITIY SCORE: 24

## 2024-06-16 ASSESSMENT — PAIN DESCRIPTION - DESCRIPTORS: DESCRIPTORS: ACHING;DISCOMFORT

## 2024-06-16 ASSESSMENT — PAIN - FUNCTIONAL ASSESSMENT
PAIN_FUNCTIONAL_ASSESSMENT: 0-10
PAIN_FUNCTIONAL_ASSESSMENT: 0-10

## 2024-06-16 NOTE — CARE PLAN
The patient's goals for the shift include      The clinical goals for the shift include patient pain will be tolerable    Over the shift, the patient did make progress toward the following goals.   Problem: Pain - Adult  Goal: Verbalizes/displays adequate comfort level or baseline comfort level  Outcome: Progressing     Problem: Pain  Goal: Takes deep breaths with improved pain control throughout the shift  Outcome: Progressing  Goal: Walks with improved pain control throughout the shift  Outcome: Progressing  Goal: Performs ADL's with improved pain control throughout shift  Outcome: Progressing

## 2024-06-16 NOTE — PROGRESS NOTES
[facility]  ACUTE CARE SURGERY - PROGRESS NOTE    Patient Name: Taylor Hampton  MRN: [id number]  Admit Date: 608  : 1995  AGE: 28 y.o.   GENDER: female  ==============================================================================  TODAY'S ASSESSMENT AND PLAN OF CARE:  29yo F with hx of sickle cell disease and PE on xarelto who presents to the ED with RUQ abdominal pain, n/v, decreased PO tolerance, and diarrhea that began two days ago. Patient is afebrile and nontachycardic. Labs remarkable for Tbili 2.7 (likely 2/2 rapid heme turnover). While CT shows normal GB and RUQ US shows cholelithiasis with no signs of GB inflammation, exam and history are consistent with possible acute cholecystitis.     : lap cholecystectomy    PCA discontinued yesterday. Reports pain is controlled but having decreased oral intake 2/2 lack of appetite. Encouraged PO intake today and if tolerating a diet, medically ready for discharge tomorrow. Encouraged to stay well hydrated to decrease sickle symptoms.     Plan:   - oral pain control with tylenol, robaxin, oxy, and breakthrough dilaudid  - DVT lovenox - should continue/discharge on lovenox for DVT prophylaxis until home AC is restarted  - good PO hydration    Neuro  - pain control with home suboxone  - oral tylenol, robaxin, oxy 5/10 q4h prn, dilaudid breakthrough  - toradol 15mg q6h  - home duloxetine  - lyrica    GI  - regular diet    Heme  - Heme consult for co-management of pain in setting of sickle cell disease  - hydroxyurea, prednisone, oxbrita HELD until outpatient follow up with [facility] hematologist. Confirm appointment with patient.  - holding home AC (xarelto)/therapeutic anticoagulation for 1 week post op. Bridge with DVT lovenox  - home folic acid, colchicine    ID  - dc Zosyn  - daily CBC    - SCDs, lovenox, activity as tolerated    Discussed with Dr. Ramirez.    Nelida Nieves,  MD  [facility]  [id number]    ==============================================================================  CHIEF COMPLAINT / EVENTS LAST 24HRS / HPI:  NAEON. Pain is improved today but has not been OOB yesterday and having decreased oral intake 2/2 lack of appetite.     PHYSICAL EXAM:  Heart Rate:  [52-67]   Temp:  [36.1 °C (97 °F)-36.7 °C (98.1 °F)]   Resp:  [16-18]   BP: (127-148)/(61-90)   SpO2:  [93 %-100 %]   Constitutional: no acute distress, nontoxic, uncomfortable  Neuro: A/O x4, no gross deficits   Psych: normal affect  HEENT: No deformities, no scleral icterus   Cardiac: RRR  Pulmonary: unlabored respirations   Abdomen: soft, non distended,. Port sites and umbilical incision well appearing with no opening, closed with skin glue, no rebound or guarding. Mild tenderness at umbilical incision  Skin: warm and dry overall    Extremities: no swelling noted  MSK: moving all four      IMAGING SUMMARY:  (summary of new imaging findings, not a copy of dictation)  NA    LABS:  Results from last 7 days   Lab Units 06/16/24  0551 06/15/24  0529 06/14/24  0924 06/13/24  0559   WBC AUTO x10*3/uL 12.0* 10.0 15.6* 16.8*   HEMOGLOBIN g/dL 9.9* 8.5* 9.2* 10.1*   HEMATOCRIT % 30.0* 25.9* 26.0* 29.6*   PLATELETS AUTO x10*3/uL 253 199 234 255   NEUTROS PCT AUTO %  --  40.8  --   --    LYMPHO PCT MAN %  --   --  27.2 8.0   LYMPHS PCT AUTO %  --  49.5  --   --    MONO PCT MAN %  --   --  1.8 5.0   MONOS PCT AUTO %  --  7.9  --   --    EOSINO PCT MAN %  --   --  0.0 0.0   EOS PCT AUTO %  --  1.3  --   --      Results from last 7 days   Lab Units 06/10/24  1618   APTT seconds 33     Results from last 7 days   Lab Units 06/16/24  0551 06/15/24  0529 06/14/24  0924   SODIUM mmol/L 138 140 141   POTASSIUM mmol/L 3.4* 3.5 3.8   CHLORIDE mmol/L 103 107 107   CO2 mmol/L 25 28 25   BUN mg/dL 7 9 10   CREATININE mg/dL 0.68 0.67 0.69   CALCIUM mg/dL 8.9 8.3* 8.4*   BILIRUBIN TOTAL mg/dL 1.3* 0.8 0.9   GLUCOSE mg/dL 91 85 136*     Results from last  7 days   Lab Units 06/16/24  0551 06/15/24  0529 06/14/24  0924   BILIRUBIN TOTAL mg/dL 1.3* 0.8 0.9   BILIRUBIN DIRECT mg/dL 0.2 0.2 0.2           I have reviewed all medications, laboratory results, and imaging pertinent for today's encounter.

## 2024-06-17 LAB
ALBUMIN SERPL BCP-MCNC: 3.5 G/DL (ref 3.4–5)
ALBUMIN SERPL BCP-MCNC: 3.7 G/DL (ref 3.4–5)
ALBUMIN SERPL BCP-MCNC: 3.7 G/DL (ref 3.4–5)
ALP SERPL-CCNC: 64 U/L (ref 33–110)
ALP SERPL-CCNC: 66 U/L (ref 33–110)
ALT SERPL W P-5'-P-CCNC: 13 U/L (ref 7–45)
ALT SERPL W P-5'-P-CCNC: 13 U/L (ref 7–45)
ANION GAP SERPL CALC-SCNC: 12 MMOL/L (ref 10–20)
AST SERPL W P-5'-P-CCNC: 13 U/L (ref 9–39)
AST SERPL W P-5'-P-CCNC: 15 U/L (ref 9–39)
BASOPHILS # BLD MANUAL: 0 X10*3/UL (ref 0–0.1)
BASOPHILS NFR BLD MANUAL: 0 %
BILIRUB DIRECT SERPL-MCNC: 0.2 MG/DL (ref 0–0.3)
BILIRUB DIRECT SERPL-MCNC: 0.3 MG/DL (ref 0–0.3)
BILIRUB DIRECT SERPL-MCNC: 0.3 MG/DL (ref 0–0.3)
BILIRUB SERPL-MCNC: 1.3 MG/DL (ref 0–1.2)
BILIRUB SERPL-MCNC: 1.4 MG/DL (ref 0–1.2)
BILIRUB SERPL-MCNC: 1.4 MG/DL (ref 0–1.2)
BUN SERPL-MCNC: 10 MG/DL (ref 6–23)
CALCIUM SERPL-MCNC: 9.2 MG/DL (ref 8.6–10.6)
CHLORIDE SERPL-SCNC: 102 MMOL/L (ref 98–107)
CO2 SERPL-SCNC: 26 MMOL/L (ref 21–32)
CREAT SERPL-MCNC: 0.78 MG/DL (ref 0.5–1.05)
DACRYOCYTES BLD QL SMEAR: ABNORMAL
EGFRCR SERPLBLD CKD-EPI 2021: >90 ML/MIN/1.73M*2
EOSINOPHIL # BLD MANUAL: 0.2 X10*3/UL (ref 0–0.7)
EOSINOPHIL NFR BLD MANUAL: 1.7 %
ERYTHROCYTE [DISTWIDTH] IN BLOOD BY AUTOMATED COUNT: 21 % (ref 11.5–14.5)
GLUCOSE SERPL-MCNC: 73 MG/DL (ref 74–99)
HCT VFR BLD AUTO: 29.3 % (ref 36–46)
HGB BLD-MCNC: 10 G/DL (ref 12–16)
HYPOCHROMIA BLD QL SMEAR: ABNORMAL
IMM GRANULOCYTES # BLD AUTO: 0.03 X10*3/UL (ref 0–0.7)
IMM GRANULOCYTES NFR BLD AUTO: 0.3 % (ref 0–0.9)
LYMPHOCYTES # BLD MANUAL: 3.9 X10*3/UL (ref 1.2–4.8)
LYMPHOCYTES NFR BLD MANUAL: 33.6 %
MAGNESIUM SERPL-MCNC: 1.96 MG/DL (ref 1.6–2.4)
MCH RBC QN AUTO: 22.5 PG (ref 26–34)
MCHC RBC AUTO-ENTMCNC: 34.1 G/DL (ref 32–36)
MCV RBC AUTO: 66 FL (ref 80–100)
MONOCYTES # BLD MANUAL: 0.2 X10*3/UL (ref 0.1–1)
MONOCYTES NFR BLD MANUAL: 1.7 %
NEUTS SEG # BLD MANUAL: 7.31 X10*3/UL (ref 1.2–7)
NEUTS SEG NFR BLD MANUAL: 63 %
NRBC BLD-RTO: 0 /100 WBCS (ref 0–0)
OVALOCYTES BLD QL SMEAR: ABNORMAL
PHOSPHATE SERPL-MCNC: 3.5 MG/DL (ref 2.5–4.9)
PLATELET # BLD AUTO: 278 X10*3/UL (ref 150–450)
POTASSIUM SERPL-SCNC: 3.4 MMOL/L (ref 3.5–5.3)
PROT SERPL-MCNC: 6.5 G/DL (ref 6.4–8.2)
PROT SERPL-MCNC: 6.7 G/DL (ref 6.4–8.2)
RBC # BLD AUTO: 4.45 X10*6/UL (ref 4–5.2)
RBC MORPH BLD: ABNORMAL
SCHISTOCYTES BLD QL SMEAR: ABNORMAL
SODIUM SERPL-SCNC: 137 MMOL/L (ref 136–145)
TARGETS BLD QL SMEAR: ABNORMAL
TOTAL CELLS COUNTED BLD: 116
WBC # BLD AUTO: 11.6 X10*3/UL (ref 4.4–11.3)

## 2024-06-17 PROCEDURE — 82247 BILIRUBIN TOTAL: CPT | Performed by: STUDENT IN AN ORGANIZED HEALTH CARE EDUCATION/TRAINING PROGRAM

## 2024-06-17 PROCEDURE — 80069 RENAL FUNCTION PANEL: CPT | Performed by: STUDENT IN AN ORGANIZED HEALTH CARE EDUCATION/TRAINING PROGRAM

## 2024-06-17 PROCEDURE — 2500000004 HC RX 250 GENERAL PHARMACY W/ HCPCS (ALT 636 FOR OP/ED)

## 2024-06-17 PROCEDURE — 36415 COLL VENOUS BLD VENIPUNCTURE: CPT | Performed by: STUDENT IN AN ORGANIZED HEALTH CARE EDUCATION/TRAINING PROGRAM

## 2024-06-17 PROCEDURE — 99024 POSTOP FOLLOW-UP VISIT: CPT | Performed by: SURGERY

## 2024-06-17 PROCEDURE — 83735 ASSAY OF MAGNESIUM: CPT | Performed by: STUDENT IN AN ORGANIZED HEALTH CARE EDUCATION/TRAINING PROGRAM

## 2024-06-17 PROCEDURE — 2500000002 HC RX 250 W HCPCS SELF ADMINISTERED DRUGS (ALT 637 FOR MEDICARE OP, ALT 636 FOR OP/ED): Performed by: STUDENT IN AN ORGANIZED HEALTH CARE EDUCATION/TRAINING PROGRAM

## 2024-06-17 PROCEDURE — 2500000001 HC RX 250 WO HCPCS SELF ADMINISTERED DRUGS (ALT 637 FOR MEDICARE OP)

## 2024-06-17 PROCEDURE — 2500000004 HC RX 250 GENERAL PHARMACY W/ HCPCS (ALT 636 FOR OP/ED): Performed by: STUDENT IN AN ORGANIZED HEALTH CARE EDUCATION/TRAINING PROGRAM

## 2024-06-17 PROCEDURE — 82248 BILIRUBIN DIRECT: CPT | Performed by: STUDENT IN AN ORGANIZED HEALTH CARE EDUCATION/TRAINING PROGRAM

## 2024-06-17 PROCEDURE — 2500000001 HC RX 250 WO HCPCS SELF ADMINISTERED DRUGS (ALT 637 FOR MEDICARE OP): Performed by: STUDENT IN AN ORGANIZED HEALTH CARE EDUCATION/TRAINING PROGRAM

## 2024-06-17 PROCEDURE — 2500000001 HC RX 250 WO HCPCS SELF ADMINISTERED DRUGS (ALT 637 FOR MEDICARE OP): Performed by: PHYSICIAN ASSISTANT

## 2024-06-17 PROCEDURE — 85027 COMPLETE CBC AUTOMATED: CPT | Performed by: STUDENT IN AN ORGANIZED HEALTH CARE EDUCATION/TRAINING PROGRAM

## 2024-06-17 PROCEDURE — 82040 ASSAY OF SERUM ALBUMIN: CPT

## 2024-06-17 PROCEDURE — 2500000002 HC RX 250 W HCPCS SELF ADMINISTERED DRUGS (ALT 637 FOR MEDICARE OP, ALT 636 FOR OP/ED): Mod: MUE

## 2024-06-17 PROCEDURE — 85007 BL SMEAR W/DIFF WBC COUNT: CPT | Performed by: STUDENT IN AN ORGANIZED HEALTH CARE EDUCATION/TRAINING PROGRAM

## 2024-06-17 PROCEDURE — 1100000001 HC PRIVATE ROOM DAILY

## 2024-06-17 RX ORDER — POTASSIUM CHLORIDE 1.5 G/1.58G
40 POWDER, FOR SOLUTION ORAL ONCE
Status: DISCONTINUED | OUTPATIENT
Start: 2024-06-17 | End: 2024-06-17

## 2024-06-17 RX ORDER — HYDROMORPHONE HYDROCHLORIDE 1 MG/ML
0.2 INJECTION, SOLUTION INTRAMUSCULAR; INTRAVENOUS; SUBCUTANEOUS
Status: DISCONTINUED | OUTPATIENT
Start: 2024-06-17 | End: 2024-06-20

## 2024-06-17 RX ORDER — POTASSIUM CHLORIDE 1.5 G/1.58G
20 POWDER, FOR SOLUTION ORAL ONCE
Status: DISCONTINUED | OUTPATIENT
Start: 2024-06-17 | End: 2024-06-17

## 2024-06-17 RX ORDER — POTASSIUM CHLORIDE 20 MEQ/1
40 TABLET, EXTENDED RELEASE ORAL ONCE
Status: COMPLETED | OUTPATIENT
Start: 2024-06-17 | End: 2024-06-17

## 2024-06-17 RX ORDER — GUAIFENESIN 600 MG/1
600 TABLET, EXTENDED RELEASE ORAL 2 TIMES DAILY PRN
Status: DISCONTINUED | OUTPATIENT
Start: 2024-06-17 | End: 2024-06-22 | Stop reason: HOSPADM

## 2024-06-17 RX ORDER — HYDROMORPHONE HYDROCHLORIDE 2 MG/1
2 TABLET ORAL EVERY 4 HOURS PRN
Status: DISCONTINUED | OUTPATIENT
Start: 2024-06-17 | End: 2024-06-20

## 2024-06-17 RX ORDER — HYDROMORPHONE HYDROCHLORIDE 2 MG/1
4 TABLET ORAL EVERY 4 HOURS PRN
Status: DISCONTINUED | OUTPATIENT
Start: 2024-06-17 | End: 2024-06-20

## 2024-06-17 RX ORDER — OXYCODONE HYDROCHLORIDE 5 MG/1
10 TABLET ORAL EVERY 4 HOURS PRN
Status: DISCONTINUED | OUTPATIENT
Start: 2024-06-17 | End: 2024-06-18

## 2024-06-17 RX ORDER — IBUPROFEN 600 MG/1
600 TABLET ORAL EVERY 6 HOURS SCHEDULED
Status: DISCONTINUED | OUTPATIENT
Start: 2024-06-17 | End: 2024-06-22 | Stop reason: HOSPADM

## 2024-06-17 RX ORDER — OXYCODONE HYDROCHLORIDE 5 MG/1
5 TABLET ORAL EVERY 4 HOURS PRN
Status: DISCONTINUED | OUTPATIENT
Start: 2024-06-17 | End: 2024-06-18

## 2024-06-17 RX ORDER — SODIUM CHLORIDE, SODIUM LACTATE, POTASSIUM CHLORIDE, CALCIUM CHLORIDE 600; 310; 30; 20 MG/100ML; MG/100ML; MG/100ML; MG/100ML
50 INJECTION, SOLUTION INTRAVENOUS CONTINUOUS
Status: DISCONTINUED | OUTPATIENT
Start: 2024-06-17 | End: 2024-06-22 | Stop reason: HOSPADM

## 2024-06-17 ASSESSMENT — PAIN SCALES - PAIN ASSESSMENT IN ADVANCED DEMENTIA (PAINAD): TOTALSCORE: MEDICATION (SEE MAR)

## 2024-06-17 ASSESSMENT — COGNITIVE AND FUNCTIONAL STATUS - GENERAL
DAILY ACTIVITIY SCORE: 24
MOBILITY SCORE: 24
DAILY ACTIVITIY SCORE: 24
MOBILITY SCORE: 24

## 2024-06-17 ASSESSMENT — PAIN SCALES - GENERAL
PAINLEVEL_OUTOF10: 7
PAINLEVEL_OUTOF10: 4
PAINLEVEL_OUTOF10: 10 - WORST POSSIBLE PAIN
PAINLEVEL_OUTOF10: 6
PAINLEVEL_OUTOF10: 7
PAINLEVEL_OUTOF10: 5 - MODERATE PAIN
PAINLEVEL_OUTOF10: 5 - MODERATE PAIN
PAINLEVEL_OUTOF10: 8
PAINLEVEL_OUTOF10: 5 - MODERATE PAIN
PAINLEVEL_OUTOF10: 9
PAINLEVEL_OUTOF10: 7
PAINLEVEL_OUTOF10: 8
PAINLEVEL_OUTOF10: 7
PAINLEVEL_OUTOF10: 7
PAINLEVEL_OUTOF10: 8

## 2024-06-17 ASSESSMENT — PAIN - FUNCTIONAL ASSESSMENT
PAIN_FUNCTIONAL_ASSESSMENT: 0-10
PAIN_FUNCTIONAL_ASSESSMENT: 0-10

## 2024-06-17 ASSESSMENT — PAIN DESCRIPTION - DESCRIPTORS: DESCRIPTORS: ACHING

## 2024-06-17 ASSESSMENT — PAIN DESCRIPTION - LOCATION: LOCATION: LEG

## 2024-06-17 NOTE — PROGRESS NOTES
[facility]  ACUTE CARE SURGERY - PROGRESS NOTE    Patient Name: Taylor Hampton  MRN: [id number]  Admit Date: 608  : 1995  AGE: 28 y.o.   GENDER: female  ==============================================================================  TODAY'S ASSESSMENT AND PLAN OF CARE:  29yo F with hx of sickle cell disease and PE on xarelto who presents to the ED with RUQ abdominal pain, n/v, decreased PO tolerance, and diarrhea that began two days ago. Patient is afebrile and nontachycardic. Labs remarkable for Tbili 2.7 (likely 2/2 rapid heme turnover). While CT shows normal GB and RUQ US shows cholelithiasis with no signs of GB inflammation, exam and history are consistent with possible acute cholecystitis.     : lap cholecystectomy    Acute increase in pain overnight and poor oral intake for the past two days. Concern for recurrence of sickle cell pan crisis.     Plan:   - DVT lovenox - should continue/discharge on lovenox for DVT prophylaxis until home AC is restarted  - LR 50mL/hr  - will reach out to sickle cell team for pain co-management    Neuro  - pain control with home suboxone  - oral tylenol, robaxin, oxy 5/10 q4h prn, dilaudid breakthrough  - toradol 15mg q6h  - home duloxetine  - lyrica    GI  - regular diet    Heme  - Heme consult for co-management of pain in setting of sickle cell disease  - hydroxyurea, prednisone, oxbrita HELD until outpatient follow up with [facility] hematologist. Confirm appointment with patient.  - holding home AC (xarelto)/therapeutic anticoagulation for 1 week post op. Bridge with DVT lovenox  - home folic acid, colchicine    ID  - dc Zosyn  - daily CBC    - SCDs, lovenox, activity as tolerated    Discussed with Dr. Chaney.    Nelida Nieves MD  [facility]  [id number]    ==============================================================================  CHIEF COMPLAINT / EVENTS LAST 24HRS / HPI:  Endorsed more pain overnight which feels similar to her sickle cell  pain. Having poor oral intake.     PHYSICAL EXAM:  Heart Rate:  [49-61]   Temp:  [35.9 °C (96.6 °F)-36.7 °C (98.1 °F)]   Resp:  [15-18]   BP: (135-143)/(76-91)   SpO2:  [94 %-96 %]   Constitutional: no acute distress, nontoxic, uncomfortable  Neuro: A/O x4, no gross deficits   Psych: normal affect  HEENT: No deformities, no scleral icterus   Cardiac: RRR  Pulmonary: unlabored respirations   Abdomen: soft, non distended,. Port sites and umbilical incision well appearing with no opening, closed with skin glue, no rebound or guarding. Mild tenderness at umbilical incision  Skin: warm and dry overall    Extremities: no swelling noted  MSK: moving all four      IMAGING SUMMARY:  (summary of new imaging findings, not a copy of dictation)  NA    LABS:  Results from last 7 days   Lab Units 06/17/24  0554 06/16/24  0551 06/15/24  0529   WBC AUTO x10*3/uL 11.6* 12.0* 10.0   HEMOGLOBIN g/dL 10.0* 9.9* 8.5*   HEMATOCRIT % 29.3* 30.0* 25.9*   PLATELETS AUTO x10*3/uL 278 253 199   NEUTROS PCT AUTO %  --  56.5 40.8   LYMPHO PCT MAN % 33.6  --   --    LYMPHS PCT AUTO %  --  33.2 49.5   MONO PCT MAN % 1.7  --   --    MONOS PCT AUTO %  --  7.6 7.9   EOSINO PCT MAN % 1.7  --   --    EOS PCT AUTO %  --  2.2 1.3     Results from last 7 days   Lab Units 06/10/24  1618   APTT seconds 33     Results from last 7 days   Lab Units 06/17/24  0554 06/16/24  0551 06/15/24  0529   SODIUM mmol/L 137 138 140   POTASSIUM mmol/L 3.4* 3.4* 3.5   CHLORIDE mmol/L 102 103 107   CO2 mmol/L 26 25 28   BUN mg/dL 10 7 9   CREATININE mg/dL 0.78 0.68 0.67   CALCIUM mg/dL 9.2 8.9 8.3*   PROTEIN TOTAL g/dL  --  6.5  --    BILIRUBIN TOTAL mg/dL 1.4* 1.3*  1.3* 0.8   ALK PHOS U/L  --  66  --    ALT U/L  --  13  --    AST U/L  --  15  --    GLUCOSE mg/dL 73* 91 85     Results from last 7 days   Lab Units 06/17/24  0554 06/16/24  0551 06/15/24  0529   BILIRUBIN TOTAL mg/dL 1.4* 1.3*  1.3* 0.8   BILIRUBIN DIRECT mg/dL 0.3 0.3  0.2 0.2           I have reviewed all  medications, laboratory results, and imaging pertinent for today's encounter.     normal... Well appearing, awake, alert, oriented to person, place, time/situation and in no apparent distress.

## 2024-06-17 NOTE — CARE PLAN
The patient's goals for the shift include      The clinical goals for the shift include pain control    Over the shift, the patient did  make progress toward the following goals.   Problem: Pain - Adult  Goal: Verbalizes/displays adequate comfort level or baseline comfort level  Outcome: Progressing     Problem: Pain  Goal: Takes deep breaths with improved pain control throughout the shift  Outcome: Progressing  Goal: Walks with improved pain control throughout the shift  Outcome: Progressing  Goal: Performs ADL's with improved pain control throughout shift  Outcome: Progressing

## 2024-06-18 ENCOUNTER — APPOINTMENT (OUTPATIENT)
Dept: RADIOLOGY | Facility: HOSPITAL | Age: 29
DRG: 417 | End: 2024-06-18
Payer: MEDICARE

## 2024-06-18 LAB
ALBUMIN SERPL BCP-MCNC: 3.7 G/DL (ref 3.4–5)
ALP SERPL-CCNC: 61 U/L (ref 33–110)
ALT SERPL W P-5'-P-CCNC: 9 U/L (ref 7–45)
ANION GAP SERPL CALC-SCNC: 13 MMOL/L (ref 10–20)
AST SERPL W P-5'-P-CCNC: 14 U/L (ref 9–39)
BASOPHILS # BLD MANUAL: 0 X10*3/UL (ref 0–0.1)
BASOPHILS NFR BLD MANUAL: 0 %
BILIRUB DIRECT SERPL-MCNC: 0.3 MG/DL (ref 0–0.3)
BILIRUB SERPL-MCNC: 1.5 MG/DL (ref 0–1.2)
BUN SERPL-MCNC: 10 MG/DL (ref 6–23)
CALCIUM SERPL-MCNC: 9 MG/DL (ref 8.6–10.6)
CHLORIDE SERPL-SCNC: 104 MMOL/L (ref 98–107)
CO2 SERPL-SCNC: 25 MMOL/L (ref 21–32)
CREAT SERPL-MCNC: 0.83 MG/DL (ref 0.5–1.05)
EGFRCR SERPLBLD CKD-EPI 2021: >90 ML/MIN/1.73M*2
EOSINOPHIL # BLD MANUAL: 0.16 X10*3/UL (ref 0–0.7)
EOSINOPHIL NFR BLD MANUAL: 1.8 %
ERYTHROCYTE [DISTWIDTH] IN BLOOD BY AUTOMATED COUNT: 21.3 % (ref 11.5–14.5)
GLUCOSE SERPL-MCNC: 89 MG/DL (ref 74–99)
HCT VFR BLD AUTO: 30.8 % (ref 36–46)
HGB BLD-MCNC: 10.3 G/DL (ref 12–16)
IMM GRANULOCYTES # BLD AUTO: 0.02 X10*3/UL (ref 0–0.7)
IMM GRANULOCYTES NFR BLD AUTO: 0.2 % (ref 0–0.9)
LYMPHOCYTES # BLD MANUAL: 2.98 X10*3/UL (ref 1.2–4.8)
LYMPHOCYTES NFR BLD MANUAL: 34.2 %
MAGNESIUM SERPL-MCNC: 1.79 MG/DL (ref 1.6–2.4)
MCH RBC QN AUTO: 23.2 PG (ref 26–34)
MCHC RBC AUTO-ENTMCNC: 33.4 G/DL (ref 32–36)
MCV RBC AUTO: 69 FL (ref 80–100)
MONOCYTES # BLD MANUAL: 0.23 X10*3/UL (ref 0.1–1)
MONOCYTES NFR BLD MANUAL: 2.6 %
NEUTS SEG # BLD MANUAL: 5.34 X10*3/UL (ref 1.2–7)
NEUTS SEG NFR BLD MANUAL: 61.4 %
NRBC BLD-RTO: 0 /100 WBCS (ref 0–0)
OVALOCYTES BLD QL SMEAR: NORMAL
PHOSPHATE SERPL-MCNC: 3.3 MG/DL (ref 2.5–4.9)
PLATELET # BLD AUTO: 203 X10*3/UL (ref 150–450)
POTASSIUM SERPL-SCNC: 3.5 MMOL/L (ref 3.5–5.3)
PROT SERPL-MCNC: 6.8 G/DL (ref 6.4–8.2)
RBC # BLD AUTO: 4.44 X10*6/UL (ref 4–5.2)
RBC MORPH BLD: NORMAL
SODIUM SERPL-SCNC: 138 MMOL/L (ref 136–145)
TARGETS BLD QL SMEAR: NORMAL
TOTAL CELLS COUNTED BLD: 114
WBC # BLD AUTO: 8.7 X10*3/UL (ref 4.4–11.3)

## 2024-06-18 PROCEDURE — 2500000004 HC RX 250 GENERAL PHARMACY W/ HCPCS (ALT 636 FOR OP/ED)

## 2024-06-18 PROCEDURE — 3430000001 HC RX 343 DIAGNOSTIC RADIOPHARMACEUTICALS: Performed by: EMERGENCY MEDICINE

## 2024-06-18 PROCEDURE — 2500000001 HC RX 250 WO HCPCS SELF ADMINISTERED DRUGS (ALT 637 FOR MEDICARE OP)

## 2024-06-18 PROCEDURE — 2500000004 HC RX 250 GENERAL PHARMACY W/ HCPCS (ALT 636 FOR OP/ED): Performed by: STUDENT IN AN ORGANIZED HEALTH CARE EDUCATION/TRAINING PROGRAM

## 2024-06-18 PROCEDURE — 85027 COMPLETE CBC AUTOMATED: CPT | Performed by: STUDENT IN AN ORGANIZED HEALTH CARE EDUCATION/TRAINING PROGRAM

## 2024-06-18 PROCEDURE — 1100000001 HC PRIVATE ROOM DAILY

## 2024-06-18 PROCEDURE — 2500000002 HC RX 250 W HCPCS SELF ADMINISTERED DRUGS (ALT 637 FOR MEDICARE OP, ALT 636 FOR OP/ED): Mod: MUE | Performed by: STUDENT IN AN ORGANIZED HEALTH CARE EDUCATION/TRAINING PROGRAM

## 2024-06-18 PROCEDURE — 83735 ASSAY OF MAGNESIUM: CPT | Performed by: STUDENT IN AN ORGANIZED HEALTH CARE EDUCATION/TRAINING PROGRAM

## 2024-06-18 PROCEDURE — 82248 BILIRUBIN DIRECT: CPT

## 2024-06-18 PROCEDURE — 36415 COLL VENOUS BLD VENIPUNCTURE: CPT | Performed by: STUDENT IN AN ORGANIZED HEALTH CARE EDUCATION/TRAINING PROGRAM

## 2024-06-18 PROCEDURE — 99024 POSTOP FOLLOW-UP VISIT: CPT | Performed by: SURGERY

## 2024-06-18 PROCEDURE — 78226 HEPATOBILIARY SYSTEM IMAGING: CPT | Performed by: RADIOLOGY

## 2024-06-18 PROCEDURE — 80048 BASIC METABOLIC PNL TOTAL CA: CPT | Performed by: STUDENT IN AN ORGANIZED HEALTH CARE EDUCATION/TRAINING PROGRAM

## 2024-06-18 PROCEDURE — 2500000001 HC RX 250 WO HCPCS SELF ADMINISTERED DRUGS (ALT 637 FOR MEDICARE OP): Performed by: STUDENT IN AN ORGANIZED HEALTH CARE EDUCATION/TRAINING PROGRAM

## 2024-06-18 PROCEDURE — 2500000005 HC RX 250 GENERAL PHARMACY W/O HCPCS

## 2024-06-18 PROCEDURE — 85007 BL SMEAR W/DIFF WBC COUNT: CPT | Performed by: STUDENT IN AN ORGANIZED HEALTH CARE EDUCATION/TRAINING PROGRAM

## 2024-06-18 PROCEDURE — 84100 ASSAY OF PHOSPHORUS: CPT | Performed by: STUDENT IN AN ORGANIZED HEALTH CARE EDUCATION/TRAINING PROGRAM

## 2024-06-18 PROCEDURE — 78227 HEPATOBIL SYST IMAGE W/DRUG: CPT

## 2024-06-18 PROCEDURE — 2500000002 HC RX 250 W HCPCS SELF ADMINISTERED DRUGS (ALT 637 FOR MEDICARE OP, ALT 636 FOR OP/ED): Mod: MUE

## 2024-06-18 PROCEDURE — A9537 TC99M MEBROFENIN: HCPCS | Performed by: EMERGENCY MEDICINE

## 2024-06-18 PROCEDURE — 2580000001 HC RX 258 IV SOLUTIONS

## 2024-06-18 RX ORDER — METOCLOPRAMIDE 10 MG/1
5 TABLET ORAL EVERY 24 HOURS
Status: DISCONTINUED | OUTPATIENT
Start: 2024-06-18 | End: 2024-06-19

## 2024-06-18 RX ORDER — MAGNESIUM SULFATE HEPTAHYDRATE 40 MG/ML
2 INJECTION, SOLUTION INTRAVENOUS ONCE
Status: COMPLETED | OUTPATIENT
Start: 2024-06-18 | End: 2024-06-18

## 2024-06-18 RX ORDER — DEXTROSE MONOHYDRATE 100 MG/ML
INJECTION, SOLUTION INTRAVENOUS CONTINUOUS
Status: CANCELLED | OUTPATIENT
Start: 2024-06-18

## 2024-06-18 RX ORDER — LIDOCAINE HYDROCHLORIDE 10 MG/ML
5 INJECTION INFILTRATION; PERINEURAL ONCE
Status: DISCONTINUED | OUTPATIENT
Start: 2024-06-18 | End: 2024-06-18

## 2024-06-18 RX ORDER — GABAPENTIN 300 MG/1
300 CAPSULE ORAL EVERY 8 HOURS SCHEDULED
Status: CANCELLED | OUTPATIENT
Start: 2024-06-18

## 2024-06-18 RX ORDER — KIT FOR THE PREPARATION OF TECHNETIUM TC 99M MEBROFENIN 45 MG/10ML
5.8 INJECTION, POWDER, LYOPHILIZED, FOR SOLUTION INTRAVENOUS
Status: COMPLETED | OUTPATIENT
Start: 2024-06-18 | End: 2024-06-18

## 2024-06-18 RX ORDER — POTASSIUM CHLORIDE 20 MEQ/1
40 TABLET, EXTENDED RELEASE ORAL ONCE
Status: COMPLETED | OUTPATIENT
Start: 2024-06-18 | End: 2024-06-18

## 2024-06-18 RX ORDER — BUPRENORPHINE HYDROCHLORIDE AND NALOXONE HYDROCHLORIDE DIHYDRATE 2; .5 MG/1; MG/1
2 TABLET SUBLINGUAL 3 TIMES DAILY
Status: DISCONTINUED | OUTPATIENT
Start: 2024-06-18 | End: 2024-06-22 | Stop reason: HOSPADM

## 2024-06-18 ASSESSMENT — PAIN SCALES - GENERAL
PAINLEVEL_OUTOF10: 7
PAINLEVEL_OUTOF10: 8
PAINLEVEL_OUTOF10: 9
PAINLEVEL_OUTOF10: 8
PAINLEVEL_OUTOF10: 9
PAINLEVEL_OUTOF10: 9
PAINLEVEL_OUTOF10: 8

## 2024-06-18 ASSESSMENT — PAIN - FUNCTIONAL ASSESSMENT
PAIN_FUNCTIONAL_ASSESSMENT: 0-10
PAIN_FUNCTIONAL_ASSESSMENT: 0-10

## 2024-06-18 ASSESSMENT — PAIN DESCRIPTION - DESCRIPTORS: DESCRIPTORS: ACHING

## 2024-06-18 ASSESSMENT — PAIN DESCRIPTION - ORIENTATION: ORIENTATION: RIGHT;LEFT

## 2024-06-18 NOTE — CARE PLAN
The patient's goals for the shift include      The clinical goals for the shift include patient will have pain <7 during shift

## 2024-06-18 NOTE — CONSULTS
"Nutrition Initial Assessment:   Nutrition Assessment    Reason for Assessment: TPN recommendations, Provider consult order    Patient is a 28 y.o. female with hx of sickle cell disease and PE; presented to ED with RUQ abdominal pain, n/v, decreased PO tolerance and diarrhea.     6/12: lap cholecystectomy    Past Medical History:   Diagnosis Date    Sickle cell anemia with crisis (Multi)      Past Surgical History:   Procedure Laterality Date    CT ABDOMEN PELVIS ANGIOGRAM W AND/OR WO IV CONTRAST  4/9/2020    CT ABDOMEN PELVIS ANGIOGRAM W AND/OR WO IV CONTRAST 4/9/2020 [facility]    CT ANGIO NECK  9/27/2019    CT NECK ANGIO W AND WO IV CONTRAST 9/27/2019 [facility]    IR CVC TUNNELED  8/24/2020    IR CVC TUNNELED 8/24/2020 [facility]    OTHER SURGICAL HISTORY  09/22/2020    Venous access port placement    OTHER SURGICAL HISTORY  09/22/2020    Tonsillectomy with adenoidectomy    OTHER SURGICAL HISTORY  09/22/2020    Knee surgery     Discussed with team, TPN/PICC is not appropriate. Suspect pt is eating adequately d/t no other indicators of poor appetite. If supplemental nutrition is needed and ONS is not sufficient then would consider TF however at this time, it is not appropriate.     Nutrition History:  Energy Intake:  (unable to quantify)  Food and Nutrient History: Met with patient this morning. Pt reports PTA pt has had a poor appetite reports eating 1 meal a day (100% ramen noodles example) for >1 year. Pt reports that PTA pt was eating a  little more than normal and after surgery pt was able to eat. However reports a few days ago pt has been unable to eat d/t decreased appetite.  Vitamin/Herbal Supplement Use: Pt reports no ONS at home but is open to try inpatient  Food Allergies/Intolerances:   coconut  GI Symptoms: Nausea  Oral Problems: None       Anthropometrics:  Height: 160 cm (5' 2.99\")   Weight: 143 kg (315 lb 0.6 oz)   BMI (Calculated): 55.82  IBW/kg (Dietitian " Calculated): 52.3 kg     Adjusted Body Weight (kg): 91 kg    Weight History:   Wt Readings from Last 5 Encounters:   06/08/24 143 kg (315 lb 0.6 oz)   05/15/24 145 kg (319 lb 7.1 oz)   05/14/24 141 kg (310 lb)   03/14/24 139 kg (306 lb)   10/27/23 141 kg (310 lb)        Weight Change %:  Weight History / % Weight Change: pt reports no weight loss  Significant Weight Loss: No    Nutrition Focused Physical Exam Findings:    Subcutaneous Fat Loss:   Orbital Fat Pads: Well nourished (slightly bulging fat pads)  Buccal Fat Pads: Well nourished (full, rounded cheeks)  Triceps: Well nourished (ample fat tissue)  Ribs: Well nourished (chest is full, ribs do not show, slight to no protrusion of the iliac crest)  Muscle Wasting:  Temporalis: Well nourished (well-defined muscle)  Pectoralis (Clavicular Region): Well nourished (clavicle not visible)  Deltoid/Trapezius: Well nourished (rounded appearance at arm, shoulder, neck)  Interosseous: Well nourished (muscle bulges)  Trapezius/Infraspinatus/Supraspinatus (Scapular Region): Well nourished (bones not prominent, muscle taut)  Quadriceps: Well nourished (well developed, well rounded)  Edema:  Edema Location: none recorded  Physical Findings:  Skin: Negative    Nutrition Significant Labs:  CBC Trend:   Results from last 7 days   Lab Units 06/18/24  0617 06/17/24  0554 06/16/24  0551 06/15/24  0529   WBC AUTO x10*3/uL 8.7 11.6* 12.0* 10.0   RBC AUTO x10*6/uL 4.44 4.45 4.39 3.72*   HEMOGLOBIN g/dL 10.3* 10.0* 9.9* 8.5*   HEMATOCRIT % 30.8* 29.3* 30.0* 25.9*   MCV fL 69* 66* 68* 70*   PLATELETS AUTO x10*3/uL 203 278 253 199    , BMP Trend:   Results from last 7 days   Lab Units 06/18/24  0617 06/17/24  0554 06/16/24  0551 06/15/24  0529   GLUCOSE mg/dL 89 73* 91 85   CALCIUM mg/dL 9.0 9.2 8.9 8.3*   SODIUM mmol/L 138 137 138 140   POTASSIUM mmol/L 3.5 3.4* 3.4* 3.5   CO2 mmol/L 25 26 25 28   CHLORIDE mmol/L 104 102 103 107   BUN mg/dL 10 10 7 9   CREATININE mg/dL 0.83 0.78 0.68  0.67    Liver Function Trend:   Results from last 7 days   Lab Units 06/18/24  0617 06/17/24  0554 06/16/24  0551   ALK PHOS U/L 61 64 66   AST U/L 14 13 15   ALT U/L 9 13 13   BILIRUBIN TOTAL mg/dL 1.5* 1.4*  1.4* 1.3*  1.3*    , Renal Lab Trend:   Results from last 7 days   Lab Units 06/18/24  0617 06/17/24  0554 06/16/24  0551 06/15/24  0529   POTASSIUM mmol/L 3.5 3.4* 3.4* 3.5   PHOSPHORUS mg/dL 3.3 3.5 3.0 3.4   SODIUM mmol/L 138 137 138 140   MAGNESIUM mg/dL 1.79 1.96 1.95 1.60   EGFR mL/min/1.73m*2 >90 >90 >90 >90   BUN mg/dL 10 10 7 9   CREATININE mg/dL 0.83 0.78 0.68 0.67       Nutrition Specific Medications:  Scheduled medications  acetaminophen, 650 mg, oral, q6h  buprenorphine-naloxone, 2 tablet, sublingual, TID  colchicine, 0.6 mg, oral, BID  DULoxetine, 60 mg, oral, Daily  enoxaparin, 60 mg, subcutaneous, q12h SHAVON  folic acid, 1 mg, oral, Daily  ibuprofen, 600 mg, oral, q6h SAHVON  lidocaine, 5 mL, infiltration, Once  lidocaine, 5 mL, infiltration, Once  methocarbamol, 1,000 mg, oral, q8h SHAVON  pregabalin, 50 mg, oral, Nightly    I/O:   Last BM Date: 06/17/24; Stool Appearance: Unable to assess (06/13/24 1300)    Dietary Orders (From admission, onward)       Start     Ordered    06/18/24 1038  Calorie count  Once         06/18/24 1037    06/18/24 0559  Oral nutritional supplements  Until discontinued        Question Answer Comment   Deliver with Breakfast    Deliver with Lunch    Deliver with Dinner    Select supplement: Ensure Plus        06/18/24 0559    06/12/24 1716  Adult diet Regular  Diet effective now        Question:  Diet type  Answer:  Regular    06/12/24 1716                     Estimated Needs:   Total Energy Estimated Needs (kCal):  (2500)  Method for Estimating Needs: Adjusted BW x 28  Total Protein Estimated Needs (g):  (118)  Method for Estimating Needs: Adjusted BW x 1.3  Total Fluid Estimated Needs (mL):  (per team)           Nutrition Diagnosis   Malnutrition Diagnosis  Patient has  Malnutrition Diagnosis: No    Nutrition Diagnosis  Patient has Nutrition Diagnosis: Yes  Diagnosis Status (1): New  Nutrition Diagnosis 1: Increased nutrient needs  Related to (1): increased metabolic demand  As Evidenced by (1): hx of sickle cell disease s/p lap cholecystectomy       Nutrition Interventions/Recommendations         Nutrition Prescription:    Continue a regular diet as tolerated  Continue Ensure Plus  TID (350kcal,13g PRO) each  Consider a MVI supplement per team discretion   Consider an appetite stimulant if medically appropriate per team discretion   If PO continues to be poor (and documented), consider supplemental nutrition in the form of TF d/t PICC/TPN not appropriate.         Nutrition Interventions:   Interventions: Meals and snacks, Medical food supplement  Meals and Snacks: General healthful diet  Goal: WNL  Medical Food Supplement: Commercial beverage  Goal: ensure plus TID    Nutrition Monitoring and Evaluation   Food/Nutrient Related History Monitoring  Monitoring and Evaluation Plan: Energy intake, Amount of food  Energy Intake: Estimated energy intake  Criteria: >75% of estimated energy needs  Amount of Food: Estimated amout of food    Body Composition/Growth/Weight History  Monitoring and Evaluation Plan: Weight    Biochemical Data, Medical Tests and Procedures  Monitoring and Evaluation Plan: Electrolyte/renal panel, Glucose/endocrine profile  Electrolyte and Renal Panel: Sodium, Potassium, Phosphorus, Magnesium  Criteria: WNL  Glucose/Endocrine Profile: Glucose, casual  Criteria: WNL    Time Spent/Follow-up Reminder:   Time Spent (min): 60 minutes  Last Date of Nutrition Visit: 06/18/24  Nutrition Follow-Up Needed?: Dietitian to reassess per policy  Follow up Comment: ONS + diet

## 2024-06-18 NOTE — CARE PLAN
The patient's goals for the shift include      The clinical goals for the shift include patient pain will remain under control    Over the shift, the patient did not make progress toward the following goals. Barriers to progression include medication regiment isn't met.  Recommendations to address these barriers include review hematology.      Problem: Pain - Adult  Goal: Verbalizes/displays adequate comfort level or baseline comfort level  Outcome: Progressing     Problem: Pain  Goal: Takes deep breaths with improved pain control throughout the shift  Outcome: Progressing  Goal: Walks with improved pain control throughout the shift  Outcome: Progressing  Goal: Performs ADL's with improved pain control throughout shift  Outcome: Progressing

## 2024-06-18 NOTE — PROGRESS NOTES
[facility]  ACUTE CARE SURGERY - PROGRESS NOTE    Patient Name: Taylor Hampton  MRN: [id number]  Admit Date: 608  : 1995  AGE: 28 y.o.   GENDER: female  ==============================================================================  TODAY'S ASSESSMENT AND PLAN OF CARE:  29yo F with hx of sickle cell disease and PE on xarelto who presents to the ED with RUQ abdominal pain, n/v, decreased PO tolerance, and diarrhea that began two days ago. Patient is afebrile and nontachycardic. Labs remarkable for Tbili 2.7 (likely 2/2 rapid heme turnover). While CT shows normal GB and RUQ US shows cholelithiasis with no signs of GB inflammation, exam and history are consistent with possible acute cholecystitis.     : lap cholecystectomy    Continues to endorse sick cell pain and poor oral intake. Concern for recurrence of sickle cell pan crisis. Heme consulted yesterday who did not evaluate patient and recommended following a sick cell pain management protocol. Will have nutrition evaluate patient for complaints of poor oral intake.    Plan:   - DVT lovenox - should continue/discharge on lovenox for DVT prophylaxis until home AC is restarted ()  - LR 50mL/hr  - on home lyrica and oral dilaudid 2/4 mg q4h prn as well as hardik tylenol, ibuprofen, robaxin, and breakthrough IV dilaudid    Neuro  - pain control with home suboxone  - oral dilaudid 2/4 mg q4h as well as hardik tylenol, ibuprofen, robaxin, and breakthrough IV dilaudid  - home duloxetine  - lyrica    GI  - regular diet    Heme  - Heme consult for co-management of pain in setting of sickle cell disease  - hydroxyurea, prednisone, oxbrita HELD until outpatient follow up with [facility] hematologist. Confirm appointment with patient.  - holding home AC (xarelto)/therapeutic anticoagulation for 1 week post op (). Bridge with DVT lovenox  - home folic acid, colchicine    ID  - dc Zosyn  - daily CBC    - SCDs, lovenox, activity as  tolerated    Discussed with Dr. Chaney.    Nelida Nieves MD  [facility]  [id number]    ==============================================================================  CHIEF COMPLAINT / EVENTS LAST 24HRS / HPI:  Endorsed more pain which feels similar to her sickle cell pain. Having poor oral intake.     PHYSICAL EXAM:  Heart Rate:  [49-75]   Temp:  [36 °C (96.8 °F)-37.1 °C (98.8 °F)]   Resp:  [16-20]   BP: (120-137)/(73-91)   SpO2:  [94 %-97 %]   Constitutional: no acute distress, nontoxic, uncomfortable  Neuro: A/O x4, no gross deficits   Psych: normal affect  HEENT: No deformities, no scleral icterus   Cardiac: RRR  Pulmonary: unlabored respirations   Abdomen: soft, non distended,. Port sites and umbilical incision well appearing with no opening, closed with skin glue, no rebound or guarding. Mild tenderness at umbilical incision  Skin: warm and dry overall    Extremities: no swelling noted  MSK: moving all four      IMAGING SUMMARY:  (summary of new imaging findings, not a copy of dictation)  NA    LABS:  Results from last 7 days   Lab Units 06/18/24  0617 06/17/24  0554 06/16/24  0551 06/15/24  0529   WBC AUTO x10*3/uL 8.7 11.6* 12.0* 10.0   HEMOGLOBIN g/dL 10.3* 10.0* 9.9* 8.5*   HEMATOCRIT % 30.8* 29.3* 30.0* 25.9*   PLATELETS AUTO x10*3/uL 203 278 253 199   NEUTROS PCT AUTO %  --   --  56.5 40.8   LYMPHO PCT MAN % 34.2 33.6  --   --    LYMPHS PCT AUTO %  --   --  33.2 49.5   MONO PCT MAN % 2.6 1.7  --   --    MONOS PCT AUTO %  --   --  7.6 7.9   EOSINO PCT MAN % 1.8 1.7  --   --    EOS PCT AUTO %  --   --  2.2 1.3           Results from last 7 days   Lab Units 06/18/24  0617 06/17/24  0554 06/16/24  0551   SODIUM mmol/L 138 137 138   POTASSIUM mmol/L 3.5 3.4* 3.4*   CHLORIDE mmol/L 104 102 103   CO2 mmol/L 25 26 25   BUN mg/dL 10 10 7   CREATININE mg/dL 0.83 0.78 0.68   CALCIUM mg/dL 9.0 9.2 8.9   PROTEIN TOTAL g/dL 6.8 6.7 6.5   BILIRUBIN TOTAL mg/dL 1.5* 1.4*  1.4* 1.3*  1.3*   ALK PHOS U/L 61 64 66   ALT U/L 9 13 13    AST U/L 14 13 15   GLUCOSE mg/dL 89 73* 91     Results from last 7 days   Lab Units 06/18/24  0617 06/17/24  0554 06/16/24  0551   BILIRUBIN TOTAL mg/dL 1.5* 1.4*  1.4* 1.3*  1.3*   BILIRUBIN DIRECT mg/dL 0.3 0.2  0.3 0.3  0.2           I have reviewed all medications, laboratory results, and imaging pertinent for today's encounter.

## 2024-06-19 ENCOUNTER — PHARMACY VISIT (OUTPATIENT)
Dept: PHARMACY | Facility: CLINIC | Age: 29
End: 2024-06-19
Payer: COMMERCIAL

## 2024-06-19 ENCOUNTER — APPOINTMENT (OUTPATIENT)
Dept: RADIOLOGY | Facility: HOSPITAL | Age: 29
DRG: 417 | End: 2024-06-19
Payer: MEDICARE

## 2024-06-19 LAB
ALBUMIN SERPL BCP-MCNC: 4 G/DL (ref 3.4–5)
ALP SERPL-CCNC: 64 U/L (ref 33–110)
ALT SERPL W P-5'-P-CCNC: 8 U/L (ref 7–45)
ANION GAP SERPL CALC-SCNC: 15 MMOL/L (ref 10–20)
AST SERPL W P-5'-P-CCNC: 12 U/L (ref 9–39)
BASOPHILS # BLD AUTO: 0.02 X10*3/UL (ref 0–0.1)
BASOPHILS NFR BLD AUTO: 0.2 %
BILIRUB DIRECT SERPL-MCNC: 0.2 MG/DL (ref 0–0.3)
BILIRUB SERPL-MCNC: 1.3 MG/DL (ref 0–1.2)
BUN SERPL-MCNC: 9 MG/DL (ref 6–23)
CALCIUM SERPL-MCNC: 9.3 MG/DL (ref 8.6–10.6)
CHLORIDE SERPL-SCNC: 101 MMOL/L (ref 98–107)
CO2 SERPL-SCNC: 23 MMOL/L (ref 21–32)
CREAT SERPL-MCNC: 0.74 MG/DL (ref 0.5–1.05)
EGFRCR SERPLBLD CKD-EPI 2021: >90 ML/MIN/1.73M*2
EOSINOPHIL # BLD AUTO: 0.16 X10*3/UL (ref 0–0.7)
EOSINOPHIL NFR BLD AUTO: 1.6 %
ERYTHROCYTE [DISTWIDTH] IN BLOOD BY AUTOMATED COUNT: 20.6 % (ref 11.5–14.5)
GLUCOSE SERPL-MCNC: 95 MG/DL (ref 74–99)
HCT VFR BLD AUTO: 30.2 % (ref 36–46)
HGB BLD-MCNC: 10.5 G/DL (ref 12–16)
IMM GRANULOCYTES # BLD AUTO: 0.02 X10*3/UL (ref 0–0.7)
IMM GRANULOCYTES NFR BLD AUTO: 0.2 % (ref 0–0.9)
LYMPHOCYTES # BLD AUTO: 2.65 X10*3/UL (ref 1.2–4.8)
LYMPHOCYTES NFR BLD AUTO: 26.2 %
MAGNESIUM SERPL-MCNC: 1.93 MG/DL (ref 1.6–2.4)
MCH RBC QN AUTO: 22.6 PG (ref 26–34)
MCHC RBC AUTO-ENTMCNC: 34.8 G/DL (ref 32–36)
MCV RBC AUTO: 65 FL (ref 80–100)
MONOCYTES # BLD AUTO: 0.81 X10*3/UL (ref 0.1–1)
MONOCYTES NFR BLD AUTO: 8 %
NEUTROPHILS # BLD AUTO: 6.47 X10*3/UL (ref 1.2–7.7)
NEUTROPHILS NFR BLD AUTO: 63.8 %
NRBC BLD-RTO: 0 /100 WBCS (ref 0–0)
PHOSPHATE SERPL-MCNC: 3.3 MG/DL (ref 2.5–4.9)
PLATELET # BLD AUTO: 306 X10*3/UL (ref 150–450)
POTASSIUM SERPL-SCNC: 3.6 MMOL/L (ref 3.5–5.3)
PROT SERPL-MCNC: 7.4 G/DL (ref 6.4–8.2)
RBC # BLD AUTO: 4.65 X10*6/UL (ref 4–5.2)
RBC MORPH BLD: NORMAL
SODIUM SERPL-SCNC: 135 MMOL/L (ref 136–145)
TARGETS BLD QL SMEAR: NORMAL
WBC # BLD AUTO: 10.1 X10*3/UL (ref 4.4–11.3)

## 2024-06-19 PROCEDURE — 2500000001 HC RX 250 WO HCPCS SELF ADMINISTERED DRUGS (ALT 637 FOR MEDICARE OP)

## 2024-06-19 PROCEDURE — 2500000002 HC RX 250 W HCPCS SELF ADMINISTERED DRUGS (ALT 637 FOR MEDICARE OP, ALT 636 FOR OP/ED): Mod: MUE | Performed by: STUDENT IN AN ORGANIZED HEALTH CARE EDUCATION/TRAINING PROGRAM

## 2024-06-19 PROCEDURE — 1100000001 HC PRIVATE ROOM DAILY

## 2024-06-19 PROCEDURE — 2780000003 HC OR 278 NO HCPCS

## 2024-06-19 PROCEDURE — 2500000002 HC RX 250 W HCPCS SELF ADMINISTERED DRUGS (ALT 637 FOR MEDICARE OP, ALT 636 FOR OP/ED)

## 2024-06-19 PROCEDURE — 2500000004 HC RX 250 GENERAL PHARMACY W/ HCPCS (ALT 636 FOR OP/ED): Performed by: STUDENT IN AN ORGANIZED HEALTH CARE EDUCATION/TRAINING PROGRAM

## 2024-06-19 PROCEDURE — 82248 BILIRUBIN DIRECT: CPT

## 2024-06-19 PROCEDURE — 2500000001 HC RX 250 WO HCPCS SELF ADMINISTERED DRUGS (ALT 637 FOR MEDICARE OP): Performed by: NURSE PRACTITIONER

## 2024-06-19 PROCEDURE — C1751 CATH, INF, PER/CENT/MIDLINE: HCPCS

## 2024-06-19 PROCEDURE — 80048 BASIC METABOLIC PNL TOTAL CA: CPT | Performed by: STUDENT IN AN ORGANIZED HEALTH CARE EDUCATION/TRAINING PROGRAM

## 2024-06-19 PROCEDURE — 2500000004 HC RX 250 GENERAL PHARMACY W/ HCPCS (ALT 636 FOR OP/ED)

## 2024-06-19 PROCEDURE — 85025 COMPLETE CBC W/AUTO DIFF WBC: CPT | Performed by: STUDENT IN AN ORGANIZED HEALTH CARE EDUCATION/TRAINING PROGRAM

## 2024-06-19 PROCEDURE — 2580000001 HC RX 258 IV SOLUTIONS

## 2024-06-19 PROCEDURE — 2500000001 HC RX 250 WO HCPCS SELF ADMINISTERED DRUGS (ALT 637 FOR MEDICARE OP): Performed by: STUDENT IN AN ORGANIZED HEALTH CARE EDUCATION/TRAINING PROGRAM

## 2024-06-19 PROCEDURE — 83690 ASSAY OF LIPASE: CPT | Performed by: STUDENT IN AN ORGANIZED HEALTH CARE EDUCATION/TRAINING PROGRAM

## 2024-06-19 PROCEDURE — 83735 ASSAY OF MAGNESIUM: CPT | Performed by: STUDENT IN AN ORGANIZED HEALTH CARE EDUCATION/TRAINING PROGRAM

## 2024-06-19 PROCEDURE — 36415 COLL VENOUS BLD VENIPUNCTURE: CPT | Performed by: STUDENT IN AN ORGANIZED HEALTH CARE EDUCATION/TRAINING PROGRAM

## 2024-06-19 PROCEDURE — 84100 ASSAY OF PHOSPHORUS: CPT | Performed by: STUDENT IN AN ORGANIZED HEALTH CARE EDUCATION/TRAINING PROGRAM

## 2024-06-19 PROCEDURE — 36410 VNPNXR 3YR/> PHY/QHP DX/THER: CPT

## 2024-06-19 RX ORDER — METOCLOPRAMIDE 10 MG/1
10 TABLET ORAL EVERY 6 HOURS SCHEDULED
Status: DISCONTINUED | OUTPATIENT
Start: 2024-06-19 | End: 2024-06-22 | Stop reason: HOSPADM

## 2024-06-19 RX ORDER — LIDOCAINE HYDROCHLORIDE 10 MG/ML
5 INJECTION INFILTRATION; PERINEURAL ONCE
Status: DISCONTINUED | OUTPATIENT
Start: 2024-06-19 | End: 2024-06-22 | Stop reason: HOSPADM

## 2024-06-19 RX ORDER — MULTIVIT-MIN/IRON FUM/FOLIC AC 7.5 MG-4
1 TABLET ORAL DAILY
Status: DISCONTINUED | OUTPATIENT
Start: 2024-06-19 | End: 2024-06-22 | Stop reason: HOSPADM

## 2024-06-19 RX ORDER — LIDOCAINE 560 MG/1
1 PATCH PERCUTANEOUS; TOPICAL; TRANSDERMAL DAILY PRN
Status: CANCELLED | OUTPATIENT
Start: 2024-06-19

## 2024-06-19 ASSESSMENT — PAIN - FUNCTIONAL ASSESSMENT
PAIN_FUNCTIONAL_ASSESSMENT: 0-10

## 2024-06-19 ASSESSMENT — COGNITIVE AND FUNCTIONAL STATUS - GENERAL
CLIMB 3 TO 5 STEPS WITH RAILING: A LITTLE
WALKING IN HOSPITAL ROOM: A LITTLE
DAILY ACTIVITIY SCORE: 24
MOBILITY SCORE: 22
CLIMB 3 TO 5 STEPS WITH RAILING: A LITTLE
WALKING IN HOSPITAL ROOM: A LITTLE
CLIMB 3 TO 5 STEPS WITH RAILING: A LITTLE
WALKING IN HOSPITAL ROOM: A LITTLE
DAILY ACTIVITIY SCORE: 24
MOBILITY SCORE: 24
WALKING IN HOSPITAL ROOM: A LITTLE
WALKING IN HOSPITAL ROOM: A LITTLE

## 2024-06-19 ASSESSMENT — PAIN SCALES - WONG BAKER
WONGBAKER_NUMERICALRESPONSE: HURTS WORST
WONGBAKER_NUMERICALRESPONSE: HURTS WHOLE LOT

## 2024-06-19 ASSESSMENT — PAIN SCALES - GENERAL
PAINLEVEL_OUTOF10: 9
PAINLEVEL_OUTOF10: 8
PAINLEVEL_OUTOF10: 0 - NO PAIN
PAINLEVEL_OUTOF10: 10 - WORST POSSIBLE PAIN
PAINLEVEL_OUTOF10: 7
PAINLEVEL_OUTOF10: 8
PAINLEVEL_OUTOF10: 8
PAINLEVEL_OUTOF10: 7
PAINLEVEL_OUTOF10: 8
PAINLEVEL_OUTOF10: 5 - MODERATE PAIN
PAINLEVEL_OUTOF10: 7

## 2024-06-19 ASSESSMENT — PAIN SCALES - PAIN ASSESSMENT IN ADVANCED DEMENTIA (PAINAD)
TOTALSCORE: MEDICATION (SEE MAR)
FACIALEXPRESSION: SMILING OR INEXPRESSIVE
BODYLANGUAGE: RELAXED
BODYLANGUAGE: RELAXED
TOTALSCORE: 0
BREATHING: NORMAL
TOTALSCORE: MEDICATION (SEE MAR)
CONSOLABILITY: NO NEED TO CONSOLE
CONSOLABILITY: NO NEED TO CONSOLE
BREATHING: NORMAL
TOTALSCORE: 0
TOTALSCORE: MEDICATION (SEE MAR);REPOSITIONED
FACIALEXPRESSION: SMILING OR INEXPRESSIVE

## 2024-06-19 ASSESSMENT — PAIN DESCRIPTION - LOCATION
LOCATION: CHEST
LOCATION: CHEST
LOCATION: ABDOMEN

## 2024-06-19 ASSESSMENT — ACTIVITIES OF DAILY LIVING (ADL): EFFECT OF PAIN ON DAILY ACTIVITIES: COMFORT

## 2024-06-19 ASSESSMENT — PAIN DESCRIPTION - ORIENTATION
ORIENTATION: ANTERIOR
ORIENTATION: ANTERIOR

## 2024-06-19 NOTE — PROGRESS NOTES
[facility]  ACUTE CARE SURGERY - PROGRESS NOTE    Patient Name: Taylor Hampton  MRN: [id number]  Admit Date: 608  : 1995  AGE: 28 y.o.   GENDER: female  ==============================================================================  TODAY'S ASSESSMENT AND PLAN OF CARE:  27yo F with hx of sickle cell disease and PE on xarelto who presents to the ED with RUQ abdominal pain, n/v, decreased PO tolerance, and diarrhea that began two days ago. Patient is afebrile and nontachycardic. Labs remarkable for Tbili 2.7 (likely 2/2 rapid heme turnover). While CT shows normal GB and RUQ US shows cholelithiasis with no signs of GB inflammation, exam and history are consistent with possible acute cholecystitis.     : lap cholecystectomy    Continues to endorse sick cell pain and poor oral intake. Concern for recurrence of sickle cell pan crisis. Heme consulted  who did not evaluate patient and recommended following a sick cell pain management protocol. Appreciate Nutrition recs.     Plan:   - DVT lovenox - should continue/discharge on lovenox for DVT prophylaxis until home AC is restarted ()  - LR 50mL/hr  - on home lyrica and oral dilaudid 2/4 mg q4h prn as well as hardik tylenol, ibuprofen, robaxin, and breakthrough IV dilaudid    Neuro  - pain control with home suboxone  - oral dilaudid 2/4 mg q4h as well as hardik tylenol, ibuprofen, robaxin, and breakthrough IV dilaudid  - home duloxetine  - lyrica    GI:  - HIDA obtained- negative for biliary leak   - regular diet, PO supplements TID   - Continue on PPN/Lipids, started   - Started on Reglan 10mg [id number]  - Folic acid  - Start on MVI     :  - Strict I&Os  - IVFs  - Replete lytes as indicated    Heme: Sickle Cell Crisis  - hydroxyurea, prednisone, oxbrita HELD until outpatient follow up with [facility] hematologist. Confirm appointment with patient.  - holding home AC (xarelto)/therapeutic anticoagulation for 1 week post op ().  Bridge with DVT lovenox  - home folic acid, colchicine    Endo: no hx of DM  - blood glucose checks while on PPN    ID: no acute issues   - daily CBC    DVT Proph:   - SCDs, lovenox, activity as tolerated    Dispo: continue RNF     Patient seen and discussed with Attending Dr. Jordyn Bain APRN-[facility]  Acute Care Surgery  Pager [phone]  ==============================================================================  CHIEF COMPLAINT / EVENTS LAST 24HRS / HPI:  Endorsing pain which feels similar to her sickle cell pain. Pain in bed, chest and abdomen. States had one episode of N/V yesterday. Unable to take in any PO.     PHYSICAL EXAM:  Heart Rate:  [60-81]   Temp:  [36.1 °C (97 °F)-37.2 °C (99 °F)]   Resp:  [16-19]   BP: (125-146)/(67-95)   SpO2:  [94 %-99 %]   Constitutional: no acute distress, nontoxic, uncomfortable  Neuro: A/O x4, no gross deficits   Psych: normal affect  HEENT: No deformities, no scleral icterus   Cardiac: non cyanotic   Pulmonary: unlabored respirations   Abdomen: soft, non distended,. Port sites and umbilical incision well appearing with no opening, closed with skin glue, no rebound or guarding. Mild tenderness at umbilical incision  Skin: warm and dry overall    Extremities: no swelling noted  MSK: moving all four      IMAGING SUMMARY:  (summary of new imaging findings, not a copy of dictation)  NA    LABS:  Results from last 7 days   Lab Units 06/19/24  0658 06/18/24  0617 06/17/24  0554 06/16/24  0551 06/15/24  0529   WBC AUTO x10*3/uL 10.1 8.7 11.6* 12.0* 10.0   HEMOGLOBIN g/dL 10.5* 10.3* 10.0* 9.9* 8.5*   HEMATOCRIT % 30.2* 30.8* 29.3* 30.0* 25.9*   PLATELETS AUTO x10*3/uL 306 203 278 253 199   NEUTROS PCT AUTO % 63.8  --   --  56.5 40.8   LYMPHO PCT MAN %  --  34.2 33.6  --   --    LYMPHS PCT AUTO % 26.2  --   --  33.2 49.5   MONO PCT MAN %  --  2.6 1.7  --   --    MONOS PCT AUTO % 8.0  --   --  7.6 7.9   EOSINO PCT MAN %  --  1.8 1.7  --   --    EOS PCT AUTO % 1.6  --   --  2.2  1.3           Results from last 7 days   Lab Units 06/19/24  0658 06/18/24  0617 06/17/24  0554   SODIUM mmol/L 135* 138 137   POTASSIUM mmol/L 3.6 3.5 3.4*   CHLORIDE mmol/L 101 104 102   CO2 mmol/L 23 25 26   BUN mg/dL 9 10 10   CREATININE mg/dL 0.74 0.83 0.78   CALCIUM mg/dL 9.3 9.0 9.2   PROTEIN TOTAL g/dL 7.4 6.8 6.7   BILIRUBIN TOTAL mg/dL 1.3* 1.5* 1.4*  1.4*   ALK PHOS U/L 64 61 64   ALT U/L 8 9 13   AST U/L 12 14 13   GLUCOSE mg/dL 95 89 73*     Results from last 7 days   Lab Units 06/19/24  0658 06/18/24  0617 06/17/24  0554   BILIRUBIN TOTAL mg/dL 1.3* 1.5* 1.4*  1.4*   BILIRUBIN DIRECT mg/dL 0.2 0.3 0.2  0.3           I have reviewed all medications, laboratory results, and imaging pertinent for today's encounter.

## 2024-06-19 NOTE — NURSING NOTE
IV team  Patient assess for replacement of PIV. Bilateral arms assessed using ultrasound. Veins visualized are not sufficient diameter to support cannulation. D/w bedside RN. Patient is agreeable to midline IV if indicated by primary team.

## 2024-06-19 NOTE — POST-PROCEDURE NOTE
Pre-Procedure Checklist:  Emergent Line Insertion: No  Type of Line to be Placed: Midline  Consent Obtained: N/A  Emergency Medication Necessary: No  Patient Identified with 2 Independent Identifiers: Yes  Review of Allergies, Anticoagulation, Relevant Labs, ECG/Telemetry: Yes  Risks/Benefits/Alternatives Discussed with Patient/POA/Legal Representative: Yes  Stop Sign on Door: Yes  Time Out Performed: Yes  Catheter Exchange: No    Positioning Checklist:  All People, Including Patient, in the Room with Cap and Mask: Yes  Fluoroscopy Used to Identify Vessel and Guide Insertion: No   Sterile Cover Used: Yes  Full Barrier Precautions Followed (Mask, Cap, Gown, Gloves): Yes  Hands Washed: Yes  Monitors Attached with Sound Alarms On: No  Full Body Sterile Drape (Head-to-Toe) Used to Cover Patient: Yes  Trendelenburg Position (For IJ and Subclavian): No  CHG Skin Prep Used and Allowed to Air Dry to Skin Procedure: Yes    Procedure Checklist:  Blood Aspirated From All Lumens, All Ports Subsequently Flushed: Yes  Catheter Caps Placed on All Lumens; Lumens Clamped: Yes  Maintain Guidewire Control Throughout, Ensuring Guidewire Removal: Yes  Maintain Sterile Field Throughout Insertion: Yes  Catheter Secured: Yes  Confirmatory Test of Venous Placement: Non-Pulsatile Blood    Post Procedure Checklist:  Date and Time Written on Dressing: Yes  Sharp and Wire Count and Safe Disposal of all Sharps/Wires: Yes  Sterile Dressing Applied Per Protocol: Yes  X-ray Ordered or ECG Image: N/A     PICC Insertion Details:  Size (Fr): 3  Lumen Type: Single   Catheter to Vein Ratio Less Than 50%: Yes  Total Length (cm): 11  External Length (cm): 0  Orientation: Left  Location: Cephalic  Site Prep: Chlorohexidine; Usual sterile procedure followed  Local Anesthetic: Injectable/Subcutaneous  Indication: IV Fluids, Parental Nutrition, IV pain Medications, Hemodynamic Monitoring   Insertion Team Members in the Room: Kinjal Mcdaniel LPN  Initial  Extremity Circumference (cm): 47  Insertion Attempts: 1  Patient Tolerance: Tolerated Well, Age Appropriate  Comfort Measures: Subcutaneous anesthetic; Verbal  Procedure Location: Bedside  Safety Measures: Patient specific safety measures addressed with RN  Estimated Blood Loss (mL): 0  Vessel Fully Compressible Proximally and Distally to Insertion Site: Yes  Brisk Blood Return Obtained and Line Draws Easily: Yes  Tip Location: Axilla   Line Confirmation: Blood Return   Lot #: [id number]  : [employer]  MidLine ExP Date 09/30/2025  Securement: Stat Lock  Post Procedure Checklist: Handoff with RN; Obtain all new IV tubing prior to use; Bed at lowest level and wheels locked; Line discharge information at bedside.  Additional Details: Line was inserted using Modified Seldinger's Technique.   Placed by: Debbie Arriola RN

## 2024-06-20 LAB
ALBUMIN SERPL BCP-MCNC: 4.1 G/DL (ref 3.4–5)
ALP SERPL-CCNC: 64 U/L (ref 33–110)
ALT SERPL W P-5'-P-CCNC: 10 U/L (ref 7–45)
ANION GAP SERPL CALC-SCNC: 15 MMOL/L (ref 10–20)
AST SERPL W P-5'-P-CCNC: 13 U/L (ref 9–39)
BASOPHILS # BLD AUTO: 0.02 X10*3/UL (ref 0–0.1)
BASOPHILS NFR BLD AUTO: 0.2 %
BILIRUB DIRECT SERPL-MCNC: 0.2 MG/DL (ref 0–0.3)
BILIRUB SERPL-MCNC: 1.3 MG/DL (ref 0–1.2)
BUN SERPL-MCNC: 10 MG/DL (ref 6–23)
CALCIUM SERPL-MCNC: 9.2 MG/DL (ref 8.6–10.6)
CHLORIDE SERPL-SCNC: 100 MMOL/L (ref 98–107)
CO2 SERPL-SCNC: 24 MMOL/L (ref 21–32)
CREAT SERPL-MCNC: 0.76 MG/DL (ref 0.5–1.05)
EGFRCR SERPLBLD CKD-EPI 2021: >90 ML/MIN/1.73M*2
EOSINOPHIL # BLD AUTO: 0.19 X10*3/UL (ref 0–0.7)
EOSINOPHIL NFR BLD AUTO: 2.3 %
ERYTHROCYTE [DISTWIDTH] IN BLOOD BY AUTOMATED COUNT: 20.7 % (ref 11.5–14.5)
GLUCOSE SERPL-MCNC: 102 MG/DL (ref 74–99)
HCT VFR BLD AUTO: 31.4 % (ref 36–46)
HGB BLD-MCNC: 10.7 G/DL (ref 12–16)
IMM GRANULOCYTES # BLD AUTO: 0.02 X10*3/UL (ref 0–0.7)
IMM GRANULOCYTES NFR BLD AUTO: 0.2 % (ref 0–0.9)
LIPASE SERPL-CCNC: 18 U/L (ref 9–82)
LYMPHOCYTES # BLD AUTO: 2.43 X10*3/UL (ref 1.2–4.8)
LYMPHOCYTES NFR BLD AUTO: 29 %
MAGNESIUM SERPL-MCNC: 1.88 MG/DL (ref 1.6–2.4)
MCH RBC QN AUTO: 22.7 PG (ref 26–34)
MCHC RBC AUTO-ENTMCNC: 34.1 G/DL (ref 32–36)
MCV RBC AUTO: 67 FL (ref 80–100)
MONOCYTES # BLD AUTO: 0.82 X10*3/UL (ref 0.1–1)
MONOCYTES NFR BLD AUTO: 9.8 %
NEUTROPHILS # BLD AUTO: 4.89 X10*3/UL (ref 1.2–7.7)
NEUTROPHILS NFR BLD AUTO: 58.5 %
NRBC BLD-RTO: 0 /100 WBCS (ref 0–0)
OVALOCYTES BLD QL SMEAR: NORMAL
PHOSPHATE SERPL-MCNC: 3.8 MG/DL (ref 2.5–4.9)
PLATELET # BLD AUTO: 283 X10*3/UL (ref 150–450)
POLYCHROMASIA BLD QL SMEAR: NORMAL
POTASSIUM SERPL-SCNC: 3.7 MMOL/L (ref 3.5–5.3)
PROT SERPL-MCNC: 7.2 G/DL (ref 6.4–8.2)
RBC # BLD AUTO: 4.71 X10*6/UL (ref 4–5.2)
RBC MORPH BLD: NORMAL
SODIUM SERPL-SCNC: 135 MMOL/L (ref 136–145)
TARGETS BLD QL SMEAR: NORMAL
WBC # BLD AUTO: 8.4 X10*3/UL (ref 4.4–11.3)

## 2024-06-20 PROCEDURE — 2500000004 HC RX 250 GENERAL PHARMACY W/ HCPCS (ALT 636 FOR OP/ED)

## 2024-06-20 PROCEDURE — 2500000005 HC RX 250 GENERAL PHARMACY W/O HCPCS

## 2024-06-20 PROCEDURE — 82248 BILIRUBIN DIRECT: CPT

## 2024-06-20 PROCEDURE — 84100 ASSAY OF PHOSPHORUS: CPT | Performed by: STUDENT IN AN ORGANIZED HEALTH CARE EDUCATION/TRAINING PROGRAM

## 2024-06-20 PROCEDURE — 2500000004 HC RX 250 GENERAL PHARMACY W/ HCPCS (ALT 636 FOR OP/ED): Performed by: STUDENT IN AN ORGANIZED HEALTH CARE EDUCATION/TRAINING PROGRAM

## 2024-06-20 PROCEDURE — 2500000001 HC RX 250 WO HCPCS SELF ADMINISTERED DRUGS (ALT 637 FOR MEDICARE OP): Performed by: NURSE PRACTITIONER

## 2024-06-20 PROCEDURE — 85025 COMPLETE CBC W/AUTO DIFF WBC: CPT | Performed by: STUDENT IN AN ORGANIZED HEALTH CARE EDUCATION/TRAINING PROGRAM

## 2024-06-20 PROCEDURE — 2500000001 HC RX 250 WO HCPCS SELF ADMINISTERED DRUGS (ALT 637 FOR MEDICARE OP)

## 2024-06-20 PROCEDURE — 1100000001 HC PRIVATE ROOM DAILY

## 2024-06-20 PROCEDURE — 99233 SBSQ HOSP IP/OBS HIGH 50: CPT | Performed by: INTERNAL MEDICINE

## 2024-06-20 PROCEDURE — 2500000002 HC RX 250 W HCPCS SELF ADMINISTERED DRUGS (ALT 637 FOR MEDICARE OP, ALT 636 FOR OP/ED): Mod: MUE | Performed by: STUDENT IN AN ORGANIZED HEALTH CARE EDUCATION/TRAINING PROGRAM

## 2024-06-20 PROCEDURE — 2500000002 HC RX 250 W HCPCS SELF ADMINISTERED DRUGS (ALT 637 FOR MEDICARE OP, ALT 636 FOR OP/ED): Mod: MUE

## 2024-06-20 PROCEDURE — 2500000001 HC RX 250 WO HCPCS SELF ADMINISTERED DRUGS (ALT 637 FOR MEDICARE OP): Performed by: STUDENT IN AN ORGANIZED HEALTH CARE EDUCATION/TRAINING PROGRAM

## 2024-06-20 PROCEDURE — 80048 BASIC METABOLIC PNL TOTAL CA: CPT | Performed by: STUDENT IN AN ORGANIZED HEALTH CARE EDUCATION/TRAINING PROGRAM

## 2024-06-20 PROCEDURE — 2500000002 HC RX 250 W HCPCS SELF ADMINISTERED DRUGS (ALT 637 FOR MEDICARE OP, ALT 636 FOR OP/ED)

## 2024-06-20 PROCEDURE — 2580000001 HC RX 258 IV SOLUTIONS

## 2024-06-20 PROCEDURE — 83735 ASSAY OF MAGNESIUM: CPT | Performed by: STUDENT IN AN ORGANIZED HEALTH CARE EDUCATION/TRAINING PROGRAM

## 2024-06-20 RX ORDER — HYDROMORPHONE HYDROCHLORIDE 1 MG/ML
1 INJECTION, SOLUTION INTRAMUSCULAR; INTRAVENOUS; SUBCUTANEOUS EVERY 2 HOUR PRN
Status: DISCONTINUED | OUTPATIENT
Start: 2024-06-20 | End: 2024-06-22 | Stop reason: HOSPADM

## 2024-06-20 RX ORDER — HYDROMORPHONE HYDROCHLORIDE 2 MG/1
2 TABLET ORAL EVERY 2 HOUR PRN
Status: DISCONTINUED | OUTPATIENT
Start: 2024-06-20 | End: 2024-06-22 | Stop reason: HOSPADM

## 2024-06-20 RX ORDER — HYDROMORPHONE HYDROCHLORIDE 2 MG/1
4 TABLET ORAL EVERY 2 HOUR PRN
Status: DISCONTINUED | OUTPATIENT
Start: 2024-06-20 | End: 2024-06-22 | Stop reason: HOSPADM

## 2024-06-20 RX ORDER — HYDROMORPHONE HYDROCHLORIDE 1 MG/ML
0.5 INJECTION, SOLUTION INTRAMUSCULAR; INTRAVENOUS; SUBCUTANEOUS
Status: DISCONTINUED | OUTPATIENT
Start: 2024-06-20 | End: 2024-06-20

## 2024-06-20 RX ORDER — MAGNESIUM SULFATE HEPTAHYDRATE 40 MG/ML
2 INJECTION, SOLUTION INTRAVENOUS ONCE
Status: COMPLETED | OUTPATIENT
Start: 2024-06-20 | End: 2024-06-20

## 2024-06-20 RX ORDER — POTASSIUM CHLORIDE 20 MEQ/1
40 TABLET, EXTENDED RELEASE ORAL ONCE
Status: COMPLETED | OUTPATIENT
Start: 2024-06-20 | End: 2024-06-20

## 2024-06-20 ASSESSMENT — PAIN SCALES - GENERAL
PAINLEVEL_OUTOF10: 9
PAINLEVEL_OUTOF10: 5 - MODERATE PAIN
PAINLEVEL_OUTOF10: 9
PAINLEVEL_OUTOF10: 7
PAINLEVEL_OUTOF10: 9
PAINLEVEL_OUTOF10: 9
PAINLEVEL_OUTOF10: 7
PAINLEVEL_OUTOF10: 9
PAINLEVEL_OUTOF10: 9
PAINLEVEL_OUTOF10: 7
PAINLEVEL_OUTOF10: 7
PAINLEVEL_OUTOF10: 8
PAINLEVEL_OUTOF10: 9
PAINLEVEL_OUTOF10: 6
PAINLEVEL_OUTOF10: 4
PAINLEVEL_OUTOF10: 7
PAINLEVEL_OUTOF10: 9
PAINLEVEL_OUTOF10: 7
PAINLEVEL_OUTOF10: 9

## 2024-06-20 ASSESSMENT — COGNITIVE AND FUNCTIONAL STATUS - GENERAL
WALKING IN HOSPITAL ROOM: A LITTLE
CLIMB 3 TO 5 STEPS WITH RAILING: A LITTLE
WALKING IN HOSPITAL ROOM: A LITTLE
CLIMB 3 TO 5 STEPS WITH RAILING: A LITTLE
WALKING IN HOSPITAL ROOM: A LITTLE
CLIMB 3 TO 5 STEPS WITH RAILING: A LITTLE
WALKING IN HOSPITAL ROOM: A LITTLE
CLIMB 3 TO 5 STEPS WITH RAILING: A LITTLE
WALKING IN HOSPITAL ROOM: A LITTLE
CLIMB 3 TO 5 STEPS WITH RAILING: A LITTLE
WALKING IN HOSPITAL ROOM: A LITTLE
CLIMB 3 TO 5 STEPS WITH RAILING: A LITTLE

## 2024-06-20 ASSESSMENT — PAIN SCALES - WONG BAKER
WONGBAKER_NUMERICALRESPONSE: HURTS WHOLE LOT

## 2024-06-20 ASSESSMENT — PAIN - FUNCTIONAL ASSESSMENT
PAIN_FUNCTIONAL_ASSESSMENT: 0-10

## 2024-06-20 ASSESSMENT — PAIN SCALES - PAIN ASSESSMENT IN ADVANCED DEMENTIA (PAINAD)
FACIALEXPRESSION: SMILING OR INEXPRESSIVE
CONSOLABILITY: NO NEED TO CONSOLE
BODYLANGUAGE: RELAXED
BREATHING: NORMAL
BREATHING: NORMAL
TOTALSCORE: 0
FACIALEXPRESSION: SMILING OR INEXPRESSIVE
TOTALSCORE: MEDICATION (SEE MAR)
TOTALSCORE: MEDICATION (SEE MAR);REPOSITIONED
FACIALEXPRESSION: SMILING OR INEXPRESSIVE
BODYLANGUAGE: RELAXED
FACIALEXPRESSION: SMILING OR INEXPRESSIVE
TOTALSCORE: MEDICATION (SEE MAR)
FACIALEXPRESSION: SMILING OR INEXPRESSIVE
BREATHING: NORMAL
CONSOLABILITY: NO NEED TO CONSOLE
TOTALSCORE: 0
TOTALSCORE: 0
BODYLANGUAGE: RELAXED
TOTALSCORE: MEDICATION (SEE MAR)
TOTALSCORE: 0
TOTALSCORE: 0
FACIALEXPRESSION: SMILING OR INEXPRESSIVE
CONSOLABILITY: NO NEED TO CONSOLE
TOTALSCORE: MEDICATION (SEE MAR);REPOSITIONED
BODYLANGUAGE: RELAXED
TOTALSCORE: 0
CONSOLABILITY: NO NEED TO CONSOLE
TOTALSCORE: 0
BREATHING: NORMAL
TOTALSCORE: MEDICATION (SEE MAR);REPOSITIONED
CONSOLABILITY: NO NEED TO CONSOLE
FACIALEXPRESSION: SMILING OR INEXPRESSIVE
TOTALSCORE: MEDICATION (SEE MAR)
BODYLANGUAGE: RELAXED
TOTALSCORE: MEDICATION (SEE MAR)
CONSOLABILITY: NO NEED TO CONSOLE
BODYLANGUAGE: RELAXED
BODYLANGUAGE: RELAXED
TOTALSCORE: 0
FACIALEXPRESSION: SMILING OR INEXPRESSIVE
CONSOLABILITY: NO NEED TO CONSOLE
BREATHING: NORMAL
CONSOLABILITY: NO NEED TO CONSOLE
BREATHING: NORMAL
BREATHING: NORMAL
BODYLANGUAGE: RELAXED
TOTALSCORE: MEDICATION (SEE MAR)
BREATHING: NORMAL

## 2024-06-20 ASSESSMENT — PAIN DESCRIPTION - LOCATION
LOCATION: ABDOMEN
LOCATION: GENERALIZED
LOCATION: GENERALIZED
LOCATION: ABDOMEN
LOCATION: GENERALIZED
LOCATION: ABDOMEN
LOCATION: GENERALIZED
LOCATION: CHEST

## 2024-06-20 ASSESSMENT — PAIN DESCRIPTION - ORIENTATION
ORIENTATION: ANTERIOR

## 2024-06-20 NOTE — CARE PLAN
The patient's goals for the shift include      The clinical goals for the shift include patients pain will be managed throughout shift.    Over the shift, the patient did not make progress toward the following goals. Barriers to progression include patient has history of sickle cells anemia, pain not managed well per patient. Recommendations to address these barriers include patient pain management goal and comfort.

## 2024-06-20 NOTE — PROGRESS NOTES
[facility]  ACUTE CARE SURGERY - PROGRESS NOTE    Patient Name: Taylor Hampton  MRN: [id number]  Admit Date: 608  : 1995  AGE: 28 y.o.   GENDER: female  ==============================================================================  TODAY'S ASSESSMENT AND PLAN OF CARE:  27yo F with hx of sickle cell disease and PE on xarelto who presents to the ED with RUQ abdominal pain, n/v, decreased PO tolerance, and diarrhea that began two days ago. Patient is afebrile and nontachycardic. Labs remarkable for Tbili 2.7 (likely 2/2 rapid heme turnover). While CT shows normal GB and RUQ US shows cholelithiasis with no signs of GB inflammation, exam and history are consistent with possible acute cholecystitis.     : lap cholecystectomy    Continues to endorse sick cell pain and poor oral intake. Concern for recurrence of sickle cell pain crisis. Heme consulted  who did not evaluate patient and recommended following a sick cell pain management protocol. Appreciate Nutrition recs. Offered dobhoff for patient who is still deliberating. Continues to have poor PO intake.     Plan:   - restart home AC today  - increase LR to 100mL/hr  - on home lyrica and oral dilaudid 2/4 mg q4h prn as well as hardik tylenol, ibuprofen, robaxin, and breakthrough IV dilaudid  - patient contemplating dobhoff  - increase breakthrough dilaudid dose    Neuro  - pain control with home suboxone  - oral dilaudid 2/4 mg q4h as well as hardik tylenol, ibuprofen, robaxin, and breakthrough IV dilaudid increase to 0.5  - continuous pulse ox monitoring for respiratory status on high dose opioids  - home duloxetine  - lyrica    GI:  - HIDA obtained- negative for biliary leak   - regular diet, PO supplements TID   - offered dobhoff, patient is still considering  - Continue on PPN/Lipids, started   - Started on Reglan 10mg [id number]   - Folic acid  - MVI     :  - Strict I&Os  - IVFs  - Replete lytes as indicated    Heme:  Sickle Cell Crisis  - hydroxyurea, prednisone, oxbrita HELD until outpatient follow up with [facility] hematologist. Confirm appointment with patient.  - restart home AC today warfarin  - home folic acid, colchicine    Endo: no hx of DM  - blood glucose checks while on PPN    ID: no acute issues   - daily CBC    DVT Proph:   - SCDs, lovenox, activity as tolerated    Dispo: continue RNF, not ready. Poor PO intake. Possible dobhoff and enteral feeds    Patient seen and discussed with Attending Dr. Shiv Tello, PGY-1  ACS Service  [id number]    ==============================================================================  CHIEF COMPLAINT / EVENTS LAST 24HRS / HPI:  Endorsing pain which feels similar to her sickle cell pain. Pain in back, chest and lower abdomen. Had one bottle of oral supplement yesterday, no other foods eaten. Will think about dobhoff. Wants higher dose of PRN dilaudid    PHYSICAL EXAM:  Heart Rate:  [64-76]   Temp:  [35.6 °C (96.1 °F)-36.8 °C (98.2 °F)]   Resp:  [18]   BP: (108-145)/(67-84)   SpO2:  [94 %-99 %]   Constitutional: no acute distress, nontoxic, comfortable appearing in bed  Neuro: A/O x4, no gross deficits   Psych: normal affect  HEENT: No deformities, no scleral icterus   Cardiac: non cyanotic   Pulmonary: unlabored respirations   Abdomen: soft, non distended,. Port sites and umbilical incision well appearing with no opening, closed with skin glue, no rebound or guarding.  Skin: warm and dry overall    Extremities: no swelling noted  MSK: moving all four      IMAGING SUMMARY:  (summary of new imaging findings, not a copy of dictation)  NA    LABS:  Results from last 7 days   Lab Units 06/20/24  0633 06/19/24  0658 06/18/24  0617 06/17/24  0554 06/16/24  0551   WBC AUTO x10*3/uL 8.4 10.1 8.7   < > 12.0*   HEMOGLOBIN g/dL 10.7* 10.5* 10.3*   < > 9.9*   HEMATOCRIT % 31.4* 30.2* 30.8*   < > 30.0*   PLATELETS AUTO x10*3/uL 283 306 203   < > 253   NEUTROS PCT AUTO % 58.5 63.8  --   --  56.5    LYMPHO PCT MAN %  --   --  34.2   < >  --    LYMPHS PCT AUTO % 29.0 26.2  --   --  33.2   MONO PCT MAN %  --   --  2.6   < >  --    MONOS PCT AUTO % 9.8 8.0  --   --  7.6   EOSINO PCT MAN %  --   --  1.8   < >  --    EOS PCT AUTO % 2.3 1.6  --   --  2.2    < > = values in this interval not displayed.           Results from last 7 days   Lab Units 06/20/24  0633 06/19/24  0658 06/18/24  0617   SODIUM mmol/L 135* 135* 138   POTASSIUM mmol/L 3.7 3.6 3.5   CHLORIDE mmol/L 100 101 104   CO2 mmol/L 24 23 25   BUN mg/dL 10 9 10   CREATININE mg/dL 0.76 0.74 0.83   CALCIUM mg/dL 9.2 9.3 9.0   PROTEIN TOTAL g/dL 7.2 7.4 6.8   BILIRUBIN TOTAL mg/dL 1.3* 1.3* 1.5*   ALK PHOS U/L 64 64 61   ALT U/L 10 8 9   AST U/L 13 12 14   GLUCOSE mg/dL 102* 95 89     Results from last 7 days   Lab Units 06/20/24  0633 06/19/24  0658 06/18/24  0617   BILIRUBIN TOTAL mg/dL 1.3* 1.3* 1.5*   BILIRUBIN DIRECT mg/dL 0.2 0.2 0.3           I have reviewed all medications, laboratory results, and imaging pertinent for today's encounter.

## 2024-06-20 NOTE — PROGRESS NOTES
Taylor Hampton is a 28 y.o. female on day 12 of admission presenting with Acute cholecystitis.      DC Plannin/18:  Pt demographic confirmed by previous TCC. Please see note.   No home going needs anticipated for this pt at this time.      :   Pt will go home with tube feeds.     ADOD:     This TCC will continue to follow for home going needs and safe DC plan.      JAYDEN NGO

## 2024-06-20 NOTE — PROGRESS NOTES
"Taylor Hampton is a 28 y.o. female on day 12 of admission presenting with Acute cholecystitis.    Subjective   Reports pain not well controlled. Mostly RUQ, chest and back. Describes it 9/10 and states that her [facility] care plan for pain management was different than how she is being treated here.     Objective     Physical Exam    GEN: She is not in acute distress.  CARD: Normal rate and regular rhythm. Normal heart sounds.  PULM: Pulmonary effort is normal. Normal breath sounds. On RA  ABD: Bowel sounds are low. Mild pain in RUQ, soft.  MSK: No swelling, deformity or signs of injury.  CNS: No focal deficit present.    Last Recorded Vitals  Blood pressure 136/76, pulse 75, temperature 36.6 °C (97.9 °F), temperature source Temporal, resp. rate 18, height 1.6 m (5' 2.99\"), weight 143 kg (315 lb 0.6 oz), SpO2 98%.  Intake/Output last 3 Shifts:  I/O last 3 completed shifts:  In: 1425 (10 mL/kg) [I.V.:355 (2.5 mL/kg)]  Out: 1325 (9.3 mL/kg) [Urine:1325 (0.3 mL/kg/hr)]  Weight: 142.9 kg     Relevant Results  Lab Results   Component Value Date    WBC 8.4 06/20/2024    HGB 10.7 (L) 06/20/2024    HCT 31.4 (L) 06/20/2024    MCV 67 (L) 06/20/2024     06/20/2024     Lab Results   Component Value Date    CREATININE 0.76 06/20/2024    BUN 10 06/20/2024     (L) 06/20/2024    K 3.7 06/20/2024     06/20/2024    CO2 24 06/20/2024     Lab Results   Component Value Date    ALT 10 06/20/2024    AST 13 06/20/2024    GGT 27 02/13/2019    ALKPHOS 64 06/20/2024    BILITOT 1.3 (H) 06/20/2024        Retic 4.2%    HIDA scan  IMPRESSION:  Non-visualization of the gallbladder, which can be seen in the  appropriate clinical setting with cystic duct obstruction / acute  cholecystitis.    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 She is s/p cholecystectomy 6/12/24 for cholecystitis.     Labs:  Hgb 10.7,  Retics 4.2, total bili 1.3    HbS 47%    She has baseline Hb is 10-11 gr/dl. HbS 47%. Pain crisis twice a month with the requirement of ER visits both on May 15/2024, and May 28/2024. Outpatient management with Hydromorphone, Suboxone, Hydrea, Voxelotor, and Tylenol.     Recommendations:  Keep appropriate hydration avoiding fluid overload.  Pain management per [facility] care plan: Continue suboxone, dilaudid 4 mg q2h PO standing and start dilaudid 1 mg q4H IV for breakthrough pain. Reassess at 12 hour intervals. And deescalate the frequency of IV breakthrough pain once pain is better controlled.   Goal hemoglobin level of 10  (not exceeding 11 to prevent hyper viscosity) in order to reduce the risk of complications during the surgery.   Follow up with [facility] hematology for further management of sickle cell    Seen and discussed with Dr Fernandez. Hematology will sign off, but please feel free to reach out with further questions.     Diann Zelaya MD  Hematology Oncology fellow, PGY-4  Pager [phone]

## 2024-06-21 LAB
ALBUMIN SERPL BCP-MCNC: 3.6 G/DL (ref 3.4–5)
ANION GAP SERPL CALC-SCNC: 12 MMOL/L (ref 10–20)
BASOPHILS # BLD AUTO: 0.03 X10*3/UL (ref 0–0.1)
BASOPHILS NFR BLD AUTO: 0.4 %
BUN SERPL-MCNC: 11 MG/DL (ref 6–23)
CALCIUM SERPL-MCNC: 8.8 MG/DL (ref 8.6–10.6)
CHLORIDE SERPL-SCNC: 104 MMOL/L (ref 98–107)
CO2 SERPL-SCNC: 26 MMOL/L (ref 21–32)
CREAT SERPL-MCNC: 0.66 MG/DL (ref 0.5–1.05)
EGFRCR SERPLBLD CKD-EPI 2021: >90 ML/MIN/1.73M*2
EOSINOPHIL # BLD AUTO: 0.38 X10*3/UL (ref 0–0.7)
EOSINOPHIL NFR BLD AUTO: 4.8 %
ERYTHROCYTE [DISTWIDTH] IN BLOOD BY AUTOMATED COUNT: 20.7 % (ref 11.5–14.5)
GLUCOSE SERPL-MCNC: 94 MG/DL (ref 74–99)
HCT VFR BLD AUTO: 29.4 % (ref 36–46)
HGB BLD-MCNC: 9.7 G/DL (ref 12–16)
IMM GRANULOCYTES # BLD AUTO: 0.02 X10*3/UL (ref 0–0.7)
IMM GRANULOCYTES NFR BLD AUTO: 0.3 % (ref 0–0.9)
LYMPHOCYTES # BLD AUTO: 3.79 X10*3/UL (ref 1.2–4.8)
LYMPHOCYTES NFR BLD AUTO: 47.6 %
MAGNESIUM SERPL-MCNC: 2.1 MG/DL (ref 1.6–2.4)
MCH RBC QN AUTO: 22.2 PG (ref 26–34)
MCHC RBC AUTO-ENTMCNC: 33 G/DL (ref 32–36)
MCV RBC AUTO: 67 FL (ref 80–100)
MONOCYTES # BLD AUTO: 0.71 X10*3/UL (ref 0.1–1)
MONOCYTES NFR BLD AUTO: 8.9 %
NEUTROPHILS # BLD AUTO: 3.04 X10*3/UL (ref 1.2–7.7)
NEUTROPHILS NFR BLD AUTO: 38 %
NRBC BLD-RTO: 0 /100 WBCS (ref 0–0)
PHOSPHATE SERPL-MCNC: 4.2 MG/DL (ref 2.5–4.9)
PLATELET # BLD AUTO: 265 X10*3/UL (ref 150–450)
POTASSIUM SERPL-SCNC: 4 MMOL/L (ref 3.5–5.3)
RBC # BLD AUTO: 4.37 X10*6/UL (ref 4–5.2)
RBC MORPH BLD: NORMAL
SODIUM SERPL-SCNC: 138 MMOL/L (ref 136–145)
TARGETS BLD QL SMEAR: NORMAL
WBC # BLD AUTO: 8 X10*3/UL (ref 4.4–11.3)

## 2024-06-21 PROCEDURE — 2500000001 HC RX 250 WO HCPCS SELF ADMINISTERED DRUGS (ALT 637 FOR MEDICARE OP)

## 2024-06-21 PROCEDURE — 2500000001 HC RX 250 WO HCPCS SELF ADMINISTERED DRUGS (ALT 637 FOR MEDICARE OP): Performed by: STUDENT IN AN ORGANIZED HEALTH CARE EDUCATION/TRAINING PROGRAM

## 2024-06-21 PROCEDURE — 2500000001 HC RX 250 WO HCPCS SELF ADMINISTERED DRUGS (ALT 637 FOR MEDICARE OP): Performed by: NURSE PRACTITIONER

## 2024-06-21 PROCEDURE — 2500000004 HC RX 250 GENERAL PHARMACY W/ HCPCS (ALT 636 FOR OP/ED)

## 2024-06-21 PROCEDURE — 85025 COMPLETE CBC W/AUTO DIFF WBC: CPT | Performed by: STUDENT IN AN ORGANIZED HEALTH CARE EDUCATION/TRAINING PROGRAM

## 2024-06-21 PROCEDURE — 2500000005 HC RX 250 GENERAL PHARMACY W/O HCPCS

## 2024-06-21 PROCEDURE — 83735 ASSAY OF MAGNESIUM: CPT | Performed by: STUDENT IN AN ORGANIZED HEALTH CARE EDUCATION/TRAINING PROGRAM

## 2024-06-21 PROCEDURE — 2580000001 HC RX 258 IV SOLUTIONS

## 2024-06-21 PROCEDURE — 1100000001 HC PRIVATE ROOM DAILY

## 2024-06-21 PROCEDURE — 2500000002 HC RX 250 W HCPCS SELF ADMINISTERED DRUGS (ALT 637 FOR MEDICARE OP, ALT 636 FOR OP/ED): Mod: MUE | Performed by: STUDENT IN AN ORGANIZED HEALTH CARE EDUCATION/TRAINING PROGRAM

## 2024-06-21 PROCEDURE — 2500000002 HC RX 250 W HCPCS SELF ADMINISTERED DRUGS (ALT 637 FOR MEDICARE OP, ALT 636 FOR OP/ED)

## 2024-06-21 PROCEDURE — 80069 RENAL FUNCTION PANEL: CPT | Performed by: STUDENT IN AN ORGANIZED HEALTH CARE EDUCATION/TRAINING PROGRAM

## 2024-06-21 ASSESSMENT — PAIN SCALES - PAIN ASSESSMENT IN ADVANCED DEMENTIA (PAINAD)
CONSOLABILITY: NO NEED TO CONSOLE
TOTALSCORE: MEDICATION (SEE MAR)
CONSOLABILITY: NO NEED TO CONSOLE
BODYLANGUAGE: RELAXED
BREATHING: NORMAL
TOTALSCORE: 0
FACIALEXPRESSION: SMILING OR INEXPRESSIVE
BREATHING: NORMAL
CONSOLABILITY: NO NEED TO CONSOLE
FACIALEXPRESSION: SMILING OR INEXPRESSIVE
FACIALEXPRESSION: SMILING OR INEXPRESSIVE
TOTALSCORE: 0
TOTALSCORE: 0
BODYLANGUAGE: RELAXED
BREATHING: NORMAL
TOTALSCORE: 0
TOTALSCORE: MEDICATION (SEE MAR)
FACIALEXPRESSION: SMILING OR INEXPRESSIVE
BREATHING: NORMAL
TOTALSCORE: MEDICATION (SEE MAR)
TOTALSCORE: MEDICATION (SEE MAR)
TOTALSCORE: 0
BREATHING: NORMAL
BODYLANGUAGE: RELAXED
TOTALSCORE: 0
BODYLANGUAGE: RELAXED
TOTALSCORE: MEDICATION (SEE MAR)
TOTALSCORE: 0
TOTALSCORE: 0
FACIALEXPRESSION: SMILING OR INEXPRESSIVE
FACIALEXPRESSION: SMILING OR INEXPRESSIVE
BODYLANGUAGE: RELAXED
CONSOLABILITY: NO NEED TO CONSOLE
TOTALSCORE: MEDICATION (SEE MAR)
TOTALSCORE: MEDICATION (SEE MAR)
BREATHING: NORMAL
BODYLANGUAGE: RELAXED
FACIALEXPRESSION: SMILING OR INEXPRESSIVE
BODYLANGUAGE: RELAXED
CONSOLABILITY: NO NEED TO CONSOLE
FACIALEXPRESSION: SMILING OR INEXPRESSIVE
BREATHING: NORMAL
CONSOLABILITY: NO NEED TO CONSOLE
BODYLANGUAGE: RELAXED
CONSOLABILITY: NO NEED TO CONSOLE
CONSOLABILITY: NO NEED TO CONSOLE
BREATHING: NORMAL
TOTALSCORE: MEDICATION (SEE MAR)

## 2024-06-21 ASSESSMENT — COGNITIVE AND FUNCTIONAL STATUS - GENERAL
DAILY ACTIVITIY SCORE: 24
MOBILITY SCORE: 24

## 2024-06-21 ASSESSMENT — PAIN DESCRIPTION - LOCATION
LOCATION: GENERALIZED

## 2024-06-21 ASSESSMENT — PAIN SCALES - WONG BAKER
WONGBAKER_NUMERICALRESPONSE: HURTS WHOLE LOT

## 2024-06-21 ASSESSMENT — PAIN SCALES - GENERAL
PAINLEVEL_OUTOF10: 7
PAINLEVEL_OUTOF10: 7
PAINLEVEL_OUTOF10: 8
PAINLEVEL_OUTOF10: 7
PAINLEVEL_OUTOF10: 7
PAINLEVEL_OUTOF10: 9
PAINLEVEL_OUTOF10: 7
PAINLEVEL_OUTOF10: 9
PAINLEVEL_OUTOF10: 7
PAINLEVEL_OUTOF10: 8
PAINLEVEL_OUTOF10: 7
PAINLEVEL_OUTOF10: 8
PAINLEVEL_OUTOF10: 7
PAINLEVEL_OUTOF10: 9
PAINLEVEL_OUTOF10: 8

## 2024-06-21 ASSESSMENT — PAIN DESCRIPTION - ORIENTATION
ORIENTATION: ANTERIOR
ORIENTATION: ANTERIOR;MID;LOWER
ORIENTATION: ANTERIOR

## 2024-06-21 NOTE — PROGRESS NOTES
[facility]  ACUTE CARE SURGERY - PROGRESS NOTE    Patient Name: Taylor Hampton  MRN: [id number]  Admit Date: 608  : 1995  AGE: 28 y.o.   GENDER: female  ==============================================================================  TODAY'S ASSESSMENT AND PLAN OF CARE:  27yo F with hx of sickle cell disease and PE on xarelto who presents to the ED with RUQ abdominal pain, n/v, decreased PO tolerance, and diarrhea that began two days ago. Patient is afebrile and nontachycardic. Labs remarkable for Tbili 2.7 (likely 2/2 rapid heme turnover). While CT shows normal GB and RUQ US shows cholelithiasis with no signs of GB inflammation, exam and history are consistent with possible acute cholecystitis.     : lap cholecystectomy    Continues to endorse sick cell pain and poor oral intake. Concern for recurrence of sickle cell pain crisis. Heme consulted  who did not evaluate patient and recommended following a sick cell pain management protocol. Appreciate Nutrition recs. Offered dobhoff for patient who is still deliberating. Continues to have poor PO intake. Hematology re engaged who made adjustments to pain regimen. Pain is improved this morning. Patient still only had 300 calories yesterday total. Was initially amenable to dobhoff in AM, however is still deliberating.     Plan:   - dobhoff placement    Neuro  - pain control with home suboxone  - oral dilaudid 2/4 mg q2h as well as hardik tylenol, ibuprofen, robaxin, and breakthrough IV dilaudid increase to 1mg  - continuous pulse ox monitoring for respiratory status on high dose opioids  - home duloxetine  - lyrica    GI:  - HIDA obtained- negative for biliary leak   - regular diet, PO supplements TID   - dobhoff placement today  - Continue on PPN/Lipids, started   - Started on Reglan 10mg [id number]   - Folic acid  - MVI     :  - Strict I&Os  - IVFs  - Replete lytes as indicated    Heme: Sickle Cell Crisis  - hydroxyurea,  prednisone, oxbrita HELD until outpatient follow up with [facility] hematologist. Confirm appointment with patient.  - home xarelto  - home folic acid, colchicine    Endo: no hx of DM  - blood glucose checks while on PPN    ID: no acute issues   - daily CBC    DVT Proph:   - SCDs, lovenox, activity as tolerated    Dispo: continue RNF, not ready. Poor PO intake. Possible dobhoff and enteral feeds    Patient seen and discussed with Attending Dr. Shiv Tello, PGY-1  ACS Service  [id number]    ==============================================================================  CHIEF COMPLAINT / EVENTS LAST 24HRS / HPI:  Pain improved overnight, was able to sleep. Had approx 300 calories yesterday with some soup. Amenable to dobhoff placement on AM rounds.     PHYSICAL EXAM:  Heart Rate:  [61-93]   Temp:  [36.1 °C (97 °F)-36.6 °C (97.9 °F)]   Resp:  [18]   BP: (107-136)/(68-76)   Weight:  [138 kg (303 lb 9.2 oz)]   SpO2:  [94 %-98 %]   Constitutional: no acute distress, nontoxic, comfortable appearing in bed  Neuro: A/O x4, no gross deficits   Psych: normal affect  HEENT: No deformities, no scleral icterus   Cardiac: non cyanotic   Pulmonary: unlabored respirations   Abdomen: soft, non distended,. Port sites and umbilical incision well appearing with no opening, closed with skin glue, no rebound or guarding.  Skin: warm and dry overall    Extremities: no swelling noted  MSK: moving all four      IMAGING SUMMARY:  (summary of new imaging findings, not a copy of dictation)  NA    LABS:  Results from last 7 days   Lab Units 06/21/24  0548 06/20/24  0633 06/19/24  0658 06/18/24  0617 06/17/24  0554 06/16/24  0551   WBC AUTO x10*3/uL 8.0 8.4 10.1 8.7   < > 12.0*   HEMOGLOBIN g/dL 9.7* 10.7* 10.5* 10.3*   < > 9.9*   HEMATOCRIT % 29.4* 31.4* 30.2* 30.8*   < > 30.0*   PLATELETS AUTO x10*3/uL 265 283 306 203   < > 253   NEUTROS PCT AUTO %  --  58.5 63.8  --   --  56.5   LYMPHO PCT MAN %  --   --   --  34.2   < >  --    LYMPHS PCT AUTO %   --  29.0 26.2  --   --  33.2   MONO PCT MAN %  --   --   --  2.6   < >  --    MONOS PCT AUTO %  --  9.8 8.0  --   --  7.6   EOSINO PCT MAN %  --   --   --  1.8   < >  --    EOS PCT AUTO %  --  2.3 1.6  --   --  2.2    < > = values in this interval not displayed.           Results from last 7 days   Lab Units 06/21/24  0548 06/20/24  0633 06/19/24 0658 06/18/24  0617   SODIUM mmol/L 138 135* 135* 138   POTASSIUM mmol/L 4.0 3.7 3.6 3.5   CHLORIDE mmol/L 104 100 101 104   CO2 mmol/L 26 24 23 25   BUN mg/dL 11 10 9 10   CREATININE mg/dL 0.66 0.76 0.74 0.83   CALCIUM mg/dL 8.8 9.2 9.3 9.0   PROTEIN TOTAL g/dL  --  7.2 7.4 6.8   BILIRUBIN TOTAL mg/dL  --  1.3* 1.3* 1.5*   ALK PHOS U/L  --  64 64 61   ALT U/L  --  10 8 9   AST U/L  --  13 12 14   GLUCOSE mg/dL 94 102* 95 89     Results from last 7 days   Lab Units 06/20/24 0633 06/19/24 0658 06/18/24  0617   BILIRUBIN TOTAL mg/dL 1.3* 1.3* 1.5*   BILIRUBIN DIRECT mg/dL 0.2 0.2 0.3           I have reviewed all medications, laboratory results, and imaging pertinent for today's encounter.

## 2024-06-21 NOTE — CARE PLAN
The patient's goals for the shift include      The clinical goals for the shift include Patient will have managable pain with pain medication overnight.    Over the shift, the patient did not make progress toward the following goals. Barriers to progression include patient is a chronic sickle cell anemia with chronic pain. Recommendations to address these barriers include adequate pain management with frequent assessment for pain .

## 2024-06-21 NOTE — CONSULTS
Nutrition Note:   Nutrition Assessment    Reason for Assessment: Tube feeding recommendations    RDN continue to question accuracy of calorie count + intake  Nutrition Significant Labs:  TPN/PPN Labs:   Results from last 7 days   Lab Units 06/21/24  0548 06/20/24  0633 06/19/24  0658 06/18/24  0617   GLUCOSE mg/dL 94 102* 95 89   POTASSIUM mmol/L 4.0 3.7 3.6 3.5   PHOSPHORUS mg/dL 4.2 3.8 3.3 3.3   MAGNESIUM mg/dL 2.10 1.88 1.93 1.79   SODIUM mmol/L 138 135* 135* 138   CHLORIDE mmol/L 104 100 101 104   ALT U/L  --  10 8 9   AST U/L  --  13 12 14   ALK PHOS U/L  --  64 64 61   BILIRUBIN TOTAL mg/dL  --  1.3* 1.3* 1.5*        Nutrition Specific Medications:  Scheduled medications  acetaminophen, 650 mg, oral, q6h  buprenorphine-naloxone, 2 tablet, sublingual, TID  colchicine, 0.6 mg, oral, BID  DULoxetine, 60 mg, oral, Daily  fat emulsion fish oil/plant based, 250 mL, intravenous, Daily Lipids  folic acid, 1 mg, oral, Daily  [Held by provider] hydroxyurea, 500 mg, oral, TID  ibuprofen, 600 mg, oral, q6h SHAVON  lidocaine, 5 mL, infiltration, Once  lidocaine, 5 mL, infiltration, Once  methocarbamol, 1,000 mg, oral, q8h SHAVON  metoclopramide, 10 mg, oral, q6h SHAVON  multivitamin with minerals, 1 tablet, oral, Daily  [Held by provider] predniSONE, 20 mg, oral, BID  pregabalin, 50 mg, oral, Nightly  rivaroxaban, 20 mg, oral, Daily with evening meal      Continuous medications  Adult Clinimix Parenteral Nutrition, 83 mL/hr, Last Rate: Stopped (06/21/24 1100)  lactated Ringer's, 50 mL/hr, Last Rate: 50 mL/hr (06/21/24 0542)        I/O:   Last BM Date: 06/20/24; Stool Appearance: Unable to assess (06/20/24 2110)    Estimated Needs:   Total Energy Estimated Needs (kCal):  (2500)  Method for Estimating Needs: Adjusted BW x 28  Total Protein Estimated Needs (g):  (118)  Method for Estimating Needs: Adjusted BW x 1.3  Total Fluid Estimated Needs (mL):  (per team)           Nutrition Interventions/Recommendations         Nutrition  Prescription:  Team requesting Enteral nutrition d/t poor PO - plan to discontinue PPN per team   When ready to initiate, initiate Isosource 1.5 @ 10ml/hr and increase by 10ml every 6-8 hours as tolerated to a goal rate of 65ml/hr   Provides: 1560ml total volume, 2340kcal, 106g PRO, 1191 ml free water  Additional water flushes  per team, TF provides 1191 ml free water  Continue a regular diet as tolerated  Will order Ensure Plus TID (350kcal,13g PRO) each Strawberry  Calorie count has not been documented to show a full day.       Nutrition Interventions:   Interventions: Meals and snacks, Medical food supplement, Enteral intake  Meals and Snacks: General healthful diet  Medical Food Supplement: Commercial beverage  Goal: Ensure plus TID    Time Spent (min): 30 minutes   alert and oriented x 3/responds to pain/responds to verbal commands

## 2024-06-22 ENCOUNTER — PHARMACY VISIT (OUTPATIENT)
Dept: PHARMACY | Facility: CLINIC | Age: 29
End: 2024-06-22
Payer: COMMERCIAL

## 2024-06-22 VITALS
DIASTOLIC BLOOD PRESSURE: 77 MMHG | HEIGHT: 63 IN | TEMPERATURE: 97.3 F | BODY MASS INDEX: 51.91 KG/M2 | OXYGEN SATURATION: 95 % | WEIGHT: 293 LBS | RESPIRATION RATE: 17 BRPM | SYSTOLIC BLOOD PRESSURE: 120 MMHG | HEART RATE: 70 BPM

## 2024-06-22 LAB
ALBUMIN SERPL BCP-MCNC: 3.8 G/DL (ref 3.4–5)
ANION GAP SERPL CALC-SCNC: 12 MMOL/L (ref 10–20)
BASOPHILS # BLD MANUAL: 0.12 X10*3/UL (ref 0–0.1)
BASOPHILS NFR BLD MANUAL: 1.7 %
BUN SERPL-MCNC: 13 MG/DL (ref 6–23)
CALCIUM SERPL-MCNC: 9 MG/DL (ref 8.6–10.6)
CHLORIDE SERPL-SCNC: 103 MMOL/L (ref 98–107)
CO2 SERPL-SCNC: 26 MMOL/L (ref 21–32)
CREAT SERPL-MCNC: 0.65 MG/DL (ref 0.5–1.05)
EGFRCR SERPLBLD CKD-EPI 2021: >90 ML/MIN/1.73M*2
EOSINOPHIL # BLD MANUAL: 0.32 X10*3/UL (ref 0–0.7)
EOSINOPHIL NFR BLD MANUAL: 4.4 %
ERYTHROCYTE [DISTWIDTH] IN BLOOD BY AUTOMATED COUNT: 20.9 % (ref 11.5–14.5)
GLUCOSE SERPL-MCNC: 99 MG/DL (ref 74–99)
HCT VFR BLD AUTO: 30 % (ref 36–46)
HGB BLD-MCNC: 10.1 G/DL (ref 12–16)
IMM GRANULOCYTES # BLD AUTO: 0.03 X10*3/UL (ref 0–0.7)
IMM GRANULOCYTES NFR BLD AUTO: 0.4 % (ref 0–0.9)
LYMPHOCYTES # BLD MANUAL: 2.69 X10*3/UL (ref 1.2–4.8)
LYMPHOCYTES NFR BLD MANUAL: 37.4 %
MAGNESIUM SERPL-MCNC: 1.9 MG/DL (ref 1.6–2.4)
MCH RBC QN AUTO: 22.7 PG (ref 26–34)
MCHC RBC AUTO-ENTMCNC: 33.7 G/DL (ref 32–36)
MCV RBC AUTO: 68 FL (ref 80–100)
MONOCYTES # BLD MANUAL: 0.19 X10*3/UL (ref 0.1–1)
MONOCYTES NFR BLD MANUAL: 2.6 %
NEUTS SEG # BLD MANUAL: 3.88 X10*3/UL (ref 1.2–7)
NEUTS SEG NFR BLD MANUAL: 53.9 %
NRBC BLD-RTO: 0 /100 WBCS (ref 0–0)
PHOSPHATE SERPL-MCNC: 3.8 MG/DL (ref 2.5–4.9)
PLATELET # BLD AUTO: 234 X10*3/UL (ref 150–450)
POLYCHROMASIA BLD QL SMEAR: ABNORMAL
POTASSIUM SERPL-SCNC: 4.2 MMOL/L (ref 3.5–5.3)
RBC # BLD AUTO: 4.44 X10*6/UL (ref 4–5.2)
RBC MORPH BLD: ABNORMAL
SCHISTOCYTES BLD QL SMEAR: ABNORMAL
SODIUM SERPL-SCNC: 137 MMOL/L (ref 136–145)
STOMATOCYTES BLD QL SMEAR: ABNORMAL
TARGETS BLD QL SMEAR: ABNORMAL
TOTAL CELLS COUNTED BLD: 115
WBC # BLD AUTO: 7.2 X10*3/UL (ref 4.4–11.3)

## 2024-06-22 PROCEDURE — 84100 ASSAY OF PHOSPHORUS: CPT | Performed by: STUDENT IN AN ORGANIZED HEALTH CARE EDUCATION/TRAINING PROGRAM

## 2024-06-22 PROCEDURE — 2500000001 HC RX 250 WO HCPCS SELF ADMINISTERED DRUGS (ALT 637 FOR MEDICARE OP): Performed by: STUDENT IN AN ORGANIZED HEALTH CARE EDUCATION/TRAINING PROGRAM

## 2024-06-22 PROCEDURE — 85007 BL SMEAR W/DIFF WBC COUNT: CPT | Performed by: STUDENT IN AN ORGANIZED HEALTH CARE EDUCATION/TRAINING PROGRAM

## 2024-06-22 PROCEDURE — 2500000001 HC RX 250 WO HCPCS SELF ADMINISTERED DRUGS (ALT 637 FOR MEDICARE OP)

## 2024-06-22 PROCEDURE — RXMED WILLOW AMBULATORY MEDICATION CHARGE

## 2024-06-22 PROCEDURE — 83735 ASSAY OF MAGNESIUM: CPT | Performed by: STUDENT IN AN ORGANIZED HEALTH CARE EDUCATION/TRAINING PROGRAM

## 2024-06-22 PROCEDURE — 2500000002 HC RX 250 W HCPCS SELF ADMINISTERED DRUGS (ALT 637 FOR MEDICARE OP, ALT 636 FOR OP/ED): Mod: MUE | Performed by: STUDENT IN AN ORGANIZED HEALTH CARE EDUCATION/TRAINING PROGRAM

## 2024-06-22 PROCEDURE — 99024 POSTOP FOLLOW-UP VISIT: CPT | Performed by: SURGERY

## 2024-06-22 PROCEDURE — 85027 COMPLETE CBC AUTOMATED: CPT | Performed by: STUDENT IN AN ORGANIZED HEALTH CARE EDUCATION/TRAINING PROGRAM

## 2024-06-22 PROCEDURE — 2500000002 HC RX 250 W HCPCS SELF ADMINISTERED DRUGS (ALT 637 FOR MEDICARE OP, ALT 636 FOR OP/ED)

## 2024-06-22 PROCEDURE — 2500000001 HC RX 250 WO HCPCS SELF ADMINISTERED DRUGS (ALT 637 FOR MEDICARE OP): Performed by: NURSE PRACTITIONER

## 2024-06-22 PROCEDURE — 2500000004 HC RX 250 GENERAL PHARMACY W/ HCPCS (ALT 636 FOR OP/ED)

## 2024-06-22 RX ORDER — HYDROMORPHONE HYDROCHLORIDE 4 MG/1
4 TABLET ORAL EVERY 2 HOUR PRN
Qty: 40 TABLET | Refills: 0 | Status: SHIPPED | OUTPATIENT
Start: 2024-06-22 | End: 2024-06-29

## 2024-06-22 ASSESSMENT — PAIN DESCRIPTION - LOCATION
LOCATION: ABDOMEN
LOCATION: GENERALIZED

## 2024-06-22 ASSESSMENT — PAIN SCALES - PAIN ASSESSMENT IN ADVANCED DEMENTIA (PAINAD)
BODYLANGUAGE: RELAXED
TOTALSCORE: 0
BODYLANGUAGE: RELAXED
TOTALSCORE: MEDICATION (SEE MAR)
FACIALEXPRESSION: SMILING OR INEXPRESSIVE
CONSOLABILITY: NO NEED TO CONSOLE
TOTALSCORE: MEDICATION (SEE MAR)
BREATHING: NORMAL
BODYLANGUAGE: RELAXED
FACIALEXPRESSION: SMILING OR INEXPRESSIVE
TOTALSCORE: 0
BREATHING: NORMAL
FACIALEXPRESSION: SMILING OR INEXPRESSIVE
FACIALEXPRESSION: SMILING OR INEXPRESSIVE
BODYLANGUAGE: RELAXED
TOTALSCORE: MEDICATION (SEE MAR);REPOSITIONED
BREATHING: NORMAL
CONSOLABILITY: NO NEED TO CONSOLE
BREATHING: NORMAL
TOTALSCORE: 0
BREATHING: NORMAL
TOTALSCORE: MEDICATION (SEE MAR)
BODYLANGUAGE: RELAXED
BODYLANGUAGE: RELAXED
TOTALSCORE: 0
BREATHING: NORMAL
FACIALEXPRESSION: SMILING OR INEXPRESSIVE
CONSOLABILITY: NO NEED TO CONSOLE
CONSOLABILITY: NO NEED TO CONSOLE
FACIALEXPRESSION: SMILING OR INEXPRESSIVE
CONSOLABILITY: NO NEED TO CONSOLE
CONSOLABILITY: NO NEED TO CONSOLE
TOTALSCORE: 0
TOTALSCORE: MEDICATION (SEE MAR)
TOTALSCORE: MEDICATION (SEE MAR)
TOTALSCORE: 0

## 2024-06-22 ASSESSMENT — PAIN DESCRIPTION - ORIENTATION
ORIENTATION: ANTERIOR
ORIENTATION: RIGHT
ORIENTATION: ANTERIOR

## 2024-06-22 ASSESSMENT — PAIN SCALES - GENERAL
PAINLEVEL_OUTOF10: 7
PAINLEVEL_OUTOF10: 7
PAINLEVEL_OUTOF10: 8
PAINLEVEL_OUTOF10: 6
PAINLEVEL_OUTOF10: 8
PAINLEVEL_OUTOF10: 6
PAINLEVEL_OUTOF10: 7

## 2024-06-22 ASSESSMENT — PAIN SCALES - WONG BAKER
WONGBAKER_NUMERICALRESPONSE: HURTS WHOLE LOT
WONGBAKER_NUMERICALRESPONSE: HURTS WHOLE LOT
WONGBAKER_NUMERICALRESPONSE: NO HURT
WONGBAKER_NUMERICALRESPONSE: HURTS WHOLE LOT
WONGBAKER_NUMERICALRESPONSE: HURTS WORST
WONGBAKER_NUMERICALRESPONSE: HURTS WHOLE LOT

## 2024-06-22 NOTE — CARE PLAN
The patient's goals for the shift include      The clinical goals for the shift include Patient will have managable pain throghout night.    Over the shift, the patient did not make progress toward the following goals. Barriers to progression include patient currently experiencing a sickle cell crisis. Recommendations to address these barriers include frequent observation of pain and pain management throughout the night.

## 2024-06-22 NOTE — DISCHARGE SUMMARY
Discharge Diagnosis  Acute cholecystitis    Issues Requiring Follow-Up  Post op check after cholecystectomy    Test Results Pending At Discharge  Pending Labs       Order Current Status    Surgical Pathology Exam In process            Hospital Course  29yo F with hx of sickle cell disease and PE on xarelto who presented with cholelithiasis with acute cholecystitis. Patient went to the OR on 6/12 for laparoscopic cholecystectomy. Post operatively pain management was difficult to achieve given patients sickle cell history. Worked with hematology team and nutrition team to improve pain control and PO intake. Patient with minimal intake for 4-5 days post op and nausea and emesis. Discussed temporary dobhoff but patient refused. Discussed with nutrition team who recommended oral nutritional supplements and enterel feeds if needed. Discussed with hematology team given prolonged pain, pain regimen was adjusted. Pain improved and patient had improved appetite. 100 percent of meals were documented and patient was discharged on the current pain regimen. Patient will follow up with [facility] hematology and palliative team for ongoing pain and sickle.     Patient should follow up with our team in 1-2 weeks for post op visit. Discharged with 7 days of pain meds with explicit instructions and confirmation of follow up appointment with pall care/hematologist team.     Pertinent Physical Exam At Time of Discharge  Constitutional: no acute distress, nontoxic, comfortable appearing in bed  Neuro: A/O x4, no gross deficits   Psych: normal affect  HEENT: No deformities, no scleral icterus   Cardiac: non cyanotic   Pulmonary: unlabored respirations   Abdomen: soft, non distended,. Port sites and umbilical incision well appearing with no opening, closed with skin glue, no rebound or guarding.  Skin: warm and dry overall    Extremities: no swelling noted  MSK: moving all four    Home Medications     Medication List      START taking these  medications     methocarbamol 500 mg tablet; Commonly known as: Robaxin; Take 2 tablets   (1,000 mg) by mouth every 8 hours.     CHANGE how you take these medications     * HYDROmorphone 2 mg tablet; Commonly known as: Dilaudid; What changed:   Another medication with the same name was added. Make sure you understand   how and when to take each.   * HYDROmorphone 4 mg tablet; Commonly known as: Dilaudid; Take 1 tablet   (4 mg) by mouth every 2 hours if needed for severe pain (7 - 10) (Take 2mg   for moderate pain and 4mg for severe pain as needed.) for up to 7 days.;   What changed: You were already taking a medication with the same name, and   this prescription was added. Make sure you understand how and when to take   each.  * This list has 2 medication(s) that are the same as other medications   prescribed for you. Read the directions carefully, and ask your doctor or   other care provider to review them with you.     CONTINUE taking these medications     acetaminophen 500 mg tablet; Commonly known as: Tylenol   albuterol 2.5 mg /3 mL (0.083 %) nebulizer solution   buprenorphine-naloxone 2-0.5 mg SL tablet; Commonly known as: Suboxone   colchicine 0.6 mg tablet   docusate sodium 100 mg capsule; Commonly known as: Colace   DULoxetine 60 mg DR capsule; Commonly known as: Cymbalta   ferrous sulfate 325 (65 Fe) MG EC tablet   folic acid 1 mg tablet; Commonly known as: Folvite   pregabalin 50 mg capsule; Commonly known as: Lyrica   sodium chloride 0.65 % nasal spray; Commonly known as: Ocean   voxelotor 500 mg tablet tablet; Commonly known as: OXBRYTA   Xarelto 20 mg tablet; Generic drug: rivaroxaban     STOP taking these medications     hydroxyurea 500 mg capsule; Commonly known as: Hydrea   Narcan 4 mg/0.1 mL nasal spray; Generic drug: naloxone   polyethylene glycol 17 gram packet; Commonly known as: Glycolax, Miralax   predniSONE 20 mg tablet; Commonly known as: Deltasone   sennosides 8.6 mg tablet; Commonly known  as: Senokot       Outpatient Follow-Up  No future appointments.    Karina Tello MD

## 2024-06-22 NOTE — DISCHARGE INSTRUCTIONS
You need to follow up with your [facility] hematologist/palliative care team for ongoing chronic pain and restarting your sickle cell medications.

## 2024-06-28 LAB
LABORATORY COMMENT REPORT: NORMAL
PATH REPORT.FINAL DX SPEC: NORMAL
PATH REPORT.GROSS SPEC: NORMAL
PATH REPORT.RELEVANT HX SPEC: NORMAL
PATH REPORT.TOTAL CANCER: NORMAL

## 2024-07-02 ENCOUNTER — OFFICE VISIT (OUTPATIENT)
Dept: VASCULAR SURGERY | Age: 29
End: 2024-07-02
Payer: MEDICARE

## 2024-07-02 VITALS — WEIGHT: 293 LBS | BODY MASS INDEX: 53.67 KG/M2

## 2024-07-02 DIAGNOSIS — I87.8 POOR VENOUS ACCESS: Chronic | ICD-10-CM

## 2024-07-02 DIAGNOSIS — D57.219 SICKLE-CELL-HEMOGLOBIN C DISEASE WITH CRISIS (HCC): Primary | Chronic | ICD-10-CM

## 2024-07-02 PROCEDURE — 99213 OFFICE O/P EST LOW 20 MIN: CPT | Performed by: SURGERY

## 2024-07-02 PROCEDURE — G8417 CALC BMI ABV UP PARAM F/U: HCPCS | Performed by: SURGERY

## 2024-07-02 PROCEDURE — G8427 DOCREV CUR MEDS BY ELIG CLIN: HCPCS | Performed by: SURGERY

## 2024-07-02 PROCEDURE — 4004F PT TOBACCO SCREEN RCVD TLK: CPT | Performed by: SURGERY

## 2024-07-02 NOTE — PROGRESS NOTES
Vascular Surgery Outpatient Progress Note      Chief Complaint   Patient presents with    Check-Up     Pt. Requesting new port       HISTORY OF PRESENT ILLNESS:                The patient is a 29 y.o. female who returns for follow-up evaluation of his access.  The patient has a history of sickle cell anemia and pain crises.  She has a history of multiple previous catheters and ports and subsequent infections.  She is requesting a new port for IV access for therapy.    Past Medical History:        Diagnosis Date    CLABSI (central line-associated bloodstream infection) 8/3/2016    Deep vein thrombosis (DVT) of left upper extremity (HCC) 8/12/2016    Disease of blood and blood forming organ     Heart murmur     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HCC)     Miscarriage within last 12 months 04/2017    Obesity, Class III, BMI 40-49.9 (morbid obesity) (HCC) 5/22/2014    PE (pulmonary embolism)     PE (pulmonary embolism)     Pneumonia     Psychiatric problem     Sickle cell anemia (HCC)     Thrombosis complicating venous access device (HCC) 5/18/2017     Past Surgical History:        Procedure Laterality Date    CATHETER REMOVAL Right 11/30/2020    RIGHT SUBCLAVIAN CATHETER REMOVAL performed by Milton Jacobs MD at [facility] OR    CATHETER REMOVAL Right 08/30/2021    PORT REMOVAL AND REINSERTION performed by Milton Jacobs MD at [facility] OR    GALLBLADDER SURGERY      INSERTION / REMOVAL / REPLACEMENT VENOUS ACCESS CATHETER Left 05/09/2019    PORT INSERTION performed by Milton Jacobs MD at [facility] OR    IR INS PICC VAD W SQ PORT GREATER THAN 5 Right 10/16/2020    tunneled picc inserted by Dr Thanh FOX 10/16 R upper chest    IR TUNNELED CATHETER PLACEMENT GREATER THAN 5 YEARS  10/16/2020    IR TUNNELED CATHETER PLACEMENT GREATER THAN 5 YEARS 10/16/2020 Jhonny Law II, MD [facility] SPECIAL PROCEDURES    KNEE SURGERY      right    OTHER SURGICAL HISTORY  08/04/2016    removal of mediport    OTHER SURGICAL HISTORY Right 03/05/2018    TUNNELED

## 2024-07-03 ENCOUNTER — TELEPHONE (OUTPATIENT)
Dept: VASCULAR SURGERY | Age: 29
End: 2024-07-03

## 2024-07-03 ENCOUNTER — PREP FOR PROCEDURE (OUTPATIENT)
Dept: VASCULAR SURGERY | Age: 29
End: 2024-07-03

## 2024-07-03 DIAGNOSIS — D57.219 SICKLE-CELL-HEMOGLOBIN C DISEASE WITH CRISIS (HCC): ICD-10-CM

## 2024-07-03 DIAGNOSIS — M79.89 ARM SWELLING: ICD-10-CM

## 2024-07-03 DIAGNOSIS — I87.8 POOR VENOUS ACCESS: Primary | ICD-10-CM

## 2024-07-03 DIAGNOSIS — Z01.818 PRE-OP EXAM: ICD-10-CM

## 2024-07-03 PROBLEM — D57.1 SICKLE CELL ANEMIA (HCC): Status: ACTIVE | Noted: 2024-07-03

## 2024-07-03 NOTE — PROGRESS NOTES
[facility]   PRE-ADMISSION TESTING GENERAL INSTRUCTIONS  PAT Phone Number: [phone]      GENERAL INSTRUCTIONS:    [x] Antibacterial Soap Shower the Night before AND the morning of surgery.  [] CHG Wipes instruction sheet and wipes given.  [x] Do not wear makeup, lotions, powders, deodorant the morning of surgery.  [x] No solid food after midnight. You may have SIPS of clear liquids up until 2 hours before your arrival time to the hospital.   [x] You may brush your teeth, gargle, but do not swallow water.   [x] No tobacco products, illegal drugs, or alcohol within 24 hours of your surgery.  [x] Jewelry or valuables should not be brought to the hospital. All body and/or tongue piercing's must be removed prior to arriving to hospital. No contact lens or hair pins.   [x] Arrange transportation with a responsible adult  to and from the hospital. Arrange for someone to be with you for the remainder of the day and for 24 hours after your procedure due to having had anesthesia.          -Who will be your  for transportation? Neighbor        -Who will be staying with you for 24 hrs after your procedure? Neighbor  [x] Bring insurance card and photo ID.  [] Bring copy of living will or healthcare power of  papers to be placed in your electronic record.  [x] Urine Pregnancy test will be preformed the day of surgery. A specimen sample may be brought from home.  [] Transfusion (Green) Bracelet: Please bring with you to hospital, day of surgery.     PARKING INSTRUCTIONS:     [x] ARRIVAL DATE & TIME:   7/11  @  1130   [x] Times are subject to change. We will contact you the business day before surgery if that were to occur.  [x] Enter into the [facility] Entrance. Two people may accompany you. Masks are not required.  [x] Parking Lot \"I\" is where you will park. It is located on the corner of [address] and [address]. The entrance is on [address].   Only one

## 2024-07-03 NOTE — TELEPHONE ENCOUNTER
Pt returned my call confirming 7/8/24 venous u/s appt at 7:45 a.m.  Scheduled insertion of venous port 7/11/24 pending those results.

## 2024-07-08 ENCOUNTER — APPOINTMENT (OUTPATIENT)
Dept: RADIOLOGY | Facility: HOSPITAL | Age: 29
End: 2024-07-08
Payer: MEDICARE

## 2024-07-08 ENCOUNTER — HOSPITAL ENCOUNTER (OUTPATIENT)
Dept: CARDIOLOGY | Age: 29
Discharge: HOME | End: 2024-07-10
Payer: MEDICARE

## 2024-07-08 ENCOUNTER — HOSPITAL ENCOUNTER (EMERGENCY)
Facility: HOSPITAL | Age: 29
Discharge: OTHER INSTITUTION | End: 2024-07-08
Payer: MEDICARE

## 2024-07-08 ENCOUNTER — CLINICAL SUPPORT (OUTPATIENT)
Dept: EMERGENCY MEDICINE | Facility: HOSPITAL | Age: 29
End: 2024-07-08
Payer: MEDICARE

## 2024-07-08 VITALS
WEIGHT: 293 LBS | RESPIRATION RATE: 16 BRPM | TEMPERATURE: 97.9 F | DIASTOLIC BLOOD PRESSURE: 73 MMHG | BODY MASS INDEX: 51.91 KG/M2 | HEART RATE: 83 BPM | SYSTOLIC BLOOD PRESSURE: 124 MMHG | OXYGEN SATURATION: 98 % | HEIGHT: 63 IN

## 2024-07-08 DIAGNOSIS — Z01.818 PRE-OP EXAM: ICD-10-CM

## 2024-07-08 DIAGNOSIS — I87.8 POOR VENOUS ACCESS: ICD-10-CM

## 2024-07-08 DIAGNOSIS — D57.219 SICKLE-CELL-HEMOGLOBIN C DISEASE WITH CRISIS (HCC): ICD-10-CM

## 2024-07-08 DIAGNOSIS — M79.89 ARM SWELLING: ICD-10-CM

## 2024-07-08 LAB
ATRIAL RATE: 77 BPM
P AXIS: 17 DEGREES
P OFFSET: 199 MS
P ONSET: 151 MS
PR INTERVAL: 134 MS
Q ONSET: 218 MS
QRS COUNT: 13 BEATS
QRS DURATION: 88 MS
QT INTERVAL: 382 MS
QTC CALCULATION(BAZETT): 432 MS
QTC FREDERICIA: 415 MS
R AXIS: 20 DEGREES
T AXIS: 8 DEGREES
T OFFSET: 409 MS
VENTRICULAR RATE: 77 BPM

## 2024-07-08 PROCEDURE — 71045 X-RAY EXAM CHEST 1 VIEW: CPT | Mod: FOREIGN READ | Performed by: RADIOLOGY

## 2024-07-08 PROCEDURE — 99283 EMERGENCY DEPT VISIT LOW MDM: CPT

## 2024-07-08 PROCEDURE — 99281 EMR DPT VST MAYX REQ PHY/QHP: CPT | Mod: 25

## 2024-07-08 PROCEDURE — 93971 EXTREMITY STUDY: CPT | Performed by: SURGERY

## 2024-07-08 PROCEDURE — 71045 X-RAY EXAM CHEST 1 VIEW: CPT

## 2024-07-08 PROCEDURE — 93005 ELECTROCARDIOGRAM TRACING: CPT

## 2024-07-08 PROCEDURE — 93970 EXTREMITY STUDY: CPT

## 2024-07-08 ASSESSMENT — PAIN SCALES - GENERAL: PAINLEVEL_OUTOF10: 10 - WORST POSSIBLE PAIN

## 2024-07-08 ASSESSMENT — COLUMBIA-SUICIDE SEVERITY RATING SCALE - C-SSRS
2. HAVE YOU ACTUALLY HAD ANY THOUGHTS OF KILLING YOURSELF?: NO
1. IN THE PAST MONTH, HAVE YOU WISHED YOU WERE DEAD OR WISHED YOU COULD GO TO SLEEP AND NOT WAKE UP?: NO
6. HAVE YOU EVER DONE ANYTHING, STARTED TO DO ANYTHING, OR PREPARED TO DO ANYTHING TO END YOUR LIFE?: NO

## 2024-07-08 ASSESSMENT — PAIN - FUNCTIONAL ASSESSMENT: PAIN_FUNCTIONAL_ASSESSMENT: 0-10

## 2024-07-09 ENCOUNTER — TELEPHONE (OUTPATIENT)
Dept: VASCULAR SURGERY | Age: 29
End: 2024-07-09

## 2024-07-09 NOTE — TELEPHONE ENCOUNTER
Pt instructed to hold Xarelto beginning today for 7/11/24 surgery.  
VTE Assessment already completed for this visit

## 2024-07-10 ENCOUNTER — ANESTHESIA EVENT (OUTPATIENT)
Dept: OPERATING ROOM | Age: 29
End: 2024-07-10
Payer: MEDICARE

## 2024-07-11 ENCOUNTER — HOSPITAL ENCOUNTER (OUTPATIENT)
Age: 29
Setting detail: OUTPATIENT SURGERY
Discharge: HOME | End: 2024-07-11
Attending: SURGERY | Admitting: SURGERY
Payer: MEDICARE

## 2024-07-11 ENCOUNTER — APPOINTMENT (OUTPATIENT)
Dept: GENERAL RADIOLOGY | Age: 29
End: 2024-07-11
Attending: SURGERY
Payer: MEDICARE

## 2024-07-11 ENCOUNTER — ANESTHESIA (OUTPATIENT)
Dept: OPERATING ROOM | Age: 29
End: 2024-07-11
Payer: MEDICARE

## 2024-07-11 VITALS
SYSTOLIC BLOOD PRESSURE: 99 MMHG | TEMPERATURE: 97.1 F | DIASTOLIC BLOOD PRESSURE: 66 MMHG | BODY MASS INDEX: 51.91 KG/M2 | WEIGHT: 293 LBS | RESPIRATION RATE: 18 BRPM | HEART RATE: 81 BPM | OXYGEN SATURATION: 100 % | HEIGHT: 63 IN

## 2024-07-11 DIAGNOSIS — Z01.812 PRE-OPERATIVE LABORATORY EXAMINATION: Primary | ICD-10-CM

## 2024-07-11 LAB
HCG, URINE, POC: NEGATIVE
Lab: NORMAL
NEGATIVE QC PASS/FAIL: NORMAL
POSITIVE QC PASS/FAIL: NORMAL

## 2024-07-11 PROCEDURE — 3600000013 HC SURGERY LEVEL 3 ADDTL 15MIN: Performed by: SURGERY

## 2024-07-11 PROCEDURE — 77001 FLUOROGUIDE FOR VEIN DEVICE: CPT | Performed by: SURGERY

## 2024-07-11 PROCEDURE — 7100000011 HC PHASE II RECOVERY - ADDTL 15 MIN: Performed by: SURGERY

## 2024-07-11 PROCEDURE — 6370000000 HC RX 637 (ALT 250 FOR IP): Performed by: SURGERY

## 2024-07-11 PROCEDURE — 6360000002 HC RX W HCPCS

## 2024-07-11 PROCEDURE — 2500000003 HC RX 250 WO HCPCS: Performed by: SURGERY

## 2024-07-11 PROCEDURE — 3700000001 HC ADD 15 MINUTES (ANESTHESIA): Performed by: SURGERY

## 2024-07-11 PROCEDURE — 2580000003 HC RX 258: Performed by: SURGERY

## 2024-07-11 PROCEDURE — C1788 PORT, INDWELLING, IMP: HCPCS | Performed by: SURGERY

## 2024-07-11 PROCEDURE — 3700000000 HC ANESTHESIA ATTENDED CARE: Performed by: SURGERY

## 2024-07-11 PROCEDURE — 6360000002 HC RX W HCPCS: Performed by: SURGERY

## 2024-07-11 PROCEDURE — C1894 INTRO/SHEATH, NON-LASER: HCPCS | Performed by: SURGERY

## 2024-07-11 PROCEDURE — 2709999900 HC NON-CHARGEABLE SUPPLY: Performed by: SURGERY

## 2024-07-11 PROCEDURE — C1769 GUIDE WIRE: HCPCS | Performed by: SURGERY

## 2024-07-11 PROCEDURE — 36561 INSERT TUNNELED CV CATH: CPT | Performed by: SURGERY

## 2024-07-11 PROCEDURE — 7100000010 HC PHASE II RECOVERY - FIRST 15 MIN: Performed by: SURGERY

## 2024-07-11 PROCEDURE — 2580000003 HC RX 258

## 2024-07-11 PROCEDURE — A4217 STERILE WATER/SALINE, 500 ML: HCPCS | Performed by: SURGERY

## 2024-07-11 PROCEDURE — 3600000003 HC SURGERY LEVEL 3 BASE: Performed by: SURGERY

## 2024-07-11 PROCEDURE — 2580000003 HC RX 258: Performed by: ANESTHESIOLOGY

## 2024-07-11 PROCEDURE — 71045 X-RAY EXAM CHEST 1 VIEW: CPT

## 2024-07-11 DEVICE — VACCESS CT POWER-INJECTABLE IMPLANTABLE PORT (WITH SUTURE PLUGS) (8F)
Type: IMPLANTABLE DEVICE | Site: CHEST | Status: FUNCTIONAL
Brand: VACCESS

## 2024-07-11 RX ORDER — FENTANYL CITRATE 50 UG/ML
INJECTION, SOLUTION INTRAMUSCULAR; INTRAVENOUS PRN
Status: DISCONTINUED | OUTPATIENT
Start: 2024-07-11 | End: 2024-07-11 | Stop reason: SDUPTHER

## 2024-07-11 RX ORDER — NALOXONE HYDROCHLORIDE 0.4 MG/ML
INJECTION, SOLUTION INTRAMUSCULAR; INTRAVENOUS; SUBCUTANEOUS PRN
Status: DISCONTINUED | OUTPATIENT
Start: 2024-07-11 | End: 2024-07-11 | Stop reason: HOSPADM

## 2024-07-11 RX ORDER — HEPARIN 100 UNIT/ML
SYRINGE INTRAVENOUS PRN
Status: DISCONTINUED | OUTPATIENT
Start: 2024-07-11 | End: 2024-07-11 | Stop reason: ALTCHOICE

## 2024-07-11 RX ORDER — SODIUM CHLORIDE 9 MG/ML
INJECTION, SOLUTION INTRAVENOUS PRN
Status: DISCONTINUED | OUTPATIENT
Start: 2024-07-11 | End: 2024-07-11 | Stop reason: HOSPADM

## 2024-07-11 RX ORDER — ACETAMINOPHEN 325 MG/1
650 TABLET ORAL EVERY 4 HOURS PRN
Status: DISCONTINUED | OUTPATIENT
Start: 2024-07-11 | End: 2024-07-11 | Stop reason: HOSPADM

## 2024-07-11 RX ORDER — SODIUM CHLORIDE 0.9 % (FLUSH) 0.9 %
5-40 SYRINGE (ML) INJECTION EVERY 12 HOURS SCHEDULED
Status: DISCONTINUED | OUTPATIENT
Start: 2024-07-11 | End: 2024-07-11 | Stop reason: HOSPADM

## 2024-07-11 RX ORDER — OXYCODONE HYDROCHLORIDE 5 MG/1
5 TABLET ORAL PRN
Status: DISCONTINUED | OUTPATIENT
Start: 2024-07-11 | End: 2024-07-11 | Stop reason: HOSPADM

## 2024-07-11 RX ORDER — OXYCODONE HYDROCHLORIDE AND ACETAMINOPHEN 5; 325 MG/1; MG/1
2 TABLET ORAL EVERY 4 HOURS PRN
Status: DISCONTINUED | OUTPATIENT
Start: 2024-07-11 | End: 2024-07-11 | Stop reason: HOSPADM

## 2024-07-11 RX ORDER — SODIUM CHLORIDE 0.9 % (FLUSH) 0.9 %
5-40 SYRINGE (ML) INJECTION PRN
Status: DISCONTINUED | OUTPATIENT
Start: 2024-07-11 | End: 2024-07-11 | Stop reason: HOSPADM

## 2024-07-11 RX ORDER — SODIUM CHLORIDE 9 MG/ML
INJECTION, SOLUTION INTRAVENOUS CONTINUOUS
Status: DISCONTINUED | OUTPATIENT
Start: 2024-07-11 | End: 2024-07-11 | Stop reason: HOSPADM

## 2024-07-11 RX ORDER — OXYCODONE HYDROCHLORIDE 5 MG/1
10 TABLET ORAL PRN
Status: DISCONTINUED | OUTPATIENT
Start: 2024-07-11 | End: 2024-07-11 | Stop reason: HOSPADM

## 2024-07-11 RX ORDER — PROPOFOL 10 MG/ML
INJECTION, EMULSION INTRAVENOUS PRN
Status: DISCONTINUED | OUTPATIENT
Start: 2024-07-11 | End: 2024-07-11 | Stop reason: SDUPTHER

## 2024-07-11 RX ORDER — MIDAZOLAM HYDROCHLORIDE 1 MG/ML
INJECTION INTRAMUSCULAR; INTRAVENOUS PRN
Status: DISCONTINUED | OUTPATIENT
Start: 2024-07-11 | End: 2024-07-11 | Stop reason: SDUPTHER

## 2024-07-11 RX ORDER — ONDANSETRON 2 MG/ML
4 INJECTION INTRAMUSCULAR; INTRAVENOUS EVERY 8 HOURS PRN
Status: DISCONTINUED | OUTPATIENT
Start: 2024-07-11 | End: 2024-07-11 | Stop reason: HOSPADM

## 2024-07-11 RX ORDER — OXYCODONE HYDROCHLORIDE AND ACETAMINOPHEN 5; 325 MG/1; MG/1
1 TABLET ORAL EVERY 4 HOURS PRN
Status: DISCONTINUED | OUTPATIENT
Start: 2024-07-11 | End: 2024-07-11 | Stop reason: HOSPADM

## 2024-07-11 RX ADMIN — MIDAZOLAM 2 MG: 1 INJECTION INTRAMUSCULAR; INTRAVENOUS at 15:32

## 2024-07-11 RX ADMIN — OXYCODONE HYDROCHLORIDE AND ACETAMINOPHEN 2 TABLET: 5; 325 TABLET ORAL at 16:19

## 2024-07-11 RX ADMIN — SODIUM CHLORIDE: 9 INJECTION, SOLUTION INTRAVENOUS at 15:32

## 2024-07-11 RX ADMIN — FENTANYL CITRATE 100 MCG: 50 INJECTION, SOLUTION INTRAMUSCULAR; INTRAVENOUS at 15:32

## 2024-07-11 RX ADMIN — SODIUM CHLORIDE 3000 MG: 9 INJECTION, SOLUTION INTRAVENOUS at 15:33

## 2024-07-11 RX ADMIN — PROPOFOL 200 MG: 10 INJECTION, EMULSION INTRAVENOUS at 15:32

## 2024-07-11 ASSESSMENT — PAIN DESCRIPTION - FREQUENCY: FREQUENCY: CONTINUOUS

## 2024-07-11 ASSESSMENT — PAIN DESCRIPTION - LOCATION: LOCATION: OTHER (COMMENT)

## 2024-07-11 ASSESSMENT — PAIN DESCRIPTION - PAIN TYPE: TYPE: CHRONIC PAIN

## 2024-07-11 ASSESSMENT — PAIN DESCRIPTION - DESCRIPTORS
DESCRIPTORS: SHARP
DESCRIPTORS: DISCOMFORT;SHARP
DESCRIPTORS: DISCOMFORT;SHARP

## 2024-07-11 ASSESSMENT — PAIN SCALES - GENERAL: PAINLEVEL_OUTOF10: 10

## 2024-07-11 ASSESSMENT — PAIN DESCRIPTION - ONSET: ONSET: ON-GOING

## 2024-07-11 ASSESSMENT — PAIN - FUNCTIONAL ASSESSMENT
PAIN_FUNCTIONAL_ASSESSMENT: 0-10
PAIN_FUNCTIONAL_ASSESSMENT: 0-10

## 2024-07-11 NOTE — ANESTHESIA PRE PROCEDURE
Department of Anesthesiology  Preprocedure Note       Name:  Mary Blake   Age:  29 y.o.  :  1995                                          MRN:  [id number]         Date:  2024      Surgeon: Surgeon(s):  Milton Jacobs MD    Procedure: Procedure(s):  PORT INSERTION    Medications prior to admission:   Prior to Admission medications    Medication Sig Start Date End Date Taking? Authorizing Provider   rivaroxaban (XARELTO) 20 MG TABS tablet Take 1 tablet by mouth    Reinaldo Alvarado MD   DULoxetine (CYMBALTA) 60 MG extended release capsule Take by mouth    Reinaldo Alvarado MD   HYDROmorphone (DILAUDID) 4 MG tablet Take 1 tablet by mouth every 4 hours as needed. 20   Reinaldo Alvarado MD   folic acid (FOLVITE) 1 MG tablet Take 1 tablet by mouth daily 10/16/20   Erlinda Bentley DO   hydroxyurea (HYDREA) 500 MG chemo capsule Take 1 capsule by mouth in the morning, at noon, and at bedtime    ProviderReinaldo MD       Current medications:    Current Facility-Administered Medications   Medication Dose Route Frequency Provider Last Rate Last Admin    sodium chloride flush 0.9 % injection 5-40 mL  5-40 mL IntraVENous 2 times per day Dimas Momin APRN - CNP        sodium chloride flush 0.9 % injection 5-40 mL  5-40 mL IntraVENous PRN Dimas Momin APRN - CNP        0.9 % sodium chloride infusion   IntraVENous PRN Dimas Momin APRN - CNP        0.9 % sodium chloride infusion   IntraVENous Continuous Dimas Momin APRN - CNP        ceFAZolin Sodium (ANCEF) 3,000 mg in sodium chloride 0.9 % 100 mL IVPB ([id number])  3,000 mg IntraVENous On Call to OR Dimas Momin APRN - CNP           Allergies:    Allergies   Allergen Reactions    Bactrim [Sulfamethoxazole-Trimethoprim]     Demerol Hcl [Meperidine] Hives    Demerol Hcl [Meperidine]     Adhesive Tape Dermatitis, Rash and Itching    Bactrim [Sulfamethoxazole-Trimethoprim] Rash     Rash, itchy       Problem List:    Patient Active

## 2024-07-11 NOTE — OP NOTE
Operative Note      Patient: Mary Blake  YOB: 1995  MRN: [id number]    Date of Procedure: 7/11/2024    Pre-Op Diagnosis Codes:     * Sickle cell anemia (HCC) [D57.1]    Post-Op Diagnosis: Same       Procedure(s):  Insertion of tunneled central venous catheter with subcutaneous port    Surgeon(s):  Milton Jacobs MD    Assistant:   Surgical Assistant: Coy Collins  Resident: Ruthie Salgado MD    Anesthesia: Monitor Anesthesia Care    Estimated Blood Loss (mL): Minimal    Complications: None    Specimens:   * No specimens in log *    Implants:  Implant Name Type Inv. Item Serial No.  Lot No. LRB No. Used Action   PORT INFUS 8FR PWR INJ CT FOR VASC ACCS CATH - [id number]  PORT INFUS 8FR PWR INJ CT FOR VASC ACCS CATH  [employer]WD [id number] 1 Implanted         Drains: * No LDAs found *    Findings:  Infection Present At Time Of Surgery (PATOS) (choose all levels that have infection present):  No infection present  Other Findings:     Detailed Description of Procedure:   The patient was identified and the procedure was confirmed. The right neck and chest were prepped and draped in the usual sterile fashion. Next, 1% lidocaine mixed with 0.25% Marcaine was used for local anesthesia. The right internal jugular vein was percutaneously entered and a guidewire was advanced into the superior vena cava under fluoroscopic guidance. A small incision was made around the wire. A second skin incision was made below the clavicle and a pocket was made in the subcutaneous tissue for the reservoir.  The catheter was pulled through a subcutaneous tunnel from the inferior chest incision to the neck incision. The introducer was passed over the wire then the catheter was passed through the introducer and the tip was positioned in the superior vena cava right atrial junction under fluoroscopic guidance. The catheter was connected to the reservoir and the locking hub was engaged.   The port was noted to withdrawal and flush blood easily and was flushed with heparinized saline solution. The reservoir was secured in the pocket with Prolene sutures.  Hemostasis was assured and the incisions were irrigated with antibiotic saline solution.  The incisions were approximated with Vicryl sutures and Dermabond was applied to the incisions in the operating room.  Needle, sponge, and instrument counts were reported as correct times two.  The patient tolerated the procedure and was transferred to the recovery area in satisfactory condition.      Electronically signed by Milton Jacobs MD on 7/11/2024 at 3:56 PM

## 2024-07-11 NOTE — H&P
Vascular Surgery History & Physical Exam      Chief Complaint: Sickle cell anemia, poor venous access    HISTORY OF PRESENT ILLNESS:                The patient is a 29 y.o. female who presents to the hospital for elective insertion of a mediport.  She denies any problems since the last office visit.    IMPRESSION:   Active Hospital Problems    Diagnosis     Sickle cell anemia (HCC) [D57.1]        PLAN:  Mediport insertion    I reviewed the procedure with the patient.  I discussed the risks, benefits, and alternatives of the procedure.  The patient understands and consents.  All questions were answered.      Past Medical History:   Diagnosis Date    CLABSI (central line-associated bloodstream infection) 8/3/2016    Deep vein thrombosis (DVT) of left upper extremity (HCC) 8/12/2016    Disease of blood and blood forming organ     Heart murmur     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HCC)     Miscarriage within last 12 months 04/2017    Obesity, Class III, BMI 40-49.9 (morbid obesity) ([facility]) 5/22/2014    PE (pulmonary embolism)     PE (pulmonary embolism)     Pneumonia     Psychiatric problem     Sickle cell anemia (HCC)     Thrombosis complicating venous access device ([facility]) 5/18/2017        Past Surgical History:   Procedure Laterality Date    CATHETER REMOVAL Right 11/30/2020    RIGHT SUBCLAVIAN CATHETER REMOVAL performed by Milton Jacobs MD at [facility] [place]    CATHETER REMOVAL Right 08/30/2021    PORT REMOVAL AND REINSERTION performed by Milton Jacobs MD at [facility] [place]    GALLBLADDER SURGERY      INSERTION / REMOVAL / REPLACEMENT VENOUS ACCESS CATHETER Left 05/09/2019    PORT INSERTION performed by Milton Jacobs MD at [facility] [place]    IR INS PICC VAD W SQ PORT GREATER THAN 5 Right 10/16/2020    tunneled picc inserted by Dr Thanh FOX 10/16 R upper chest    IR TUNNELED CATHETER PLACEMENT GREATER THAN 5 YEARS  10/16/2020    IR TUNNELED CATHETER PLACEMENT GREATER THAN 5 YEARS 10/16/2020 Jhonny Law II, MD [facility] [place] SPECIAL PROCEDURES    KNEE

## 2024-07-11 NOTE — ANESTHESIA POSTPROCEDURE EVALUATION
Department of Anesthesiology  Postprocedure Note    Patient: Mary Blake  MRN: [id number]  YOB: 1995  Date of evaluation: 7/11/2024    Procedure Summary       Date: 07/11/24 Room / Location: [address]    Anesthesia Start: 1506 Anesthesia Stop:     Procedure: Insertion of tunneled central venous catheter with subcutaneous port (Chest) Diagnosis:       Sickle cell anemia (HCC)      (Sickle cell anemia (HCC) [D57.1])    Surgeons: Milton Jacobs MD Responsible Provider: Fox Small MD    Anesthesia Type: MAC ASA Status: 3            Anesthesia Type: MAC    Chago Phase I: Chago Score: 10    Chago Phase II: Chago Score: 10    Anesthesia Post Evaluation    Patient location during evaluation: PACU  Patient participation: complete - patient participated  Level of consciousness: awake and alert  Airway patency: patent  Nausea & Vomiting: no nausea and no vomiting  Cardiovascular status: hemodynamically stable  Respiratory status: acceptable  Hydration status: euvolemic  Multimodal analgesia pain management approach  Pain management: adequate    No notable events documented.

## 2024-07-11 NOTE — PROGRESS NOTES
Message sent to IV Team and notified of need for IV access.  Electronically signed by Alicia Tierney RN on 7/11/2024 at 11:45 AM      Notified Dr. Small of IV team consult.  Electronically signed by Alicia Tierney RN on 7/11/2024 at 12:05 PM

## 2024-07-31 ENCOUNTER — APPOINTMENT (OUTPATIENT)
Dept: SURGERY | Facility: CLINIC | Age: 29
End: 2024-07-31
Payer: MEDICARE

## 2024-09-05 ENCOUNTER — APPOINTMENT (OUTPATIENT)
Dept: RADIOLOGY | Facility: HOSPITAL | Age: 29
End: 2024-09-05
Payer: MEDICARE

## 2024-09-05 ENCOUNTER — APPOINTMENT (OUTPATIENT)
Dept: CARDIOLOGY | Facility: HOSPITAL | Age: 29
End: 2024-09-05
Payer: MEDICARE

## 2024-09-05 ENCOUNTER — HOSPITAL ENCOUNTER (EMERGENCY)
Facility: HOSPITAL | Age: 29
Discharge: HOME | End: 2024-09-05
Attending: STUDENT IN AN ORGANIZED HEALTH CARE EDUCATION/TRAINING PROGRAM
Payer: MEDICARE

## 2024-09-05 VITALS
RESPIRATION RATE: 16 BRPM | OXYGEN SATURATION: 98 % | WEIGHT: 293 LBS | HEIGHT: 63 IN | TEMPERATURE: 97.6 F | HEART RATE: 79 BPM | BODY MASS INDEX: 51.91 KG/M2 | SYSTOLIC BLOOD PRESSURE: 149 MMHG | DIASTOLIC BLOOD PRESSURE: 103 MMHG

## 2024-09-05 DIAGNOSIS — D57.00 SICKLE CELL PAIN CRISIS (MULTI): Primary | ICD-10-CM

## 2024-09-05 LAB
ALBUMIN SERPL BCP-MCNC: 3.8 G/DL (ref 3.4–5)
ALP SERPL-CCNC: 88 U/L (ref 33–110)
ALT SERPL W P-5'-P-CCNC: 7 U/L (ref 7–45)
AMPHETAMINES UR QL SCN: ABNORMAL
ANION GAP SERPL CALC-SCNC: 9 MMOL/L (ref 10–20)
APPEARANCE UR: CLEAR
AST SERPL W P-5'-P-CCNC: 11 U/L (ref 9–39)
B-HCG SERPL-ACNC: <2 MIU/ML
BARBITURATES UR QL SCN: ABNORMAL
BASOPHILS # BLD AUTO: 0.02 X10*3/UL (ref 0–0.1)
BASOPHILS NFR BLD AUTO: 0.1 %
BENZODIAZ UR QL SCN: ABNORMAL
BILIRUB SERPL-MCNC: 1.5 MG/DL (ref 0–1.2)
BILIRUB UR STRIP.AUTO-MCNC: NEGATIVE MG/DL
BUN SERPL-MCNC: 8 MG/DL (ref 6–23)
BZE UR QL SCN: ABNORMAL
CALCIUM SERPL-MCNC: 8.4 MG/DL (ref 8.6–10.3)
CANNABINOIDS UR QL SCN: ABNORMAL
CHLORIDE SERPL-SCNC: 104 MMOL/L (ref 98–107)
CO2 SERPL-SCNC: 27 MMOL/L (ref 21–32)
COLOR UR: YELLOW
CREAT SERPL-MCNC: 0.69 MG/DL (ref 0.5–1.05)
EGFRCR SERPLBLD CKD-EPI 2021: >90 ML/MIN/1.73M*2
EOSINOPHIL # BLD AUTO: 0.26 X10*3/UL (ref 0–0.7)
EOSINOPHIL NFR BLD AUTO: 1.9 %
ERYTHROCYTE [DISTWIDTH] IN BLOOD BY AUTOMATED COUNT: 18.4 % (ref 11.5–14.5)
FENTANYL+NORFENTANYL UR QL SCN: ABNORMAL
GLUCOSE SERPL-MCNC: 118 MG/DL (ref 74–99)
GLUCOSE UR STRIP.AUTO-MCNC: NEGATIVE MG/DL
HCG UR QL IA.RAPID: NEGATIVE
HCT VFR BLD AUTO: 30.6 % (ref 36–46)
HGB BLD-MCNC: 10.2 G/DL (ref 12–16)
HGB RETIC QN: 23 PG (ref 28–38)
HOLD SPECIMEN: NORMAL
HOLD SPECIMEN: NORMAL
IMM GRANULOCYTES # BLD AUTO: 0.05 X10*3/UL (ref 0–0.7)
IMM GRANULOCYTES NFR BLD AUTO: 0.4 % (ref 0–0.9)
IMMATURE RETIC FRACTION: 17.8 %
KETONES UR STRIP.AUTO-MCNC: NEGATIVE MG/DL
LEUKOCYTE ESTERASE UR QL STRIP.AUTO: NEGATIVE
LYMPHOCYTES # BLD AUTO: 4.54 X10*3/UL (ref 1.2–4.8)
LYMPHOCYTES NFR BLD AUTO: 34 %
MCH RBC QN AUTO: 21.7 PG (ref 26–34)
MCHC RBC AUTO-ENTMCNC: 33.3 G/DL (ref 32–36)
MCV RBC AUTO: 65 FL (ref 80–100)
METHADONE UR QL SCN: ABNORMAL
MONOCYTES # BLD AUTO: 0.7 X10*3/UL (ref 0.1–1)
MONOCYTES NFR BLD AUTO: 5.2 %
NEUTROPHILS # BLD AUTO: 7.8 X10*3/UL (ref 1.2–7.7)
NEUTROPHILS NFR BLD AUTO: 58.4 %
NITRITE UR QL STRIP.AUTO: NEGATIVE
NRBC BLD-RTO: 0 /100 WBCS (ref 0–0)
OPIATES UR QL SCN: ABNORMAL
OXYCODONE+OXYMORPHONE UR QL SCN: ABNORMAL
PCP UR QL SCN: ABNORMAL
PH UR STRIP.AUTO: 6 [PH]
PLATELET # BLD AUTO: 293 X10*3/UL (ref 150–450)
POTASSIUM SERPL-SCNC: 3.5 MMOL/L (ref 3.5–5.3)
PROT SERPL-MCNC: 7.2 G/DL (ref 6.4–8.2)
PROT UR STRIP.AUTO-MCNC: NEGATIVE MG/DL
RBC # BLD AUTO: 4.69 X10*6/UL (ref 4–5.2)
RBC # UR STRIP.AUTO: NEGATIVE /UL
RETICS #: 0.09 X10*6/UL (ref 0.02–0.08)
RETICS/RBC NFR AUTO: 2 % (ref 0.5–2)
SODIUM SERPL-SCNC: 136 MMOL/L (ref 136–145)
SP GR UR STRIP.AUTO: 1.04
UROBILINOGEN UR STRIP.AUTO-MCNC: <2 MG/DL
WBC # BLD AUTO: 13.4 X10*3/UL (ref 4.4–11.3)

## 2024-09-05 PROCEDURE — 96375 TX/PRO/DX INJ NEW DRUG ADDON: CPT | Mod: 59

## 2024-09-05 PROCEDURE — 2500000004 HC RX 250 GENERAL PHARMACY W/ HCPCS (ALT 636 FOR OP/ED): Mod: SE

## 2024-09-05 PROCEDURE — 85045 AUTOMATED RETICULOCYTE COUNT: CPT | Performed by: STUDENT IN AN ORGANIZED HEALTH CARE EDUCATION/TRAINING PROGRAM

## 2024-09-05 PROCEDURE — 84702 CHORIONIC GONADOTROPIN TEST: CPT | Performed by: STUDENT IN AN ORGANIZED HEALTH CARE EDUCATION/TRAINING PROGRAM

## 2024-09-05 PROCEDURE — 2500000004 HC RX 250 GENERAL PHARMACY W/ HCPCS (ALT 636 FOR OP/ED): Mod: SE | Performed by: EMERGENCY MEDICINE

## 2024-09-05 PROCEDURE — 2550000001 HC RX 255 CONTRASTS: Mod: SE | Performed by: STUDENT IN AN ORGANIZED HEALTH CARE EDUCATION/TRAINING PROGRAM

## 2024-09-05 PROCEDURE — 2500000004 HC RX 250 GENERAL PHARMACY W/ HCPCS (ALT 636 FOR OP/ED): Mod: SE | Performed by: STUDENT IN AN ORGANIZED HEALTH CARE EDUCATION/TRAINING PROGRAM

## 2024-09-05 PROCEDURE — 71275 CT ANGIOGRAPHY CHEST: CPT

## 2024-09-05 PROCEDURE — 96376 TX/PRO/DX INJ SAME DRUG ADON: CPT | Mod: 59

## 2024-09-05 PROCEDURE — 2500000001 HC RX 250 WO HCPCS SELF ADMINISTERED DRUGS (ALT 637 FOR MEDICARE OP): Mod: SE | Performed by: STUDENT IN AN ORGANIZED HEALTH CARE EDUCATION/TRAINING PROGRAM

## 2024-09-05 PROCEDURE — 81003 URINALYSIS AUTO W/O SCOPE: CPT | Performed by: STUDENT IN AN ORGANIZED HEALTH CARE EDUCATION/TRAINING PROGRAM

## 2024-09-05 PROCEDURE — 80307 DRUG TEST PRSMV CHEM ANLYZR: CPT | Performed by: STUDENT IN AN ORGANIZED HEALTH CARE EDUCATION/TRAINING PROGRAM

## 2024-09-05 PROCEDURE — 99285 EMERGENCY DEPT VISIT HI MDM: CPT

## 2024-09-05 PROCEDURE — 93005 ELECTROCARDIOGRAM TRACING: CPT

## 2024-09-05 PROCEDURE — 36415 COLL VENOUS BLD VENIPUNCTURE: CPT | Performed by: STUDENT IN AN ORGANIZED HEALTH CARE EDUCATION/TRAINING PROGRAM

## 2024-09-05 PROCEDURE — 71275 CT ANGIOGRAPHY CHEST: CPT | Performed by: RADIOLOGY

## 2024-09-05 PROCEDURE — 81025 URINE PREGNANCY TEST: CPT | Performed by: STUDENT IN AN ORGANIZED HEALTH CARE EDUCATION/TRAINING PROGRAM

## 2024-09-05 PROCEDURE — 96374 THER/PROPH/DIAG INJ IV PUSH: CPT | Mod: 59

## 2024-09-05 PROCEDURE — 84075 ASSAY ALKALINE PHOSPHATASE: CPT | Performed by: STUDENT IN AN ORGANIZED HEALTH CARE EDUCATION/TRAINING PROGRAM

## 2024-09-05 PROCEDURE — 85025 COMPLETE CBC W/AUTO DIFF WBC: CPT | Performed by: STUDENT IN AN ORGANIZED HEALTH CARE EDUCATION/TRAINING PROGRAM

## 2024-09-05 RX ORDER — HEPARIN 100 UNIT/ML
5 SYRINGE INTRAVENOUS ONCE
Status: COMPLETED | OUTPATIENT
Start: 2024-09-05 | End: 2024-09-05

## 2024-09-05 RX ORDER — ONDANSETRON HYDROCHLORIDE 2 MG/ML
4 INJECTION, SOLUTION INTRAVENOUS ONCE
Status: COMPLETED | OUTPATIENT
Start: 2024-09-05 | End: 2024-09-05

## 2024-09-05 RX ORDER — HEPARIN 100 UNIT/ML
SYRINGE INTRAVENOUS
Status: COMPLETED
Start: 2024-09-05 | End: 2024-09-05

## 2024-09-05 RX ORDER — HYDROMORPHONE HYDROCHLORIDE 1 MG/ML
2 INJECTION, SOLUTION INTRAMUSCULAR; INTRAVENOUS; SUBCUTANEOUS
Status: COMPLETED | OUTPATIENT
Start: 2024-09-05 | End: 2024-09-05

## 2024-09-05 RX ORDER — DIPHENHYDRAMINE HYDROCHLORIDE 50 MG/ML
25 INJECTION INTRAMUSCULAR; INTRAVENOUS ONCE
Status: COMPLETED | OUTPATIENT
Start: 2024-09-05 | End: 2024-09-05

## 2024-09-05 RX ORDER — DIPHENHYDRAMINE HCL 25 MG
25 CAPSULE ORAL ONCE
Status: COMPLETED | OUTPATIENT
Start: 2024-09-05 | End: 2024-09-05

## 2024-09-05 ASSESSMENT — PAIN SCALES - GENERAL
PAINLEVEL_OUTOF10: 3
PAINLEVEL_OUTOF10: 9
PAINLEVEL_OUTOF10: 5 - MODERATE PAIN
PAINLEVEL_OUTOF10: 7
PAINLEVEL_OUTOF10: 8
PAINLEVEL_OUTOF10: 8
PAINLEVEL_OUTOF10: 6

## 2024-09-05 ASSESSMENT — COLUMBIA-SUICIDE SEVERITY RATING SCALE - C-SSRS

## 2024-09-05 ASSESSMENT — PAIN - FUNCTIONAL ASSESSMENT
PAIN_FUNCTIONAL_ASSESSMENT: 0-10
PAIN_FUNCTIONAL_ASSESSMENT: 0-10

## 2024-09-05 ASSESSMENT — PAIN DESCRIPTION - PAIN TYPE
TYPE: CHRONIC PAIN
TYPE: ACUTE PAIN

## 2024-09-05 ASSESSMENT — PAIN DESCRIPTION - LOCATION: LOCATION: BACK

## 2024-09-05 ASSESSMENT — PAIN DESCRIPTION - FREQUENCY: FREQUENCY: CONSTANT/CONTINUOUS

## 2024-09-05 ASSESSMENT — PAIN DESCRIPTION - DESCRIPTORS
DESCRIPTORS: ACHING
DESCRIPTORS: SHARP

## 2024-09-06 LAB — HOLD SPECIMEN: NORMAL

## 2024-09-06 NOTE — PROGRESS NOTES
"Taylor Hampton is a 29 y.o. female on day 0 of admission presenting with This patient presents to the emergency department with history of sickle cell disease who presented to the emergency department with signs and symptoms she usually associates with her apical painful crisis.  Usually pain in chest, legs.  She has had a cough recently, no fever.  Patient was initially evaluated with Dr. Ho.  Please see her note for full details the H&P.  Patient was signed out to me at shift change pending urinalysis results    Subjective   Patient says she is feeling much better after medication, except that she is having some itching from the Dilaudid.  She typically does this.  She request IV Benadryl rather than the oral Benadryl that was given earlier.       Objective     Physical Exam  Vitals reviewed.   Constitutional:       Appearance: Normal appearance.   HENT:      Head: Normocephalic and atraumatic.      Mouth/Throat:      Mouth: Mucous membranes are moist.      Comments: No angioedema  Cardiovascular:      Rate and Rhythm: Normal rate and regular rhythm.   Pulmonary:      Effort: Pulmonary effort is normal. No respiratory distress.      Breath sounds: Normal breath sounds. No wheezing.   Skin:     General: Skin is warm.      Findings: No rash.   Neurological:      Mental Status: She is alert.         Last Recorded Vitals  Blood pressure 142/79, pulse 84, temperature 36.4 °C (97.6 °F), temperature source Temporal, resp. rate 16, height 1.6 m (5' 3\"), weight 138 kg (304 lb), SpO2 99%.  Intake/Output last 3 Shifts:  No intake/output data recorded.    Relevant Results               This patient has a central line   Reason for the central line remaining today? Parenteral medication      Labs Reviewed   CBC WITH AUTO DIFFERENTIAL - Abnormal       Result Value    WBC 13.4 (*)     nRBC 0.0      RBC 4.69      Hemoglobin 10.2 (*)     Hematocrit 30.6 (*)     MCV 65 (*)     MCH 21.7 (*)     MCHC 33.3      RDW 18.4 (*)     " Platelets 293      Neutrophils % 58.4      Immature Granulocytes %, Automated 0.4      Lymphocytes % 34.0      Monocytes % 5.2      Eosinophils % 1.9      Basophils % 0.1      Neutrophils Absolute 7.80 (*)     Immature Granulocytes Absolute, Automated 0.05      Lymphocytes Absolute 4.54      Monocytes Absolute 0.70      Eosinophils Absolute 0.26      Basophils Absolute 0.02     COMPREHENSIVE METABOLIC PANEL - Abnormal    Glucose 118 (*)     Sodium 136      Potassium 3.5      Chloride 104      Bicarbonate 27      Anion Gap 9 (*)     Urea Nitrogen 8      Creatinine 0.69      eGFR >90      Calcium 8.4 (*)     Albumin 3.8      Alkaline Phosphatase 88      Total Protein 7.2      AST 11      Bilirubin, Total 1.5 (*)     ALT 7     RETICULOCYTES - Abnormal    Retic % 2.0      Retic Absolute 0.092 (*)     Reticulocyte Hemoglobin 23 (*)     Immature Retic fraction 17.8 (*)    URINALYSIS WITH REFLEX CULTURE AND MICROSCOPIC - Abnormal    Color, Urine Yellow      Appearance, Urine Clear      Specific Gravity, Urine 1.040 (*)     pH, Urine 6.0      Protein, Urine NEGATIVE      Glucose, Urine NEGATIVE      Blood, Urine NEGATIVE      Ketones, Urine NEGATIVE      Bilirubin, Urine NEGATIVE      Urobilinogen, Urine <2.0      Nitrite, Urine NEGATIVE      Leukocyte Esterase, Urine NEGATIVE     DRUG SCREEN,URINE - Abnormal    Amphetamine Screen, Urine Presumptive Negative      Barbiturate Screen, Urine Presumptive Negative      Benzodiazepines Screen, Urine Presumptive Negative      Cannabinoid Screen, Urine Presumptive Negative      Cocaine Metabolite Screen, Urine Presumptive Negative      Fentanyl Screen, Urine Presumptive Negative      Opiate Screen, Urine Presumptive Positive (*)     Oxycodone Screen, Urine Presumptive Negative      PCP Screen, Urine Presumptive Negative      Methadone Screen, Urine Presumptive Negative      Narrative:     Drug screen results are presumptive and should not be used to assess   compliance with  prescribed medication. Contact the performing [facility] laboratory   to add-on definitive confirmatory testing if clinically indicated.    Toxicology screening results are reported qualitatively. The concentration must   be greater than or equal to the cutoff to be reported as positive. The concentration   at which the screening test can detect an individual drug or metabolite varies.   The absence of expected drug(s) and/or drug metabolite(s) may indicate non-compliance,   inappropriate timing of specimen collection relative to drug administration, poor drug   absorption, diluted/adulterated urine, or limitations of testing. For medical purposes   only; not valid for forensic use.    Interpretive questions should be directed to the laboratory medical directors.   HCG, URINE, QUALITATIVE - Normal    HCG, Urine NEGATIVE     HUMAN CHORIONIC GONADOTROPIN, SERUM QUANTITATIVE - Normal    HCG, Beta-Quantitative <2      Narrative:      Total HCG measurement is performed using the [employer] Access   Immunoassay which detects intact HCG and free beta HCG subunit.    This test is not indicated for use as a tumor marker.   HCG testing is performed using a different test methodology at [facility] than other [facility]. Direct result comparison   should only be made within the same method.       GRAY TOP    Extra Tube Hold for add-ons.     URINALYSIS WITH REFLEX CULTURE AND MICROSCOPIC    Narrative:     The following orders were created for panel order Urinalysis with Reflex Culture and Microscopic.  Procedure                               Abnormality         Status                     ---------                               -----------         ------                     Urinalysis with Reflex C...[[id number]]  Abnormal            Final result               Extra Urine Gray Tube[[id number]]                            In process                   Please view results for these tests on the individual orders.    EXTRA URINE GRAY TUBE     CT angio chest for pulmonary embolism   Final Result   No evidence of pulmonary embolism.        Scattered streaky opacities suggestive of atelectasis.        Signed by: Omar Alejandra 9/5/2024 5:53 PM   Dictation workstation:   [id number]        ED Medication Administration from 09/05/2024 1604 to 09/05/2024 2039         Date/Time Order Dose Route Action Action by     09/05/2024 1728 EDT iohexol (OMNIPaque) 350 mg iodine/mL solution 50 mL 50 mL intravenous Given MARIANNE Moreno     09/05/2024 1744 EDT HYDROmorphone (Dilaudid) injection 2 mg 2 mg intravenous Given Ila, MARIANNE     09/05/2024 1814 EDT diphenhydrAMINE (BENADryl) capsule 25 mg 25 mg oral Given Haworth, MARIANNE     09/05/2024 1814 EDT ondansetron (Zofran) injection 4 mg 4 mg intravenous Given Haworth, MARIANNE     09/05/2024 1927 EDT HYDROmorphone (Dilaudid) injection 2 mg 2 mg intravenous Given KEVIN Wade     09/05/2024 2028 EDT HYDROmorphone (Dilaudid) injection 2 mg 2 mg intravenous Given KEVIN Wade                     Assessment/Plan   Assessment & Plan  Sickle cell pain crisis (Multi)    This patient presents to the emergency department with painful episode associated with her history of sickle cell disease.  Laboratory evaluation remarkable for white count of 13.4 without any evidence of infection.  Hemoglobin 10.2 which is within her usual ranges.  Chemistries remarkable only for calcium of 8.4 and bilirubin of 1.5.  Reticulocyte is 23 which is within her normal ranges.  Urinalysis shows no evidence of infection.  Pregnancy is negative.    CT angio chest was obtained and read by radiology --streaky opacities in right lower lobe right middle lobe lingula and left lower lobe no pulmonary consolidation, no evidence of pulmonary embolism.    Patient was treated with her usual IV cocktail of medication with symptom improvement.  She did develop some itching after the Dilaudid without any evidence of angioedema, anaphylaxis, urticaria.  This was  treated with IV Benadryl.  Patient reports that it frequently makes her itch.    Results of exam and any testing were discussed with patient/family. To the best of my ability, I answered all questions. At this time, there is no indication for admission/transfer or further diagnostic testing. Patient understands to return for any new or worsening symptoms, or failure to improve as anticipated. The importance of follow-up was stressed.     At discharge patient stated she felt like there was a hole in her nasal septum.  She denies any trauma to the septum and has never had a piercing.  She says her nose was feeling irritated just to put her finger into check.  No nasal bleeding or discharge.  She states that she believes her fingers to touch in the middle.  On nasal examination there is some irritation to the nasal septum without any bleeding.  No nasal discharge.  Thinning distal to the nasal cartilage although I could not pass, patient believes her fingers do meet.  No nasal septal hematoma.  This is the first time she had noticed anything like this.  I did recommend follow-up with otolaryngology and gave patient appointment card for Dr. Contreras to schedule a evaluation.  I spent 0 minutes in the critical care of this patient.      Lenore Duran MD

## 2024-09-07 ENCOUNTER — HOSPITAL ENCOUNTER (EMERGENCY)
Facility: HOSPITAL | Age: 29
Discharge: HOME | End: 2024-09-08
Attending: EMERGENCY MEDICINE
Payer: MEDICARE

## 2024-09-07 ENCOUNTER — APPOINTMENT (OUTPATIENT)
Dept: RADIOLOGY | Facility: HOSPITAL | Age: 29
End: 2024-09-07
Payer: MEDICARE

## 2024-09-07 ENCOUNTER — APPOINTMENT (OUTPATIENT)
Dept: CARDIOLOGY | Facility: HOSPITAL | Age: 29
End: 2024-09-07
Payer: MEDICARE

## 2024-09-07 DIAGNOSIS — D57.00 SICKLE CELL PAIN CRISIS (MULTI): Primary | ICD-10-CM

## 2024-09-07 LAB
ALBUMIN SERPL BCP-MCNC: 3.8 G/DL (ref 3.4–5)
ALP SERPL-CCNC: 88 U/L (ref 33–110)
ALT SERPL W P-5'-P-CCNC: 7 U/L (ref 7–45)
ANION GAP SERPL CALC-SCNC: 11 MMOL/L (ref 10–20)
AST SERPL W P-5'-P-CCNC: 11 U/L (ref 9–39)
ATRIAL RATE: 87 BPM
BASOPHILS # BLD AUTO: 0.03 X10*3/UL (ref 0–0.1)
BASOPHILS NFR BLD AUTO: 0.2 %
BILIRUB SERPL-MCNC: 1.5 MG/DL (ref 0–1.2)
BUN SERPL-MCNC: 8 MG/DL (ref 6–23)
CALCIUM SERPL-MCNC: 8.8 MG/DL (ref 8.6–10.3)
CARDIAC TROPONIN I PNL SERPL HS: <3 NG/L (ref 0–13)
CHLORIDE SERPL-SCNC: 103 MMOL/L (ref 98–107)
CO2 SERPL-SCNC: 27 MMOL/L (ref 21–32)
CREAT SERPL-MCNC: 0.84 MG/DL (ref 0.5–1.05)
EGFRCR SERPLBLD CKD-EPI 2021: >90 ML/MIN/1.73M*2
EOSINOPHIL # BLD AUTO: 0.15 X10*3/UL (ref 0–0.7)
EOSINOPHIL NFR BLD AUTO: 1.1 %
ERYTHROCYTE [DISTWIDTH] IN BLOOD BY AUTOMATED COUNT: 18.3 % (ref 11.5–14.5)
GLUCOSE SERPL-MCNC: 105 MG/DL (ref 74–99)
HCT VFR BLD AUTO: 30.5 % (ref 36–46)
HGB BLD-MCNC: 10.5 G/DL (ref 12–16)
HGB RETIC QN: 24 PG (ref 28–38)
HOLD SPECIMEN: NORMAL
IMM GRANULOCYTES # BLD AUTO: 0.04 X10*3/UL (ref 0–0.7)
IMM GRANULOCYTES NFR BLD AUTO: 0.3 % (ref 0–0.9)
IMMATURE RETIC FRACTION: 22.6 %
LYMPHOCYTES # BLD AUTO: 4.72 X10*3/UL (ref 1.2–4.8)
LYMPHOCYTES NFR BLD AUTO: 34.6 %
MCH RBC QN AUTO: 22 PG (ref 26–34)
MCHC RBC AUTO-ENTMCNC: 34.4 G/DL (ref 32–36)
MCV RBC AUTO: 64 FL (ref 80–100)
MONOCYTES # BLD AUTO: 0.64 X10*3/UL (ref 0.1–1)
MONOCYTES NFR BLD AUTO: 4.7 %
NEUTROPHILS # BLD AUTO: 8.07 X10*3/UL (ref 1.2–7.7)
NEUTROPHILS NFR BLD AUTO: 59.1 %
NRBC BLD-RTO: 0 /100 WBCS (ref 0–0)
P AXIS: 40 DEGREES
P OFFSET: 197 MS
P ONSET: 144 MS
PLATELET # BLD AUTO: 298 X10*3/UL (ref 150–450)
POTASSIUM SERPL-SCNC: 3.6 MMOL/L (ref 3.5–5.3)
PR INTERVAL: 144 MS
PROT SERPL-MCNC: 7.7 G/DL (ref 6.4–8.2)
Q ONSET: 216 MS
QRS COUNT: 14 BEATS
QRS DURATION: 92 MS
QT INTERVAL: 384 MS
QTC CALCULATION(BAZETT): 462 MS
QTC FREDERICIA: 434 MS
R AXIS: 34 DEGREES
RBC # BLD AUTO: 4.77 X10*6/UL (ref 4–5.2)
RETICS #: 0.1 X10*6/UL (ref 0.02–0.08)
RETICS/RBC NFR AUTO: 2.1 % (ref 0.5–2)
SODIUM SERPL-SCNC: 137 MMOL/L (ref 136–145)
T AXIS: 5 DEGREES
T OFFSET: 408 MS
VENTRICULAR RATE: 87 BPM
WBC # BLD AUTO: 13.7 X10*3/UL (ref 4.4–11.3)

## 2024-09-07 PROCEDURE — 36415 COLL VENOUS BLD VENIPUNCTURE: CPT | Performed by: EMERGENCY MEDICINE

## 2024-09-07 PROCEDURE — 96376 TX/PRO/DX INJ SAME DRUG ADON: CPT

## 2024-09-07 PROCEDURE — 71045 X-RAY EXAM CHEST 1 VIEW: CPT | Performed by: SURGERY

## 2024-09-07 PROCEDURE — 84484 ASSAY OF TROPONIN QUANT: CPT | Performed by: EMERGENCY MEDICINE

## 2024-09-07 PROCEDURE — 85045 AUTOMATED RETICULOCYTE COUNT: CPT | Performed by: EMERGENCY MEDICINE

## 2024-09-07 PROCEDURE — 2500000001 HC RX 250 WO HCPCS SELF ADMINISTERED DRUGS (ALT 637 FOR MEDICARE OP): Mod: SE | Performed by: EMERGENCY MEDICINE

## 2024-09-07 PROCEDURE — 96374 THER/PROPH/DIAG INJ IV PUSH: CPT

## 2024-09-07 PROCEDURE — 96375 TX/PRO/DX INJ NEW DRUG ADDON: CPT

## 2024-09-07 PROCEDURE — 2500000004 HC RX 250 GENERAL PHARMACY W/ HCPCS (ALT 636 FOR OP/ED): Mod: SE | Performed by: EMERGENCY MEDICINE

## 2024-09-07 PROCEDURE — 71045 X-RAY EXAM CHEST 1 VIEW: CPT

## 2024-09-07 PROCEDURE — 93005 ELECTROCARDIOGRAM TRACING: CPT

## 2024-09-07 PROCEDURE — 80053 COMPREHEN METABOLIC PANEL: CPT | Performed by: EMERGENCY MEDICINE

## 2024-09-07 PROCEDURE — 99284 EMERGENCY DEPT VISIT MOD MDM: CPT | Mod: 25

## 2024-09-07 PROCEDURE — 85025 COMPLETE CBC W/AUTO DIFF WBC: CPT | Performed by: EMERGENCY MEDICINE

## 2024-09-07 PROCEDURE — 96361 HYDRATE IV INFUSION ADD-ON: CPT

## 2024-09-07 RX ORDER — DIPHENHYDRAMINE HCL 25 MG
25 CAPSULE ORAL ONCE
Status: COMPLETED | OUTPATIENT
Start: 2024-09-07 | End: 2024-09-07

## 2024-09-07 RX ORDER — HYDROMORPHONE HYDROCHLORIDE 1 MG/ML
2 INJECTION, SOLUTION INTRAMUSCULAR; INTRAVENOUS; SUBCUTANEOUS
Status: COMPLETED | OUTPATIENT
Start: 2024-09-07 | End: 2024-09-08

## 2024-09-07 RX ORDER — ONDANSETRON HYDROCHLORIDE 2 MG/ML
4 INJECTION, SOLUTION INTRAVENOUS ONCE
Status: COMPLETED | OUTPATIENT
Start: 2024-09-07 | End: 2024-09-07

## 2024-09-07 RX ADMIN — DIPHENHYDRAMINE HYDROCHLORIDE 25 MG: 25 CAPSULE ORAL at 21:12

## 2024-09-07 RX ADMIN — HYDROMORPHONE HYDROCHLORIDE 2 MG: 1 INJECTION, SOLUTION INTRAMUSCULAR; INTRAVENOUS; SUBCUTANEOUS at 22:55

## 2024-09-07 RX ADMIN — ONDANSETRON 4 MG: 2 INJECTION INTRAMUSCULAR; INTRAVENOUS at 21:14

## 2024-09-07 RX ADMIN — SODIUM CHLORIDE 1000 ML: 9 INJECTION, SOLUTION INTRAVENOUS at 21:13

## 2024-09-07 RX ADMIN — HYDROMORPHONE HYDROCHLORIDE 2 MG: 1 INJECTION, SOLUTION INTRAMUSCULAR; INTRAVENOUS; SUBCUTANEOUS at 21:14

## 2024-09-07 ASSESSMENT — PAIN DESCRIPTION - LOCATION
LOCATION: OTHER (COMMENT)
LOCATION: OTHER (COMMENT)

## 2024-09-07 ASSESSMENT — PAIN - FUNCTIONAL ASSESSMENT: PAIN_FUNCTIONAL_ASSESSMENT: 0-10

## 2024-09-07 ASSESSMENT — PAIN SCALES - GENERAL
PAINLEVEL_OUTOF10: 9
PAINLEVEL_OUTOF10: 8
PAINLEVEL_OUTOF10: 6
PAINLEVEL_OUTOF10: 9

## 2024-09-07 NOTE — ED PROVIDER NOTES
Chief Complaint: Sickle cell pain crisis  HPI: This is a 29-year-old female, presenting to the emergency department for sickle cell pain crisis which began about a week ago.  Patient states that this feels typical of her sickle cell pain, and the pain is in her chest, back, legs.  Patient does have a cough, however denies any fevers.  She states she gets a cough around this time of year as well.  She has had acute chest syndrome as a child, however not as an adult, however states that she does not feel very ill, is just in pain.    Past Medical History:   Diagnosis Date    Sickle cell anemia with crisis (Multi)       Past Surgical History:   Procedure Laterality Date    CT ABDOMEN PELVIS ANGIOGRAM W AND/OR WO IV CONTRAST  4/9/2020    CT ABDOMEN PELVIS ANGIOGRAM W AND/OR WO IV CONTRAST 4/9/2020 [facility]    CT ANGIO NECK  9/27/2019    CT NECK ANGIO W AND WO IV CONTRAST 9/27/2019 [facility]    IR CVC TUNNELED  8/24/2020    IR CVC TUNNELED 8/24/2020 [facility]    OTHER SURGICAL HISTORY  09/22/2020    Venous access port placement    OTHER SURGICAL HISTORY  09/22/2020    Tonsillectomy with adenoidectomy    OTHER SURGICAL HISTORY  09/22/2020    Knee surgery       Physical Exam  Constitutional:       Appearance: Normal appearance.   HENT:      Head: Normocephalic and atraumatic.      Mouth/Throat:      Mouth: Mucous membranes are moist.   Eyes:      Conjunctiva/sclera: Conjunctivae normal.   Cardiovascular:      Rate and Rhythm: Normal rate.   Pulmonary:      Effort: Pulmonary effort is normal.   Abdominal:      General: Abdomen is flat.      Palpations: Abdomen is soft.      Tenderness: There is no abdominal tenderness.   Musculoskeletal:         General: Normal range of motion.      Cervical back: Normal range of motion.   Skin:     General: Skin is warm and dry.   Neurological:      General: No focal deficit present.      Mental Status: She is alert.   Psychiatric:         Mood and Affect:  Mood normal.          ED Course/MDM  ED Course as of 09/06/24 2243   Thu Sep 05, 2024   2031 Patient assessed, results reviewed [MN]      ED Course User Index  [MN] Lenore Duran MD         Diagnoses as of 09/06/24 2243   Sickle cell pain crisis (Multi)       Discussion of Management with Other Providers:   I discussed the patient/results with:   Lyndsey Dang, sickle cell coordinator, discussed the case over the phone, they state that the patient's care plan includes 2 mg of IV Dilaudid Q 1 hours x 3 doses.  If this does not help her pain, she can be admitted where she has a different admission care plan.  They also state that the patient is not to receive IV Benadryl, and given her history of kidney disease, is not to receive IV Toradol.    This is a 29 y.o. female presenting to the ED for evaluation of sickle cell pain crisis which began about a week ago.  Patient states that this happens around this time every year.  She also complains of a cough however denies any fevers or chills.  On physical exam, patient is resting comfortably in the bed, no acute distress.  Heart is regular rate and rhythm, lungs are clear to auscultation bilaterally.  Chest is nontender.  Abdomen is soft and nontender.  Compartments are soft.  Lab work was obtained, and is not far off of patient's baseline.  I did do a CT PE study given her cough and concern for possible acute chest syndrome, however CT PE study is unremarkable.  I did reach out to the patient's sickle cell care coordinator, and the patient does have a care plan with this provider, which includes 2 mg of IV Dilaudid every hour x 3 doses.  Patient is not to receive IV Benadryl with her care plan, however can receive p.o. Benadryl.  She should also not be given IV Toradol, given her history of kidney injury.  Patient was provided her care plan, and at time of signout to the oncoming ED team, is pending reevaluation after her final dose of Dilaudid.  I do  anticipate the patient will be able to be discharged home, as she states her pain is already starting to improve.  She was signed out to the oncoming ED team in stable condition    Final Impression  1.  Sickle cell pain crisis  Disposition/Plan: Signout  Condition at disposition: Stable.     Dinora Ho DO  Emergency Medicine Physician     Dinora Ho DO  09/06/24 2947

## 2024-09-08 VITALS
OXYGEN SATURATION: 98 % | SYSTOLIC BLOOD PRESSURE: 101 MMHG | HEIGHT: 63 IN | DIASTOLIC BLOOD PRESSURE: 66 MMHG | TEMPERATURE: 97.1 F | BODY MASS INDEX: 51.91 KG/M2 | WEIGHT: 293 LBS | HEART RATE: 95 BPM | RESPIRATION RATE: 18 BRPM

## 2024-09-08 PROCEDURE — 2500000004 HC RX 250 GENERAL PHARMACY W/ HCPCS (ALT 636 FOR OP/ED): Mod: SE | Performed by: EMERGENCY MEDICINE

## 2024-09-08 PROCEDURE — 96376 TX/PRO/DX INJ SAME DRUG ADON: CPT

## 2024-09-08 RX ORDER — HEPARIN SODIUM,PORCINE/PF 10 UNIT/ML
SYRINGE (ML) INTRAVENOUS
Status: DISCONTINUED
Start: 2024-09-08 | End: 2024-09-08 | Stop reason: HOSPADM

## 2024-09-08 RX ADMIN — HYDROMORPHONE HYDROCHLORIDE 2 MG: 1 INJECTION, SOLUTION INTRAMUSCULAR; INTRAVENOUS; SUBCUTANEOUS at 00:02

## 2024-09-08 ASSESSMENT — PAIN SCALES - GENERAL
PAINLEVEL_OUTOF10: 7
PAINLEVEL_OUTOF10: 2

## 2024-09-08 NOTE — DISCHARGE INSTRUCTIONS
Follow-up with your hematologist Friday as already scheduled.    Return to the emergency department for fever, vomiting, increasing pain

## 2024-09-08 NOTE — ED PROVIDER NOTES
HPI   Chief Complaint   Patient presents with    Sickle Cell Pain Crisis         History provided by:  Medical records and patient   used: No      This patient presents to the emergency department ambulatory via private vehicle for pain she associates with sickle cell pain crisis.  Chest pain is in her chest, back, legs.  It has been going on for more than a week.  She had been seen in the emergency department on September 5 for this pain and treated with her usual outpatient pain management protocol.  Patient states that the cough she had during that visit is largely gone.  She has not run any fever but she has had occasional sweats and chills.  No vomiting but very nauseated.  No urinary symptoms.  No abdominal pain.  No swelling of feet or legs.      Patient History   Past Medical History:   Diagnosis Date    Sickle cell anemia with crisis (Multi)      Past Surgical History:   Procedure Laterality Date    CT ABDOMEN PELVIS ANGIOGRAM W AND/OR WO IV CONTRAST  4/9/2020    CT ABDOMEN PELVIS ANGIOGRAM W AND/OR WO IV CONTRAST 4/9/2020 [facility]    CT ANGIO NECK  9/27/2019    CT NECK ANGIO W AND WO IV CONTRAST 9/27/2019 [facility]    IR CVC TUNNELED  8/24/2020    IR CVC TUNNELED 8/24/2020 [facility]    OTHER SURGICAL HISTORY  09/22/2020    Venous access port placement    OTHER SURGICAL HISTORY  09/22/2020    Tonsillectomy with adenoidectomy    OTHER SURGICAL HISTORY  09/22/2020    Knee surgery     No family history on file.  Social History     Tobacco Use    Smoking status: Never    Smokeless tobacco: Never   Vaping Use    Vaping status: Never Used   Substance Use Topics    Alcohol use: Never    Drug use: Never       Physical Exam   ED Triage Vitals [09/07/24 2031]   Temperature Heart Rate Respirations BP   36.2 °C (97.1 °F) (!) 103 18 153/84      SpO2 Temp src Heart Rate Source Patient Position   98 % -- -- --      BP Location FiO2 (%)     -- --       Physical  Exam  Vitals reviewed.   Constitutional:       Appearance: Normal appearance. She is obese.   HENT:      Head: Normocephalic and atraumatic.      Mouth/Throat:      Mouth: Mucous membranes are moist.   Eyes:      Extraocular Movements: Extraocular movements intact.      Conjunctiva/sclera: Conjunctivae normal.      Pupils: Pupils are equal, round, and reactive to light.   Cardiovascular:      Rate and Rhythm: Normal rate and regular rhythm.      Pulses: Normal pulses.      Heart sounds: Normal heart sounds.   Pulmonary:      Effort: Pulmonary effort is normal.      Breath sounds: Normal breath sounds.   Chest:      Chest wall: No tenderness.   Abdominal:      General: Bowel sounds are normal.      Palpations: Abdomen is soft.      Tenderness: There is no abdominal tenderness.   Musculoskeletal:         General: Normal range of motion.      Cervical back: Normal range of motion and neck supple.   Skin:     General: Skin is warm and dry.      Capillary Refill: Capillary refill takes less than 2 seconds.      Coloration: Skin is not pale.   Neurological:      General: No focal deficit present.      Mental Status: She is alert and oriented to person, place, and time.   Psychiatric:         Mood and Affect: Mood normal.           ED Course & MDM   ED Course as of 09/08/24 0057   Sat Sep 07, 2024   2119 Patient reassessed [MN]   2155 Patient reassessed [MN]   2249 Patient reassessed [MN]   Sun Sep 08, 2024   0054 Patient reassessed [MN]      ED Course User Index  [MN] Lenore Duran MD         Diagnoses as of 09/08/24 0057   Sickle cell pain crisis (Multi)        9/7/2024  Labs Reviewed   CBC WITH AUTO DIFFERENTIAL - Abnormal       Result Value    WBC 13.7 (*)     nRBC 0.0      RBC 4.77      Hemoglobin 10.5 (*)     Hematocrit 30.5 (*)     MCV 64 (*)     MCH 22.0 (*)     MCHC 34.4      RDW 18.3 (*)     Platelets 298      Neutrophils % 59.1      Immature Granulocytes %, Automated 0.3      Lymphocytes % 34.6       Monocytes % 4.7      Eosinophils % 1.1      Basophils % 0.2      Neutrophils Absolute 8.07 (*)     Immature Granulocytes Absolute, Automated 0.04      Lymphocytes Absolute 4.72      Monocytes Absolute 0.64      Eosinophils Absolute 0.15      Basophils Absolute 0.03     COMPREHENSIVE METABOLIC PANEL - Abnormal    Glucose 105 (*)     Sodium 137      Potassium 3.6      Chloride 103      Bicarbonate 27      Anion Gap 11      Urea Nitrogen 8      Creatinine 0.84      eGFR >90      Calcium 8.8      Albumin 3.8      Alkaline Phosphatase 88      Total Protein 7.7      AST 11      Bilirubin, Total 1.5 (*)     ALT 7     RETICULOCYTES - Abnormal    Retic % 2.1 (*)     Retic Absolute 0.098 (*)     Reticulocyte Hemoglobin 24 (*)     Immature Retic fraction 22.6 (*)    TROPONIN I, HIGH SENSITIVITY - Normal    Troponin I, High Sensitivity <3      Narrative:     Less than 99th percentile of normal range cutoff-  Female and children under 18 years old <14 ng/L; Male <21 ng/L: Negative  Repeat testing should be performed if clinically indicated.     Female and children under 18 years old 14-50 ng/L; Male 21-50 ng/L:  Consistent with possible cardiac damage and possible increased clinical   risk. Serial measurements may help to assess extent of myocardial damage.     >50 ng/L: Consistent with cardiac damage, increased clinical risk and  myocardial infarction. Serial measurements may help assess extent of   myocardial damage.      NOTE: Children less than 1 year old may have higher baseline troponin   levels and results should be interpreted in conjunction with the overall   clinical context.     NOTE: Troponin I testing is performed using a different   testing methodology at [facility] than at other   [facility]. Direct result comparisons should only   be made within the same method.   GRAY TOP     XR chest 1 view   Final Result   1.  No evidence of acute cardiopulmonary process.                  MACRO:   None         Signed by: Tee Irwin 9/7/2024 10:11 PM   Dictation workstation:   [id number]        ED Medication Administration from 09/07/2024 2023 to 09/07/2024 2248         Date/Time Order Dose Route Action Action by     09/07/2024 2112 EDT diphenhydrAMINE (BENADryl) capsule 25 mg 25 mg oral Given MARIANNE Jhaveri     09/07/2024 2113 EDT sodium chloride 0.9 % bolus 1,000 mL 1,000 mL intravenous New Bag MARIANNE Jhaveri     09/07/2024 2114 EDT HYDROmorphone (Dilaudid) injection 2 mg 2 mg intravenous Given MARIANNE Jhaveri     09/07/2024 2114 EDT ondansetron (Zofran) injection 4 mg 4 mg intravenous Given MARIANNE Jhaveri          EKG  2132 --twelve-lead EKG was obtained and read by me. This demonstrates normal sinus rhythm with a rate of 76, normal intervals, normal axes, no ectopy, no ischemia, no pericarditis. There was no change compared to most recent prior EKG. 9/5/24         No data recorded     [place] Coma Scale Score: 15 (09/07/24 2158 : Portillo Jhaveri RN)                           Medical Decision Making  Patient presents emergency department the above history and physical.  She was started on her usual sickle pain exacerbation care plan.  Chest x-ray obtained read by radiology as no acute cardiopulmonary disease.  EKG shows no ischemia or hemodynamically significant arrhythmia.  Hemoglobin is stable.  Reticulocytes are stable.  No peripheral leukocytosis.  Patient also given Zofran for her complaint of nausea and is eating snacks during the remainder of the emergency department stay.    Following medication cycle patient reports pain is improved to a 5 out of 10 and she feels she is okay to go home.    Results of exam and any testing were discussed with patient/family. To the best of my ability, I answered all questions. At this time, there is no indication for admission/transfer or further diagnostic testing. Patient understands to return for any new or worsening symptoms, or failure to improve as anticipated. The importance of  follow-up was stressed.    Diagnoses as of 09/08/24 0057   Sickle cell pain crisis (Multi)       Procedure  Procedures     Lenore Duran MD  09/08/24 0059

## 2024-09-12 ENCOUNTER — HOSPITAL ENCOUNTER (EMERGENCY)
Facility: HOSPITAL | Age: 29
Discharge: HOME | End: 2024-09-12
Attending: FAMILY MEDICINE
Payer: MEDICARE

## 2024-09-12 ENCOUNTER — APPOINTMENT (OUTPATIENT)
Dept: CARDIOLOGY | Facility: HOSPITAL | Age: 29
End: 2024-09-12
Payer: MEDICARE

## 2024-09-12 ENCOUNTER — APPOINTMENT (OUTPATIENT)
Dept: RADIOLOGY | Facility: HOSPITAL | Age: 29
End: 2024-09-12
Payer: MEDICARE

## 2024-09-12 VITALS
RESPIRATION RATE: 18 BRPM | SYSTOLIC BLOOD PRESSURE: 119 MMHG | OXYGEN SATURATION: 100 % | WEIGHT: 293 LBS | DIASTOLIC BLOOD PRESSURE: 60 MMHG | HEIGHT: 63 IN | BODY MASS INDEX: 51.91 KG/M2 | TEMPERATURE: 97.9 F | HEART RATE: 96 BPM

## 2024-09-12 DIAGNOSIS — D57.00 SICKLE CELL CRISIS (MULTI): Primary | ICD-10-CM

## 2024-09-12 LAB
ALBUMIN SERPL BCP-MCNC: 3.6 G/DL (ref 3.4–5)
ALP SERPL-CCNC: 84 U/L (ref 33–110)
ALT SERPL W P-5'-P-CCNC: 6 U/L (ref 7–45)
ANION GAP SERPL CALC-SCNC: 8 MMOL/L (ref 10–20)
AST SERPL W P-5'-P-CCNC: 11 U/L (ref 9–39)
BASOPHILS # BLD AUTO: 0.03 X10*3/UL (ref 0–0.1)
BASOPHILS NFR BLD AUTO: 0.2 %
BILIRUB SERPL-MCNC: 1.3 MG/DL (ref 0–1.2)
BUN SERPL-MCNC: 14 MG/DL (ref 6–23)
CALCIUM SERPL-MCNC: 9.1 MG/DL (ref 8.6–10.3)
CHLORIDE SERPL-SCNC: 103 MMOL/L (ref 98–107)
CO2 SERPL-SCNC: 27 MMOL/L (ref 21–32)
CREAT SERPL-MCNC: 0.94 MG/DL (ref 0.5–1.05)
EGFRCR SERPLBLD CKD-EPI 2021: 84 ML/MIN/1.73M*2
EOSINOPHIL # BLD AUTO: 0.23 X10*3/UL (ref 0–0.7)
EOSINOPHIL NFR BLD AUTO: 1.6 %
ERYTHROCYTE [DISTWIDTH] IN BLOOD BY AUTOMATED COUNT: 18.1 % (ref 11.5–14.5)
GLUCOSE SERPL-MCNC: 108 MG/DL (ref 74–99)
HCT VFR BLD AUTO: 29 % (ref 36–46)
HGB BLD-MCNC: 10 G/DL (ref 12–16)
HGB RETIC QN: 25 PG (ref 28–38)
HOLD SPECIMEN: NORMAL
HYPOCHROMIA BLD QL SMEAR: NORMAL
IMM GRANULOCYTES # BLD AUTO: 0.05 X10*3/UL (ref 0–0.7)
IMM GRANULOCYTES NFR BLD AUTO: 0.4 % (ref 0–0.9)
IMMATURE RETIC FRACTION: 22.1 %
LYMPHOCYTES # BLD AUTO: 5.39 X10*3/UL (ref 1.2–4.8)
LYMPHOCYTES NFR BLD AUTO: 37.9 %
MCH RBC QN AUTO: 22 PG (ref 26–34)
MCHC RBC AUTO-ENTMCNC: 34.5 G/DL (ref 32–36)
MCV RBC AUTO: 64 FL (ref 80–100)
MONOCYTES # BLD AUTO: 0.86 X10*3/UL (ref 0.1–1)
MONOCYTES NFR BLD AUTO: 6 %
NEUTROPHILS # BLD AUTO: 7.68 X10*3/UL (ref 1.2–7.7)
NEUTROPHILS NFR BLD AUTO: 53.9 %
NRBC BLD-RTO: 0 /100 WBCS (ref 0–0)
PLATELET # BLD AUTO: 293 X10*3/UL (ref 150–450)
POLYCHROMASIA BLD QL SMEAR: NORMAL
POTASSIUM SERPL-SCNC: 3.8 MMOL/L (ref 3.5–5.3)
PROT SERPL-MCNC: 7.7 G/DL (ref 6.4–8.2)
RBC # BLD AUTO: 4.54 X10*6/UL (ref 4–5.2)
RBC MORPH BLD: NORMAL
RETICS #: 0.09 X10*6/UL (ref 0.02–0.08)
RETICS/RBC NFR AUTO: 1.9 % (ref 0.5–2)
SCHISTOCYTES BLD QL SMEAR: NORMAL
SODIUM SERPL-SCNC: 134 MMOL/L (ref 136–145)
STOMATOCYTES BLD QL SMEAR: NORMAL
TARGETS BLD QL SMEAR: NORMAL
WBC # BLD AUTO: 14.2 X10*3/UL (ref 4.4–11.3)

## 2024-09-12 PROCEDURE — 96375 TX/PRO/DX INJ NEW DRUG ADDON: CPT

## 2024-09-12 PROCEDURE — 71045 X-RAY EXAM CHEST 1 VIEW: CPT | Performed by: STUDENT IN AN ORGANIZED HEALTH CARE EDUCATION/TRAINING PROGRAM

## 2024-09-12 PROCEDURE — 99284 EMERGENCY DEPT VISIT MOD MDM: CPT | Mod: 25

## 2024-09-12 PROCEDURE — 85045 AUTOMATED RETICULOCYTE COUNT: CPT | Performed by: FAMILY MEDICINE

## 2024-09-12 PROCEDURE — 96361 HYDRATE IV INFUSION ADD-ON: CPT

## 2024-09-12 PROCEDURE — 80053 COMPREHEN METABOLIC PANEL: CPT | Performed by: FAMILY MEDICINE

## 2024-09-12 PROCEDURE — 93005 ELECTROCARDIOGRAM TRACING: CPT

## 2024-09-12 PROCEDURE — 96376 TX/PRO/DX INJ SAME DRUG ADON: CPT

## 2024-09-12 PROCEDURE — 71045 X-RAY EXAM CHEST 1 VIEW: CPT

## 2024-09-12 PROCEDURE — 36415 COLL VENOUS BLD VENIPUNCTURE: CPT | Performed by: FAMILY MEDICINE

## 2024-09-12 PROCEDURE — 96374 THER/PROPH/DIAG INJ IV PUSH: CPT

## 2024-09-12 PROCEDURE — 2500000004 HC RX 250 GENERAL PHARMACY W/ HCPCS (ALT 636 FOR OP/ED): Mod: SE | Performed by: FAMILY MEDICINE

## 2024-09-12 PROCEDURE — 85025 COMPLETE CBC W/AUTO DIFF WBC: CPT | Performed by: FAMILY MEDICINE

## 2024-09-12 RX ORDER — DEXTROSE MONOHYDRATE AND SODIUM CHLORIDE 5; .45 G/100ML; G/100ML
325 INJECTION, SOLUTION INTRAVENOUS CONTINUOUS
Status: DISCONTINUED | OUTPATIENT
Start: 2024-09-12 | End: 2024-09-13 | Stop reason: HOSPADM

## 2024-09-12 RX ORDER — HYDROMORPHONE HYDROCHLORIDE 1 MG/ML
2 INJECTION, SOLUTION INTRAMUSCULAR; INTRAVENOUS; SUBCUTANEOUS ONCE
Status: COMPLETED | OUTPATIENT
Start: 2024-09-12 | End: 2024-09-12

## 2024-09-12 RX ORDER — HEPARIN 100 UNIT/ML
5 SYRINGE INTRAVENOUS ONCE
Status: COMPLETED | OUTPATIENT
Start: 2024-09-12 | End: 2024-09-12

## 2024-09-12 RX ADMIN — HYDROMORPHONE HYDROCHLORIDE 2 MG: 1 INJECTION, SOLUTION INTRAMUSCULAR; INTRAVENOUS; SUBCUTANEOUS at 20:38

## 2024-09-12 RX ADMIN — HYDROMORPHONE HYDROCHLORIDE 2 MG: 1 INJECTION, SOLUTION INTRAMUSCULAR; INTRAVENOUS; SUBCUTANEOUS at 21:39

## 2024-09-12 RX ADMIN — HEPARIN 500 UNITS: 100 SYRINGE at 22:23

## 2024-09-12 RX ADMIN — DEXTROSE AND SODIUM CHLORIDE 325 ML/HR: 5; 450 INJECTION, SOLUTION INTRAVENOUS at 20:40

## 2024-09-12 ASSESSMENT — PAIN SCALES - GENERAL: PAINLEVEL_OUTOF10: 9

## 2024-09-12 ASSESSMENT — PAIN - FUNCTIONAL ASSESSMENT: PAIN_FUNCTIONAL_ASSESSMENT: 0-10

## 2024-09-13 LAB
ATRIAL RATE: 79 BPM
P AXIS: 41 DEGREES
P OFFSET: 199 MS
P ONSET: 146 MS
PR INTERVAL: 140 MS
Q ONSET: 216 MS
QRS COUNT: 13 BEATS
QRS DURATION: 88 MS
QT INTERVAL: 382 MS
QTC CALCULATION(BAZETT): 438 MS
QTC FREDERICIA: 418 MS
R AXIS: 29 DEGREES
T AXIS: 2 DEGREES
T OFFSET: 407 MS
VENTRICULAR RATE: 79 BPM

## 2024-09-13 ASSESSMENT — HEART SCORE
TROPONIN: LESS THAN OR EQUAL TO NORMAL LIMIT
AGE: <45
HISTORY: SLIGHTLY SUSPICIOUS
ECG: NORMAL
RISK FACTORS: NO KNOWN RISK FACTORS
HEART SCORE: 0

## 2024-09-13 NOTE — ED PROVIDER NOTES
HPI   Chief Complaint   Patient presents with    Sickle Cell Pain Crisis       29-year-old female with history of sickle cell disease comes to the ED with complaint of sickle cell pain crisis which began about a few weeks ago and has continued to be intermittent every several days.  Patient states that this feels today's event is typical of her sickle cell pain, and that the pain is in her chest, back, and legs.  Patient reports that she feels it was aggravated today by doing a lot of work and being out in the heat.  Patient reports he tried taking several medications at home but they were not enough to keep her pain in check.  Patient in the ED is alert, cooperative, appears uncomfortable, but in no distress.  Patient currently denies headache, blurred vision, neck pain, abdominal pain, shortness of breath, fevers, falls, recent travel, sick track, change in diet, nausea/vomiting/diarrhea, loss sensation, numbness/tingling, ataxia, dizziness, and weakness.      History provided by:  Patient, medical records and relative   used: No            Patient History   Past Medical History:   Diagnosis Date    Sickle cell anemia with crisis (Multi)      Past Surgical History:   Procedure Laterality Date    CT ABDOMEN PELVIS ANGIOGRAM W AND/OR WO IV CONTRAST  4/9/2020    CT ABDOMEN PELVIS ANGIOGRAM W AND/OR WO IV CONTRAST 4/9/2020 [facility]    CT ANGIO NECK  9/27/2019    CT NECK ANGIO W AND WO IV CONTRAST 9/27/2019 [facility]    IR CVC TUNNELED  8/24/2020    IR CVC TUNNELED 8/24/2020 [facility] CL[facility]ICAL LEGACY    OTHER SURGICAL HISTORY  09/22/2020    Venous access port placement    OTHER SURGICAL HISTORY  09/22/2020    Tonsillectomy with adenoidectomy    OTHER SURGICAL HISTORY  09/22/2020    Knee surgery     No family history on file.  Social History     Tobacco Use    Smoking status: Never    Smokeless tobacco: Never   Vaping Use    Vaping status: Never Used   Substance Use Topics     Alcohol use: Never    Drug use: Never       Physical Exam   ED Triage Vitals [09/12/24 2010]   Temperature Heart Rate Respirations BP   36.6 °C (97.9 °F) 95 16 123/80      SpO2 Temp src Heart Rate Source Patient Position   99 % -- -- --      BP Location FiO2 (%)     -- --       Physical Exam  Vitals and nursing note reviewed.   Constitutional:       General: She is not in acute distress.     Appearance: She is well-developed.   HENT:      Head: Normocephalic and atraumatic.   Eyes:      Conjunctiva/sclera: Conjunctivae normal.   Cardiovascular:      Rate and Rhythm: Normal rate and regular rhythm.      Heart sounds: No murmur heard.  Pulmonary:      Effort: Pulmonary effort is normal. No respiratory distress.      Breath sounds: Normal breath sounds.   Abdominal:      Palpations: Abdomen is soft.      Tenderness: There is no abdominal tenderness.   Musculoskeletal:         General: No swelling.      Cervical back: Neck supple.   Skin:     General: Skin is warm and dry.      Capillary Refill: Capillary refill takes less than 2 seconds.   Neurological:      Mental Status: She is alert.   Psychiatric:         Mood and Affect: Mood normal.           ED Course & MDM   Diagnoses as of 09/12/24 2357   Sickle cell crisis (Multi)                 No data recorded     [place] Coma Scale Score: 15 (09/12/24 2121 : Tayler Ortiz, NELSON)                       XR chest 1 view   Final Result   No acute cardiopulmonary process.             MACRO:   None.        Signed by: Frandy Berumen 9/12/2024 8:51 PM   Dictation workstation:   [id number]        Labs Reviewed   CBC WITH AUTO DIFFERENTIAL - Abnormal       Result Value    WBC 14.2 (*)     nRBC 0.0      RBC 4.54      Hemoglobin 10.0 (*)     Hematocrit 29.0 (*)     MCV 64 (*)     MCH 22.0 (*)     MCHC 34.5      RDW 18.1 (*)     Platelets 293      Neutrophils % 53.9      Immature Granulocytes %, Automated 0.4      Lymphocytes % 37.9      Monocytes % 6.0      Eosinophils % 1.6       Basophils % 0.2      Neutrophils Absolute 7.68      Immature Granulocytes Absolute, Automated 0.05      Lymphocytes Absolute 5.39 (*)     Monocytes Absolute 0.86      Eosinophils Absolute 0.23      Basophils Absolute 0.03     COMPREHENSIVE METABOLIC PANEL - Abnormal    Glucose 108 (*)     Sodium 134 (*)     Potassium 3.8      Chloride 103      Bicarbonate 27      Anion Gap 8 (*)     Urea Nitrogen 14      Creatinine 0.94      eGFR 84      Calcium 9.1      Albumin 3.6      Alkaline Phosphatase 84      Total Protein 7.7      AST 11      Bilirubin, Total 1.3 (*)     ALT 6 (*)    RETICULOCYTES - Abnormal    Retic % 1.9      Retic Absolute 0.085 (*)     Reticulocyte Hemoglobin 25 (*)     Immature Retic fraction 22.1 (*)    GRAY TOP    Extra Tube Hold for add-ons.     MORPHOLOGY    RBC Morphology See Below      Polychromasia Mild      Hypochromia Mild      RBC Fragments Few      Target Cells Many      Stomatocytes Few       2039 -- NSR rate 92.  Normal axis.  Normal intervals.  Nonspecific ST-T wave changes with no findings of STEMI. Unchanged compared to old EKG      Medical Decision Making  Pt upon arrival to the ED appeared to be anxious and uncomfortable but in no distress with stable vitals.  Discussed with pt the presenting complaints and clinically findings.  Reviewed with pt the epic chart and counseled the patient on sickle cell crisis and appropriate approach to management/treatments.  After assessment and evaluation IV line was started, IV fluids given, labs sent, imaging ordered, EKG reviewed, given IV Dilaudid for pain per care path, placed on cardiac monitor, and observed.  Therapy continued to persist in the ER for several hours per care path and patient steadily showed improvement in her pain and had no new physical complaints.  Patient tolerating her diet with no difficulties and vital signs continue be appropriate.  Final results were reviewed discussed with patient and findings appear to be consistent  with her baseline.  Patient was encouraged at this time to contact her doctors managing her sickle cell disease and maintain upcoming follow-ups with them for continued management.  Patient also educated on her home medications for management of her pain and patient discharged home.    Amount and/or Complexity of Data Reviewed  External Data Reviewed: labs, radiology, ECG and notes.  Labs: ordered. Decision-making details documented in ED Course.  Radiology: ordered. Decision-making details documented in ED Course.  ECG/medicine tests: ordered and independent interpretation performed. Decision-making details documented in ED Course.    Risk  OTC drugs.  Prescription drug management.        Procedure  Procedures     iJmmy Tsai MD  09/13/24 0006

## 2024-09-15 ENCOUNTER — HOSPITAL ENCOUNTER (INPATIENT)
Facility: HOSPITAL | Age: 29
LOS: 4 days | Discharge: HOME | DRG: 812 | End: 2024-09-21
Attending: EMERGENCY MEDICINE | Admitting: INTERNAL MEDICINE
Payer: MEDICARE

## 2024-09-15 ENCOUNTER — APPOINTMENT (OUTPATIENT)
Dept: RADIOLOGY | Facility: HOSPITAL | Age: 29
DRG: 812 | End: 2024-09-15
Payer: MEDICARE

## 2024-09-15 ENCOUNTER — APPOINTMENT (OUTPATIENT)
Dept: CARDIOLOGY | Facility: HOSPITAL | Age: 29
DRG: 812 | End: 2024-09-15
Payer: MEDICARE

## 2024-09-15 DIAGNOSIS — K81.0 ACUTE CHOLECYSTITIS: ICD-10-CM

## 2024-09-15 DIAGNOSIS — D57.00 SICKLE CELL ANEMIA WITH CRISIS (MULTI): ICD-10-CM

## 2024-09-15 DIAGNOSIS — D57.00 SICKLE CELL CRISIS (MULTI): Primary | ICD-10-CM

## 2024-09-15 LAB
ALBUMIN SERPL BCP-MCNC: 3.4 G/DL (ref 3.4–5)
ALP SERPL-CCNC: 84 U/L (ref 33–110)
ALT SERPL W P-5'-P-CCNC: 8 U/L (ref 7–45)
ANION GAP SERPL CALC-SCNC: 7 MMOL/L (ref 10–20)
APPEARANCE UR: CLEAR
AST SERPL W P-5'-P-CCNC: 13 U/L (ref 9–39)
ATRIAL RATE: 92 BPM
BASOPHILS # BLD AUTO: 0.03 X10*3/UL (ref 0–0.1)
BASOPHILS NFR BLD AUTO: 0.2 %
BILIRUB SERPL-MCNC: 1 MG/DL (ref 0–1.2)
BILIRUB UR STRIP.AUTO-MCNC: NEGATIVE MG/DL
BUN SERPL-MCNC: 10 MG/DL (ref 6–23)
CALCIUM SERPL-MCNC: 8.8 MG/DL (ref 8.6–10.3)
CARDIAC TROPONIN I PNL SERPL HS: 3 NG/L (ref 0–13)
CARDIAC TROPONIN I PNL SERPL HS: <3 NG/L (ref 0–13)
CHLORIDE SERPL-SCNC: 106 MMOL/L (ref 98–107)
CO2 SERPL-SCNC: 26 MMOL/L (ref 21–32)
COLOR UR: YELLOW
CREAT SERPL-MCNC: 0.77 MG/DL (ref 0.5–1.05)
EGFRCR SERPLBLD CKD-EPI 2021: >90 ML/MIN/1.73M*2
EOSINOPHIL # BLD AUTO: 0.28 X10*3/UL (ref 0–0.7)
EOSINOPHIL NFR BLD AUTO: 1.7 %
ERYTHROCYTE [DISTWIDTH] IN BLOOD BY AUTOMATED COUNT: 18.5 % (ref 11.5–14.5)
GLUCOSE SERPL-MCNC: 130 MG/DL (ref 74–99)
GLUCOSE UR STRIP.AUTO-MCNC: NEGATIVE MG/DL
HCT VFR BLD AUTO: 28.2 % (ref 36–46)
HGB BLD-MCNC: 9.6 G/DL (ref 12–16)
HGB RETIC QN: 26 PG (ref 28–38)
HOLD SPECIMEN: NORMAL
IMM GRANULOCYTES # BLD AUTO: 0.08 X10*3/UL (ref 0–0.7)
IMM GRANULOCYTES NFR BLD AUTO: 0.5 % (ref 0–0.9)
IMMATURE RETIC FRACTION: 26.4 %
KETONES UR STRIP.AUTO-MCNC: NEGATIVE MG/DL
LEUKOCYTE ESTERASE UR QL STRIP.AUTO: NEGATIVE
LYMPHOCYTES # BLD AUTO: 4.58 X10*3/UL (ref 1.2–4.8)
LYMPHOCYTES NFR BLD AUTO: 28.5 %
MCH RBC QN AUTO: 22.2 PG (ref 26–34)
MCHC RBC AUTO-ENTMCNC: 34 G/DL (ref 32–36)
MCV RBC AUTO: 65 FL (ref 80–100)
MONOCYTES # BLD AUTO: 0.82 X10*3/UL (ref 0.1–1)
MONOCYTES NFR BLD AUTO: 5.1 %
NEUTROPHILS # BLD AUTO: 10.28 X10*3/UL (ref 1.2–7.7)
NEUTROPHILS NFR BLD AUTO: 64 %
NITRITE UR QL STRIP.AUTO: NEGATIVE
NRBC BLD-RTO: 0 /100 WBCS (ref 0–0)
P AXIS: 51 DEGREES
P OFFSET: 198 MS
P ONSET: 147 MS
PH UR STRIP.AUTO: 5 [PH]
PLATELET # BLD AUTO: 287 X10*3/UL (ref 150–450)
POTASSIUM SERPL-SCNC: 3.5 MMOL/L (ref 3.5–5.3)
PR INTERVAL: 138 MS
PROT SERPL-MCNC: 7.2 G/DL (ref 6.4–8.2)
PROT UR STRIP.AUTO-MCNC: NEGATIVE MG/DL
Q ONSET: 216 MS
QRS COUNT: 16 BEATS
QRS DURATION: 88 MS
QT INTERVAL: 368 MS
QTC CALCULATION(BAZETT): 455 MS
QTC FREDERICIA: 424 MS
R AXIS: 50 DEGREES
RBC # BLD AUTO: 4.33 X10*6/UL (ref 4–5.2)
RBC # UR STRIP.AUTO: NEGATIVE /UL
RETICS #: 0.09 X10*6/UL (ref 0.02–0.08)
RETICS/RBC NFR AUTO: 2.1 % (ref 0.5–2)
SARS-COV-2 RNA RESP QL NAA+PROBE: NOT DETECTED
SODIUM SERPL-SCNC: 135 MMOL/L (ref 136–145)
SP GR UR STRIP.AUTO: 1.01
T AXIS: 16 DEGREES
T OFFSET: 400 MS
UROBILINOGEN UR STRIP.AUTO-MCNC: <2 MG/DL
VENTRICULAR RATE: 92 BPM
WBC # BLD AUTO: 16.1 X10*3/UL (ref 4.4–11.3)

## 2024-09-15 PROCEDURE — 99285 EMERGENCY DEPT VISIT HI MDM: CPT | Mod: 25

## 2024-09-15 PROCEDURE — 80053 COMPREHEN METABOLIC PANEL: CPT | Performed by: EMERGENCY MEDICINE

## 2024-09-15 PROCEDURE — 71046 X-RAY EXAM CHEST 2 VIEWS: CPT | Mod: FOREIGN READ | Performed by: RADIOLOGY

## 2024-09-15 PROCEDURE — 84484 ASSAY OF TROPONIN QUANT: CPT | Performed by: EMERGENCY MEDICINE

## 2024-09-15 PROCEDURE — 85045 AUTOMATED RETICULOCYTE COUNT: CPT | Performed by: EMERGENCY MEDICINE

## 2024-09-15 PROCEDURE — 87635 SARS-COV-2 COVID-19 AMP PRB: CPT | Performed by: EMERGENCY MEDICINE

## 2024-09-15 PROCEDURE — 96374 THER/PROPH/DIAG INJ IV PUSH: CPT

## 2024-09-15 PROCEDURE — 2500000004 HC RX 250 GENERAL PHARMACY W/ HCPCS (ALT 636 FOR OP/ED): Mod: SE | Performed by: EMERGENCY MEDICINE

## 2024-09-15 PROCEDURE — 93005 ELECTROCARDIOGRAM TRACING: CPT

## 2024-09-15 PROCEDURE — 82746 ASSAY OF FOLIC ACID SERUM: CPT | Mod: CONLAB | Performed by: INTERNAL MEDICINE

## 2024-09-15 PROCEDURE — 96361 HYDRATE IV INFUSION ADD-ON: CPT

## 2024-09-15 PROCEDURE — 85025 COMPLETE CBC W/AUTO DIFF WBC: CPT | Performed by: EMERGENCY MEDICINE

## 2024-09-15 PROCEDURE — 96376 TX/PRO/DX INJ SAME DRUG ADON: CPT

## 2024-09-15 PROCEDURE — 81003 URINALYSIS AUTO W/O SCOPE: CPT | Performed by: EMERGENCY MEDICINE

## 2024-09-15 PROCEDURE — 82607 VITAMIN B-12: CPT | Mod: CONLAB | Performed by: INTERNAL MEDICINE

## 2024-09-15 PROCEDURE — 71046 X-RAY EXAM CHEST 2 VIEWS: CPT

## 2024-09-15 PROCEDURE — 96375 TX/PRO/DX INJ NEW DRUG ADDON: CPT

## 2024-09-15 RX ORDER — ONDANSETRON HYDROCHLORIDE 2 MG/ML
4 INJECTION, SOLUTION INTRAVENOUS ONCE
Status: COMPLETED | OUTPATIENT
Start: 2024-09-15 | End: 2024-09-15

## 2024-09-15 RX ORDER — HYDROMORPHONE HYDROCHLORIDE 1 MG/ML
2 INJECTION, SOLUTION INTRAMUSCULAR; INTRAVENOUS; SUBCUTANEOUS
Status: DISCONTINUED | OUTPATIENT
Start: 2024-09-15 | End: 2024-09-15

## 2024-09-15 RX ORDER — HYDROMORPHONE HYDROCHLORIDE 1 MG/ML
2 INJECTION, SOLUTION INTRAMUSCULAR; INTRAVENOUS; SUBCUTANEOUS
Status: DISCONTINUED | OUTPATIENT
Start: 2024-09-15 | End: 2024-09-16

## 2024-09-15 ASSESSMENT — PAIN SCALES - GENERAL
PAINLEVEL_OUTOF10: 0 - NO PAIN
PAINLEVEL_OUTOF10: 9
PAINLEVEL_OUTOF10: 10 - WORST POSSIBLE PAIN
PAINLEVEL_OUTOF10: 9
PAINLEVEL_OUTOF10: 7

## 2024-09-15 ASSESSMENT — PAIN DESCRIPTION - PAIN TYPE: TYPE: ACUTE PAIN

## 2024-09-15 ASSESSMENT — PAIN - FUNCTIONAL ASSESSMENT: PAIN_FUNCTIONAL_ASSESSMENT: 0-10

## 2024-09-16 ENCOUNTER — APPOINTMENT (OUTPATIENT)
Dept: RADIOLOGY | Facility: HOSPITAL | Age: 29
DRG: 812 | End: 2024-09-16
Payer: MEDICARE

## 2024-09-16 ENCOUNTER — APPOINTMENT (OUTPATIENT)
Dept: CARDIOLOGY | Facility: HOSPITAL | Age: 29
DRG: 812 | End: 2024-09-16
Payer: MEDICARE

## 2024-09-16 PROBLEM — D57.00 SICKLE CELL ANEMIA WITH CRISIS (MULTI): Status: ACTIVE | Noted: 2024-09-16

## 2024-09-16 LAB
ALBUMIN SERPL BCP-MCNC: 3.5 G/DL (ref 3.4–5)
ALP SERPL-CCNC: 104 U/L (ref 33–110)
ALT SERPL W P-5'-P-CCNC: 9 U/L (ref 7–45)
ANION GAP SERPL CALC-SCNC: 7 MMOL/L (ref 10–20)
AST SERPL W P-5'-P-CCNC: 17 U/L (ref 9–39)
BILIRUB SERPL-MCNC: 1.1 MG/DL (ref 0–1.2)
BUN SERPL-MCNC: 10 MG/DL (ref 6–23)
CALCIUM SERPL-MCNC: 8.5 MG/DL (ref 8.6–10.3)
CARDIAC TROPONIN I PNL SERPL HS: <3 NG/L (ref 0–13)
CHLORIDE SERPL-SCNC: 107 MMOL/L (ref 98–107)
CO2 SERPL-SCNC: 27 MMOL/L (ref 21–32)
CREAT SERPL-MCNC: 0.71 MG/DL (ref 0.5–1.05)
D DIMER PPP FEU-MCNC: 711 NG/ML FEU
EGFRCR SERPLBLD CKD-EPI 2021: >90 ML/MIN/1.73M*2
ERYTHROCYTE [DISTWIDTH] IN BLOOD BY AUTOMATED COUNT: 18.5 % (ref 11.5–14.5)
GLUCOSE SERPL-MCNC: 107 MG/DL (ref 74–99)
HCT VFR BLD AUTO: 27.2 % (ref 36–46)
HGB BLD-MCNC: 9.3 G/DL (ref 12–16)
MCH RBC QN AUTO: 22 PG (ref 26–34)
MCHC RBC AUTO-ENTMCNC: 34.2 G/DL (ref 32–36)
MCV RBC AUTO: 64 FL (ref 80–100)
NRBC BLD-RTO: 0 /100 WBCS (ref 0–0)
PLATELET # BLD AUTO: 270 X10*3/UL (ref 150–450)
POTASSIUM SERPL-SCNC: 3.8 MMOL/L (ref 3.5–5.3)
PROT SERPL-MCNC: 7.2 G/DL (ref 6.4–8.2)
RBC # BLD AUTO: 4.23 X10*6/UL (ref 4–5.2)
SODIUM SERPL-SCNC: 137 MMOL/L (ref 136–145)
WBC # BLD AUTO: 13.9 X10*3/UL (ref 4.4–11.3)

## 2024-09-16 PROCEDURE — 71275 CT ANGIOGRAPHY CHEST: CPT | Performed by: RADIOLOGY

## 2024-09-16 PROCEDURE — 71275 CT ANGIOGRAPHY CHEST: CPT

## 2024-09-16 PROCEDURE — 96376 TX/PRO/DX INJ SAME DRUG ADON: CPT | Mod: 59

## 2024-09-16 PROCEDURE — 85379 FIBRIN DEGRADATION QUANT: CPT | Performed by: NURSE PRACTITIONER

## 2024-09-16 PROCEDURE — 80053 COMPREHEN METABOLIC PANEL: CPT | Performed by: NURSE PRACTITIONER

## 2024-09-16 PROCEDURE — 94760 N-INVAS EAR/PLS OXIMETRY 1: CPT

## 2024-09-16 PROCEDURE — 2500000004 HC RX 250 GENERAL PHARMACY W/ HCPCS (ALT 636 FOR OP/ED): Mod: SE | Performed by: EMERGENCY MEDICINE

## 2024-09-16 PROCEDURE — 93005 ELECTROCARDIOGRAM TRACING: CPT

## 2024-09-16 PROCEDURE — 96361 HYDRATE IV INFUSION ADD-ON: CPT

## 2024-09-16 PROCEDURE — 2500000001 HC RX 250 WO HCPCS SELF ADMINISTERED DRUGS (ALT 637 FOR MEDICARE OP): Mod: SE | Performed by: NURSE PRACTITIONER

## 2024-09-16 PROCEDURE — G0378 HOSPITAL OBSERVATION PER HR: HCPCS

## 2024-09-16 PROCEDURE — 99222 1ST HOSP IP/OBS MODERATE 55: CPT | Performed by: NURSE PRACTITIONER

## 2024-09-16 PROCEDURE — 84484 ASSAY OF TROPONIN QUANT: CPT | Performed by: NURSE PRACTITIONER

## 2024-09-16 PROCEDURE — 2500000002 HC RX 250 W HCPCS SELF ADMINISTERED DRUGS (ALT 637 FOR MEDICARE OP, ALT 636 FOR OP/ED): Mod: SE | Performed by: STUDENT IN AN ORGANIZED HEALTH CARE EDUCATION/TRAINING PROGRAM

## 2024-09-16 PROCEDURE — 85027 COMPLETE CBC AUTOMATED: CPT | Performed by: NURSE PRACTITIONER

## 2024-09-16 PROCEDURE — 2500000004 HC RX 250 GENERAL PHARMACY W/ HCPCS (ALT 636 FOR OP/ED): Mod: SE | Performed by: NURSE PRACTITIONER

## 2024-09-16 PROCEDURE — 94640 AIRWAY INHALATION TREATMENT: CPT

## 2024-09-16 PROCEDURE — 2500000001 HC RX 250 WO HCPCS SELF ADMINISTERED DRUGS (ALT 637 FOR MEDICARE OP): Mod: SE | Performed by: STUDENT IN AN ORGANIZED HEALTH CARE EDUCATION/TRAINING PROGRAM

## 2024-09-16 PROCEDURE — 2500000004 HC RX 250 GENERAL PHARMACY W/ HCPCS (ALT 636 FOR OP/ED): Mod: SE | Performed by: STUDENT IN AN ORGANIZED HEALTH CARE EDUCATION/TRAINING PROGRAM

## 2024-09-16 PROCEDURE — 94664 DEMO&/EVAL PT USE INHALER: CPT | Mod: 59

## 2024-09-16 PROCEDURE — 2550000001 HC RX 255 CONTRASTS: Mod: SE | Performed by: STUDENT IN AN ORGANIZED HEALTH CARE EDUCATION/TRAINING PROGRAM

## 2024-09-16 PROCEDURE — 9420000001 HC RT PATIENT EDUCATION 5 MIN

## 2024-09-16 RX ORDER — ONDANSETRON HYDROCHLORIDE 2 MG/ML
4 INJECTION, SOLUTION INTRAVENOUS EVERY 8 HOURS PRN
Status: DISCONTINUED | OUTPATIENT
Start: 2024-09-16 | End: 2024-09-21 | Stop reason: HOSPADM

## 2024-09-16 RX ORDER — ALBUTEROL SULFATE 0.83 MG/ML
2.5 SOLUTION RESPIRATORY (INHALATION) EVERY 2 HOUR PRN
Status: DISCONTINUED | OUTPATIENT
Start: 2024-09-16 | End: 2024-09-21 | Stop reason: HOSPADM

## 2024-09-16 RX ORDER — KETOROLAC TROMETHAMINE 30 MG/ML
30 INJECTION, SOLUTION INTRAMUSCULAR; INTRAVENOUS EVERY 6 HOURS PRN
Status: DISCONTINUED | OUTPATIENT
Start: 2024-09-16 | End: 2024-09-16

## 2024-09-16 RX ORDER — DIPHENHYDRAMINE HCL 25 MG
25 CAPSULE ORAL EVERY 6 HOURS PRN
Status: DISCONTINUED | OUTPATIENT
Start: 2024-09-16 | End: 2024-09-21 | Stop reason: HOSPADM

## 2024-09-16 RX ORDER — FOLIC ACID 1 MG/1
1 TABLET ORAL DAILY
Status: DISCONTINUED | OUTPATIENT
Start: 2024-09-16 | End: 2024-09-21 | Stop reason: HOSPADM

## 2024-09-16 RX ORDER — COLCHICINE 0.6 MG/1
0.6 TABLET ORAL ONCE
Status: DISCONTINUED | OUTPATIENT
Start: 2024-09-16 | End: 2024-09-17

## 2024-09-16 RX ORDER — SODIUM CHLORIDE 9 MG/ML
150 INJECTION, SOLUTION INTRAVENOUS CONTINUOUS
Status: DISCONTINUED | OUTPATIENT
Start: 2024-09-16 | End: 2024-09-20

## 2024-09-16 RX ORDER — FERROUS SULFATE 325(65) MG
65 TABLET ORAL
Status: DISCONTINUED | OUTPATIENT
Start: 2024-09-16 | End: 2024-09-21 | Stop reason: HOSPADM

## 2024-09-16 RX ORDER — DOCUSATE SODIUM 100 MG/1
100 CAPSULE, LIQUID FILLED ORAL 2 TIMES DAILY
Status: DISCONTINUED | OUTPATIENT
Start: 2024-09-16 | End: 2024-09-21 | Stop reason: HOSPADM

## 2024-09-16 RX ORDER — DULOXETIN HYDROCHLORIDE 30 MG/1
60 CAPSULE, DELAYED RELEASE ORAL DAILY
Status: DISCONTINUED | OUTPATIENT
Start: 2024-09-16 | End: 2024-09-21 | Stop reason: HOSPADM

## 2024-09-16 RX ORDER — PREGABALIN 50 MG/1
50 CAPSULE ORAL NIGHTLY
Status: DISCONTINUED | OUTPATIENT
Start: 2024-09-16 | End: 2024-09-21 | Stop reason: HOSPADM

## 2024-09-16 RX ORDER — ENOXAPARIN SODIUM 100 MG/ML
60 INJECTION SUBCUTANEOUS EVERY 12 HOURS SCHEDULED
Status: DISCONTINUED | OUTPATIENT
Start: 2024-09-16 | End: 2024-09-16

## 2024-09-16 RX ORDER — HYDROMORPHONE HYDROCHLORIDE 1 MG/ML
2 INJECTION, SOLUTION INTRAMUSCULAR; INTRAVENOUS; SUBCUTANEOUS
Status: DISCONTINUED | OUTPATIENT
Start: 2024-09-16 | End: 2024-09-16

## 2024-09-16 RX ORDER — MORPHINE SULFATE 4 MG/ML
4 INJECTION INTRAVENOUS EVERY 4 HOURS PRN
Status: DISCONTINUED | OUTPATIENT
Start: 2024-09-16 | End: 2024-09-16

## 2024-09-16 RX ORDER — HYDROMORPHONE HYDROCHLORIDE 1 MG/ML
INJECTION, SOLUTION INTRAMUSCULAR; INTRAVENOUS; SUBCUTANEOUS
Status: COMPLETED
Start: 2024-09-16 | End: 2024-09-16

## 2024-09-16 RX ORDER — ACETAMINOPHEN 325 MG/1
650 TABLET ORAL EVERY 4 HOURS PRN
Status: DISCONTINUED | OUTPATIENT
Start: 2024-09-16 | End: 2024-09-21 | Stop reason: HOSPADM

## 2024-09-16 RX ORDER — ALBUTEROL SULFATE 0.83 MG/ML
2.5 SOLUTION RESPIRATORY (INHALATION) EVERY 4 HOURS PRN
Status: DISCONTINUED | OUTPATIENT
Start: 2024-09-16 | End: 2024-09-16

## 2024-09-16 SDOH — SOCIAL STABILITY: SOCIAL INSECURITY: HAVE YOU HAD THOUGHTS OF HARMING ANYONE ELSE?: NO

## 2024-09-16 SDOH — SOCIAL STABILITY: SOCIAL INSECURITY: HAS ANYONE EVER THREATENED TO HURT YOUR FAMILY OR YOUR PETS?: NO

## 2024-09-16 SDOH — SOCIAL STABILITY: SOCIAL INSECURITY: ARE YOU OR HAVE YOU BEEN THREATENED OR ABUSED PHYSICALLY, EMOTIONALLY, OR SEXUALLY BY ANYONE?: NO

## 2024-09-16 SDOH — SOCIAL STABILITY: SOCIAL INSECURITY: WERE YOU ABLE TO COMPLETE ALL THE BEHAVIORAL HEALTH SCREENINGS?: YES

## 2024-09-16 SDOH — SOCIAL STABILITY: SOCIAL INSECURITY: DO YOU FEEL ANYONE HAS EXPLOITED OR TAKEN ADVANTAGE OF YOU FINANCIALLY OR OF YOUR PERSONAL PROPERTY?: NO

## 2024-09-16 SDOH — SOCIAL STABILITY: SOCIAL INSECURITY: HAVE YOU HAD ANY THOUGHTS OF HARMING ANYONE ELSE?: NO

## 2024-09-16 SDOH — SOCIAL STABILITY: SOCIAL INSECURITY: ARE THERE ANY APPARENT SIGNS OF INJURIES/BEHAVIORS THAT COULD BE RELATED TO ABUSE/NEGLECT?: NO

## 2024-09-16 SDOH — SOCIAL STABILITY: SOCIAL INSECURITY: ABUSE: ADULT

## 2024-09-16 SDOH — SOCIAL STABILITY: SOCIAL INSECURITY: DO YOU FEEL UNSAFE GOING BACK TO THE PLACE WHERE YOU ARE LIVING?: NO

## 2024-09-16 SDOH — SOCIAL STABILITY: SOCIAL INSECURITY: DOES ANYONE TRY TO KEEP YOU FROM HAVING/CONTACTING OTHER FRIENDS OR DOING THINGS OUTSIDE YOUR HOME?: NO

## 2024-09-16 ASSESSMENT — COGNITIVE AND FUNCTIONAL STATUS - GENERAL
DAILY ACTIVITIY SCORE: 24
PATIENT BASELINE BEDBOUND: NO
DAILY ACTIVITIY SCORE: 24
MOBILITY SCORE: 24
MOBILITY SCORE: 24

## 2024-09-16 ASSESSMENT — PAIN - FUNCTIONAL ASSESSMENT
PAIN_FUNCTIONAL_ASSESSMENT: 0-10

## 2024-09-16 ASSESSMENT — PAIN DESCRIPTION - LOCATION: LOCATION: OTHER (COMMENT)

## 2024-09-16 ASSESSMENT — PAIN SCALES - GENERAL
PAINLEVEL_OUTOF10: 9
PAINLEVEL_OUTOF10: 8
PAINLEVEL_OUTOF10: 9
PAINLEVEL_OUTOF10: 8
PAINLEVEL_OUTOF10: 7
PAINLEVEL_OUTOF10: 8
PAINLEVEL_OUTOF10: 7
PAINLEVEL_OUTOF10: 8

## 2024-09-16 ASSESSMENT — LIFESTYLE VARIABLES
AUDIT-C TOTAL SCORE: 2
HOW OFTEN DO YOU HAVE 6 OR MORE DRINKS ON ONE OCCASION: LESS THAN MONTHLY
HOW OFTEN DO YOU HAVE A DRINK CONTAINING ALCOHOL: MONTHLY OR LESS
AUDIT-C TOTAL SCORE: 2
HOW MANY STANDARD DRINKS CONTAINING ALCOHOL DO YOU HAVE ON A TYPICAL DAY: PATIENT DOES NOT DRINK
SKIP TO QUESTIONS 9-10: 0

## 2024-09-16 ASSESSMENT — ACTIVITIES OF DAILY LIVING (ADL)
HEARING - LEFT EAR: FUNCTIONAL
FEEDING YOURSELF: INDEPENDENT
LACK_OF_TRANSPORTATION: NO
BATHING: INDEPENDENT
HEARING - RIGHT EAR: FUNCTIONAL
PATIENT'S MEMORY ADEQUATE TO SAFELY COMPLETE DAILY ACTIVITIES?: YES
WALKS IN HOME: INDEPENDENT
GROOMING: INDEPENDENT
DRESSING YOURSELF: INDEPENDENT
TOILETING: INDEPENDENT
ADEQUATE_TO_COMPLETE_ADL: YES
JUDGMENT_ADEQUATE_SAFELY_COMPLETE_DAILY_ACTIVITIES: YES

## 2024-09-16 ASSESSMENT — PATIENT HEALTH QUESTIONNAIRE - PHQ9
2. FEELING DOWN, DEPRESSED OR HOPELESS: NOT AT ALL
SUM OF ALL RESPONSES TO PHQ9 QUESTIONS 1 & 2: 0
1. LITTLE INTEREST OR PLEASURE IN DOING THINGS: NOT AT ALL

## 2024-09-16 NOTE — PROGRESS NOTES
09/16/24 1103   Discharge Planning   Living Arrangements Spouse/significant other   Support Systems Spouse/significant other   Assistance Needed independent   Type of Residence Private residence   Expected Discharge Disposition Home     Pt with [facility], lives with her spouse and is independent with ADLS. She sees a specialty PCP for her sickle cell and is being treated here for sickle cell crisis. Pt is expected to discharge home with no additional needs when medically stable. ALONSO [place]

## 2024-09-16 NOTE — NURSING NOTE
Patient is alert and oriented. Independent in room. On room air and tele. NSR-ST. Sickle cell crisis. Patient had complaint this afternoon for chest pain. EKG done, labs and chest CT. New orders in place. Chest pain subsided. States she from time to time has had the chest pain. IV fluids initiated and increased to 200ml/hr. Has power port to right chest. No other complaints or questions.    Patient's urinalysis is significant for a specific gravity of 1.010 but otherwise benign.  Urine output has been fair.  Will obtain additional urine studies; provide aggressive IV fluid hydration; monitor the urine output; recheck the renal function in the morning; and avoid nephrotoxins.  Etiology is likely poor oral intake.    Cr trending down, continue IVF

## 2024-09-16 NOTE — CARE PLAN
The patient's goals for the shift include      The clinical goals for the shift include patient will report <4 pain by end of this shift    Over the shift, the patient did not make progress toward the following goals. Barriers to progression include pain. Recommendations to address these barriers include pain medication, monitor, iv fluid therapy.

## 2024-09-16 NOTE — H&P
History of Present Illness  Taylor Hampton is a 29 y.o. female  with PMHx significant for sickle cell, PE, anxiety  who presented to [facility] due to onset of pain. The episodes of sickle cell crisis has increased this week and likely 2/2 the change in the weather. She is medicated with dilaudid for her pain and continue on IVF for now. Patient had a sudden onset of severe chest pain associated with tachycardia 120s that self resolved. D-dimer further elevated  since admission and thus CT chest ordered.     12 Point ROS negative unless noted in above HPI     Past Medical History  Past Medical History:   Diagnosis Date    Sickle cell anemia with crisis (Multi)        Surgical History  Past Surgical History:   Procedure Laterality Date    CT ABDOMEN PELVIS ANGIOGRAM W AND/OR WO IV CONTRAST  4/9/2020    CT ABDOMEN PELVIS ANGIOGRAM W AND/OR WO IV CONTRAST 4/9/2020 [facility]    CT ANGIO NECK  9/27/2019    CT NECK ANGIO W AND WO IV CONTRAST 9/27/2019 [facility]    IR CVC TUNNELED  8/24/2020    IR CVC TUNNELED 8/24/2020 [facility]    OTHER SURGICAL HISTORY  09/22/2020    Venous access port placement    OTHER SURGICAL HISTORY  09/22/2020    Tonsillectomy with adenoidectomy    OTHER SURGICAL HISTORY  09/22/2020    Knee surgery        Social History  She reports that she has never smoked. She has never used smokeless tobacco. She reports that she does not drink alcohol and does not use drugs.    Allergies  Crizanlizumab-tmca, Meperidine, Sulfamethoxazole-trimethoprim, Trimethoprim, Adhesive, Adhesive tape-silicones, Coconut, and Sulfamethoxazole     Physical Exam  Constitutional:       Appearance: She is obese. She is not ill-appearing.   HENT:      Head: Atraumatic.      Nose: Nose normal.      Mouth/Throat:      Mouth: Mucous membranes are moist.   Eyes:      Extraocular Movements: Extraocular movements intact.      Pupils: Pupils are equal, round, and reactive to light.   Cardiovascular:      " Rate and Rhythm: Normal rate and regular rhythm.   Pulmonary:      Effort: Pulmonary effort is normal.      Breath sounds: Normal breath sounds.   Abdominal:      General: Bowel sounds are normal.      Palpations: Abdomen is soft.   Musculoskeletal:         General: Normal range of motion.   Skin:     General: Skin is warm and dry.      Capillary Refill: Capillary refill takes less than 2 seconds.   Neurological:      General: No focal deficit present.      Mental Status: She is alert and oriented to person, place, and time.   Psychiatric:         Mood and Affect: Mood normal.         Behavior: Behavior normal.          Last Recorded Vitals  Blood pressure 138/73, pulse 84, temperature 36.6 °C (97.9 °F), temperature source Temporal, resp. rate 18, height 1.6 m (5' 2.99\"), weight 144 kg (316 lb 6.4 oz), SpO2 97%.    Relevant Results  Scheduled medications  colchicine, 0.6 mg, oral, Once  docusate sodium, 100 mg, oral, BID  DULoxetine, 60 mg, oral, Daily  ferrous sulfate (325 mg ferrous sulfate), 65 mg of iron, oral, Daily with breakfast  folic acid, 1 mg, oral, Daily  pregabalin, 50 mg, oral, Nightly  rivaroxaban, 20 mg, oral, Daily with evening meal      Continuous medications  sodium chloride 0.9%, 125 mL/hr, Last Rate: 125 mL/hr (09/16/24 1214)      PRN medications  PRN medications: acetaminophen, albuterol, HYDROmorphone, ondansetron     Results for orders placed or performed during the hospital encounter of 09/15/24 (from the past 24 hour(s))   ECG 12 lead   Result Value Ref Range    Ventricular Rate 98 BPM    Atrial Rate 98 BPM    HI Interval 140 ms    QRS Duration 96 ms    QT Interval 362 ms    QTC Calculation(Bazett) 462 ms    P Axis 41 degrees    R Axis 34 degrees    T Axis 22 degrees    QRS Count 16 beats    Q Onset 218 ms    P Onset 148 ms    P Offset 201 ms    T Offset 399 ms    QTC Fredericia 426 ms   Sars-CoV-2 PCR   Result Value Ref Range    Coronavirus 2019, PCR Not Detected Not Detected   CBC and " Auto Differential   Result Value Ref Range    WBC 16.1 (H) 4.4 - 11.3 x10*3/uL    nRBC 0.0 0.0 - 0.0 /100 WBCs    RBC 4.33 4.00 - 5.20 x10*6/uL    Hemoglobin 9.6 (L) 12.0 - 16.0 g/dL    Hematocrit 28.2 (L) 36.0 - 46.0 %    MCV 65 (L) 80 - 100 fL    MCH 22.2 (L) 26.0 - 34.0 pg    MCHC 34.0 32.0 - 36.0 g/dL    RDW 18.5 (H) 11.5 - 14.5 %    Platelets 287 150 - 450 x10*3/uL    Neutrophils % 64.0 40.0 - 80.0 %    Immature Granulocytes %, Automated 0.5 0.0 - 0.9 %    Lymphocytes % 28.5 13.0 - 44.0 %    Monocytes % 5.1 2.0 - 10.0 %    Eosinophils % 1.7 0.0 - 6.0 %    Basophils % 0.2 0.0 - 2.0 %    Neutrophils Absolute 10.28 (H) 1.20 - 7.70 x10*3/uL    Immature Granulocytes Absolute, Automated 0.08 0.00 - 0.70 x10*3/uL    Lymphocytes Absolute 4.58 1.20 - 4.80 x10*3/uL    Monocytes Absolute 0.82 0.10 - 1.00 x10*3/uL    Eosinophils Absolute 0.28 0.00 - 0.70 x10*3/uL    Basophils Absolute 0.03 0.00 - 0.10 x10*3/uL   Comprehensive metabolic panel   Result Value Ref Range    Glucose 130 (H) 74 - 99 mg/dL    Sodium 135 (L) 136 - 145 mmol/L    Potassium 3.5 3.5 - 5.3 mmol/L    Chloride 106 98 - 107 mmol/L    Bicarbonate 26 21 - 32 mmol/L    Anion Gap 7 (L) 10 - 20 mmol/L    Urea Nitrogen 10 6 - 23 mg/dL    Creatinine 0.77 0.50 - 1.05 mg/dL    eGFR >90 >60 mL/min/1.73m*2    Calcium 8.8 8.6 - 10.3 mg/dL    Albumin 3.4 3.4 - 5.0 g/dL    Alkaline Phosphatase 84 33 - 110 U/L    Total Protein 7.2 6.4 - 8.2 g/dL    AST 13 9 - 39 U/L    Bilirubin, Total 1.0 0.0 - 1.2 mg/dL    ALT 8 7 - 45 U/L   Reticulocytes   Result Value Ref Range    Retic % 2.1 (H) 0.5 - 2.0 %    Retic Absolute 0.090 (H) 0.018 - 0.083 x10*6/uL    Reticulocyte Hemoglobin 26 (L) 28 - 38 pg    Immature Retic fraction 26.4 (H) <=16.0 %   Troponin I, High Sensitivity, Initial   Result Value Ref Range    Troponin I, High Sensitivity 3 0 - 13 ng/L   Light Blue Top   Result Value Ref Range    Extra Tube Hold for add-ons.    Red Top   Result Value Ref Range    Extra Tube Hold  for add-ons.    Gray Top   Result Value Ref Range    Extra Tube Hold for add-ons.    Troponin, High Sensitivity, 1 Hour   Result Value Ref Range    Troponin I, High Sensitivity <3 0 - 13 ng/L   Urinalysis with Reflex Culture and Microscopic   Result Value Ref Range    Color, Urine Yellow Straw, Yellow    Appearance, Urine Clear Clear    Specific Gravity, Urine 1.011 1.005 - 1.035    pH, Urine 5.0 5.0, 5.5, 6.0, 6.5, 7.0, 7.5, 8.0    Protein, Urine NEGATIVE NEGATIVE mg/dL    Glucose, Urine NEGATIVE NEGATIVE mg/dL    Blood, Urine NEGATIVE NEGATIVE    Ketones, Urine NEGATIVE NEGATIVE mg/dL    Bilirubin, Urine NEGATIVE NEGATIVE    Urobilinogen, Urine <2.0 <2.0 mg/dL    Nitrite, Urine NEGATIVE NEGATIVE    Leukocyte Esterase, Urine NEGATIVE NEGATIVE   Electrocardiogram, 12-lead PRN ACS symptoms   Result Value Ref Range    Ventricular Rate 91 BPM    Atrial Rate 91 BPM    IN Interval 144 ms    QRS Duration 84 ms    QT Interval 378 ms    QTC Calculation(Bazett) 464 ms    P Axis 55 degrees    R Axis 33 degrees    T Axis 26 degrees    QRS Count 15 beats    Q Onset 224 ms    P Onset 152 ms    P Offset 206 ms    T Offset 413 ms    QTC Fredericia 434 ms   Troponin I, High Sensitivity   Result Value Ref Range    Troponin I, High Sensitivity <3 0 - 13 ng/L   CBC   Result Value Ref Range    WBC 13.9 (H) 4.4 - 11.3 x10*3/uL    nRBC 0.0 0.0 - 0.0 /100 WBCs    RBC 4.23 4.00 - 5.20 x10*6/uL    Hemoglobin 9.3 (L) 12.0 - 16.0 g/dL    Hematocrit 27.2 (L) 36.0 - 46.0 %    MCV 64 (L) 80 - 100 fL    MCH 22.0 (L) 26.0 - 34.0 pg    MCHC 34.2 32.0 - 36.0 g/dL    RDW 18.5 (H) 11.5 - 14.5 %    Platelets 270 150 - 450 x10*3/uL   Comprehensive metabolic panel   Result Value Ref Range    Glucose 107 (H) 74 - 99 mg/dL    Sodium 137 136 - 145 mmol/L    Potassium 3.8 3.5 - 5.3 mmol/L    Chloride 107 98 - 107 mmol/L    Bicarbonate 27 21 - 32 mmol/L    Anion Gap 7 (L) 10 - 20 mmol/L    Urea Nitrogen 10 6 - 23 mg/dL    Creatinine 0.71 0.50 - 1.05 mg/dL     eGFR >90 >60 mL/min/1.73m*2    Calcium 8.5 (L) 8.6 - 10.3 mg/dL    Albumin 3.5 3.4 - 5.0 g/dL    Alkaline Phosphatase 104 33 - 110 U/L    Total Protein 7.2 6.4 - 8.2 g/dL    AST 17 9 - 39 U/L    Bilirubin, Total 1.1 0.0 - 1.2 mg/dL    ALT 9 7 - 45 U/L   D-Dimer, VTE Exclusion   Result Value Ref Range    D-Dimer, Quantitative VTE Exclusion 711 (H) <=500 ng/mL FEU        Imaging  Electrocardiogram, 12-lead PRN ACS symptoms    Result Date: 9/16/2024  Normal sinus rhythm with sinus arrhythmia Normal ECG When compared with ECG of 15-SEP-2024 20:32, (unconfirmed) No significant change was found    ECG 12 lead    Result Date: 9/16/2024  Normal sinus rhythm Normal ECG When compared with ECG of 12-SEP-2024 20:39, No significant change was found    XR chest 2 views    Result Date: 9/15/2024  STUDY: Chest Radiographs;  9/15/2024 at 8:31 PM INDICATION: Chest pain. COMPARISON: XR chest 9/12/2024, XR chest 9/7/2024, XR chest 10/22/2023, XR chest 5/20/2023. ACCESSION NUMBER(S): [id number] ORDERING CLINICIAN: KEYSHAWN BORDEN TECHNIQUE:  Frontal and lateral chest. FINDINGS: CARDIOMEDIASTINAL SILHOUETTE: Cardiomediastinal silhouette is normal in size and configuration. Right internal jugular [id number] demonstrates tip at the cavoatrial junction.  There is minimal elevation right hemidiaphragm which appears stable.  LUNGS: Lungs are clear.  There is no pneumothorax or pleural effusion.  ABDOMEN: No remarkable upper abdominal findings.  BONES: No acute osseous changes.    No acute cardiopulmonary disease. Signed by Randall Burkett, DO      Assessment/Plan    #Sickle cell crisis  ~increased episodes over the past week  ~PE 6/12/24 on xarelto  ~dilaudid for pain  ~IVF  for increased hydration  ~sudden onset of chest pain with increasing d-dimer  ~CT angio chest~    Disposition: Admitted 2/2 sickle cell crisis.     I spent 60 minutes in the professional and overall care of this patient.      Tiana Kearney, APRN-CNP  Internal  Medicine

## 2024-09-16 NOTE — ED PROVIDER NOTES
HPI   Chief Complaint   Patient presents with    Sickle Cell Pain Crisis     Pt reports she is having a sickle cell flare up. Pt states all over body pain and states similar pain to when her sickle cell flares up. Pt states she has a care plan for her sickle cell pain.       29-year-old female with past medical history significant for sickle cell disease presents to the emergency department complaining of generalized pain, pain in her chest and shortness of breath.  Patient states she has been having the symptoms for the past 6 days.  She has been seen in the emergency department twice.  She was feeling better but now started feeling worse again.  She has been using warm compresses and taking her Dilaudid pills at home but is not improving.  Denies any cold symptoms, fevers or chills.  She has had a cough which is dry.  No other complaints.  Patient states she has a care plan.  Patient also states that she is followed by hematology at [facility] because her hematologist left from  and moved to [facility].  Denies any falls or injuries.  No other complaints.              Patient History   Past Medical History:   Diagnosis Date    Sickle cell anemia with crisis (Multi)      Past Surgical History:   Procedure Laterality Date    CT ABDOMEN PELVIS ANGIOGRAM W AND/OR WO IV CONTRAST  4/9/2020    CT ABDOMEN PELVIS ANGIOGRAM W AND/OR WO IV CONTRAST 4/9/2020 [facility]    CT ANGIO NECK  9/27/2019    CT NECK ANGIO W AND WO IV CONTRAST 9/27/2019 [facility]    IR CVC TUNNELED  8/24/2020    IR CVC TUNNELED 8/24/2020 [facility]ICAL LEGAC[facility]    OTHER SURGICAL HISTORY  09/22/2020    Venous access port placement    OTHER SURGICAL HISTORY  09/22/2020    Tonsillectomy with adenoidectomy    OTHER SURGICAL HISTORY  09/22/2020    Knee surgery     No family history on file.  Social History     Tobacco Use    Smoking status: Never    Smokeless tobacco: Never   Vaping Use    Vaping status: Never Used    Substance Use Topics    Alcohol use: Never    Drug use: Never       Physical Exam   ED Triage Vitals [09/15/24 2008]   Temperature Heart Rate Respirations BP   36.6 °C (97.8 °F) (!) 105 16 (!) 153/91      SpO2 Temp Source Heart Rate Source Patient Position   95 % Temporal Monitor --      BP Location FiO2 (%)     -- --       Physical Exam  Constitutional:       Appearance: Normal appearance.   HENT:      Head: Normocephalic and atraumatic.      Mouth/Throat:      Mouth: Mucous membranes are moist.   Eyes:      Extraocular Movements: Extraocular movements intact.      Pupils: Pupils are equal, round, and reactive to light.   Cardiovascular:      Rate and Rhythm: Normal rate and regular rhythm.   Pulmonary:      Effort: Pulmonary effort is normal.      Breath sounds: Normal breath sounds.   Abdominal:      Palpations: Abdomen is soft.      Tenderness: There is no abdominal tenderness.   Musculoskeletal:         General: Normal range of motion.   Skin:     General: Skin is warm and dry.   Neurological:      Mental Status: She is alert and oriented to person, place, and time.   Psychiatric:         Behavior: Behavior normal.           ED Course & MDM   ED Course as of 09/16/24 0404   Sun Sep 15, 2024   2037 EKG done at 2032 which I reviewed and independently interpreted reveals a normal sinus rhythm at a rate of 98 nonischemic with normal intervals, normal axis, normal QT QTc, no STEMI.  Normal EKG [RM]      ED Course User Index  [RM] Ascencion Greenwood MD         Diagnoses as of 09/16/24 0404   Sickle cell crisis (Multi)               Labs Reviewed   CBC WITH AUTO DIFFERENTIAL - Abnormal       Result Value    WBC 16.1 (*)     nRBC 0.0      RBC 4.33      Hemoglobin 9.6 (*)     Hematocrit 28.2 (*)     MCV 65 (*)     MCH 22.2 (*)     MCHC 34.0      RDW 18.5 (*)     Platelets 287      Neutrophils % 64.0      Immature Granulocytes %, Automated 0.5      Lymphocytes % 28.5      Monocytes % 5.1      Eosinophils % 1.7       Basophils % 0.2      Neutrophils Absolute 10.28 (*)     Immature Granulocytes Absolute, Automated 0.08      Lymphocytes Absolute 4.58      Monocytes Absolute 0.82      Eosinophils Absolute 0.28      Basophils Absolute 0.03     COMPREHENSIVE METABOLIC PANEL - Abnormal    Glucose 130 (*)     Sodium 135 (*)     Potassium 3.5      Chloride 106      Bicarbonate 26      Anion Gap 7 (*)     Urea Nitrogen 10      Creatinine 0.77      eGFR >90      Calcium 8.8      Albumin 3.4      Alkaline Phosphatase 84      Total Protein 7.2      AST 13      Bilirubin, Total 1.0      ALT 8     RETICULOCYTES - Abnormal    Retic % 2.1 (*)     Retic Absolute 0.090 (*)     Reticulocyte Hemoglobin 26 (*)     Immature Retic fraction 26.4 (*)    SERIAL TROPONIN-INITIAL - Normal    Troponin I, High Sensitivity 3      Narrative:     Less than 99th percentile of normal range cutoff-  Female and children under 18 years old <14 ng/L; Male <21 ng/L: Negative  Repeat testing should be performed if clinically indicated.     Female and children under 18 years old 14-50 ng/L; Male 21-50 ng/L:  Consistent with possible cardiac damage and possible increased clinical   risk. Serial measurements may help to assess extent of myocardial damage.     >50 ng/L: Consistent with cardiac damage, increased clinical risk and  myocardial infarction. Serial measurements may help assess extent of   myocardial damage.      NOTE: Children less than 1 year old may have higher baseline troponin   levels and results should be interpreted in conjunction with the overall   clinical context.     NOTE: Troponin I testing is performed using a different   testing methodology at [facility] than at other   [facility]. Direct result comparisons should only   be made within the same method.   SARS-COV-2 PCR - Normal    Coronavirus 2019, PCR Not Detected      Narrative:     This assay has received FDA Emergency Use Authorization (EUA) and is only authorized for the  duration of time that circumstances exist to justify the authorization of the emergency use of in vitro diagnostic tests for the detection of SARS-CoV-2 virus and/or diagnosis of COVID-19 infection under section 564(b)(1) of the Act, 21 U.S.C. 360bbb-3(b)(1). This assay is an in vitro diagnostic nucleic acid amplification test for the qualitative detection of SARS-CoV-2 from nasopharyngeal specimens and has been validated for use at [facility]. Negative results do not preclude COVID-19 infections and should not be used as the sole basis for diagnosis, treatment, or other management decisions.     SERIAL TROPONIN, 1 HOUR - Normal    Troponin I, High Sensitivity <3      Narrative:     Less than 99th percentile of normal range cutoff-  Female and children under 18 years old <14 ng/L; Male <21 ng/L: Negative  Repeat testing should be performed if clinically indicated.     Female and children under 18 years old 14-50 ng/L; Male 21-50 ng/L:  Consistent with possible cardiac damage and possible increased clinical   risk. Serial measurements may help to assess extent of myocardial damage.     >50 ng/L: Consistent with cardiac damage, increased clinical risk and  myocardial infarction. Serial measurements may help assess extent of   myocardial damage.      NOTE: Children less than 1 year old may have higher baseline troponin   levels and results should be interpreted in conjunction with the overall   clinical context.     NOTE: Troponin I testing is performed using a different   testing methodology at [facility] than at other   [facility]. Direct result comparisons should only   be made within the same method.   URINALYSIS WITH REFLEX CULTURE AND MICROSCOPIC - Normal    Color, Urine Yellow      Appearance, Urine Clear      Specific Gravity, Urine 1.011      pH, Urine 5.0      Protein, Urine NEGATIVE      Glucose, Urine NEGATIVE      Blood, Urine NEGATIVE      Ketones, Urine  NEGATIVE      Bilirubin, Urine NEGATIVE      Urobilinogen, Urine <2.0      Nitrite, Urine NEGATIVE      Leukocyte Esterase, Urine NEGATIVE     TROPONIN SERIES- (INITIAL, 1 HR)    Narrative:     The following orders were created for panel order Troponin I Series, High Sensitivity (0, 1 HR).  Procedure                               Abnormality         Status                     ---------                               -----------         ------                     Troponin I, High Sensiti...[[id number]]  Normal              Final result               Troponin, High Sensitivi...[[id number]]  Normal              Final result                 Please view results for these tests on the individual orders.   GRAY TOP    Extra Tube Hold for add-ons.     URINALYSIS WITH REFLEX CULTURE AND MICROSCOPIC    Narrative:     The following orders were created for panel order Urinalysis with Reflex Culture and Microscopic.  Procedure                               Abnormality         Status                     ---------                               -----------         ------                     Urinalysis with Reflex C...[[id number]]  Normal              Final result               Extra Urine Gray Tube[[id number]]                                                         Please view results for these tests on the individual orders.   EXTRA URINE GRAY TUBE         No data recorded     [place] Coma Scale Score: 15 (09/15/24 2011 : Blessing Wade, NELSON)                           Medical Decision Making  Patient presents to the emergency department with pain.  This is the third visit in 1 week for similar.  Patient with history of sickle cell disease.  She has a care plan has been followed.  Patient continues with pain and does not feel she can go home.  Her white count is 16,000.  Up from 14,000 a few days ago.  Her hemoglobin and reticulocyte count are at baseline.  Chest x-ray reveals no infiltrates or effusions.  Urine reveals no evidence of  infection.  Electrolytes are within normal range and renal functions are normal.  Patient with intractable pain.  EKG is nonischemic.  Patient with chest discomfort.  No concerns for acute chest at present time.  Patient is feeling better after Dilaudid but still continues with pain and does not feel that she can manage at home with her pain medication.  Case has been discussed with Dr. Joshua.  Patient will be placed observation for pain control.        Procedure  Procedures     Ascencion Greenwood MD  09/16/24 5524

## 2024-09-17 PROBLEM — D57.00 SICKLE CELL CRISIS (MULTI): Status: ACTIVE | Noted: 2024-09-17

## 2024-09-17 LAB
ALBUMIN SERPL BCP-MCNC: 3.3 G/DL (ref 3.4–5)
ANION GAP SERPL CALC-SCNC: <7 MMOL/L (ref 10–20)
BUN SERPL-MCNC: 8 MG/DL (ref 6–23)
CALCIUM SERPL-MCNC: 8 MG/DL (ref 8.6–10.3)
CHLORIDE SERPL-SCNC: 107 MMOL/L (ref 98–107)
CO2 SERPL-SCNC: 28 MMOL/L (ref 21–32)
CREAT SERPL-MCNC: 0.65 MG/DL (ref 0.5–1.05)
EGFRCR SERPLBLD CKD-EPI 2021: >90 ML/MIN/1.73M*2
ERYTHROCYTE [DISTWIDTH] IN BLOOD BY AUTOMATED COUNT: 18.5 % (ref 11.5–14.5)
FERRITIN SERPL-MCNC: 43 NG/ML (ref 8–150)
FOLATE SERPL-MCNC: 9.1 NG/ML
GLUCOSE SERPL-MCNC: 102 MG/DL (ref 74–99)
HCT VFR BLD AUTO: 26.6 % (ref 36–46)
HGB BLD-MCNC: 8.9 G/DL (ref 12–16)
IRON SATN MFR SERPL: 28 % (ref 25–45)
IRON SERPL-MCNC: 64 UG/DL (ref 35–150)
LDH SERPL L TO P-CCNC: 163 U/L (ref 84–246)
MAGNESIUM SERPL-MCNC: 1.75 MG/DL (ref 1.6–2.4)
MCH RBC QN AUTO: 22.1 PG (ref 26–34)
MCHC RBC AUTO-ENTMCNC: 33.5 G/DL (ref 32–36)
MCV RBC AUTO: 66 FL (ref 80–100)
NRBC BLD-RTO: 0 /100 WBCS (ref 0–0)
PHOSPHATE SERPL-MCNC: 3 MG/DL (ref 2.5–4.9)
PLATELET # BLD AUTO: 255 X10*3/UL (ref 150–450)
POTASSIUM SERPL-SCNC: 4.1 MMOL/L (ref 3.5–5.3)
RBC # BLD AUTO: 4.03 X10*6/UL (ref 4–5.2)
SODIUM SERPL-SCNC: 137 MMOL/L (ref 136–145)
TIBC SERPL-MCNC: 232 UG/DL (ref 240–445)
UIBC SERPL-MCNC: 168 UG/DL (ref 110–370)
VIT B12 SERPL-MCNC: 306 PG/ML (ref 211–911)
WBC # BLD AUTO: 12.4 X10*3/UL (ref 4.4–11.3)

## 2024-09-17 PROCEDURE — 99232 SBSQ HOSP IP/OBS MODERATE 35: CPT | Performed by: NURSE PRACTITIONER

## 2024-09-17 PROCEDURE — 94760 N-INVAS EAR/PLS OXIMETRY 1: CPT

## 2024-09-17 PROCEDURE — 83540 ASSAY OF IRON: CPT | Performed by: INTERNAL MEDICINE

## 2024-09-17 PROCEDURE — 99223 1ST HOSP IP/OBS HIGH 75: CPT | Performed by: INTERNAL MEDICINE

## 2024-09-17 PROCEDURE — 82728 ASSAY OF FERRITIN: CPT | Performed by: INTERNAL MEDICINE

## 2024-09-17 PROCEDURE — 83735 ASSAY OF MAGNESIUM: CPT | Performed by: STUDENT IN AN ORGANIZED HEALTH CARE EDUCATION/TRAINING PROGRAM

## 2024-09-17 PROCEDURE — 2500000004 HC RX 250 GENERAL PHARMACY W/ HCPCS (ALT 636 FOR OP/ED): Mod: SE | Performed by: NURSE PRACTITIONER

## 2024-09-17 PROCEDURE — 83615 LACTATE (LD) (LDH) ENZYME: CPT | Performed by: INTERNAL MEDICINE

## 2024-09-17 PROCEDURE — 85027 COMPLETE CBC AUTOMATED: CPT | Performed by: STUDENT IN AN ORGANIZED HEALTH CARE EDUCATION/TRAINING PROGRAM

## 2024-09-17 PROCEDURE — 2500000001 HC RX 250 WO HCPCS SELF ADMINISTERED DRUGS (ALT 637 FOR MEDICARE OP): Mod: SE | Performed by: STUDENT IN AN ORGANIZED HEALTH CARE EDUCATION/TRAINING PROGRAM

## 2024-09-17 PROCEDURE — 1200000002 HC GENERAL ROOM WITH TELEMETRY DAILY: Mod: IPSPLIT

## 2024-09-17 PROCEDURE — 2500000002 HC RX 250 W HCPCS SELF ADMINISTERED DRUGS (ALT 637 FOR MEDICARE OP, ALT 636 FOR OP/ED): Mod: IPSPLIT | Performed by: STUDENT IN AN ORGANIZED HEALTH CARE EDUCATION/TRAINING PROGRAM

## 2024-09-17 PROCEDURE — 96376 TX/PRO/DX INJ SAME DRUG ADON: CPT | Mod: IPSPLIT

## 2024-09-17 PROCEDURE — 82565 ASSAY OF CREATININE: CPT | Performed by: STUDENT IN AN ORGANIZED HEALTH CARE EDUCATION/TRAINING PROGRAM

## 2024-09-17 RX ORDER — GUAIFENESIN 600 MG/1
600 TABLET, EXTENDED RELEASE ORAL 2 TIMES DAILY
Status: DISCONTINUED | OUTPATIENT
Start: 2024-09-17 | End: 2024-09-21 | Stop reason: HOSPADM

## 2024-09-17 RX ORDER — HYDROXYUREA 500 MG/1
500 CAPSULE ORAL DAILY
Status: DISCONTINUED | OUTPATIENT
Start: 2024-09-17 | End: 2024-09-18

## 2024-09-17 SDOH — ECONOMIC STABILITY: HOUSING INSECURITY

## 2024-09-17 SDOH — ECONOMIC STABILITY: TRANSPORTATION INSECURITY

## 2024-09-17 SDOH — ECONOMIC STABILITY: FOOD INSECURITY: WITHIN THE PAST 12 MONTHS, THE FOOD YOU BOUGHT JUST DIDN'T LAST AND YOU DIDN'T HAVE MONEY TO GET MORE.: NEVER TRUE

## 2024-09-17 SDOH — ECONOMIC STABILITY: TRANSPORTATION INSECURITY
IN THE PAST 12 MONTHS, HAS LACK OF TRANSPORTATION KEPT YOU FROM MEETINGS, WORK, OR FROM GETTING THINGS NEEDED FOR DAILY LIVING?: NO

## 2024-09-17 SDOH — ECONOMIC STABILITY: FOOD INSECURITY: WITHIN THE PAST 12 MONTHS, YOU WORRIED THAT YOUR FOOD WOULD RUN OUT BEFORE YOU GOT MONEY TO BUY MORE.: NEVER TRUE

## 2024-09-17 SDOH — ECONOMIC STABILITY: HOUSING INSECURITY
IN THE LAST 12 MONTHS, WAS THERE A TIME WHEN YOU DID NOT HAVE A STEADY PLACE TO SLEEP OR SLEPT IN A SHELTER (INCLUDING NOW)?: NO

## 2024-09-17 SDOH — ECONOMIC STABILITY: TRANSPORTATION INSECURITY
IN THE PAST 12 MONTHS, HAS THE LACK OF TRANSPORTATION KEPT YOU FROM MEDICAL APPOINTMENTS OR FROM GETTING MEDICATIONS?: NO

## 2024-09-17 SDOH — ECONOMIC STABILITY: TRANSPORTATION INSECURITY: IN THE PAST 12 MONTHS, HAS LACK OF TRANSPORTATION KEPT YOU FROM MEDICAL APPOINTMENTS OR FROM GETTING MEDICATIONS?: NO

## 2024-09-17 SDOH — ECONOMIC STABILITY: FOOD INSECURITY

## 2024-09-17 SDOH — ECONOMIC STABILITY: INCOME INSECURITY: IN THE LAST 12 MONTHS, WAS THERE A TIME WHEN YOU WERE NOT ABLE TO PAY THE MORTGAGE OR RENT ON TIME?: NO

## 2024-09-17 SDOH — ECONOMIC STABILITY: HOUSING INSECURITY: IN THE PAST 12 MONTHS HAS THE ELECTRIC, GAS, OIL, OR WATER COMPANY THREATENED TO SHUT OFF SERVICES IN YOUR HOME?: NO

## 2024-09-17 SDOH — ECONOMIC STABILITY: FOOD INSECURITY: WITHIN THE PAST 12 MONTHS, YOU WORRIED THAT YOUR FOOD WOULD RUN OUT BEFORE YOU GOT THE MONEY TO BUY MORE.: NEVER TRUE

## 2024-09-17 SDOH — ECONOMIC STABILITY: HOUSING INSECURITY: IN THE LAST 12 MONTHS, HOW MANY PLACES HAVE YOU LIVED?: 1

## 2024-09-17 SDOH — ECONOMIC STABILITY: HOUSING INSECURITY: IN THE LAST 12 MONTHS, WAS THERE A TIME WHEN YOU WERE NOT ABLE TO PAY THE MORTGAGE OR RENT ON TIME?: NO

## 2024-09-17 SDOH — ECONOMIC STABILITY: GENERAL

## 2024-09-17 ASSESSMENT — ENCOUNTER SYMPTOMS
CHOKING: 0
COUGH: 1
FATIGUE: 1
FEVER: 0
UNEXPECTED WEIGHT CHANGE: 0
STRIDOR: 0
CHEST TIGHTNESS: 1
WHEEZING: 0
GASTROINTESTINAL NEGATIVE: 1

## 2024-09-17 ASSESSMENT — PAIN SCALES - GENERAL
PAINLEVEL_OUTOF10: 0 - NO PAIN
PAINLEVEL_OUTOF10: 8
PAINLEVEL_OUTOF10: 6
PAINLEVEL_OUTOF10: 9
PAINLEVEL_OUTOF10: 7
PAINLEVEL_OUTOF10: 7
PAINLEVEL_OUTOF10: 8
PAINLEVEL_OUTOF10: 10 - WORST POSSIBLE PAIN
PAINLEVEL_OUTOF10: 7
PAINLEVEL_OUTOF10: 6
PAINLEVEL_OUTOF10: 7
PAINLEVEL_OUTOF10: 6
PAINLEVEL_OUTOF10: 4
PAINLEVEL_OUTOF10: 0 - NO PAIN
PAINLEVEL_OUTOF10: 8

## 2024-09-17 ASSESSMENT — COGNITIVE AND FUNCTIONAL STATUS - GENERAL
DAILY ACTIVITIY SCORE: 24
MOBILITY SCORE: 24
DAILY ACTIVITIY SCORE: 24
MOBILITY SCORE: 24

## 2024-09-17 ASSESSMENT — PAIN - FUNCTIONAL ASSESSMENT
PAIN_FUNCTIONAL_ASSESSMENT: 0-10

## 2024-09-17 ASSESSMENT — PAIN DESCRIPTION - DESCRIPTORS
DESCRIPTORS: SHARP

## 2024-09-17 ASSESSMENT — PAIN DESCRIPTION - LOCATION
LOCATION: BACK
LOCATION: OTHER (COMMENT)

## 2024-09-17 ASSESSMENT — PAIN DESCRIPTION - ORIENTATION: ORIENTATION: LOWER

## 2024-09-17 ASSESSMENT — PAIN SCALES - PAIN ASSESSMENT IN ADVANCED DEMENTIA (PAINAD): TOTALSCORE: MEDICATION (SEE MAR);RELAXATION TECHNIQUE;REST

## 2024-09-17 ASSESSMENT — ACTIVITIES OF DAILY LIVING (ADL): LACK_OF_TRANSPORTATION: NO

## 2024-09-17 ASSESSMENT — SOCIAL DETERMINANTS OF HEALTH (SDOH): IN THE PAST 12 MONTHS, HAS THE ELECTRIC, GAS, OIL, OR WATER COMPANY THREATENED TO SHUT OFF SERVICE IN YOUR HOME?: NO

## 2024-09-17 NOTE — CARE PLAN
The patient's goals for the shift include      The clinical goals for the shift include Patient will report her pain is a 5 or less by the end of the shift    Patient had an uneventful shift. She has been medicated every two hours for 7-8/10 pain in her chest, legs and back. She has slept on and off through the night. VSS, affebrile.

## 2024-09-17 NOTE — PROGRESS NOTES
Taylor Hampton is a 29 y.o. female on day 0 of admission presenting with Sickle cell anemia with crisis (Multi).      Subjective   Patient is seen in room. Expressed pain medication is helping with her pain.   IVF reduced to 150/hr.  Heme/onc consult with Dr. ANNIE Cruz 2/2 H/H trending downward.       Objective     Last Recorded Vitals  /79 (BP Location: Right arm, Patient Position: Lying)   Pulse 102   Temp 36.2 °C (97.1 °F) (Temporal)   Resp 18   Wt 144 kg (316 lb 6.4 oz)   SpO2 98%   Intake/Output last 3 Shifts:    Intake/Output Summary (Last 24 hours) at 9/17/2024 0929  Last data filed at 9/17/2024 0855  Gross per 24 hour   Intake 4048.34 ml   Output --   Net 4048.34 ml       Admission Weight  Weight: 138 kg (304 lb) (09/15/24 2008)    Daily Weight  09/16/24 : 144 kg (316 lb 6.4 oz)    Image Results  CT angio chest for pulmonary embolism  Narrative: Interpreted By:  Mu Nieves,   STUDY:  CT ANGIO CHEST FOR PULMONARY EMBOLISM;  9/16/2024 3:36 pm      INDICATION:  Signs/Symptoms:r/o PE.      COMPARISON:  09/05/2024      ACCESSION NUMBER(S):  [id number]      ORDERING CLINICIAN:  JOSSUE العلي      TECHNIQUE:  Helical data acquisition of the chest was obtained with  50 mL  Omnipaque 350. Images were reformatted in axial, coronal, and  sagittal planes.MIP reformatted images were also generated.      FINDINGS:  LUNGS and AIRWAYS:  Small areas of minor atelectasis are not significantly changed.  Otherwise the lungs appear clear and unremarkable. Minimal secretions  in the trachea, otherwise the central airways are patent. No  bronchiectasis.      No pleural effusion or pneumothorax.      MEDIASTINUM and KARENA, LOWER NECK AND AXILLA:  The visualized thyroid gland is grossly unremarkable.      No thoracic lymphadenopathy by CT criteria.      Esophagus is not dilated.      HEART and VESSELS:  Heart size upper limit of normal.      No significant pericardial effusion.      No pulmonary embolism identified.       Thoracic aorta and great vessels are grossly patent although not well  opacified. Right IJ central line is still in place.      UPPER ABDOMEN:  Post cholecystectomy.      CHEST WALL and OSSEOUS STRUCTURES:  No acute skeletal abnormalities.      Impression: No pulmonary embolism or new active disease in the chest identified.      Signed by: Mu Nieves 9/16/2024 4:13 PM  Dictation workstation:   [id number]  Electrocardiogram, 12-lead PRN ACS symptoms  Normal sinus rhythm with sinus arrhythmia  Normal ECG  When compared with ECG of 15-SEP-2024 20:32, (unconfirmed)  No significant change was found  ECG 12 lead  Normal sinus rhythm  Normal ECG  When compared with ECG of 12-SEP-2024 20:39,  No significant change was found    Results for orders placed or performed during the hospital encounter of 09/15/24 (from the past 24 hour(s))   Electrocardiogram, 12-lead PRN ACS symptoms   Result Value Ref Range    Ventricular Rate 91 BPM    Atrial Rate 91 BPM    NC Interval 144 ms    QRS Duration 84 ms    QT Interval 378 ms    QTC Calculation(Bazett) 464 ms    P Axis 55 degrees    R Axis 33 degrees    T Axis 26 degrees    QRS Count 15 beats    Q Onset 224 ms    P Onset 152 ms    P Offset 206 ms    T Offset 413 ms    QTC Fredericia 434 ms   Troponin I, High Sensitivity   Result Value Ref Range    Troponin I, High Sensitivity <3 0 - 13 ng/L   CBC   Result Value Ref Range    WBC 13.9 (H) 4.4 - 11.3 x10*3/uL    nRBC 0.0 0.0 - 0.0 /100 WBCs    RBC 4.23 4.00 - 5.20 x10*6/uL    Hemoglobin 9.3 (L) 12.0 - 16.0 g/dL    Hematocrit 27.2 (L) 36.0 - 46.0 %    MCV 64 (L) 80 - 100 fL    MCH 22.0 (L) 26.0 - 34.0 pg    MCHC 34.2 32.0 - 36.0 g/dL    RDW 18.5 (H) 11.5 - 14.5 %    Platelets 270 150 - 450 x10*3/uL   Comprehensive metabolic panel   Result Value Ref Range    Glucose 107 (H) 74 - 99 mg/dL    Sodium 137 136 - 145 mmol/L    Potassium 3.8 3.5 - 5.3 mmol/L    Chloride 107 98 - 107 mmol/L    Bicarbonate 27 21 - 32 mmol/L    Anion Gap 7 (L)  10 - 20 mmol/L    Urea Nitrogen 10 6 - 23 mg/dL    Creatinine 0.71 0.50 - 1.05 mg/dL    eGFR >90 >60 mL/min/1.73m*2    Calcium 8.5 (L) 8.6 - 10.3 mg/dL    Albumin 3.5 3.4 - 5.0 g/dL    Alkaline Phosphatase 104 33 - 110 U/L    Total Protein 7.2 6.4 - 8.2 g/dL    AST 17 9 - 39 U/L    Bilirubin, Total 1.1 0.0 - 1.2 mg/dL    ALT 9 7 - 45 U/L   D-Dimer, VTE Exclusion   Result Value Ref Range    D-Dimer, Quantitative VTE Exclusion 711 (H) <=500 ng/mL FEU   CBC   Result Value Ref Range    WBC 12.4 (H) 4.4 - 11.3 x10*3/uL    nRBC 0.0 0.0 - 0.0 /100 WBCs    RBC 4.03 4.00 - 5.20 x10*6/uL    Hemoglobin 8.9 (L) 12.0 - 16.0 g/dL    Hematocrit 26.6 (L) 36.0 - 46.0 %    MCV 66 (L) 80 - 100 fL    MCH 22.1 (L) 26.0 - 34.0 pg    MCHC 33.5 32.0 - 36.0 g/dL    RDW 18.5 (H) 11.5 - 14.5 %    Platelets 255 150 - 450 x10*3/uL   Renal Function Panel   Result Value Ref Range    Glucose 102 (H) 74 - 99 mg/dL    Sodium 137 136 - 145 mmol/L    Potassium 4.1 3.5 - 5.3 mmol/L    Chloride 107 98 - 107 mmol/L    Bicarbonate 28 21 - 32 mmol/L    Anion Gap <7 (L) 10 - 20 mmol/L    Urea Nitrogen 8 6 - 23 mg/dL    Creatinine 0.65 0.50 - 1.05 mg/dL    eGFR >90 >60 mL/min/1.73m*2    Calcium 8.0 (L) 8.6 - 10.3 mg/dL    Phosphorus 3.0 2.5 - 4.9 mg/dL    Albumin 3.3 (L) 3.4 - 5.0 g/dL   Magnesium   Result Value Ref Range    Magnesium 1.75 1.60 - 2.40 mg/dL   Lactate Dehydrogenase   Result Value Ref Range     84 - 246 U/L   Iron and TIBC   Result Value Ref Range    Iron 64 35 - 150 ug/dL    UIBC 168 110 - 370 ug/dL    TIBC 232 (L) 240 - 445 ug/dL    % Saturation 28 25 - 45 %   Ferritin   Result Value Ref Range    Ferritin 43 8 - 150 ng/mL     Physical Exam  Constitutional:       Appearance: She is obese. She is not ill-appearing.   HENT:      Head: Atraumatic.      Nose: Nose normal.      Mouth/Throat:      Mouth: Mucous membranes are moist.   Eyes:      Extraocular Movements: Extraocular movements intact.      Pupils: Pupils are equal, round, and  reactive to light.   Cardiovascular:      Rate and Rhythm: Normal rate and regular rhythm.      Right mediport  Pulmonary:      Effort: Pulmonary effort is normal.      Breath sounds: Normal breath sounds.   Abdominal:      General: Bowel sounds are normal.      Palpations: Abdomen is soft.   Musculoskeletal:         General: Normal range of motion.   Skin:     General: Skin is warm and dry.      Capillary Refill: Capillary refill takes less than 2 seconds.   Neurological:      General: No focal deficit present.      Mental Status: She is alert and oriented to person, place, and time.   Psychiatric:         Mood and Affect: Mood normal.         Behavior: Behavior normal.     Scheduled medications  colchicine, 0.6 mg, oral, Once  docusate sodium, 100 mg, oral, BID  DULoxetine, 60 mg, oral, Daily  ferrous sulfate (325 mg ferrous sulfate), 65 mg of iron, oral, Daily with breakfast  folic acid, 1 mg, oral, Daily  guaiFENesin, 600 mg, oral, BID  hydroxyurea, 500 mg, oral, Daily  pregabalin, 50 mg, oral, Nightly  rivaroxaban, 20 mg, oral, Daily with evening meal      Continuous medications  sodium chloride 0.9%, 150 mL/hr, Last Rate: 125 mL/hr (09/17/24 0707)      PRN medications  PRN medications: acetaminophen, albuterol, diphenhydrAMINE, HYDROmorphone, ondansetron    Assessment/Plan        This patient has a central line   Reason for the central line remaining today?  Frequently requires blood transfusions 2/2 sickle cell disease    #Sickle cell crisis  ~increased episodes over the past week  ~PE 6/12/24 on xarelto  ~dilaudid for pain  ~IVF  for increased hydration  ~sudden onset of chest pain with increasing d-dimer  ~CT angio chest~neg for PE or active disease.   ~HGB 9.3>8.9  ~Heme/onc consult with Dr. Rocco leiva    #Chest pain and cough  ~? ACS syndrome 2/2 SCA  ~Chest Ct neg PE  ~nursing instructed to obtain stat EKG with chest pain  ~monitor for fever  ~mucinex ordered for cough     Disposition:  Admitted 2/2 sickle cell crisis.      I spent 35 minutes in the professional and overall care of this patient.    Tiana Kearney, APRN-CNP

## 2024-09-17 NOTE — CARE PLAN
The patient's goals for the shift include      The clinical goals for the shift include patient will report her pain is 5 or less by end of shift    Over the shift, the patient did not make progress toward the following goals. Barriers to progression include pain. Recommendations to address these barriers include pain management, iv fluid therapy.

## 2024-09-18 LAB
ALBUMIN SERPL BCP-MCNC: 2.9 G/DL (ref 3.4–5)
ANION GAP SERPL CALC-SCNC: <7 MMOL/L (ref 10–20)
BILIRUB SERPL-MCNC: 1.4 MG/DL (ref 0–1.2)
BUN SERPL-MCNC: 6 MG/DL (ref 6–23)
CALCIUM SERPL-MCNC: 8.1 MG/DL (ref 8.6–10.3)
CHLORIDE SERPL-SCNC: 107 MMOL/L (ref 98–107)
CO2 SERPL-SCNC: 29 MMOL/L (ref 21–32)
CREAT SERPL-MCNC: 0.64 MG/DL (ref 0.5–1.05)
EGFRCR SERPLBLD CKD-EPI 2021: >90 ML/MIN/1.73M*2
ERYTHROCYTE [DISTWIDTH] IN BLOOD BY AUTOMATED COUNT: 18.4 % (ref 11.5–14.5)
GLUCOSE SERPL-MCNC: 133 MG/DL (ref 74–99)
HCT VFR BLD AUTO: 25.1 % (ref 36–46)
HGB BLD-MCNC: 8.4 G/DL (ref 12–16)
LDH SERPL L TO P-CCNC: 162 U/L (ref 84–246)
MAGNESIUM SERPL-MCNC: 1.65 MG/DL (ref 1.6–2.4)
MCH RBC QN AUTO: 22.1 PG (ref 26–34)
MCHC RBC AUTO-ENTMCNC: 33.5 G/DL (ref 32–36)
MCV RBC AUTO: 66 FL (ref 80–100)
NRBC BLD-RTO: 0.2 /100 WBCS (ref 0–0)
PHOSPHATE SERPL-MCNC: 2.7 MG/DL (ref 2.5–4.9)
PLATELET # BLD AUTO: 246 X10*3/UL (ref 150–450)
POTASSIUM SERPL-SCNC: 3.7 MMOL/L (ref 3.5–5.3)
RBC # BLD AUTO: 3.8 X10*6/UL (ref 4–5.2)
SODIUM SERPL-SCNC: 137 MMOL/L (ref 136–145)
WBC # BLD AUTO: 12.5 X10*3/UL (ref 4.4–11.3)

## 2024-09-18 PROCEDURE — 2500000004 HC RX 250 GENERAL PHARMACY W/ HCPCS (ALT 636 FOR OP/ED): Mod: IPSPLIT | Performed by: NURSE PRACTITIONER

## 2024-09-18 PROCEDURE — 82247 BILIRUBIN TOTAL: CPT | Mod: IPSPLIT | Performed by: NURSE PRACTITIONER

## 2024-09-18 PROCEDURE — 94760 N-INVAS EAR/PLS OXIMETRY 1: CPT | Mod: IPSPLIT

## 2024-09-18 PROCEDURE — 80069 RENAL FUNCTION PANEL: CPT | Mod: IPSPLIT | Performed by: STUDENT IN AN ORGANIZED HEALTH CARE EDUCATION/TRAINING PROGRAM

## 2024-09-18 PROCEDURE — 2500000001 HC RX 250 WO HCPCS SELF ADMINISTERED DRUGS (ALT 637 FOR MEDICARE OP): Mod: IPSPLIT | Performed by: STUDENT IN AN ORGANIZED HEALTH CARE EDUCATION/TRAINING PROGRAM

## 2024-09-18 PROCEDURE — 85027 COMPLETE CBC AUTOMATED: CPT | Mod: IPSPLIT | Performed by: STUDENT IN AN ORGANIZED HEALTH CARE EDUCATION/TRAINING PROGRAM

## 2024-09-18 PROCEDURE — 1100000001 HC PRIVATE ROOM DAILY: Mod: IPSPLIT

## 2024-09-18 PROCEDURE — 83615 LACTATE (LD) (LDH) ENZYME: CPT | Mod: IPSPLIT | Performed by: NURSE PRACTITIONER

## 2024-09-18 PROCEDURE — 83735 ASSAY OF MAGNESIUM: CPT | Mod: IPSPLIT | Performed by: STUDENT IN AN ORGANIZED HEALTH CARE EDUCATION/TRAINING PROGRAM

## 2024-09-18 PROCEDURE — 99231 SBSQ HOSP IP/OBS SF/LOW 25: CPT | Performed by: NURSE PRACTITIONER

## 2024-09-18 PROCEDURE — 2500000002 HC RX 250 W HCPCS SELF ADMINISTERED DRUGS (ALT 637 FOR MEDICARE OP, ALT 636 FOR OP/ED): Mod: IPSPLIT | Performed by: STUDENT IN AN ORGANIZED HEALTH CARE EDUCATION/TRAINING PROGRAM

## 2024-09-18 RX ORDER — HYDROXYUREA 500 MG/1
500 CAPSULE ORAL
COMMUNITY

## 2024-09-18 RX ORDER — BUPRENORPHINE 2 MG/1
2 TABLET SUBLINGUAL 3 TIMES DAILY
COMMUNITY

## 2024-09-18 RX ORDER — HYDROXYUREA 500 MG/1
500 CAPSULE ORAL
Status: DISCONTINUED | OUTPATIENT
Start: 2024-09-18 | End: 2024-09-21 | Stop reason: HOSPADM

## 2024-09-18 ASSESSMENT — PAIN SCALES - GENERAL
PAINLEVEL_OUTOF10: 8
PAINLEVEL_OUTOF10: 8
PAINLEVEL_OUTOF10: 0 - NO PAIN
PAINLEVEL_OUTOF10: 5 - MODERATE PAIN
PAINLEVEL_OUTOF10: 7
PAINLEVEL_OUTOF10: 8
PAINLEVEL_OUTOF10: 8
PAINLEVEL_OUTOF10: 5 - MODERATE PAIN
PAINLEVEL_OUTOF10: 4
PAINLEVEL_OUTOF10: 8
PAINLEVEL_OUTOF10: 8

## 2024-09-18 ASSESSMENT — COGNITIVE AND FUNCTIONAL STATUS - GENERAL
MOBILITY SCORE: 24
DAILY ACTIVITIY SCORE: 24

## 2024-09-18 ASSESSMENT — PAIN DESCRIPTION - LOCATION
LOCATION: OTHER (COMMENT)
LOCATION: BACK

## 2024-09-18 ASSESSMENT — PAIN - FUNCTIONAL ASSESSMENT
PAIN_FUNCTIONAL_ASSESSMENT: 0-10

## 2024-09-18 ASSESSMENT — PAIN DESCRIPTION - DESCRIPTORS
DESCRIPTORS: SHARP
DESCRIPTORS: SHARP

## 2024-09-18 NOTE — PROGRESS NOTES
Taylor Hampton is a 29 y.o. female on day 1 of admission presenting with Sickle cell anemia with crisis (Multi).      Subjective   Taylor is seen in her room in stable condition.   Continue current plan of care and reassess in the am.        Objective     Last Recorded Vitals  /69 (BP Location: Right arm, Patient Position: Lying)   Pulse 86   Temp 36.4 °C (97.5 °F) (Temporal)   Resp 20   Wt 144 kg (316 lb 6.4 oz)   SpO2 99%   Intake/Output last 3 Shifts:    Intake/Output Summary (Last 24 hours) at 9/18/2024 1709  Last data filed at 9/18/2024 1400  Gross per 24 hour   Intake 4022.08 ml   Output --   Net 4022.08 ml       Admission Weight  Weight: 138 kg (304 lb) (09/15/24 2008)    Daily Weight  09/16/24 : 144 kg (316 lb 6.4 oz)    Image Results  CT angio chest for pulmonary embolism  Narrative: Interpreted By:  Mu Nieves,   STUDY:  CT ANGIO CHEST FOR PULMONARY EMBOLISM;  9/16/2024 3:36 pm      INDICATION:  Signs/Symptoms:r/o PE.      COMPARISON:  09/05/2024      ACCESSION NUMBER(S):  [id number]      ORDERING CLINICIAN:  JOSSUE العلي      TECHNIQUE:  Helical data acquisition of the chest was obtained with  50 mL  Omnipaque 350. Images were reformatted in axial, coronal, and  sagittal planes.MIP reformatted images were also generated.      FINDINGS:  LUNGS and AIRWAYS:  Small areas of minor atelectasis are not significantly changed.  Otherwise the lungs appear clear and unremarkable. Minimal secretions  in the trachea, otherwise the central airways are patent. No  bronchiectasis.      No pleural effusion or pneumothorax.      MEDIASTINUM and KARENA, LOWER NECK AND AXILLA:  The visualized thyroid gland is grossly unremarkable.      No thoracic lymphadenopathy by CT criteria.      Esophagus is not dilated.      HEART and VESSELS:  Heart size upper limit of normal.      No significant pericardial effusion.      No pulmonary embolism identified.      Thoracic aorta and great vessels are grossly patent although  not well  opacified. Right IJ central line is still in place.      UPPER ABDOMEN:  Post cholecystectomy.      CHEST WALL and OSSEOUS STRUCTURES:  No acute skeletal abnormalities.      Impression: No pulmonary embolism or new active disease in the chest identified.      Signed by: Mu Nieves 9/16/2024 4:13 PM  Dictation workstation:   [id number]  Electrocardiogram, 12-lead PRN ACS symptoms  Normal sinus rhythm with sinus arrhythmia  Normal ECG  When compared with ECG of 15-SEP-2024 20:32, (unconfirmed)  No significant change was found  ECG 12 lead  Normal sinus rhythm  Normal ECG  When compared with ECG of 12-SEP-2024 20:39,  No significant change was found    Results for orders placed or performed during the hospital encounter of 09/15/24 (from the past 24 hour(s))   CBC   Result Value Ref Range    WBC 12.5 (H) 4.4 - 11.3 x10*3/uL    nRBC 0.2 (H) 0.0 - 0.0 /100 WBCs    RBC 3.80 (L) 4.00 - 5.20 x10*6/uL    Hemoglobin 8.4 (L) 12.0 - 16.0 g/dL    Hematocrit 25.1 (L) 36.0 - 46.0 %    MCV 66 (L) 80 - 100 fL    MCH 22.1 (L) 26.0 - 34.0 pg    MCHC 33.5 32.0 - 36.0 g/dL    RDW 18.4 (H) 11.5 - 14.5 %    Platelets 246 150 - 450 x10*3/uL   Renal Function Panel   Result Value Ref Range    Glucose 133 (H) 74 - 99 mg/dL    Sodium 137 136 - 145 mmol/L    Potassium 3.7 3.5 - 5.3 mmol/L    Chloride 107 98 - 107 mmol/L    Bicarbonate 29 21 - 32 mmol/L    Anion Gap <7 (L) 10 - 20 mmol/L    Urea Nitrogen 6 6 - 23 mg/dL    Creatinine 0.64 0.50 - 1.05 mg/dL    eGFR >90 >60 mL/min/1.73m*2    Calcium 8.1 (L) 8.6 - 10.3 mg/dL    Phosphorus 2.7 2.5 - 4.9 mg/dL    Albumin 2.9 (L) 3.4 - 5.0 g/dL   Magnesium   Result Value Ref Range    Magnesium 1.65 1.60 - 2.40 mg/dL   Lactate dehydrogenase   Result Value Ref Range     84 - 246 U/L   Bilirubin, Total   Result Value Ref Range    Bilirubin, Total 1.4 (H) 0.0 - 1.2 mg/dL     Physical Exam  Constitutional:       Appearance: She is obese. She is not ill-appearing.   HENT:      Head:  Atraumatic.      Nose: Nose normal.      Mouth/Throat:      Mouth: Mucous membranes are moist.   Eyes:      Extraocular Movements: Extraocular movements intact.      Pupils: Pupils are equal, round, and reactive to light.   Cardiovascular:      Rate and Rhythm: Normal rate and regular rhythm.      Right mediport  Pulmonary:      Effort: Pulmonary effort is normal.      Breath sounds: Normal breath sounds.   Abdominal:      General: Bowel sounds are normal.      Palpations: Abdomen is soft.   Musculoskeletal:         General: Normal range of motion.   Skin:     General: Skin is warm and dry.      Capillary Refill: Capillary refill takes less than 2 seconds.   Neurological:      General: No focal deficit present.      Mental Status: She is alert and oriented to person, place, and time.   Psychiatric:         Mood and Affect: Mood normal.         Behavior: Behavior normal.     Scheduled medications  docusate sodium, 100 mg, oral, BID  DULoxetine, 60 mg, oral, Daily  ferrous sulfate (325 mg ferrous sulfate), 65 mg of iron, oral, Daily with breakfast  folic acid, 1 mg, oral, Daily  guaiFENesin, 600 mg, oral, BID  hydroxyurea, 500 mg, oral, TID after meals  pregabalin, 50 mg, oral, Nightly  rivaroxaban, 20 mg, oral, Daily with evening meal      Continuous medications  sodium chloride 0.9%, 150 mL/hr, Last Rate: 150 mL/hr (09/18/24 1634)      PRN medications  PRN medications: acetaminophen, albuterol, diphenhydrAMINE, HYDROmorphone, ondansetron    Assessment/Plan      #Sickle cell crisis  ~increased episodes over the past week  ~PE 6/12/24 on xarelto  ~dilaudid for pain  ~IVF  for increased hydration  ~sudden onset of chest pain with increasing d-dimer  ~CT angio chest~neg for PE or active disease.   ~HGB 9.3>8.9  ~Heme/onc consult with Dr. Valiente appreciated     #Chest pain and cough  ~? ACS syndrome 2/2 SCA  ~Chest Ct neg PE  ~nursing instructed to obtain stat EKG with chest pain  ~monitor for fever  ~mucinex  ordered for cough     Disposition: Admitted 2/2 sickle cell crisis.      I spent 25 minutes in the professional and overall care of this patient.    Tiana Kearney, APRN-CNP

## 2024-09-18 NOTE — CONSULTS
Inpatient consult to Hematology-Oncology  Consult performed by: Delmar Cruz MD  Consult ordered by: Tiana Kearney, APRN-CNP  Reason for consult: Sickle cell crisis  Assessment/Recommendations: Patient with known HbSC. Follows up at [facility]. Admitted with chest pain and dyspnea. CTA Chest neg for PE or pneumonia. Actively coughs during visit.   Hb has dropped since admission. However, bili and LDH are WNL. WBC elevated => Hemolysis is mild.     This is not a severe crisis and she does not need transfusion at this time. Monitor bilirubin, LDH, and CBC daily.   Needs to continue hydroxyurea for her sickle cell.   Continue IV fluids. Please start her on incentive spirometry to open up her lungs. Also, would benefit from supplemental nasal oxygen.   She will follow up with her Hematologist at [facility] as outpatient          Reason For Consult  Sickle cell crisis    History Of Present Illness  Taylor Hampton is a 29 y.o. female presenting with chest pain.     Past Medical History  She has a past medical history of Sickle cell anemia with crisis (Multi).    Surgical History  She has a past surgical history that includes Other surgical history (09/22/2020); Other surgical history (09/22/2020); Other surgical history (09/22/2020); CT angio neck (9/27/2019); CT angio abdomen pelvis w and or wo IV IV contrast (4/9/2020); and IR CVC tunneled (8/24/2020).     Social History  She reports that she has never smoked. She has never used smokeless tobacco. She reports that she does not drink alcohol and does not use drugs.    Family History  No family history on file.     Allergies  Crizanlizumab-tmca, Meperidine, Sulfamethoxazole-trimethoprim, Trimethoprim, Adhesive, Adhesive tape-silicones, Coconut, and Sulfamethoxazole    Review of Systems   Constitutional:  Positive for fatigue. Negative for fever and unexpected weight change.   Respiratory:  Positive for cough and chest tightness. Negative for choking,  "wheezing and stridor.    Cardiovascular:  Positive for chest pain.   Gastrointestinal: Negative.         Physical Exam  Constitutional:       Appearance: Normal appearance.   HENT:      Head: Normocephalic and atraumatic.   Eyes:      Pupils: Pupils are equal, round, and reactive to light.   Cardiovascular:      Rate and Rhythm: Normal rate and regular rhythm.   Pulmonary:      Effort: Pulmonary effort is normal.   Abdominal:      General: Abdomen is flat.      Palpations: Abdomen is soft. There is no mass.   Musculoskeletal:      Right lower leg: No edema.      Left lower leg: No edema.   Lymphadenopathy:      Cervical: No cervical adenopathy.   Skin:     Coloration: Skin is not jaundiced.   Neurological:      General: No focal deficit present.      Mental Status: She is alert and oriented to person, place, and time.          Last Recorded Vitals  Blood pressure 152/87, pulse (!) 116, temperature 36.2 °C (97.2 °F), temperature source Temporal, resp. rate 18, height 1.6 m (5' 2.99\"), weight 144 kg (316 lb 6.4 oz), SpO2 98%.    Relevant Results  See above     Assessment/Plan     See above    I spent 50 minutes in the professional and overall care of this patient.          "

## 2024-09-19 LAB
ALBUMIN SERPL BCP-MCNC: 3 G/DL (ref 3.4–5)
ANION GAP SERPL CALC-SCNC: <7 MMOL/L (ref 10–20)
ATRIAL RATE: 98 BPM
BILIRUB SERPL-MCNC: 1 MG/DL (ref 0–1.2)
BUN SERPL-MCNC: 6 MG/DL (ref 6–23)
CALCIUM SERPL-MCNC: 8.1 MG/DL (ref 8.6–10.3)
CHLORIDE SERPL-SCNC: 107 MMOL/L (ref 98–107)
CO2 SERPL-SCNC: 30 MMOL/L (ref 21–32)
CREAT SERPL-MCNC: 0.71 MG/DL (ref 0.5–1.05)
EGFRCR SERPLBLD CKD-EPI 2021: >90 ML/MIN/1.73M*2
ERYTHROCYTE [DISTWIDTH] IN BLOOD BY AUTOMATED COUNT: 18.6 % (ref 11.5–14.5)
GLUCOSE SERPL-MCNC: 132 MG/DL (ref 74–99)
HCT VFR BLD AUTO: 25 % (ref 36–46)
HGB BLD-MCNC: 8.3 G/DL (ref 12–16)
LDH SERPL L TO P-CCNC: 163 U/L (ref 84–246)
MAGNESIUM SERPL-MCNC: 1.57 MG/DL (ref 1.6–2.4)
MCH RBC QN AUTO: 21.9 PG (ref 26–34)
MCHC RBC AUTO-ENTMCNC: 33.2 G/DL (ref 32–36)
MCV RBC AUTO: 66 FL (ref 80–100)
NRBC BLD-RTO: 0.2 /100 WBCS (ref 0–0)
P AXIS: 41 DEGREES
P OFFSET: 201 MS
P ONSET: 148 MS
PHOSPHATE SERPL-MCNC: 2.5 MG/DL (ref 2.5–4.9)
PLATELET # BLD AUTO: 240 X10*3/UL (ref 150–450)
POTASSIUM SERPL-SCNC: 3.8 MMOL/L (ref 3.5–5.3)
PR INTERVAL: 140 MS
Q ONSET: 218 MS
QRS COUNT: 16 BEATS
QRS DURATION: 96 MS
QT INTERVAL: 362 MS
QTC CALCULATION(BAZETT): 462 MS
QTC FREDERICIA: 426 MS
R AXIS: 34 DEGREES
RBC # BLD AUTO: 3.79 X10*6/UL (ref 4–5.2)
SODIUM SERPL-SCNC: 139 MMOL/L (ref 136–145)
T AXIS: 22 DEGREES
T OFFSET: 399 MS
VENTRICULAR RATE: 98 BPM
WBC # BLD AUTO: 12.1 X10*3/UL (ref 4.4–11.3)

## 2024-09-19 PROCEDURE — 80069 RENAL FUNCTION PANEL: CPT | Mod: IPSPLIT | Performed by: STUDENT IN AN ORGANIZED HEALTH CARE EDUCATION/TRAINING PROGRAM

## 2024-09-19 PROCEDURE — 85027 COMPLETE CBC AUTOMATED: CPT | Mod: IPSPLIT | Performed by: STUDENT IN AN ORGANIZED HEALTH CARE EDUCATION/TRAINING PROGRAM

## 2024-09-19 PROCEDURE — 82247 BILIRUBIN TOTAL: CPT | Mod: IPSPLIT | Performed by: NURSE PRACTITIONER

## 2024-09-19 PROCEDURE — 83735 ASSAY OF MAGNESIUM: CPT | Mod: IPSPLIT | Performed by: STUDENT IN AN ORGANIZED HEALTH CARE EDUCATION/TRAINING PROGRAM

## 2024-09-19 PROCEDURE — 99232 SBSQ HOSP IP/OBS MODERATE 35: CPT | Performed by: NURSE PRACTITIONER

## 2024-09-19 PROCEDURE — 1100000001 HC PRIVATE ROOM DAILY: Mod: IPSPLIT

## 2024-09-19 PROCEDURE — 2500000004 HC RX 250 GENERAL PHARMACY W/ HCPCS (ALT 636 FOR OP/ED): Mod: IPSPLIT | Performed by: NURSE PRACTITIONER

## 2024-09-19 PROCEDURE — 83615 LACTATE (LD) (LDH) ENZYME: CPT | Mod: IPSPLIT | Performed by: NURSE PRACTITIONER

## 2024-09-19 PROCEDURE — 2500000002 HC RX 250 W HCPCS SELF ADMINISTERED DRUGS (ALT 637 FOR MEDICARE OP, ALT 636 FOR OP/ED): Mod: IPSPLIT | Performed by: STUDENT IN AN ORGANIZED HEALTH CARE EDUCATION/TRAINING PROGRAM

## 2024-09-19 PROCEDURE — 2500000001 HC RX 250 WO HCPCS SELF ADMINISTERED DRUGS (ALT 637 FOR MEDICARE OP): Mod: IPSPLIT | Performed by: STUDENT IN AN ORGANIZED HEALTH CARE EDUCATION/TRAINING PROGRAM

## 2024-09-19 PROCEDURE — 94760 N-INVAS EAR/PLS OXIMETRY 1: CPT | Mod: IPSPLIT

## 2024-09-19 RX ORDER — LANOLIN ALCOHOL/MO/W.PET/CERES
400 CREAM (GRAM) TOPICAL ONCE
Status: COMPLETED | OUTPATIENT
Start: 2024-09-19 | End: 2024-09-19

## 2024-09-19 ASSESSMENT — COGNITIVE AND FUNCTIONAL STATUS - GENERAL
DAILY ACTIVITIY SCORE: 24
MOBILITY SCORE: 24
MOBILITY SCORE: 24
DAILY ACTIVITIY SCORE: 24

## 2024-09-19 ASSESSMENT — PAIN SCALES - GENERAL
PAINLEVEL_OUTOF10: 7
PAINLEVEL_OUTOF10: 8
PAINLEVEL_OUTOF10: 3
PAINLEVEL_OUTOF10: 7
PAINLEVEL_OUTOF10: 8
PAINLEVEL_OUTOF10: 3

## 2024-09-19 ASSESSMENT — PAIN DESCRIPTION - LOCATION
LOCATION: GENERALIZED
LOCATION: OTHER (COMMENT)

## 2024-09-19 ASSESSMENT — PAIN - FUNCTIONAL ASSESSMENT
PAIN_FUNCTIONAL_ASSESSMENT: 0-10

## 2024-09-19 NOTE — CARE PLAN
The patient's goals for the shift include      The clinical goals for the shift include patient will report pain less than 5 by the end of shift    Patient in bed. Medicated for pain per MAR. No complaints of n/v, chest pain, shortness of breath. Tolerating medications.

## 2024-09-19 NOTE — PROGRESS NOTES
Taylor Hampton is a 29 y.o. female on day 2 of admission presenting with Sickle cell anemia with crisis (Multi).      Subjective   Feeling mildly better today. Continues with pain.   HGB continues to decline mildly.   Discussed her subutex and when to restart after when she is able to be discharged.        Objective     Last Recorded Vitals  BP (!) 162/92 (BP Location: Right arm, Patient Position: Lying)   Pulse 100   Temp 36.3 °C (97.4 °F) (Temporal)   Resp 18   Wt 144 kg (316 lb 6.4 oz)   SpO2 99%   Intake/Output last 3 Shifts:    Intake/Output Summary (Last 24 hours) at 9/19/2024 1459  Last data filed at 9/19/2024 1000  Gross per 24 hour   Intake 830 ml   Output --   Net 830 ml       Admission Weight  Weight: 138 kg (304 lb) (09/15/24 2008)    Daily Weight  09/16/24 : 144 kg (316 lb 6.4 oz)    Image Results  CT angio chest for pulmonary embolism  Narrative: Interpreted By:  Mu Nieves,   STUDY:  CT ANGIO CHEST FOR PULMONARY EMBOLISM;  9/16/2024 3:36 pm      INDICATION:  Signs/Symptoms:r/o PE.      COMPARISON:  09/05/2024      ACCESSION NUMBER(S):  [id number]      ORDERING CLINICIAN:  JOSSUE العلي      TECHNIQUE:  Helical data acquisition of the chest was obtained with  50 mL  Omnipaque 350. Images were reformatted in axial, coronal, and  sagittal planes.MIP reformatted images were also generated.      FINDINGS:  LUNGS and AIRWAYS:  Small areas of minor atelectasis are not significantly changed.  Otherwise the lungs appear clear and unremarkable. Minimal secretions  in the trachea, otherwise the central airways are patent. No  bronchiectasis.      No pleural effusion or pneumothorax.      MEDIASTINUM and KARENA, LOWER NECK AND AXILLA:  The visualized thyroid gland is grossly unremarkable.      No thoracic lymphadenopathy by CT criteria.      Esophagus is not dilated.      HEART and VESSELS:  Heart size upper limit of normal.      No significant pericardial effusion.      No pulmonary embolism identified.       Thoracic aorta and great vessels are grossly patent although not well  opacified. Right IJ central line is still in place.      UPPER ABDOMEN:  Post cholecystectomy.      CHEST WALL and OSSEOUS STRUCTURES:  No acute skeletal abnormalities.      Impression: No pulmonary embolism or new active disease in the chest identified.      Signed by: Mu Nieves 9/16/2024 4:13 PM  Dictation workstation:   [id number]  Electrocardiogram, 12-lead PRN ACS symptoms  Normal sinus rhythm with sinus arrhythmia  Normal ECG  When compared with ECG of 15-SEP-2024 20:32, (unconfirmed)  No significant change was found  ECG 12 lead  Normal sinus rhythm  Normal ECG  When compared with ECG of 12-SEP-2024 20:39,  No significant change was found    Results for orders placed or performed during the hospital encounter of 09/15/24 (from the past 24 hour(s))   CBC   Result Value Ref Range    WBC 12.1 (H) 4.4 - 11.3 x10*3/uL    nRBC 0.2 (H) 0.0 - 0.0 /100 WBCs    RBC 3.79 (L) 4.00 - 5.20 x10*6/uL    Hemoglobin 8.3 (L) 12.0 - 16.0 g/dL    Hematocrit 25.0 (L) 36.0 - 46.0 %    MCV 66 (L) 80 - 100 fL    MCH 21.9 (L) 26.0 - 34.0 pg    MCHC 33.2 32.0 - 36.0 g/dL    RDW 18.6 (H) 11.5 - 14.5 %    Platelets 240 150 - 450 x10*3/uL   Renal Function Panel   Result Value Ref Range    Glucose 132 (H) 74 - 99 mg/dL    Sodium 139 136 - 145 mmol/L    Potassium 3.8 3.5 - 5.3 mmol/L    Chloride 107 98 - 107 mmol/L    Bicarbonate 30 21 - 32 mmol/L    Anion Gap <7 (L) 10 - 20 mmol/L    Urea Nitrogen 6 6 - 23 mg/dL    Creatinine 0.71 0.50 - 1.05 mg/dL    eGFR >90 >60 mL/min/1.73m*2    Calcium 8.1 (L) 8.6 - 10.3 mg/dL    Phosphorus 2.5 2.5 - 4.9 mg/dL    Albumin 3.0 (L) 3.4 - 5.0 g/dL   Magnesium   Result Value Ref Range    Magnesium 1.57 (L) 1.60 - 2.40 mg/dL   Lactate Dehydrogenase   Result Value Ref Range     84 - 246 U/L   Bilirubin, Total   Result Value Ref Range    Bilirubin, Total 1.0 0.0 - 1.2 mg/dL     Physical Exam  Constitutional:       Appearance:  She is obese. She is not ill-appearing.   HENT:      Head: Atraumatic.      Nose: Nose normal.      Mouth/Throat:      Mouth: Mucous membranes are moist.   Eyes:      Extraocular Movements: Extraocular movements intact.      Pupils: Pupils are equal, round, and reactive to light.   Cardiovascular:      Rate and Rhythm: Normal rate and regular rhythm.      Right mediport  Pulmonary:      Effort: Pulmonary effort is normal.      Breath sounds: Normal breath sounds.   Abdominal:      General: Bowel sounds are normal.      Palpations: Abdomen is soft.   Musculoskeletal:         General: Normal range of motion.   Skin:     General: Skin is warm and dry.      Capillary Refill: Capillary refill takes less than 2 seconds.   Neurological:      General: No focal deficit present.      Mental Status: She is alert and oriented to person, place, and time.   Psychiatric:         Mood and Affect: Mood normal.         Behavior: Behavior normal.     Scheduled medications  docusate sodium, 100 mg, oral, BID  DULoxetine, 60 mg, oral, Daily  ferrous sulfate (325 mg ferrous sulfate), 65 mg of iron, oral, Daily with breakfast  folic acid, 1 mg, oral, Daily  guaiFENesin, 600 mg, oral, BID  hydroxyurea, 500 mg, oral, TID after meals  pregabalin, 50 mg, oral, Nightly  rivaroxaban, 20 mg, oral, Daily with evening meal      Continuous medications  sodium chloride 0.9%, 150 mL/hr, Last Rate: 150 mL/hr (09/19/24 1238)      PRN medications  PRN medications: acetaminophen, albuterol, diphenhydrAMINE, HYDROmorphone, ondansetron      Assessment/Plan        This patient has a central line   Reason for the central line remaining today? Parenteral medication    Assessment & Plan  Sickle cell anemia with crisis (Multi)    Sickle cell crisis (Multi)    #Sickle cell crisis  ~increased episodes over the past week  ~PE 6/12/24 on xarelto  ~dilaudid for pain  ~IVF  for increased hydration  ~sudden onset of chest pain with increasing d-dimer  ~CT angio  chest~neg for PE or active disease.   ~HGB 9.3>8.9  ~Heme/onc consult with Dr. Valiente appreciated     #Chest pain and cough  ~? ACS syndrome 2/2 SCA  ~Chest Ct neg PE  ~nursing instructed to obtain stat EKG with chest pain  ~monitor for fever  ~mucinex ordered for cough     Disposition: Admitted 2/2 sickle cell crisis.      I spent 25 minutes in the professional and overall care of this patient.              Tiana Kearney, APRN-CNP

## 2024-09-20 LAB
ALBUMIN SERPL BCP-MCNC: 3 G/DL (ref 3.4–5)
ANION GAP SERPL CALC-SCNC: <7 MMOL/L (ref 10–20)
ATRIAL RATE: 91 BPM
BILIRUB SERPL-MCNC: 1.2 MG/DL (ref 0–1.2)
BUN SERPL-MCNC: 6 MG/DL (ref 6–23)
CALCIUM SERPL-MCNC: 8 MG/DL (ref 8.6–10.3)
CHLORIDE SERPL-SCNC: 104 MMOL/L (ref 98–107)
CO2 SERPL-SCNC: 29 MMOL/L (ref 21–32)
CREAT SERPL-MCNC: 0.65 MG/DL (ref 0.5–1.05)
EGFRCR SERPLBLD CKD-EPI 2021: >90 ML/MIN/1.73M*2
ERYTHROCYTE [DISTWIDTH] IN BLOOD BY AUTOMATED COUNT: 18.8 % (ref 11.5–14.5)
GLUCOSE SERPL-MCNC: 137 MG/DL (ref 74–99)
HCT VFR BLD AUTO: 25.5 % (ref 36–46)
HGB BLD-MCNC: 8.6 G/DL (ref 12–16)
LDH SERPL L TO P-CCNC: 169 U/L (ref 84–246)
MAGNESIUM SERPL-MCNC: 1.62 MG/DL (ref 1.6–2.4)
MCH RBC QN AUTO: 22.1 PG (ref 26–34)
MCHC RBC AUTO-ENTMCNC: 33.7 G/DL (ref 32–36)
MCV RBC AUTO: 66 FL (ref 80–100)
NRBC BLD-RTO: 0.3 /100 WBCS (ref 0–0)
P AXIS: 55 DEGREES
P OFFSET: 206 MS
P ONSET: 152 MS
PHOSPHATE SERPL-MCNC: 2.9 MG/DL (ref 2.5–4.9)
PLATELET # BLD AUTO: 249 X10*3/UL (ref 150–450)
POTASSIUM SERPL-SCNC: 3.4 MMOL/L (ref 3.5–5.3)
PR INTERVAL: 144 MS
Q ONSET: 224 MS
QRS COUNT: 15 BEATS
QRS DURATION: 84 MS
QT INTERVAL: 378 MS
QTC CALCULATION(BAZETT): 464 MS
QTC FREDERICIA: 434 MS
R AXIS: 33 DEGREES
RBC # BLD AUTO: 3.89 X10*6/UL (ref 4–5.2)
SODIUM SERPL-SCNC: 135 MMOL/L (ref 136–145)
T AXIS: 26 DEGREES
T OFFSET: 413 MS
VENTRICULAR RATE: 91 BPM
WBC # BLD AUTO: 13.3 X10*3/UL (ref 4.4–11.3)

## 2024-09-20 PROCEDURE — 82247 BILIRUBIN TOTAL: CPT | Mod: IPSPLIT | Performed by: NURSE PRACTITIONER

## 2024-09-20 PROCEDURE — 84100 ASSAY OF PHOSPHORUS: CPT | Mod: IPSPLIT | Performed by: STUDENT IN AN ORGANIZED HEALTH CARE EDUCATION/TRAINING PROGRAM

## 2024-09-20 PROCEDURE — 99232 SBSQ HOSP IP/OBS MODERATE 35: CPT | Performed by: NURSE PRACTITIONER

## 2024-09-20 PROCEDURE — 2500000002 HC RX 250 W HCPCS SELF ADMINISTERED DRUGS (ALT 637 FOR MEDICARE OP, ALT 636 FOR OP/ED): Mod: IPSPLIT | Performed by: NURSE PRACTITIONER

## 2024-09-20 PROCEDURE — 2500000002 HC RX 250 W HCPCS SELF ADMINISTERED DRUGS (ALT 637 FOR MEDICARE OP, ALT 636 FOR OP/ED): Mod: IPSPLIT | Performed by: STUDENT IN AN ORGANIZED HEALTH CARE EDUCATION/TRAINING PROGRAM

## 2024-09-20 PROCEDURE — 2500000001 HC RX 250 WO HCPCS SELF ADMINISTERED DRUGS (ALT 637 FOR MEDICARE OP): Mod: IPSPLIT | Performed by: STUDENT IN AN ORGANIZED HEALTH CARE EDUCATION/TRAINING PROGRAM

## 2024-09-20 PROCEDURE — 83615 LACTATE (LD) (LDH) ENZYME: CPT | Mod: IPSPLIT | Performed by: NURSE PRACTITIONER

## 2024-09-20 PROCEDURE — 2500000004 HC RX 250 GENERAL PHARMACY W/ HCPCS (ALT 636 FOR OP/ED): Mod: IPSPLIT | Performed by: NURSE PRACTITIONER

## 2024-09-20 PROCEDURE — 1100000001 HC PRIVATE ROOM DAILY: Mod: IPSPLIT

## 2024-09-20 PROCEDURE — 94760 N-INVAS EAR/PLS OXIMETRY 1: CPT | Mod: IPSPLIT

## 2024-09-20 PROCEDURE — 85027 COMPLETE CBC AUTOMATED: CPT | Mod: IPSPLIT | Performed by: STUDENT IN AN ORGANIZED HEALTH CARE EDUCATION/TRAINING PROGRAM

## 2024-09-20 PROCEDURE — 83735 ASSAY OF MAGNESIUM: CPT | Mod: IPSPLIT | Performed by: STUDENT IN AN ORGANIZED HEALTH CARE EDUCATION/TRAINING PROGRAM

## 2024-09-20 RX ORDER — POTASSIUM CHLORIDE 20 MEQ/1
20 TABLET, EXTENDED RELEASE ORAL ONCE
Status: COMPLETED | OUTPATIENT
Start: 2024-09-20 | End: 2024-09-20

## 2024-09-20 ASSESSMENT — COGNITIVE AND FUNCTIONAL STATUS - GENERAL
DAILY ACTIVITIY SCORE: 24
MOBILITY SCORE: 24

## 2024-09-20 ASSESSMENT — PAIN - FUNCTIONAL ASSESSMENT
PAIN_FUNCTIONAL_ASSESSMENT: 0-10

## 2024-09-20 ASSESSMENT — PAIN SCALES - GENERAL
PAINLEVEL_OUTOF10: 7
PAINLEVEL_OUTOF10: 8
PAINLEVEL_OUTOF10: 7
PAINLEVEL_OUTOF10: 3
PAINLEVEL_OUTOF10: 8
PAINLEVEL_OUTOF10: 7
PAINLEVEL_OUTOF10: 8

## 2024-09-20 ASSESSMENT — PAIN DESCRIPTION - LOCATION
LOCATION: BACK
LOCATION: BACK

## 2024-09-20 ASSESSMENT — PAIN DESCRIPTION - DESCRIPTORS: DESCRIPTORS: HEADACHE

## 2024-09-20 ASSESSMENT — PAIN DESCRIPTION - ORIENTATION: ORIENTATION: RIGHT;LEFT

## 2024-09-20 NOTE — CARE PLAN
The clinical goals for the shift include pt will report pain less than 5 by throughout the shift.    Over the shift, the patient did not make progress toward the following goals. Pt resting in bed and with uneventful night. VSS, RA. Continue NS @ 150ml/hr. Dilaudid PRN for control pain d/t sickle cell crisis. Ambulated independently to bathroom. No sign of distress. Continue POC and call light within reach.

## 2024-09-20 NOTE — PROGRESS NOTES
Taylor Hampton is a 29 y.o. female on day 3 of admission presenting with Sickle cell anemia with crisis (Multi).      Subjective   Pain partially controlled. Pain controlled transitioned to routine for overnight with plan for discharge in the morning.   H/H stable today.   DC IVF.        Objective     Last Recorded Vitals  /86 (BP Location: Right arm, Patient Position: Lying)   Pulse 98   Temp 36.1 °C (96.9 °F) (Temporal)   Resp 18   Wt 144 kg (316 lb 6.4 oz)   SpO2 98%   Intake/Output last 3 Shifts:    Intake/Output Summary (Last 24 hours) at 9/20/2024 1333  Last data filed at 9/20/2024 1240  Gross per 24 hour   Intake 1260 ml   Output --   Net 1260 ml       Admission Weight  Weight: 138 kg (304 lb) (09/15/24 2008)    Daily Weight  09/16/24 : 144 kg (316 lb 6.4 oz)    Image Results  ECG 12 lead  Normal sinus rhythm  Normal ECG  When compared with ECG of 12-SEP-2024 20:39,  No significant change was found  See ED provider note for full interpretation and clinical correlation  Confirmed by Elsi Mercado ([id number]) on 9/19/2024 11:32:38 PM    Results for orders placed or performed during the hospital encounter of 09/15/24 (from the past 24 hour(s))   CBC   Result Value Ref Range    WBC 13.3 (H) 4.4 - 11.3 x10*3/uL    nRBC 0.3 (H) 0.0 - 0.0 /100 WBCs    RBC 3.89 (L) 4.00 - 5.20 x10*6/uL    Hemoglobin 8.6 (L) 12.0 - 16.0 g/dL    Hematocrit 25.5 (L) 36.0 - 46.0 %    MCV 66 (L) 80 - 100 fL    MCH 22.1 (L) 26.0 - 34.0 pg    MCHC 33.7 32.0 - 36.0 g/dL    RDW 18.8 (H) 11.5 - 14.5 %    Platelets 249 150 - 450 x10*3/uL   Renal Function Panel   Result Value Ref Range    Glucose 137 (H) 74 - 99 mg/dL    Sodium 135 (L) 136 - 145 mmol/L    Potassium 3.4 (L) 3.5 - 5.3 mmol/L    Chloride 104 98 - 107 mmol/L    Bicarbonate 29 21 - 32 mmol/L    Anion Gap <7 (L) 10 - 20 mmol/L    Urea Nitrogen 6 6 - 23 mg/dL    Creatinine 0.65 0.50 - 1.05 mg/dL    eGFR >90 >60 mL/min/1.73m*2    Calcium 8.0 (L) 8.6 - 10.3 mg/dL     Phosphorus 2.9 2.5 - 4.9 mg/dL    Albumin 3.0 (L) 3.4 - 5.0 g/dL   Magnesium   Result Value Ref Range    Magnesium 1.62 1.60 - 2.40 mg/dL   Lactate Dehydrogenase   Result Value Ref Range     84 - 246 U/L   Bilirubin, Total   Result Value Ref Range    Bilirubin, Total 1.2 0.0 - 1.2 mg/dL     Physical Exam  Constitutional:       Appearance: She is obese. She is not ill-appearing.   HENT:      Head: Atraumatic.      Nose: Nose normal.      Mouth/Throat:      Mouth: Mucous membranes are moist.   Eyes:      Extraocular Movements: Extraocular movements intact.      Pupils: Pupils are equal, round, and reactive to light.   Cardiovascular:      Rate and Rhythm: Normal rate and regular rhythm.      Right mediport  Pulmonary:      Effort: Pulmonary effort is normal.      Breath sounds: Normal breath sounds.   Abdominal:      General: Bowel sounds are normal.      Palpations: Abdomen is soft.   Musculoskeletal:         General: Normal range of motion.   Skin:     General: Skin is warm and dry.      Capillary Refill: Capillary refill takes less than 2 seconds.   Neurological:      General: No focal deficit present.      Mental Status: She is alert and oriented to person, place, and time.   Psychiatric:         Mood and Affect: Mood normal.         Behavior: Behavior normal.     Scheduled medications  docusate sodium, 100 mg, oral, BID  DULoxetine, 60 mg, oral, Daily  ferrous sulfate (325 mg ferrous sulfate), 65 mg of iron, oral, Daily with breakfast  folic acid, 1 mg, oral, Daily  guaiFENesin, 600 mg, oral, BID  HYDROmorphone, 2 mg, intravenous, q2h  hydroxyurea, 500 mg, oral, TID after meals  pregabalin, 50 mg, oral, Nightly  rivaroxaban, 20 mg, oral, Daily with evening meal      Continuous medications     PRN medications  PRN medications: acetaminophen, albuterol, diphenhydrAMINE, ondansetron    Assessment/Plan      Assessment & Plan  Sickle cell anemia with crisis (Multi)    Sickle cell crisis (Multi)    #Sickle cell  crisis  ~increased episodes over the past week  ~PE 6/12/24 on xarelto  ~dilaudid for pain  ~IVF  for increased hydration  ~sudden onset of chest pain with increasing d-dimer  ~CT angio chest~neg for PE or active disease.   ~HGB 9.3>8.9>8.3>8.6  ~Heme/onc consult with Dr. Valiente appreciated     #Chest pain and cough  ~? ACS syndrome 2/2 SCA  ~Chest Ct neg PE  ~nursing instructed to obtain stat EKG with chest pain  ~monitor for fever  ~mucinex ordered for cough     Disposition: Admitted 2/2 sickle cell crisis. Plan for discharge 9/20/24     I spent 25 minutes in the professional and overall care of this patient.      Tiana Kearney, APRN-CNP

## 2024-09-21 VITALS
TEMPERATURE: 98 F | OXYGEN SATURATION: 97 % | DIASTOLIC BLOOD PRESSURE: 68 MMHG | RESPIRATION RATE: 18 BRPM | HEIGHT: 63 IN | SYSTOLIC BLOOD PRESSURE: 139 MMHG | HEART RATE: 81 BPM | BODY MASS INDEX: 51.91 KG/M2 | WEIGHT: 293 LBS

## 2024-09-21 PROBLEM — D57.00 SICKLE CELL CRISIS (MULTI): Status: RESOLVED | Noted: 2024-09-17 | Resolved: 2024-09-21

## 2024-09-21 PROBLEM — D57.00 SICKLE CELL ANEMIA WITH CRISIS (MULTI): Status: RESOLVED | Noted: 2024-09-16 | Resolved: 2024-09-21

## 2024-09-21 LAB
ALBUMIN SERPL BCP-MCNC: 3 G/DL (ref 3.4–5)
ANION GAP SERPL CALC-SCNC: <7 MMOL/L (ref 10–20)
BILIRUB SERPL-MCNC: 1.2 MG/DL (ref 0–1.2)
BUN SERPL-MCNC: 7 MG/DL (ref 6–23)
CALCIUM SERPL-MCNC: 8.4 MG/DL (ref 8.6–10.3)
CHLORIDE SERPL-SCNC: 104 MMOL/L (ref 98–107)
CO2 SERPL-SCNC: 31 MMOL/L (ref 21–32)
CREAT SERPL-MCNC: 0.7 MG/DL (ref 0.5–1.05)
EGFRCR SERPLBLD CKD-EPI 2021: >90 ML/MIN/1.73M*2
ERYTHROCYTE [DISTWIDTH] IN BLOOD BY AUTOMATED COUNT: 18.7 % (ref 11.5–14.5)
GLUCOSE SERPL-MCNC: 131 MG/DL (ref 74–99)
HCT VFR BLD AUTO: 25.9 % (ref 36–46)
HGB BLD-MCNC: 8.6 G/DL (ref 12–16)
LDH SERPL L TO P-CCNC: 150 U/L (ref 84–246)
MAGNESIUM SERPL-MCNC: 1.67 MG/DL (ref 1.6–2.4)
MCH RBC QN AUTO: 21.9 PG (ref 26–34)
MCHC RBC AUTO-ENTMCNC: 33.2 G/DL (ref 32–36)
MCV RBC AUTO: 66 FL (ref 80–100)
NRBC BLD-RTO: 0.2 /100 WBCS (ref 0–0)
PHOSPHATE SERPL-MCNC: 3.4 MG/DL (ref 2.5–4.9)
PLATELET # BLD AUTO: 243 X10*3/UL (ref 150–450)
POTASSIUM SERPL-SCNC: 3.6 MMOL/L (ref 3.5–5.3)
RBC # BLD AUTO: 3.93 X10*6/UL (ref 4–5.2)
SODIUM SERPL-SCNC: 136 MMOL/L (ref 136–145)
WBC # BLD AUTO: 13.2 X10*3/UL (ref 4.4–11.3)

## 2024-09-21 PROCEDURE — 82247 BILIRUBIN TOTAL: CPT | Mod: IPSPLIT | Performed by: NURSE PRACTITIONER

## 2024-09-21 PROCEDURE — 85027 COMPLETE CBC AUTOMATED: CPT | Mod: IPSPLIT | Performed by: NURSE PRACTITIONER

## 2024-09-21 PROCEDURE — 2500000004 HC RX 250 GENERAL PHARMACY W/ HCPCS (ALT 636 FOR OP/ED): Mod: IPSPLIT | Performed by: NURSE PRACTITIONER

## 2024-09-21 PROCEDURE — 94760 N-INVAS EAR/PLS OXIMETRY 1: CPT | Mod: IPSPLIT

## 2024-09-21 PROCEDURE — 83735 ASSAY OF MAGNESIUM: CPT | Mod: IPSPLIT | Performed by: NURSE PRACTITIONER

## 2024-09-21 PROCEDURE — 80069 RENAL FUNCTION PANEL: CPT | Mod: IPSPLIT | Performed by: NURSE PRACTITIONER

## 2024-09-21 PROCEDURE — 2500000001 HC RX 250 WO HCPCS SELF ADMINISTERED DRUGS (ALT 637 FOR MEDICARE OP): Mod: IPSPLIT | Performed by: STUDENT IN AN ORGANIZED HEALTH CARE EDUCATION/TRAINING PROGRAM

## 2024-09-21 PROCEDURE — 83615 LACTATE (LD) (LDH) ENZYME: CPT | Mod: IPSPLIT | Performed by: NURSE PRACTITIONER

## 2024-09-21 PROCEDURE — 2500000004 HC RX 250 GENERAL PHARMACY W/ HCPCS (ALT 636 FOR OP/ED): Mod: IPSPLIT

## 2024-09-21 PROCEDURE — 99238 HOSP IP/OBS DSCHRG MGMT 30/<: CPT | Performed by: NURSE PRACTITIONER

## 2024-09-21 RX ORDER — METHOCARBAMOL 500 MG/1
1000 TABLET, FILM COATED ORAL 3 TIMES DAILY PRN
Start: 2024-09-21

## 2024-09-21 RX ORDER — GUAIFENESIN 600 MG/1
600 TABLET, EXTENDED RELEASE ORAL EVERY 12 HOURS PRN
Qty: 30 TABLET | Refills: 0 | Status: SHIPPED | OUTPATIENT
Start: 2024-09-21

## 2024-09-21 RX ORDER — HEPARIN SODIUM,PORCINE/PF 10 UNIT/ML
SYRINGE (ML) INTRAVENOUS
Status: COMPLETED
Start: 2024-09-21 | End: 2024-09-21

## 2024-09-21 ASSESSMENT — PAIN SCALES - GENERAL
PAINLEVEL_OUTOF10: 7
PAINLEVEL_OUTOF10: 4

## 2024-09-21 ASSESSMENT — PAIN - FUNCTIONAL ASSESSMENT
PAIN_FUNCTIONAL_ASSESSMENT: 0-10

## 2024-09-21 NOTE — DISCHARGE SUMMARY
Discharge Diagnosis  Sickle cell anemia with crisis (Multi)    Issues Requiring Follow-Up  Follow up with PCP as needed.     Discharge Meds     Medication List      START taking these medications     guaiFENesin 600 mg 12 hr tablet; Commonly known as: Mucinex; Take 1   tablet (600 mg) by mouth every 12 hours if needed for cough. Do not crush,   chew, or split.     CHANGE how you take these medications     methocarbamol 500 mg tablet; Commonly known as: Robaxin; Take 2 tablets   (1,000 mg) by mouth 3 times a day as needed for muscle spasms.; What   changed: when to take this, reasons to take this     CONTINUE taking these medications     acetaminophen 500 mg tablet; Commonly known as: Tylenol   albuterol 2.5 mg /3 mL (0.083 %) nebulizer solution   buprenorphine 2 mg; Commonly known as: Subutex   docusate sodium 100 mg capsule; Commonly known as: Colace   DULoxetine 60 mg DR capsule; Commonly known as: Cymbalta   ferrous sulfate 325 (65 Fe) MG EC tablet   folic acid 1 mg tablet; Commonly known as: Folvite   HYDROmorphone 2 mg tablet; Commonly known as: Dilaudid   hydroxyurea 500 mg capsule; Commonly known as: Hydrea   pregabalin 50 mg capsule; Commonly known as: Lyrica   sodium chloride 0.65 % nasal spray; Commonly known as: Ocean   Xarelto 20 mg tablet; Generic drug: rivaroxaban     STOP taking these medications     voxelotor 500 mg tablet tablet; Commonly known as: OXBRYTA       Test Results Pending At Discharge  Pending Labs       Order Current Status    Extra Urine Gray Tube Collected (09/15/24 2308)    Urinalysis with Reflex Culture and Microscopic In process            Hospital Course   #Sickle cell crisis  ~increased episodes over the past week  ~PE 6/12/24 on xarelto  ~dilaudid for pain  ~IVF NS  for increased hydration  ~CT angio chest~neg for PE or active disease~continue xarelto  ~HGB 9.3>8.9>8.3>8.6  ~Heme/onc consult with Dr. Rocco leiva  ~discharge with hydroxyurea TID     #Chest pain and  cough  ~Chest Ct neg PE  ~mucinex ordered as needed for cough    Pertinent Physical Exam At Time of Discharge  Constitutional:       Appearance: She is obese. She is not ill-appearing.   HENT:      Head: Atraumatic.      Nose: Nose normal.      Mouth/Throat:      Mouth: Mucous membranes are moist.   Eyes:      Extraocular Movements: Extraocular movements intact.      Pupils: Pupils are equal, round, and reactive to light.   Cardiovascular:      Rate and Rhythm: Normal rate and regular rhythm.      Right mediport  Pulmonary:      Effort: Pulmonary effort is normal.      Breath sounds: Normal breath sounds.   Abdominal:      General: Bowel sounds are normal.      Palpations: Abdomen is soft.   Musculoskeletal:         General: Normal range of motion.   Skin:     General: Skin is warm and dry.      Capillary Refill: Capillary refill takes less than 2 seconds.   Neurological:      General: No focal deficit present.      Mental Status: She is alert and oriented to person, place, and time.   Psychiatric:         Mood and Affect: Mood normal.         Behavior: Behavior normal.   Outpatient Follow-Up  No future appointments.      Tiana Kearney, APRN-CNP

## 2024-09-21 NOTE — CARE PLAN
The clinical goals for the shift include pt will report pain less than 5 by throughout the shift.    Over the shift, the patient did make progress toward the following goals. Pt resting in bed with uneventful night. VSSKAVITA. Rated pain 7/10 and scheduled Dilaudid given per order. Mediport care per protocol. No sign of distress at this time and call light within reach.

## 2024-09-21 NOTE — CARE PLAN
Patient had uneventful shift. VSS. Patient up in chair for meals. Used call bell for all patient needs. Tolerating ordered medications this shift. Patient verbalized understanding of all discharge instructions

## 2024-09-23 ENCOUNTER — HOSPITAL ENCOUNTER (EMERGENCY)
Facility: HOSPITAL | Age: 29
Discharge: HOME | End: 2024-09-23
Attending: FAMILY MEDICINE
Payer: MEDICARE

## 2024-09-23 VITALS
BODY MASS INDEX: 51.91 KG/M2 | TEMPERATURE: 97.7 F | OXYGEN SATURATION: 99 % | SYSTOLIC BLOOD PRESSURE: 137 MMHG | WEIGHT: 293 LBS | HEIGHT: 63 IN | DIASTOLIC BLOOD PRESSURE: 78 MMHG | HEART RATE: 81 BPM | RESPIRATION RATE: 18 BRPM

## 2024-09-23 DIAGNOSIS — D57.00 SICKLE CELL CRISIS (MULTI): Primary | ICD-10-CM

## 2024-09-23 LAB
ALBUMIN SERPL BCP-MCNC: 3.6 G/DL (ref 3.4–5)
ALP SERPL-CCNC: 91 U/L (ref 33–110)
ALT SERPL W P-5'-P-CCNC: 9 U/L (ref 7–45)
ANION GAP SERPL CALC-SCNC: 10 MMOL/L (ref 10–20)
APPEARANCE UR: CLEAR
AST SERPL W P-5'-P-CCNC: 13 U/L (ref 9–39)
BASOPHILS # BLD AUTO: 0.03 X10*3/UL (ref 0–0.1)
BASOPHILS NFR BLD AUTO: 0.2 %
BILIRUB SERPL-MCNC: 1.3 MG/DL (ref 0–1.2)
BILIRUB UR STRIP.AUTO-MCNC: NEGATIVE MG/DL
BUN SERPL-MCNC: 9 MG/DL (ref 6–23)
CALCIUM SERPL-MCNC: 8.9 MG/DL (ref 8.6–10.3)
CHLORIDE SERPL-SCNC: 102 MMOL/L (ref 98–107)
CO2 SERPL-SCNC: 28 MMOL/L (ref 21–32)
COLOR UR: YELLOW
CREAT SERPL-MCNC: 0.77 MG/DL (ref 0.5–1.05)
EGFRCR SERPLBLD CKD-EPI 2021: >90 ML/MIN/1.73M*2
EOSINOPHIL # BLD AUTO: 0.14 X10*3/UL (ref 0–0.7)
EOSINOPHIL NFR BLD AUTO: 1.1 %
ERYTHROCYTE [DISTWIDTH] IN BLOOD BY AUTOMATED COUNT: 19 % (ref 11.5–14.5)
GLUCOSE SERPL-MCNC: 97 MG/DL (ref 74–99)
GLUCOSE UR STRIP.AUTO-MCNC: NEGATIVE MG/DL
HCG UR QL IA.RAPID: NEGATIVE
HCT VFR BLD AUTO: 27.9 % (ref 36–46)
HGB BLD-MCNC: 9.2 G/DL (ref 12–16)
HGB RETIC QN: 23 PG (ref 28–38)
IMM GRANULOCYTES # BLD AUTO: 0.08 X10*3/UL (ref 0–0.7)
IMM GRANULOCYTES NFR BLD AUTO: 0.6 % (ref 0–0.9)
IMMATURE RETIC FRACTION: 30.8 %
KETONES UR STRIP.AUTO-MCNC: NEGATIVE MG/DL
LEUKOCYTE ESTERASE UR QL STRIP.AUTO: NEGATIVE
LYMPHOCYTES # BLD AUTO: 3.03 X10*3/UL (ref 1.2–4.8)
LYMPHOCYTES NFR BLD AUTO: 23 %
MCH RBC QN AUTO: 21.3 PG (ref 26–34)
MCHC RBC AUTO-ENTMCNC: 33 G/DL (ref 32–36)
MCV RBC AUTO: 65 FL (ref 80–100)
MONOCYTES # BLD AUTO: 0.84 X10*3/UL (ref 0.1–1)
MONOCYTES NFR BLD AUTO: 6.4 %
NEUTROPHILS # BLD AUTO: 9.03 X10*3/UL (ref 1.2–7.7)
NEUTROPHILS NFR BLD AUTO: 68.7 %
NITRITE UR QL STRIP.AUTO: NEGATIVE
NRBC BLD-RTO: 0.2 /100 WBCS (ref 0–0)
PH UR STRIP.AUTO: 8 [PH]
PLATELET # BLD AUTO: 265 X10*3/UL (ref 150–450)
POTASSIUM SERPL-SCNC: 3.5 MMOL/L (ref 3.5–5.3)
PROT SERPL-MCNC: 7.6 G/DL (ref 6.4–8.2)
PROT UR STRIP.AUTO-MCNC: NEGATIVE MG/DL
RBC # BLD AUTO: 4.32 X10*6/UL (ref 4–5.2)
RBC # UR STRIP.AUTO: ABNORMAL /UL
RBC #/AREA URNS AUTO: >20 /HPF
RETICS #: 0.12 X10*6/UL (ref 0.02–0.08)
RETICS/RBC NFR AUTO: 2.7 % (ref 0.5–2)
SODIUM SERPL-SCNC: 136 MMOL/L (ref 136–145)
SP GR UR STRIP.AUTO: 1.01
SQUAMOUS #/AREA URNS AUTO: ABNORMAL /HPF
UROBILINOGEN UR STRIP.AUTO-MCNC: <2 MG/DL
WBC # BLD AUTO: 13.2 X10*3/UL (ref 4.4–11.3)
WBC #/AREA URNS AUTO: ABNORMAL /HPF

## 2024-09-23 PROCEDURE — 2500000004 HC RX 250 GENERAL PHARMACY W/ HCPCS (ALT 636 FOR OP/ED): Mod: SE | Performed by: FAMILY MEDICINE

## 2024-09-23 PROCEDURE — 84075 ASSAY ALKALINE PHOSPHATASE: CPT | Performed by: FAMILY MEDICINE

## 2024-09-23 PROCEDURE — 96374 THER/PROPH/DIAG INJ IV PUSH: CPT

## 2024-09-23 PROCEDURE — 2500000004 HC RX 250 GENERAL PHARMACY W/ HCPCS (ALT 636 FOR OP/ED): Mod: SE

## 2024-09-23 PROCEDURE — 81001 URINALYSIS AUTO W/SCOPE: CPT | Performed by: FAMILY MEDICINE

## 2024-09-23 PROCEDURE — 81025 URINE PREGNANCY TEST: CPT | Performed by: FAMILY MEDICINE

## 2024-09-23 PROCEDURE — 85025 COMPLETE CBC W/AUTO DIFF WBC: CPT | Performed by: FAMILY MEDICINE

## 2024-09-23 PROCEDURE — 99284 EMERGENCY DEPT VISIT MOD MDM: CPT | Mod: 25

## 2024-09-23 PROCEDURE — 85045 AUTOMATED RETICULOCYTE COUNT: CPT | Performed by: FAMILY MEDICINE

## 2024-09-23 RX ORDER — HEPARIN 100 UNIT/ML
5 SYRINGE INTRAVENOUS ONCE
Status: COMPLETED | OUTPATIENT
Start: 2024-09-23 | End: 2024-09-23

## 2024-09-23 RX ORDER — HEPARIN 100 UNIT/ML
SYRINGE INTRAVENOUS
Status: COMPLETED
Start: 2024-09-23 | End: 2024-09-23

## 2024-09-23 RX ORDER — HYDROMORPHONE HYDROCHLORIDE 1 MG/ML
2 INJECTION, SOLUTION INTRAMUSCULAR; INTRAVENOUS; SUBCUTANEOUS ONCE
Status: COMPLETED | OUTPATIENT
Start: 2024-09-23 | End: 2024-09-23

## 2024-09-23 ASSESSMENT — PAIN - FUNCTIONAL ASSESSMENT
PAIN_FUNCTIONAL_ASSESSMENT: 0-10
PAIN_FUNCTIONAL_ASSESSMENT: 0-10

## 2024-09-23 ASSESSMENT — PAIN SCALES - GENERAL
PAINLEVEL_OUTOF10: 2
PAINLEVEL_OUTOF10: 9

## 2024-09-23 NOTE — ED PROVIDER NOTES
HPI   Chief Complaint   Patient presents with    Sickle Cell Pain Crisis       29-year-old female with history of sickle cell disease comes to the ED with complaint of sickle cell pain crisis which began earlier today and has continued to persist despite taking her home medications.  Patient states that this feels like her typical sickle cell pain, and that the pain is in her chest, back, and legs.  Patient reports that she feels it was aggravated today by the start of her period.  Patient reports she was recently seen several times for her sickle cell pain and we did and discharged 2 days earlier.  Patient in the ED is alert, cooperative, appears uncomfortable, but in no distress.  Patient currently denies headache, blurred vision, neck pain, abdominal pain, shortness of breath, fevers, falls, recent travel, sick track, change in diet, nausea/vomiting/diarrhea, loss sensation, numbness/tingling, ataxia, dizziness, and weakness.      History provided by:  Patient and medical records   used: No            Patient History   Past Medical History:   Diagnosis Date    Sickle cell anemia with crisis (Multi)      Past Surgical History:   Procedure Laterality Date    CHOLECYSTECTOMY      CT ABDOMEN PELVIS ANGIOGRAM W AND/OR WO IV CONTRAST  04/09/2020    CT ABDOMEN PELVIS ANGIOGRAM W AND/OR WO IV CONTRAST 4/9/2020 [facility]    CT ANGIO NECK  09/27/2019    CT NECK ANGIO W AND WO IV CONTRAST 9/27/2019 [facility]    IR CVC TUNNELED  08/24/2020    IR CVC TUNNELED 8/24/2020 [facility]    KNEE Right     OTHER SURGICAL HISTORY  09/22/2020    Venous access port placement    OTHER SURGICAL HISTORY  09/22/2020    Tonsillectomy with adenoidectomy    OTHER SURGICAL HISTORY  09/22/2020    Knee surgery     No family history on file.  Social History     Tobacco Use    Smoking status: Never    Smokeless tobacco: Never   Vaping Use    Vaping status: Never Used   Substance Use Topics     Alcohol use: Never    Drug use: Never       Physical Exam   ED Triage Vitals [09/23/24 1223]   Temperature Heart Rate Respirations BP   36.5 °C (97.7 °F) 74 19 135/82      SpO2 Temp Source Heart Rate Source Patient Position   100 % Tympanic -- --      BP Location FiO2 (%)     -- --       Physical Exam  Vitals and nursing note reviewed.   Constitutional:       General: She is not in acute distress.     Appearance: She is well-developed.   HENT:      Head: Normocephalic and atraumatic.   Eyes:      Conjunctiva/sclera: Conjunctivae normal.   Cardiovascular:      Rate and Rhythm: Normal rate and regular rhythm.      Heart sounds: No murmur heard.  Pulmonary:      Effort: Pulmonary effort is normal. No respiratory distress.      Breath sounds: Normal breath sounds.   Abdominal:      Palpations: Abdomen is soft.      Tenderness: There is no abdominal tenderness.   Musculoskeletal:         General: No swelling.      Cervical back: Neck supple.   Skin:     General: Skin is warm and dry.      Capillary Refill: Capillary refill takes less than 2 seconds.   Neurological:      Mental Status: She is alert.   Psychiatric:         Mood and Affect: Mood normal.           ED Course & MDM   Diagnoses as of 09/23/24 1609   Sickle cell crisis (Multi)                 No data recorded     Jer Coma Scale Score: 15 (09/23/24 1225 : Jessica Cano RN)                       Labs Reviewed   CBC WITH AUTO DIFFERENTIAL - Abnormal       Result Value    WBC 13.2 (*)     nRBC 0.2 (*)     RBC 4.32      Hemoglobin 9.2 (*)     Hematocrit 27.9 (*)     MCV 65 (*)     MCH 21.3 (*)     MCHC 33.0      RDW 19.0 (*)     Platelets 265      Neutrophils % 68.7      Immature Granulocytes %, Automated 0.6      Lymphocytes % 23.0      Monocytes % 6.4      Eosinophils % 1.1      Basophils % 0.2      Neutrophils Absolute 9.03 (*)     Immature Granulocytes Absolute, Automated 0.08      Lymphocytes Absolute 3.03      Monocytes Absolute 0.84      Eosinophils  Absolute 0.14      Basophils Absolute 0.03     COMPREHENSIVE METABOLIC PANEL - Abnormal    Glucose 97      Sodium 136      Potassium 3.5      Chloride 102      Bicarbonate 28      Anion Gap 10      Urea Nitrogen 9      Creatinine 0.77      eGFR >90      Calcium 8.9      Albumin 3.6      Alkaline Phosphatase 91      Total Protein 7.6      AST 13      Bilirubin, Total 1.3 (*)     ALT 9     RETICULOCYTES - Abnormal    Retic % 2.7 (*)     Retic Absolute 0.115 (*)     Reticulocyte Hemoglobin 23 (*)     Immature Retic fraction 30.8 (*)    URINALYSIS WITH REFLEX CULTURE AND MICROSCOPIC - Abnormal    Color, Urine Yellow      Appearance, Urine Clear      Specific Gravity, Urine 1.012      pH, Urine 8.0      Protein, Urine NEGATIVE      Glucose, Urine NEGATIVE      Blood, Urine LARGE (3+) (*)     Ketones, Urine NEGATIVE      Bilirubin, Urine NEGATIVE      Urobilinogen, Urine <2.0      Nitrite, Urine NEGATIVE      Leukocyte Esterase, Urine NEGATIVE     URINALYSIS MICROSCOPIC WITH REFLEX CULTURE - Abnormal    WBC, Urine 1-5      RBC, Urine >20 (*)     Squamous Epithelial Cells, Urine 1-9 (SPARSE)     HCG, URINE, QUALITATIVE - Normal    HCG, Urine NEGATIVE     URINALYSIS WITH REFLEX CULTURE AND MICROSCOPIC    Narrative:     The following orders were created for panel order Urinalysis with Reflex Culture and Microscopic.  Procedure                               Abnormality         Status                     ---------                               -----------         ------                     Urinalysis with Reflex C...[[id number]]  Abnormal            Final result               Extra Urine Gray Tube[[id number]]                            In process                   Please view results for these tests on the individual orders.   EXTRA URINE GRAY TUBE         Medical Decision Making  Pt upon arrival to the ED appeared to be anxious and uncomfortable but in no distress with stable vitals.  Discussed with pt the presenting complaints  and clinically findings.  Reviewed with pt the epic chart and counseled the patient on sickle cell crisis and appropriate approach to management/treatments.  After assessment and evaluation IV line was started, IV fluids given, labs sent, given IV Dilaudid for pain per care path, placed on cardiac monitor, and observed.  After treatment and a period of rest patient steadily showed improvement in her pain and had no new physical complaints.  Patient tolerating her diet with no difficulties and vital signs continue be appropriate.  Final results were reviewed discussed with patient and findings appear to be consistent with her baseline.  Patient was encouraged to maintain her upcoming appoint this week with her heme oncologist at [facility] to discuss management of her sickle cell crises that continued to increase in frequency.  Patient also educated on her home medications for management of her pain and patient discharged home in stable condition.    Amount and/or Complexity of Data Reviewed  External Data Reviewed: labs, radiology, ECG and notes.  Labs: ordered. Decision-making details documented in ED Course.    Risk  OTC drugs.  Prescription drug management.        Procedure  Procedures     Jimmy Tsai MD  09/23/24 1640

## 2024-09-23 NOTE — ED TRIAGE NOTES
Pt arrived with sickle cell pain. Pt states was discharged from hospital on Saturday and pain returned Saturday.

## 2024-09-24 LAB — HOLD SPECIMEN: NORMAL

## 2024-10-01 ENCOUNTER — HOSPITAL ENCOUNTER (EMERGENCY)
Facility: HOSPITAL | Age: 29
Discharge: HOME | End: 2024-10-02
Attending: EMERGENCY MEDICINE
Payer: MEDICARE

## 2024-10-01 ENCOUNTER — APPOINTMENT (OUTPATIENT)
Dept: CARDIOLOGY | Facility: HOSPITAL | Age: 29
End: 2024-10-01
Payer: MEDICARE

## 2024-10-01 ENCOUNTER — APPOINTMENT (OUTPATIENT)
Dept: RADIOLOGY | Facility: HOSPITAL | Age: 29
End: 2024-10-01
Payer: MEDICARE

## 2024-10-01 DIAGNOSIS — J06.9 VIRAL UPPER RESPIRATORY TRACT INFECTION: ICD-10-CM

## 2024-10-01 DIAGNOSIS — D57.00 SICKLE CELL PAIN CRISIS (MULTI): Primary | ICD-10-CM

## 2024-10-01 LAB
ALBUMIN SERPL BCP-MCNC: 3.2 G/DL (ref 3.4–5)
ALP SERPL-CCNC: 69 U/L (ref 33–110)
ALT SERPL W P-5'-P-CCNC: 6 U/L (ref 7–45)
ANION GAP SERPL CALC-SCNC: 8 MMOL/L (ref 10–20)
AST SERPL W P-5'-P-CCNC: 10 U/L (ref 9–39)
B-HCG SERPL-ACNC: <2 MIU/ML
BASOPHILS # BLD AUTO: 0.02 X10*3/UL (ref 0–0.1)
BASOPHILS NFR BLD AUTO: 0.2 %
BILIRUB SERPL-MCNC: 1.2 MG/DL (ref 0–1.2)
BUN SERPL-MCNC: 9 MG/DL (ref 6–23)
CALCIUM SERPL-MCNC: 7.7 MG/DL (ref 8.6–10.3)
CARDIAC TROPONIN I PNL SERPL HS: <3 NG/L (ref 0–13)
CHLORIDE SERPL-SCNC: 110 MMOL/L (ref 98–107)
CO2 SERPL-SCNC: 24 MMOL/L (ref 21–32)
CREAT SERPL-MCNC: 0.67 MG/DL (ref 0.5–1.05)
EGFRCR SERPLBLD CKD-EPI 2021: >90 ML/MIN/1.73M*2
EOSINOPHIL # BLD AUTO: 0.34 X10*3/UL (ref 0–0.7)
EOSINOPHIL NFR BLD AUTO: 3.1 %
ERYTHROCYTE [DISTWIDTH] IN BLOOD BY AUTOMATED COUNT: 18.8 % (ref 11.5–14.5)
FLUAV RNA RESP QL NAA+PROBE: NOT DETECTED
FLUBV RNA RESP QL NAA+PROBE: NOT DETECTED
GLUCOSE SERPL-MCNC: 92 MG/DL (ref 74–99)
HCT VFR BLD AUTO: 27.1 % (ref 36–46)
HGB BLD-MCNC: 9.2 G/DL (ref 12–16)
HGB RETIC QN: 23 PG (ref 28–38)
HOLD SPECIMEN: NORMAL
IMM GRANULOCYTES # BLD AUTO: 0.05 X10*3/UL (ref 0–0.7)
IMM GRANULOCYTES NFR BLD AUTO: 0.5 % (ref 0–0.9)
IMMATURE RETIC FRACTION: 25.4 %
LYMPHOCYTES # BLD AUTO: 2.96 X10*3/UL (ref 1.2–4.8)
LYMPHOCYTES NFR BLD AUTO: 27.3 %
MAGNESIUM SERPL-MCNC: 1.45 MG/DL (ref 1.6–2.4)
MCH RBC QN AUTO: 21.5 PG (ref 26–34)
MCHC RBC AUTO-ENTMCNC: 33.9 G/DL (ref 32–36)
MCV RBC AUTO: 64 FL (ref 80–100)
MONOCYTES # BLD AUTO: 0.67 X10*3/UL (ref 0.1–1)
MONOCYTES NFR BLD AUTO: 6.2 %
NEUTROPHILS # BLD AUTO: 6.79 X10*3/UL (ref 1.2–7.7)
NEUTROPHILS NFR BLD AUTO: 62.7 %
NRBC BLD-RTO: 0 /100 WBCS (ref 0–0)
PLATELET # BLD AUTO: 287 X10*3/UL (ref 150–450)
POTASSIUM SERPL-SCNC: 3.2 MMOL/L (ref 3.5–5.3)
PROT SERPL-MCNC: 6.7 G/DL (ref 6.4–8.2)
RBC # BLD AUTO: 4.27 X10*6/UL (ref 4–5.2)
RETICS #: 0.1 X10*6/UL (ref 0.02–0.08)
RETICS/RBC NFR AUTO: 2.3 % (ref 0.5–2)
S PYO DNA THROAT QL NAA+PROBE: NOT DETECTED
SARS-COV-2 RNA RESP QL NAA+PROBE: NOT DETECTED
SODIUM SERPL-SCNC: 139 MMOL/L (ref 136–145)
WBC # BLD AUTO: 10.8 X10*3/UL (ref 4.4–11.3)

## 2024-10-01 PROCEDURE — 87651 STREP A DNA AMP PROBE: CPT | Performed by: EMERGENCY MEDICINE

## 2024-10-01 PROCEDURE — 84702 CHORIONIC GONADOTROPIN TEST: CPT | Performed by: EMERGENCY MEDICINE

## 2024-10-01 PROCEDURE — 87636 SARSCOV2 & INF A&B AMP PRB: CPT | Performed by: EMERGENCY MEDICINE

## 2024-10-01 PROCEDURE — 96375 TX/PRO/DX INJ NEW DRUG ADDON: CPT

## 2024-10-01 PROCEDURE — 93005 ELECTROCARDIOGRAM TRACING: CPT

## 2024-10-01 PROCEDURE — 83735 ASSAY OF MAGNESIUM: CPT | Performed by: EMERGENCY MEDICINE

## 2024-10-01 PROCEDURE — 96376 TX/PRO/DX INJ SAME DRUG ADON: CPT

## 2024-10-01 PROCEDURE — 96366 THER/PROPH/DIAG IV INF ADDON: CPT

## 2024-10-01 PROCEDURE — 71045 X-RAY EXAM CHEST 1 VIEW: CPT

## 2024-10-01 PROCEDURE — 2500000001 HC RX 250 WO HCPCS SELF ADMINISTERED DRUGS (ALT 637 FOR MEDICARE OP): Mod: SE | Performed by: EMERGENCY MEDICINE

## 2024-10-01 PROCEDURE — 84484 ASSAY OF TROPONIN QUANT: CPT | Performed by: EMERGENCY MEDICINE

## 2024-10-01 PROCEDURE — 99284 EMERGENCY DEPT VISIT MOD MDM: CPT | Mod: 25

## 2024-10-01 PROCEDURE — 85025 COMPLETE CBC W/AUTO DIFF WBC: CPT | Performed by: EMERGENCY MEDICINE

## 2024-10-01 PROCEDURE — 2500000004 HC RX 250 GENERAL PHARMACY W/ HCPCS (ALT 636 FOR OP/ED): Mod: SE

## 2024-10-01 PROCEDURE — 96361 HYDRATE IV INFUSION ADD-ON: CPT

## 2024-10-01 PROCEDURE — 71045 X-RAY EXAM CHEST 1 VIEW: CPT | Mod: FOREIGN READ | Performed by: RADIOLOGY

## 2024-10-01 PROCEDURE — 85045 AUTOMATED RETICULOCYTE COUNT: CPT | Performed by: EMERGENCY MEDICINE

## 2024-10-01 PROCEDURE — 96365 THER/PROPH/DIAG IV INF INIT: CPT

## 2024-10-01 PROCEDURE — 2500000002 HC RX 250 W HCPCS SELF ADMINISTERED DRUGS (ALT 637 FOR MEDICARE OP, ALT 636 FOR OP/ED): Mod: SE,MUE | Performed by: EMERGENCY MEDICINE

## 2024-10-01 PROCEDURE — 80053 COMPREHEN METABOLIC PANEL: CPT | Performed by: EMERGENCY MEDICINE

## 2024-10-01 PROCEDURE — 2500000004 HC RX 250 GENERAL PHARMACY W/ HCPCS (ALT 636 FOR OP/ED): Mod: SE | Performed by: EMERGENCY MEDICINE

## 2024-10-01 RX ORDER — MAGNESIUM SULFATE HEPTAHYDRATE 40 MG/ML
2 INJECTION, SOLUTION INTRAVENOUS ONCE
Status: COMPLETED | OUTPATIENT
Start: 2024-10-01 | End: 2024-10-02

## 2024-10-01 RX ORDER — HYDROMORPHONE HYDROCHLORIDE 1 MG/ML
2 INJECTION, SOLUTION INTRAMUSCULAR; INTRAVENOUS; SUBCUTANEOUS
Status: COMPLETED | OUTPATIENT
Start: 2024-10-01 | End: 2024-10-02

## 2024-10-01 RX ORDER — HYDROMORPHONE HYDROCHLORIDE 1 MG/ML
INJECTION, SOLUTION INTRAMUSCULAR; INTRAVENOUS; SUBCUTANEOUS
Status: COMPLETED
Start: 2024-10-01 | End: 2024-10-01

## 2024-10-01 RX ORDER — HYDROMORPHONE HYDROCHLORIDE 1 MG/ML
1 INJECTION, SOLUTION INTRAMUSCULAR; INTRAVENOUS; SUBCUTANEOUS
Status: DISCONTINUED | OUTPATIENT
Start: 2024-10-01 | End: 2024-10-01

## 2024-10-01 RX ORDER — ONDANSETRON HYDROCHLORIDE 2 MG/ML
4 INJECTION, SOLUTION INTRAVENOUS ONCE
Status: COMPLETED | OUTPATIENT
Start: 2024-10-01 | End: 2024-10-01

## 2024-10-01 RX ORDER — BENZONATATE 100 MG/1
200 CAPSULE ORAL ONCE
Status: COMPLETED | OUTPATIENT
Start: 2024-10-01 | End: 2024-10-01

## 2024-10-01 RX ORDER — DIPHENHYDRAMINE HCL 25 MG
25 CAPSULE ORAL ONCE
Status: COMPLETED | OUTPATIENT
Start: 2024-10-01 | End: 2024-10-01

## 2024-10-01 RX ORDER — POTASSIUM CHLORIDE 20 MEQ/1
40 TABLET, EXTENDED RELEASE ORAL ONCE
Status: COMPLETED | OUTPATIENT
Start: 2024-10-01 | End: 2024-10-01

## 2024-10-01 ASSESSMENT — PAIN - FUNCTIONAL ASSESSMENT: PAIN_FUNCTIONAL_ASSESSMENT: 0-10

## 2024-10-01 ASSESSMENT — PAIN DESCRIPTION - LOCATION: LOCATION: BACK

## 2024-10-01 ASSESSMENT — PAIN SCALES - GENERAL
PAINLEVEL_OUTOF10: 9
PAINLEVEL_OUTOF10: 10 - WORST POSSIBLE PAIN

## 2024-10-02 VITALS
SYSTOLIC BLOOD PRESSURE: 165 MMHG | HEIGHT: 63 IN | DIASTOLIC BLOOD PRESSURE: 94 MMHG | BODY MASS INDEX: 51.91 KG/M2 | TEMPERATURE: 97.8 F | HEART RATE: 87 BPM | OXYGEN SATURATION: 97 % | WEIGHT: 293 LBS | RESPIRATION RATE: 18 BRPM

## 2024-10-02 PROBLEM — J06.9 VIRAL UPPER RESPIRATORY TRACT INFECTION: Status: ACTIVE | Noted: 2024-10-02

## 2024-10-02 LAB
ATRIAL RATE: 84 BPM
ATRIAL RATE: 93 BPM
CARDIAC TROPONIN I PNL SERPL HS: <3 NG/L (ref 0–13)
P AXIS: 45 DEGREES
P AXIS: 51 DEGREES
P OFFSET: 193 MS
P OFFSET: 196 MS
P ONSET: 143 MS
P ONSET: 146 MS
PR INTERVAL: 144 MS
PR INTERVAL: 148 MS
Q ONSET: 217 MS
Q ONSET: 218 MS
QRS COUNT: 14 BEATS
QRS COUNT: 15 BEATS
QRS DURATION: 92 MS
QRS DURATION: 94 MS
QT INTERVAL: 372 MS
QT INTERVAL: 382 MS
QTC CALCULATION(BAZETT): 451 MS
QTC CALCULATION(BAZETT): 462 MS
QTC FREDERICIA: 427 MS
QTC FREDERICIA: 430 MS
R AXIS: 25 DEGREES
R AXIS: 27 DEGREES
T AXIS: 14 DEGREES
T AXIS: 15 DEGREES
T OFFSET: 404 MS
T OFFSET: 408 MS
VENTRICULAR RATE: 84 BPM
VENTRICULAR RATE: 93 BPM

## 2024-10-02 PROCEDURE — 96376 TX/PRO/DX INJ SAME DRUG ADON: CPT

## 2024-10-02 PROCEDURE — 2500000004 HC RX 250 GENERAL PHARMACY W/ HCPCS (ALT 636 FOR OP/ED): Mod: SE | Performed by: EMERGENCY MEDICINE

## 2024-10-02 RX ORDER — BENZONATATE 100 MG/1
100 CAPSULE ORAL 3 TIMES DAILY PRN
Qty: 21 CAPSULE | Refills: 0 | Status: SHIPPED | OUTPATIENT
Start: 2024-10-02 | End: 2024-10-09

## 2024-10-02 RX ORDER — HEPARIN 100 UNIT/ML
5 SYRINGE INTRAVENOUS ONCE
Status: COMPLETED | OUTPATIENT
Start: 2024-10-02 | End: 2024-10-02

## 2024-10-02 ASSESSMENT — PAIN SCALES - GENERAL
PAINLEVEL_OUTOF10: 3
PAINLEVEL_OUTOF10: 8

## 2024-10-02 ASSESSMENT — HEART SCORE
AGE: <45
RISK FACTORS: 1-2 RISK FACTORS
TROPONIN: LESS THAN OR EQUAL TO NORMAL LIMIT
HISTORY: SLIGHTLY SUSPICIOUS
HEART SCORE: 2
ECG: NON-SPECIFIC REPOLARIZATION DISTURBANCE

## 2024-10-02 NOTE — ED PROVIDER NOTES
HPI   Chief Complaint   Patient presents with    Sickle Cell Pain Crisis     Pt walks in with sickle cell flare up. Pt has hx of sickle cell and states she was fine yesterday but today when she woke up she was in 10/10 pain all over, mostly in her chest, back and legs she states.         History provided by:  Medical records and patient   used: No      This patient presents to the emergency department ambulatory via private vehicle for evaluation of pain she attributes to flareup of her sickle cell.  Patient states she gets pain in her back, chest and both legs every time her sickle cell flares up.  Patient says it is kind of and waxing waning for the past couple of days but was worse today.  Patient states that she woke up this morning with cough, congestion, sore throat and she thinks that is what is aggravating the sickle cell.  She did a home COVID test which was negative.  She has not had any fever.  Patient says she has been nauseated but has not had any vomiting.  No diarrhea.  No urinary symptoms.  No palpitations, lightheadedness, dizziness, syncope.  No known ill exposure.  Pain is deep and constant.  She is not getting relief with her home medications.  No swelling of feet or legs.      Patient History   Past Medical History:   Diagnosis Date    Sickle cell anemia with crisis (Multi)      Past Surgical History:   Procedure Laterality Date    CHOLECYSTECTOMY      CT ABDOMEN PELVIS ANGIOGRAM W AND/OR WO IV CONTRAST  04/09/2020    CT ABDOMEN PELVIS ANGIOGRAM W AND/OR WO IV CONTRAST [facility]    CT ANGIO NECK  09/27/2019    CT NECK ANGIO W AND WO IV CONTRAST 9/27/2019 [facility]    IR CVC TUNNELED  08/24/2020    IR CVC TUNNELED 8/24/2020 [facility]    KNEE Right     OTHER SURGICAL HISTORY  09/22/2020    Venous access port placement    OTHER SURGICAL HISTORY  09/22/2020    Tonsillectomy with adenoidectomy    OTHER SURGICAL HISTORY  09/22/2020    Knee  surgery     No family history on file.  Social History     Tobacco Use    Smoking status: Never    Smokeless tobacco: Never   Vaping Use    Vaping status: Never Used   Substance Use Topics    Alcohol use: Never    Drug use: Never       Physical Exam   ED Triage Vitals   Temperature Heart Rate Respirations BP   10/01/24 2047 10/01/24 2047 10/01/24 2154 10/01/24 2045   36.6 °C (97.8 °F) 93 20 (!) 129/111      SpO2 Temp Source Heart Rate Source Patient Position   10/01/24 2045 10/01/24 2047 10/01/24 2154 10/01/24 2047   100 % Tympanic Monitor Sitting      BP Location FiO2 (%)     10/01/24 2047 --     Left arm        Physical Exam  Vitals reviewed.   Constitutional:       Appearance: Normal appearance. She is obese.   HENT:      Head: Normocephalic and atraumatic.      Right Ear: Tympanic membrane normal.      Left Ear: Tympanic membrane normal.      Nose: Congestion present.      Mouth/Throat:      Mouth: Mucous membranes are moist.      Pharynx: No oropharyngeal exudate or posterior oropharyngeal erythema.   Eyes:      Extraocular Movements: Extraocular movements intact.      Conjunctiva/sclera: Conjunctivae normal.      Pupils: Pupils are equal, round, and reactive to light.   Cardiovascular:      Rate and Rhythm: Normal rate and regular rhythm.      Pulses: Normal pulses.      Heart sounds: Normal heart sounds.   Pulmonary:      Effort: Pulmonary effort is normal.      Breath sounds: Normal breath sounds.   Abdominal:      General: Bowel sounds are normal. There is no distension.      Palpations: Abdomen is soft.      Tenderness: There is no abdominal tenderness.   Musculoskeletal:         General: Normal range of motion.      Cervical back: Normal range of motion and neck supple.      Right lower leg: No edema.      Left lower leg: No edema.   Lymphadenopathy:      Cervical: No cervical adenopathy.   Skin:     General: Skin is warm and dry.      Capillary Refill: Capillary refill takes less than 2 seconds.       Findings: No erythema or rash.   Neurological:      General: No focal deficit present.      Mental Status: She is alert and oriented to person, place, and time.   Psychiatric:         Mood and Affect: Mood normal.           ED Course & MDM   ED Course as of 10/02/24 0157   Tue Oct 01, 2024   2255 Patient reassessed, no better [MN]   2303 POTASSIUM(!): 3.2  Oral correction ordered [MN]   2303 MAGNESIUM(!): 1.45  IV correction ordered [MN]   Wed Oct 02, 2024   0001 Patient reassessed [MN]   0150 Patient reassessed [MN]      ED Course User Index  [MN] Lenore Duran MD         Diagnoses as of 10/02/24 0157   Sickle cell pain crisis (Multi)   Viral upper respiratory tract infection     Labs Reviewed   CBC WITH AUTO DIFFERENTIAL - Abnormal       Result Value    WBC 10.8      nRBC 0.0      RBC 4.27      Hemoglobin 9.2 (*)     Hematocrit 27.1 (*)     MCV 64 (*)     MCH 21.5 (*)     MCHC 33.9      RDW 18.8 (*)     Platelets 287      Neutrophils % 62.7      Immature Granulocytes %, Automated 0.5      Lymphocytes % 27.3      Monocytes % 6.2      Eosinophils % 3.1      Basophils % 0.2      Neutrophils Absolute 6.79      Immature Granulocytes Absolute, Automated 0.05      Lymphocytes Absolute 2.96      Monocytes Absolute 0.67      Eosinophils Absolute 0.34      Basophils Absolute 0.02     COMPREHENSIVE METABOLIC PANEL - Abnormal    Glucose 92      Sodium 139      Potassium 3.2 (*)     Chloride 110 (*)     Bicarbonate 24      Anion Gap 8 (*)     Urea Nitrogen 9      Creatinine 0.67      eGFR >90      Calcium 7.7 (*)     Albumin 3.2 (*)     Alkaline Phosphatase 69      Total Protein 6.7      AST 10      Bilirubin, Total 1.2      ALT 6 (*)    MAGNESIUM - Abnormal    Magnesium 1.45 (*)    RETICULOCYTES - Abnormal    Retic % 2.3 (*)     Retic Absolute 0.097 (*)     Reticulocyte Hemoglobin 23 (*)     Immature Retic fraction 25.4 (*)    GROUP A STREPTOCOCCUS, PCR - Normal    Group A Strep PCR Not Detected     HUMAN CHORIONIC  GONADOTROPIN, SERUM QUANTITATIVE - Normal    HCG, Beta-Quantitative <2      Narrative:      Total HCG measurement is performed using the [employer] Access   Immunoassay which detects intact HCG and free beta HCG subunit.    This test is not indicated for use as a tumor marker.   HCG testing is performed using a different test methodology at [facility] than [facility]. Direct result comparison   should only be made within the same method.       INFLUENZA A AND B PCR - Normal    Flu A Result Not Detected      Flu B Result Not Detected      Narrative:     This assay is an in vitro diagnostic multiplex nucleic acid amplification test for the detection and discrimination of Influenza A & B from nasopharyngeal specimens, and has been validated for use at [facility]. Negative results do not preclude Influenza A/B infections, and should not be used as the sole basis for diagnosis, treatment, or other management decisions. If Influenza A/B and RSV PCR results are negative, testing for Parainfluenza virus, Adenovirus and Metapneumovirus is routinely performed for [facility] pediatric oncology and intensive care inpatients, and is available on other patients by placing an add-on request.   SARS-COV-2 PCR - Normal    Coronavirus 2019, PCR Not Detected      Narrative:     This assay has received FDA Emergency Use Authorization (EUA) and is only authorized for the duration of time that circumstances exist to justify the authorization of the emergency use of in vitro diagnostic tests for the detection of SARS-CoV-2 virus and/or diagnosis of COVID-19 infection under section 564(b)(1) of the Act, 21 U.S.C. 360bbb-3(b)(1). This assay is an in vitro diagnostic nucleic acid amplification test for the qualitative detection of SARS-CoV-2 from nasopharyngeal specimens and has been validated for use at [facility]. Negative results do not preclude COVID-19 infections  and should not be used as the sole basis for diagnosis, treatment, or other management decisions.     SERIAL TROPONIN-INITIAL - Normal    Troponin I, High Sensitivity <3      Narrative:     Less than 99th percentile of normal range cutoff-  Female and children under 18 years old <14 ng/L; Male <21 ng/L: Negative  Repeat testing should be performed if clinically indicated.     Female and children under 18 years old 14-50 ng/L; Male 21-50 ng/L:  Consistent with possible cardiac damage and possible increased clinical   risk. Serial measurements may help to assess extent of myocardial damage.     >50 ng/L: Consistent with cardiac damage, increased clinical risk and  myocardial infarction. Serial measurements may help assess extent of   myocardial damage.      NOTE: Children less than 1 year old may have higher baseline troponin   levels and results should be interpreted in conjunction with the overall   clinical context.     NOTE: Troponin I testing is performed using a different   testing methodology at [facility] than at other   [facility]. Direct result comparisons should only   be made within the same method.   SERIAL TROPONIN, 1 HOUR - Normal    Troponin I, High Sensitivity <3      Narrative:     Less than 99th percentile of normal range cutoff-  Female and children under 18 years old <14 ng/L; Male <21 ng/L: Negative  Repeat testing should be performed if clinically indicated.     Female and children under 18 years old 14-50 ng/L; Male 21-50 ng/L:  Consistent with possible cardiac damage and possible increased clinical   risk. Serial measurements may help to assess extent of myocardial damage.     >50 ng/L: Consistent with cardiac damage, increased clinical risk and  myocardial infarction. Serial measurements may help assess extent of   myocardial damage.      NOTE: Children less than 1 year old may have higher baseline troponin   levels and results should be interpreted in conjunction with the  overall   clinical context.     NOTE: Troponin I testing is performed using a different   testing methodology at [facility] than at other   [facility] hospitals. Direct result comparisons should only   be made within the same method.   TROPONIN SERIES- (INITIAL, 1 HR)    Narrative:     The following orders were created for panel order Troponin I Series, High Sensitivity (0, 1 HR).  Procedure                               Abnormality         Status                     ---------                               -----------         ------                     Troponin I, High Sensiti...[[id number]]  Normal              Final result               Troponin, High Sensitivi...[[id number]]  Normal              Final result                 Please view results for these tests on the individual orders.     XR chest 1 view   Final Result   No findings of an acute cardiopulmonary process.   Signed by Tashi Huff MD        ED Medication Administration from 10/01/2024 2040 to 10/02/2024 0153         Date/Time Order Dose Route Action Action by     10/01/2024 2144 EDT sodium chloride 0.9 % bolus 1,000 mL 1,000 mL intravenous New Bag Jerome, T     10/01/2024 2150 EDT benzonatate (Tessalon) capsule 200 mg 200 mg oral Given Jerome, T     10/01/2024 2150 EDT diphenhydrAMINE (BENADryl) capsule 25 mg 25 mg oral Given Jerome, T     10/01/2024 2150 EDT ondansetron (Zofran) injection 4 mg 4 mg intravenous Given Jerome, T     10/01/2024 2151 EDT HYDROmorphone (Dilaudid) injection 1 mg 1 mg intravenous Given Jerome, T     10/01/2024 2309 EDT sodium chloride 0.9 % bolus 1,000 mL 0 mL intravenous Stopped Jerome, T     10/01/2024 2319 EDT HYDROmorphone (Dilaudid) injection 2 mg 2 mg intravenous Given Jerome, T     10/01/2024 2326 EDT potassium chloride CR (Klor-Con M20) ER tablet 40 mEq 40 mEq oral Given Jerome, T     10/01/2024 2327 EDT magnesium sulfate 2 g in sterile water for injection 50 mL 2 g intravenous New Bag Jerome, T      10/02/2024 0112 EDT HYDROmorphone (Dilaudid) injection 2 mg 2 mg intravenous Given MARIANNE Grace     10/02/2024 0122 EDT magnesium sulfate 2 g in sterile water for injection 50 mL 0 g intravenous Stopped MARIANNE Grace                      No data recorded       HEART Score: 2 (10/02/24 0005 : Lenore Duran MD)                 EKG  2105 --twelve-lead EKG was obtained and read by me. This demonstrates normal sinus rhythm with a rate of 93, QTc 462, low amplitude T waves without ST segment changes, no ectopy, no ischemia, no pericarditis. Compared to most recent prior EKG (9/16/24), T wave amplitude is lower  2257 --twelve-lead EKG was obtained and read by me. This demonstrates normal sinus rhythm with a rate of 84, normal intervals, normal axes, low amplitude T waves without ST segment changes no ectopy, no ischemia, no pericarditis. There was no change compared to most recent prior EKG. arrival       Diagnoses as of 10/02/24 0157   Sickle cell pain crisis (Multi)   Viral upper respiratory tract infection       Medical Decision Making  Patient presents emergency department the above history and physical.  No signs of sepsis, dehydration.  No acute abdomen.  EKG and troponin negative x 2.  Chest x-ray read by radiology demonstrating no acute cardiopulmonary disease.    Patient presents with pain that she says is typical for her usual sickle cell pain in her chest, back, legs.  Troponin is normal x 2.  hCG is negative.  Hemoglobin is 9.2 is her baseline.  Reticulocytes 2.3.  Chemistries remarkable for potassium of 3.2 and a magnesium of 1.45 for which correction was given.  PCR swab negative for strep, COVID, influenza.    Patient was treated with IV fluids, Benadryl, Zofran and her usual pain management Dilaudid protocol with improvement.  Suspect the patient's viral illness has exacerbated her chronic sickle cell pain.  Patient given prescription for Tessalon Perles.    Patient presented to the emergency department with  complaint of chest pain. Evaluation in the emergency department included consideration for STEMI,  acute coronary syndrome, pneumonia, pneumothorax, perforated ulcer, esophageal rupture, venous thromboembolism, acute abdomen. At this time, there is no evidence of acute life-threatening etiology for their symptoms.      Results of exam and any testing were discussed with patient/family. To the best of my ability, I answered all questions. At this time, there is no indication for admission/transfer or further diagnostic testing. Patient understands to return for any new or worsening symptoms, or failure to improve as anticipated. The importance of follow-up was stressed.    Procedure  Procedures     Lenore Duran MD  10/02/24 0158

## 2024-10-02 NOTE — ED TRIAGE NOTES
Pt walks in with sickle cell flare up. Pt has hx of sickle cell and states she was fine yesterday but today when she woke up she was in 10/10 pain all over, mostly in her chest, back and legs she states.

## 2024-10-12 ENCOUNTER — CLINICAL SUPPORT (OUTPATIENT)
Dept: EMERGENCY MEDICINE | Facility: HOSPITAL | Age: 29
End: 2024-10-12
Payer: MEDICARE

## 2024-10-12 ENCOUNTER — HOSPITAL ENCOUNTER (EMERGENCY)
Facility: HOSPITAL | Age: 29
Discharge: LEFT BEFORE DISCHARGE | End: 2024-10-12
Payer: MEDICARE

## 2024-10-12 VITALS
HEART RATE: 94 BPM | RESPIRATION RATE: 16 BRPM | SYSTOLIC BLOOD PRESSURE: 117 MMHG | OXYGEN SATURATION: 100 % | TEMPERATURE: 97.4 F | BODY MASS INDEX: 51.91 KG/M2 | WEIGHT: 293 LBS | DIASTOLIC BLOOD PRESSURE: 66 MMHG | HEIGHT: 63 IN

## 2024-10-12 LAB
ATRIAL RATE: 89 BPM
P AXIS: 24 DEGREES
P OFFSET: 197 MS
P ONSET: 147 MS
PR INTERVAL: 144 MS
Q ONSET: 219 MS
QRS COUNT: 15 BEATS
QRS DURATION: 86 MS
QT INTERVAL: 366 MS
QTC CALCULATION(BAZETT): 445 MS
QTC FREDERICIA: 417 MS
R AXIS: 16 DEGREES
T AXIS: -8 DEGREES
T OFFSET: 402 MS
VENTRICULAR RATE: 89 BPM

## 2024-10-12 PROCEDURE — 93005 ELECTROCARDIOGRAM TRACING: CPT

## 2024-10-12 PROCEDURE — 4500999001 HC ED NO CHARGE

## 2024-10-12 ASSESSMENT — PAIN - FUNCTIONAL ASSESSMENT: PAIN_FUNCTIONAL_ASSESSMENT: 0-10

## 2024-10-12 ASSESSMENT — PAIN SCALES - GENERAL: PAINLEVEL_OUTOF10: 9

## 2024-10-12 ASSESSMENT — PAIN DESCRIPTION - LOCATION: LOCATION: BACK

## 2024-10-12 NOTE — ED TRIAGE NOTES
Pt here for sickle cell, c/o chest, back and bilat hand pain. Pt took dilaudid tab at home w/o relief.

## 2024-10-19 ENCOUNTER — HOSPITAL ENCOUNTER (EMERGENCY)
Facility: HOSPITAL | Age: 29
Discharge: HOME | End: 2024-10-19
Attending: EMERGENCY MEDICINE
Payer: MEDICARE

## 2024-10-19 VITALS
WEIGHT: 293 LBS | BODY MASS INDEX: 51.91 KG/M2 | RESPIRATION RATE: 16 BRPM | HEIGHT: 63 IN | SYSTOLIC BLOOD PRESSURE: 129 MMHG | HEART RATE: 72 BPM | OXYGEN SATURATION: 99 % | TEMPERATURE: 97.7 F | DIASTOLIC BLOOD PRESSURE: 78 MMHG

## 2024-10-19 DIAGNOSIS — G89.29 OTHER CHRONIC PAIN: Primary | ICD-10-CM

## 2024-10-19 LAB
ALBUMIN SERPL BCP-MCNC: 3.8 G/DL (ref 3.4–5)
ALP SERPL-CCNC: 84 U/L (ref 33–110)
ALT SERPL W P-5'-P-CCNC: 10 U/L (ref 7–45)
AMPHETAMINES UR QL SCN: ABNORMAL
ANION GAP SERPL CALC-SCNC: 9 MMOL/L (ref 10–20)
APPEARANCE UR: CLEAR
AST SERPL W P-5'-P-CCNC: 13 U/L (ref 9–39)
B-HCG SERPL-ACNC: <2 MIU/ML
BARBITURATES UR QL SCN: ABNORMAL
BENZODIAZ UR QL SCN: ABNORMAL
BILIRUB SERPL-MCNC: 1.2 MG/DL (ref 0–1.2)
BILIRUB UR STRIP.AUTO-MCNC: NEGATIVE MG/DL
BUN SERPL-MCNC: 9 MG/DL (ref 6–23)
BZE UR QL SCN: ABNORMAL
CALCIUM SERPL-MCNC: 9.2 MG/DL (ref 8.6–10.3)
CANNABINOIDS UR QL SCN: ABNORMAL
CHLORIDE SERPL-SCNC: 103 MMOL/L (ref 98–107)
CO2 SERPL-SCNC: 25 MMOL/L (ref 21–32)
COLOR UR: YELLOW
CREAT SERPL-MCNC: 0.84 MG/DL (ref 0.5–1.05)
EGFRCR SERPLBLD CKD-EPI 2021: >90 ML/MIN/1.73M*2
ERYTHROCYTE [DISTWIDTH] IN BLOOD BY AUTOMATED COUNT: 19 % (ref 11.5–14.5)
FENTANYL+NORFENTANYL UR QL SCN: ABNORMAL
GLUCOSE SERPL-MCNC: 112 MG/DL (ref 74–99)
GLUCOSE UR STRIP.AUTO-MCNC: NEGATIVE MG/DL
HCT VFR BLD AUTO: 29.7 % (ref 36–46)
HGB BLD-MCNC: 10 G/DL (ref 12–16)
HGB RETIC QN: 22 PG (ref 28–38)
HOLD SPECIMEN: NORMAL
HOLD SPECIMEN: NORMAL
IMMATURE RETIC FRACTION: 16.8 %
KETONES UR STRIP.AUTO-MCNC: NEGATIVE MG/DL
LACTATE SERPL-SCNC: 0.8 MMOL/L (ref 0.4–2)
LEUKOCYTE ESTERASE UR QL STRIP.AUTO: NEGATIVE
LIPASE SERPL-CCNC: 11 U/L (ref 9–82)
MAGNESIUM SERPL-MCNC: 1.85 MG/DL (ref 1.6–2.4)
MCH RBC QN AUTO: 21.2 PG (ref 26–34)
MCHC RBC AUTO-ENTMCNC: 33.7 G/DL (ref 32–36)
MCV RBC AUTO: 63 FL (ref 80–100)
METHADONE UR QL SCN: ABNORMAL
NITRITE UR QL STRIP.AUTO: NEGATIVE
NRBC BLD-RTO: 0 /100 WBCS (ref 0–0)
OPIATES UR QL SCN: ABNORMAL
OXYCODONE+OXYMORPHONE UR QL SCN: ABNORMAL
PCP UR QL SCN: ABNORMAL
PH UR STRIP.AUTO: 6 [PH]
PLATELET # BLD AUTO: 349 X10*3/UL (ref 150–450)
POTASSIUM SERPL-SCNC: 3.5 MMOL/L (ref 3.5–5.3)
PROT SERPL-MCNC: 8 G/DL (ref 6.4–8.2)
PROT UR STRIP.AUTO-MCNC: NEGATIVE MG/DL
RBC # BLD AUTO: 4.72 X10*6/UL (ref 4–5.2)
RBC # UR STRIP.AUTO: NEGATIVE /UL
RETICS #: 0.07 X10*6/UL (ref 0.02–0.08)
RETICS/RBC NFR AUTO: 1.4 % (ref 0.5–2)
SODIUM SERPL-SCNC: 133 MMOL/L (ref 136–145)
SP GR UR STRIP.AUTO: 1.01
UROBILINOGEN UR STRIP.AUTO-MCNC: <2 MG/DL
WBC # BLD AUTO: 14.7 X10*3/UL (ref 4.4–11.3)

## 2024-10-19 PROCEDURE — 81003 URINALYSIS AUTO W/O SCOPE: CPT | Performed by: EMERGENCY MEDICINE

## 2024-10-19 PROCEDURE — 85045 AUTOMATED RETICULOCYTE COUNT: CPT | Performed by: EMERGENCY MEDICINE

## 2024-10-19 PROCEDURE — 84702 CHORIONIC GONADOTROPIN TEST: CPT | Performed by: EMERGENCY MEDICINE

## 2024-10-19 PROCEDURE — 80053 COMPREHEN METABOLIC PANEL: CPT | Performed by: EMERGENCY MEDICINE

## 2024-10-19 PROCEDURE — 96374 THER/PROPH/DIAG INJ IV PUSH: CPT

## 2024-10-19 PROCEDURE — 80307 DRUG TEST PRSMV CHEM ANLYZR: CPT | Performed by: EMERGENCY MEDICINE

## 2024-10-19 PROCEDURE — 83735 ASSAY OF MAGNESIUM: CPT | Performed by: EMERGENCY MEDICINE

## 2024-10-19 PROCEDURE — 83690 ASSAY OF LIPASE: CPT | Performed by: EMERGENCY MEDICINE

## 2024-10-19 PROCEDURE — 83605 ASSAY OF LACTIC ACID: CPT | Performed by: EMERGENCY MEDICINE

## 2024-10-19 PROCEDURE — 85027 COMPLETE CBC AUTOMATED: CPT | Performed by: EMERGENCY MEDICINE

## 2024-10-19 PROCEDURE — 96375 TX/PRO/DX INJ NEW DRUG ADDON: CPT

## 2024-10-19 PROCEDURE — 99284 EMERGENCY DEPT VISIT MOD MDM: CPT | Mod: 25

## 2024-10-19 PROCEDURE — 2500000004 HC RX 250 GENERAL PHARMACY W/ HCPCS (ALT 636 FOR OP/ED): Mod: SE | Performed by: EMERGENCY MEDICINE

## 2024-10-19 PROCEDURE — 2500000004 HC RX 250 GENERAL PHARMACY W/ HCPCS (ALT 636 FOR OP/ED): Mod: SE

## 2024-10-19 RX ORDER — HYDROMORPHONE HYDROCHLORIDE 1 MG/ML
1 INJECTION, SOLUTION INTRAMUSCULAR; INTRAVENOUS; SUBCUTANEOUS ONCE
Status: COMPLETED | OUTPATIENT
Start: 2024-10-19 | End: 2024-10-19

## 2024-10-19 RX ORDER — HEPARIN 100 UNIT/ML
SYRINGE INTRAVENOUS
Status: COMPLETED
Start: 2024-10-19 | End: 2024-10-19

## 2024-10-19 RX ORDER — HEPARIN 100 UNIT/ML
5 SYRINGE INTRAVENOUS ONCE
Status: COMPLETED | OUTPATIENT
Start: 2024-10-19 | End: 2024-10-19

## 2024-10-19 RX ORDER — ONDANSETRON HYDROCHLORIDE 2 MG/ML
4 INJECTION, SOLUTION INTRAVENOUS ONCE
Status: COMPLETED | OUTPATIENT
Start: 2024-10-19 | End: 2024-10-19

## 2024-10-19 RX ADMIN — HYDROMORPHONE HYDROCHLORIDE 1 MG: 1 INJECTION, SOLUTION INTRAMUSCULAR; INTRAVENOUS; SUBCUTANEOUS at 21:23

## 2024-10-19 RX ADMIN — HEPARIN 500 UNITS: 100 SYRINGE at 22:39

## 2024-10-19 RX ADMIN — ONDANSETRON 4 MG: 2 INJECTION INTRAMUSCULAR; INTRAVENOUS at 21:23

## 2024-10-19 ASSESSMENT — PAIN SCALES - GENERAL
PAINLEVEL_OUTOF10: 0 - NO PAIN
PAINLEVEL_OUTOF10: 9
PAINLEVEL_OUTOF10: 6

## 2024-10-19 ASSESSMENT — PAIN - FUNCTIONAL ASSESSMENT
PAIN_FUNCTIONAL_ASSESSMENT: 0-10
PAIN_FUNCTIONAL_ASSESSMENT: 0-10

## 2024-10-19 ASSESSMENT — PAIN DESCRIPTION - LOCATION: LOCATION: BACK

## 2024-10-19 ASSESSMENT — PAIN DESCRIPTION - FREQUENCY: FREQUENCY: CONSTANT/CONTINUOUS

## 2024-10-19 ASSESSMENT — PAIN DESCRIPTION - PAIN TYPE: TYPE: ACUTE PAIN

## 2024-10-19 ASSESSMENT — PAIN DESCRIPTION - DESCRIPTORS: DESCRIPTORS: ACHING

## 2024-10-20 NOTE — ED TRIAGE NOTES
Pt reports history of sickle cell pain and reports back pain and all over body pain. Pt reports similar symptoms to when she has sickle cell pain. Pt states occurring for 1 week denies pain.

## 2024-10-20 NOTE — ED PROVIDER NOTES
HPI   Chief Complaint   Patient presents with   • Back Pain     Pt reports history of sickle cell pain and reports back pain and all over body pain. Pt reports similar symptoms to when she has sickle cell pain. Pt states occurring for 1 week denies pain.       Patient is a 29-year-old female denies any chance of being pregnant has chronic sickle cell pain she actually did not tell me that she was just at another hospital prior to this and actually denied initially that she was at [facility] yesterday where she got Dilaudid she she is 1 to 2 mg x 3 which I said I could only give her 1 mg I will check to make sure that her vital signs which are stable as well as her lab work is stable and patient is supposed to follow-up with the drug treatment plan            Patient History   Past Medical History:   Diagnosis Date   • Sickle cell anemia with crisis (Multi)      Past Surgical History:   Procedure Laterality Date   • CHOLECYSTECTOMY     • CT ABDOMEN PELVIS ANGIOGRAM W AND/OR WO IV CONTRAST  04/09/2020    CT ABDOMEN PELVIS ANGIOGRAM W AND/OR WO IV CONTRAST 4[facility]9/[facility]   • CT ANGIO NECK  09/27/2019    CT NECK ANGIO W AND WO IV CONTRAST 9/27/2019 [facility]   • IR CVC TUNNELED  08/24/2020    IR CVC TUNNELED 8/24/2020 [facility]GAC[facility]   • KNEE Right    • OTHER SURGICAL HISTORY  09/22/2020    Venous access port placement   • OTHER SURGICAL HISTORY  09/22/2020    Tonsillectomy with adenoidectomy   • OTHER SURGICAL HISTORY  09/22/2020    Knee surgery     No family history on file.  Social History     Tobacco Use   • Smoking status: Never   • Smokeless tobacco: Never   Vaping Use   • Vaping status: Never Used   Substance Use Topics   • Alcohol use: Never   • Drug use: Never       Physical Exam   ED Triage Vitals [10/19/24 2030]   Temperature Heart Rate Respirations BP   36.5 °C (97.7 °F) 88 16 145/77      SpO2 Temp Source Heart Rate Source Patient Position   100 %  Temporal Monitor --      BP Location FiO2 (%)     -- --       Physical Exam      ED Course & MDM   Diagnoses as of 10/19/24 2052   Other chronic pain        Patient is a 29-year-old female denies any chance of being pregnant has chronic sickle cell pain she actually did not tell me that she was just at another hospital prior to this and actually denied initially that she was at [facility] yesterday where she got Dilaudid she she is 1 to 2 mg x 3 which I said I could only give her 1 mg I will check to make sure that her vital signs which are stable as well as her lab work is stable and patient is supposed to follow-up with the drug treatment plan         No data recorded     Jer Coma Scale Score: 15 (10/19/24 2037 : Alexey Grace RN)                           Medical Decision Making      Procedure  Procedures     Gennaro Barros MD  10/20/24 8289

## 2024-11-08 ENCOUNTER — APPOINTMENT (OUTPATIENT)
Dept: RADIOLOGY | Facility: HOSPITAL | Age: 29
End: 2024-11-08
Payer: MEDICARE

## 2024-11-08 ENCOUNTER — HOSPITAL ENCOUNTER (EMERGENCY)
Facility: HOSPITAL | Age: 29
Discharge: HOME | End: 2024-11-08
Attending: FAMILY MEDICINE
Payer: MEDICARE

## 2024-11-08 VITALS
DIASTOLIC BLOOD PRESSURE: 76 MMHG | WEIGHT: 293 LBS | HEART RATE: 79 BPM | SYSTOLIC BLOOD PRESSURE: 127 MMHG | OXYGEN SATURATION: 100 % | HEIGHT: 63 IN | BODY MASS INDEX: 51.91 KG/M2 | TEMPERATURE: 99 F | RESPIRATION RATE: 17 BRPM

## 2024-11-08 DIAGNOSIS — D57.00 SICKLE CELL CRISIS (MULTI): ICD-10-CM

## 2024-11-08 DIAGNOSIS — U07.1 COVID-19: Primary | ICD-10-CM

## 2024-11-08 LAB
ALBUMIN SERPL BCP-MCNC: 4 G/DL (ref 3.4–5)
ALP SERPL-CCNC: 99 U/L (ref 33–110)
ALT SERPL W P-5'-P-CCNC: 8 U/L (ref 7–45)
ANION GAP SERPL CALC-SCNC: 8 MMOL/L (ref 10–20)
AST SERPL W P-5'-P-CCNC: 12 U/L (ref 9–39)
BASOPHILS # BLD AUTO: 0.01 X10*3/UL (ref 0–0.1)
BASOPHILS NFR BLD AUTO: 0.1 %
BILIRUB SERPL-MCNC: 1.4 MG/DL (ref 0–1.2)
BUN SERPL-MCNC: 10 MG/DL (ref 6–23)
CALCIUM SERPL-MCNC: 9.1 MG/DL (ref 8.6–10.3)
CHLORIDE SERPL-SCNC: 103 MMOL/L (ref 98–107)
CO2 SERPL-SCNC: 28 MMOL/L (ref 21–32)
CREAT SERPL-MCNC: 0.88 MG/DL (ref 0.5–1.05)
EGFRCR SERPLBLD CKD-EPI 2021: >90 ML/MIN/1.73M*2
EOSINOPHIL # BLD AUTO: 0.25 X10*3/UL (ref 0–0.7)
EOSINOPHIL NFR BLD AUTO: 2.3 %
ERYTHROCYTE [DISTWIDTH] IN BLOOD BY AUTOMATED COUNT: 19 % (ref 11.5–14.5)
GLUCOSE SERPL-MCNC: 102 MG/DL (ref 74–99)
HCT VFR BLD AUTO: 30.3 % (ref 36–46)
HGB BLD-MCNC: 10.4 G/DL (ref 12–16)
HGB RETIC QN: 23 PG (ref 28–38)
HOLD SPECIMEN: NORMAL
IMM GRANULOCYTES # BLD AUTO: 0.04 X10*3/UL (ref 0–0.7)
IMM GRANULOCYTES NFR BLD AUTO: 0.4 % (ref 0–0.9)
IMMATURE RETIC FRACTION: 23.9 %
LYMPHOCYTES # BLD AUTO: 3.95 X10*3/UL (ref 1.2–4.8)
LYMPHOCYTES NFR BLD AUTO: 36 %
MCH RBC QN AUTO: 20.8 PG (ref 26–34)
MCHC RBC AUTO-ENTMCNC: 34.3 G/DL (ref 32–36)
MCV RBC AUTO: 61 FL (ref 80–100)
MONOCYTES # BLD AUTO: 0.52 X10*3/UL (ref 0.1–1)
MONOCYTES NFR BLD AUTO: 4.7 %
NEUTROPHILS # BLD AUTO: 6.2 X10*3/UL (ref 1.2–7.7)
NEUTROPHILS NFR BLD AUTO: 56.5 %
NRBC BLD-RTO: 0 /100 WBCS (ref 0–0)
PLATELET # BLD AUTO: 325 X10*3/UL (ref 150–450)
POTASSIUM SERPL-SCNC: 3.9 MMOL/L (ref 3.5–5.3)
PROT SERPL-MCNC: 8.1 G/DL (ref 6.4–8.2)
RBC # BLD AUTO: 5.01 X10*6/UL (ref 4–5.2)
RETICS #: 0.09 X10*6/UL (ref 0.02–0.08)
RETICS/RBC NFR AUTO: 1.7 % (ref 0.5–2)
SODIUM SERPL-SCNC: 135 MMOL/L (ref 136–145)
WBC # BLD AUTO: 11 X10*3/UL (ref 4.4–11.3)

## 2024-11-08 PROCEDURE — 96374 THER/PROPH/DIAG INJ IV PUSH: CPT

## 2024-11-08 PROCEDURE — 99284 EMERGENCY DEPT VISIT MOD MDM: CPT | Mod: 25

## 2024-11-08 PROCEDURE — 96375 TX/PRO/DX INJ NEW DRUG ADDON: CPT

## 2024-11-08 PROCEDURE — 96361 HYDRATE IV INFUSION ADD-ON: CPT

## 2024-11-08 PROCEDURE — 85045 AUTOMATED RETICULOCYTE COUNT: CPT | Performed by: FAMILY MEDICINE

## 2024-11-08 PROCEDURE — 71045 X-RAY EXAM CHEST 1 VIEW: CPT

## 2024-11-08 PROCEDURE — 2500000004 HC RX 250 GENERAL PHARMACY W/ HCPCS (ALT 636 FOR OP/ED): Mod: SE | Performed by: FAMILY MEDICINE

## 2024-11-08 PROCEDURE — 84075 ASSAY ALKALINE PHOSPHATASE: CPT | Performed by: FAMILY MEDICINE

## 2024-11-08 PROCEDURE — 2500000004 HC RX 250 GENERAL PHARMACY W/ HCPCS (ALT 636 FOR OP/ED): Mod: SE

## 2024-11-08 PROCEDURE — 84520 ASSAY OF UREA NITROGEN: CPT | Performed by: FAMILY MEDICINE

## 2024-11-08 PROCEDURE — 85025 COMPLETE CBC W/AUTO DIFF WBC: CPT | Performed by: FAMILY MEDICINE

## 2024-11-08 RX ORDER — HEPARIN 100 UNIT/ML
SYRINGE INTRAVENOUS
Status: COMPLETED
Start: 2024-11-08 | End: 2024-11-08

## 2024-11-08 RX ORDER — DIPHENHYDRAMINE HYDROCHLORIDE 50 MG/ML
INJECTION INTRAMUSCULAR; INTRAVENOUS
Status: COMPLETED
Start: 2024-11-08 | End: 2024-11-08

## 2024-11-08 RX ORDER — DEXTROSE MONOHYDRATE AND SODIUM CHLORIDE 5; .45 G/100ML; G/100ML
350 INJECTION, SOLUTION INTRAVENOUS ONCE
Status: COMPLETED | OUTPATIENT
Start: 2024-11-08 | End: 2024-11-08

## 2024-11-08 RX ORDER — HYDROMORPHONE HYDROCHLORIDE 1 MG/ML
1 INJECTION, SOLUTION INTRAMUSCULAR; INTRAVENOUS; SUBCUTANEOUS ONCE
Status: COMPLETED | OUTPATIENT
Start: 2024-11-08 | End: 2024-11-08

## 2024-11-08 RX ORDER — HEPARIN 100 UNIT/ML
5 SYRINGE INTRAVENOUS ONCE
Status: COMPLETED | OUTPATIENT
Start: 2024-11-08 | End: 2024-11-08

## 2024-11-08 RX ORDER — HYDROMORPHONE HYDROCHLORIDE 1 MG/ML
INJECTION, SOLUTION INTRAMUSCULAR; INTRAVENOUS; SUBCUTANEOUS
Status: COMPLETED
Start: 2024-11-08 | End: 2024-11-08

## 2024-11-08 RX ORDER — DIPHENHYDRAMINE HYDROCHLORIDE 50 MG/ML
50 INJECTION INTRAMUSCULAR; INTRAVENOUS ONCE
Status: COMPLETED | OUTPATIENT
Start: 2024-11-08 | End: 2024-11-08

## 2024-11-08 ASSESSMENT — PAIN DESCRIPTION - PROGRESSION: CLINICAL_PROGRESSION: NOT CHANGED

## 2024-11-08 ASSESSMENT — PAIN - FUNCTIONAL ASSESSMENT: PAIN_FUNCTIONAL_ASSESSMENT: 0-10

## 2024-11-08 ASSESSMENT — PAIN SCALES - GENERAL: PAINLEVEL_OUTOF10: 10 - WORST POSSIBLE PAIN

## 2024-11-08 ASSESSMENT — PAIN DESCRIPTION - PAIN TYPE: TYPE: CHRONIC PAIN

## 2024-11-08 NOTE — ED PROVIDER NOTES
HPI   Chief Complaint   Patient presents with    Shortness of Breath     Pt tested positive for covid 4 days ago.  Today reports sob, cough and all over body aches.         29-year-old female with history of sickle cell disease comes ED with complaint of persistent flulike symptoms since diagnosed with COVID-19 a few days earlier.  Patient also feels that her diagnosis of COVID-19 is also aggravating her sickle cell crisis that she is having what she believes is a flareup or dose increase in pain.  Patient reports not taking her medications thus far to treat it and called EMS was brought to the ED to get evaluated.  Patient reports she was diagnosed with COVID-19 4 days ago at [facility] where she also had a full workup done.  Patient in the ED is alert, cooperative, appears comfortable, and in no distress.  Patient reports again cough with feeling winded as well as generalized bodyaches.  Patient reports no other associate symptoms.      History provided by:  Patient, EMS personnel and medical records   used: No            Patient History   Past Medical History:   Diagnosis Date    Sickle cell anemia with crisis (Multi)      Past Surgical History:   Procedure Laterality Date    CHOLECYSTECTOMY      CT ABDOMEN PELVIS ANGIOGRAM W AND/OR WO IV CONTRAST  04/09/2020    CT ABDOMEN PELVIS ANGIOGRAM W AND/OR WO IV CONTRAST 4/9/2020 [facility]    CT ANGIO NECK  09/27/2019    CT NECK ANGIO W AND WO IV CONTRAST 9/27/2019 [facility]    IR CVC TUNNELED  08/24/2020    IR CVC TUNNELED 8/24/2020 [facility]    KNEE Right     OTHER SURGICAL HISTORY  09/22/2020    Venous access port placement    OTHER SURGICAL HISTORY  09/22/2020    Tonsillectomy with adenoidectomy    OTHER SURGICAL HISTORY  09/22/2020    Knee surgery     No family history on file.  Social History     Tobacco Use    Smoking status: Never    Smokeless tobacco: Never   Vaping Use    Vaping status: Never  Used   Substance Use Topics    Alcohol use: Never    Drug use: Never       Physical Exam   ED Triage Vitals [11/08/24 1213]   Temperature Heart Rate Respirations BP   37.2 °C (99 °F) 85 18 129/68      SpO2 Temp src Heart Rate Source Patient Position   100 % -- -- --      BP Location FiO2 (%)     -- --       Physical Exam  Vitals and nursing note reviewed.   Constitutional:       General: She is not in acute distress.     Appearance: She is well-developed.   HENT:      Head: Normocephalic and atraumatic.   Eyes:      Conjunctiva/sclera: Conjunctivae normal.   Cardiovascular:      Rate and Rhythm: Normal rate and regular rhythm.      Heart sounds: No murmur heard.  Pulmonary:      Effort: Pulmonary effort is normal. No respiratory distress.      Breath sounds: Normal breath sounds.   Abdominal:      Palpations: Abdomen is soft.      Tenderness: There is no abdominal tenderness.   Musculoskeletal:         General: No swelling.      Cervical back: Neck supple.   Skin:     General: Skin is warm and dry.      Capillary Refill: Capillary refill takes less than 2 seconds.   Neurological:      Mental Status: She is alert.   Psychiatric:         Mood and Affect: Mood normal.           ED Course & MDM   Diagnoses as of 11/08/24 1621   COVID-19   Sickle cell crisis (Multi)                 No data recorded     Jer Coma Scale Score: 15 (11/08/24 1218 : Priyanka Whiet RN)                         Labs Reviewed   CBC WITH AUTO DIFFERENTIAL - Abnormal       Result Value    WBC 11.0      nRBC 0.0      RBC 5.01      Hemoglobin 10.4 (*)     Hematocrit 30.3 (*)     MCV 61 (*)     MCH 20.8 (*)     MCHC 34.3      RDW 19.0 (*)     Platelets 325      Neutrophils % 56.5      Immature Granulocytes %, Automated 0.4      Lymphocytes % 36.0      Monocytes % 4.7      Eosinophils % 2.3      Basophils % 0.1      Neutrophils Absolute 6.20      Immature Granulocytes Absolute, Automated 0.04      Lymphocytes Absolute 3.95      Monocytes Absolute  0.52      Eosinophils Absolute 0.25      Basophils Absolute 0.01     COMPREHENSIVE METABOLIC PANEL - Abnormal    Glucose 102 (*)     Sodium 135 (*)     Potassium 3.9      Chloride 103      Bicarbonate 28      Anion Gap 8 (*)     Urea Nitrogen 10      Creatinine 0.88      eGFR >90      Calcium 9.1      Albumin 4.0      Alkaline Phosphatase 99      Total Protein 8.1      AST 12      Bilirubin, Total 1.4 (*)     ALT 8     RETICULOCYTES - Abnormal    Retic % 1.7      Retic Absolute 0.085 (*)     Reticulocyte Hemoglobin 23 (*)     Immature Retic fraction 23.9 (*)    GRAY TOP    Extra Tube Hold for add-ons.       XR chest 1 view   Final Result   No acute radiographic abnormality is noted        MACRO:   None.        Signed by: Felisha Roche 11/8/2024 1:19 PM   Dictation workstation:   [id number]          Medical Decision Making  Pt upon arrival to the ED appeared to be anxious and uncomfortable but in no distress with stable vitals.  Discussed with pt the presenting complaints and clinically findings.  Reviewed with pt the epic chart and counseled the patient on COVID-19 and appropriate approach to management/treatments.  After assessment and evaluation IV line was started, IV fluids given, labs sent, imaging ordered, given IV pain medication, placed on cardiac monitor, and observed.  After treatment and a period of rest patient was reassessed finally feeling better, pain better controlled, symptoms improved some, no new physical complaints, and vital signs stable.  Reviewed patient's epic chart was performed and patient's recent workups as well several visits to the ED were reviewed with her and no further workup was added at this time.  Patient's final results were reviewed/discussed with patient and at this time patient was educated on use of all, medication as well as her home medications for management of her symptoms/supportive care.  Patient was encouraged to contact her doctor for follow-up and reassessment.   Patient stable and discharged home.    Amount and/or Complexity of Data Reviewed  External Data Reviewed: labs, radiology, ECG and notes.  Labs: ordered. Decision-making details documented in ED Course.  Radiology: ordered. Decision-making details documented in ED Course.    Risk  OTC drugs.        Procedure  Procedures     Jimmy Tsai MD  11/08/24 7383

## 2024-11-27 ENCOUNTER — APPOINTMENT (OUTPATIENT)
Dept: RADIOLOGY | Facility: HOSPITAL | Age: 29
End: 2024-11-27
Payer: MEDICARE

## 2024-11-27 ENCOUNTER — APPOINTMENT (OUTPATIENT)
Dept: CARDIOLOGY | Facility: HOSPITAL | Age: 29
End: 2024-11-27
Payer: MEDICARE

## 2024-11-27 ENCOUNTER — HOSPITAL ENCOUNTER (EMERGENCY)
Facility: HOSPITAL | Age: 29
Discharge: HOME | End: 2024-11-27
Payer: MEDICARE

## 2024-11-27 VITALS
HEIGHT: 63 IN | DIASTOLIC BLOOD PRESSURE: 67 MMHG | TEMPERATURE: 96.8 F | OXYGEN SATURATION: 100 % | BODY MASS INDEX: 51.91 KG/M2 | HEART RATE: 85 BPM | SYSTOLIC BLOOD PRESSURE: 137 MMHG | RESPIRATION RATE: 17 BRPM | WEIGHT: 293 LBS

## 2024-11-27 DIAGNOSIS — R07.9 CHEST PAIN, UNSPECIFIED TYPE: Primary | ICD-10-CM

## 2024-11-27 LAB
ALBUMIN SERPL BCP-MCNC: 4 G/DL (ref 3.4–5)
ALP SERPL-CCNC: 90 U/L (ref 33–110)
ALT SERPL W P-5'-P-CCNC: 6 U/L (ref 7–45)
ANION GAP SERPL CALC-SCNC: 10 MMOL/L (ref 10–20)
AST SERPL W P-5'-P-CCNC: 12 U/L (ref 9–39)
BASOPHILS # BLD AUTO: 0.02 X10*3/UL (ref 0–0.1)
BASOPHILS NFR BLD AUTO: 0.2 %
BILIRUB SERPL-MCNC: 1.3 MG/DL (ref 0–1.2)
BUN SERPL-MCNC: 9 MG/DL (ref 6–23)
CALCIUM SERPL-MCNC: 9.1 MG/DL (ref 8.6–10.3)
CARDIAC TROPONIN I PNL SERPL HS: <3 NG/L (ref 0–13)
CARDIAC TROPONIN I PNL SERPL HS: <3 NG/L (ref 0–13)
CHLORIDE SERPL-SCNC: 104 MMOL/L (ref 98–107)
CO2 SERPL-SCNC: 24 MMOL/L (ref 21–32)
CREAT SERPL-MCNC: 0.89 MG/DL (ref 0.5–1.05)
EGFRCR SERPLBLD CKD-EPI 2021: 90 ML/MIN/1.73M*2
EOSINOPHIL # BLD AUTO: 0.18 X10*3/UL (ref 0–0.7)
EOSINOPHIL NFR BLD AUTO: 1.8 %
ERYTHROCYTE [DISTWIDTH] IN BLOOD BY AUTOMATED COUNT: 19.9 % (ref 11.5–14.5)
GLUCOSE SERPL-MCNC: 93 MG/DL (ref 74–99)
HCT VFR BLD AUTO: 30.2 % (ref 36–46)
HGB BLD-MCNC: 10.2 G/DL (ref 12–16)
IMM GRANULOCYTES # BLD AUTO: 0.03 X10*3/UL (ref 0–0.7)
IMM GRANULOCYTES NFR BLD AUTO: 0.3 % (ref 0–0.9)
LYMPHOCYTES # BLD AUTO: 3.6 X10*3/UL (ref 1.2–4.8)
LYMPHOCYTES NFR BLD AUTO: 35.3 %
MAGNESIUM SERPL-MCNC: 1.72 MG/DL (ref 1.6–2.4)
MCH RBC QN AUTO: 20.8 PG (ref 26–34)
MCHC RBC AUTO-ENTMCNC: 33.8 G/DL (ref 32–36)
MCV RBC AUTO: 62 FL (ref 80–100)
MONOCYTES # BLD AUTO: 0.65 X10*3/UL (ref 0.1–1)
MONOCYTES NFR BLD AUTO: 6.4 %
NEUTROPHILS # BLD AUTO: 5.72 X10*3/UL (ref 1.2–7.7)
NEUTROPHILS NFR BLD AUTO: 56 %
NRBC BLD-RTO: 0 /100 WBCS (ref 0–0)
PLATELET # BLD AUTO: 340 X10*3/UL (ref 150–450)
POTASSIUM SERPL-SCNC: 3.8 MMOL/L (ref 3.5–5.3)
PROT SERPL-MCNC: 8.3 G/DL (ref 6.4–8.2)
RBC # BLD AUTO: 4.91 X10*6/UL (ref 4–5.2)
SODIUM SERPL-SCNC: 134 MMOL/L (ref 136–145)
WBC # BLD AUTO: 10.2 X10*3/UL (ref 4.4–11.3)

## 2024-11-27 PROCEDURE — 99284 EMERGENCY DEPT VISIT MOD MDM: CPT | Mod: 25

## 2024-11-27 PROCEDURE — 84484 ASSAY OF TROPONIN QUANT: CPT | Performed by: PHYSICIAN ASSISTANT

## 2024-11-27 PROCEDURE — 96374 THER/PROPH/DIAG INJ IV PUSH: CPT

## 2024-11-27 PROCEDURE — 80053 COMPREHEN METABOLIC PANEL: CPT | Performed by: PHYSICIAN ASSISTANT

## 2024-11-27 PROCEDURE — 71046 X-RAY EXAM CHEST 2 VIEWS: CPT

## 2024-11-27 PROCEDURE — 83735 ASSAY OF MAGNESIUM: CPT | Performed by: PHYSICIAN ASSISTANT

## 2024-11-27 PROCEDURE — 85025 COMPLETE CBC W/AUTO DIFF WBC: CPT | Performed by: PHYSICIAN ASSISTANT

## 2024-11-27 PROCEDURE — 2500000004 HC RX 250 GENERAL PHARMACY W/ HCPCS (ALT 636 FOR OP/ED)

## 2024-11-27 PROCEDURE — 93005 ELECTROCARDIOGRAM TRACING: CPT

## 2024-11-27 PROCEDURE — 71046 X-RAY EXAM CHEST 2 VIEWS: CPT | Mod: FOREIGN READ | Performed by: RADIOLOGY

## 2024-11-27 RX ORDER — HEPARIN 100 UNIT/ML
SYRINGE INTRAVENOUS
Status: COMPLETED
Start: 2024-11-27 | End: 2024-11-27

## 2024-11-27 RX ORDER — HEPARIN 100 UNIT/ML
1 SYRINGE INTRAVENOUS ONCE
Status: COMPLETED | OUTPATIENT
Start: 2024-11-27 | End: 2024-11-27

## 2024-11-27 ASSESSMENT — COLUMBIA-SUICIDE SEVERITY RATING SCALE - C-SSRS
6. HAVE YOU EVER DONE ANYTHING, STARTED TO DO ANYTHING, OR PREPARED TO DO ANYTHING TO END YOUR LIFE?: NO
1. IN THE PAST MONTH, HAVE YOU WISHED YOU WERE DEAD OR WISHED YOU COULD GO TO SLEEP AND NOT WAKE UP?: NO
2. HAVE YOU ACTUALLY HAD ANY THOUGHTS OF KILLING YOURSELF?: NO

## 2024-11-27 ASSESSMENT — PAIN DESCRIPTION - LOCATION: LOCATION: CHEST

## 2024-11-27 ASSESSMENT — PAIN DESCRIPTION - DESCRIPTORS: DESCRIPTORS: ACHING

## 2024-11-27 ASSESSMENT — PAIN - FUNCTIONAL ASSESSMENT: PAIN_FUNCTIONAL_ASSESSMENT: 0-10

## 2024-11-27 ASSESSMENT — PAIN SCALES - GENERAL
PAINLEVEL_OUTOF10: 10 - WORST POSSIBLE PAIN
PAINLEVEL_OUTOF10: 10 - WORST POSSIBLE PAIN

## 2024-11-27 NOTE — ED TRIAGE NOTES
Per pt, she is having a sickle cell flare up. Pt states having pain in her chest, low back, and into legs. Pt takes dilaudid for her pain at home, however recently saw palliative care team and they changed her meds, but she has not yet been able to get them

## 2024-11-28 LAB
ATRIAL RATE: 66 BPM
ATRIAL RATE: 79 BPM
P AXIS: 29 DEGREES
P AXIS: 34 DEGREES
P OFFSET: 193 MS
P OFFSET: 198 MS
P ONSET: 153 MS
P ONSET: 162 MS
PR INTERVAL: 110 MS
PR INTERVAL: 130 MS
Q ONSET: 217 MS
Q ONSET: 218 MS
QRS COUNT: 11 BEATS
QRS COUNT: 13 BEATS
QRS DURATION: 88 MS
QRS DURATION: 90 MS
QT INTERVAL: 380 MS
QT INTERVAL: 406 MS
QTC CALCULATION(BAZETT): 425 MS
QTC CALCULATION(BAZETT): 435 MS
QTC FREDERICIA: 416 MS
QTC FREDERICIA: 419 MS
R AXIS: 28 DEGREES
R AXIS: 32 DEGREES
T AXIS: 2 DEGREES
T AXIS: 21 DEGREES
T OFFSET: 408 MS
T OFFSET: 420 MS
VENTRICULAR RATE: 66 BPM
VENTRICULAR RATE: 79 BPM

## 2024-11-29 NOTE — ED PROVIDER NOTES
HPI   Chief Complaint   Patient presents with    Sickle Cell Pain Crisis     Per pt, she is having a sickle cell flare up. Pt states having pain in her chest, low back, and into legs. Pt takes dilaudid for her pain at home, however recently saw palliative care team and they changed her meds, but she has not yet been able to get them       History of present illness:  29-year-old female presents emergency room for complaints of sickle cell flareup and pain?  The patient states that she is having a sickle cell flareup and pain stating that she is having pain in her chest as well as in her back or hips and her knees.  She states pain began again earlier today and she states that she is on long-term medications and sees palliative care team for pain management.  She states that she is currently on Dilaudid 2 mg up to 6 times a day and that she is also on gabapentin daily as well for the pain as well as Tylenol.  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  She denies any other symptoms at this time.  She receives most of care through the [facility] network.    Social history: Negative for alcohol and drug use.    Review of systems:   Gen.: No weight loss, fatigue, anorexia, insomnia, fever.   Eyes: No vision loss, double vision  ENT: No pharyngitis,   Cardiac: No  palpitations, syncope  Pulmonary: No shortness of breath, cough, hemoptysis.   Heme/lymph: No swollen glands, fever, bleeding.   GI: No abdominal pain, change in bowel habits, melena, hematemesis, hematochezia, nausea, vomiting, diarrhea.   : No discharge, dysuria, frequency, urgency, hematuria.   Musculoskeletal: No joint swelling.   Skin: No rashes.   Review of systems is otherwise negative unless stated above or in history of present illness.      Good physical exam:  General: Vitals noted, no distress. Afebrile.   EENT: No lymphadenopathy  appreciated   Cardiac: Regular, rate, rhythm, no murmur.   Pulmonary: Lungs clear bilaterally with good aeration. No adventitious breath sounds.   Abdomen: Soft, nonsurgical. Nontender. No peritoneal signs. Normoactive bowel sounds.   Extremities: No peripheral edema.   Skin: No rash.   Neuro: No focal neurologic deficits      Medical decision making:   Testing: Troponin x 2 was negative magnesium 1.72 CMP unremarkable CBC unremarkable chest x-ray unremarkable for any acute findings        EKG taken on March 27 2024 at 1347 shows normal sinus rhythm at 79, no STEMI no T wave inversion normal axis    EKG taken on November 27, 2024 at 1629 shows rate of 66 no STEMI normal rhythm normal axis no AV block        Plan: Home-going.  Discussed differential. Will follow-up with the primary physician in the next 2-3 days. Return if worse. They understand return precautions and discharge instructions. Patient and family/friend/caregiver are in agreement with this plan. 29-year-old female presents emergency room for complaints of sickle cell flareup and pain?  The patient states that she is having a sickle cell flareup and pain stating that she is having pain in her chest as well as in her back or hips and her knees.  She states pain began again earlier today and she states that she is on long-term medications and sees palliative care team for pain management.  She states that she is currently on Dilaudid 2 mg up to 6 times a day and that she is also on gabapentin daily as well for the pain as well as Tylenol.  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  She denies any other symptoms at this time.  She receives most of care through the [facility] network. EENT: No lymphadenopathy appreciated   Cardiac: Regular, rate, rhythm, no murmur.   Pulmonary: Lungs clear bilaterally with good aeration. No adventitious  breath sounds.   Abdomen: Soft, nonsurgical. Nontender. No peritoneal signs. Normoactive bowel sounds.   Extremities: No peripheral edema.  Upon exam the patient is found sitting in a chair at this time texting on her phone does not appear to be any acute distress.  She explained that she is having the symptoms.  Upon review of the medical record it showed that the patient was recently seen at [facility] emergency room just a few days ago and was actually discharged a few days prior to that after being admitted for multiple days for pain management.  I reached out to the patient's pain management doctor and palliative care team at [facility] and spoke with them at length.  We discussed the patient's case and they explained to me that they had already called and increased doses of Dilaudid for the patient at 4 mg multiple times a day.  I explained to them that the patient presented for pain control but did not appear to be in any acute distress at this time.  They explained to me that they be happy to follow-up with the patient in outpatient setting and I spoke with the attending physician regarding my results as well as the initial exam.  He evaluated the patient as well and advised me they felt the patient could go home and take her home medications and did not need anything in the emergency room at this time as he also found similar exam findings.  Impression:   1.  Evaluation for pain            History provided by:  Patient   used: No            Patient History   Past Medical History:   Diagnosis Date    Sickle cell anemia with crisis (Multi)      Past Surgical History:   Procedure Laterality Date    CHOLECYSTECTOMY      CT ABDOMEN PELVIS ANGIOGRAM W AND/OR WO IV CONTRAST  04/09/2020    CT ABDOMEN PELVIS ANGIOGRAM W AND/OR WO IV CONTRAST 4[facility]/2020 [facility]    CT ANGIO NECK  09/27/2019    CT NECK ANGIO W AND WO IV CONTRAST 9/27/2019 [facility]    IR CVC TUNNELED   08/24/2020    IR CVC TUNNELED 8/24/2020 [facility]    KNEE Right     OTHER SURGICAL HISTORY  09/22/2020    Venous access port placement    OTHER SURGICAL HISTORY  09/22/2020    Tonsillectomy with adenoidectomy    OTHER SURGICAL HISTORY  09/22/2020    Knee surgery     No family history on file.  Social History     Tobacco Use    Smoking status: Never    Smokeless tobacco: Never   Vaping Use    Vaping status: Never Used   Substance Use Topics    Alcohol use: Never    Drug use: Never       Physical Exam   ED Triage Vitals [11/27/24 1345]   Temperature Heart Rate Respirations BP   36 °C (96.8 °F) 85 17 137/67      Pulse Ox Temp Source Heart Rate Source Patient Position   100 % Temporal -- --      BP Location FiO2 (%)     -- --       Physical Exam      ED Course & MDM   Diagnoses as of 11/29/24 1357   Chest pain, unspecified type                 No data recorded     Jer Coma Scale Score: 15 (11/27/24 1349 : Ashtyn Weaver RN)                           Medical Decision Making      Procedure  Procedures     Gamaliel Bethea PA-C  11/29/24 1402       Gamaliel Bethea PA-C  12/13/24 5418

## 2025-03-22 ENCOUNTER — APPOINTMENT (OUTPATIENT)
Dept: RADIOLOGY | Facility: HOSPITAL | Age: 30
End: 2025-03-22
Payer: COMMERCIAL

## 2025-03-22 ENCOUNTER — APPOINTMENT (OUTPATIENT)
Dept: CARDIOLOGY | Facility: HOSPITAL | Age: 30
End: 2025-03-22
Payer: COMMERCIAL

## 2025-03-22 ENCOUNTER — HOSPITAL ENCOUNTER (EMERGENCY)
Facility: HOSPITAL | Age: 30
Discharge: HOME | End: 2025-03-22
Attending: EMERGENCY MEDICINE
Payer: COMMERCIAL

## 2025-03-22 VITALS
DIASTOLIC BLOOD PRESSURE: 90 MMHG | WEIGHT: 293 LBS | TEMPERATURE: 97.5 F | BODY MASS INDEX: 51.91 KG/M2 | HEIGHT: 63 IN | HEART RATE: 98 BPM | OXYGEN SATURATION: 99 % | SYSTOLIC BLOOD PRESSURE: 140 MMHG | RESPIRATION RATE: 18 BRPM

## 2025-03-22 DIAGNOSIS — J18.9 PNEUMONIA DUE TO INFECTIOUS ORGANISM, UNSPECIFIED LATERALITY, UNSPECIFIED PART OF LUNG: ICD-10-CM

## 2025-03-22 DIAGNOSIS — R10.9 ABDOMINAL PAIN, UNSPECIFIED ABDOMINAL LOCATION: ICD-10-CM

## 2025-03-22 DIAGNOSIS — D57.00 SICKLE CELL CRISIS (MULTI): Primary | ICD-10-CM

## 2025-03-22 LAB
ALBUMIN SERPL BCP-MCNC: 3.7 G/DL (ref 3.4–5)
ALP SERPL-CCNC: 83 U/L (ref 33–110)
ALT SERPL W P-5'-P-CCNC: 11 U/L (ref 7–45)
ANION GAP SERPL CALC-SCNC: 8 MMOL/L (ref 10–20)
APPEARANCE UR: CLEAR
AST SERPL W P-5'-P-CCNC: 21 U/L (ref 9–39)
BASOPHILS # BLD AUTO: 0.02 X10*3/UL (ref 0–0.1)
BASOPHILS NFR BLD AUTO: 0.2 %
BILIRUB SERPL-MCNC: 1.1 MG/DL (ref 0–1.2)
BILIRUB UR STRIP.AUTO-MCNC: NEGATIVE MG/DL
BUN SERPL-MCNC: 11 MG/DL (ref 6–23)
CALCIUM SERPL-MCNC: 8.5 MG/DL (ref 8.6–10.3)
CHLORIDE SERPL-SCNC: 104 MMOL/L (ref 98–107)
CO2 SERPL-SCNC: 27 MMOL/L (ref 21–32)
COLOR UR: YELLOW
CREAT SERPL-MCNC: 0.8 MG/DL (ref 0.5–1.05)
DACRYOCYTES BLD QL SMEAR: NORMAL
EGFRCR SERPLBLD CKD-EPI 2021: >90 ML/MIN/1.73M*2
EOSINOPHIL # BLD AUTO: 0.06 X10*3/UL (ref 0–0.7)
EOSINOPHIL NFR BLD AUTO: 0.5 %
ERYTHROCYTE [DISTWIDTH] IN BLOOD BY AUTOMATED COUNT: 20.1 % (ref 11.5–14.5)
FLUAV RNA RESP QL NAA+PROBE: NOT DETECTED
FLUBV RNA RESP QL NAA+PROBE: NOT DETECTED
GLUCOSE SERPL-MCNC: 129 MG/DL (ref 74–99)
GLUCOSE UR STRIP.AUTO-MCNC: NEGATIVE MG/DL
HCG UR QL IA.RAPID: NEGATIVE
HCT VFR BLD AUTO: 29.5 % (ref 36–46)
HGB BLD-MCNC: 9.8 G/DL (ref 12–16)
HGB RETIC QN: 23 PG (ref 28–38)
HOLD SPECIMEN: NORMAL
HYPOCHROMIA BLD QL SMEAR: NORMAL
IMM GRANULOCYTES # BLD AUTO: 0.04 X10*3/UL (ref 0–0.7)
IMM GRANULOCYTES NFR BLD AUTO: 0.3 % (ref 0–0.9)
IMMATURE RETIC FRACTION: 28.9 %
KETONES UR STRIP.AUTO-MCNC: NEGATIVE MG/DL
LEUKOCYTE ESTERASE UR QL STRIP.AUTO: ABNORMAL
LYMPHOCYTES # BLD AUTO: 2.03 X10*3/UL (ref 1.2–4.8)
LYMPHOCYTES NFR BLD AUTO: 15.9 %
MCH RBC QN AUTO: 20.9 PG (ref 26–34)
MCHC RBC AUTO-ENTMCNC: 33.2 G/DL (ref 32–36)
MCV RBC AUTO: 63 FL (ref 80–100)
MONOCYTES # BLD AUTO: 0.7 X10*3/UL (ref 0.1–1)
MONOCYTES NFR BLD AUTO: 5.5 %
NEUTROPHILS # BLD AUTO: 9.93 X10*3/UL (ref 1.2–7.7)
NEUTROPHILS NFR BLD AUTO: 77.6 %
NITRITE UR QL STRIP.AUTO: NEGATIVE
NRBC BLD-RTO: 0 /100 WBCS (ref 0–0)
PH UR STRIP.AUTO: 7 [PH]
PLATELET # BLD AUTO: 293 X10*3/UL (ref 150–450)
POTASSIUM SERPL-SCNC: 3.9 MMOL/L (ref 3.5–5.3)
PROT SERPL-MCNC: 7.8 G/DL (ref 6.4–8.2)
PROT UR STRIP.AUTO-MCNC: NEGATIVE MG/DL
RBC # BLD AUTO: 4.7 X10*6/UL (ref 4–5.2)
RBC # UR STRIP.AUTO: NEGATIVE MG/DL
RBC #/AREA URNS AUTO: NORMAL /HPF
RBC MORPH BLD: NORMAL
RETICS #: 0.09 X10*6/UL (ref 0.02–0.08)
RETICS/RBC NFR AUTO: 2 % (ref 0.5–2)
RSV RNA RESP QL NAA+PROBE: NOT DETECTED
SARS-COV-2 RNA RESP QL NAA+PROBE: NOT DETECTED
SODIUM SERPL-SCNC: 135 MMOL/L (ref 136–145)
SP GR UR STRIP.AUTO: 1.01
SQUAMOUS #/AREA URNS AUTO: NORMAL /HPF
STOMATOCYTES BLD QL SMEAR: NORMAL
TARGETS BLD QL SMEAR: NORMAL
UROBILINOGEN UR STRIP.AUTO-MCNC: <2 MG/DL
WBC # BLD AUTO: 12.8 X10*3/UL (ref 4.4–11.3)
WBC #/AREA URNS AUTO: NORMAL /HPF

## 2025-03-22 PROCEDURE — 93005 ELECTROCARDIOGRAM TRACING: CPT

## 2025-03-22 PROCEDURE — 74177 CT ABD & PELVIS W/CONTRAST: CPT | Performed by: RADIOLOGY

## 2025-03-22 PROCEDURE — 87086 URINE CULTURE/COLONY COUNT: CPT | Mod: CONLAB | Performed by: EMERGENCY MEDICINE

## 2025-03-22 PROCEDURE — 2500000004 HC RX 250 GENERAL PHARMACY W/ HCPCS (ALT 636 FOR OP/ED): Mod: JZ,TB | Performed by: EMERGENCY MEDICINE

## 2025-03-22 PROCEDURE — 81001 URINALYSIS AUTO W/SCOPE: CPT | Performed by: EMERGENCY MEDICINE

## 2025-03-22 PROCEDURE — 96361 HYDRATE IV INFUSION ADD-ON: CPT

## 2025-03-22 PROCEDURE — 2500000001 HC RX 250 WO HCPCS SELF ADMINISTERED DRUGS (ALT 637 FOR MEDICARE OP)

## 2025-03-22 PROCEDURE — 99285 EMERGENCY DEPT VISIT HI MDM: CPT | Mod: 25 | Performed by: EMERGENCY MEDICINE

## 2025-03-22 PROCEDURE — 85025 COMPLETE CBC W/AUTO DIFF WBC: CPT | Performed by: EMERGENCY MEDICINE

## 2025-03-22 PROCEDURE — 87637 SARSCOV2&INF A&B&RSV AMP PRB: CPT | Performed by: EMERGENCY MEDICINE

## 2025-03-22 PROCEDURE — 2550000001 HC RX 255 CONTRASTS: Performed by: EMERGENCY MEDICINE

## 2025-03-22 PROCEDURE — 81025 URINE PREGNANCY TEST: CPT | Performed by: EMERGENCY MEDICINE

## 2025-03-22 PROCEDURE — 2500000005 HC RX 250 GENERAL PHARMACY W/O HCPCS: Performed by: EMERGENCY MEDICINE

## 2025-03-22 PROCEDURE — 2500000004 HC RX 250 GENERAL PHARMACY W/ HCPCS (ALT 636 FOR OP/ED)

## 2025-03-22 PROCEDURE — 74177 CT ABD & PELVIS W/CONTRAST: CPT

## 2025-03-22 PROCEDURE — 96375 TX/PRO/DX INJ NEW DRUG ADDON: CPT | Mod: 59

## 2025-03-22 PROCEDURE — 71260 CT THORAX DX C+: CPT | Performed by: RADIOLOGY

## 2025-03-22 PROCEDURE — 96374 THER/PROPH/DIAG INJ IV PUSH: CPT | Mod: 59

## 2025-03-22 PROCEDURE — 80053 COMPREHEN METABOLIC PANEL: CPT | Performed by: EMERGENCY MEDICINE

## 2025-03-22 PROCEDURE — 85045 AUTOMATED RETICULOCYTE COUNT: CPT | Performed by: EMERGENCY MEDICINE

## 2025-03-22 PROCEDURE — 96376 TX/PRO/DX INJ SAME DRUG ADON: CPT | Mod: 59

## 2025-03-22 RX ORDER — HEPARIN 100 UNIT/ML
5 SYRINGE INTRAVENOUS ONCE
Status: COMPLETED | OUTPATIENT
Start: 2025-03-22 | End: 2025-03-22

## 2025-03-22 RX ORDER — HYDROMORPHONE HYDROCHLORIDE 1 MG/ML
2 INJECTION, SOLUTION INTRAMUSCULAR; INTRAVENOUS; SUBCUTANEOUS ONCE
Status: COMPLETED | OUTPATIENT
Start: 2025-03-22 | End: 2025-03-22

## 2025-03-22 RX ORDER — DIPHENHYDRAMINE HCL 25 MG
CAPSULE ORAL
Status: COMPLETED
Start: 2025-03-22 | End: 2025-03-22

## 2025-03-22 RX ORDER — DIPHENHYDRAMINE HCL 25 MG
50 CAPSULE ORAL ONCE
Status: COMPLETED | OUTPATIENT
Start: 2025-03-22 | End: 2025-03-22

## 2025-03-22 RX ORDER — ONDANSETRON HYDROCHLORIDE 2 MG/ML
4 INJECTION, SOLUTION INTRAVENOUS ONCE
Status: COMPLETED | OUTPATIENT
Start: 2025-03-22 | End: 2025-03-22

## 2025-03-22 RX ORDER — HEPARIN 100 UNIT/ML
SYRINGE INTRAVENOUS
Status: COMPLETED
Start: 2025-03-22 | End: 2025-03-22

## 2025-03-22 RX ORDER — AMOXICILLIN AND CLAVULANATE POTASSIUM 875; 125 MG/1; MG/1
875 TABLET, FILM COATED ORAL EVERY 12 HOURS
Qty: 14 TABLET | Refills: 0 | Status: SHIPPED | OUTPATIENT
Start: 2025-03-22 | End: 2025-03-29

## 2025-03-22 RX ORDER — AMOXICILLIN AND CLAVULANATE POTASSIUM 875; 125 MG/1; MG/1
1 TABLET, FILM COATED ORAL ONCE
Status: COMPLETED | OUTPATIENT
Start: 2025-03-22 | End: 2025-03-22

## 2025-03-22 RX ORDER — AMOXICILLIN AND CLAVULANATE POTASSIUM 875; 125 MG/1; MG/1
TABLET, FILM COATED ORAL
Status: COMPLETED
Start: 2025-03-22 | End: 2025-03-22

## 2025-03-22 RX ADMIN — HYDROMORPHONE HYDROCHLORIDE 2 MG: 1 INJECTION, SOLUTION INTRAMUSCULAR; INTRAVENOUS; SUBCUTANEOUS at 06:41

## 2025-03-22 RX ADMIN — HYDROMORPHONE HYDROCHLORIDE 2 MG: 1 INJECTION, SOLUTION INTRAMUSCULAR; INTRAVENOUS; SUBCUTANEOUS at 09:26

## 2025-03-22 RX ADMIN — HEPARIN 500 UNITS: 100 SYRINGE at 11:13

## 2025-03-22 RX ADMIN — Medication 50 MG: at 09:26

## 2025-03-22 RX ADMIN — Medication 2 L/MIN: at 06:53

## 2025-03-22 RX ADMIN — ONDANSETRON 4 MG: 2 INJECTION INTRAMUSCULAR; INTRAVENOUS at 07:53

## 2025-03-22 RX ADMIN — AMOXICILLIN AND CLAVULANATE POTASSIUM 1 TABLET: 875; 125 TABLET, FILM COATED ORAL at 11:12

## 2025-03-22 RX ADMIN — IOHEXOL 75 ML: 350 INJECTION, SOLUTION INTRAVENOUS at 09:17

## 2025-03-22 RX ADMIN — SODIUM CHLORIDE 1000 ML: 0.9 INJECTION, SOLUTION INTRAVENOUS at 06:41

## 2025-03-22 RX ADMIN — Medication 1 L/MIN: at 11:14

## 2025-03-22 RX ADMIN — HYDROMORPHONE HYDROCHLORIDE 2 MG: 1 INJECTION, SOLUTION INTRAMUSCULAR; INTRAVENOUS; SUBCUTANEOUS at 07:53

## 2025-03-22 RX ADMIN — DIPHENHYDRAMINE HYDROCHLORIDE 50 MG: 25 CAPSULE ORAL at 09:26

## 2025-03-22 ASSESSMENT — PAIN SCALES - GENERAL
PAINLEVEL_OUTOF10: 2
PAINLEVEL_OUTOF10: 9
PAINLEVEL_OUTOF10: 8
PAINLEVEL_OUTOF10: 9

## 2025-03-22 ASSESSMENT — PAIN DESCRIPTION - PROGRESSION: CLINICAL_PROGRESSION: GRADUALLY IMPROVING

## 2025-03-22 ASSESSMENT — PAIN - FUNCTIONAL ASSESSMENT
PAIN_FUNCTIONAL_ASSESSMENT: 0-10
PAIN_FUNCTIONAL_ASSESSMENT: 0-10

## 2025-03-22 ASSESSMENT — PAIN DESCRIPTION - PAIN TYPE
TYPE: CHRONIC PAIN
TYPE: ACUTE PAIN

## 2025-03-22 ASSESSMENT — PAIN DESCRIPTION - LOCATION: LOCATION: ABDOMEN

## 2025-03-22 NOTE — ED PROVIDER NOTES
HPI   Chief Complaint   Patient presents with    Sickle Cell Pain Crisis     Pt has had Sickle pain constant since monday all over trunk and all extremities no SOB no N/V no headache no dizziness or diaphoresis pt to ER via [facility] with 2l NC O2 has helped took her own DIlaudid 2 mg last dose at 01:00 this am no relief in pain.       29-year-old female with past medical history significant for sickle cell presents to the emergency department complaining of pain.  States that she has pain in her chest, abdomen and legs.  States that chest pain and leg pains is not unusual but abdominal pain is unusual for her.  States that she has been coughing.  No fevers or chills.  She has an appointment to see pulmonology this coming week because she has been coughing for months.  No nausea, vomiting or diarrhea.  Has been taking Dilaudid pills and Subutex.  This flareup started on Monday and is not getting better.  Denies any other complaints.  Patient brought in by EMS.              Patient History   Past Medical History:   Diagnosis Date    Sickle cell anemia with crisis (Multi)      Past Surgical History:   Procedure Laterality Date    CHOLECYSTECTOMY      CT ABDOMEN PELVIS ANGIOGRAM W AND/OR WO IV CONTRAST  04/09/2020    CT ABDOMEN PELVIS ANGIOGRAM W AND/OR WO IV CONTRAST 4/9/2020 [facility]    CT ANGIO NECK  09/27/2019    CT NECK ANGIO W AND WO IV CONTRAST 9/27/2019 [facility]    IR CVC TUNNELED  08/24/2020    IR CVC TUNNELED 8/24/2020 [facility]    KNEE Right     OTHER SURGICAL HISTORY  09/22/2020    Venous access port placement    OTHER SURGICAL HISTORY  09/22/2020    Tonsillectomy with adenoidectomy    OTHER SURGICAL HISTORY  09/22/2020    Knee surgery     No family history on file.  Social History     Tobacco Use    Smoking status: Never    Smokeless tobacco: Never   Vaping Use    Vaping status: Never Used   Substance Use Topics    Alcohol use: Never    Drug use: Never        Physical Exam   ED Triage Vitals [03/22/25 0605]   Temperature Heart Rate Respirations BP   36.4 °C (97.5 °F) 72 16 (!) 150/108      SpO2 Temp Source Heart Rate Source Patient Position   100 % Temporal -- --      BP Location FiO2 (%)     Right arm --       Physical Exam  HENT:      Head: Normocephalic and atraumatic.      Mouth/Throat:      Mouth: Mucous membranes are moist.   Eyes:      Extraocular Movements: Extraocular movements intact.      Pupils: Pupils are equal, round, and reactive to light.   Cardiovascular:      Rate and Rhythm: Normal rate and regular rhythm.   Pulmonary:      Breath sounds: Rhonchi present.   Abdominal:      Palpations: Abdomen is soft.      Tenderness: There is abdominal tenderness.      Comments: Left upper abdominal tenderness to palpation without rebound or guarding   Musculoskeletal:         General: No swelling. Normal range of motion.   Skin:     General: Skin is warm and dry.   Neurological:      General: No focal deficit present.      Mental Status: She is alert.   Psychiatric:         Behavior: Behavior normal.           ED Course & MDM   ED Course as of 03/22/25 2015   Sat Mar 22, 2025   0740 EKG today reviewed in April interpreted done at 648 reveals normal sinus rhythm at a rate of 96 with normal intervals, normal axis, normal QT QTc and no ischemia or STEMI. [RM]   0925 Urinalysis is negative for infection.  Urine pregnancy test is negative.    Patient requesting third dose of Dilaudid.  She is receiving 2 mg of Dilaudid IV as her third dose now.  She was requesting some Benadryl and going to give her oral Benadryl will not give her IV Benadryl as she has had 3 doses of IV Dilaudid. [EC]   1049 CT Chest/Abdomen/Pelvis:  IMPRESSION:  CHEST:      Bandlike possible infiltrate, atelectasis or scarring in the right  lung base.      ABDOMEN AND PELVIS:      Moderate hepatosplenomegaly similar to 06/8/2024.      Mild nonspecific stranding in the right anterior pelvic wall  and  flank. Correlate with possible dependent edema, bruising or  cellulitis.      Additional findings as described above.   [EC]      ED Course User Index  [EC] José Miguel Dey DO  [RM] Ascencion Greenwood MD         Diagnoses as of 03/22/25 2015   Sickle cell crisis (Multi)   Abdominal pain, unspecified abdominal location   Pneumonia due to infectious organism, unspecified laterality, unspecified part of lung                 No data recorded     [place] Coma Scale Score: 15 (03/22/25 0623 : Gamaliel Phipps RN)                   Labs Reviewed   CBC WITH AUTO DIFFERENTIAL - Abnormal       Result Value    WBC 12.8 (*)     nRBC 0.0      RBC 4.70      Hemoglobin 9.8 (*)     Hematocrit 29.5 (*)     MCV 63 (*)     MCH 20.9 (*)     MCHC 33.2      RDW 20.1 (*)     Platelets 293      Neutrophils % 77.6      Immature Granulocytes %, Automated 0.3      Lymphocytes % 15.9      Monocytes % 5.5      Eosinophils % 0.5      Basophils % 0.2      Neutrophils Absolute 9.93 (*)     Immature Granulocytes Absolute, Automated 0.04      Lymphocytes Absolute 2.03      Monocytes Absolute 0.70      Eosinophils Absolute 0.06      Basophils Absolute 0.02     COMPREHENSIVE METABOLIC PANEL - Abnormal    Glucose 129 (*)     Sodium 135 (*)     Potassium 3.9      Chloride 104      Bicarbonate 27      Anion Gap 8 (*)     Urea Nitrogen 11      Creatinine 0.80      eGFR >90      Calcium 8.5 (*)     Albumin 3.7      Alkaline Phosphatase 83      Total Protein 7.8      AST 21      Bilirubin, Total 1.1      ALT 11     RETICULOCYTES - Abnormal    Retic % 2.0      Retic Absolute 0.094 (*)     Reticulocyte Hemoglobin 23 (*)     Immature Retic fraction 28.9 (*)    URINALYSIS WITH REFLEX CULTURE AND MICROSCOPIC - Abnormal    Color, Urine Yellow      Appearance, Urine Clear      Specific Gravity, Urine 1.010      pH, Urine 7.0      Protein, Urine NEGATIVE      Glucose, Urine NEGATIVE      Blood, Urine NEGATIVE      Ketones, Urine NEGATIVE      Bilirubin, Urine  NEGATIVE      Urobilinogen, Urine <2.0      Nitrite, Urine NEGATIVE      Leukocyte Esterase, Urine TRACE (*)    SARS-COV-2 AND INFLUENZA A/B PCR - Normal    Flu A Result Not Detected      Flu B Result Not Detected      Coronavirus 2019, PCR Not Detected      Narrative:     This assay is an FDA-cleared, in vitro diagnostic nucleic acid amplification test for the qualitative detection and differentiation of SARS CoV-2/ Influenza A/B from nasopharyngeal specimens collected from individuals with signs and symptoms of respiratory tract infections, and has been validated for use at [facility]. Negative results do not preclude COVID-19/ Influenza A/B infections and should not be used as the sole basis for diagnosis, treatment, or other management decisions. Testing for SARS CoV-2 is recommended only for patients who meet current clinical and/or epidemiological criteria defined by federal, state, or local public health directives.   RSV PCR - Normal    RSV PCR Not Detected      Narrative:     This assay is an FDA-cleared, in vitro diagnostic nucleic acid amplification test for the detection of RSV from nasopharyngeal specimens, and has been validated for use at [facility]. Negative results do not preclude RSV infections, and should not be used as the sole basis for diagnosis, treatment, or other management decisions. If Influenza A/B and RSV PCR results are negative, testing for Parainfluenza virus, Adenovirus and Metapneumovirus is routinely performed for pediatric oncology and intensive care [place] [facility] [place] and is available on other patients by placing an add-on request.       HCG, URINE, QUALITATIVE - Normal    HCG, Urine NEGATIVE     URINE CULTURE   URINALYSIS WITH REFLEX CULTURE AND MICROSCOPIC    Narrative:     The following orders were created for panel order Urinalysis with Reflex Culture and Microscopic.  Procedure                               Abnormality          Status                     ---------                               -----------         ------                     Urinalysis with Reflex C...[[id number]]  Abnormal            Final result               Extra Urine Gray Tube[[id number]]                            Final result                 Please view results for these tests on the individual orders.   EXTRA URINE GRAY TUBE    Extra Tube Hold for add-ons.     GRAY TOP    Extra Tube Hold for add-ons.     MICROSCOPIC ONLY, URINE    WBC, Urine 1-5      RBC, Urine 1-2      Squamous Epithelial Cells, Urine 1-9 (SPARSE)     MORPHOLOGY    RBC Morphology See Below      Hypochromia Mild      Target Cells Few      Teardrop Cells Few      Stomatocytes Few           CT chest abdomen pelvis w IV contrast   Final Result   CHEST:        Bandlike possible infiltrate, atelectasis or scarring in the right   lung base.        ABDOMEN AND PELVIS:        Moderate hepatosplenomegaly similar to 06/8/2024.        Mild nonspecific stranding in the right anterior pelvic wall and   flank. Correlate with possible dependent edema, bruising or   cellulitis.        Additional findings as described above.        MACRO:   None.        Signed by: Brittani Cervantes 3/22/2025 10:45 AM   Dictation workstation:   [id number]            Medical Decision Making  29-year-old female with history of sickle cell who presents to the emergency department with leg pain chest pain and abdominal pain.  Is normal for her to have chest pain and leg pain as part of her sickle cell crisis but not abdominal pain.  Workup reveals a minimally elevated white count of 12.8 with hemoglobin of 9.8, reticulocyte count of 2.0.  COVID and flu are negative.  RSV is negative.  Urine is pending and CT scan of the chest and abdomen are ending.  The patient does have rhonchi on lung examination.  Differential includes pneumonia, bronchitis, URI, viral illness, acute chest.  The abdominal pain with differential of nonspecific abdominal  pain versus solid organ infarct.  With left upper abdominal pain splenic infarct in the differential.  Pancreatitis, hepatitis, bowel obstruction and perforation are less likely but also in the differential.  CT scan is pending.  Signed out to incoming physician Dr. Flores pending urine and CT scan results.  If negative, plan to discharge if reasonable pain control is achieved.  Has received multiple doses of Dilaudid in the emergency department.        Procedure  Procedures     Ascencion Greenwood MD  03/22/25 0818       Ascencion Greenwood MD  03/22/25 2016

## 2025-03-22 NOTE — PROGRESS NOTES
Emergency Medicine Transition of Care Note.    I received Taylor Hampton in signout from Dr. TAMIR Greenwood.  Please see the previous ED provider note for all HPI, PE and MDM up to the time of signout at 0800. This is in addition to the primary record.    In brief Taylor Hampton is an 29 y.o. female presenting for   Chief Complaint   Patient presents with    Sickle Cell Pain Crisis     Pt has had Sickle pain constant since monday all over trunk and all extremities no SOB no N/V no headache no dizziness or diaphoresis pt to ER via [facility] with 2l NC O2 has helped took her own DIlaudid 2 mg last dose at 01:00 this am no relief in pain.     At the time of signout we were awaiting: CT imaging    See Dr Greenwood note:  29-year-old female with history of sickle cell who presents to the emergency department with leg pain chest pain and abdominal pain. Is normal for her to have chest pain and leg pain as part of her sickle cell crisis but not abdominal pain. Workup reveals a minimally elevated white count of 12.8 with hemoglobin of 9.8, reticulocyte count of 2.0. COVID and flu are negative. RSV is negative.     Urine is pending and CT scan of the chest and abdomen are pending. The patient does have rhonchi on lung examination. Differential includes pneumonia, bronchitis, URI, viral illness, acute chest. The abdominal pain with differential of nonspecific abdominal pain versus solid organ infarct. With left upper abdominal pain splenic infarct in the differential. Pancreatitis, hepatitis, bowel obstruction and perforation are less likely but also in the differential. CT scan is pending. Signed out to incoming physician Dr. Flores pending urine and CT scan results. If negative, plan to discharge if reasonable pain control is achieved. Has received multiple doses of Dilaudid in the emergency department.     Vital signs are stable she afebrile nontoxic.  Patient is up and ambulatory here in the ER I watched her ambulate to  and from the bathroom.    I did review and interpret her EKG done at 0648.  Normal sinus rhythm 96 bpm.  Normal axis.   ms QRS 92 ms QTc minimally prolonged 386 ms.  No acute ST-T change.  No STEMI.        ED Course as of 03/22/25 1055   Sat Mar 22, 2025   0740 EKG today reviewed in April interpreted done at 648 reveals normal sinus rhythm at a rate of 96 with normal intervals, normal axis, normal QT QTc and no ischemia or STEMI. [RM]   0925 Urinalysis is negative for infection.  Urine pregnancy test is negative.    Patient requesting third dose of Dilaudid.  She is receiving 2 mg of Dilaudid IV as her third dose now.  She was requesting some Benadryl and going to give her oral Benadryl will not give her IV Benadryl as she has had 3 doses of IV Dilaudid. [EC]   1049 CT Chest/Abdomen/Pelvis:  IMPRESSION:  CHEST:      Bandlike possible infiltrate, atelectasis or scarring in the right  lung base.      ABDOMEN AND PELVIS:      Moderate hepatosplenomegaly similar to 06/8/2024.      Mild nonspecific stranding in the right anterior pelvic wall and  flank. Correlate with possible dependent edema, bruising or  cellulitis.      Additional findings as described above.   [EC]      ED Course User Index  [EC] José Miguel Dye DO  [RM] Ascencion Greenwood MD         Diagnoses as of 03/22/25 1055   Sickle cell crisis (Multi)   Abdominal pain, unspecified abdominal location   Pneumonia due to infectious organism, unspecified laterality, unspecified part of lung       Medical Decision Making  IMPRESSION:  CHEST:      Bandlike possible infiltrate, atelectasis or scarring in the right  lung base.      ABDOMEN AND PELVIS:      Moderate hepatosplenomegaly similar to 06/8/2024.      Mild nonspecific stranding in the right anterior pelvic wall and  flank. Correlate with possible dependent edema, bruising or  cellulitis.  Additional findings as described above.    Patient has had some nonproductive cough. She does have a mildly elevated  WBC.  Will Start her on Augmentin 875mg twice a day x 7 days.  DC home.  Follow up with primary care this week.          Final diagnoses:   [D57.00] Sickle cell crisis (Multi)   [R10.9] Abdominal pain, unspecified abdominal location   [J18.9] Pneumonia due to infectious organism, unspecified laterality, unspecified part of lung           Procedure  Procedures    José Miguel Dye, DO

## 2025-03-22 NOTE — DISCHARGE INSTRUCTIONS
Lab work is stable.  CT imaging shows no acute surgical findings.   Continue your regular home medications and care.  RECHECK with your primary care/sickle care physician/clinic this week.  RETURN if acutely worse or new worrisome symptoms

## 2025-03-22 NOTE — ED TRIAGE NOTES
Pt has had Sickle pain constant since monday all over trunk and all extremities no SOB no N/V no headache no dizziness or diaphoresis pt to ER via [facility] with 2l NC O2 has helped took her own DIlaudid 2 mg last dose at 01:00 this am no relief in pain.

## 2025-03-23 LAB — BACTERIA UR CULT: NORMAL

## 2025-03-24 LAB
ATRIAL RATE: 96 BPM
P AXIS: 43 DEGREES
P OFFSET: 197 MS
P ONSET: 146 MS
PR INTERVAL: 144 MS
Q ONSET: 218 MS
QRS COUNT: 16 BEATS
QRS DURATION: 92 MS
QT INTERVAL: 386 MS
QTC CALCULATION(BAZETT): 487 MS
QTC FREDERICIA: 451 MS
R AXIS: 30 DEGREES
T AXIS: 11 DEGREES
T OFFSET: 411 MS
VENTRICULAR RATE: 96 BPM

## 2025-04-18 ENCOUNTER — CLINICAL SUPPORT (OUTPATIENT)
Dept: EMERGENCY MEDICINE | Facility: HOSPITAL | Age: 30
End: 2025-04-18
Payer: COMMERCIAL

## 2025-04-18 ENCOUNTER — HOSPITAL ENCOUNTER (INPATIENT)
Facility: HOSPITAL | Age: 30
End: 2025-04-18
Attending: STUDENT IN AN ORGANIZED HEALTH CARE EDUCATION/TRAINING PROGRAM | Admitting: STUDENT IN AN ORGANIZED HEALTH CARE EDUCATION/TRAINING PROGRAM
Payer: COMMERCIAL

## 2025-04-18 ENCOUNTER — APPOINTMENT (OUTPATIENT)
Dept: RADIOLOGY | Facility: HOSPITAL | Age: 30
End: 2025-04-18
Payer: COMMERCIAL

## 2025-04-18 DIAGNOSIS — G89.29 CHRONIC LOW BACK PAIN WITHOUT SCIATICA, UNSPECIFIED BACK PAIN LATERALITY: ICD-10-CM

## 2025-04-18 DIAGNOSIS — K59.03 DRUG-INDUCED CONSTIPATION: ICD-10-CM

## 2025-04-18 DIAGNOSIS — G89.29 OTHER CHRONIC PAIN: ICD-10-CM

## 2025-04-18 DIAGNOSIS — D57.00 SICKLE CELL PAIN CRISIS (MULTI): Primary | ICD-10-CM

## 2025-04-18 DIAGNOSIS — M54.50 CHRONIC LOW BACK PAIN WITHOUT SCIATICA, UNSPECIFIED BACK PAIN LATERALITY: ICD-10-CM

## 2025-04-18 LAB
ALBUMIN SERPL BCP-MCNC: 3.6 G/DL (ref 3.4–5)
ALP SERPL-CCNC: 82 U/L (ref 33–110)
ALT SERPL W P-5'-P-CCNC: 7 U/L (ref 7–45)
ANION GAP SERPL CALC-SCNC: 12 MMOL/L (ref 10–20)
AST SERPL W P-5'-P-CCNC: 13 U/L (ref 9–39)
ATRIAL RATE: 88 BPM
BASOPHILS # BLD AUTO: 0.03 X10*3/UL (ref 0–0.1)
BASOPHILS NFR BLD AUTO: 0.3 %
BILIRUB SERPL-MCNC: 1 MG/DL (ref 0–1.2)
BUN SERPL-MCNC: 9 MG/DL (ref 6–23)
CALCIUM SERPL-MCNC: 8.9 MG/DL (ref 8.6–10.6)
CARDIAC TROPONIN I PNL SERPL HS: <3 NG/L (ref 0–34)
CHLORIDE SERPL-SCNC: 105 MMOL/L (ref 98–107)
CO2 SERPL-SCNC: 25 MMOL/L (ref 21–32)
CREAT SERPL-MCNC: 0.73 MG/DL (ref 0.5–1.05)
EGFRCR SERPLBLD CKD-EPI 2021: >90 ML/MIN/1.73M*2
EOSINOPHIL # BLD AUTO: 0.23 X10*3/UL (ref 0–0.7)
EOSINOPHIL NFR BLD AUTO: 2.1 %
ERYTHROCYTE [DISTWIDTH] IN BLOOD BY AUTOMATED COUNT: 19.7 % (ref 11.5–14.5)
GLUCOSE SERPL-MCNC: 107 MG/DL (ref 74–99)
HCT VFR BLD AUTO: 27.8 % (ref 36–46)
HGB BLD-MCNC: 9.7 G/DL (ref 12–16)
HGB RETIC QN: 23 PG (ref 28–38)
IMM GRANULOCYTES # BLD AUTO: 0.04 X10*3/UL (ref 0–0.7)
IMM GRANULOCYTES NFR BLD AUTO: 0.4 % (ref 0–0.9)
IMMATURE RETIC FRACTION: 16.4 %
LDH SERPL L TO P-CCNC: 150 U/L (ref 84–246)
LYMPHOCYTES # BLD AUTO: 3.54 X10*3/UL (ref 1.2–4.8)
LYMPHOCYTES NFR BLD AUTO: 32.4 %
MCH RBC QN AUTO: 20.9 PG (ref 26–34)
MCHC RBC AUTO-ENTMCNC: 34.9 G/DL (ref 32–36)
MCV RBC AUTO: 60 FL (ref 80–100)
MONOCYTES # BLD AUTO: 0.77 X10*3/UL (ref 0.1–1)
MONOCYTES NFR BLD AUTO: 7.1 %
NEUTROPHILS # BLD AUTO: 6.3 X10*3/UL (ref 1.2–7.7)
NEUTROPHILS NFR BLD AUTO: 57.7 %
NRBC BLD-RTO: 0 /100 WBCS (ref 0–0)
P AXIS: 29 DEGREES
P OFFSET: 201 MS
P ONSET: 151 MS
PLATELET # BLD AUTO: 291 X10*3/UL (ref 150–450)
POTASSIUM SERPL-SCNC: 3.6 MMOL/L (ref 3.5–5.3)
PR INTERVAL: 136 MS
PROT SERPL-MCNC: 7.6 G/DL (ref 6.4–8.2)
Q ONSET: 219 MS
QRS COUNT: 14 BEATS
QRS DURATION: 86 MS
QT INTERVAL: 384 MS
QTC CALCULATION(BAZETT): 464 MS
QTC FREDERICIA: 436 MS
R AXIS: 33 DEGREES
RBC # BLD AUTO: 4.65 X10*6/UL (ref 4–5.2)
RETICS #: 0.07 X10*6/UL (ref 0.02–0.08)
RETICS/RBC NFR AUTO: 1.6 % (ref 0.5–2)
SODIUM SERPL-SCNC: 138 MMOL/L (ref 136–145)
T AXIS: -5 DEGREES
T OFFSET: 411 MS
VENTRICULAR RATE: 88 BPM
WBC # BLD AUTO: 10.9 X10*3/UL (ref 4.4–11.3)

## 2025-04-18 PROCEDURE — 96376 TX/PRO/DX INJ SAME DRUG ADON: CPT

## 2025-04-18 PROCEDURE — 71046 X-RAY EXAM CHEST 2 VIEWS: CPT

## 2025-04-18 PROCEDURE — 71046 X-RAY EXAM CHEST 2 VIEWS: CPT | Performed by: RADIOLOGY

## 2025-04-18 PROCEDURE — 2500000002 HC RX 250 W HCPCS SELF ADMINISTERED DRUGS (ALT 637 FOR MEDICARE OP, ALT 636 FOR OP/ED): Mod: JZ

## 2025-04-18 PROCEDURE — 83021 HEMOGLOBIN CHROMOTOGRAPHY: CPT

## 2025-04-18 PROCEDURE — 85045 AUTOMATED RETICULOCYTE COUNT: CPT

## 2025-04-18 PROCEDURE — G0378 HOSPITAL OBSERVATION PER HR: HCPCS

## 2025-04-18 PROCEDURE — 2500000002 HC RX 250 W HCPCS SELF ADMINISTERED DRUGS (ALT 637 FOR MEDICARE OP, ALT 636 FOR OP/ED)

## 2025-04-18 PROCEDURE — 36415 COLL VENOUS BLD VENIPUNCTURE: CPT

## 2025-04-18 PROCEDURE — 96375 TX/PRO/DX INJ NEW DRUG ADDON: CPT

## 2025-04-18 PROCEDURE — 94640 AIRWAY INHALATION TREATMENT: CPT

## 2025-04-18 PROCEDURE — 96374 THER/PROPH/DIAG INJ IV PUSH: CPT | Mod: 59

## 2025-04-18 PROCEDURE — 99223 1ST HOSP IP/OBS HIGH 75: CPT

## 2025-04-18 PROCEDURE — 2500000001 HC RX 250 WO HCPCS SELF ADMINISTERED DRUGS (ALT 637 FOR MEDICARE OP)

## 2025-04-18 PROCEDURE — 85025 COMPLETE CBC W/AUTO DIFF WBC: CPT

## 2025-04-18 PROCEDURE — 2500000004 HC RX 250 GENERAL PHARMACY W/ HCPCS (ALT 636 FOR OP/ED): Mod: JZ,TB

## 2025-04-18 PROCEDURE — 80053 COMPREHEN METABOLIC PANEL: CPT

## 2025-04-18 PROCEDURE — 99285 EMERGENCY DEPT VISIT HI MDM: CPT | Performed by: STUDENT IN AN ORGANIZED HEALTH CARE EDUCATION/TRAINING PROGRAM

## 2025-04-18 PROCEDURE — 2500000004 HC RX 250 GENERAL PHARMACY W/ HCPCS (ALT 636 FOR OP/ED): Mod: JZ

## 2025-04-18 PROCEDURE — 93010 ELECTROCARDIOGRAM REPORT: CPT | Performed by: STUDENT IN AN ORGANIZED HEALTH CARE EDUCATION/TRAINING PROGRAM

## 2025-04-18 PROCEDURE — 93005 ELECTROCARDIOGRAM TRACING: CPT

## 2025-04-18 PROCEDURE — 99285 EMERGENCY DEPT VISIT HI MDM: CPT | Mod: 25 | Performed by: STUDENT IN AN ORGANIZED HEALTH CARE EDUCATION/TRAINING PROGRAM

## 2025-04-18 PROCEDURE — 83615 LACTATE (LD) (LDH) ENZYME: CPT

## 2025-04-18 PROCEDURE — 84484 ASSAY OF TROPONIN QUANT: CPT

## 2025-04-18 RX ORDER — IPRATROPIUM BROMIDE AND ALBUTEROL SULFATE 2.5; .5 MG/3ML; MG/3ML
3 SOLUTION RESPIRATORY (INHALATION)
Status: COMPLETED | OUTPATIENT
Start: 2025-04-18 | End: 2025-04-18

## 2025-04-18 RX ORDER — DULOXETIN HYDROCHLORIDE 60 MG/1
60 CAPSULE, DELAYED RELEASE ORAL DAILY
Status: DISCONTINUED | OUTPATIENT
Start: 2025-04-18 | End: 2025-04-23 | Stop reason: HOSPADM

## 2025-04-18 RX ORDER — PREGABALIN 25 MG/1
50 CAPSULE ORAL NIGHTLY
Status: DISCONTINUED | OUTPATIENT
Start: 2025-04-18 | End: 2025-04-23 | Stop reason: HOSPADM

## 2025-04-18 RX ORDER — SODIUM CHLORIDE, SODIUM LACTATE, POTASSIUM CHLORIDE, CALCIUM CHLORIDE 600; 310; 30; 20 MG/100ML; MG/100ML; MG/100ML; MG/100ML
100 INJECTION, SOLUTION INTRAVENOUS CONTINUOUS
Status: ACTIVE | OUTPATIENT
Start: 2025-04-18 | End: 2025-04-19

## 2025-04-18 RX ORDER — ONDANSETRON HYDROCHLORIDE 2 MG/ML
4 INJECTION, SOLUTION INTRAVENOUS ONCE
Status: COMPLETED | OUTPATIENT
Start: 2025-04-18 | End: 2025-04-18

## 2025-04-18 RX ORDER — POLYETHYLENE GLYCOL 3350 17 G/17G
17 POWDER, FOR SOLUTION ORAL DAILY PRN
Status: DISCONTINUED | OUTPATIENT
Start: 2025-04-18 | End: 2025-04-23 | Stop reason: HOSPADM

## 2025-04-18 RX ORDER — FOLIC ACID 1 MG/1
1 TABLET ORAL DAILY
Status: DISCONTINUED | OUTPATIENT
Start: 2025-04-18 | End: 2025-04-23 | Stop reason: HOSPADM

## 2025-04-18 RX ORDER — ACETAMINOPHEN 325 MG/1
975 TABLET ORAL EVERY 6 HOURS PRN
Status: DISCONTINUED | OUTPATIENT
Start: 2025-04-18 | End: 2025-04-19

## 2025-04-18 RX ORDER — DIPHENHYDRAMINE HCL 25 MG
50 CAPSULE ORAL ONCE
Status: COMPLETED | OUTPATIENT
Start: 2025-04-18 | End: 2025-04-18

## 2025-04-18 RX ORDER — HYDROXYUREA 500 MG/1
500 CAPSULE ORAL
Status: DISCONTINUED | OUTPATIENT
Start: 2025-04-18 | End: 2025-04-23 | Stop reason: HOSPADM

## 2025-04-18 RX ORDER — ONDANSETRON HYDROCHLORIDE 2 MG/ML
4 INJECTION, SOLUTION INTRAVENOUS EVERY 6 HOURS PRN
Status: DISCONTINUED | OUTPATIENT
Start: 2025-04-18 | End: 2025-04-23 | Stop reason: HOSPADM

## 2025-04-18 RX ORDER — BUPRENORPHINE 2 MG/1
2 TABLET SUBLINGUAL 3 TIMES DAILY
Status: DISCONTINUED | OUTPATIENT
Start: 2025-04-18 | End: 2025-04-20

## 2025-04-18 RX ORDER — ALBUTEROL SULFATE 0.83 MG/ML
2.5 SOLUTION RESPIRATORY (INHALATION) EVERY 4 HOURS PRN
Status: DISCONTINUED | OUTPATIENT
Start: 2025-04-18 | End: 2025-04-23 | Stop reason: HOSPADM

## 2025-04-18 RX ADMIN — DULOXETINE 60 MG: 60 CAPSULE, DELAYED RELEASE ORAL at 11:02

## 2025-04-18 RX ADMIN — SODIUM CHLORIDE, SODIUM LACTATE, POTASSIUM CHLORIDE, AND CALCIUM CHLORIDE 100 ML/HR: .6; .31; .03; .02 INJECTION, SOLUTION INTRAVENOUS at 20:17

## 2025-04-18 RX ADMIN — HYDROMORPHONE HYDROCHLORIDE 2 MG: 2 INJECTION, SOLUTION INTRAMUSCULAR; INTRAVENOUS; SUBCUTANEOUS at 08:36

## 2025-04-18 RX ADMIN — FOLIC ACID 1 MG: 1 TABLET ORAL at 11:02

## 2025-04-18 RX ADMIN — HYDROMORPHONE HYDROCHLORIDE 2 MG: 2 INJECTION, SOLUTION INTRAMUSCULAR; INTRAVENOUS; SUBCUTANEOUS at 15:04

## 2025-04-18 RX ADMIN — BUPRENORPHINE HYDROCHLORIDE 2 MG: 2 TABLET SUBLINGUAL at 11:02

## 2025-04-18 RX ADMIN — IPRATROPIUM BROMIDE AND ALBUTEROL SULFATE 3 ML: .5; 3 SOLUTION RESPIRATORY (INHALATION) at 05:32

## 2025-04-18 RX ADMIN — SODIUM CHLORIDE, SODIUM LACTATE, POTASSIUM CHLORIDE, AND CALCIUM CHLORIDE 100 ML/HR: .6; .31; .03; .02 INJECTION, SOLUTION INTRAVENOUS at 11:40

## 2025-04-18 RX ADMIN — HYDROMORPHONE HYDROCHLORIDE 2 MG: 2 INJECTION, SOLUTION INTRAMUSCULAR; INTRAVENOUS; SUBCUTANEOUS at 07:27

## 2025-04-18 RX ADMIN — IPRATROPIUM BROMIDE AND ALBUTEROL SULFATE 3 ML: .5; 3 SOLUTION RESPIRATORY (INHALATION) at 05:26

## 2025-04-18 RX ADMIN — HYDROMORPHONE HYDROCHLORIDE 2 MG: 2 INJECTION, SOLUTION INTRAMUSCULAR; INTRAVENOUS; SUBCUTANEOUS at 19:07

## 2025-04-18 RX ADMIN — IPRATROPIUM BROMIDE AND ALBUTEROL SULFATE 3 ML: .5; 3 SOLUTION RESPIRATORY (INHALATION) at 05:36

## 2025-04-18 RX ADMIN — PREGABALIN 50 MG: 25 CAPSULE ORAL at 20:14

## 2025-04-18 RX ADMIN — HYDROMORPHONE HYDROCHLORIDE 2 MG: 2 INJECTION, SOLUTION INTRAMUSCULAR; INTRAVENOUS; SUBCUTANEOUS at 11:40

## 2025-04-18 RX ADMIN — HYDROMORPHONE HYDROCHLORIDE 2 MG: 2 INJECTION, SOLUTION INTRAMUSCULAR; INTRAVENOUS; SUBCUTANEOUS at 21:06

## 2025-04-18 RX ADMIN — DIPHENHYDRAMINE HYDROCHLORIDE 50 MG: 25 CAPSULE ORAL at 05:26

## 2025-04-18 RX ADMIN — HYDROMORPHONE HYDROCHLORIDE 2 MG: 2 INJECTION, SOLUTION INTRAMUSCULAR; INTRAVENOUS; SUBCUTANEOUS at 05:26

## 2025-04-18 RX ADMIN — BUPRENORPHINE HYDROCHLORIDE 2 MG: 2 TABLET SUBLINGUAL at 20:14

## 2025-04-18 RX ADMIN — RIVAROXABAN 20 MG: 20 TABLET, FILM COATED ORAL at 17:09

## 2025-04-18 RX ADMIN — HYDROMORPHONE HYDROCHLORIDE 2 MG: 2 INJECTION, SOLUTION INTRAMUSCULAR; INTRAVENOUS; SUBCUTANEOUS at 17:09

## 2025-04-18 RX ADMIN — HYDROXYUREA 500 MG: 500 CAPSULE ORAL at 12:17

## 2025-04-18 RX ADMIN — HYDROMORPHONE HYDROCHLORIDE 2 MG: 2 INJECTION, SOLUTION INTRAMUSCULAR; INTRAVENOUS; SUBCUTANEOUS at 23:14

## 2025-04-18 RX ADMIN — BUPRENORPHINE HYDROCHLORIDE 2 MG: 2 TABLET SUBLINGUAL at 15:04

## 2025-04-18 RX ADMIN — HYDROXYUREA 500 MG: 500 CAPSULE ORAL at 17:09

## 2025-04-18 RX ADMIN — ONDANSETRON 4 MG: 2 INJECTION INTRAMUSCULAR; INTRAVENOUS at 05:26

## 2025-04-18 SDOH — SOCIAL STABILITY: SOCIAL INSECURITY: DO YOU FEEL ANYONE HAS EXPLOITED OR TAKEN ADVANTAGE OF YOU FINANCIALLY OR OF YOUR PERSONAL PROPERTY?: NO

## 2025-04-18 SDOH — SOCIAL STABILITY: SOCIAL INSECURITY: HAS ANYONE EVER THREATENED TO HURT YOUR FAMILY OR YOUR PETS?: NO

## 2025-04-18 SDOH — SOCIAL STABILITY: SOCIAL INSECURITY: WITHIN THE LAST YEAR, HAVE YOU BEEN HUMILIATED OR EMOTIONALLY ABUSED IN OTHER WAYS BY YOUR PARTNER OR EX-PARTNER?: NO

## 2025-04-18 SDOH — SOCIAL STABILITY: SOCIAL INSECURITY: DOES ANYONE TRY TO KEEP YOU FROM HAVING/CONTACTING OTHER FRIENDS OR DOING THINGS OUTSIDE YOUR HOME?: NO

## 2025-04-18 SDOH — SOCIAL STABILITY: SOCIAL INSECURITY: HAVE YOU HAD THOUGHTS OF HARMING ANYONE ELSE?: NO

## 2025-04-18 SDOH — SOCIAL STABILITY: SOCIAL INSECURITY
WITHIN THE LAST YEAR, HAVE YOU BEEN RAPED OR FORCED TO HAVE ANY KIND OF SEXUAL ACTIVITY BY YOUR PARTNER OR EX-PARTNER?: NO

## 2025-04-18 SDOH — ECONOMIC STABILITY: FOOD INSECURITY: WITHIN THE PAST 12 MONTHS, YOU WORRIED THAT YOUR FOOD WOULD RUN OUT BEFORE YOU GOT THE MONEY TO BUY MORE.: NEVER TRUE

## 2025-04-18 SDOH — ECONOMIC STABILITY: FOOD INSECURITY: WITHIN THE PAST 12 MONTHS, THE FOOD YOU BOUGHT JUST DIDN'T LAST AND YOU DIDN'T HAVE MONEY TO GET MORE.: NEVER TRUE

## 2025-04-18 SDOH — SOCIAL STABILITY: SOCIAL INSECURITY
WITHIN THE LAST YEAR, HAVE YOU BEEN KICKED, HIT, SLAPPED, OR OTHERWISE PHYSICALLY HURT BY YOUR PARTNER OR EX-PARTNER?: NO

## 2025-04-18 SDOH — SOCIAL STABILITY: SOCIAL INSECURITY: DO YOU FEEL UNSAFE GOING BACK TO THE PLACE WHERE YOU ARE LIVING?: NO

## 2025-04-18 SDOH — SOCIAL STABILITY: SOCIAL INSECURITY: WITHIN THE LAST YEAR, HAVE YOU BEEN AFRAID OF YOUR PARTNER OR EX-PARTNER?: NO

## 2025-04-18 SDOH — ECONOMIC STABILITY: INCOME INSECURITY: IN THE PAST 12 MONTHS HAS THE ELECTRIC, GAS, OIL, OR WATER COMPANY THREATENED TO SHUT OFF SERVICES IN YOUR HOME?: NO

## 2025-04-18 SDOH — SOCIAL STABILITY: SOCIAL INSECURITY: ARE THERE ANY APPARENT SIGNS OF INJURIES/BEHAVIORS THAT COULD BE RELATED TO ABUSE/NEGLECT?: NO

## 2025-04-18 SDOH — SOCIAL STABILITY: SOCIAL INSECURITY: ARE YOU OR HAVE YOU BEEN THREATENED OR ABUSED PHYSICALLY, EMOTIONALLY, OR SEXUALLY BY ANYONE?: NO

## 2025-04-18 SDOH — SOCIAL STABILITY: SOCIAL INSECURITY: ABUSE: ADULT

## 2025-04-18 SDOH — SOCIAL STABILITY: SOCIAL INSECURITY: HAVE YOU HAD ANY THOUGHTS OF HARMING ANYONE ELSE?: NO

## 2025-04-18 ASSESSMENT — ACTIVITIES OF DAILY LIVING (ADL)
HEARING - LEFT EAR: FUNCTIONAL
GROOMING: INDEPENDENT
WALKS IN HOME: INDEPENDENT
ADEQUATE_TO_COMPLETE_ADL: YES
FEEDING YOURSELF: INDEPENDENT
TOILETING: INDEPENDENT
JUDGMENT_ADEQUATE_SAFELY_COMPLETE_DAILY_ACTIVITIES: YES
PATIENT'S MEMORY ADEQUATE TO SAFELY COMPLETE DAILY ACTIVITIES?: YES
HEARING - RIGHT EAR: FUNCTIONAL
BATHING: INDEPENDENT
LACK_OF_TRANSPORTATION: NO
DRESSING YOURSELF: INDEPENDENT

## 2025-04-18 ASSESSMENT — PAIN - FUNCTIONAL ASSESSMENT
PAIN_FUNCTIONAL_ASSESSMENT: 0-10

## 2025-04-18 ASSESSMENT — COGNITIVE AND FUNCTIONAL STATUS - GENERAL
PATIENT BASELINE BEDBOUND: NO
DAILY ACTIVITIY SCORE: 24
MOBILITY SCORE: 24
DAILY ACTIVITIY SCORE: 24
MOBILITY SCORE: 24

## 2025-04-18 ASSESSMENT — PAIN DESCRIPTION - PROGRESSION: CLINICAL_PROGRESSION: NOT CHANGED

## 2025-04-18 ASSESSMENT — LIFESTYLE VARIABLES
HAVE PEOPLE ANNOYED YOU BY CRITICIZING YOUR DRINKING: NO
HOW MANY STANDARD DRINKS CONTAINING ALCOHOL DO YOU HAVE ON A TYPICAL DAY: PATIENT DOES NOT DRINK
AUDIT-C TOTAL SCORE: 0
EVER FELT BAD OR GUILTY ABOUT YOUR DRINKING: NO
SKIP TO QUESTIONS 9-10: 1
HOW OFTEN DO YOU HAVE 6 OR MORE DRINKS ON ONE OCCASION: NEVER
EVER HAD A DRINK FIRST THING IN THE MORNING TO STEADY YOUR NERVES TO GET RID OF A HANGOVER: NO
AUDIT-C TOTAL SCORE: 0
TOTAL SCORE: 0
HAVE YOU EVER FELT YOU SHOULD CUT DOWN ON YOUR DRINKING: NO
HOW OFTEN DO YOU HAVE A DRINK CONTAINING ALCOHOL: NEVER

## 2025-04-18 ASSESSMENT — PAIN SCALES - GENERAL
PAINLEVEL_OUTOF10: 8
PAINLEVEL_OUTOF10: 7
PAINLEVEL_OUTOF10: 9
PAINLEVEL_OUTOF10: 8
PAINLEVEL_OUTOF10: 8
PAINLEVEL_OUTOF10: 7
PAINLEVEL_OUTOF10: 8
PAINLEVEL_OUTOF10: 8
PAINLEVEL_OUTOF10: 5 - MODERATE PAIN
PAINLEVEL_OUTOF10: 8
PAINLEVEL_OUTOF10: 7
PAINLEVEL_OUTOF10: 8
PAINLEVEL_OUTOF10: 6
PAINLEVEL_OUTOF10: 8
PAINLEVEL_OUTOF10: 7

## 2025-04-18 ASSESSMENT — PAIN DESCRIPTION - LOCATION
LOCATION: GENERALIZED
LOCATION: OTHER (COMMENT)

## 2025-04-18 ASSESSMENT — PAIN DESCRIPTION - PAIN TYPE: TYPE: ACUTE PAIN

## 2025-04-18 NOTE — PROGRESS NOTES
Pharmacy Medication History Review    Taylor Hampton is a 29 y.o. female admitted for Sickle cell pain crisis (Multi). Pharmacy reviewed the patient's kfuor-ig-[id number] medications and allergies for accuracy.    Medications ADDED:  Prevident 5000   Woman Multivitamin gummy  Medications CHANGED:  None   Medications REMOVED:   Docusate 100 mg  Ferrous Sulfate 325 mg     The list below reflects the updated PTA list.   Prior to Admission Medications   Prescriptions Last Dose Informant   DULoxetine (Cymbalta) 60 mg DR capsule 4/17/2025 Morning Self   Sig: Take 1 capsule (60 mg) by mouth once daily.   HYDROmorphone (Dilaudid) 2 mg tablet 4/17/2025 Evening Self   Sig: Take 1 tablets (2-4 mg) by mouth every 4 hours if needed for severe pain (7 - 10).   acetaminophen (Tylenol) 500 mg tablet  Self   Sig: Take 2 tablets (1,000 mg) by mouth every 6 hours if needed for mild pain (1 - 3).   albuterol 2.5 mg /3 mL (0.083 %) nebulizer solution  Self   Sig: Take 3 mL (2.5 mg) by nebulization every 4 hours if needed for wheezing.   buprenorphine (Subutex) 2 mg 4/17/2025 Evening Self   Sig: Place 1 tablet (2 mg) under the tongue 3 times a day.   folic acid (Folvite) 1 mg tablet 4/17/2025 Morning Self   Sig: Take 1 tablet (1 mg) by mouth once daily.   hydroxyurea (Hydrea) 500 mg capsule 4/17/2025 Evening Self   Sig: Take 1 capsule (500 mg total) by mouth 3 times a day after meals.  Take at the same time each day.   multivit-minerals/folic acid (WOMEN'S MULTIVITAMIN GUMMIES ORAL)  Self   Sig: Take 1 Dose by mouth once daily.   pregabalin (Lyrica) 50 mg capsule 4/17/2025 Evening Self   Sig: Take 1 capsule (50 mg) by mouth once daily at bedtime.   rivaroxaban (Xarelto) 20 mg tablet 4/17/2025 Evening Self   Sig: Take 1 tablet (20 mg) by mouth once daily in the evening. Take with meals. Take with food.   sodium chloride (Ocean) 0.65 % nasal spray  Self   Sig: Administer 2 sprays into each nostril 5 times a day. PRN   sodium  "fluoride/potassium nit (PREVIDENT 5000 SENSITIVE DENT)  Self   Sig: Apply 1 Application to teeth once daily.      Facility-Administered Medications: None         The list below reflects the updated allergy list. Please review each documented allergy for additional clarification and justification.  Allergies  Reviewed by Carito Early on 4/18/2025        Severity Reactions Comments    Crizanlizumab-tmca High Anaphylaxis     Meperidine Medium Hives, Rash     Sulfamethoxazole-trimethoprim Medium Hives, Rash Rash, itchy    Trimethoprim Not Specified Hives     Adhesive Low Dermatitis, Itching, Rash     Adhesive Tape-silicones Low Rash     Coconut Low Rash     Sulfamethoxazole Low Hives, Rash             Patient accepts M2B at discharge.     Sources:   [facility]  Pharmacy dispense history  Patient Interview Good historian  Chart Review  Care Everywhere     Additional Comments:  None       Carito Early  Pharmacy Technician  04/18/25     Secure Chat preferred   If no response call [id number] or [email or web address] \"Med Rec\"   "

## 2025-04-18 NOTE — ED PROVIDER NOTES
CC: Sickle Cell Pain Crisis     HPI:   Patient is a 29-year-old female with past medical history of sickle cell disease complicated by pulmonary embolism on Xarelto, restrictive lung disease, presenting due to concerns of pain in her chest, legs and back.  Patient reports this is usually where her sickle cell pain is.  She endorses midline chest tenderness without radiation to her shoulder.  She endorses lower back pain extending into her legs.  She reports with weather changes her pain gets worse.  She has been taking medicines at home without relief of symptoms.  Patient endorses a chronic cough but denies having any recent history of acute chest syndrome.  She reports she had it as a child.  Patient denies any recent fevers or chills.    Limitations to History: none  Additional History Obtained from: patient alone    PMHx/PSHx:  Per HPI.   - has a past medical history of Sickle cell anemia with crisis.  - has a past surgical history that includes Other surgical history (09/22/2020); Other surgical history (09/22/2020); Other surgical history (09/22/2020); CT angio neck (09/27/2019); CT angio abdomen pelvis w and or wo IV IV contrast (04/09/2020); IR CVC tunneled (08/24/2020); Cholecystectomy; and XR knee (Right).    Social History:  - Tobacco:  reports that she has never smoked. She has never used smokeless tobacco.   - Alcohol:  reports no history of alcohol use.   - Drugs:  reports no history of drug use.     Medications: Reviewed in EMR.     Allergies:  Crizanlizumab-tmca, Meperidine, Sulfamethoxazole-trimethoprim, Trimethoprim, Adhesive, Adhesive tape-silicones, Coconut, and Sulfamethoxazole    ???????????????????????????????????????????????????????????????  Triage Vitals:  T 36.1 °C (97 °F)  HR 94  BP (!) 168/111  RR 19  O2 98 % None (Room air)    Physical Exam  Vitals and nursing note reviewed.   Constitutional:       General: She is not in acute distress.     Appearance: She is well-developed.   HENT:       Head: Normocephalic and atraumatic.      Mouth/Throat:      Mouth: Mucous membranes are dry.      Pharynx: Oropharynx is clear.   Eyes:      Conjunctiva/sclera: Conjunctivae normal.   Cardiovascular:      Rate and Rhythm: Normal rate and regular rhythm.      Heart sounds: No murmur heard.  Pulmonary:      Effort: Pulmonary effort is normal. No respiratory distress.      Breath sounds: Wheezing present. No rhonchi or rales.      Comments: R Chest port  Abdominal:      General: There is no distension.      Palpations: Abdomen is soft.      Tenderness: There is no abdominal tenderness. There is no guarding or rebound.   Musculoskeletal:         General: No swelling or tenderness.      Cervical back: Neck supple.   Skin:     General: Skin is warm and dry.      Capillary Refill: Capillary refill takes less than 2 seconds.   Neurological:      Mental Status: She is alert and oriented to person, place, and time.      Sensory: No sensory deficit.      Motor: No weakness.      Gait: Gait normal.   Psychiatric:         Mood and Affect: Mood normal.       ???????????????????????????????????????????????????????????????  EKG (per my interpretation):  Sinus rhythm with a rate of 88.  No SD QRS punctation.  No obvious ST elevations or depressions noted.  T wave inversions noted in inferior and anterolateral leads that are stable when compared to prior EKG from March 2025.  No MAY QTc 464    ED Course  Diagnoses as of 04/18/25 2314   Sickle cell pain crisis (Multi)       Medical Decision Making:  Patient is a 29-year-old female with past medical history of sickle cell disease complicated by pulmonary embolism on Xarelto, restrictive lung disease, presenting due to concerns of pain in her chest, legs and back.  Patient reports that she does not have a history of asthma however does have wheezing noted on exam.  She does use nebulizer treatments and has not used any recently.  Will trial DuoNebs to see if she has improvement of  symptoms in addition to her care plan.  She gets 2 mg of Dilaudid every hour along with Benadryl and Toradol as needed for side effect control.  Patient will be reevaluated to see if she requires admission.  EKG and chest x-ray were obtained to rule out acute chest syndrome. Chest x-ray was negative for any signs of acute chest syndrome.  Patient is pending lab work results and reevaluation after care path.  Care was handed off to oncoming ED provider.    External records reviewed: recent inpatient, clinic, and prior ED notes  Diagnostic imaging independently reviewed/interpreted by me (as reflected in MDM) includes: CXR  Social Determinants Affecting Care: None identified  Discussion of management with other providers: attending  Prescription Drug Consideration: none  Escalation of Care: handoff    Impression:   Sickle cell pain  Chest Pain  Back Pain  LE Pain    Disposition: Handoff      Procedures ? Smart[employer] last updated 4/18/2025 11:14 PM        Serena Quispe MD  Resident  04/18/25 0634       Serena Quispe MD  Resident  04/18/25 0635       Buffy Aguillon MD  04/18/25 4328

## 2025-04-18 NOTE — LETTER
April 23, 2025     Patient: Taylor Hampton   YOB: 1995   Date of Visit: 4/18/2025       To Whom It May Concern:    Taylor Hampton was admitted to the hospital from 4/18/2025 to 4/23/2025 . Please excuse Taylor for her absence from work on these days. Patient appropriate to return to work on 4/28/2025.    If you have any questions or concerns, please don't hesitate to call.         Sincerely,   Allyssa Khan, DO

## 2025-04-18 NOTE — H&P
MEDICINE ADMISSION NOTE    History of Present Illness  Taylor Hampton is a 29 y.o. female with PMHx of Sickle cell disease c/b Pulmonary embolism on Xarelto, Obstructive airway disease, Depression, presenting with complaints of chest, bilateral lower limbs and low back pain of few days duration which she feels is consistent with her sickle cell crisis pain.     According to the patient, symptoms are said to have started about a week ago, but she presented because symptoms got worse despite using her home pain medications. Pain is currently 8/10, non radiating, aggravated by movement, not related to change in body posture or time of the day and relieved temporarily with pain medications. She was at [facility] ED a week ago for same complaint. This is her 3rd ED visit in a month for painful crisis.   She endorses associated dry cough for over a year now and nausea. Does not follow up with a pulmonologist. Her appetite is good and she tries to stay hydrated but feels usually at this time of the year, she tends to get more crisis due to the weather.      Patient denies any headache, dizziness, fever, chills, shortness of breath,  abdominal pain, V/C/D or lower extremity swelling. She follows up with a Hematologist at [facility] and has an appointment for next week.     In the ED  Vitals:  T 36.1, HR 94, /111, RR 19, SpO2 98 on RA    Labs:  WBC 10.9, Hgb 9.7, plt 291  Na 138, K 3.6, Cl 105, HCO3 25, BUN 9, Cr 0.73, glu 107  Ca 8.9, tprot 7.6, alb 3.6, alkphos 82, AST 13, ALT 7, tbili 1  Reti % 1.6, Retic abs 0.075, Reti HGB 23, Immature Retic fraction 16.4    Imaging:  XR chest 2 views 4/18  No acute cardiopulmonary process.     Intervention in the ED  Benadryl caps 50 mg once  Dilaudid 2 mg IV X2  Duonebs  Zofran IV 4 mg once    Past Medical History  As above    Social History  She reports that she has never smoked. She has never used smokeless tobacco. She reports that she does not drink alcohol and does not use drugs.      Allergies  Crizanlizumab-tmca, Meperidine, Sulfamethoxazole-trimethoprim, Trimethoprim, Adhesive, Adhesive tape-silicones, Coconut, and Sulfamethoxazole     Physical Exam   Constitutional: Obese, Alert, in mild painful distress  HEENT: Normocephalic, atraumatic, PERRLA, EOMI, moist mucous membranes, no pharyngeal erythema  Cardiovascular: RRR, normal S1/S2, no murmurs noted  Pulmonary: Clear to auscultation b/l, no wheezes/crackles/rhonchi, no increased work of breathing, no supplemental oxygen  GI: Soft, non-tender, non-distended, normoactive bowel sounds  : No mendoza catheter  Skin: Mediport in RU arm, no visible lesions seen.  Lower extremities: Warm and well perfused, trace lower extremity edema  Neuro: A&O x3, able to move all 4 extremities spontaneously, normal gait  Psych: Depressed mood and affect     Home Medications   Tylenol 1000 mg q6 PRN  Lyrica 50 mg daily  Hydroxyurea 500 mg TID  Albuterol neb  Xarelto 20 mg daily  Folic acid 1 mg daily  Dilaudid 2 mg q4 PRn  Buprenorphine 2 mg TID   Duloxetin 60 mg daily  Robaxin 1000 mg TID     Assessment/Plan   29 y.o. female with PMHx of Sickle cell disease c/b Pulmonary embolism on Xarelto, Obstructive airway disease, Depression, presenting with complaints of chest, bilateral lower limbs and low back pain of few days duration which she feels is consistent with her sickle cell crisis pain.  Tachycardic and HTNsive on arrival in the ED. Labs with no acute pathology, HGB stable.  CXR unremarkable. Presentation concerning for Sickle cell disease with painful crisis likely vaso-occlusive. No concern for acute chest syndrome at this time. Will admit for pain control and further management.    #Sickle cell disease with Painful Crisis  -Likely vaso-occlusive crisis.   -c/o chest, bilateral lower extremity and back pain similar to previous sickle cell crisis  -Last ED visit to [facility] a week ago. Today is her 3rd ED visit in a month  -CXR: with no acute cardiopulmonary  process / unremarkable   -HGB 9.7 (at baseline)  -s/p Benadryl 50 mg PO, Dilaudid 2 mg IV X2 and Zofran IV 4 mg once in the ED  -EKG 4/18: QT/Qtc: 384/464  -Follows with Hematologist at [facility]. Has appointment scheduled for next week  Plan  -Start IV Dilaudid 2 mg q2 PRN with plan to transition to home oral pain meds tomorrow   -Schedule Tylenol 975 mg q8  -Continue Buprenorphine 2 mg TID   -Continue home folic acid 1 mg daily  -HGB identification with Path review ordered  -IVF LR @100cc/hr  -Continue home Lyrica  50 mg daily  -Continue home Hydroxyurea 500 mg TID  -Will start bowel regimen  -Hematology consulted and will appreciate recs  -Will consider pain management consult    #Pulmonary Embolism  -Continue home Xarelto 20 mg daily    #Obstructive Airway Dx  -Continue home albuterol nebulizer PRN    #Depression  -Continue home Duloxetin 60 mg daily    F : PRN  E: PRN  N: Regular   A: Mediport    DVT prophylaxis: Xarelto 20 mg daily     Code Status: Full Code (confirmed on admission)   NOK: Extended Emergency Contact Information  Primary Emergency Contact: Taylor Quintana   [place]  Mobile Phone: [phone]  Relation: Mother  Secondary Emergency Contact: ACOSTAMEREDITH M  Address: [address]  Home Phone: [phone]  Mobile Phone: [phone]  Relation: Neighbor  Preferred language: English   needed? No      Haseeb Oliva MD MPH  Internal Medicine, PGY-3

## 2025-04-18 NOTE — ED PROVIDER NOTES
Patient received on signout.  Her sickle cell workup had already been completed.  Her labs were at her baseline.  Chest x-ray without acute cardiopulmonary process.  No evidence of acute chest at this time.  Patient was given a total of 3 doses of 2 mg IV Dilaudid per her care plan without significant improvement in her pain.  She is still endorsing 7/10 pain.  She does not feel that she can adequately control her pain at home.  Patient otherwise hemodynamically stable and afebrile.  Patient to be admitted for sickle cell pain crisis.     Ky Chaney MD  Resident  04/18/25 2067

## 2025-04-19 LAB
ALBUMIN SERPL BCP-MCNC: 1.8 G/DL (ref 3.4–5)
ALP SERPL-CCNC: 48 U/L (ref 33–110)
ALT SERPL W P-5'-P-CCNC: 4 U/L (ref 7–45)
ANION GAP SERPL CALC-SCNC: 17 MMOL/L (ref 10–20)
AST SERPL W P-5'-P-CCNC: 7 U/L (ref 9–39)
BASOPHILS # BLD AUTO: 0.02 X10*3/UL (ref 0–0.1)
BASOPHILS NFR BLD AUTO: 0.2 %
BILIRUB SERPL-MCNC: 0.7 MG/DL (ref 0–1.2)
BUN SERPL-MCNC: 4 MG/DL (ref 6–23)
CALCIUM SERPL-MCNC: 7 MG/DL (ref 8.6–10.6)
CHLORIDE SERPL-SCNC: 107 MMOL/L (ref 98–107)
CO2 SERPL-SCNC: 16 MMOL/L (ref 21–32)
CREAT SERPL-MCNC: 0.35 MG/DL (ref 0.5–1.05)
EGFRCR SERPLBLD CKD-EPI 2021: >90 ML/MIN/1.73M*2
EOSINOPHIL # BLD AUTO: 0.29 X10*3/UL (ref 0–0.7)
EOSINOPHIL NFR BLD AUTO: 2.7 %
ERYTHROCYTE [DISTWIDTH] IN BLOOD BY AUTOMATED COUNT: 19.7 % (ref 11.5–14.5)
GLUCOSE BLD MANUAL STRIP-MCNC: 120 MG/DL (ref 74–99)
GLUCOSE SERPL-MCNC: 56 MG/DL (ref 74–99)
HCT VFR BLD AUTO: 27.9 % (ref 36–46)
HEMOGLOBIN A2: 4.4 % (ref 2–3.5)
HEMOGLOBIN C: 44.8 %
HEMOGLOBIN F: 0.5 % (ref 0–2)
HEMOGLOBIN IDENTIFICATION INTERPRETATION: ABNORMAL
HEMOGLOBIN S: 50.3 %
HGB BLD-MCNC: 9 G/DL (ref 12–16)
IMM GRANULOCYTES # BLD AUTO: 0.05 X10*3/UL (ref 0–0.7)
IMM GRANULOCYTES NFR BLD AUTO: 0.5 % (ref 0–0.9)
LYMPHOCYTES # BLD AUTO: 3.6 X10*3/UL (ref 1.2–4.8)
LYMPHOCYTES NFR BLD AUTO: 33.7 %
MAGNESIUM SERPL-MCNC: 0.98 MG/DL (ref 1.6–2.4)
MCH RBC QN AUTO: 20.1 PG (ref 26–34)
MCHC RBC AUTO-ENTMCNC: 32.3 G/DL (ref 32–36)
MCV RBC AUTO: 62 FL (ref 80–100)
MONOCYTES # BLD AUTO: 0.67 X10*3/UL (ref 0.1–1)
MONOCYTES NFR BLD AUTO: 6.3 %
NEUTROPHILS # BLD AUTO: 6.06 X10*3/UL (ref 1.2–7.7)
NEUTROPHILS NFR BLD AUTO: 56.6 %
NRBC BLD-RTO: 0 /100 WBCS (ref 0–0)
PATH REVIEW-HGB IDENTIFICATION: ABNORMAL
PLATELET # BLD AUTO: 283 X10*3/UL (ref 150–450)
POTASSIUM SERPL-SCNC: 4.2 MMOL/L (ref 3.5–5.3)
PROT SERPL-MCNC: 3.6 G/DL (ref 6.4–8.2)
RBC # BLD AUTO: 4.48 X10*6/UL (ref 4–5.2)
SODIUM SERPL-SCNC: 136 MMOL/L (ref 136–145)
WBC # BLD AUTO: 10.7 X10*3/UL (ref 4.4–11.3)

## 2025-04-19 PROCEDURE — 99233 SBSQ HOSP IP/OBS HIGH 50: CPT

## 2025-04-19 PROCEDURE — 2500000004 HC RX 250 GENERAL PHARMACY W/ HCPCS (ALT 636 FOR OP/ED)

## 2025-04-19 PROCEDURE — 83735 ASSAY OF MAGNESIUM: CPT

## 2025-04-19 PROCEDURE — 2500000001 HC RX 250 WO HCPCS SELF ADMINISTERED DRUGS (ALT 637 FOR MEDICARE OP)

## 2025-04-19 PROCEDURE — 85025 COMPLETE CBC W/AUTO DIFF WBC: CPT

## 2025-04-19 PROCEDURE — 80053 COMPREHEN METABOLIC PANEL: CPT

## 2025-04-19 PROCEDURE — 1100000001 HC PRIVATE ROOM DAILY

## 2025-04-19 PROCEDURE — 2500000002 HC RX 250 W HCPCS SELF ADMINISTERED DRUGS (ALT 637 FOR MEDICARE OP, ALT 636 FOR OP/ED): Mod: JZ | Performed by: STUDENT IN AN ORGANIZED HEALTH CARE EDUCATION/TRAINING PROGRAM

## 2025-04-19 PROCEDURE — 2500000005 HC RX 250 GENERAL PHARMACY W/O HCPCS

## 2025-04-19 PROCEDURE — 2500000002 HC RX 250 W HCPCS SELF ADMINISTERED DRUGS (ALT 637 FOR MEDICARE OP, ALT 636 FOR OP/ED)

## 2025-04-19 PROCEDURE — 2500000001 HC RX 250 WO HCPCS SELF ADMINISTERED DRUGS (ALT 637 FOR MEDICARE OP): Performed by: STUDENT IN AN ORGANIZED HEALTH CARE EDUCATION/TRAINING PROGRAM

## 2025-04-19 PROCEDURE — 2500000004 HC RX 250 GENERAL PHARMACY W/ HCPCS (ALT 636 FOR OP/ED): Mod: JZ,TB

## 2025-04-19 PROCEDURE — 82947 ASSAY GLUCOSE BLOOD QUANT: CPT

## 2025-04-19 RX ORDER — DIPHENHYDRAMINE HCL 25 MG
25 CAPSULE ORAL EVERY 6 HOURS PRN
Status: DISCONTINUED | OUTPATIENT
Start: 2025-04-19 | End: 2025-04-23 | Stop reason: HOSPADM

## 2025-04-19 RX ORDER — GUAIFENESIN 600 MG/1
600 TABLET, EXTENDED RELEASE ORAL 2 TIMES DAILY PRN
Status: DISCONTINUED | OUTPATIENT
Start: 2025-04-19 | End: 2025-04-23 | Stop reason: HOSPADM

## 2025-04-19 RX ORDER — DEXTROSE 50 % IN WATER (D50W) INTRAVENOUS SYRINGE
25
Status: DISCONTINUED | OUTPATIENT
Start: 2025-04-19 | End: 2025-04-23 | Stop reason: HOSPADM

## 2025-04-19 RX ORDER — DEXTROSE 50 % IN WATER (D50W) INTRAVENOUS SYRINGE
12.5
Status: DISCONTINUED | OUTPATIENT
Start: 2025-04-19 | End: 2025-04-23 | Stop reason: HOSPADM

## 2025-04-19 RX ORDER — IPRATROPIUM BROMIDE AND ALBUTEROL SULFATE 2.5; .5 MG/3ML; MG/3ML
3 SOLUTION RESPIRATORY (INHALATION) EVERY 6 HOURS PRN
Status: DISCONTINUED | OUTPATIENT
Start: 2025-04-19 | End: 2025-04-23 | Stop reason: HOSPADM

## 2025-04-19 RX ORDER — MAGNESIUM SULFATE HEPTAHYDRATE 40 MG/ML
4 INJECTION, SOLUTION INTRAVENOUS ONCE
Status: COMPLETED | OUTPATIENT
Start: 2025-04-19 | End: 2025-04-19

## 2025-04-19 RX ORDER — LIDOCAINE 560 MG/1
1 PATCH PERCUTANEOUS; TOPICAL; TRANSDERMAL DAILY
Status: DISCONTINUED | OUTPATIENT
Start: 2025-04-20 | End: 2025-04-19

## 2025-04-19 RX ORDER — METHOCARBAMOL 500 MG/1
500 TABLET, FILM COATED ORAL EVERY 6 HOURS SCHEDULED
Status: DISCONTINUED | OUTPATIENT
Start: 2025-04-19 | End: 2025-04-23

## 2025-04-19 RX ORDER — ACETAMINOPHEN 325 MG/1
975 TABLET ORAL EVERY 6 HOURS
Status: DISCONTINUED | OUTPATIENT
Start: 2025-04-19 | End: 2025-04-19

## 2025-04-19 RX ORDER — SODIUM CHLORIDE FOR INHALATION 3 %
3 VIAL, NEBULIZER (ML) INHALATION EVERY 6 HOURS SCHEDULED
Status: DISCONTINUED | OUTPATIENT
Start: 2025-04-19 | End: 2025-04-20

## 2025-04-19 RX ORDER — LIDOCAINE 560 MG/1
1 PATCH PERCUTANEOUS; TOPICAL; TRANSDERMAL DAILY
Status: DISCONTINUED | OUTPATIENT
Start: 2025-04-20 | End: 2025-04-23 | Stop reason: HOSPADM

## 2025-04-19 RX ORDER — ACETAMINOPHEN 325 MG/1
975 TABLET ORAL EVERY 6 HOURS
Status: DISCONTINUED | OUTPATIENT
Start: 2025-04-19 | End: 2025-04-23 | Stop reason: HOSPADM

## 2025-04-19 RX ADMIN — HYDROXYUREA 500 MG: 500 CAPSULE ORAL at 18:56

## 2025-04-19 RX ADMIN — HYDROMORPHONE HYDROCHLORIDE 2 MG: 2 INJECTION, SOLUTION INTRAMUSCULAR; INTRAVENOUS; SUBCUTANEOUS at 05:43

## 2025-04-19 RX ADMIN — BUPRENORPHINE HYDROCHLORIDE 2 MG: 2 TABLET SUBLINGUAL at 08:42

## 2025-04-19 RX ADMIN — HYDROMORPHONE HYDROCHLORIDE 2 MG: 2 INJECTION, SOLUTION INTRAMUSCULAR; INTRAVENOUS; SUBCUTANEOUS at 18:45

## 2025-04-19 RX ADMIN — HYDROMORPHONE HYDROCHLORIDE 2 MG: 2 INJECTION, SOLUTION INTRAMUSCULAR; INTRAVENOUS; SUBCUTANEOUS at 14:33

## 2025-04-19 RX ADMIN — DIPHENHYDRAMINE HYDROCHLORIDE 25 MG: 25 CAPSULE ORAL at 22:41

## 2025-04-19 RX ADMIN — HYDROMORPHONE HYDROCHLORIDE 2 MG: 2 INJECTION, SOLUTION INTRAMUSCULAR; INTRAVENOUS; SUBCUTANEOUS at 16:32

## 2025-04-19 RX ADMIN — HYDROMORPHONE HYDROCHLORIDE 2 MG: 2 INJECTION, SOLUTION INTRAMUSCULAR; INTRAVENOUS; SUBCUTANEOUS at 03:32

## 2025-04-19 RX ADMIN — HYDROMORPHONE HYDROCHLORIDE 2 MG: 2 INJECTION, SOLUTION INTRAMUSCULAR; INTRAVENOUS; SUBCUTANEOUS at 01:26

## 2025-04-19 RX ADMIN — HYDROMORPHONE HYDROCHLORIDE 2 MG: 2 INJECTION, SOLUTION INTRAMUSCULAR; INTRAVENOUS; SUBCUTANEOUS at 07:42

## 2025-04-19 RX ADMIN — METHOCARBAMOL 500 MG: 500 TABLET ORAL at 18:45

## 2025-04-19 RX ADMIN — METHOCARBAMOL 500 MG: 500 TABLET ORAL at 12:05

## 2025-04-19 RX ADMIN — HYDROXYUREA 500 MG: 500 CAPSULE ORAL at 12:30

## 2025-04-19 RX ADMIN — FOLIC ACID 1 MG: 1 TABLET ORAL at 08:42

## 2025-04-19 RX ADMIN — DULOXETINE 60 MG: 60 CAPSULE, DELAYED RELEASE ORAL at 08:42

## 2025-04-19 RX ADMIN — SODIUM CHLORIDE SOLN NEBU 3% 3 ML: 3 NEBU SOLN at 12:00

## 2025-04-19 RX ADMIN — HYDROMORPHONE HYDROCHLORIDE 2 MG: 2 INJECTION, SOLUTION INTRAMUSCULAR; INTRAVENOUS; SUBCUTANEOUS at 22:35

## 2025-04-19 RX ADMIN — DIPHENHYDRAMINE HYDROCHLORIDE 25 MG: 25 CAPSULE ORAL at 12:05

## 2025-04-19 RX ADMIN — MAGNESIUM SULFATE HEPTAHYDRATE 4 G: 40 INJECTION, SOLUTION INTRAVENOUS at 09:40

## 2025-04-19 RX ADMIN — GUAIFENESIN 600 MG: 600 TABLET ORAL at 12:05

## 2025-04-19 RX ADMIN — ACETAMINOPHEN 975 MG: 325 TABLET, FILM COATED ORAL at 12:00

## 2025-04-19 RX ADMIN — BUPRENORPHINE HYDROCHLORIDE 2 MG: 2 TABLET SUBLINGUAL at 14:33

## 2025-04-19 RX ADMIN — HYDROMORPHONE HYDROCHLORIDE 2 MG: 2 INJECTION, SOLUTION INTRAMUSCULAR; INTRAVENOUS; SUBCUTANEOUS at 09:43

## 2025-04-19 RX ADMIN — ACETAMINOPHEN 975 MG: 325 TABLET, FILM COATED ORAL at 18:45

## 2025-04-19 RX ADMIN — HYDROXYUREA 500 MG: 500 CAPSULE ORAL at 08:42

## 2025-04-19 RX ADMIN — RIVAROXABAN 20 MG: 20 TABLET, FILM COATED ORAL at 16:35

## 2025-04-19 RX ADMIN — HYDROMORPHONE HYDROCHLORIDE 2 MG: 2 INJECTION, SOLUTION INTRAMUSCULAR; INTRAVENOUS; SUBCUTANEOUS at 12:00

## 2025-04-19 RX ADMIN — BUPRENORPHINE HYDROCHLORIDE 2 MG: 2 TABLET SUBLINGUAL at 20:42

## 2025-04-19 RX ADMIN — HYDROMORPHONE HYDROCHLORIDE 2 MG: 2 INJECTION, SOLUTION INTRAMUSCULAR; INTRAVENOUS; SUBCUTANEOUS at 20:42

## 2025-04-19 RX ADMIN — IPRATROPIUM BROMIDE AND ALBUTEROL SULFATE 3 ML: .5; 3 SOLUTION RESPIRATORY (INHALATION) at 09:52

## 2025-04-19 RX ADMIN — PREGABALIN 50 MG: 25 CAPSULE ORAL at 20:42

## 2025-04-19 RX ADMIN — SODIUM CHLORIDE SOLN NEBU 3% 3 ML: 3 NEBU SOLN at 20:59

## 2025-04-19 SDOH — ECONOMIC STABILITY: FOOD INSECURITY: WITHIN THE PAST 12 MONTHS, YOU WORRIED THAT YOUR FOOD WOULD RUN OUT BEFORE YOU GOT THE MONEY TO BUY MORE.: NEVER TRUE

## 2025-04-19 SDOH — SOCIAL STABILITY: SOCIAL INSECURITY: WITHIN THE LAST YEAR, HAVE YOU BEEN AFRAID OF YOUR PARTNER OR EX-PARTNER?: NO

## 2025-04-19 SDOH — ECONOMIC STABILITY: FOOD INSECURITY: WITHIN THE PAST 12 MONTHS, THE FOOD YOU BOUGHT JUST DIDN'T LAST AND YOU DIDN'T HAVE MONEY TO GET MORE.: NEVER TRUE

## 2025-04-19 SDOH — SOCIAL STABILITY: SOCIAL INSECURITY: WITHIN THE LAST YEAR, HAVE YOU BEEN HUMILIATED OR EMOTIONALLY ABUSED IN OTHER WAYS BY YOUR PARTNER OR EX-PARTNER?: NO

## 2025-04-19 SDOH — ECONOMIC STABILITY: HOUSING INSECURITY: IN THE LAST 12 MONTHS, WAS THERE A TIME WHEN YOU WERE NOT ABLE TO PAY THE MORTGAGE OR RENT ON TIME?: NO

## 2025-04-19 SDOH — ECONOMIC STABILITY: TRANSPORTATION INSECURITY: IN THE PAST 12 MONTHS, HAS LACK OF TRANSPORTATION KEPT YOU FROM MEDICAL APPOINTMENTS OR FROM GETTING MEDICATIONS?: NO

## 2025-04-19 SDOH — ECONOMIC STABILITY: INCOME INSECURITY: IN THE PAST 12 MONTHS HAS THE ELECTRIC, GAS, OIL, OR WATER COMPANY THREATENED TO SHUT OFF SERVICES IN YOUR HOME?: NO

## 2025-04-19 SDOH — ECONOMIC STABILITY: HOUSING INSECURITY: IN THE PAST 12 MONTHS, HOW MANY TIMES HAVE YOU MOVED WHERE YOU WERE LIVING?: 0

## 2025-04-19 SDOH — ECONOMIC STABILITY: FOOD INSECURITY: HOW HARD IS IT FOR YOU TO PAY FOR THE VERY BASICS LIKE FOOD, HOUSING, MEDICAL CARE, AND HEATING?: NOT VERY HARD

## 2025-04-19 SDOH — ECONOMIC STABILITY: HOUSING INSECURITY: AT ANY TIME IN THE PAST 12 MONTHS, WERE YOU HOMELESS OR LIVING IN A SHELTER (INCLUDING NOW)?: NO

## 2025-04-19 ASSESSMENT — PAIN DESCRIPTION - DESCRIPTORS
DESCRIPTORS: ACHING

## 2025-04-19 ASSESSMENT — PAIN SCALES - WONG BAKER
WONGBAKER_NUMERICALRESPONSE: HURTS LITTLE BIT
WONGBAKER_NUMERICALRESPONSE: HURTS LITTLE BIT

## 2025-04-19 ASSESSMENT — PAIN - FUNCTIONAL ASSESSMENT
PAIN_FUNCTIONAL_ASSESSMENT: 0-10
PAIN_FUNCTIONAL_ASSESSMENT: WONG-BAKER FACES
PAIN_FUNCTIONAL_ASSESSMENT: 0-10
PAIN_FUNCTIONAL_ASSESSMENT: WONG-BAKER FACES
PAIN_FUNCTIONAL_ASSESSMENT: 0-10

## 2025-04-19 ASSESSMENT — PAIN SCALES - GENERAL
PAINLEVEL_OUTOF10: 5 - MODERATE PAIN
PAINLEVEL_OUTOF10: 7
PAINLEVEL_OUTOF10: 7
PAINLEVEL_OUTOF10: 8
PAINLEVEL_OUTOF10: 7
PAINLEVEL_OUTOF10: 8
PAINLEVEL_OUTOF10: 8
PAINLEVEL_OUTOF10: 5 - MODERATE PAIN
PAINLEVEL_OUTOF10: 8
PAINLEVEL_OUTOF10: 7
PAINLEVEL_OUTOF10: 7
PAINLEVEL_OUTOF10: 5 - MODERATE PAIN
PAINLEVEL_OUTOF10: 9

## 2025-04-19 ASSESSMENT — ACTIVITIES OF DAILY LIVING (ADL): LACK_OF_TRANSPORTATION: NO

## 2025-04-19 NOTE — PROGRESS NOTES
04/19/25 1814   Discharge Planning   Living Arrangements Alone   Support Systems Parent   Assistance Needed none   Type of Residence Private residence   Number of Stairs to Enter Residence 0   Number of Stairs Within Residence 0   Do you have animals or pets at home? No   Who is requesting discharge planning? Other (Comment)  (TCC assessment)   Home or Post Acute Services None   Expected Discharge Disposition Home   Does the patient need discharge transport arranged? No   Financial Resource Strain   How hard is it for you to pay for the very basics like food, housing, medical care, and heating? Not very   Housing Stability   In the last 12 months, was there a time when you were not able to pay the mortgage or rent on time? N   In the past 12 months, how many times have you moved where you were living? 0   At any time in the past 12 months, were you homeless or living in a shelter (including now)? N   Transportation Needs   In the past 12 months, has lack of transportation kept you from medical appointments or from getting medications? no   In the past 12 months, has lack of transportation kept you from meetings, work, or from getting things needed for daily living? No   Intensity of Service   Intensity of Service 0-30 min     Transitional Care Coordinator Note: Met with patient to discuss discharge planning s/p admission.  Patient lives home alone.  Support person mother( Taylor as listed) Independent in ADL's. Requires no assist devices for ambulation.  Patient denies active home care or home care needs.  Demographics and contact information confirmed.  Will continue to monitor patient for all home going needs.  Klarissa Martin RN TCC via Epic.    Falls- none  Equipment -none  HC -none  Picc/Port- none  SW- none  O2/Cpap- none  Diabetes- none   Dialysis- none  PCP- Dr. Vitaly [facility]'s [facility] last seen 1yr ago no follow up scheduled  Pharm- [facility]   Transport at discharge- self   Therapy  Recommendations-none at this time

## 2025-04-19 NOTE — CARE PLAN
The patient's goals for the shift include      The clinical goals for the shift include Patient pain will be well controlled this shift

## 2025-04-19 NOTE — CARE PLAN
The patient's goals for the shift include      The clinical goals for the shift include Pts pain will be 5/10 by 700 on 4/19/25    Over the shift, the patient did not make progress toward the following goals. Barriers to progression include . Recommendations to address these barriers include .

## 2025-04-19 NOTE — CARE PLAN
The patient's goals for the shift include      The clinical goals for the shift include Patient pain will be well controlled this shift    Problem: Pain  Goal: Takes deep breaths with improved pain control throughout the shift  Outcome: Progressing  Goal: Turns in bed with improved pain control throughout the shift  Outcome: Progressing  Goal: Walks with improved pain control throughout the shift  Outcome: Progressing  Goal: Performs ADL's with improved pain control throughout shift  Outcome: Progressing  Goal: Participates in PT with improved pain control throughout the shift  Outcome: Progressing  Goal: Free from opioid side effects throughout the shift  Outcome: Progressing  Goal: Free from acute confusion related to pain meds throughout the shift  Outcome: Progressing     Problem: Pain - Adult  Goal: Verbalizes/displays adequate comfort level or baseline comfort level  Outcome: Progressing     Problem: Safety - Adult  Goal: Free from fall injury  Outcome: Progressing     Problem: Discharge Planning  Goal: Discharge to home or other facility with appropriate resources  Outcome: Progressing     Problem: Chronic Conditions and Co-morbidities  Goal: Patient's chronic conditions and co-morbidity symptoms are monitored and maintained or improved  Outcome: Progressing     Problem: Nutrition  Goal: Nutrient intake appropriate for maintaining nutritional needs  Outcome: Progressing

## 2025-04-19 NOTE — PROGRESS NOTES
"Taylor Hampton is a 29 y.o. female on day 0 of admission presenting with Sickle cell pain crisis (Multi).    Subjective   Seen at bedside today.  Reports 8/10 pain.  Reports some nausea but no vomiting.  Denies chest pain or shortness of breath.       Objective     Physical Exam  General: well-appearing, NAD  HEENT: NC/AT  Cardiac: rrr, no m/r/g  Lungs: normal work of breathing, wheezing diffusely  Abdomen: soft, non-tender, non-distended, BS present  Neuro: grossly intact  MSK: No peripheral edema, cyanosis, or pallor  Psych: no depression, anxiety      Last Recorded Vitals  Blood pressure 105/68, pulse 76, temperature 36.6 °C (97.9 °F), resp. rate 18, height 1.6 m (5' 3\"), weight 143 kg (315 lb), SpO2 100%.  Intake/Output last 3 Shifts:  I/O last 3 completed shifts:  In: 1880 (13.2 mL/kg) [I.V.:1880 (13.2 mL/kg)]  Out: - (0 mL/kg)   Weight: 142.9 kg     Relevant Results  Scheduled medications  Scheduled Medications[1]  Continuous medications  Continuous Medications[2]  PRN medications  PRN Medications[3]    Results for orders placed or performed during the hospital encounter of 04/18/25 (from the past 24 hours)   CBC and Auto Differential   Result Value Ref Range    WBC 10.7 4.4 - 11.3 x10*3/uL    nRBC 0.0 0.0 - 0.0 /100 WBCs    RBC 4.48 4.00 - 5.20 x10*6/uL    Hemoglobin 9.0 (L) 12.0 - 16.0 g/dL    Hematocrit 27.9 (L) 36.0 - 46.0 %    MCV 62 (L) 80 - 100 fL    MCH 20.1 (L) 26.0 - 34.0 pg    MCHC 32.3 32.0 - 36.0 g/dL    RDW 19.7 (H) 11.5 - 14.5 %    Platelets 283 150 - 450 x10*3/uL    Neutrophils % 56.6 40.0 - 80.0 %    Immature Granulocytes %, Automated 0.5 0.0 - 0.9 %    Lymphocytes % 33.7 13.0 - 44.0 %    Monocytes % 6.3 2.0 - 10.0 %    Eosinophils % 2.7 0.0 - 6.0 %    Basophils % 0.2 0.0 - 2.0 %    Neutrophils Absolute 6.06 1.20 - 7.70 x10*3/uL    Immature Granulocytes Absolute, Automated 0.05 0.00 - 0.70 x10*3/uL    Lymphocytes Absolute 3.60 1.20 - 4.80 x10*3/uL    Monocytes Absolute 0.67 0.10 - 1.00 x10*3/uL "    Eosinophils Absolute 0.29 0.00 - 0.70 x10*3/uL    Basophils Absolute 0.02 0.00 - 0.10 x10*3/uL   Comprehensive Metabolic Panel   Result Value Ref Range    Glucose 56 (L) 74 - 99 mg/dL    Sodium 136 136 - 145 mmol/L    Potassium 4.2 3.5 - 5.3 mmol/L    Chloride 107 98 - 107 mmol/L    Bicarbonate 16 (L) 21 - 32 mmol/L    Anion Gap 17 10 - 20 mmol/L    Urea Nitrogen 4 (L) 6 - 23 mg/dL    Creatinine 0.35 (L) 0.50 - 1.05 mg/dL    eGFR >90 >60 mL/min/1.73m*2    Calcium 7.0 (L) 8.6 - 10.6 mg/dL    Albumin 1.8 (L) 3.4 - 5.0 g/dL    Alkaline Phosphatase 48 33 - 110 U/L    Total Protein 3.6 (L) 6.4 - 8.2 g/dL    AST 7 (L) 9 - 39 U/L    Bilirubin, Total 0.7 0.0 - 1.2 mg/dL    ALT 4 (L) 7 - 45 U/L   Magnesium   Result Value Ref Range    Magnesium 0.98 (L) 1.60 - 2.40 mg/dL   POCT GLUCOSE   Result Value Ref Range    POCT Glucose 120 (H) 74 - 99 mg/dL     ECG 12 lead  Result Date: 4/18/2025  Normal sinus rhythm Nonspecific T wave abnormality Prolonged QT Abnormal ECG When compared with ECG of 22-MAR-2025 06:48, No significant change was found See ED provider note for full interpretation and clinical correlation Confirmed by Elly Larson ([id number]) on 4/18/2025 10:06:46 PM    XR chest 2 views  Result Date: 4/18/2025  Interpreted By:  Chata Melissa, STUDY: XR CHEST 2 VIEWS;  4/18/2025 4:47 am   INDICATION: Signs/Symptoms:SOB and CP with SS.   COMPARISON: 11/27/2024   ACCESSION NUMBER(S): [id number]   ORDERING CLINICIAN: BRII DAY   FINDINGS: Chest port catheter tip is at the level of the cavoatrial junction.   CARDIOMEDIASTINAL SILHOUETTE: Cardiomediastinal silhouette is normal in size and configuration.   LUNGS: No pulmonary consolidation, pleural effusion or pneumothorax.   ABDOMEN: No remarkable upper abdominal findings.   BONES: No acute osseous abnormality.       No acute cardiopulmonary process.   MACRO: None   Signed by: Chata Melissa 4/18/2025 4:50 AM Dictation workstation:   [id number]       This patient has a  central line   Reason for the central line remaining today?  Seems to be for difficult IV access           Assessment & Plan  Sickle cell pain crisis (Multi)    29 y.o. female with PMHx of Sickle cell disease c/b Pulmonary embolism on Xarelto, Obstructive airway disease, Depression, presenting with complaints of chest, bilateral lower limbs and low back pain of few days duration which she feels is consistent with her sickle cell crisis pain.  No concern for acute chest syndrome at this time.  Admitted for pain control and further management.     #Sickle cell disease with Painful Crisis  -Likely vaso-occlusive crisis although labs mildly suggestive  -Does not seem to have care plan established in chart, per discussion with patient she sees both hematology and palliative for pain management, palliative had recommended uptitrating buprenorphine to 8 mg in a.m. while continuing home Dilaudid 2 mg q4h prn, and Lyrica however hematologist did not recommend this and patient reports suboptimal pain control at home  -Follows with Hematologist and palliative at [facility]. Has appointment scheduled for next week  Plan  -Start IV Dilaudid 2 mg q2 PRN -> [ ] consider weaning tomorrow 4/20 to oral  -Schedule Tylenol 975 mg q8  -Added Robaxin 500 mg every 6 hours  -Continue Buprenorphine 2 mg TID   -Continue home folic acid 1 mg daily  -HGB identification with Path review ordered  -Continue home Lyrica  50 mg daily  -Continue home Hydroxyurea 500 mg TID  -bowel regimen  - Stopped IV fluids  [ ] Will need outpatient follow-up with hematology given frequent crises and palliative for optimal pain management     #Wheezing  #Obstructive Airway Dx  -Continue home albuterol nebulizer PRN  -DuoNebs as needed for wheezing  -Mucinex and hypertonic saline nebulizer ordered for thick mucus  [ ] OP pulmonary and PFTs for asthma evaluation     #Pulmonary Embolism  -Continue home Xarelto 20 mg daily    #Depression  -Continue home Duloxetine 60 mg  daily     F : PRN  E: PRN  N: Regular   A: Mediport     DVT prophylaxis: Xarelto 20 mg daily      Code Status: Full Code (confirmed on admission)   NOK: Extended Emergency Contact Information  Primary Emergency Contact: Taylor Quintana   [place]  Mobile Phone: [phone]  Relation: Mother  Secondary Emergency Contact: MEREDITH SHANE  Address: [address] [place] [address]  Home Phone: [phone]  Mobile Phone: [phone]  Relation: Neighbor    Patient seen and discussed with attending physician Dr. Casey    Plan preliminary until cosigned by attending physician.    Portions of this note have been composed using voice recognition and may contain transcription errors.     Yanet Lopez M.D.  Family Medicine  PGY-2           [1] acetaminophen, 975 mg, oral, q6h  buprenorphine, 2 mg, sublingual, TID  DULoxetine, 60 mg, oral, Daily  folic acid, 1 mg, oral, Daily  hydroxyurea, 500 mg, oral, TID after meals  methocarbamol, 500 mg, oral, q6h SHAVON  pregabalin, 50 mg, oral, Nightly  rivaroxaban, 20 mg, oral, Daily with evening meal  sodium chloride, 3 mL, nebulization, q6h SHAVON  [2]    [3] PRN medications: albuterol, dextrose, dextrose, diphenhydrAMINE, glucagon, glucagon, guaiFENesin, HYDROmorphone, ipratropium-albuteroL, ondansetron, polyethylene glycol

## 2025-04-20 VITALS
HEIGHT: 63 IN | BODY MASS INDEX: 51.91 KG/M2 | HEART RATE: 100 BPM | RESPIRATION RATE: 19 BRPM | DIASTOLIC BLOOD PRESSURE: 71 MMHG | OXYGEN SATURATION: 97 % | TEMPERATURE: 97.3 F | WEIGHT: 293 LBS | SYSTOLIC BLOOD PRESSURE: 120 MMHG

## 2025-04-20 LAB
ALBUMIN SERPL BCP-MCNC: 3.5 G/DL (ref 3.4–5)
ALP SERPL-CCNC: 106 U/L (ref 33–110)
ALT SERPL W P-5'-P-CCNC: 9 U/L (ref 7–45)
ANION GAP SERPL CALC-SCNC: 9 MMOL/L (ref 10–20)
AST SERPL W P-5'-P-CCNC: 14 U/L (ref 9–39)
BASOPHILS # BLD AUTO: 0.03 X10*3/UL (ref 0–0.1)
BASOPHILS NFR BLD AUTO: 0.3 %
BILIRUB DIRECT SERPL-MCNC: 0.7 MG/DL (ref 0–0.3)
BILIRUB SERPL-MCNC: 3.1 MG/DL (ref 0–1.2)
BUN SERPL-MCNC: 8 MG/DL (ref 6–23)
CALCIUM SERPL-MCNC: 8.3 MG/DL (ref 8.6–10.6)
CHLORIDE SERPL-SCNC: 104 MMOL/L (ref 98–107)
CO2 SERPL-SCNC: 30 MMOL/L (ref 21–32)
CREAT SERPL-MCNC: 0.7 MG/DL (ref 0.5–1.05)
EGFRCR SERPLBLD CKD-EPI 2021: >90 ML/MIN/1.73M*2
EOSINOPHIL # BLD AUTO: 0.53 X10*3/UL (ref 0–0.7)
EOSINOPHIL NFR BLD AUTO: 4.6 %
ERYTHROCYTE [DISTWIDTH] IN BLOOD BY AUTOMATED COUNT: 19.1 % (ref 11.5–14.5)
GLUCOSE SERPL-MCNC: 102 MG/DL (ref 74–99)
HCT VFR BLD AUTO: 25.4 % (ref 36–46)
HGB BLD-MCNC: 8.2 G/DL (ref 12–16)
IMM GRANULOCYTES # BLD AUTO: 0.14 X10*3/UL (ref 0–0.7)
IMM GRANULOCYTES NFR BLD AUTO: 1.2 % (ref 0–0.9)
LYMPHOCYTES # BLD AUTO: 3.42 X10*3/UL (ref 1.2–4.8)
LYMPHOCYTES NFR BLD AUTO: 29.6 %
MAGNESIUM SERPL-MCNC: 2.13 MG/DL (ref 1.6–2.4)
MCH RBC QN AUTO: 20.3 PG (ref 26–34)
MCHC RBC AUTO-ENTMCNC: 32.3 G/DL (ref 32–36)
MCV RBC AUTO: 63 FL (ref 80–100)
MONOCYTES # BLD AUTO: 0.61 X10*3/UL (ref 0.1–1)
MONOCYTES NFR BLD AUTO: 5.3 %
NEUTROPHILS # BLD AUTO: 6.82 X10*3/UL (ref 1.2–7.7)
NEUTROPHILS NFR BLD AUTO: 59 %
NRBC BLD-RTO: 0 /100 WBCS (ref 0–0)
PLATELET # BLD AUTO: 239 X10*3/UL (ref 150–450)
POTASSIUM SERPL-SCNC: 4.4 MMOL/L (ref 3.5–5.3)
PROT SERPL-MCNC: 7.2 G/DL (ref 6.4–8.2)
RBC # BLD AUTO: 4.03 X10*6/UL (ref 4–5.2)
SODIUM SERPL-SCNC: 139 MMOL/L (ref 136–145)
WBC # BLD AUTO: 11.6 X10*3/UL (ref 4.4–11.3)

## 2025-04-20 PROCEDURE — 2500000005 HC RX 250 GENERAL PHARMACY W/O HCPCS: Performed by: STUDENT IN AN ORGANIZED HEALTH CARE EDUCATION/TRAINING PROGRAM

## 2025-04-20 PROCEDURE — 94640 AIRWAY INHALATION TREATMENT: CPT

## 2025-04-20 PROCEDURE — 1100000001 HC PRIVATE ROOM DAILY

## 2025-04-20 PROCEDURE — 2500000001 HC RX 250 WO HCPCS SELF ADMINISTERED DRUGS (ALT 637 FOR MEDICARE OP)

## 2025-04-20 PROCEDURE — 2500000005 HC RX 250 GENERAL PHARMACY W/O HCPCS

## 2025-04-20 PROCEDURE — 2500000002 HC RX 250 W HCPCS SELF ADMINISTERED DRUGS (ALT 637 FOR MEDICARE OP, ALT 636 FOR OP/ED)

## 2025-04-20 PROCEDURE — 2500000004 HC RX 250 GENERAL PHARMACY W/ HCPCS (ALT 636 FOR OP/ED): Mod: JZ | Performed by: STUDENT IN AN ORGANIZED HEALTH CARE EDUCATION/TRAINING PROGRAM

## 2025-04-20 PROCEDURE — 2500000001 HC RX 250 WO HCPCS SELF ADMINISTERED DRUGS (ALT 637 FOR MEDICARE OP): Performed by: STUDENT IN AN ORGANIZED HEALTH CARE EDUCATION/TRAINING PROGRAM

## 2025-04-20 PROCEDURE — 2500000002 HC RX 250 W HCPCS SELF ADMINISTERED DRUGS (ALT 637 FOR MEDICARE OP, ALT 636 FOR OP/ED): Performed by: STUDENT IN AN ORGANIZED HEALTH CARE EDUCATION/TRAINING PROGRAM

## 2025-04-20 PROCEDURE — 2500000004 HC RX 250 GENERAL PHARMACY W/ HCPCS (ALT 636 FOR OP/ED): Mod: JZ,TB

## 2025-04-20 PROCEDURE — 99233 SBSQ HOSP IP/OBS HIGH 50: CPT | Performed by: STUDENT IN AN ORGANIZED HEALTH CARE EDUCATION/TRAINING PROGRAM

## 2025-04-20 PROCEDURE — 80053 COMPREHEN METABOLIC PANEL: CPT

## 2025-04-20 PROCEDURE — 83735 ASSAY OF MAGNESIUM: CPT | Performed by: STUDENT IN AN ORGANIZED HEALTH CARE EDUCATION/TRAINING PROGRAM

## 2025-04-20 PROCEDURE — 85025 COMPLETE CBC W/AUTO DIFF WBC: CPT

## 2025-04-20 PROCEDURE — 82248 BILIRUBIN DIRECT: CPT | Performed by: STUDENT IN AN ORGANIZED HEALTH CARE EDUCATION/TRAINING PROGRAM

## 2025-04-20 RX ORDER — SODIUM CHLORIDE, SODIUM LACTATE, POTASSIUM CHLORIDE, CALCIUM CHLORIDE 600; 310; 30; 20 MG/100ML; MG/100ML; MG/100ML; MG/100ML
75 INJECTION, SOLUTION INTRAVENOUS CONTINUOUS
Status: ACTIVE | OUTPATIENT
Start: 2025-04-20 | End: 2025-04-21

## 2025-04-20 RX ORDER — SODIUM CHLORIDE FOR INHALATION 3 %
3 VIAL, NEBULIZER (ML) INHALATION 3 TIMES DAILY
Status: DISCONTINUED | OUTPATIENT
Start: 2025-04-21 | End: 2025-04-21

## 2025-04-20 RX ORDER — BUPRENORPHINE 2 MG/1
2 TABLET SUBLINGUAL 3 TIMES DAILY
Status: DISCONTINUED | OUTPATIENT
Start: 2025-04-20 | End: 2025-04-23 | Stop reason: HOSPADM

## 2025-04-20 RX ORDER — BUPRENORPHINE 2 MG/1
4 TABLET SUBLINGUAL 2 TIMES DAILY
Status: DISCONTINUED | OUTPATIENT
Start: 2025-04-20 | End: 2025-04-20

## 2025-04-20 RX ADMIN — HYDROMORPHONE HYDROCHLORIDE 2 MG: 2 INJECTION, SOLUTION INTRAMUSCULAR; INTRAVENOUS; SUBCUTANEOUS at 08:41

## 2025-04-20 RX ADMIN — HYDROMORPHONE HYDROCHLORIDE 2 MG: 2 INJECTION, SOLUTION INTRAMUSCULAR; INTRAVENOUS; SUBCUTANEOUS at 04:35

## 2025-04-20 RX ADMIN — HYDROXYUREA 500 MG: 500 CAPSULE ORAL at 12:23

## 2025-04-20 RX ADMIN — HYDROXYUREA 500 MG: 500 CAPSULE ORAL at 17:42

## 2025-04-20 RX ADMIN — ACETAMINOPHEN 975 MG: 325 TABLET, FILM COATED ORAL at 12:23

## 2025-04-20 RX ADMIN — HYDROMORPHONE HYDROCHLORIDE 2 MG: 2 INJECTION, SOLUTION INTRAMUSCULAR; INTRAVENOUS; SUBCUTANEOUS at 02:35

## 2025-04-20 RX ADMIN — HYDROMORPHONE HYDROCHLORIDE 2 MG: 2 INJECTION, SOLUTION INTRAMUSCULAR; INTRAVENOUS; SUBCUTANEOUS at 21:06

## 2025-04-20 RX ADMIN — SODIUM CHLORIDE SOLN NEBU 3% 3 ML: 3 NEBU SOLN at 00:15

## 2025-04-20 RX ADMIN — DULOXETINE 60 MG: 60 CAPSULE, DELAYED RELEASE ORAL at 08:41

## 2025-04-20 RX ADMIN — HYDROMORPHONE HYDROCHLORIDE 2 MG: 2 INJECTION, SOLUTION INTRAMUSCULAR; INTRAVENOUS; SUBCUTANEOUS at 06:37

## 2025-04-20 RX ADMIN — SODIUM CHLORIDE SOLN NEBU 3% 3 ML: 3 NEBU SOLN at 09:43

## 2025-04-20 RX ADMIN — RIVAROXABAN 20 MG: 20 TABLET, FILM COATED ORAL at 17:43

## 2025-04-20 RX ADMIN — HYDROMORPHONE HYDROCHLORIDE 2 MG: 2 INJECTION, SOLUTION INTRAMUSCULAR; INTRAVENOUS; SUBCUTANEOUS at 16:54

## 2025-04-20 RX ADMIN — HYDROMORPHONE HYDROCHLORIDE 2 MG: 2 INJECTION, SOLUTION INTRAMUSCULAR; INTRAVENOUS; SUBCUTANEOUS at 12:45

## 2025-04-20 RX ADMIN — METHOCARBAMOL 500 MG: 500 TABLET ORAL at 17:42

## 2025-04-20 RX ADMIN — ACETAMINOPHEN 975 MG: 325 TABLET, FILM COATED ORAL at 00:14

## 2025-04-20 RX ADMIN — BUPRENORPHINE 2 MG: 2 TABLET SUBLINGUAL at 14:48

## 2025-04-20 RX ADMIN — SODIUM CHLORIDE SOLN NEBU 3% 3 ML: 3 NEBU SOLN at 14:53

## 2025-04-20 RX ADMIN — METHOCARBAMOL 500 MG: 500 TABLET ORAL at 00:14

## 2025-04-20 RX ADMIN — HYDROMORPHONE HYDROCHLORIDE 2 MG: 2 INJECTION, SOLUTION INTRAMUSCULAR; INTRAVENOUS; SUBCUTANEOUS at 10:44

## 2025-04-20 RX ADMIN — HYDROMORPHONE HYDROCHLORIDE 2 MG: 2 INJECTION, SOLUTION INTRAMUSCULAR; INTRAVENOUS; SUBCUTANEOUS at 19:03

## 2025-04-20 RX ADMIN — LIDOCAINE 4% 1 PATCH: 40 PATCH TOPICAL at 00:14

## 2025-04-20 RX ADMIN — METHOCARBAMOL 500 MG: 500 TABLET ORAL at 12:23

## 2025-04-20 RX ADMIN — PREGABALIN 50 MG: 25 CAPSULE ORAL at 21:06

## 2025-04-20 RX ADMIN — ACETAMINOPHEN 975 MG: 325 TABLET, FILM COATED ORAL at 06:37

## 2025-04-20 RX ADMIN — HYDROMORPHONE HYDROCHLORIDE 2 MG: 2 INJECTION, SOLUTION INTRAMUSCULAR; INTRAVENOUS; SUBCUTANEOUS at 14:48

## 2025-04-20 RX ADMIN — METHOCARBAMOL 500 MG: 500 TABLET ORAL at 23:25

## 2025-04-20 RX ADMIN — SODIUM CHLORIDE SOLN NEBU 3% 3 ML: 3 NEBU SOLN at 21:07

## 2025-04-20 RX ADMIN — BUPRENORPHINE HYDROCHLORIDE 2 MG: 2 TABLET SUBLINGUAL at 08:41

## 2025-04-20 RX ADMIN — HYDROXYUREA 500 MG: 500 CAPSULE ORAL at 08:41

## 2025-04-20 RX ADMIN — HYDROMORPHONE HYDROCHLORIDE 2 MG: 2 INJECTION, SOLUTION INTRAMUSCULAR; INTRAVENOUS; SUBCUTANEOUS at 23:25

## 2025-04-20 RX ADMIN — METHOCARBAMOL 500 MG: 500 TABLET ORAL at 06:37

## 2025-04-20 RX ADMIN — BUPRENORPHINE 2 MG: 2 TABLET SUBLINGUAL at 21:06

## 2025-04-20 RX ADMIN — ACETAMINOPHEN 975 MG: 325 TABLET, FILM COATED ORAL at 23:25

## 2025-04-20 RX ADMIN — DIPHENHYDRAMINE HYDROCHLORIDE 25 MG: 25 CAPSULE ORAL at 06:37

## 2025-04-20 RX ADMIN — HYDROMORPHONE HYDROCHLORIDE 2 MG: 2 INJECTION, SOLUTION INTRAMUSCULAR; INTRAVENOUS; SUBCUTANEOUS at 00:14

## 2025-04-20 RX ADMIN — SODIUM CHLORIDE, SODIUM LACTATE, POTASSIUM CHLORIDE, AND CALCIUM CHLORIDE 75 ML/HR: .6; .31; .03; .02 INJECTION, SOLUTION INTRAVENOUS at 14:00

## 2025-04-20 RX ADMIN — FOLIC ACID 1 MG: 1 TABLET ORAL at 08:41

## 2025-04-20 SDOH — ECONOMIC STABILITY: FOOD INSECURITY: WITHIN THE PAST 12 MONTHS, YOU WORRIED THAT YOUR FOOD WOULD RUN OUT BEFORE YOU GOT MONEY TO BUY MORE.: NEVER TRUE

## 2025-04-20 SDOH — ECONOMIC STABILITY: HOUSING INSECURITY

## 2025-04-20 SDOH — ECONOMIC STABILITY: INCOME INSECURITY: IN THE LAST 12 MONTHS, WAS THERE A TIME WHEN YOU WERE NOT ABLE TO PAY THE MORTGAGE OR RENT ON TIME?: YES

## 2025-04-20 SDOH — ECONOMIC STABILITY: TRANSPORTATION INSECURITY
IN THE PAST 12 MONTHS, HAS THE LACK OF TRANSPORTATION KEPT YOU FROM MEDICAL APPOINTMENTS OR FROM GETTING MEDICATIONS?: YES

## 2025-04-20 SDOH — ECONOMIC STABILITY: FOOD INSECURITY: WITHIN THE PAST 12 MONTHS, YOU WORRIED THAT YOUR FOOD WOULD RUN OUT BEFORE YOU GOT THE MONEY TO BUY MORE.: NEVER TRUE

## 2025-04-20 SDOH — ECONOMIC STABILITY: TRANSPORTATION INSECURITY

## 2025-04-20 SDOH — ECONOMIC STABILITY: FOOD INSECURITY: WITHIN THE PAST 12 MONTHS, THE FOOD YOU BOUGHT JUST DIDN'T LAST AND YOU DIDN'T HAVE MONEY TO GET MORE.: NEVER TRUE

## 2025-04-20 SDOH — ECONOMIC STABILITY: HOUSING INSECURITY: IN THE LAST 12 MONTHS, WAS THERE A TIME WHEN YOU WERE NOT ABLE TO PAY THE MORTGAGE OR RENT ON TIME?: YES

## 2025-04-20 SDOH — ECONOMIC STABILITY: HOUSING INSECURITY: IN THE PAST 12 MONTHS HAS THE ELECTRIC, GAS, OIL, OR WATER COMPANY THREATENED TO SHUT OFF SERVICES IN YOUR HOME?: NO

## 2025-04-20 SDOH — ECONOMIC STABILITY: FOOD INSECURITY

## 2025-04-20 SDOH — ECONOMIC STABILITY: HOUSING INSECURITY: IN THE LAST 12 MONTHS, HOW MANY PLACES HAVE YOU LIVED?: 1

## 2025-04-20 SDOH — ECONOMIC STABILITY: TRANSPORTATION INSECURITY: IN THE PAST 12 MONTHS, HAS LACK OF TRANSPORTATION KEPT YOU FROM MEDICAL APPOINTMENTS OR FROM GETTING MEDICATIONS?: YES

## 2025-04-20 SDOH — ECONOMIC STABILITY: GENERAL

## 2025-04-20 ASSESSMENT — COGNITIVE AND FUNCTIONAL STATUS - GENERAL
DAILY ACTIVITIY SCORE: 24
MOBILITY SCORE: 24
DAILY ACTIVITIY SCORE: 24
MOBILITY SCORE: 24

## 2025-04-20 ASSESSMENT — PAIN DESCRIPTION - LOCATION
LOCATION: CHEST
LOCATION: BACK

## 2025-04-20 ASSESSMENT — PAIN SCALES - GENERAL
PAINLEVEL_OUTOF10: 8
PAINLEVEL_OUTOF10: 8
PAINLEVEL_OUTOF10: 5 - MODERATE PAIN
PAINLEVEL_OUTOF10: 7
PAINLEVEL_OUTOF10: 8
PAINLEVEL_OUTOF10: 4
PAINLEVEL_OUTOF10: 8
PAINLEVEL_OUTOF10: 4
PAINLEVEL_OUTOF10: 8
PAINLEVEL_OUTOF10: 5 - MODERATE PAIN

## 2025-04-20 ASSESSMENT — PAIN SCALES - PAIN ASSESSMENT IN ADVANCED DEMENTIA (PAINAD)
TOTALSCORE: MEDICATION (SEE MAR)

## 2025-04-20 ASSESSMENT — ACTIVITIES OF DAILY LIVING (ADL): LACK_OF_TRANSPORTATION: NO

## 2025-04-20 ASSESSMENT — PAIN DESCRIPTION - ORIENTATION
ORIENTATION: ANTERIOR
ORIENTATION: RIGHT;LEFT
ORIENTATION: RIGHT;LEFT;LOWER
ORIENTATION: RIGHT;LEFT;LOWER

## 2025-04-20 ASSESSMENT — SOCIAL DETERMINANTS OF HEALTH (SDOH): IN THE PAST 12 MONTHS, HAS THE ELECTRIC, GAS, OIL, OR WATER COMPANY THREATENED TO SHUT OFF SERVICE IN YOUR HOME?: NO

## 2025-04-20 NOTE — PROGRESS NOTES
INTERNAL MEDICINE PROGRESS NOTE         SUBJECTIVE     Pt was seen and examined at beside. No acute events overnight.      OBJECTIVE      Visit Vitals  /71   Pulse 100   Temp 36.3 °C (97.3 °F)   Resp 19        Intake/Output Summary (Last 24 hours) at 4/20/2025 1552  Last data filed at 4/20/2025 1241  Gross per 24 hour   Intake 480 ml   Output --   Net 480 ml       Physical Exam     Current Meds   Scheduled Medications[1]   PRN Medications[2]     LABS and IMAGING     WBC   Date Value Ref Range Status   04/20/2025 11.6 (H) 4.4 - 11.3 x10*3/uL Final   04/19/2025 10.7 4.4 - 11.3 x10*3/uL Final   04/18/2025 10.9 4.4 - 11.3 x10*3/uL Final     Hemoglobin   Date Value Ref Range Status   04/20/2025 8.2 (L) 12.0 - 16.0 g/dL Final   04/19/2025 9.0 (L) 12.0 - 16.0 g/dL Final   04/18/2025 9.7 (L) 12.0 - 16.0 g/dL Final     Hematocrit   Date Value Ref Range Status   04/20/2025 25.4 (L) 36.0 - 46.0 % Final   04/19/2025 27.9 (L) 36.0 - 46.0 % Final   04/18/2025 27.8 (L) 36.0 - 46.0 % Final     Bicarbonate   Date Value Ref Range Status   04/20/2025 30 21 - 32 mmol/L Final   04/19/2025 16 (L) 21 - 32 mmol/L Final   04/18/2025 25 21 - 32 mmol/L Final     Creatinine   Date Value Ref Range Status   04/20/2025 0.70 0.50 - 1.05 mg/dL Final   04/19/2025 0.35 (L) 0.50 - 1.05 mg/dL Final   04/18/2025 0.73 0.50 - 1.05 mg/dL Final     Calcium   Date Value Ref Range Status   04/20/2025 8.3 (L) 8.6 - 10.6 mg/dL Final   04/19/2025 7.0 (L) 8.6 - 10.6 mg/dL Final   04/18/2025 8.9 8.6 - 10.6 mg/dL Final       ECG 12 lead  Normal sinus rhythm  Nonspecific T wave abnormality  Prolonged QT  Abnormal ECG  When compared with ECG of 22-MAR-2025 06:48,  No significant  change was found    See ED provider note for full interpretation and clinical correlation  Confirmed by Elly Larson ([id number]) on 4/18/2025 10:06:46 PM  XR chest 2 views  Narrative: Interpreted By:  Chata Melissa,   STUDY:  XR CHEST 2 VIEWS;  4/18/2025 4:47 am      INDICATION:  Signs/Symptoms:SOB and CP with SS.      COMPARISON:  11/27/2024      ACCESSION NUMBER(S):  [id number]      ORDERING CLINICIAN:  BRII DAY      FINDINGS:  Chest port catheter tip is at the level of the cavoatrial junction.      CARDIOMEDIASTINAL SILHOUETTE:  Cardiomediastinal silhouette is normal in size and configuration.      LUNGS:  No pulmonary consolidation, pleural effusion or pneumothorax.      ABDOMEN:  No remarkable upper abdominal findings.      BONES:  No acute osseous abnormality.      Impression: No acute cardiopulmonary process.      MACRO:  None      Signed by: Chata Melissa 4/18/2025 4:50 AM  Dictation workstation:   [id number]       Labs and images were reviewed by me.       PROBLEM LISTS      Problem List Items Addressed This Visit       * (Principal) Sickle cell pain crisis (Multi) - Primary         ASSESSMENT / PLANS    29 y.o. female with PMHx of Sickle cell disease c/b Pulmonary embolism on Xarelto, Obstructive airway disease, Depression, presenting with complaints of chest, bilateral lower limbs and low back pain of few days duration which she feels is consistent with her sickle cell crisis pain.  No concern for acute chest syndrome at this time.  Admitted for pain control and further management.      #Uncomplicated Sickle cell Crisis  ::Likely vaso-occlusive crisis although labs mildly suggestive  ::Does not seem to have care plan established in chart, per discussion with patient she sees both hematology and palliative for pain management, palliative had recommended uptitrating buprenorphine to 8 mg in a.m. while continuing home Dilaudid 2 mg q4h prn, and Lyrica however hematologist did not recommend this and  patient reports suboptimal pain control at home  ::CXR unremarkable   ::Hg stable, no need for simple transfusion  ::Follows with Hematologist and palliative at [facility]. Has appointment scheduled for next week  Plan  -Start IV Dilaudid 2 mg q2 PRN -> [ ] consider weaning tomorrow 4/21 to oral  -Schedule Tylenol 975 mg q8  -Added Robaxin 500 mg every 6 hours  -Continue Buprenorphine 2 mg TID   -Continue home folic acid 1 mg daily  -HGB identification with Path review ordered  -Continue home Lyrica  50 mg daily  -Continue home Hydroxyurea 500 mg TID  -bowel regimen  - c/w IV fluids @ 75 ml/hr  [ ] Will need outpatient follow-up with hematology given frequent crises and palliative for optimal pain management; has appt scheduled at [facility] 4/24      #Wheezing  #Obstructive Airway Dx  -Continue home albuterol nebulizer PRN  -DuoNebs as needed for wheezing  -Mucinex and hypertonic saline nebulizer ordered for thick mucus  [ ] OP pulmonary and PFTs for asthma evaluation    #Hypomagnesemia   ::Mg 0.98   ::repleted with 4g of Mg on 4/19   -will check AM levels     #Hx of Pulmonary Embolism  -Continue home Xarelto 20 mg daily     #Depression  -Continue home Duloxetine 60 mg daily     F : PRN  E: PRN  N: Regular   A: Mediport     DVT prophylaxis: Xarelto 20 mg daily      Code Status: Full Code (confirmed on admission)   NOK: Extended Emergency Contact Information  Primary Emergency Contact: Taylor Quintana   [place]  Mobile Phone: [phone]  Relation: Mother  Secondary Emergency Contact: MEREDITH SHANE  Address: [address] [place] [address]  Home Phone: [phone]  Mobile Phone: [phone]  Relation: Neighbor    The total time spent on the floor rendering services for this patient  and coordinating the patient's care (obtaining/reviewing additional history, performing medically appropriate exam and/or evaluation, reviewing the electronic chart, labs, imaging,  independently interpreting the results and communicating and discussing with medical teams, counseling and educating the patient, discussing with family members, ordering medications, tests and/or procedures, coordinating care, and documentation) was 45 minutes.        Estephanie Casey MD                 [1] acetaminophen, 975 mg, oral, q6h  buprenorphine, 2 mg, sublingual, TID  DULoxetine, 60 mg, oral, Daily  folic acid, 1 mg, oral, Daily  hydroxyurea, 500 mg, oral, TID after meals  lidocaine, 1 patch, transdermal, Daily  methocarbamol, 500 mg, oral, q6h SHAVON  pregabalin, 50 mg, oral, Nightly  rivaroxaban, 20 mg, oral, Daily with evening meal  sodium chloride, 3 mL, nebulization, q6h SHAVON  [2] PRN medications: albuterol, dextrose, dextrose, diphenhydrAMINE, glucagon, glucagon, guaiFENesin, HYDROmorphone, ipratropium-albuteroL, ondansetron, polyethylene glycol

## 2025-04-20 NOTE — CARE PLAN
The patient's goals for the shift include      The clinical goals for the shift include patient pain will be well controlled over the shift., Patient needs pain medication every 2 hrs.  Problem: Pain  Goal: Takes deep breaths with improved pain control throughout the shift  Outcome: Progressing     Problem: Pain  Goal: Turns in bed with improved pain control throughout the shift  Outcome: Progressing     Problem: Pain  Goal: Walks with improved pain control throughout the shift  Outcome: Progressing     Problem: Pain  Goal: Performs ADL's with improved pain control throughout shift  Outcome: Progressing     Problem: Pain - Adult  Goal: Verbalizes/displays adequate comfort level or baseline comfort level  Outcome: Progressing     Problem: Pain  Goal: Free from acute confusion related to pain meds throughout the shift  Outcome: Progressing       Over the shift, the patient did not make progress toward the following goals. Barriers to progression include . Recommendations to address these barriers include .

## 2025-04-21 LAB
ALBUMIN SERPL BCP-MCNC: 3.4 G/DL (ref 3.4–5)
ALBUMIN SERPL BCP-MCNC: 3.4 G/DL (ref 3.4–5)
ALP SERPL-CCNC: 103 U/L (ref 33–110)
ALT SERPL W P-5'-P-CCNC: 9 U/L (ref 7–45)
ANION GAP SERPL CALC-SCNC: 8 MMOL/L (ref 10–20)
AST SERPL W P-5'-P-CCNC: 16 U/L (ref 9–39)
BASOPHILS # BLD AUTO: 0.03 X10*3/UL (ref 0–0.1)
BASOPHILS NFR BLD AUTO: 0.2 %
BILIRUB DIRECT SERPL-MCNC: 0.4 MG/DL (ref 0–0.3)
BILIRUB SERPL-MCNC: 1.5 MG/DL (ref 0–1.2)
BUN SERPL-MCNC: 8 MG/DL (ref 6–23)
CALCIUM SERPL-MCNC: 8.5 MG/DL (ref 8.6–10.6)
CHLORIDE SERPL-SCNC: 102 MMOL/L (ref 98–107)
CO2 SERPL-SCNC: 30 MMOL/L (ref 21–32)
CREAT SERPL-MCNC: 0.72 MG/DL (ref 0.5–1.05)
EGFRCR SERPLBLD CKD-EPI 2021: >90 ML/MIN/1.73M*2
EOSINOPHIL # BLD AUTO: 0.58 X10*3/UL (ref 0–0.7)
EOSINOPHIL NFR BLD AUTO: 4.6 %
ERYTHROCYTE [DISTWIDTH] IN BLOOD BY AUTOMATED COUNT: 19.6 % (ref 11.5–14.5)
GLUCOSE SERPL-MCNC: 98 MG/DL (ref 74–99)
HCT VFR BLD AUTO: 26.8 % (ref 36–46)
HGB BLD-MCNC: 8.7 G/DL (ref 12–16)
IMM GRANULOCYTES # BLD AUTO: 0.09 X10*3/UL (ref 0–0.7)
IMM GRANULOCYTES NFR BLD AUTO: 0.7 % (ref 0–0.9)
LYMPHOCYTES # BLD AUTO: 4.14 X10*3/UL (ref 1.2–4.8)
LYMPHOCYTES NFR BLD AUTO: 32.9 %
MAGNESIUM SERPL-MCNC: 1.8 MG/DL (ref 1.6–2.4)
MCH RBC QN AUTO: 20.5 PG (ref 26–34)
MCHC RBC AUTO-ENTMCNC: 32.5 G/DL (ref 32–36)
MCV RBC AUTO: 63 FL (ref 80–100)
MONOCYTES # BLD AUTO: 0.57 X10*3/UL (ref 0.1–1)
MONOCYTES NFR BLD AUTO: 4.5 %
NEUTROPHILS # BLD AUTO: 7.16 X10*3/UL (ref 1.2–7.7)
NEUTROPHILS NFR BLD AUTO: 57.1 %
NRBC BLD-RTO: 0.2 /100 WBCS (ref 0–0)
PHOSPHATE SERPL-MCNC: 2.7 MG/DL (ref 2.5–4.9)
PLATELET # BLD AUTO: 271 X10*3/UL (ref 150–450)
POTASSIUM SERPL-SCNC: 4.1 MMOL/L (ref 3.5–5.3)
PROT SERPL-MCNC: 6.8 G/DL (ref 6.4–8.2)
RBC # BLD AUTO: 4.25 X10*6/UL (ref 4–5.2)
SODIUM SERPL-SCNC: 136 MMOL/L (ref 136–145)
WBC # BLD AUTO: 12.6 X10*3/UL (ref 4.4–11.3)

## 2025-04-21 PROCEDURE — 2500000004 HC RX 250 GENERAL PHARMACY W/ HCPCS (ALT 636 FOR OP/ED): Mod: JZ,TB

## 2025-04-21 PROCEDURE — 84075 ASSAY ALKALINE PHOSPHATASE: CPT | Performed by: STUDENT IN AN ORGANIZED HEALTH CARE EDUCATION/TRAINING PROGRAM

## 2025-04-21 PROCEDURE — 85025 COMPLETE CBC W/AUTO DIFF WBC: CPT | Performed by: STUDENT IN AN ORGANIZED HEALTH CARE EDUCATION/TRAINING PROGRAM

## 2025-04-21 PROCEDURE — 2500000001 HC RX 250 WO HCPCS SELF ADMINISTERED DRUGS (ALT 637 FOR MEDICARE OP): Performed by: STUDENT IN AN ORGANIZED HEALTH CARE EDUCATION/TRAINING PROGRAM

## 2025-04-21 PROCEDURE — 2500000002 HC RX 250 W HCPCS SELF ADMINISTERED DRUGS (ALT 637 FOR MEDICARE OP, ALT 636 FOR OP/ED): Performed by: STUDENT IN AN ORGANIZED HEALTH CARE EDUCATION/TRAINING PROGRAM

## 2025-04-21 PROCEDURE — 80069 RENAL FUNCTION PANEL: CPT | Performed by: STUDENT IN AN ORGANIZED HEALTH CARE EDUCATION/TRAINING PROGRAM

## 2025-04-21 PROCEDURE — 83735 ASSAY OF MAGNESIUM: CPT | Performed by: STUDENT IN AN ORGANIZED HEALTH CARE EDUCATION/TRAINING PROGRAM

## 2025-04-21 PROCEDURE — 2500000002 HC RX 250 W HCPCS SELF ADMINISTERED DRUGS (ALT 637 FOR MEDICARE OP, ALT 636 FOR OP/ED)

## 2025-04-21 PROCEDURE — 2500000005 HC RX 250 GENERAL PHARMACY W/O HCPCS: Performed by: STUDENT IN AN ORGANIZED HEALTH CARE EDUCATION/TRAINING PROGRAM

## 2025-04-21 PROCEDURE — 99233 SBSQ HOSP IP/OBS HIGH 50: CPT

## 2025-04-21 PROCEDURE — 2500000004 HC RX 250 GENERAL PHARMACY W/ HCPCS (ALT 636 FOR OP/ED): Mod: JZ | Performed by: STUDENT IN AN ORGANIZED HEALTH CARE EDUCATION/TRAINING PROGRAM

## 2025-04-21 PROCEDURE — 2500000001 HC RX 250 WO HCPCS SELF ADMINISTERED DRUGS (ALT 637 FOR MEDICARE OP)

## 2025-04-21 PROCEDURE — 1100000001 HC PRIVATE ROOM DAILY

## 2025-04-21 RX ORDER — SODIUM CHLORIDE FOR INHALATION 3 %
3 VIAL, NEBULIZER (ML) INHALATION 3 TIMES DAILY PRN
Status: DISCONTINUED | OUTPATIENT
Start: 2025-04-21 | End: 2025-04-23 | Stop reason: HOSPADM

## 2025-04-21 RX ORDER — OXYCODONE HYDROCHLORIDE 5 MG/1
10 TABLET ORAL EVERY 6 HOURS PRN
Refills: 0 | Status: DISCONTINUED | OUTPATIENT
Start: 2025-04-21 | End: 2025-04-22

## 2025-04-21 RX ORDER — OXYCODONE HYDROCHLORIDE 5 MG/1
10 TABLET ORAL EVERY 6 HOURS PRN
Refills: 0 | Status: DISCONTINUED | OUTPATIENT
Start: 2025-04-21 | End: 2025-04-21

## 2025-04-21 RX ADMIN — FOLIC ACID 1 MG: 1 TABLET ORAL at 09:09

## 2025-04-21 RX ADMIN — DULOXETINE 60 MG: 60 CAPSULE, DELAYED RELEASE ORAL at 09:09

## 2025-04-21 RX ADMIN — METHOCARBAMOL 500 MG: 500 TABLET ORAL at 05:13

## 2025-04-21 RX ADMIN — HYDROMORPHONE HYDROCHLORIDE 2 MG: 2 INJECTION, SOLUTION INTRAMUSCULAR; INTRAVENOUS; SUBCUTANEOUS at 09:13

## 2025-04-21 RX ADMIN — OXYCODONE 10 MG: 5 TABLET ORAL at 21:00

## 2025-04-21 RX ADMIN — PREGABALIN 50 MG: 25 CAPSULE ORAL at 21:00

## 2025-04-21 RX ADMIN — HYDROMORPHONE HYDROCHLORIDE 2 MG: 2 INJECTION, SOLUTION INTRAMUSCULAR; INTRAVENOUS; SUBCUTANEOUS at 23:23

## 2025-04-21 RX ADMIN — ACETAMINOPHEN 975 MG: 325 TABLET, FILM COATED ORAL at 23:23

## 2025-04-21 RX ADMIN — RIVAROXABAN 20 MG: 20 TABLET, FILM COATED ORAL at 17:51

## 2025-04-21 RX ADMIN — BUPRENORPHINE 2 MG: 2 TABLET SUBLINGUAL at 15:29

## 2025-04-21 RX ADMIN — METHOCARBAMOL 500 MG: 500 TABLET ORAL at 23:23

## 2025-04-21 RX ADMIN — ACETAMINOPHEN 975 MG: 325 TABLET, FILM COATED ORAL at 05:13

## 2025-04-21 RX ADMIN — HYDROMORPHONE HYDROCHLORIDE 2 MG: 2 INJECTION, SOLUTION INTRAMUSCULAR; INTRAVENOUS; SUBCUTANEOUS at 07:05

## 2025-04-21 RX ADMIN — HYDROXYUREA 500 MG: 500 CAPSULE ORAL at 09:09

## 2025-04-21 RX ADMIN — HYDROMORPHONE HYDROCHLORIDE 2 MG: 2 INJECTION, SOLUTION INTRAMUSCULAR; INTRAVENOUS; SUBCUTANEOUS at 15:29

## 2025-04-21 RX ADMIN — BUPRENORPHINE 2 MG: 2 TABLET SUBLINGUAL at 09:09

## 2025-04-21 RX ADMIN — OXYCODONE 10 MG: 5 TABLET ORAL at 14:06

## 2025-04-21 RX ADMIN — METHOCARBAMOL 500 MG: 500 TABLET ORAL at 17:51

## 2025-04-21 RX ADMIN — HYDROXYUREA 500 MG: 500 CAPSULE ORAL at 17:51

## 2025-04-21 RX ADMIN — HYDROMORPHONE HYDROCHLORIDE 2 MG: 2 INJECTION, SOLUTION INTRAMUSCULAR; INTRAVENOUS; SUBCUTANEOUS at 03:20

## 2025-04-21 RX ADMIN — ACETAMINOPHEN 975 MG: 325 TABLET, FILM COATED ORAL at 17:50

## 2025-04-21 RX ADMIN — HYDROXYUREA 500 MG: 500 CAPSULE ORAL at 12:57

## 2025-04-21 RX ADMIN — HYDROMORPHONE HYDROCHLORIDE 2 MG: 2 INJECTION, SOLUTION INTRAMUSCULAR; INTRAVENOUS; SUBCUTANEOUS at 05:12

## 2025-04-21 RX ADMIN — HYDROMORPHONE HYDROCHLORIDE 2 MG: 2 INJECTION, SOLUTION INTRAMUSCULAR; INTRAVENOUS; SUBCUTANEOUS at 01:22

## 2025-04-21 RX ADMIN — BUPRENORPHINE 2 MG: 2 TABLET SUBLINGUAL at 21:00

## 2025-04-21 RX ADMIN — METHOCARBAMOL 500 MG: 500 TABLET ORAL at 11:21

## 2025-04-21 RX ADMIN — HYDROMORPHONE HYDROCHLORIDE 2 MG: 2 INJECTION, SOLUTION INTRAMUSCULAR; INTRAVENOUS; SUBCUTANEOUS at 19:22

## 2025-04-21 RX ADMIN — SODIUM CHLORIDE, SODIUM LACTATE, POTASSIUM CHLORIDE, AND CALCIUM CHLORIDE 75 ML/HR: .6; .31; .03; .02 INJECTION, SOLUTION INTRAVENOUS at 03:27

## 2025-04-21 RX ADMIN — HYDROMORPHONE HYDROCHLORIDE 2 MG: 2 INJECTION, SOLUTION INTRAMUSCULAR; INTRAVENOUS; SUBCUTANEOUS at 11:22

## 2025-04-21 RX ADMIN — ACETAMINOPHEN 975 MG: 325 TABLET, FILM COATED ORAL at 11:21

## 2025-04-21 ASSESSMENT — COGNITIVE AND FUNCTIONAL STATUS - GENERAL
DAILY ACTIVITIY SCORE: 24
MOBILITY SCORE: 24

## 2025-04-21 ASSESSMENT — PAIN SCALES - GENERAL
PAINLEVEL_OUTOF10: 8
PAINLEVEL_OUTOF10: 7
PAINLEVEL_OUTOF10: 7
PAINLEVEL_OUTOF10: 6
PAINLEVEL_OUTOF10: 7
PAINLEVEL_OUTOF10: 6
PAINLEVEL_OUTOF10: 5 - MODERATE PAIN
PAINLEVEL_OUTOF10: 8
PAINLEVEL_OUTOF10: 8
PAINLEVEL_OUTOF10: 5 - MODERATE PAIN
PAINLEVEL_OUTOF10: 5 - MODERATE PAIN
PAINLEVEL_OUTOF10: 8

## 2025-04-21 ASSESSMENT — PAIN - FUNCTIONAL ASSESSMENT
PAIN_FUNCTIONAL_ASSESSMENT: 0-10
PAIN_FUNCTIONAL_ASSESSMENT: 0-10

## 2025-04-21 ASSESSMENT — PAIN DESCRIPTION - ORIENTATION
ORIENTATION: RIGHT;LEFT;LOWER
ORIENTATION: LOWER
ORIENTATION: RIGHT;LEFT
ORIENTATION: LOWER
ORIENTATION: RIGHT;LEFT

## 2025-04-21 ASSESSMENT — PAIN DESCRIPTION - LOCATION
LOCATION: BACK

## 2025-04-21 ASSESSMENT — PAIN SCALES - WONG BAKER
WONGBAKER_NUMERICALRESPONSE: HURTS LITTLE MORE

## 2025-04-21 ASSESSMENT — PAIN SCALES - PAIN ASSESSMENT IN ADVANCED DEMENTIA (PAINAD)
TOTALSCORE: MEDICATION (SEE MAR)

## 2025-04-21 NOTE — CARE PLAN
The patient's goals for the shift include      The clinical goals for the shift include patient pain will be well controlled over the shift      Problem: Pain  Goal: Takes deep breaths with improved pain control throughout the shift  Outcome: Progressing  Goal: Turns in bed with improved pain control throughout the shift  Outcome: Progressing  Goal: Walks with improved pain control throughout the shift  Outcome: Progressing  Goal: Performs ADL's with improved pain control throughout shift  Outcome: Progressing  Goal: Participates in PT with improved pain control throughout the shift  Outcome: Progressing  Goal: Free from opioid side effects throughout the shift  Outcome: Progressing  Goal: Free from acute confusion related to pain meds throughout the shift  Outcome: Progressing     Problem: Pain - Adult  Goal: Verbalizes/displays adequate comfort level or baseline comfort level  Outcome: Progressing     Problem: Safety - Adult  Goal: Free from fall injury  Outcome: Progressing     Problem: Discharge Planning  Goal: Discharge to home or other facility with appropriate resources  Outcome: Progressing     Problem: Chronic Conditions and Co-morbidities  Goal: Patient's chronic conditions and co-morbidity symptoms are monitored and maintained or improved  Outcome: Progressing     Problem: Nutrition  Goal: Nutrient intake appropriate for maintaining nutritional needs  Outcome: Progressing

## 2025-04-21 NOTE — PROGRESS NOTES
INTERNAL MEDICINE PROGRESS NOTE         SUBJECTIVE     Pt was seen and examined at beside. No acute events overnight.  Reports pain is improving; no longer has pain in her chest, mainly in legs and back now. Also reports breathing treatments have helped her chest tightness and SOB significantly. Not currently using any inhalers/breathing treatments at home.     OBJECTIVE      Visit Vitals  /71   Pulse 100   Temp 36.3 °C (97.3 °F)   Resp 19        Intake/Output Summary (Last 24 hours) at 4/21/2025 1221  Last data filed at 4/21/2025 0947  Gross per 24 hour   Intake 1723.75 ml   Output --   Net 1723.75 ml       Physical Exam  Vitals reviewed.   Constitutional:       General: She is not in acute distress.     Appearance: Normal appearance. She is obese. She is not ill-appearing.   HENT:      Head: Normocephalic and atraumatic.   Eyes:      General: No scleral icterus.  Cardiovascular:      Rate and Rhythm: Normal rate and regular rhythm.   Pulmonary:      Effort: Pulmonary effort is normal.   Neurological:      General: No focal deficit present.      Mental Status: She is alert.   Psychiatric:         Mood and Affect: Mood normal.         Thought Content: Thought content normal.          Current Meds   Scheduled Medications[1]   PRN Medications[2]     LABS and IMAGING     WBC   Date Value Ref Range Status   04/21/2025 12.6 (H) 4.4 - 11.3 x10*3/uL Final   04/20/2025 11.6 (H) 4.4 - 11.3 x10*3/uL Final   04/19/2025 10.7 4.4 - 11.3 x10*3/uL Final     Hemoglobin   Date Value Ref Range Status   04/21/2025 8.7 (L) 12.0 - 16.0 g/dL Final   04/20/2025 8.2 (L) 12.0 - 16.0 g/dL Final   04/19/2025 9.0 (L) 12.0 - 16.0 g/dL Final     Hematocrit    Date Value Ref Range Status   04/21/2025 26.8 (L) 36.0 - 46.0 % Final   04/20/2025 25.4 (L) 36.0 - 46.0 % Final   04/19/2025 27.9 (L) 36.0 - 46.0 % Final     Bicarbonate   Date Value Ref Range Status   04/21/2025 30 21 - 32 mmol/L Final   04/20/2025 30 21 - 32 mmol/L Final   04/19/2025 16 (L) 21 - 32 mmol/L Final     Creatinine   Date Value Ref Range Status   04/21/2025 0.72 0.50 - 1.05 mg/dL Final   04/20/2025 0.70 0.50 - 1.05 mg/dL Final   04/19/2025 0.35 (L) 0.50 - 1.05 mg/dL Final     Calcium   Date Value Ref Range Status   04/21/2025 8.5 (L) 8.6 - 10.6 mg/dL Final   04/20/2025 8.3 (L) 8.6 - 10.6 mg/dL Final   04/19/2025 7.0 (L) 8.6 - 10.6 mg/dL Final       ECG 12 lead  Normal sinus rhythm  Nonspecific T wave abnormality  Prolonged QT  Abnormal ECG  When compared with ECG of 22-MAR-2025 06:48,  No significant change was found    See ED provider note for full interpretation and clinical correlation  Confirmed by Elly Larson ([id number]) on 4/18/2025 10:06:46 PM  XR chest 2 views  Narrative: Interpreted By:  Chata Melissa,   STUDY:  XR CHEST 2 VIEWS;  4/18/2025 4:47 am      INDICATION:  Signs/Symptoms:SOB and CP with SS.      COMPARISON:  11/27/2024      ACCESSION NUMBER(S):  [id number]      ORDERING CLINICIAN:  BRII DYA      FINDINGS:  Chest port catheter tip is at the level of the cavoatrial junction.      CARDIOMEDIASTINAL SILHOUETTE:  Cardiomediastinal silhouette is normal in size and configuration.      LUNGS:  No pulmonary consolidation, pleural effusion or pneumothorax.      ABDOMEN:  No remarkable upper abdominal findings.      BONES:  No acute osseous abnormality.      Impression: No acute cardiopulmonary process.      MACRO:  None      Signed by: Chata Melissa 4/18/2025 4:50 AM  Dictation workstation:   [id number]       Labs and images were independently reviewed by me.     PROBLEM LISTS      Problem List Items Addressed This Visit       * (Principal) Sickle cell pain crisis (Multi) - Primary      ASSESSMENT / PLANS    29 y.o. female with PMHx of Sickle cell disease c/b Pulmonary embolism on Xarelto, Obstructive airway disease, Depression, presenting with complaints of chest, bilateral lower limbs and low back pain of few days duration which she feels is consistent with her sickle cell crisis pain.  No concern for acute chest syndrome at this time.  Admitted for pain control and further management.      Update 4/21:  - goal today to start to wean off IV dialudid. Spaced to 2mg q4h prn today.   - PO oxy 10mg q6h prn moderate-severe pain  - will plan to discharge with symbicort or albuterol for management of respiratory symptoms.     #Uncomplicated Sickle cell Crisis  ::Likely vaso-occlusive crisis although labs mildly suggestive  ::Does not seem to have care plan established in chart, per discussion with patient she sees both hematology and palliative for pain management, palliative had recommended uptitrating buprenorphine to 8 mg in a.m. while continuing home Dilaudid 2 mg q4h prn, and Lyrica however hematologist did not recommend this and patient reports suboptimal pain control at home  ::CXR unremarkable   ::Hg stable, no need for simple transfusion  ::Follows with Hematologist and palliative at [facility]. Has appointment scheduled for this week  Plan  -IV Dilaudid 2 mg q4h PRN   -Add oxy 10 mg q6h prn moderate-severe pain  -Schedule Tylenol 975 mg q8  -Robaxin 500 mg every 6 hours  -Continue Buprenorphine 2 mg TID   -Continue home folic acid 1 mg daily  -HGB identification with Path review ordered  -Continue home Lyrica 50 mg daily  -Continue home Hydroxyurea 500 mg TID  -bowel regimen  - c/w IV fluids @ 75 ml/hr  [ ] Will need outpatient follow-up with hematology given frequent crises and palliative for optimal pain management; has appt scheduled at [facility] 4/24      #Wheezing  #Obstructive Airway Dx  -Continue home albuterol nebulizer PRN  -DuoNebs as needed for wheezing  -Mucinex and hypertonic saline  nebulizer ordered for thick mucus  [ ] OP pulmonary and PFTs for asthma evaluation    #Hypomagnesemia, resolved  ::Mg 0.98   ::repleted with 4g of Mg on 4/19   - Mg 1.80 on 4/21    #Hx of Pulmonary Embolism  -Continue home Xarelto 20 mg daily     #Depression  -Continue home Duloxetine 60 mg daily     F : PRN  E: PRN  N: Regular   A: Mediport     DVT prophylaxis: Xarelto 20 mg daily      Code Status: Full Code (confirmed on admission)   NOK: Extended Emergency Contact Information  Primary Emergency Contact: Taylor Quintana   [place]  Mobile Phone: [phone]  Relation: Mother  Secondary Emergency Contact: MEREDITH SHANE  Address: [address] [place] [address]  Home Phone: [phone]  Mobile Phone: [phone]  Relation: Neighbor    Patient seen and discussed with attending physician, Estephanie Casey MD.    Plan not finalized until cosigned by attending physician.     Allyssa Khan,   Family Medicine, PGY-2           [1] acetaminophen, 975 mg, oral, q6h  buprenorphine, 2 mg, sublingual, TID  DULoxetine, 60 mg, oral, Daily  folic acid, 1 mg, oral, Daily  hydroxyurea, 500 mg, oral, TID after meals  lidocaine, 1 patch, transdermal, Daily  methocarbamol, 500 mg, oral, q6h SHAVON  pregabalin, 50 mg, oral, Nightly  rivaroxaban, 20 mg, oral, Daily with evening meal  sodium chloride, 3 mL, nebulization, TID     [2] PRN medications: albuterol, dextrose, dextrose, diphenhydrAMINE, glucagon, glucagon, guaiFENesin, HYDROmorphone, ipratropium-albuteroL, lubricating eye drops, ondansetron, oxyCODONE, polyethylene glycol

## 2025-04-21 NOTE — CARE PLAN
The patient's goals for the shift include      The clinical goals for the shift include patient pain will be well controlled over the shift.    Over the shift, the patient did not make progress toward the following goals. Barriers to progression include . Recommendations to address these barriers include   Problem: Pain  Goal: Takes deep breaths with improved pain control throughout the shift  Outcome: Progressing  Goal: Turns in bed with improved pain control throughout the shift  Outcome: Progressing  Goal: Walks with improved pain control throughout the shift  Outcome: Progressing  Goal: Performs ADL's with improved pain control throughout shift  Outcome: Progressing  Goal: Participates in PT with improved pain control throughout the shift  Outcome: Progressing  Goal: Free from opioid side effects throughout the shift  Outcome: Progressing  Goal: Free from acute confusion related to pain meds throughout the shift  Outcome: Progressing     Problem: Pain - Adult  Goal: Verbalizes/displays adequate comfort level or baseline comfort level  Outcome: Progressing     Problem: Safety - Adult  Goal: Free from fall injury  Outcome: Progressing     Problem: Discharge Planning  Goal: Discharge to home or other facility with appropriate resources  Outcome: Progressing     Problem: Chronic Conditions and Co-morbidities  Goal: Patient's chronic conditions and co-morbidity symptoms are monitored and maintained or improved  Outcome: Progressing     Problem: Nutrition  Goal: Nutrient intake appropriate for maintaining nutritional needs  Outcome: Progressing   .

## 2025-04-22 PROBLEM — F33.41 RECURRENT MAJOR DEPRESSIVE DISORDER, IN PARTIAL REMISSION (CMS-HCC): Status: ACTIVE | Noted: 2025-04-22

## 2025-04-22 PROBLEM — J44.9 OBSTRUCTIVE AIRWAY DISEASE (MULTI): Status: ACTIVE | Noted: 2025-04-22

## 2025-04-22 PROBLEM — E66.01 MORBID OBESITY WITH BMI OF 50.0-59.9, ADULT (MULTI): Status: ACTIVE | Noted: 2025-04-22

## 2025-04-22 PROBLEM — D68.9 COAGULOPATHY (MULTI): Status: ACTIVE | Noted: 2025-04-22

## 2025-04-22 LAB
ALBUMIN SERPL BCP-MCNC: 3.4 G/DL (ref 3.4–5)
ALP SERPL-CCNC: 99 U/L (ref 33–110)
ALT SERPL W P-5'-P-CCNC: 9 U/L (ref 7–45)
ANION GAP SERPL CALC-SCNC: 11 MMOL/L (ref 10–20)
AST SERPL W P-5'-P-CCNC: 14 U/L (ref 9–39)
BASOPHILS # BLD AUTO: 0.03 X10*3/UL (ref 0–0.1)
BASOPHILS NFR BLD AUTO: 0.3 %
BILIRUB DIRECT SERPL-MCNC: 0.2 MG/DL (ref 0–0.3)
BILIRUB SERPL-MCNC: 1.1 MG/DL (ref 0–1.2)
BUN SERPL-MCNC: 8 MG/DL (ref 6–23)
CALCIUM SERPL-MCNC: 8.6 MG/DL (ref 8.6–10.6)
CHLORIDE SERPL-SCNC: 103 MMOL/L (ref 98–107)
CO2 SERPL-SCNC: 29 MMOL/L (ref 21–32)
CREAT SERPL-MCNC: 0.75 MG/DL (ref 0.5–1.05)
EGFRCR SERPLBLD CKD-EPI 2021: >90 ML/MIN/1.73M*2
EOSINOPHIL # BLD AUTO: 0.51 X10*3/UL (ref 0–0.7)
EOSINOPHIL NFR BLD AUTO: 4.4 %
ERYTHROCYTE [DISTWIDTH] IN BLOOD BY AUTOMATED COUNT: 19.8 % (ref 11.5–14.5)
GLUCOSE SERPL-MCNC: 114 MG/DL (ref 74–99)
HCT VFR BLD AUTO: 27 % (ref 36–46)
HGB BLD-MCNC: 8.6 G/DL (ref 12–16)
HGB RETIC QN: 26 PG (ref 28–38)
IMM GRANULOCYTES # BLD AUTO: 0.07 X10*3/UL (ref 0–0.7)
IMM GRANULOCYTES NFR BLD AUTO: 0.6 % (ref 0–0.9)
IMMATURE RETIC FRACTION: 38.1 %
LDH SERPL L TO P-CCNC: 180 U/L (ref 84–246)
LYMPHOCYTES # BLD AUTO: 3.32 X10*3/UL (ref 1.2–4.8)
LYMPHOCYTES NFR BLD AUTO: 28.7 %
MAGNESIUM SERPL-MCNC: 1.81 MG/DL (ref 1.6–2.4)
MCH RBC QN AUTO: 20.2 PG (ref 26–34)
MCHC RBC AUTO-ENTMCNC: 31.9 G/DL (ref 32–36)
MCV RBC AUTO: 64 FL (ref 80–100)
MONOCYTES # BLD AUTO: 0.61 X10*3/UL (ref 0.1–1)
MONOCYTES NFR BLD AUTO: 5.3 %
NEUTROPHILS # BLD AUTO: 7.02 X10*3/UL (ref 1.2–7.7)
NEUTROPHILS NFR BLD AUTO: 60.7 %
NRBC BLD-RTO: 0.2 /100 WBCS (ref 0–0)
PHOSPHATE SERPL-MCNC: 3.4 MG/DL (ref 2.5–4.9)
PLATELET # BLD AUTO: 290 X10*3/UL (ref 150–450)
POTASSIUM SERPL-SCNC: 4 MMOL/L (ref 3.5–5.3)
PROT SERPL-MCNC: 7.1 G/DL (ref 6.4–8.2)
RBC # BLD AUTO: 4.25 X10*6/UL (ref 4–5.2)
RETICS #: 0.1 X10*6/UL (ref 0.02–0.08)
RETICS/RBC NFR AUTO: 2.4 % (ref 0.5–2)
SODIUM SERPL-SCNC: 139 MMOL/L (ref 136–145)
WBC # BLD AUTO: 11.6 X10*3/UL (ref 4.4–11.3)

## 2025-04-22 PROCEDURE — 83735 ASSAY OF MAGNESIUM: CPT | Performed by: STUDENT IN AN ORGANIZED HEALTH CARE EDUCATION/TRAINING PROGRAM

## 2025-04-22 PROCEDURE — 85045 AUTOMATED RETICULOCYTE COUNT: CPT

## 2025-04-22 PROCEDURE — 2500000002 HC RX 250 W HCPCS SELF ADMINISTERED DRUGS (ALT 637 FOR MEDICARE OP, ALT 636 FOR OP/ED)

## 2025-04-22 PROCEDURE — 82248 BILIRUBIN DIRECT: CPT

## 2025-04-22 PROCEDURE — 2500000001 HC RX 250 WO HCPCS SELF ADMINISTERED DRUGS (ALT 637 FOR MEDICARE OP): Performed by: STUDENT IN AN ORGANIZED HEALTH CARE EDUCATION/TRAINING PROGRAM

## 2025-04-22 PROCEDURE — 2500000004 HC RX 250 GENERAL PHARMACY W/ HCPCS (ALT 636 FOR OP/ED)

## 2025-04-22 PROCEDURE — 99232 SBSQ HOSP IP/OBS MODERATE 35: CPT

## 2025-04-22 PROCEDURE — 2500000004 HC RX 250 GENERAL PHARMACY W/ HCPCS (ALT 636 FOR OP/ED): Mod: JZ,TB

## 2025-04-22 PROCEDURE — 2500000002 HC RX 250 W HCPCS SELF ADMINISTERED DRUGS (ALT 637 FOR MEDICARE OP, ALT 636 FOR OP/ED): Performed by: STUDENT IN AN ORGANIZED HEALTH CARE EDUCATION/TRAINING PROGRAM

## 2025-04-22 PROCEDURE — 85025 COMPLETE CBC W/AUTO DIFF WBC: CPT | Performed by: STUDENT IN AN ORGANIZED HEALTH CARE EDUCATION/TRAINING PROGRAM

## 2025-04-22 PROCEDURE — 80048 BASIC METABOLIC PNL TOTAL CA: CPT | Performed by: STUDENT IN AN ORGANIZED HEALTH CARE EDUCATION/TRAINING PROGRAM

## 2025-04-22 PROCEDURE — 2500000001 HC RX 250 WO HCPCS SELF ADMINISTERED DRUGS (ALT 637 FOR MEDICARE OP)

## 2025-04-22 PROCEDURE — 83615 LACTATE (LD) (LDH) ENZYME: CPT

## 2025-04-22 PROCEDURE — 1100000001 HC PRIVATE ROOM DAILY

## 2025-04-22 PROCEDURE — 84100 ASSAY OF PHOSPHORUS: CPT | Performed by: STUDENT IN AN ORGANIZED HEALTH CARE EDUCATION/TRAINING PROGRAM

## 2025-04-22 RX ORDER — OXYCODONE HYDROCHLORIDE 5 MG/1
10 TABLET ORAL EVERY 4 HOURS PRN
Refills: 0 | Status: DISCONTINUED | OUTPATIENT
Start: 2025-04-22 | End: 2025-04-23 | Stop reason: HOSPADM

## 2025-04-22 RX ADMIN — METHOCARBAMOL 500 MG: 500 TABLET ORAL at 11:22

## 2025-04-22 RX ADMIN — ACETAMINOPHEN 975 MG: 325 TABLET, FILM COATED ORAL at 17:54

## 2025-04-22 RX ADMIN — METHOCARBAMOL 500 MG: 500 TABLET ORAL at 06:13

## 2025-04-22 RX ADMIN — PREGABALIN 50 MG: 25 CAPSULE ORAL at 20:12

## 2025-04-22 RX ADMIN — ACETAMINOPHEN 975 MG: 325 TABLET, FILM COATED ORAL at 11:22

## 2025-04-22 RX ADMIN — HYDROXYUREA 500 MG: 500 CAPSULE ORAL at 14:21

## 2025-04-22 RX ADMIN — HYDROMORPHONE HYDROCHLORIDE 2 MG: 2 INJECTION, SOLUTION INTRAMUSCULAR; INTRAVENOUS; SUBCUTANEOUS at 14:21

## 2025-04-22 RX ADMIN — METHOCARBAMOL 500 MG: 500 TABLET ORAL at 17:09

## 2025-04-22 RX ADMIN — ACETAMINOPHEN 975 MG: 325 TABLET, FILM COATED ORAL at 06:13

## 2025-04-22 RX ADMIN — BUPRENORPHINE 2 MG: 2 TABLET SUBLINGUAL at 20:12

## 2025-04-22 RX ADMIN — OXYCODONE 10 MG: 5 TABLET ORAL at 03:05

## 2025-04-22 RX ADMIN — BUPRENORPHINE 2 MG: 2 TABLET SUBLINGUAL at 14:21

## 2025-04-22 RX ADMIN — OXYCODONE 10 MG: 5 TABLET ORAL at 16:49

## 2025-04-22 RX ADMIN — RIVAROXABAN 20 MG: 20 TABLET, FILM COATED ORAL at 16:49

## 2025-04-22 RX ADMIN — HYDROXYUREA 500 MG: 500 CAPSULE ORAL at 17:09

## 2025-04-22 RX ADMIN — HYDROMORPHONE HYDROCHLORIDE 2 MG: 2 INJECTION, SOLUTION INTRAMUSCULAR; INTRAVENOUS; SUBCUTANEOUS at 20:17

## 2025-04-22 RX ADMIN — HYDROXYUREA 500 MG: 500 CAPSULE ORAL at 08:54

## 2025-04-22 RX ADMIN — DULOXETINE 60 MG: 60 CAPSULE, DELAYED RELEASE ORAL at 08:53

## 2025-04-22 RX ADMIN — HYDROMORPHONE HYDROCHLORIDE 2 MG: 2 INJECTION, SOLUTION INTRAMUSCULAR; INTRAVENOUS; SUBCUTANEOUS at 09:42

## 2025-04-22 RX ADMIN — OXYCODONE 10 MG: 5 TABLET ORAL at 11:22

## 2025-04-22 RX ADMIN — OXYCODONE 10 MG: 5 TABLET ORAL at 22:05

## 2025-04-22 RX ADMIN — HYDROMORPHONE HYDROCHLORIDE 2 MG: 2 INJECTION, SOLUTION INTRAMUSCULAR; INTRAVENOUS; SUBCUTANEOUS at 05:19

## 2025-04-22 RX ADMIN — FOLIC ACID 1 MG: 1 TABLET ORAL at 08:53

## 2025-04-22 RX ADMIN — BUPRENORPHINE 2 MG: 2 TABLET SUBLINGUAL at 08:53

## 2025-04-22 ASSESSMENT — PAIN DESCRIPTION - LOCATION
LOCATION: BACK

## 2025-04-22 ASSESSMENT — PAIN - FUNCTIONAL ASSESSMENT
PAIN_FUNCTIONAL_ASSESSMENT: 0-10
PAIN_FUNCTIONAL_ASSESSMENT: 0-10

## 2025-04-22 ASSESSMENT — PAIN SCALES - GENERAL
PAINLEVEL_OUTOF10: 5 - MODERATE PAIN
PAINLEVEL_OUTOF10: 6
PAINLEVEL_OUTOF10: 9
PAINLEVEL_OUTOF10: 8
PAINLEVEL_OUTOF10: 5 - MODERATE PAIN
PAINLEVEL_OUTOF10: 8
PAINLEVEL_OUTOF10: 8
PAINLEVEL_OUTOF10: 6
PAINLEVEL_OUTOF10: 5 - MODERATE PAIN
PAINLEVEL_OUTOF10: 9
PAINLEVEL_OUTOF10: 8
PAINLEVEL_OUTOF10: 9
PAINLEVEL_OUTOF10: 6

## 2025-04-22 ASSESSMENT — PAIN SCALES - WONG BAKER
WONGBAKER_NUMERICALRESPONSE: HURTS EVEN MORE
WONGBAKER_NUMERICALRESPONSE: HURTS LITTLE MORE

## 2025-04-22 ASSESSMENT — PAIN DESCRIPTION - ORIENTATION
ORIENTATION: RIGHT;LEFT;LOWER
ORIENTATION: LOWER
ORIENTATION: RIGHT;LEFT;LOWER
ORIENTATION: RIGHT;LEFT;LOWER

## 2025-04-22 ASSESSMENT — COGNITIVE AND FUNCTIONAL STATUS - GENERAL
MOBILITY SCORE: 24
DAILY ACTIVITIY SCORE: 24

## 2025-04-22 NOTE — CARE PLAN
The patient's goals for the shift include      The clinical goals for the shift include pt will have pain less than 5    Over the shift, the patient did not make progress toward the following goals. Barriers to progression include . Recommendations to address these barriers include .

## 2025-04-22 NOTE — PROGRESS NOTES
Transitional Care Coordination Progress Note:  Patient discussed during interdisciplinary rounds.   Team members present: MD VICTOR HUGO  Plan per medical/surgical team: Continue pain control-wean to PO pain meds only.   Payer: [employer]   Status: inpatient  Discharge disposition: Home   Potential Barriers:  none   ADOD: 4/23

## 2025-04-22 NOTE — CARE PLAN
The patient's goals for the shift include      The clinical goals for the shift include Patient will remain free from pain and pain should be less than 5    Over the shift, the patient did not make progress toward the following goals. Barriers to progression include   Problem: Pain  Goal: Takes deep breaths with improved pain control throughout the shift  Outcome: Progressing  Goal: Turns in bed with improved pain control throughout the shift  Outcome: Progressing  Goal: Walks with improved pain control throughout the shift  Outcome: Progressing  Goal: Performs ADL's with improved pain control throughout shift  Outcome: Progressing  Goal: Participates in PT with improved pain control throughout the shift  Outcome: Progressing  Goal: Free from opioid side effects throughout the shift  Outcome: Progressing  Goal: Free from acute confusion related to pain meds throughout the shift  Outcome: Progressing     Problem: Pain - Adult  Goal: Verbalizes/displays adequate comfort level or baseline comfort level  Outcome: Progressing     Problem: Safety - Adult  Goal: Free from fall injury  Outcome: Progressing     Problem: Discharge Planning  Goal: Discharge to home or other facility with appropriate resources  Outcome: Progressing     Problem: Chronic Conditions and Co-morbidities  Goal: Patient's chronic conditions and co-morbidity symptoms are monitored and maintained or improved  Outcome: Progressing     Problem: Nutrition  Goal: Nutrient intake appropriate for maintaining nutritional needs  Outcome: Progressing   . Recommendations to address these barriers include .

## 2025-04-23 ENCOUNTER — PHARMACY VISIT (OUTPATIENT)
Dept: PHARMACY | Facility: CLINIC | Age: 30
End: 2025-04-23
Payer: COMMERCIAL

## 2025-04-23 VITALS
HEIGHT: 63 IN | TEMPERATURE: 97.3 F | RESPIRATION RATE: 18 BRPM | HEART RATE: 77 BPM | WEIGHT: 293 LBS | OXYGEN SATURATION: 98 % | BODY MASS INDEX: 51.91 KG/M2 | DIASTOLIC BLOOD PRESSURE: 55 MMHG | SYSTOLIC BLOOD PRESSURE: 110 MMHG

## 2025-04-23 PROBLEM — D57.00 SICKLE CELL PAIN CRISIS (MULTI): Status: RESOLVED | Noted: 2023-10-22 | Resolved: 2025-04-23

## 2025-04-23 LAB
ALBUMIN SERPL BCP-MCNC: 3.4 G/DL (ref 3.4–5)
ALP SERPL-CCNC: 104 U/L (ref 33–110)
ALT SERPL W P-5'-P-CCNC: 8 U/L (ref 7–45)
ANION GAP SERPL CALC-SCNC: 12 MMOL/L (ref 10–20)
AST SERPL W P-5'-P-CCNC: 11 U/L (ref 9–39)
BASOPHILS # BLD MANUAL: 0 X10*3/UL (ref 0–0.1)
BASOPHILS NFR BLD MANUAL: 0 %
BILIRUB DIRECT SERPL-MCNC: 0.2 MG/DL (ref 0–0.3)
BILIRUB SERPL-MCNC: 0.8 MG/DL (ref 0–1.2)
BUN SERPL-MCNC: 9 MG/DL (ref 6–23)
CALCIUM SERPL-MCNC: 8.5 MG/DL (ref 8.6–10.6)
CHLORIDE SERPL-SCNC: 103 MMOL/L (ref 98–107)
CO2 SERPL-SCNC: 29 MMOL/L (ref 21–32)
CREAT SERPL-MCNC: 0.77 MG/DL (ref 0.5–1.05)
EGFRCR SERPLBLD CKD-EPI 2021: >90 ML/MIN/1.73M*2
EOSINOPHIL # BLD MANUAL: 0.41 X10*3/UL (ref 0–0.7)
EOSINOPHIL NFR BLD MANUAL: 3.5 %
ERYTHROCYTE [DISTWIDTH] IN BLOOD BY AUTOMATED COUNT: 20.9 % (ref 11.5–14.5)
GLUCOSE SERPL-MCNC: 102 MG/DL (ref 74–99)
HCT VFR BLD AUTO: 27.6 % (ref 36–46)
HGB BLD-MCNC: 9 G/DL (ref 12–16)
HGB RETIC QN: 25 PG (ref 28–38)
HYPOCHROMIA BLD QL SMEAR: ABNORMAL
IMM GRANULOCYTES # BLD AUTO: 0.08 X10*3/UL (ref 0–0.7)
IMM GRANULOCYTES NFR BLD AUTO: 0.7 % (ref 0–0.9)
IMMATURE RETIC FRACTION: 31.7 %
LDH SERPL L TO P-CCNC: 173 U/L (ref 84–246)
LYMPHOCYTES # BLD MANUAL: 2.3 X10*3/UL (ref 1.2–4.8)
LYMPHOCYTES NFR BLD MANUAL: 19.5 %
MAGNESIUM SERPL-MCNC: 1.84 MG/DL (ref 1.6–2.4)
MCH RBC QN AUTO: 20.5 PG (ref 26–34)
MCHC RBC AUTO-ENTMCNC: 32.6 G/DL (ref 32–36)
MCV RBC AUTO: 63 FL (ref 80–100)
MONOCYTES # BLD MANUAL: 0.52 X10*3/UL (ref 0.1–1)
MONOCYTES NFR BLD MANUAL: 4.4 %
NEUTS SEG # BLD MANUAL: 8.57 X10*3/UL (ref 1.2–7)
NEUTS SEG NFR BLD MANUAL: 72.6 %
NRBC BLD-RTO: 0.2 /100 WBCS (ref 0–0)
PHOSPHATE SERPL-MCNC: 3.9 MG/DL (ref 2.5–4.9)
PLATELET # BLD AUTO: 288 X10*3/UL (ref 150–450)
POTASSIUM SERPL-SCNC: 4 MMOL/L (ref 3.5–5.3)
PROT SERPL-MCNC: 6.9 G/DL (ref 6.4–8.2)
RBC # BLD AUTO: 4.4 X10*6/UL (ref 4–5.2)
RBC MORPH BLD: ABNORMAL
RETICS #: 0.12 X10*6/UL (ref 0.02–0.08)
RETICS/RBC NFR AUTO: 2.7 % (ref 0.5–2)
SCHISTOCYTES BLD QL SMEAR: ABNORMAL
SODIUM SERPL-SCNC: 140 MMOL/L (ref 136–145)
TARGETS BLD QL SMEAR: ABNORMAL
TOTAL CELLS COUNTED BLD: 113
WBC # BLD AUTO: 11.8 X10*3/UL (ref 4.4–11.3)

## 2025-04-23 PROCEDURE — 85027 COMPLETE CBC AUTOMATED: CPT | Performed by: STUDENT IN AN ORGANIZED HEALTH CARE EDUCATION/TRAINING PROGRAM

## 2025-04-23 PROCEDURE — 99238 HOSP IP/OBS DSCHRG MGMT 30/<: CPT

## 2025-04-23 PROCEDURE — 2500000002 HC RX 250 W HCPCS SELF ADMINISTERED DRUGS (ALT 637 FOR MEDICARE OP, ALT 636 FOR OP/ED)

## 2025-04-23 PROCEDURE — 2500000001 HC RX 250 WO HCPCS SELF ADMINISTERED DRUGS (ALT 637 FOR MEDICARE OP)

## 2025-04-23 PROCEDURE — 84100 ASSAY OF PHOSPHORUS: CPT | Performed by: STUDENT IN AN ORGANIZED HEALTH CARE EDUCATION/TRAINING PROGRAM

## 2025-04-23 PROCEDURE — RXMED WILLOW AMBULATORY MEDICATION CHARGE

## 2025-04-23 PROCEDURE — 80048 BASIC METABOLIC PNL TOTAL CA: CPT | Performed by: STUDENT IN AN ORGANIZED HEALTH CARE EDUCATION/TRAINING PROGRAM

## 2025-04-23 PROCEDURE — 85045 AUTOMATED RETICULOCYTE COUNT: CPT

## 2025-04-23 PROCEDURE — 2500000001 HC RX 250 WO HCPCS SELF ADMINISTERED DRUGS (ALT 637 FOR MEDICARE OP): Performed by: STUDENT IN AN ORGANIZED HEALTH CARE EDUCATION/TRAINING PROGRAM

## 2025-04-23 PROCEDURE — 2500000004 HC RX 250 GENERAL PHARMACY W/ HCPCS (ALT 636 FOR OP/ED)

## 2025-04-23 PROCEDURE — 83615 LACTATE (LD) (LDH) ENZYME: CPT

## 2025-04-23 PROCEDURE — 85007 BL SMEAR W/DIFF WBC COUNT: CPT | Performed by: STUDENT IN AN ORGANIZED HEALTH CARE EDUCATION/TRAINING PROGRAM

## 2025-04-23 PROCEDURE — 2500000002 HC RX 250 W HCPCS SELF ADMINISTERED DRUGS (ALT 637 FOR MEDICARE OP, ALT 636 FOR OP/ED): Performed by: STUDENT IN AN ORGANIZED HEALTH CARE EDUCATION/TRAINING PROGRAM

## 2025-04-23 PROCEDURE — 83735 ASSAY OF MAGNESIUM: CPT | Performed by: STUDENT IN AN ORGANIZED HEALTH CARE EDUCATION/TRAINING PROGRAM

## 2025-04-23 PROCEDURE — 82248 BILIRUBIN DIRECT: CPT

## 2025-04-23 PROCEDURE — 2500000004 HC RX 250 GENERAL PHARMACY W/ HCPCS (ALT 636 FOR OP/ED): Mod: JZ

## 2025-04-23 RX ORDER — HEPARIN 100 UNIT/ML
5 SYRINGE INTRAVENOUS AS NEEDED
Status: DISCONTINUED | OUTPATIENT
Start: 2025-04-23 | End: 2025-04-23 | Stop reason: HOSPADM

## 2025-04-23 RX ORDER — LIDOCAINE 560 MG/1
1 PATCH PERCUTANEOUS; TOPICAL; TRANSDERMAL DAILY
Qty: 10 PATCH | Refills: 1 | Status: SHIPPED | OUTPATIENT
Start: 2025-04-24 | End: 2025-05-14

## 2025-04-23 RX ORDER — METHOCARBAMOL 500 MG/1
1000 TABLET, FILM COATED ORAL EVERY 6 HOURS SCHEDULED
Status: DISCONTINUED | OUTPATIENT
Start: 2025-04-23 | End: 2025-04-23 | Stop reason: HOSPADM

## 2025-04-23 RX ORDER — POLYETHYLENE GLYCOL 3350 17 G/17G
17 POWDER, FOR SOLUTION ORAL DAILY PRN
Qty: 1530 G | Refills: 1 | Status: SHIPPED | OUTPATIENT
Start: 2025-04-23

## 2025-04-23 RX ORDER — METHOCARBAMOL 500 MG/1
1000 TABLET, FILM COATED ORAL EVERY 6 HOURS SCHEDULED
Qty: 240 TABLET | Refills: 0 | Status: SHIPPED | OUTPATIENT
Start: 2025-04-23

## 2025-04-23 RX ORDER — OXYCODONE HYDROCHLORIDE 5 MG/1
5 TABLET ORAL ONCE
Refills: 0 | Status: COMPLETED | OUTPATIENT
Start: 2025-04-23 | End: 2025-04-23

## 2025-04-23 RX ORDER — HEPARIN 100 UNIT/ML
1 SYRINGE INTRAVENOUS AS NEEDED
Status: DISCONTINUED | OUTPATIENT
Start: 2025-04-23 | End: 2025-04-23

## 2025-04-23 RX ORDER — OXYCODONE HYDROCHLORIDE 10 MG/1
10 TABLET ORAL EVERY 4 HOURS PRN
Qty: 15 TABLET | Refills: 0 | Status: SHIPPED | OUTPATIENT
Start: 2025-04-23

## 2025-04-23 RX ADMIN — OXYCODONE 10 MG: 5 TABLET ORAL at 12:20

## 2025-04-23 RX ADMIN — METHOCARBAMOL 500 MG: 500 TABLET ORAL at 00:05

## 2025-04-23 RX ADMIN — DULOXETINE 60 MG: 60 CAPSULE, DELAYED RELEASE ORAL at 09:04

## 2025-04-23 RX ADMIN — OXYCODONE 10 MG: 5 TABLET ORAL at 02:07

## 2025-04-23 RX ADMIN — ACETAMINOPHEN 975 MG: 325 TABLET, FILM COATED ORAL at 12:18

## 2025-04-23 RX ADMIN — HYDROXYUREA 500 MG: 500 CAPSULE ORAL at 09:04

## 2025-04-23 RX ADMIN — HYDROXYUREA 500 MG: 500 CAPSULE ORAL at 12:18

## 2025-04-23 RX ADMIN — HEPARIN 500 UNITS: 100 SYRINGE at 14:37

## 2025-04-23 RX ADMIN — HYDROMORPHONE HYDROCHLORIDE 2 MG: 2 INJECTION, SOLUTION INTRAMUSCULAR; INTRAVENOUS; SUBCUTANEOUS at 00:05

## 2025-04-23 RX ADMIN — METHOCARBAMOL 500 MG: 500 TABLET ORAL at 06:17

## 2025-04-23 RX ADMIN — OXYCODONE 5 MG: 5 TABLET ORAL at 09:04

## 2025-04-23 RX ADMIN — BUPRENORPHINE 2 MG: 2 TABLET SUBLINGUAL at 14:37

## 2025-04-23 RX ADMIN — OXYCODONE 10 MG: 5 TABLET ORAL at 06:17

## 2025-04-23 RX ADMIN — FOLIC ACID 1 MG: 1 TABLET ORAL at 09:04

## 2025-04-23 RX ADMIN — METHOCARBAMOL 1000 MG: 500 TABLET ORAL at 12:18

## 2025-04-23 RX ADMIN — ACETAMINOPHEN 975 MG: 325 TABLET, FILM COATED ORAL at 00:05

## 2025-04-23 RX ADMIN — HYDROMORPHONE HYDROCHLORIDE 2 MG: 2 INJECTION, SOLUTION INTRAMUSCULAR; INTRAVENOUS; SUBCUTANEOUS at 04:08

## 2025-04-23 RX ADMIN — ACETAMINOPHEN 975 MG: 325 TABLET, FILM COATED ORAL at 06:17

## 2025-04-23 RX ADMIN — BUPRENORPHINE 2 MG: 2 TABLET SUBLINGUAL at 09:04

## 2025-04-23 ASSESSMENT — PAIN - FUNCTIONAL ASSESSMENT
PAIN_FUNCTIONAL_ASSESSMENT: 0-10

## 2025-04-23 ASSESSMENT — PAIN SCALES - GENERAL
PAINLEVEL_OUTOF10: 4
PAINLEVEL_OUTOF10: 8
PAINLEVEL_OUTOF10: 7
PAINLEVEL_OUTOF10: 3

## 2025-04-23 NOTE — NURSING NOTE
Pt dc order in, chest port de-accessed, dc instructions provided, meds to beds delivered, pt stable and transport called.

## 2025-04-23 NOTE — CARE PLAN
The patient's goals for the shift include      The clinical goals for the shift include Patient will remain free from pain and pain should be less than 5    Problem: Pain  Goal: Takes deep breaths with improved pain control throughout the shift  4/23/2025 0229 by Natalie Espinosa RN  Outcome: Progressing  4/23/2025 0229 by Natalie Espinosa RN  Outcome: Progressing  Goal: Turns in bed with improved pain control throughout the shift  4/23/2025 0229 by Natalie Espinosa RN  Outcome: Progressing  4/23/2025 0229 by Natalie Espinosa RN  Outcome: Progressing  Goal: Walks with improved pain control throughout the shift  4/23/2025 0229 by Natalie Espinosa RN  Outcome: Progressing  4/23/2025 0229 by Natalie Espinosa RN  Outcome: Progressing  Goal: Performs ADL's with improved pain control throughout shift  4/23/2025 0229 by Natalie Espinosa RN  Outcome: Progressing  4/23/2025 0229 by Natalie Espinosa RN  Outcome: Progressing  Goal: Participates in PT with improved pain control throughout the shift  4/23/2025 0229 by Natalie Espinosa RN  Outcome: Progressing  4/23/2025 0229 by Natalie Espinosa RN  Outcome: Progressing  Goal: Free from opioid side effects throughout the shift  4/23/2025 0229 by Natalie Espinosa RN  Outcome: Progressing  4/23/2025 0229 by Natalie Espinosa RN  Outcome: Progressing  Goal: Free from acute confusion related to pain meds throughout the shift  4/23/2025 0229 by Natalie Espinosa RN  Outcome: Progressing  4/23/2025 0229 by Natalie Espinosa RN  Outcome: Progressing     Problem: Pain - Adult  Goal: Verbalizes/displays adequate comfort level or baseline comfort level  4/23/2025 0229 by Natalie Espinosa RN  Outcome: Progressing  4/23/2025 0229 by Natalie Espinosa RN  Outcome: Progressing     Problem: Safety - Adult  Goal: Free from fall injury  4/23/2025 0229 by Natalie Espinosa, NELSON  Outcome:  Progressing  4/23/2025 0229 by Natalie Espinosa RN  Outcome: Progressing     Problem: Discharge Planning  Goal: Discharge to home or other facility with appropriate resources  4/23/2025 0229 by Natalie Espinosa RN  Outcome: Progressing  4/23/2025 0229 by Natalie Espinosa RN  Outcome: Progressing     Problem: Chronic Conditions and Co-morbidities  Goal: Patient's chronic conditions and co-morbidity symptoms are monitored and maintained or improved  4/23/2025 0229 by Natalie Espinosa RN  Outcome: Progressing  4/23/2025 0229 by Natalie Espinosa RN  Outcome: Progressing     Problem: Nutrition  Goal: Nutrient intake appropriate for maintaining nutritional needs  4/23/2025 0229 by Natalie Espinosa RN  Outcome: Progressing  4/23/2025 0229 by Natalie Espinosa RN  Outcome: Progressing

## 2025-04-23 NOTE — CARE PLAN
Problem: Pain  Goal: Takes deep breaths with improved pain control throughout the shift  Outcome: Progressing  Goal: Turns in bed with improved pain control throughout the shift  Outcome: Progressing  Goal: Walks with improved pain control throughout the shift  Outcome: Progressing  Goal: Performs ADL's with improved pain control throughout shift  Outcome: Progressing  Goal: Participates in PT with improved pain control throughout the shift  Outcome: Progressing  Goal: Free from opioid side effects throughout the shift  Outcome: Progressing  Goal: Free from acute confusion related to pain meds throughout the shift  Outcome: Progressing     Problem: Pain - Adult  Goal: Verbalizes/displays adequate comfort level or baseline comfort level  Outcome: Progressing     Problem: Safety - Adult  Goal: Free from fall injury  Outcome: Progressing     Problem: Discharge Planning  Goal: Discharge to home or other facility with appropriate resources  Outcome: Progressing     Problem: Chronic Conditions and Co-morbidities  Goal: Patient's chronic conditions and co-morbidity symptoms are monitored and maintained or improved  Outcome: Progressing     Problem: Nutrition  Goal: Nutrient intake appropriate for maintaining nutritional needs  Outcome: Progressing   The patient's goals for the shift include      The clinical goals for the shift include pts pain will be controlled through shift

## 2025-04-24 NOTE — DISCHARGE SUMMARY
Discharge Diagnosis  Sickle cell pain crisis (Multi)    Issues Requiring Follow-Up  [ ] patient has follow up with outpatient mary ellen at [facility] on 4/24- discuss change in pain management reg, as patient is hospitalized frequently for pain crises on current home regimen.   [ ] unspecified obstructive pulmonary disease, fu outpatient with PCP for diagnostic testing    Test Results Pending At Discharge  Pending Labs       No current pending labs.            Hospital Course  Taylor Hampton is a 29 y.o. female with PMHx of Sickle cell disease c/b Pulmonary embolism on Xarelto, Obstructive airway disease, Depression, who presented to the ED on 4/18 with complaints of chest, bilateral lower limbs and low back pain of few days duration which she feels is consistent with her sickle cell crisis pain.  Tachycardic and HTNsive on arrival in the ED. Labs with no acute pathology, HGB stable.  CXR unremarkable. Presentation concerning for Sickle cell disease with painful crisis likely vaso-occlusive. Patient's initial pain management reg included scheduled tylenol, home buprenorphine, home lyrica, IV dilaudid 2mg q2h prn. IV pain control able to be spaced on hospital day 3. Pain improvement and control with PO oxy 10 in addition  to home medications. Patient discharged with outpatient follow up with mary ellen at [facility] on 4/24.    Other notable finding of obstructive pulmonary disease (unspecified). Symptoms significantly improved with breathing treatments. Patient agreeable to having outpatient work up (PFTs).     Pertinent Physical Exam At Time of Discharge  Physical Exam  Constitutional:       General: She is not in acute distress.     Appearance: Normal appearance. She is obese. She is not ill-appearing.   HENT:      Head: Normocephalic and atraumatic.   Eyes:      General: No scleral icterus.  Cardiovascular:      Rate and Rhythm: Normal rate and regular rhythm.   Pulmonary:      Effort: Pulmonary effort is normal.   Neurological:       General: No focal deficit present.      Mental Status: She is alert.   Psychiatric:         Mood and Affect: Mood normal.         Thought Content: Thought content normal.     Home Medications     Medication List      START taking these medications     lidocaine 4 % patch; Place 1 patch over 12 hours on the skin once daily   for 20 days. Remove & discard patch within 12 hours or as directed by MD.;   Start taking on: April 24, 2025   methocarbamol 500 mg tablet; Commonly known as: Robaxin; Take 2 tablets   (1,000 mg) by mouth every 6 hours.   oxyCODONE 10 mg immediate release tablet; Commonly known as: Roxicodone;   Take 1 tablet (10 mg) by mouth every 4 hours if needed for severe pain (7   - 10).   polyethylene glycol 17 gram/dose powder; Commonly known as: Glycolax,   Miralax; Mix 1 capful (17 g) of powder in 4-8 ounces of liquid and drink   once daily as needed (constipation while on opioids).     CONTINUE taking these medications     acetaminophen 500 mg tablet; Commonly known as: Tylenol   albuterol 2.5 mg /3 mL (0.083 %) nebulizer solution   buprenorphine 2 mg; Commonly known as: Subutex   DULoxetine 60 mg DR capsule; Commonly known as: Cymbalta   folic acid 1 mg tablet; Commonly known as: Folvite   hydroxyurea 500 mg capsule; Commonly known as: Hydrea   pregabalin 50 mg capsule; Commonly known as: Lyrica   PREVIDENT 5000 SENSITIVE DENT   sodium chloride 0.65 % nasal spray; Commonly known as: [place]   WOMEN'S MULTIVITAMIN GUMMIES ORAL   Xarelto 20 mg tablet; Generic drug: rivaroxaban     STOP taking these medications     HYDROmorphone 2 mg tablet; Commonly known as: Dilaudid       Outpatient Follow-Up  No future appointments.    Allyssa Khan DO

## 2025-04-24 NOTE — HOSPITAL COURSE
Taylor Hampton is a 29 y.o. female with PMHx of Sickle cell disease c/b Pulmonary embolism on Xarelto, Obstructive airway disease, Depression, who presented to the ED on 4/18 with complaints of chest, bilateral lower limbs and low back pain of few days duration which she feels is consistent with her sickle cell crisis pain.  Tachycardic and HTNsive on arrival in the ED. Labs with no acute pathology, HGB stable.  CXR unremarkable. Presentation concerning for Sickle cell disease with painful crisis likely vaso-occlusive. Patient's initial pain management reg included scheduled tylenol, home buprenorphine, home lyrica, IV dilaudid 2mg q2h prn. IV pain control able to be spaced on hospital day 3. Pain improvement and control with PO oxy 10 in addition  to home medications. Patient discharged with outpatient follow up with mary ellen at [facility] on 4/24.    Other notable finding of obstructive pulmonary disease (unspecified). Symptoms significantly improved with breathing treatments. Patient agreeable to having outpatient work up (PFTs).

## 2025-06-04 ENCOUNTER — APPOINTMENT (OUTPATIENT)
Dept: CARDIOLOGY | Facility: HOSPITAL | Age: 30
End: 2025-06-04
Payer: COMMERCIAL

## 2025-06-04 ENCOUNTER — HOSPITAL ENCOUNTER (EMERGENCY)
Facility: HOSPITAL | Age: 30
Discharge: HOME | End: 2025-06-04
Attending: EMERGENCY MEDICINE
Payer: COMMERCIAL

## 2025-06-04 ENCOUNTER — APPOINTMENT (OUTPATIENT)
Dept: RADIOLOGY | Facility: HOSPITAL | Age: 30
End: 2025-06-04
Payer: COMMERCIAL

## 2025-06-04 VITALS
DIASTOLIC BLOOD PRESSURE: 63 MMHG | BODY MASS INDEX: 41.95 KG/M2 | TEMPERATURE: 97.4 F | OXYGEN SATURATION: 100 % | RESPIRATION RATE: 17 BRPM | WEIGHT: 293 LBS | SYSTOLIC BLOOD PRESSURE: 118 MMHG | HEART RATE: 94 BPM | HEIGHT: 70 IN

## 2025-06-04 DIAGNOSIS — E87.6 HYPOKALEMIA: ICD-10-CM

## 2025-06-04 DIAGNOSIS — D57.00 SICKLE CELL DISEASE WITH CRISIS (MULTI): Primary | ICD-10-CM

## 2025-06-04 LAB
ALBUMIN SERPL BCP-MCNC: 3.9 G/DL (ref 3.4–5)
ALP SERPL-CCNC: 57 U/L (ref 33–110)
ALT SERPL W P-5'-P-CCNC: 7 U/L (ref 7–45)
ANION GAP SERPL CALC-SCNC: 7 MMOL/L (ref 10–20)
AST SERPL W P-5'-P-CCNC: 12 U/L (ref 9–39)
ATRIAL RATE: 83 BPM
ATRIAL RATE: 90 BPM
B-HCG SERPL-ACNC: <2 MIU/ML
BASOPHILS # BLD AUTO: 0.02 X10*3/UL (ref 0–0.1)
BASOPHILS NFR BLD AUTO: 0.2 %
BILIRUB SERPL-MCNC: 1.4 MG/DL (ref 0–1.2)
BUN SERPL-MCNC: 6 MG/DL (ref 6–23)
CALCIUM SERPL-MCNC: 8.9 MG/DL (ref 8.6–10.3)
CARDIAC TROPONIN I PNL SERPL HS: 3 NG/L (ref 0–13)
CHLORIDE SERPL-SCNC: 106 MMOL/L (ref 98–107)
CO2 SERPL-SCNC: 26 MMOL/L (ref 21–32)
CREAT SERPL-MCNC: 0.77 MG/DL (ref 0.5–1.05)
EGFRCR SERPLBLD CKD-EPI 2021: >90 ML/MIN/1.73M*2
EOSINOPHIL # BLD AUTO: 0.24 X10*3/UL (ref 0–0.7)
EOSINOPHIL NFR BLD AUTO: 1.9 %
ERYTHROCYTE [DISTWIDTH] IN BLOOD BY AUTOMATED COUNT: 20.4 % (ref 11.5–14.5)
GLUCOSE SERPL-MCNC: 104 MG/DL (ref 74–99)
HCT VFR BLD AUTO: 28.4 % (ref 36–46)
HGB BLD-MCNC: 9.4 G/DL (ref 12–16)
HGB RETIC QN: 25 PG (ref 28–38)
HYPOCHROMIA BLD QL SMEAR: NORMAL
IMM GRANULOCYTES # BLD AUTO: 0.05 X10*3/UL (ref 0–0.7)
IMM GRANULOCYTES NFR BLD AUTO: 0.4 % (ref 0–0.9)
IMMATURE RETIC FRACTION: 22.6 %
LYMPHOCYTES # BLD AUTO: 4.76 X10*3/UL (ref 1.2–4.8)
LYMPHOCYTES NFR BLD AUTO: 37.1 %
MAGNESIUM SERPL-MCNC: 1.76 MG/DL (ref 1.6–2.4)
MCH RBC QN AUTO: 20.4 PG (ref 26–34)
MCHC RBC AUTO-ENTMCNC: 33.1 G/DL (ref 32–36)
MCV RBC AUTO: 62 FL (ref 80–100)
MONOCYTES # BLD AUTO: 0.66 X10*3/UL (ref 0.1–1)
MONOCYTES NFR BLD AUTO: 5.1 %
NEUTROPHILS # BLD AUTO: 7.09 X10*3/UL (ref 1.2–7.7)
NEUTROPHILS NFR BLD AUTO: 55.3 %
NRBC BLD-RTO: 0 /100 WBCS (ref 0–0)
P AXIS: 49 DEGREES
P AXIS: 64 DEGREES
P OFFSET: 196 MS
P OFFSET: 198 MS
P ONSET: 142 MS
P ONSET: 146 MS
PLATELET # BLD AUTO: 191 X10*3/UL (ref 150–450)
POTASSIUM SERPL-SCNC: 3.4 MMOL/L (ref 3.5–5.3)
PR INTERVAL: 142 MS
PR INTERVAL: 150 MS
PROT SERPL-MCNC: 7.6 G/DL (ref 6.4–8.2)
Q ONSET: 217 MS
Q ONSET: 217 MS
QRS COUNT: 13 BEATS
QRS COUNT: 14 BEATS
QRS DURATION: 90 MS
QRS DURATION: 96 MS
QT INTERVAL: 386 MS
QT INTERVAL: 398 MS
QTC CALCULATION(BAZETT): 467 MS
QTC CALCULATION(BAZETT): 472 MS
QTC FREDERICIA: 441 MS
QTC FREDERICIA: 443 MS
R AXIS: 16 DEGREES
R AXIS: 31 DEGREES
RBC # BLD AUTO: 4.6 X10*6/UL (ref 4–5.2)
RBC MORPH BLD: NORMAL
RETICS #: 0.1 X10*6/UL (ref 0.02–0.08)
RETICS/RBC NFR AUTO: 2.2 % (ref 0.5–2)
SCHISTOCYTES BLD QL SMEAR: NORMAL
SODIUM SERPL-SCNC: 136 MMOL/L (ref 136–145)
STOMATOCYTES BLD QL SMEAR: NORMAL
T AXIS: -23 DEGREES
T AXIS: -3 DEGREES
T OFFSET: 410 MS
T OFFSET: 416 MS
TARGETS BLD QL SMEAR: NORMAL
VENTRICULAR RATE: 83 BPM
VENTRICULAR RATE: 90 BPM
WBC # BLD AUTO: 12.8 X10*3/UL (ref 4.4–11.3)

## 2025-06-04 PROCEDURE — 84702 CHORIONIC GONADOTROPIN TEST: CPT | Performed by: EMERGENCY MEDICINE

## 2025-06-04 PROCEDURE — 93005 ELECTROCARDIOGRAM TRACING: CPT

## 2025-06-04 PROCEDURE — 80053 COMPREHEN METABOLIC PANEL: CPT | Performed by: EMERGENCY MEDICINE

## 2025-06-04 PROCEDURE — 96375 TX/PRO/DX INJ NEW DRUG ADDON: CPT

## 2025-06-04 PROCEDURE — 84484 ASSAY OF TROPONIN QUANT: CPT | Performed by: EMERGENCY MEDICINE

## 2025-06-04 PROCEDURE — 99285 EMERGENCY DEPT VISIT HI MDM: CPT | Mod: 25 | Performed by: EMERGENCY MEDICINE

## 2025-06-04 PROCEDURE — 96361 HYDRATE IV INFUSION ADD-ON: CPT

## 2025-06-04 PROCEDURE — 85025 COMPLETE CBC W/AUTO DIFF WBC: CPT | Performed by: EMERGENCY MEDICINE

## 2025-06-04 PROCEDURE — 96374 THER/PROPH/DIAG INJ IV PUSH: CPT

## 2025-06-04 PROCEDURE — 83735 ASSAY OF MAGNESIUM: CPT | Performed by: EMERGENCY MEDICINE

## 2025-06-04 PROCEDURE — 2500000004 HC RX 250 GENERAL PHARMACY W/ HCPCS (ALT 636 FOR OP/ED)

## 2025-06-04 PROCEDURE — 85045 AUTOMATED RETICULOCYTE COUNT: CPT | Performed by: EMERGENCY MEDICINE

## 2025-06-04 PROCEDURE — 96376 TX/PRO/DX INJ SAME DRUG ADON: CPT

## 2025-06-04 PROCEDURE — 71045 X-RAY EXAM CHEST 1 VIEW: CPT

## 2025-06-04 PROCEDURE — 71045 X-RAY EXAM CHEST 1 VIEW: CPT | Performed by: RADIOLOGY

## 2025-06-04 PROCEDURE — 2500000002 HC RX 250 W HCPCS SELF ADMINISTERED DRUGS (ALT 637 FOR MEDICARE OP, ALT 636 FOR OP/ED): Performed by: EMERGENCY MEDICINE

## 2025-06-04 PROCEDURE — 2500000004 HC RX 250 GENERAL PHARMACY W/ HCPCS (ALT 636 FOR OP/ED): Mod: JZ,TB | Performed by: EMERGENCY MEDICINE

## 2025-06-04 RX ORDER — HYDROMORPHONE HYDROCHLORIDE 1 MG/ML
1 INJECTION, SOLUTION INTRAMUSCULAR; INTRAVENOUS; SUBCUTANEOUS ONCE
Status: COMPLETED | OUTPATIENT
Start: 2025-06-04 | End: 2025-06-04

## 2025-06-04 RX ORDER — DIPHENHYDRAMINE HYDROCHLORIDE 50 MG/ML
25 INJECTION, SOLUTION INTRAMUSCULAR; INTRAVENOUS ONCE
Status: COMPLETED | OUTPATIENT
Start: 2025-06-04 | End: 2025-06-04

## 2025-06-04 RX ORDER — POTASSIUM CHLORIDE 20 MEQ/1
40 TABLET, EXTENDED RELEASE ORAL ONCE
Status: COMPLETED | OUTPATIENT
Start: 2025-06-04 | End: 2025-06-04

## 2025-06-04 RX ORDER — HEPARIN 100 UNIT/ML
5 SYRINGE INTRAVENOUS ONCE
Status: COMPLETED | OUTPATIENT
Start: 2025-06-04 | End: 2025-06-04

## 2025-06-04 RX ORDER — HEPARIN 100 UNIT/ML
SYRINGE INTRAVENOUS
Status: COMPLETED
Start: 2025-06-04 | End: 2025-06-04

## 2025-06-04 RX ADMIN — HYDROMORPHONE HYDROCHLORIDE 1 MG: 1 INJECTION, SOLUTION INTRAMUSCULAR; INTRAVENOUS; SUBCUTANEOUS at 08:27

## 2025-06-04 RX ADMIN — HYDROMORPHONE HYDROCHLORIDE 1 MG: 1 INJECTION, SOLUTION INTRAMUSCULAR; INTRAVENOUS; SUBCUTANEOUS at 05:41

## 2025-06-04 RX ADMIN — POTASSIUM CHLORIDE 40 MEQ: 1500 TABLET, EXTENDED RELEASE ORAL at 07:34

## 2025-06-04 RX ADMIN — SODIUM CHLORIDE 500 ML: 9 INJECTION, SOLUTION INTRAVENOUS at 05:41

## 2025-06-04 RX ADMIN — DIPHENHYDRAMINE HYDROCHLORIDE 25 MG: 50 INJECTION, SOLUTION INTRAMUSCULAR; INTRAVENOUS at 05:41

## 2025-06-04 RX ADMIN — HEPARIN 500 UNITS: 100 SYRINGE at 08:30

## 2025-06-04 RX ADMIN — HYDROMORPHONE HYDROCHLORIDE 1 MG: 1 INJECTION, SOLUTION INTRAMUSCULAR; INTRAVENOUS; SUBCUTANEOUS at 07:34

## 2025-06-04 ASSESSMENT — HEART SCORE
RISK FACTORS: 1-2 RISK FACTORS
AGE: <45
TROPONIN: LESS THAN OR EQUAL TO NORMAL LIMIT
HEART SCORE: 1
ECG: NORMAL
HISTORY: SLIGHTLY SUSPICIOUS

## 2025-06-04 ASSESSMENT — PAIN - FUNCTIONAL ASSESSMENT: PAIN_FUNCTIONAL_ASSESSMENT: 0-10

## 2025-06-04 ASSESSMENT — PAIN SCALES - GENERAL
PAINLEVEL_OUTOF10: 4
PAINLEVEL_OUTOF10: 6
PAINLEVEL_OUTOF10: 6
PAINLEVEL_OUTOF10: 9

## 2025-06-04 NOTE — ED PROVIDER NOTES
HPI   Chief Complaint   Patient presents with    Sickle Cell Pain Crisis         History provided by:  Medical records and patient    Patient presents to the emergency department ambulatory via private vehicle for evaluation of sickle cell pain crisis.  Patient states that it has been bothering her for about the past 3 days but she was on a trip to [place].  She just got back in town today and came to the hospital.  Patient states she believes it was the weather change and the increased walking that she was doing in [place] that led to this exacerbation.    Patient is complaining of pain all over her chest, abdomen, back, legs.  Said these are the typical symptoms she gets with her sickle cell.  She denies any recent fevers, chills, coughs, URIs or legs.  No nausea, vomiting, diarrhea.  She states the pain is just deep and achy and diffuse.  It is not affected by position or activity.    Patient has history of sickle cell disease and follows with hematology at [facility].  Has history of previous pulmonary embolism and is always on Xarelto.  She says she has been compliant with her medications.      Patient History   Medical History[1]  Surgical History[2]  Family History[3]  Social History[4]    Physical Exam   ED Triage Vitals [06/04/25 0521]   Temperature Heart Rate Respirations BP   36.5 °C (97.7 °F) 80 18 134/71      SpO2 Temp src Heart Rate Source Patient Position   99 % -- -- --      BP Location FiO2 (%)     -- --       Physical Exam  Vitals reviewed.   Constitutional:       Appearance: She is obese.   HENT:      Head: Normocephalic and atraumatic.      Nose: Nose normal.      Mouth/Throat:      Mouth: Mucous membranes are moist.   Eyes:      Pupils: Pupils are equal, round, and reactive to light.   Cardiovascular:      Rate and Rhythm: Normal rate and regular rhythm.      Pulses: Normal pulses.      Heart sounds: Normal heart sounds.   Pulmonary:      Effort: Pulmonary effort is normal.  "  Abdominal:      General: Bowel sounds are normal.      Palpations: Abdomen is soft. There is no mass.      Tenderness: There is no abdominal tenderness.   Musculoskeletal:         General: No swelling or tenderness. Normal range of motion.      Cervical back: Normal range of motion and neck supple.      Right lower leg: No edema.      Left lower leg: No edema.   Skin:     General: Skin is warm and dry.      Capillary Refill: Capillary refill takes less than 2 seconds.   Neurological:      General: No focal deficit present.      Mental Status: She is alert and oriented to person, place, and time.   Psychiatric:         Mood and Affect: Mood normal.           ED Course & MDM   ED Course as of 06/04/25 0830 Wed Jun 04, 2025   0726 POTASSIUM(!): 3.4  Replacement ordered [MN]   0729 Patient reassessed, stlll having pain [MN]   0809 Pain now 6/10 from 9/10. \"I think one more dose (of dilaudid) should do it\" [MN]      ED Course User Index  [MN] Lenore Duran MD         Diagnoses as of 06/04/25 0830   Sickle cell disease with crisis (Multi)   Hypokalemia     Labs Reviewed   CBC WITH AUTO DIFFERENTIAL - Abnormal       Result Value    WBC 12.8 (*)     nRBC 0.0      RBC 4.60      Hemoglobin 9.4 (*)     Hematocrit 28.4 (*)     MCV 62 (*)     MCH 20.4 (*)     MCHC 33.1      RDW 20.4 (*)     Platelets 191      Neutrophils % 55.3      Immature Granulocytes %, Automated 0.4      Lymphocytes % 37.1      Monocytes % 5.1      Eosinophils % 1.9      Basophils % 0.2      Neutrophils Absolute 7.09      Immature Granulocytes Absolute, Automated 0.05      Lymphocytes Absolute 4.76      Monocytes Absolute 0.66      Eosinophils Absolute 0.24      Basophils Absolute 0.02     COMPREHENSIVE METABOLIC PANEL - Abnormal    Glucose 104 (*)     Sodium 136      Potassium 3.4 (*)     Chloride 106      Bicarbonate 26      Anion Gap 7 (*)     Urea Nitrogen 6      Creatinine 0.77      eGFR >90      Calcium 8.9      Albumin 3.9      Alkaline " Phosphatase 57      Total Protein 7.6      AST 12      Bilirubin, Total 1.4 (*)     ALT 7     RETICULOCYTES - Abnormal    Retic % 2.2 (*)     Retic Absolute 0.099 (*)     Reticulocyte Hemoglobin 25 (*)     Immature Retic fraction 22.6 (*)    MAGNESIUM - Normal    Magnesium 1.76     HUMAN CHORIONIC GONADOTROPIN, SERUM QUANTITATIVE - Normal    HCG, Beta-Quantitative <2      Narrative:      Total HCG measurement is performed using the [employer] Access   Immunoassay which detects intact HCG and free beta HCG subunit.    This test is not indicated for use as a tumor marker.   HCG testing is performed using a different test methodology at [facility] than other [facility]. Direct result comparison   should only be made within the same method.       SERIAL TROPONIN-INITIAL - Normal    Troponin I, High Sensitivity 3      Narrative:     Less than 99th percentile of normal range cutoff-  Female and children under 18 years old <14 ng/L; Male <21 ng/L: Negative  Repeat testing should be performed if clinically indicated.     Female and children under 18 years old 14-50 ng/L; Male 21-50 ng/L:  Consistent with possible cardiac damage and possible increased clinical   risk. Serial measurements may help to assess extent of myocardial damage.     >50 ng/L: Consistent with cardiac damage, increased clinical risk and  myocardial infarction. Serial measurements may help assess extent of   myocardial damage.      NOTE: Children less than 1 year old may have higher baseline troponin   levels and results should be interpreted in conjunction with the overall   clinical context.     NOTE: Troponin I testing is performed using a different   testing methodology at [facility] than at other   [facility]. Direct result comparisons should only   be made within the same method.   TROPONIN SERIES- (INITIAL, 1 HR)    Narrative:     The following orders were created for panel order Troponin I Series,  High Sensitivity (0, 1 HR).  Procedure                               Abnormality         Status                     ---------                               -----------         ------                     Troponin I, High Sensiti...[[id number]]  Normal              Final result               Troponin, High Sensitivi...[[id number]]                                                   Please view results for these tests on the individual orders.   SERIAL TROPONIN, 1 HOUR   MORPHOLOGY    RBC Morphology See Below      Hypochromia Marked      RBC Fragments Few      Target Cells Few      Stomatocytes Few       ED Medication Administration from 06/04/2025 0513 to 06/04/2025 0759         Date/Time Order Dose Route Action Action by     06/04/2025 0541 EDT diphenhydrAMINE (BENADryl) injection 25 mg 25 mg intravenous Given GRAZYNA Correa     06/04/2025 0541 EDT HYDROmorphone (Dilaudid) injection 1 mg 1 mg intravenous Given GRAZYNA Correa     06/04/2025 0541 EDT sodium chloride 0.9 % bolus 500 mL 500 mL intravenous New Bag GRAZYNA Correa     06/04/2025 0641 EDT sodium chloride 0.9 % bolus 500 mL 0 mL intravenous Stopped ANA Emery     06/04/2025 0734 EDT HYDROmorphone (Dilaudid) injection 1 mg 1 mg intravenous Given Rupert A     06/04/2025 0734 EDT potassium chloride CR (Klor-Con M20) ER tablet 40 mEq 40 mEq oral Given Rupert A          XR chest 1 view   Final Result   No evidence of acute intrathoracic abnormality.        Signed by: Jimmy Barber 6/4/2025 6:23 AM   Dictation workstation:   [id number]                    No data recorded                         EKG  0607 --twelve-lead EKG was obtained and read by me. This demonstrates normal sinus rhythm with rate of 83, normal axes, QTc 467, poor R wave ration, but no ectopy, no ischemia, no pericarditis. There was no change compared to most recent prior EKG. 4/18/25 0728 --twelve-lead EKG was obtained and read by me. This demonstrates normal sinus rhythm at a rate of 98, QTc 472, poor R wave,  no  ectopy, no ischemia, no pericarditis. There was no change compared to most recent prior EKG. arrival        Medical Decision Making  Presents emergency department the above history and physical.  No signs of sepsis, dehydration, hypoxemia, bronchospasm.  Patient is afebrile on arrival.  EKG shows no ischemia or rhythm abnormalities.  Patient treated with IV fluids, Benadryl, Dilaudid.  On laboratory evaluation initial troponin is 3.  Heart score 1.    Chest x-ray obtained and read by radiology demonstrate no acute intrathoracic abnormality.  Hypokalemia 3.4 treated with oral correction.  Reticulate's 2.2% within patient's usual baseline.  White count 12.8, hemoglobin 9.4 platelets 191.  These are comparable to baseline values.    No evidence of acute chest syndrome, severe hemolysis, sequestration, thromboembolic disease.  Symptoms represents patient's usual typical painful exacerbation.  She may continue her home medications and follow-up with her usual hematologist.    Results of exam and any testing were discussed with patient/family. To the best of my ability, I answered all questions. At this time, there is no indication for admission/transfer or further diagnostic testing. Patient understands to return for any new or worsening symptoms, or failure to improve as anticipated. The importance of follow-up was stressed.    Procedure  Procedures       [1]   Past Medical History:  Diagnosis Date    Sickle cell anemia with crisis    [2]   Past Surgical History:  Procedure Laterality Date    CHOLECYSTECTOMY      CT ABDOMEN PELVIS ANGIOGRAM W AND/OR WO IV CONTRAST  04/09/2020    CT ABDOMEN PELVIS ANGIOGRAM W AND/OR WO IV CONTRAST 4/9/2020 [facility]    CT ANGIO NECK  09/27/2019    CT NECK ANGIO W AND WO IV CONTRAST 9/27/2019 [facility]    IR CVC TUNNELED  08/24/2020    IR CVC TUNNELED 8/24/2020 [facility]    KNEE Right     OTHER SURGICAL HISTORY  09/22/2020    Venous access port placement     OTHER SURGICAL HISTORY  09/22/2020    Tonsillectomy with adenoidectomy    OTHER SURGICAL HISTORY  09/22/2020    Knee surgery   [3] No family history on file.  [4]   Social History  Tobacco Use    Smoking status: Never    Smokeless tobacco: Never   Vaping Use    Vaping status: Never Used   Substance Use Topics    Alcohol use: Never    Drug use: Never        Lenore Duran MD  06/04/25 0831

## 2025-06-14 LAB
ATRIAL RATE: 83 BPM
ATRIAL RATE: 90 BPM
P AXIS: 49 DEGREES
P AXIS: 64 DEGREES
P OFFSET: 196 MS
P OFFSET: 198 MS
P ONSET: 142 MS
P ONSET: 146 MS
PR INTERVAL: 142 MS
PR INTERVAL: 150 MS
Q ONSET: 217 MS
Q ONSET: 217 MS
QRS COUNT: 13 BEATS
QRS COUNT: 14 BEATS
QRS DURATION: 90 MS
QRS DURATION: 96 MS
QT INTERVAL: 386 MS
QT INTERVAL: 398 MS
QTC CALCULATION(BAZETT): 467 MS
QTC CALCULATION(BAZETT): 472 MS
QTC FREDERICIA: 441 MS
QTC FREDERICIA: 443 MS
R AXIS: 16 DEGREES
R AXIS: 31 DEGREES
T AXIS: -23 DEGREES
T AXIS: -3 DEGREES
T OFFSET: 410 MS
T OFFSET: 416 MS
VENTRICULAR RATE: 83 BPM
VENTRICULAR RATE: 90 BPM

## 2025-08-25 ENCOUNTER — HOSPITAL ENCOUNTER (INPATIENT)
Facility: HOSPITAL | Age: 30
LOS: 5 days | Discharge: HOME | DRG: 812 | End: 2025-08-31
Attending: EMERGENCY MEDICINE | Admitting: HOSPITALIST
Payer: COMMERCIAL

## 2025-08-25 ASSESSMENT — PAIN DESCRIPTION - FREQUENCY: FREQUENCY: CONSTANT/CONTINUOUS

## 2025-08-25 ASSESSMENT — PAIN - FUNCTIONAL ASSESSMENT
PAIN_FUNCTIONAL_ASSESSMENT: 0-10

## 2025-08-25 ASSESSMENT — PAIN DESCRIPTION - PAIN TYPE
TYPE: ACUTE PAIN
TYPE: ACUTE PAIN

## 2025-08-25 ASSESSMENT — PAIN SCALES - GENERAL
PAINLEVEL_OUTOF10: 9
PAINLEVEL_OUTOF10: 9
PAINLEVEL_OUTOF10: 8
PAINLEVEL_OUTOF10: 9
PAINLEVEL_OUTOF10: 9
PAINLEVEL_OUTOF10: 8

## 2025-08-25 ASSESSMENT — PAIN DESCRIPTION - PROGRESSION
CLINICAL_PROGRESSION: GRADUALLY IMPROVING
CLINICAL_PROGRESSION: NOT CHANGED

## 2025-08-26 ENCOUNTER — APPOINTMENT (OUTPATIENT)
Dept: CARDIOLOGY | Facility: HOSPITAL | Age: 30
DRG: 812 | End: 2025-08-26
Payer: COMMERCIAL

## 2025-08-26 ENCOUNTER — APPOINTMENT (OUTPATIENT)
Dept: RADIOLOGY | Facility: HOSPITAL | Age: 30
DRG: 812 | End: 2025-08-26
Payer: COMMERCIAL

## 2025-08-26 PROCEDURE — 93005 ELECTROCARDIOGRAM TRACING: CPT | Mod: IPSPLIT

## 2025-08-26 PROCEDURE — 70496 CT ANGIOGRAPHY HEAD: CPT | Mod: IPSPLIT

## 2025-08-26 SDOH — SOCIAL STABILITY: SOCIAL INSECURITY: WITHIN THE LAST YEAR, HAVE YOU BEEN HUMILIATED OR EMOTIONALLY ABUSED IN OTHER WAYS BY YOUR PARTNER OR EX-PARTNER?: NO

## 2025-08-26 SDOH — ECONOMIC STABILITY: FOOD INSECURITY: WITHIN THE PAST 12 MONTHS, THE FOOD YOU BOUGHT JUST DIDN'T LAST AND YOU DIDN'T HAVE MONEY TO GET MORE.: NEVER TRUE

## 2025-08-26 SDOH — ECONOMIC STABILITY: FOOD INSECURITY: WITHIN THE PAST 12 MONTHS, YOU WORRIED THAT YOUR FOOD WOULD RUN OUT BEFORE YOU GOT THE MONEY TO BUY MORE.: NEVER TRUE

## 2025-08-26 SDOH — SOCIAL STABILITY: SOCIAL INSECURITY: HAVE YOU HAD THOUGHTS OF HARMING ANYONE ELSE?: NO

## 2025-08-26 SDOH — SOCIAL STABILITY: SOCIAL INSECURITY: ABUSE: ADULT

## 2025-08-26 SDOH — ECONOMIC STABILITY: HOUSING INSECURITY: IN THE LAST 12 MONTHS, WAS THERE A TIME WHEN YOU WERE NOT ABLE TO PAY THE MORTGAGE OR RENT ON TIME?: NO

## 2025-08-26 SDOH — ECONOMIC STABILITY: HOUSING INSECURITY: AT ANY TIME IN THE PAST 12 MONTHS, WERE YOU HOMELESS OR LIVING IN A SHELTER (INCLUDING NOW)?: NO

## 2025-08-26 SDOH — SOCIAL STABILITY: SOCIAL INSECURITY: ARE THERE ANY APPARENT SIGNS OF INJURIES/BEHAVIORS THAT COULD BE RELATED TO ABUSE/NEGLECT?: NO

## 2025-08-26 SDOH — ECONOMIC STABILITY: INCOME INSECURITY: IN THE PAST 12 MONTHS HAS THE ELECTRIC, GAS, OIL, OR WATER COMPANY THREATENED TO SHUT OFF SERVICES IN YOUR HOME?: NO

## 2025-08-26 SDOH — ECONOMIC STABILITY: TRANSPORTATION INSECURITY: IN THE PAST 12 MONTHS, HAS LACK OF TRANSPORTATION KEPT YOU FROM MEDICAL APPOINTMENTS OR FROM GETTING MEDICATIONS?: NO

## 2025-08-26 SDOH — SOCIAL STABILITY: SOCIAL INSECURITY: DO YOU FEEL ANYONE HAS EXPLOITED OR TAKEN ADVANTAGE OF YOU FINANCIALLY OR OF YOUR PERSONAL PROPERTY?: NO

## 2025-08-26 SDOH — SOCIAL STABILITY: SOCIAL INSECURITY: WITHIN THE LAST YEAR, HAVE YOU BEEN AFRAID OF YOUR PARTNER OR EX-PARTNER?: NO

## 2025-08-26 SDOH — ECONOMIC STABILITY: HOUSING INSECURITY: IN THE PAST 12 MONTHS, HOW MANY TIMES HAVE YOU MOVED WHERE YOU WERE LIVING?: 0

## 2025-08-26 SDOH — SOCIAL STABILITY: SOCIAL INSECURITY: ARE YOU OR HAVE YOU BEEN THREATENED OR ABUSED PHYSICALLY, EMOTIONALLY, OR SEXUALLY BY ANYONE?: NO

## 2025-08-26 SDOH — ECONOMIC STABILITY: FOOD INSECURITY: HOW HARD IS IT FOR YOU TO PAY FOR THE VERY BASICS LIKE FOOD, HOUSING, MEDICAL CARE, AND HEATING?: NOT HARD AT ALL

## 2025-08-26 SDOH — SOCIAL STABILITY: SOCIAL INSECURITY: DOES ANYONE TRY TO KEEP YOU FROM HAVING/CONTACTING OTHER FRIENDS OR DOING THINGS OUTSIDE YOUR HOME?: NO

## 2025-08-26 SDOH — SOCIAL STABILITY: SOCIAL INSECURITY: DO YOU FEEL UNSAFE GOING BACK TO THE PLACE WHERE YOU ARE LIVING?: NO

## 2025-08-26 SDOH — SOCIAL STABILITY: SOCIAL INSECURITY: WERE YOU ABLE TO COMPLETE ALL THE BEHAVIORAL HEALTH SCREENINGS?: YES

## 2025-08-26 SDOH — SOCIAL STABILITY: SOCIAL INSECURITY: HAS ANYONE EVER THREATENED TO HURT YOUR FAMILY OR YOUR PETS?: NO

## 2025-08-26 ASSESSMENT — PAIN SCALES - GENERAL
PAINLEVEL_OUTOF10: 7
PAINLEVEL_OUTOF10: 8
PAINLEVEL_OUTOF10: 7
PAINLEVEL_OUTOF10: 8
PAINLEVEL_OUTOF10: 9
PAINLEVEL_OUTOF10: 9
PAINLEVEL_OUTOF10: 10 - WORST POSSIBLE PAIN
PAINLEVEL_OUTOF10: 8
PAINLEVEL_OUTOF10: 9
PAINLEVEL_OUTOF10: 7
PAINLEVEL_OUTOF10: 5 - MODERATE PAIN

## 2025-08-26 ASSESSMENT — ACTIVITIES OF DAILY LIVING (ADL)
LACK_OF_TRANSPORTATION: NO
JUDGMENT_ADEQUATE_SAFELY_COMPLETE_DAILY_ACTIVITIES: YES
LACK_OF_TRANSPORTATION: NO
HEARING - LEFT EAR: FUNCTIONAL
PATIENT'S MEMORY ADEQUATE TO SAFELY COMPLETE DAILY ACTIVITIES?: YES
HEARING - RIGHT EAR: FUNCTIONAL
BATHING: INDEPENDENT
WALKS IN HOME: INDEPENDENT
DRESSING YOURSELF: INDEPENDENT
TOILETING: INDEPENDENT
ADEQUATE_TO_COMPLETE_ADL: YES
GROOMING: INDEPENDENT
FEEDING YOURSELF: INDEPENDENT
LACK_OF_TRANSPORTATION: NO

## 2025-08-26 ASSESSMENT — COGNITIVE AND FUNCTIONAL STATUS - GENERAL
DAILY ACTIVITIY SCORE: 24
MOBILITY SCORE: 24
MOBILITY SCORE: 24
DAILY ACTIVITIY SCORE: 24
PATIENT BASELINE BEDBOUND: NO

## 2025-08-26 ASSESSMENT — PAIN DESCRIPTION - LOCATION
LOCATION: BACK
LOCATION: LEG
LOCATION: BACK

## 2025-08-26 ASSESSMENT — PAIN - FUNCTIONAL ASSESSMENT
PAIN_FUNCTIONAL_ASSESSMENT: 0-10

## 2025-08-26 ASSESSMENT — PAIN DESCRIPTION - DESCRIPTORS
DESCRIPTORS: ACHING
DESCRIPTORS: ACHING

## 2025-08-26 ASSESSMENT — LIFESTYLE VARIABLES
SKIP TO QUESTIONS 9-10: 1
HOW MANY STANDARD DRINKS CONTAINING ALCOHOL DO YOU HAVE ON A TYPICAL DAY: PATIENT DOES NOT DRINK
AUDIT-C TOTAL SCORE: 0
AUDIT-C TOTAL SCORE: 0
HOW OFTEN DO YOU HAVE 6 OR MORE DRINKS ON ONE OCCASION: NEVER
HOW OFTEN DO YOU HAVE A DRINK CONTAINING ALCOHOL: NEVER

## 2025-08-26 ASSESSMENT — PAIN DESCRIPTION - ORIENTATION
ORIENTATION: LOWER
ORIENTATION: LOWER

## 2025-08-26 ASSESSMENT — PATIENT HEALTH QUESTIONNAIRE - PHQ9
1. LITTLE INTEREST OR PLEASURE IN DOING THINGS: NOT AT ALL
2. FEELING DOWN, DEPRESSED OR HOPELESS: NOT AT ALL
SUM OF ALL RESPONSES TO PHQ9 QUESTIONS 1 & 2: 0

## 2025-08-27 PROBLEM — Z86.711 HX PULMONARY EMBOLISM: Status: ACTIVE | Noted: 2025-08-27

## 2025-08-27 ASSESSMENT — PAIN DESCRIPTION - ORIENTATION
ORIENTATION: LOWER

## 2025-08-27 ASSESSMENT — PAIN SCALES - GENERAL
PAINLEVEL_OUTOF10: 8
PAINLEVEL_OUTOF10: 6
PAINLEVEL_OUTOF10: 7
PAINLEVEL_OUTOF10: 8
PAINLEVEL_OUTOF10: 4
PAINLEVEL_OUTOF10: 8
PAINLEVEL_OUTOF10: 8
PAINLEVEL_OUTOF10: 5 - MODERATE PAIN

## 2025-08-27 ASSESSMENT — PAIN DESCRIPTION - LOCATION
LOCATION: OTHER (COMMENT)
LOCATION: BACK

## 2025-08-27 ASSESSMENT — PAIN - FUNCTIONAL ASSESSMENT
PAIN_FUNCTIONAL_ASSESSMENT: 0-10

## 2025-08-27 ASSESSMENT — PAIN DESCRIPTION - DESCRIPTORS
DESCRIPTORS: BURNING;ACHING
DESCRIPTORS: ACHING;BURNING
DESCRIPTORS: ACHING;DISCOMFORT;NAGGING
DESCRIPTORS: SHARP

## 2025-08-28 ASSESSMENT — PAIN - FUNCTIONAL ASSESSMENT
PAIN_FUNCTIONAL_ASSESSMENT: 0-10

## 2025-08-28 ASSESSMENT — PAIN SCALES - GENERAL
PAINLEVEL_OUTOF10: 8
PAINLEVEL_OUTOF10: 3
PAINLEVEL_OUTOF10: 8
PAINLEVEL_OUTOF10: 5 - MODERATE PAIN
PAINLEVEL_OUTOF10: 8
PAINLEVEL_OUTOF10: 3

## 2025-08-28 ASSESSMENT — ACTIVITIES OF DAILY LIVING (ADL): LACK_OF_TRANSPORTATION: NO

## 2025-08-28 ASSESSMENT — PAIN DESCRIPTION - DESCRIPTORS
DESCRIPTORS: SORE;DISCOMFORT
DESCRIPTORS: ACHING;DISCOMFORT;NAGGING
DESCRIPTORS: ACHING;DISCOMFORT;BURNING
DESCRIPTORS: ACHING;NAGGING
DESCRIPTORS: ACHING;NAGGING

## 2025-08-28 ASSESSMENT — PAIN DESCRIPTION - ORIENTATION
ORIENTATION: LOWER

## 2025-08-28 ASSESSMENT — PAIN DESCRIPTION - LOCATION
LOCATION: BACK
LOCATION: BACK
LOCATION: FACE
LOCATION: BACK
LOCATION: FACE

## 2025-08-29 ASSESSMENT — PAIN SCALES - GENERAL
PAINLEVEL_OUTOF10: 7
PAINLEVEL_OUTOF10: 8
PAINLEVEL_OUTOF10: 0 - NO PAIN
PAINLEVEL_OUTOF10: 7
PAINLEVEL_OUTOF10: 8
PAINLEVEL_OUTOF10: 7
PAINLEVEL_OUTOF10: 5 - MODERATE PAIN
PAINLEVEL_OUTOF10: 7
PAINLEVEL_OUTOF10: 7
PAINLEVEL_OUTOF10: 8
PAINLEVEL_OUTOF10: 7
PAINLEVEL_OUTOF10: 0 - NO PAIN
PAINLEVEL_OUTOF10: 9
PAINLEVEL_OUTOF10: 0 - NO PAIN
PAINLEVEL_OUTOF10: 7
PAINLEVEL_OUTOF10: 8
PAINLEVEL_OUTOF10: 7

## 2025-08-29 ASSESSMENT — PAIN - FUNCTIONAL ASSESSMENT

## 2025-08-29 ASSESSMENT — PAIN DESCRIPTION - LOCATION
LOCATION: GENERALIZED
LOCATION: FACE
LOCATION: GENERALIZED
LOCATION: FACE
LOCATION: BACK
LOCATION: GENERALIZED

## 2025-08-29 ASSESSMENT — COGNITIVE AND FUNCTIONAL STATUS - GENERAL
DAILY ACTIVITIY SCORE: 24
MOBILITY SCORE: 24

## 2025-08-29 ASSESSMENT — PAIN DESCRIPTION - DESCRIPTORS
DESCRIPTORS: SORE;DISCOMFORT
DESCRIPTORS: DISCOMFORT;SORE
DESCRIPTORS: SHARP
DESCRIPTORS: DISCOMFORT;SORE
DESCRIPTORS: SORE;DISCOMFORT
DESCRIPTORS: SHARP
DESCRIPTORS: SHARP

## 2025-08-29 ASSESSMENT — PAIN DESCRIPTION - ORIENTATION: ORIENTATION: LOWER

## 2025-08-30 ASSESSMENT — PAIN SCALES - GENERAL
PAINLEVEL_OUTOF10: 6
PAINLEVEL_OUTOF10: 7
PAINLEVEL_OUTOF10: 6
PAINLEVEL_OUTOF10: 6
PAINLEVEL_OUTOF10: 8
PAINLEVEL_OUTOF10: 7
PAINLEVEL_OUTOF10: 6
PAINLEVEL_OUTOF10: 0 - NO PAIN
PAINLEVEL_OUTOF10: 7
PAINLEVEL_OUTOF10: 7
PAINLEVEL_OUTOF10: 6
PAINLEVEL_OUTOF10: 0 - NO PAIN
PAINLEVEL_OUTOF10: 8
PAINLEVEL_OUTOF10: 7
PAINLEVEL_OUTOF10: 6
PAINLEVEL_OUTOF10: 8

## 2025-08-30 ASSESSMENT — PAIN - FUNCTIONAL ASSESSMENT

## 2025-08-30 ASSESSMENT — PAIN DESCRIPTION - DESCRIPTORS
DESCRIPTORS: DISCOMFORT;SORE
DESCRIPTORS: SORE;DISCOMFORT

## 2025-08-30 ASSESSMENT — PAIN DESCRIPTION - LOCATION
LOCATION: GENERALIZED
LOCATION: GENERALIZED
LOCATION: BACK
LOCATION: GENERALIZED
LOCATION: FACE

## 2025-08-31 ASSESSMENT — PAIN SCALES - GENERAL
PAINLEVEL_OUTOF10: 0 - NO PAIN
PAINLEVEL_OUTOF10: 4
PAINLEVEL_OUTOF10: 8
PAINLEVEL_OUTOF10: 8
PAINLEVEL_OUTOF10: 7
PAINLEVEL_OUTOF10: 0 - NO PAIN
PAINLEVEL_OUTOF10: 3
PAINLEVEL_OUTOF10: 7
PAINLEVEL_OUTOF10: 0 - NO PAIN
PAINLEVEL_OUTOF10: 7
PAINLEVEL_OUTOF10: 7
PAINLEVEL_OUTOF10: 3
PAINLEVEL_OUTOF10: 7
PAINLEVEL_OUTOF10: 0 - NO PAIN

## 2025-08-31 ASSESSMENT — COGNITIVE AND FUNCTIONAL STATUS - GENERAL
MOBILITY SCORE: 24
DAILY ACTIVITIY SCORE: 24

## 2025-08-31 ASSESSMENT — PAIN DESCRIPTION - DESCRIPTORS
DESCRIPTORS: ACHING;DULL
DESCRIPTORS: DISCOMFORT;SORE
DESCRIPTORS: ACHING;SORE
DESCRIPTORS: SORE;DISCOMFORT
DESCRIPTORS: DISCOMFORT;SORE
DESCRIPTORS: SORE;DULL
DESCRIPTORS: DISCOMFORT;SORE

## 2025-08-31 ASSESSMENT — PAIN DESCRIPTION - LOCATION
LOCATION: GENERALIZED
LOCATION: BACK
LOCATION: GENERALIZED

## (undated) DEVICE — INTENDED FOR TISSUE SEPARATION, AND OTHER PROCEDURES THAT REQUIRE A SHARP SURGICAL BLADE TO PUNCTURE OR CUT.: Brand: BARD-PARKER ® STAINLESS STEEL BLADES

## (undated) DEVICE — MAGNETIC INSTR DRAPE 20X16: Brand: MEDLINE INDUSTRIES, INC.

## (undated) DEVICE — LABEL MED 4 IN SURG PANEL W/ PEN STRL

## (undated) DEVICE — SYRINGE MED 10ML LUERLOCK TIP W/O SFTY DISP

## (undated) DEVICE — NEEDLE HYPO 25GA L1.5IN BLU POLYPR HUB S STL REG BVL STR

## (undated) DEVICE — TROCAR, OPTICAL, BLADELESS, 12MM, THREADED, 100MM LENGTH

## (undated) DEVICE — SHEET, T, LAPAROTOMY, STERILE: Brand: MEDLINE

## (undated) DEVICE — 5F (1.0MM ID) X 9CM STIFF5F (1.0 MICRO-STICK®INTRODUCER SE WITH NITINOL GUIDEWIREWITH NITIN WITH RADIOPAQUE TIPWITH RADIOPAQ: Brand: MICRO-STICK SETMICRO-STICK SET

## (undated) DEVICE — SOLUTION IRRIG 500ML 0.9% SOD CHLO USP POUR PLAS BTL

## (undated) DEVICE — 18 GA N.G. KIT, 10 PACK: Brand: SITE-RITE

## (undated) DEVICE — DRAPE SHEET: Brand: UNBRANDED

## (undated) DEVICE — DRAPE EQUIP BANDED BG 36X28 IN W/ROUNDED CORNER SNAPKOVER

## (undated) DEVICE — PUMP, STRYKERFLOW 2 & HANDPIECE W/10FT. IRRIGATION TUBING

## (undated) DEVICE — 3M™ IOBAN™ 2 ANTIMICROBIAL INCISE DRAPE 6640EZ: Brand: IOBAN™ 2

## (undated) DEVICE — SNAP KOVER: Brand: UNBRANDED

## (undated) DEVICE — CLOTH SURG PREP PREOPERATIVE CHLORHEXIDINE GLUC 2% READYPREP

## (undated) DEVICE — SYRINGE MED 10ML POLYPR LUERSLIP TIP FLAT TOP W/O SFTY DISP

## (undated) DEVICE — KIT CVC AD 7FR L20CM POLYUR BLU FLEXTIP ANTIMIC MULTILUMEN

## (undated) DEVICE — TRAY VCF/TESIO TRAY  REUSABLE

## (undated) DEVICE — GOWN,AURORA,NONREINF,RAGLAN,L,STERILE: Brand: MEDLINE

## (undated) DEVICE — RADIFOCUS GLIDECATH: Brand: GLIDECATH

## (undated) DEVICE — SPONGE, LAP, XRAY DECT, 18IN X 18IN, W/MASTER DMT, STERILE

## (undated) DEVICE — GLOVE SURG SZ 7.5 L11.73IN FNGR THK9.8MIL STRW LTX POLYMER

## (undated) DEVICE — SURGICAL PROCEDURE PACK VASC MAJ CUST

## (undated) DEVICE — Z DUP USE 2257490 ADHESIVE SKIN CLSRE 036ML TPCL 2CTL CNCRLTE HIGH VSCSTY DRMB

## (undated) DEVICE — SET SURG INSTR MINI VASC

## (undated) DEVICE — ELECTRODE PT RET AD L9FT HI MOIST COND ADH HYDRGEL CORDED

## (undated) DEVICE — SURGICAL PROCEDURE PACK BASIC

## (undated) DEVICE — APPLIER,  LIGACLIP, ENDO ROTATE, ROTATING, 10MM 20M/L, DISP

## (undated) DEVICE — GARMENT,MEDLINE,DVT,INT,CALF,MED, GEN2: Brand: MEDLINE

## (undated) DEVICE — SLEEVE, KII, Z-THREAD, 5X100CM

## (undated) DEVICE — APPLICATOR PREP 6ML 0.7% IOD POVACRYLEX 74% ISO ALC

## (undated) DEVICE — GOWN,SIRUS,FABRNF,XL,20/CS: Brand: MEDLINE

## (undated) DEVICE — GLOVE SURG SZ 75 STD WHT LTX SYN POLYMER BEAD REINF ANTI RL

## (undated) DEVICE — GLOVE ORANGE PI 8   MSG9080

## (undated) DEVICE — ADVANCE, 35LP LOW PROFILE PTA BALLOON DILATATION CATHETER: Brand: ADVANCE

## (undated) DEVICE — FIXED CORE WIRE GUIDE SAFE-T-J, CURVED: Brand: COOK

## (undated) DEVICE — SPECIMEN CUP W/LID: Brand: DEROYAL

## (undated) DEVICE — TROCAR SYSTEM, BALLOON, KII GELPORT, 12 X 100MM

## (undated) DEVICE — BLADE CLIPPER GEN PURP NS

## (undated) DEVICE — DRAPE C ARM UNIV W41XL74IN CLR PLAS XR VELC CLSR POLY STRP

## (undated) DEVICE — MEDIA CONTRAST ISOVUE  300 10X50ML

## (undated) DEVICE — SOLUTION IV 500ML 0.9% SOD CHL PH 5 INJ USP VIAFLX PLAS

## (undated) DEVICE — SOLUTION IRRIG 500ML 0.9% SOD CHL USP POUR PLAS BTL

## (undated) DEVICE — SKIN AFFIX SURG ADHESIVE 72/CS 0.55ML: Brand: MEDLINE

## (undated) DEVICE — SOLUTION IV IRRIG 500ML 0.9% SODIUM CHL 2F7123

## (undated) DEVICE — GLOVE SURG SZ 75 L12IN FNGR THK79MIL GRN LTX FREE

## (undated) DEVICE — GLOVE ORANGE PI 7 1/2   MSG9075

## (undated) DEVICE — CHECK-FLO PERFORMER INTRODUCER: Brand: PERFORMER

## (undated) DEVICE — DRAPE, SHEET, ENDOSCOPY, GENERAL, FENESTRATED, ARMBOARD COVER, 98 X 123.5 IN, DISPOSABLE, LF, STERILE

## (undated) DEVICE — APPLICATOR MEDICATED 26 CC SOLUTION HI LT ORNG CHLORAPREP

## (undated) DEVICE — TROCAR, KII OPTICAL BLADELESS 5MM Z THREAD 100MM LNGTH

## (undated) DEVICE — PENCIL,CAUTERY,ROCKER,PTFE,15'CORD: Brand: MEDLINE INDUSTRIES, INC.

## (undated) DEVICE — PATIENT RETURN ELECTRODE, SINGLE-USE, CONTACT QUALITY MONITORING, ADULT, WITH 9FT CORD, FOR PATIENTS WEIGING OVER 33LBS. (15KG): Brand: MEGADYNE

## (undated) DEVICE — MINOR VASCULAR: Brand: MEDLINE INDUSTRIES, INC.

## (undated) DEVICE — RETRIEVAL SYSTEM, MONARCH, 10MM DISP ENDOSCOPIC

## (undated) DEVICE — COVER, CART, 45 X 27 X 48 IN, CLEAR

## (undated) DEVICE — STANDARD HYPODERMIC NEEDLE,POLYPROPYLENE HUB: Brand: MONOJECT

## (undated) DEVICE — RADIFOCUS GLIDEWIRE ADVANTAGE GUIDEWIRE: Brand: GLIDEWIRE ADVANTAGE

## (undated) DEVICE — SCOPE WARMER, LAPAROSCOPE, BAG ONLY, LF

## (undated) DEVICE — GLOVE SURG SZ 75 CRM LTX FREE POLYISOPRENE POLYMER BEAD ANTI

## (undated) DEVICE — ANTIFOG, SOLUTION, FOG-OUT

## (undated) DEVICE — TOWEL,OR,DSP,ST,BLUE,STD,6/PK,12PK/CS: Brand: MEDLINE

## (undated) DEVICE — TUBE SET, PNEUMOCLEAR, SMOKE EVACU, HIGH-FLOW

## (undated) DEVICE — GOWN, SURGICAL, SMARTGOWN, XLARGE, STERILE

## (undated) DEVICE — Device

## (undated) DEVICE — MICROPUNCTURE INTRODUCER SET SILHOUETTE TRANSITIONLESS WITH STAINLESS STEEL WIRE GUIDE: Brand: MICROPUNCTURE

## (undated) DEVICE — TUBE, SALEM SUMP, 16 FR X 48IN, ENFIT

## (undated) DEVICE — MANIFOLD, 4 PORT NEPTUNE STANDARD

## (undated) DEVICE — BENTSON WIRE GUIDE 20CM DISTAL FLEXIBILITY WITH SOFTENED TIP: Brand: BENTSON

## (undated) DEVICE — GLOVE SURG L12IN SZ 65FNGR THK94MIL TRNSLUC YEL LTX

## (undated) DEVICE — GLOVE SURG SZ 65 THK91MIL LTX FREE SYN POLYISOPRENE

## (undated) DEVICE — ADHESIVE SKIN CLOSURE TOP 36 CC HI VISC DERMBND MINI